# Patient Record
Sex: MALE | Race: WHITE | NOT HISPANIC OR LATINO | Employment: OTHER | URBAN - METROPOLITAN AREA
[De-identification: names, ages, dates, MRNs, and addresses within clinical notes are randomized per-mention and may not be internally consistent; named-entity substitution may affect disease eponyms.]

---

## 2019-08-16 NOTE — TELEPHONE ENCOUNTER
ONCOLOGY INTAKE: Records Information      APPT INFORMATION:  Referring provider:  Self Referred  Referring provider s clinic:  na  Reason for visit/diagnosis:  glioblastoma  Has patient been notified of appointment date and time?: yes, per pt wife    RECORDS INFORMATION:  Were the records received with the referral (via [employer])? no    Has patient been seen for any external appt for this diagnosis? yes    If yes, where? [facility]    Has patient had any imaging or procedures outside of [facility] for this condition? yes      If Yes, where? [facility]    ADDITIONAL INFORMATION:  Patient lives in both [place] and [place].  Patient will be moving to [place] on 8/30/2019.  Per pt wife patient was diagnosed with GBM in June 2019 and has been going through a clinical trial at [facility] in [facility].    Patient is being treated by Dr. Wilmer Moreno at [facility] who specifically referred patient to Dr. Avila.  [facility] contact number is [phone]

## 2019-08-19 NOTE — TELEPHONE ENCOUNTER
RECORDS STATUS - ALL OTHER DIAGNOSIS      RECORDS RECEIVED FROM: [facility]   DATE RECEIVED: 08/19/2019   NOTES STATUS DETAILS   OFFICE NOTE from referring provider YES CE   OFFICE NOTE from medical oncologist YES CE   DISCHARGE SUMMARY from hospital YES CE   DISCHARGE REPORT from the ER NA    OPERATIVE REPORT YES 06/27/2019/CE   MEDICATION LIST YES CE   CLINICAL TRIAL TREATMENTS TO DATE NA    LABS YES CE   PATHOLOGY REPORTS PENDING REQUEST SENT TO [facility] 08/19/2019   ANYTHING RELATED TO DIAGNOSIS NA    GENONOMIC TESTING NA    TYPE:     IMAGING (NEED IMAGES & REPORT) PENDING REQUEST SENT TO [facility] 08/19/2019   CT SCANS     MRI     MAMMO     ULTRASOUND     PET

## 2019-09-06 ENCOUNTER — PRE VISIT (OUTPATIENT)
Dept: ONCOLOGY | Facility: CLINIC | Age: 54
End: 2019-09-06

## 2019-09-06 ENCOUNTER — ONCOLOGY VISIT (OUTPATIENT)
Dept: ONCOLOGY | Facility: CLINIC | Age: 54
End: 2019-09-06
Attending: PSYCHIATRY & NEUROLOGY
Payer: COMMERCIAL

## 2019-09-06 ENCOUNTER — PATIENT OUTREACH (OUTPATIENT)
Dept: CARE COORDINATION | Facility: CLINIC | Age: 54
End: 2019-09-06

## 2019-09-06 VITALS
RESPIRATION RATE: 18 BRPM | HEIGHT: 69 IN | SYSTOLIC BLOOD PRESSURE: 134 MMHG | OXYGEN SATURATION: 97 % | HEART RATE: 85 BPM | DIASTOLIC BLOOD PRESSURE: 83 MMHG | TEMPERATURE: 97.9 F | WEIGHT: 162.5 LBS | BODY MASS INDEX: 24.07 KG/M2

## 2019-09-06 DIAGNOSIS — C71.9 GLIOBLASTOMA (H): Primary | ICD-10-CM

## 2019-09-06 PROCEDURE — G0463 HOSPITAL OUTPT CLINIC VISIT: HCPCS | Mod: ZF

## 2019-09-06 PROCEDURE — 99205 OFFICE O/P NEW HI 60 MIN: CPT | Mod: ZP | Performed by: PSYCHIATRY & NEUROLOGY

## 2019-09-06 RX ORDER — OLOPATADINE HYDROCHLORIDE 1 MG/ML
1-2 SOLUTION/ DROPS OPHTHALMIC DAILY PRN
Status: ON HOLD | COMMUNITY
Start: 2008-04-16 | End: 2021-01-01

## 2019-09-06 RX ORDER — LEVETIRACETAM 1000 MG/1
1000 TABLET ORAL 2 TIMES DAILY
COMMUNITY
Start: 2019-06-23 | End: 2020-01-01

## 2019-09-06 RX ORDER — SENNOSIDES A AND B 8.6 MG/1
1 TABLET, FILM COATED ORAL DAILY PRN
COMMUNITY
End: 2020-04-02

## 2019-09-06 RX ORDER — ACETAZOLAMIDE 250 MG/1
250 TABLET ORAL 2 TIMES DAILY PRN
COMMUNITY
Start: 2018-12-29 | End: 2020-05-11

## 2019-09-06 RX ORDER — FLUTICASONE PROPIONATE 50 MCG
1 SPRAY, SUSPENSION (ML) NASAL AT BEDTIME
Status: ON HOLD | COMMUNITY
Start: 2008-04-16 | End: 2021-01-01

## 2019-09-06 SDOH — HEALTH STABILITY: MENTAL HEALTH: HOW OFTEN DO YOU HAVE A DRINK CONTAINING ALCOHOL?: 2-3 TIMES A WEEK

## 2019-09-06 SDOH — HEALTH STABILITY: MENTAL HEALTH: HOW MANY STANDARD DRINKS CONTAINING ALCOHOL DO YOU HAVE ON A TYPICAL DAY?: 3 OR 4

## 2019-09-06 ASSESSMENT — MIFFLIN-ST. JEOR: SCORE: 1549.6

## 2019-09-06 ASSESSMENT — PAIN SCALES - GENERAL: PAINLEVEL: NO PAIN (0)

## 2019-09-06 NOTE — LETTER
9/6/2019       RE: Erasto Maher  [address]  [facility] [address]     Dear Colleague,    Thank you for referring your patient, Erasto Maher, to the [facility]. Please see a copy of my visit note below.    NEURO-ONCOLOGY INITIAL VISIT  Sep 6, 2019    CHIEF COMPLAINT: Mr. 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 He is currently managed by Dr. Wilmer Mckenna at [facility] on an upfront clinical trial utilizing concurrent chemoradiation with temozolomide and atezolizumab. He completed this portion of his treatment on 8/30/2019.  He is presenting to this initial clinic visit for evaluation and to also establish care locally.     Accompanying him to this visit is Judith (wife).       HISTORY OF PRESENT ILLNESS  A summary of the patient s oncologic history is as follows;   -PRESENTATION: New onset seizures; complex partial event with speech arrest and altered mental status lasting several minutes. Later had secondary generalization. Started Keppra.   -MR brain imaging with a ring enhancing lesion in the right parietal lobe.    -6/27/2019 SURGERY: Right temporal craniotomy for mass resection, gross total resection by Dr. Tam Barreto.  PATHOLOGY: Glioblastoma; IDH1 R132H wildtype, MGMT promoter unmethylated  -7/3/2019 CLINICAL TRIAL: Evaluated by Dr. Wilmer Mckenna at [facility], consented for clinical trial 2016-0867 (Standard of Care plus atezolizumab).  -7/22 - 8/30/2019 CHEMORADS: 60cGy with concurrent temozolomide 75mg/m2/day (145mg) plus atezolizumab Q2 weeks on clinical trial 2016-0867.  -9/6/2019 NEURO-ONC: Evaluated at the [facility].   -9/25/2019 Projected start date of adjuvant chemotherapy; temozolomide + atezolizumab 840 mg IV on clinical trial 2016-0867 + baseline, post-radiation imaging.    Today in clinic;   -Erasto does  "complain of becoming easily fatigued. Played golf yesterday + used a cart and left very tired afterwards. Generalized weakness/ not as conditioned, notes less muscle mass thorughout.   -About 1-2 mornings a week, experiences right lateral thigh numbness that resolves within 10 minutes of waking; seems consistent with meralgia paresthetica.   -Experienced mild to moderate constipation with treatment.   -Denies any headaches or abnormalities with vision.  -Mild cognitive slowing/ mental \"fog\", especially after starting on Keppra. Some difficulty with remembering names.   -No recurrent seizure events.   -Off all steroids.  -Mood is good, though more irritable when fatigued.     REVIEW OF SYSTEMS  A comprehensive ROS negative except as in HPI.      MEDICATIONS   Current Outpatient Medications   Medication Sig Dispense Refill     fluticasone (FLONASE) 50 MCG/ACT nasal spray Spray 1 spray in nostril       levETIRAcetam (KEPPRA) 1000 MG tablet Take 1,000 mg by mouth       loratadine (CLARITIN) 5 MG/5ML syrup        melatonin 5 MG tablet Take 5 mg by mouth       olopatadine (PATANOL) 0.1 % ophthalmic solution Apply 1-2 drops to eye       omeprazole (PRILOSEC) 20 MG DR capsule Take 1 capsule by mouth       senna (SENOKOT) 8.6 MG tablet Take 1 tablet by mouth       acetaZOLAMIDE (DIAMOX) 250 MG tablet Take 250 mg by mouth       DRUG ALLERGIES   Allergies   Allergen Reactions     Erythromycin Diarrhea     PN: LW Reaction: GI Upset  PN: LW Reaction: GI Upset       No Clinical Screening - See Comments Rash     PN: LW Other1: -cats, hairy dogs  PN: LW Other1: -cats, hairy dogs       PAST MEDICAL HISTORY   Glioblastoma  Epilepsy  Findings on a cardiac stress test  Basal cell carcinoma    PAST SURGICAL HISTORY   Craniotomy  Hernia   Appendectomy   Mohs procedure     SOCIAL HISTORY   History   Smoking Status     Never Smoker   Smokeless Tobacco     Never Used    Social History    Substance and Sexual Activity      Alcohol use: Not " "Currently        Frequency: 2-3 times a week        Drinks per session: 3 or 4     History   Drug Use Unknown   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   .     FAMILY HISTORY   Maternal Grandfather- Colon cancer.   Paternal grandmother- Diabetes.  Mother- Colon polyps.  Father- Colon polyps. Pre-diabetes.   Son- Type 1 diabetes.   Brother- Pre-diabetes.       PHYSICAL EXAMINATION  /83 (BP Location: Left arm, Patient Position: Sitting, Cuff Size: Adult Regular)   Pulse 85   Temp 97.9  F (36.6  C) (Oral)   Resp 18   Ht 1.74 m (5' 8.5\")   Wt 73.7 kg (162 lb 8 oz)   SpO2 97%   BMI 24.35 kg/m      Wt Readings from Last 2 Encounters:   09/06/19 73.7 kg (162 lb 8 oz)      Ht Readings from Last 2 Encounters:   09/06/19 1.74 m (5' 8.5\")     KPS: 100    -Generally well appearing.  -Throat: No oral thrush. Dry mouth.   -Respiratory: Normal breath sounds, no audible wheezing.   -Skin: No rashes. Healed head incision. Hair loss.   -Hematologic/ lymphatic: No abnormal bruising. No leg swelling.  -Psychiatric: Normal mood and affect. Pleasant, talkative.  -Neurologic:   MENTAL STATUS:     Alert, oriented to date.    Recall: Immediate 3/3, delayed 3/3.    Speech fluent. Comprehension intact to multi-step commands.   Normal naming, repetition. Able to read.   Good right-left orientation.     CRANIAL NERVES:     Discs flat on fundoscopy.    Pupils are equal, round, reactive to light.     Extraocular movements full, patient denies diplopia.     Visual fields full.     Facial sensation intact to light touch.   Symmetric facial movements.   Hearing intact.   Palate moves symmetrically.     Sternocleidomastoid and trapezius strength intact.   Tongue midline.  MOTOR:    Normal and symmetric tone.   Grossly 5/5 throughout.    No pronation or drift. No orbiting.   Able to rise from a chair without use of arms.   On toe/ heel walk, equal distance from floor to heels/ toes.   SENSATION:    Intact to light touch " throughout.  COORDINATION:   Intact finger-nose with eyes open and closed.   REFLEXES:    Normal and symmetric.    Toes not tested. No clonus. No Hoffmans.   No grasp.    GAIT:  Walks without assistance.   Good speed. Normal stride length and heel strike. Normal turns. Normal arm swing.   Able to toe, heel walk. Able to tandem walk.       MEDICAL RECORDS  Obtained and personally reviewed all available outside medical records in addition to reviewing any records available in our electronic system.     LABS  Personally reviewed all available lab results.     IMAGING  Personally reviewed post-operative MR brain imaging from July.      IMPRESSION  For the 60 minute appointment, more than 50% of the encounter was spent discussing in detail the nature of this tumor, providing emotional support, answering questions, and providing local resources.     With regard to cancer-directed therapy, a multimodal treatment approach first involves attempting a maximum safe surgical resection. In this case, a gross total resection was performed and Erasto has already undergone chemoradiotherapy with temozolomide in addition to atezolizumab on a clinical trial at [facility] Of note, MGMT promoter status was finalized as unmethylated. Discussed the meaning of this with Erasto.     PROBLEM LIST  Glioblastoma, MGMT unmethylated and IDH1 wildtype by IHC  Seizure  Chemotherapy-associated constipation    PLAN  -CANCER-DIRECTED THERAPY-  -Continue to follow with Dr. Wilmer Mckenna at [facility] and remain on clinical trial.   -Adjuvant chemotherapy with temozolomide and atezolizumab due to start later this month.   -Consideration for a lower dose of Zofran (4mg) when restarting temozolomide to possibly avoid treatment-associated constipation.     -STEROIDS-  -Currently off dexamethasone.    -SEIZURE MANAGEMENT-  -Given history of seizures, will continue current antiepileptic regimen of Keppra for the foreseeable future.  -Discussed risk  factors for breakthrough seizures. Discussed the social, infrequent use of alcohol. Provided instructions.     -Quality of life/ MOOD/ FATIGUE-  -Denies any mood issues. Becomes fatigued easily, continue to monitor.   -Continue to monitor mood as untreated/ undertreated depression can worsen fatigue, dysorexia, and quality of life.    -ADDITIONAL SUPPORTIVE MANAGEMENT-  -Provided instructions on combating dry mouth.   -Social work provided information about hair care.     Return to clinic as needed.     Ericka Avila MD  Neuro-oncology

## 2019-09-06 NOTE — PROGRESS NOTES
NEURO-ONCOLOGY INITIAL VISIT  Sep 6, 2019    CHIEF COMPLAINT: Mr. 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 He is currently managed by Dr. Wilmer Mckenna at [facility] on an upfront clinical trial utilizing concurrent chemoradiation with temozolomide and atezolizumab. He completed this portion of his treatment on 8/30/2019.  He is presenting to this initial clinic visit for evaluation and to also establish care locally.     Accompanying him to this visit is Judith (wife).       HISTORY OF PRESENT ILLNESS  A summary of the patient s oncologic history is as follows;   -PRESENTATION: New onset seizures; complex partial event with speech arrest and altered mental status lasting several minutes. Later had secondary generalization. Started Keppra.   -MR brain imaging with a ring enhancing lesion in the right parietal lobe.    -6/27/2019 SURGERY: Right temporal craniotomy for mass resection, gross total resection by Dr. Tam Barreto.  PATHOLOGY: Glioblastoma; IDH1 R132H wildtype, MGMT promoter unmethylated  -7/3/2019 CLINICAL TRIAL: Evaluated by Dr. Wilmer Mckenna at [facility], consented for clinical trial 4169-0600 (Standard of Care plus atezolizumab).  -7/22 - 8/30/2019 CHEMORADS: 60cGy with concurrent temozolomide 75mg/m2/day (145mg) plus atezolizumab Q2 weeks on clinical trial 0495-2838.  -9/6/2019 NEURO-ONC: Evaluated at the [facility].   -9/25/2019 Projected start date of adjuvant chemotherapy; temozolomide + atezolizumab 840 mg IV on clinical trial 6300-9580 + baseline, post-radiation imaging.    Today in clinic;   -Erasto does complain of becoming easily fatigued. Played golf yesterday + used a cart and left very tired afterwards. Generalized weakness/ not as conditioned, notes less muscle mass thorughout.   -About 1-2 mornings a week, experiences right lateral thigh  "numbness that resolves within 10 minutes of waking; seems consistent with meralgia paresthetica.   -Experienced mild to moderate constipation with treatment.   -Denies any headaches or abnormalities with vision.  -Mild cognitive slowing/ mental \"fog\", especially after starting on Keppra. Some difficulty with remembering names.   -No recurrent seizure events.   -Off all steroids.  -Mood is good, though more irritable when fatigued.     REVIEW OF SYSTEMS  A comprehensive ROS negative except as in HPI.      MEDICATIONS   Current Outpatient Medications   Medication Sig Dispense Refill     fluticasone (FLONASE) 50 MCG/ACT nasal spray Spray 1 spray in nostril       levETIRAcetam (KEPPRA) 1000 MG tablet Take 1,000 mg by mouth       loratadine (CLARITIN) 5 MG/5ML syrup        melatonin 5 MG tablet Take 5 mg by mouth       olopatadine (PATANOL) 0.1 % ophthalmic solution Apply 1-2 drops to eye       omeprazole (PRILOSEC) 20 MG DR capsule Take 1 capsule by mouth       senna (SENOKOT) 8.6 MG tablet Take 1 tablet by mouth       acetaZOLAMIDE (DIAMOX) 250 MG tablet Take 250 mg by mouth       DRUG ALLERGIES   Allergies   Allergen Reactions     Erythromycin Diarrhea     PN: LW Reaction: GI Upset  PN: LW Reaction: GI Upset       No Clinical Screening - See Comments Rash     PN: LW Other1: -cats, hairy dogs  PN: LW Other1: -cats, hairy dogs       PAST MEDICAL HISTORY   Glioblastoma  Epilepsy  Findings on a cardiac stress test  Basal cell carcinoma    PAST SURGICAL HISTORY   Craniotomy  Hernia   Appendectomy   Mohs procedure     SOCIAL HISTORY   History   Smoking Status     Never Smoker   Smokeless Tobacco     Never Used    Social History    Substance and Sexual Activity      Alcohol use: Not Currently        Frequency: 2-3 times a week        Drinks per session: 3 or 4     History   Drug Use Unknown   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   .     FAMILY HISTORY   Maternal Grandfather- Colon cancer.   Paternal grandmother- " "Diabetes.  Mother- Colon polyps.  Father- Colon polyps. Pre-diabetes.   Son- Type 1 diabetes.   Brother- Pre-diabetes.       PHYSICAL EXAMINATION  /83 (BP Location: Left arm, Patient Position: Sitting, Cuff Size: Adult Regular)   Pulse 85   Temp 97.9  F (36.6  C) (Oral)   Resp 18   Ht 1.74 m (5' 8.5\")   Wt 73.7 kg (162 lb 8 oz)   SpO2 97%   BMI 24.35 kg/m     Wt Readings from Last 2 Encounters:   09/06/19 73.7 kg (162 lb 8 oz)      Ht Readings from Last 2 Encounters:   09/06/19 1.74 m (5' 8.5\")     KPS: 100    -Generally well appearing.  -Throat: No oral thrush. Dry mouth.   -Respiratory: Normal breath sounds, no audible wheezing.   -Skin: No rashes. Healed head incision. Hair loss.   -Hematologic/ lymphatic: No abnormal bruising. No leg swelling.  -Psychiatric: Normal mood and affect. Pleasant, talkative.  -Neurologic:   MENTAL STATUS:     Alert, oriented to date.    Recall: Immediate 3/3, delayed 3/3.    Speech fluent. Comprehension intact to multi-step commands.   Normal naming, repetition. Able to read.   Good right-left orientation.     CRANIAL NERVES:     Discs flat on fundoscopy.    Pupils are equal, round, reactive to light.     Extraocular movements full, patient denies diplopia.     Visual fields full.     Facial sensation intact to light touch.   Symmetric facial movements.   Hearing intact.   Palate moves symmetrically.     Sternocleidomastoid and trapezius strength intact.   Tongue midline.  MOTOR:    Normal and symmetric tone.   Grossly 5/5 throughout.    No pronation or drift. No orbiting.   Able to rise from a chair without use of arms.   On toe/ heel walk, equal distance from floor to heels/ toes.   SENSATION:    Intact to light touch throughout.  COORDINATION:   Intact finger-nose with eyes open and closed.   REFLEXES:    Normal and symmetric.    Toes not tested. No clonus. No Hoffmans.   No grasp.    GAIT:  Walks without assistance.   Good speed. Normal stride length and heel strike. " Normal turns. Normal arm swing.   Able to toe, heel walk. Able to tandem walk.       MEDICAL RECORDS  Obtained and personally reviewed all available outside medical records in addition to reviewing any records available in our electronic system.     LABS  Personally reviewed all available lab results.     IMAGING  Personally reviewed post-operative MR brain imaging from July.      IMPRESSION  For the 60 minute appointment, more than 50% of the encounter was spent discussing in detail the nature of this tumor, providing emotional support, answering questions, and providing local resources.     With regard to cancer-directed therapy, a multimodal treatment approach first involves attempting a maximum safe surgical resection. In this case, a gross total resection was performed and Erasto has already undergone chemoradiotherapy with temozolomide in addition to atezolizumab on a clinical trial at [facility]. Of note, MGMT promoter status was finalized as unmethylated. Discussed the meaning of this with Erasto.     PROBLEM LIST  Glioblastoma, MGMT unmethylated and IDH1 wildtype by IHC  Seizure  Chemotherapy-associated constipation    PLAN  -CANCER-DIRECTED THERAPY-  -Continue to follow with Dr. Wilmer Mckenna at [facility] and remain on clinical trial.   -Adjuvant chemotherapy with temozolomide and atezolizumab due to start later this month.   -Consideration for a lower dose of Zofran (4mg) when restarting temozolomide to possibly avoid treatment-associated constipation.     -STEROIDS-  -Currently off dexamethasone.    -SEIZURE MANAGEMENT-  -Given history of seizures, will continue current antiepileptic regimen of Keppra for the foreseeable future.  -Discussed risk factors for breakthrough seizures. Discussed the social, infrequent use of alcohol. Provided instructions.     -Quality of life/ MOOD/ FATIGUE-  -Denies any mood issues. Becomes fatigued easily, continue to monitor.   -Continue to monitor mood as untreated/  undertreated depression can worsen fatigue, dysorexia, and quality of life.    -ADDITIONAL SUPPORTIVE MANAGEMENT-  -Provided instructions on combating dry mouth.   -Social work provided information about hair care.     Return to clinic as needed.     Ericka Avila MD  Neuro-oncology

## 2019-09-06 NOTE — NURSING NOTE
"Oncology Rooming Note    September 6, 2019 10:55 AM   Erasto Maher is a 54 year old male who presents for:    No chief complaint on file.    Initial Vitals: /83 (BP Location: Left arm, Patient Position: Sitting, Cuff Size: Adult Regular)   Pulse 85   Temp 97.9  F (36.6  C) (Oral)   Resp 18   Ht 1.74 m (5' 8.5\")   Wt 73.7 kg (162 lb 8 oz)   SpO2 97%   BMI 24.35 kg/m   Estimated body mass index is 24.35 kg/m  as calculated from the following:    Height as of this encounter: 1.74 m (5' 8.5\").    Weight as of this encounter: 73.7 kg (162 lb 8 oz). Body surface area is 1.89 meters squared.  No Pain (0) Comment: Data Unavailable   No LMP for male patient.  Allergies reviewed: Yes  Medications reviewed: Yes    Medications: Medication refills not needed today.  Pharmacy name entered into hi5: [id number] [place] [id number] AT [facility]    Clinical concerns: No new concerns. Provider was notified.      Xi Michel LPN              "

## 2019-09-06 NOTE — PROGRESS NOTES
Social Work Follow-Up Encounter Visit  Oncology Clinic    Data/Intervention:  Patient Name:  Erasto Maher  /Age:  1965 (54 year old)    Reason for Follow-Up:  Hair loss salon recommendation request    Collaborated With:    -Dr Avila  -patient  -pt's spouse    Resources Provided:  [facility] (formerly [facility])     Assessment:  Received request from Dr Avila, pt inquiring about recommendation for hair loss salon recommendation.  SW met with pt and pt's spouse to offer information for [facility].  Pt and pt's spouse appreciative of information.  No other questions or concerns for SW at this time.      Plan:  SW available as necessary.           INDIA Fischer, MSW   - [facility]  Phone: [phone]  Pager: [phone]  2019

## 2019-09-06 NOTE — PATIENT INSTRUCTIONS
Social work regarding hair care.     Plan for dry mouth:  -Good oral hygiene is very important.   -Keep mouth and lips moist by rinsing with water frequently (every 2 hours while awake). Salt or baking soda (1/2 to 1 teaspoon in 8 ounces of water) can be added for improved effect as well as using a commercially available saliva substitute, like Biotene.  -Apply lip moisturizer/chap stick.  -Suck on hard candies.    ALCOHOL USE:  Discussed 1 drink/ day. A drink is defined as;   12-ounces of beer (5% alcohol content).   8-ounces of malt liquor (7% alcohol content).   5-ounces of wine (12% alcohol content).   1.5-ounces or a  shot  of 80-proof (40% alcohol content) distilled spirits or liquor (e.g., gin, rum, vodka, whiskey).    Consideration for a lower dose of Zofran (4mg) when restarting temozolomide to possibly avoid treatment-associated constipation.     Continue treatment on clinical trial through [facility].     Return to clinic as needed. Please continue to keep us updated.     Ericka Avila MD  Neuro-oncology  9/6/2019

## 2020-01-01 ENCOUNTER — ANCILLARY PROCEDURE (OUTPATIENT)
Dept: MRI IMAGING | Facility: CLINIC | Age: 55
End: 2020-01-01
Attending: PSYCHIATRY & NEUROLOGY
Payer: COMMERCIAL

## 2020-01-01 ENCOUNTER — ONCOLOGY VISIT (OUTPATIENT)
Dept: RADIATION ONCOLOGY | Facility: CLINIC | Age: 55
End: 2020-01-01

## 2020-01-01 ENCOUNTER — TUMOR CONFERENCE (OUTPATIENT)
Dept: ONCOLOGY | Facility: CLINIC | Age: 55
End: 2020-01-01

## 2020-01-01 ENCOUNTER — PRE VISIT (OUTPATIENT)
Dept: RADIATION ONCOLOGY | Facility: CLINIC | Age: 55
End: 2020-01-01

## 2020-01-01 ENCOUNTER — OFFICE VISIT (OUTPATIENT)
Dept: RADIATION ONCOLOGY | Facility: CLINIC | Age: 55
End: 2020-01-01
Attending: RADIOLOGY
Payer: COMMERCIAL

## 2020-01-01 ENCOUNTER — PREP FOR PROCEDURE (OUTPATIENT)
Dept: NEUROSURGERY | Facility: CLINIC | Age: 55
End: 2020-01-01

## 2020-01-01 ENCOUNTER — APPOINTMENT (OUTPATIENT)
Dept: LAB | Facility: CLINIC | Age: 55
End: 2020-01-01
Attending: PSYCHIATRY & NEUROLOGY
Payer: COMMERCIAL

## 2020-01-01 ENCOUNTER — TELEPHONE (OUTPATIENT)
Dept: ONCOLOGY | Facility: CLINIC | Age: 55
End: 2020-01-01

## 2020-01-01 ENCOUNTER — HOSPITAL ENCOUNTER (OUTPATIENT)
Dept: MRI IMAGING | Facility: CLINIC | Age: 55
End: 2020-12-04
Attending: RADIOLOGY
Payer: COMMERCIAL

## 2020-01-01 ENCOUNTER — TELEPHONE (OUTPATIENT)
Dept: NEUROSURGERY | Facility: CLINIC | Age: 55
End: 2020-01-01

## 2020-01-01 ENCOUNTER — VIRTUAL VISIT (OUTPATIENT)
Dept: NEUROSURGERY | Facility: CLINIC | Age: 55
End: 2020-01-01
Attending: NEUROLOGICAL SURGERY
Payer: COMMERCIAL

## 2020-01-01 ENCOUNTER — VIRTUAL VISIT (OUTPATIENT)
Dept: ONCOLOGY | Facility: CLINIC | Age: 55
End: 2020-01-01
Attending: PSYCHIATRY & NEUROLOGY
Payer: COMMERCIAL

## 2020-01-01 ENCOUNTER — PATIENT OUTREACH (OUTPATIENT)
Dept: ONCOLOGY | Facility: CLINIC | Age: 55
End: 2020-01-01

## 2020-01-01 ENCOUNTER — OFFICE VISIT (OUTPATIENT)
Dept: RADIATION ONCOLOGY | Facility: CLINIC | Age: 55
End: 2020-01-01
Attending: NEUROLOGICAL SURGERY
Payer: COMMERCIAL

## 2020-01-01 ENCOUNTER — HEALTH MAINTENANCE LETTER (OUTPATIENT)
Age: 55
End: 2020-01-01

## 2020-01-01 ENCOUNTER — PRE VISIT (OUTPATIENT)
Dept: ONCOLOGY | Facility: CLINIC | Age: 55
End: 2020-01-01

## 2020-01-01 ENCOUNTER — RESEARCH ENCOUNTER (OUTPATIENT)
Dept: ONCOLOGY | Facility: CLINIC | Age: 55
End: 2020-01-01

## 2020-01-01 ENCOUNTER — DOCUMENTATION ONLY (OUTPATIENT)
Dept: ONCOLOGY | Facility: CLINIC | Age: 55
End: 2020-01-01

## 2020-01-01 VITALS
TEMPERATURE: 97.8 F | WEIGHT: 168.2 LBS | OXYGEN SATURATION: 98 % | HEART RATE: 81 BPM | SYSTOLIC BLOOD PRESSURE: 131 MMHG | BODY MASS INDEX: 24.13 KG/M2 | RESPIRATION RATE: 16 BRPM | DIASTOLIC BLOOD PRESSURE: 84 MMHG

## 2020-01-01 VITALS
HEART RATE: 82 BPM | TEMPERATURE: 97 F | BODY MASS INDEX: 23.89 KG/M2 | SYSTOLIC BLOOD PRESSURE: 116 MMHG | OXYGEN SATURATION: 96 % | DIASTOLIC BLOOD PRESSURE: 80 MMHG | RESPIRATION RATE: 16 BRPM | WEIGHT: 166.5 LBS

## 2020-01-01 VITALS
OXYGEN SATURATION: 98 % | SYSTOLIC BLOOD PRESSURE: 127 MMHG | RESPIRATION RATE: 16 BRPM | DIASTOLIC BLOOD PRESSURE: 77 MMHG | HEART RATE: 73 BPM

## 2020-01-01 VITALS
DIASTOLIC BLOOD PRESSURE: 92 MMHG | OXYGEN SATURATION: 98 % | TEMPERATURE: 97.3 F | SYSTOLIC BLOOD PRESSURE: 138 MMHG | HEART RATE: 96 BPM | WEIGHT: 164.3 LBS | RESPIRATION RATE: 16 BRPM | BODY MASS INDEX: 23.57 KG/M2

## 2020-01-01 VITALS
RESPIRATION RATE: 16 BRPM | OXYGEN SATURATION: 96 % | HEART RATE: 80 BPM | SYSTOLIC BLOOD PRESSURE: 114 MMHG | DIASTOLIC BLOOD PRESSURE: 66 MMHG

## 2020-01-01 DIAGNOSIS — R45.89 DIFFICULTY COPING: Primary | ICD-10-CM

## 2020-01-01 DIAGNOSIS — C71.9 GLIOBLASTOMA (H): Primary | ICD-10-CM

## 2020-01-01 DIAGNOSIS — C71.3 MALIGNANT NEOPLASM OF PARIETAL LOBE OF BRAIN (H): Primary | ICD-10-CM

## 2020-01-01 DIAGNOSIS — T45.1X5A CHEMOTHERAPY INDUCED NEUTROPENIA (H): ICD-10-CM

## 2020-01-01 DIAGNOSIS — R45.89 DEPRESSED MOOD: ICD-10-CM

## 2020-01-01 DIAGNOSIS — G40.409 GENERALIZED TONIC-CLONIC SEIZURE (H): Primary | ICD-10-CM

## 2020-01-01 DIAGNOSIS — C71.3 MALIGNANT NEOPLASM OF PARIETAL LOBE OF BRAIN (H): ICD-10-CM

## 2020-01-01 DIAGNOSIS — Z51.11 CHEMOTHERAPY MANAGEMENT, ENCOUNTER FOR: ICD-10-CM

## 2020-01-01 DIAGNOSIS — R11.2 CHEMOTHERAPY-INDUCED NAUSEA AND VOMITING: ICD-10-CM

## 2020-01-01 DIAGNOSIS — C71.9 GLIOBLASTOMA (H): ICD-10-CM

## 2020-01-01 DIAGNOSIS — T45.1X5A CHEMOTHERAPY-INDUCED NAUSEA AND VOMITING: ICD-10-CM

## 2020-01-01 DIAGNOSIS — C71.9 GLIOBLASTOMA MULTIFORME OF BRAIN (H): Primary | ICD-10-CM

## 2020-01-01 DIAGNOSIS — R45.89 DIFFICULTY COPING: ICD-10-CM

## 2020-01-01 DIAGNOSIS — Z00.6 EXAMINATION OF PARTICIPANT OR CONTROL IN CLINICAL RESEARCH: ICD-10-CM

## 2020-01-01 DIAGNOSIS — D70.1 CHEMOTHERAPY INDUCED NEUTROPENIA (H): ICD-10-CM

## 2020-01-01 DIAGNOSIS — Z11.59 ENCOUNTER FOR SCREENING FOR OTHER VIRAL DISEASES: Primary | ICD-10-CM

## 2020-01-01 DIAGNOSIS — G93.6 BRAIN SWELLING (H): Primary | ICD-10-CM

## 2020-01-01 LAB
ALBUMIN SERPL-MCNC: 3.5 G/DL (ref 3.4–5)
ALBUMIN SERPL-MCNC: 3.7 G/DL (ref 3.4–5)
ALBUMIN SERPL-MCNC: 3.8 G/DL (ref 3.4–5)
ALP SERPL-CCNC: 59 U/L (ref 40–150)
ALP SERPL-CCNC: 60 U/L (ref 40–150)
ALP SERPL-CCNC: 62 U/L (ref 40–150)
ALT SERPL W P-5'-P-CCNC: 26 U/L (ref 0–70)
ALT SERPL W P-5'-P-CCNC: 28 U/L (ref 0–70)
ALT SERPL W P-5'-P-CCNC: 28 U/L (ref 0–70)
ANION GAP SERPL CALCULATED.3IONS-SCNC: 4 MMOL/L (ref 3–14)
ANION GAP SERPL CALCULATED.3IONS-SCNC: 4 MMOL/L (ref 3–14)
ANION GAP SERPL CALCULATED.3IONS-SCNC: 5 MMOL/L (ref 3–14)
AST SERPL W P-5'-P-CCNC: 13 U/L (ref 0–45)
AST SERPL W P-5'-P-CCNC: 15 U/L (ref 0–45)
AST SERPL W P-5'-P-CCNC: 17 U/L (ref 0–45)
BASOPHILS # BLD AUTO: 0 10E9/L (ref 0–0.2)
BASOPHILS NFR BLD AUTO: 0.5 %
BASOPHILS NFR BLD AUTO: 0.6 %
BASOPHILS NFR BLD AUTO: 0.7 %
BILIRUB SERPL-MCNC: 0.4 MG/DL (ref 0.2–1.3)
BILIRUB SERPL-MCNC: 0.5 MG/DL (ref 0.2–1.3)
BILIRUB SERPL-MCNC: 0.6 MG/DL (ref 0.2–1.3)
BUN SERPL-MCNC: 12 MG/DL (ref 7–30)
BUN SERPL-MCNC: 16 MG/DL (ref 7–30)
BUN SERPL-MCNC: 19 MG/DL (ref 7–30)
CALCIUM SERPL-MCNC: 8.7 MG/DL (ref 8.5–10.1)
CALCIUM SERPL-MCNC: 8.9 MG/DL (ref 8.5–10.1)
CALCIUM SERPL-MCNC: 9 MG/DL (ref 8.5–10.1)
CHLORIDE SERPL-SCNC: 104 MMOL/L (ref 94–109)
CHLORIDE SERPL-SCNC: 105 MMOL/L (ref 94–109)
CHLORIDE SERPL-SCNC: 107 MMOL/L (ref 94–109)
CO2 SERPL-SCNC: 27 MMOL/L (ref 20–32)
CO2 SERPL-SCNC: 29 MMOL/L (ref 20–32)
CO2 SERPL-SCNC: 30 MMOL/L (ref 20–32)
CREAT SERPL-MCNC: 0.84 MG/DL (ref 0.66–1.25)
CREAT SERPL-MCNC: 0.84 MG/DL (ref 0.66–1.25)
CREAT SERPL-MCNC: 0.87 MG/DL (ref 0.66–1.25)
DIFFERENTIAL METHOD BLD: ABNORMAL
EOSINOPHIL # BLD AUTO: 0.2 10E9/L (ref 0–0.7)
EOSINOPHIL # BLD AUTO: 0.2 10E9/L (ref 0–0.7)
EOSINOPHIL # BLD AUTO: 0.3 10E9/L (ref 0–0.7)
EOSINOPHIL NFR BLD AUTO: 4 %
EOSINOPHIL NFR BLD AUTO: 6.2 %
EOSINOPHIL NFR BLD AUTO: 7.5 %
ERYTHROCYTE [DISTWIDTH] IN BLOOD BY AUTOMATED COUNT: 12.7 % (ref 10–15)
ERYTHROCYTE [DISTWIDTH] IN BLOOD BY AUTOMATED COUNT: 12.9 % (ref 10–15)
ERYTHROCYTE [DISTWIDTH] IN BLOOD BY AUTOMATED COUNT: 13.2 % (ref 10–15)
GFR SERPL CREATININE-BSD FRML MDRD: >90 ML/MIN/{1.73_M2}
GLUCOSE SERPL-MCNC: 102 MG/DL (ref 70–99)
GLUCOSE SERPL-MCNC: 118 MG/DL (ref 70–99)
GLUCOSE SERPL-MCNC: 94 MG/DL (ref 70–99)
HCT VFR BLD AUTO: 33.6 % (ref 40–53)
HCT VFR BLD AUTO: 36.5 % (ref 40–53)
HCT VFR BLD AUTO: 38.5 % (ref 40–53)
HGB BLD-MCNC: 11.4 G/DL (ref 13.3–17.7)
HGB BLD-MCNC: 12.6 G/DL (ref 13.3–17.7)
HGB BLD-MCNC: 13.4 G/DL (ref 13.3–17.7)
IMM GRANULOCYTES # BLD: 0 10E9/L (ref 0–0.4)
IMM GRANULOCYTES NFR BLD: 0.2 %
IMM GRANULOCYTES NFR BLD: 0.2 %
IMM GRANULOCYTES NFR BLD: 0.3 %
LYMPHOCYTES # BLD AUTO: 0.6 10E9/L (ref 0.8–5.3)
LYMPHOCYTES # BLD AUTO: 0.7 10E9/L (ref 0.8–5.3)
LYMPHOCYTES # BLD AUTO: 0.9 10E9/L (ref 0.8–5.3)
LYMPHOCYTES NFR BLD AUTO: 13.7 %
LYMPHOCYTES NFR BLD AUTO: 17.2 %
LYMPHOCYTES NFR BLD AUTO: 28 %
MCH RBC QN AUTO: 33.2 PG (ref 26.5–33)
MCH RBC QN AUTO: 33.9 PG (ref 26.5–33)
MCH RBC QN AUTO: 34.2 PG (ref 26.5–33)
MCHC RBC AUTO-ENTMCNC: 33.9 G/DL (ref 31.5–36.5)
MCHC RBC AUTO-ENTMCNC: 34.5 G/DL (ref 31.5–36.5)
MCHC RBC AUTO-ENTMCNC: 34.8 G/DL (ref 31.5–36.5)
MCV RBC AUTO: 101 FL (ref 78–100)
MCV RBC AUTO: 95 FL (ref 78–100)
MCV RBC AUTO: 98 FL (ref 78–100)
MONOCYTES # BLD AUTO: 0.2 10E9/L (ref 0–1.3)
MONOCYTES # BLD AUTO: 0.4 10E9/L (ref 0–1.3)
MONOCYTES # BLD AUTO: 0.5 10E9/L (ref 0–1.3)
MONOCYTES NFR BLD AUTO: 10.8 %
MONOCYTES NFR BLD AUTO: 6.2 %
MONOCYTES NFR BLD AUTO: 9.8 %
NEUTROPHILS # BLD AUTO: 1.9 10E9/L (ref 1.6–8.3)
NEUTROPHILS # BLD AUTO: 2.8 10E9/L (ref 1.6–8.3)
NEUTROPHILS # BLD AUTO: 3.2 10E9/L (ref 1.6–8.3)
NEUTROPHILS NFR BLD AUTO: 58.7 %
NEUTROPHILS NFR BLD AUTO: 64.8 %
NEUTROPHILS NFR BLD AUTO: 70.6 %
NRBC # BLD AUTO: 0 10*3/UL
NRBC BLD AUTO-RTO: 0 /100
PLATELET # BLD AUTO: 116 10E9/L (ref 150–450)
PLATELET # BLD AUTO: 126 10E9/L (ref 150–450)
PLATELET # BLD AUTO: 137 10E9/L (ref 150–450)
POTASSIUM SERPL-SCNC: 3.9 MMOL/L (ref 3.4–5.3)
POTASSIUM SERPL-SCNC: 4.1 MMOL/L (ref 3.4–5.3)
POTASSIUM SERPL-SCNC: 4.1 MMOL/L (ref 3.4–5.3)
PROT SERPL-MCNC: 6.8 G/DL (ref 6.8–8.8)
RBC # BLD AUTO: 3.33 10E12/L (ref 4.4–5.9)
RBC # BLD AUTO: 3.72 10E12/L (ref 4.4–5.9)
RBC # BLD AUTO: 4.04 10E12/L (ref 4.4–5.9)
SARS-COV-2 RNA SPEC QL NAA+PROBE: NOT DETECTED
SODIUM SERPL-SCNC: 138 MMOL/L (ref 133–144)
SODIUM SERPL-SCNC: 138 MMOL/L (ref 133–144)
SODIUM SERPL-SCNC: 139 MMOL/L (ref 133–144)
SPECIMEN SOURCE: NORMAL
WBC # BLD AUTO: 3.3 10E9/L (ref 4–11)
WBC # BLD AUTO: 4.3 10E9/L (ref 4–11)
WBC # BLD AUTO: 4.5 10E9/L (ref 4–11)

## 2020-01-01 PROCEDURE — 85025 COMPLETE CBC W/AUTO DIFF WBC: CPT | Performed by: PSYCHIATRY & NEUROLOGY

## 2020-01-01 PROCEDURE — 77300 RADIATION THERAPY DOSE PLAN: CPT | Performed by: RADIOLOGY

## 2020-01-01 PROCEDURE — 99214 OFFICE O/P EST MOD 30 MIN: CPT | Mod: GT | Performed by: PSYCHIATRY & NEUROLOGY

## 2020-01-01 PROCEDURE — G0463 HOSPITAL OUTPT CLINIC VISIT: HCPCS

## 2020-01-01 PROCEDURE — 70552 MRI BRAIN STEM W/DYE: CPT

## 2020-01-01 PROCEDURE — 255N000002 HC RX 255 OP 636: Performed by: RADIOLOGY

## 2020-01-01 PROCEDURE — 999N001193 HC VIDEO/TELEPHONE VISIT; NO CHARGE

## 2020-01-01 PROCEDURE — 70552 MRI BRAIN STEM W/DYE: CPT | Mod: 26 | Performed by: RADIOLOGY

## 2020-01-01 PROCEDURE — A9585 GADOBUTROL INJECTION: HCPCS | Performed by: RADIOLOGY

## 2020-01-01 PROCEDURE — G0463 HOSPITAL OUTPT CLINIC VISIT: HCPCS | Mod: 25 | Performed by: RADIOLOGY

## 2020-01-01 PROCEDURE — 77334 RADIATION TREATMENT AID(S): CPT | Performed by: RADIOLOGY

## 2020-01-01 PROCEDURE — 99215 OFFICE O/P EST HI 40 MIN: CPT | Mod: ZP | Performed by: PSYCHIATRY & NEUROLOGY

## 2020-01-01 PROCEDURE — 77336 RADIATION PHYSICS CONSULT: CPT | Performed by: RADIOLOGY

## 2020-01-01 PROCEDURE — 90791 PSYCH DIAGNOSTIC EVALUATION: CPT | Mod: TEL | Performed by: PSYCHOLOGIST

## 2020-01-01 PROCEDURE — 77295 3-D RADIOTHERAPY PLAN: CPT | Performed by: RADIOLOGY

## 2020-01-01 PROCEDURE — 99215 OFFICE O/P EST HI 40 MIN: CPT | Performed by: PSYCHIATRY & NEUROLOGY

## 2020-01-01 PROCEDURE — 77290 THER RAD SIMULAJ FIELD CPLX: CPT | Mod: 26 | Performed by: RADIOLOGY

## 2020-01-01 PROCEDURE — 77334 RADIATION TREATMENT AID(S): CPT | Mod: 26 | Performed by: RADIOLOGY

## 2020-01-01 PROCEDURE — 85025 COMPLETE CBC W/AUTO DIFF WBC: CPT | Performed by: PATHOLOGY

## 2020-01-01 PROCEDURE — 77295 3-D RADIOTHERAPY PLAN: CPT | Mod: 26 | Performed by: RADIOLOGY

## 2020-01-01 PROCEDURE — 61796 SRS CRANIAL LESION SIMPLE: CPT | Performed by: NEUROLOGICAL SURGERY

## 2020-01-01 PROCEDURE — 77373 STRTCTC BDY RAD THER TX DLVR: CPT | Performed by: RADIOLOGY

## 2020-01-01 PROCEDURE — 70553 MRI BRAIN STEM W/O & W/DYE: CPT | Mod: GC | Performed by: STUDENT IN AN ORGANIZED HEALTH CARE EDUCATION/TRAINING PROGRAM

## 2020-01-01 PROCEDURE — 77263 THER RADIOLOGY TX PLNG CPLX: CPT | Performed by: RADIOLOGY

## 2020-01-01 PROCEDURE — G0463 HOSPITAL OUTPT CLINIC VISIT: HCPCS | Mod: ZF

## 2020-01-01 PROCEDURE — 77370 RADIATION PHYSICS CONSULT: CPT | Performed by: RADIOLOGY

## 2020-01-01 PROCEDURE — 70553 MRI BRAIN STEM W/O & W/DYE: CPT | Mod: GC | Performed by: RADIOLOGY

## 2020-01-01 PROCEDURE — A9585 GADOBUTROL INJECTION: HCPCS | Performed by: STUDENT IN AN ORGANIZED HEALTH CARE EDUCATION/TRAINING PROGRAM

## 2020-01-01 PROCEDURE — 99215 OFFICE O/P EST HI 40 MIN: CPT | Mod: 95 | Performed by: PSYCHIATRY & NEUROLOGY

## 2020-01-01 PROCEDURE — 77470 SPECIAL RADIATION TREATMENT: CPT | Performed by: RADIOLOGY

## 2020-01-01 PROCEDURE — 99215 OFFICE O/P EST HI 40 MIN: CPT | Mod: GT | Performed by: NEUROLOGICAL SURGERY

## 2020-01-01 PROCEDURE — 80053 COMPREHEN METABOLIC PANEL: CPT | Performed by: PSYCHIATRY & NEUROLOGY

## 2020-01-01 PROCEDURE — U0003 INFECTIOUS AGENT DETECTION BY NUCLEIC ACID (DNA OR RNA); SEVERE ACUTE RESPIRATORY SYNDROME CORONAVIRUS 2 (SARS-COV-2) (CORONAVIRUS DISEASE [COVID-19]), AMPLIFIED PROBE TECHNIQUE, MAKING USE OF HIGH THROUGHPUT TECHNOLOGIES AS DESCRIBED BY CMS-2020-01-R: HCPCS | Performed by: RADIOLOGY

## 2020-01-01 PROCEDURE — 77470 SPECIAL RADIATION TREATMENT: CPT | Mod: 26 | Performed by: RADIOLOGY

## 2020-01-01 PROCEDURE — 77290 THER RAD SIMULAJ FIELD CPLX: CPT | Performed by: RADIOLOGY

## 2020-01-01 PROCEDURE — 99213 OFFICE O/P EST LOW 20 MIN: CPT | Mod: 25 | Performed by: RADIOLOGY

## 2020-01-01 PROCEDURE — 77300 RADIATION THERAPY DOSE PLAN: CPT | Mod: 26 | Performed by: RADIOLOGY

## 2020-01-01 PROCEDURE — 61797 SRS CRAN LES SIMPLE ADDL: CPT | Performed by: NEUROLOGICAL SURGERY

## 2020-01-01 PROCEDURE — 77435 SBRT MANAGEMENT: CPT | Performed by: RADIOLOGY

## 2020-01-01 PROCEDURE — 80053 COMPREHEN METABOLIC PANEL: CPT | Performed by: PATHOLOGY

## 2020-01-01 PROCEDURE — 36592 COLLECT BLOOD FROM PICC: CPT

## 2020-01-01 RX ORDER — ALBUTEROL SULFATE 0.83 MG/ML
2.5 SOLUTION RESPIRATORY (INHALATION)
Status: CANCELLED | OUTPATIENT
Start: 2021-01-01

## 2020-01-01 RX ORDER — DIPHENHYDRAMINE HYDROCHLORIDE 50 MG/ML
50 INJECTION INTRAMUSCULAR; INTRAVENOUS
Status: CANCELLED
Start: 2021-01-01

## 2020-01-01 RX ORDER — GADOBUTROL 604.72 MG/ML
7.5 INJECTION INTRAVENOUS ONCE
Status: COMPLETED | OUTPATIENT
Start: 2020-01-01 | End: 2020-01-01

## 2020-01-01 RX ORDER — LEVETIRACETAM 1000 MG/1
1000 TABLET ORAL 2 TIMES DAILY
Qty: 180 TABLET | Refills: 1 | Status: SHIPPED | OUTPATIENT
Start: 2020-01-01 | End: 2021-01-01

## 2020-01-01 RX ORDER — NALOXONE HYDROCHLORIDE 0.4 MG/ML
.1-.4 INJECTION, SOLUTION INTRAMUSCULAR; INTRAVENOUS; SUBCUTANEOUS
Status: CANCELLED | OUTPATIENT
Start: 2021-01-01

## 2020-01-01 RX ORDER — ALBUTEROL SULFATE 90 UG/1
1-2 AEROSOL, METERED RESPIRATORY (INHALATION)
Status: CANCELLED
Start: 2021-01-01

## 2020-01-01 RX ORDER — MEPERIDINE HYDROCHLORIDE 25 MG/ML
25 INJECTION INTRAMUSCULAR; INTRAVENOUS; SUBCUTANEOUS EVERY 30 MIN PRN
Status: CANCELLED | OUTPATIENT
Start: 2021-01-01

## 2020-01-01 RX ORDER — SODIUM CHLORIDE 9 MG/ML
1000 INJECTION, SOLUTION INTRAVENOUS CONTINUOUS PRN
Status: CANCELLED
Start: 2021-01-01

## 2020-01-01 RX ORDER — DEXAMETHASONE 2 MG/1
4 TABLET ORAL
Qty: 30 TABLET | Refills: 1 | Status: SHIPPED | OUTPATIENT
Start: 2020-01-01 | End: 2021-01-01

## 2020-01-01 RX ORDER — LORAZEPAM 2 MG/ML
0.5 INJECTION INTRAMUSCULAR EVERY 4 HOURS PRN
Status: CANCELLED
Start: 2021-01-01

## 2020-01-01 RX ORDER — EPINEPHRINE 1 MG/ML
0.3 INJECTION, SOLUTION, CONCENTRATE INTRAVENOUS EVERY 5 MIN PRN
Status: CANCELLED | OUTPATIENT
Start: 2021-01-01

## 2020-01-01 RX ADMIN — GADOBUTROL 7.5 ML: 604.72 INJECTION INTRAVENOUS at 07:28

## 2020-01-01 RX ADMIN — GADOBUTROL 7.5 ML: 604.72 INJECTION INTRAVENOUS at 14:45

## 2020-01-01 RX ADMIN — GADOBUTROL 7.5 ML: 604.72 INJECTION INTRAVENOUS at 07:25

## 2020-01-01 RX ADMIN — GADOBUTROL 7.5 ML: 604.72 INJECTION INTRAVENOUS at 07:47

## 2020-01-01 SDOH — HEALTH STABILITY: MENTAL HEALTH: HOW OFTEN DO YOU HAVE A DRINK CONTAINING ALCOHOL?: 2-4 TIMES A MONTH

## 2020-01-01 SDOH — ECONOMIC STABILITY: TRANSPORTATION INSECURITY
IN THE PAST 12 MONTHS, HAS LACK OF TRANSPORTATION KEPT YOU FROM MEETINGS, WORK, OR FROM GETTING THINGS NEEDED FOR DAILY LIVING?: NOT ASKED

## 2020-01-01 SDOH — ECONOMIC STABILITY: FOOD INSECURITY: WITHIN THE PAST 12 MONTHS, YOU WORRIED THAT YOUR FOOD WOULD RUN OUT BEFORE YOU GOT MONEY TO BUY MORE.: NOT ASKED

## 2020-01-01 SDOH — ECONOMIC STABILITY: FOOD INSECURITY: WITHIN THE PAST 12 MONTHS, THE FOOD YOU BOUGHT JUST DIDN'T LAST AND YOU DIDN'T HAVE MONEY TO GET MORE.: NOT ASKED

## 2020-01-01 SDOH — ECONOMIC STABILITY: INCOME INSECURITY: HOW HARD IS IT FOR YOU TO PAY FOR THE VERY BASICS LIKE FOOD, HOUSING, MEDICAL CARE, AND HEATING?: NOT ASKED

## 2020-01-01 SDOH — ECONOMIC STABILITY: TRANSPORTATION INSECURITY
IN THE PAST 12 MONTHS, HAS THE LACK OF TRANSPORTATION KEPT YOU FROM MEDICAL APPOINTMENTS OR FROM GETTING MEDICATIONS?: NOT ASKED

## 2020-01-01 SDOH — HEALTH STABILITY: MENTAL HEALTH: HOW OFTEN DO YOU HAVE 6 OR MORE DRINKS ON ONE OCCASION?: NOT ASKED

## 2020-01-01 ASSESSMENT — PAIN SCALES - GENERAL
PAINLEVEL: NO PAIN (0)

## 2020-01-01 ASSESSMENT — ENCOUNTER SYMPTOMS
TINGLING: 1
GASTROINTESTINAL NEGATIVE: 1
DEPRESSION: 1
NERVOUS/ANXIOUS: 1
EYES NEGATIVE: 1
CARDIOVASCULAR NEGATIVE: 1
RESPIRATORY NEGATIVE: 1

## 2020-02-11 ENCOUNTER — TRANSFERRED RECORDS (OUTPATIENT)
Dept: HEALTH INFORMATION MANAGEMENT | Facility: CLINIC | Age: 55
End: 2020-02-11

## 2020-03-28 ENCOUNTER — APPOINTMENT (OUTPATIENT)
Dept: MRI IMAGING | Facility: CLINIC | Age: 55
DRG: 054 | End: 2020-03-28
Attending: STUDENT IN AN ORGANIZED HEALTH CARE EDUCATION/TRAINING PROGRAM
Payer: COMMERCIAL

## 2020-03-28 ENCOUNTER — APPOINTMENT (OUTPATIENT)
Dept: CT IMAGING | Facility: CLINIC | Age: 55
DRG: 054 | End: 2020-03-28
Attending: EMERGENCY MEDICINE
Payer: COMMERCIAL

## 2020-03-28 ENCOUNTER — HOSPITAL ENCOUNTER (INPATIENT)
Facility: CLINIC | Age: 55
LOS: 2 days | Discharge: HOME | DRG: 054 | End: 2020-03-30
Attending: EMERGENCY MEDICINE | Admitting: NEUROLOGICAL SURGERY
Payer: COMMERCIAL

## 2020-03-28 DIAGNOSIS — C71.9 GLIOBLASTOMA (H): Primary | ICD-10-CM

## 2020-03-28 DIAGNOSIS — G93.6 VASOGENIC EDEMA (H): ICD-10-CM

## 2020-03-28 DIAGNOSIS — R11.2 NAUSEA AND VOMITING, INTRACTABILITY OF VOMITING NOT SPECIFIED, UNSPECIFIED VOMITING TYPE: ICD-10-CM

## 2020-03-28 PROBLEM — D49.6 BRAIN TUMOR (H): Status: ACTIVE | Noted: 2020-03-28

## 2020-03-28 LAB
ALBUMIN SERPL-MCNC: 4.1 G/DL (ref 3.4–5)
ALP SERPL-CCNC: 67 U/L (ref 40–150)
ALT SERPL W P-5'-P-CCNC: 27 U/L (ref 0–70)
ANION GAP SERPL CALCULATED.3IONS-SCNC: 5 MMOL/L (ref 3–14)
AST SERPL W P-5'-P-CCNC: 13 U/L (ref 0–45)
BILIRUB SERPL-MCNC: 0.5 MG/DL (ref 0.2–1.3)
BUN SERPL-MCNC: 16 MG/DL (ref 7–30)
CALCIUM SERPL-MCNC: 9.1 MG/DL (ref 8.5–10.1)
CHLORIDE SERPL-SCNC: 96 MMOL/L (ref 94–109)
CO2 SERPL-SCNC: 29 MMOL/L (ref 20–32)
CREAT BLD-MCNC: 0.7 MG/DL (ref 0.66–1.25)
CREAT SERPL-MCNC: 0.75 MG/DL (ref 0.66–1.25)
ERYTHROCYTE [DISTWIDTH] IN BLOOD BY AUTOMATED COUNT: 11.9 % (ref 10–15)
GFR SERPL CREATININE-BSD FRML MDRD: >90 ML/MIN/{1.73_M2}
GFR SERPL CREATININE-BSD FRML MDRD: >90 ML/MIN/{1.73_M2}
GLUCOSE BLDC GLUCOMTR-MCNC: 156 MG/DL (ref 70–99)
GLUCOSE SERPL-MCNC: 143 MG/DL (ref 70–99)
HCT VFR BLD AUTO: 39.9 % (ref 40–53)
HGB BLD-MCNC: 13.8 G/DL (ref 13.3–17.7)
MCH RBC QN AUTO: 31.5 PG (ref 26.5–33)
MCHC RBC AUTO-ENTMCNC: 34.6 G/DL (ref 31.5–36.5)
MCV RBC AUTO: 91 FL (ref 78–100)
OSMOLALITY SERPL: 274 MMOL/KG (ref 275–295)
OSMOLALITY UR: 582 MMOL/KG (ref 100–1200)
PLATELET # BLD AUTO: 222 10E9/L (ref 150–450)
POTASSIUM SERPL-SCNC: 3.6 MMOL/L (ref 3.4–5.3)
PROT SERPL-MCNC: 7.1 G/DL (ref 6.8–8.8)
RBC # BLD AUTO: 4.38 10E12/L (ref 4.4–5.9)
SODIUM SERPL-SCNC: 127 MMOL/L (ref 133–144)
SODIUM SERPL-SCNC: 130 MMOL/L (ref 133–144)
SODIUM SERPL-SCNC: 132 MMOL/L (ref 133–144)
SODIUM UR-SCNC: 153 MMOL/L
SP GR UR STRIP: 1.02 (ref 1–1.03)
WBC # BLD AUTO: 9.5 10E9/L (ref 4–11)

## 2020-03-28 PROCEDURE — 84295 ASSAY OF SERUM SODIUM: CPT | Performed by: STUDENT IN AN ORGANIZED HEALTH CARE EDUCATION/TRAINING PROGRAM

## 2020-03-28 PROCEDURE — 25000132 ZZH RX MED GY IP 250 OP 250 PS 637: Performed by: STUDENT IN AN ORGANIZED HEALTH CARE EDUCATION/TRAINING PROGRAM

## 2020-03-28 PROCEDURE — 96375 TX/PRO/DX INJ NEW DRUG ADDON: CPT | Performed by: EMERGENCY MEDICINE

## 2020-03-28 PROCEDURE — 36415 COLL VENOUS BLD VENIPUNCTURE: CPT | Performed by: NEUROLOGICAL SURGERY

## 2020-03-28 PROCEDURE — 81003 URINALYSIS AUTO W/O SCOPE: CPT | Performed by: STUDENT IN AN ORGANIZED HEALTH CARE EDUCATION/TRAINING PROGRAM

## 2020-03-28 PROCEDURE — 25000128 H RX IP 250 OP 636: Performed by: STUDENT IN AN ORGANIZED HEALTH CARE EDUCATION/TRAINING PROGRAM

## 2020-03-28 PROCEDURE — 80053 COMPREHEN METABOLIC PANEL: CPT | Performed by: EMERGENCY MEDICINE

## 2020-03-28 PROCEDURE — 25800030 ZZH RX IP 258 OP 636: Performed by: STUDENT IN AN ORGANIZED HEALTH CARE EDUCATION/TRAINING PROGRAM

## 2020-03-28 PROCEDURE — 12000001 ZZH R&B MED SURG/OB UMMC

## 2020-03-28 PROCEDURE — 25000131 ZZH RX MED GY IP 250 OP 636 PS 637: Performed by: STUDENT IN AN ORGANIZED HEALTH CARE EDUCATION/TRAINING PROGRAM

## 2020-03-28 PROCEDURE — 25500064 ZZH RX 255 OP 636: Performed by: NEUROLOGICAL SURGERY

## 2020-03-28 PROCEDURE — 70553 MRI BRAIN STEM W/O & W/DYE: CPT

## 2020-03-28 PROCEDURE — 83930 ASSAY OF BLOOD OSMOLALITY: CPT | Performed by: EMERGENCY MEDICINE

## 2020-03-28 PROCEDURE — 84300 ASSAY OF URINE SODIUM: CPT | Performed by: STUDENT IN AN ORGANIZED HEALTH CARE EDUCATION/TRAINING PROGRAM

## 2020-03-28 PROCEDURE — 96376 TX/PRO/DX INJ SAME DRUG ADON: CPT | Performed by: EMERGENCY MEDICINE

## 2020-03-28 PROCEDURE — 96374 THER/PROPH/DIAG INJ IV PUSH: CPT | Performed by: EMERGENCY MEDICINE

## 2020-03-28 PROCEDURE — 84295 ASSAY OF SERUM SODIUM: CPT | Performed by: NEUROLOGICAL SURGERY

## 2020-03-28 PROCEDURE — 25000128 H RX IP 250 OP 636: Performed by: EMERGENCY MEDICINE

## 2020-03-28 PROCEDURE — 99285 EMERGENCY DEPT VISIT HI MDM: CPT | Mod: Z6 | Performed by: EMERGENCY MEDICINE

## 2020-03-28 PROCEDURE — 82565 ASSAY OF CREATININE: CPT

## 2020-03-28 PROCEDURE — A9585 GADOBUTROL INJECTION: HCPCS | Performed by: NEUROLOGICAL SURGERY

## 2020-03-28 PROCEDURE — 99285 EMERGENCY DEPT VISIT HI MDM: CPT | Mod: 25 | Performed by: EMERGENCY MEDICINE

## 2020-03-28 PROCEDURE — BW28ZZZ COMPUTERIZED TOMOGRAPHY (CT SCAN) OF HEAD: ICD-10-PCS | Performed by: RADIOLOGY

## 2020-03-28 PROCEDURE — 36415 COLL VENOUS BLD VENIPUNCTURE: CPT | Performed by: STUDENT IN AN ORGANIZED HEALTH CARE EDUCATION/TRAINING PROGRAM

## 2020-03-28 PROCEDURE — 00000146 ZZHCL STATISTIC GLUCOSE BY METER IP

## 2020-03-28 PROCEDURE — 25000128 H RX IP 250 OP 636

## 2020-03-28 PROCEDURE — 96361 HYDRATE IV INFUSION ADD-ON: CPT | Performed by: EMERGENCY MEDICINE

## 2020-03-28 PROCEDURE — 83935 ASSAY OF URINE OSMOLALITY: CPT | Performed by: STUDENT IN AN ORGANIZED HEALTH CARE EDUCATION/TRAINING PROGRAM

## 2020-03-28 PROCEDURE — 99223 1ST HOSP IP/OBS HIGH 75: CPT | Performed by: INTERNAL MEDICINE

## 2020-03-28 PROCEDURE — 85027 COMPLETE CBC AUTOMATED: CPT | Performed by: EMERGENCY MEDICINE

## 2020-03-28 PROCEDURE — 70450 CT HEAD/BRAIN W/O DYE: CPT

## 2020-03-28 RX ORDER — SODIUM CHLORIDE 9 MG/ML
INJECTION, SOLUTION INTRAVENOUS CONTINUOUS
Status: DISCONTINUED | OUTPATIENT
Start: 2020-03-28 | End: 2020-03-30 | Stop reason: HOSPADM

## 2020-03-28 RX ORDER — AMOXICILLIN 250 MG
1 CAPSULE ORAL 2 TIMES DAILY
Status: DISCONTINUED | OUTPATIENT
Start: 2020-03-28 | End: 2020-03-30 | Stop reason: HOSPADM

## 2020-03-28 RX ORDER — CALCIUM CARBONATE 500 MG/1
500 TABLET, CHEWABLE ORAL DAILY PRN
Status: DISCONTINUED | OUTPATIENT
Start: 2020-03-28 | End: 2020-03-30 | Stop reason: HOSPADM

## 2020-03-28 RX ORDER — ONDANSETRON 2 MG/ML
4 INJECTION INTRAMUSCULAR; INTRAVENOUS EVERY 6 HOURS PRN
Status: DISCONTINUED | OUTPATIENT
Start: 2020-03-28 | End: 2020-03-28

## 2020-03-28 RX ORDER — GADOBUTROL 604.72 MG/ML
10 INJECTION INTRAVENOUS ONCE
Status: COMPLETED | OUTPATIENT
Start: 2020-03-28 | End: 2020-03-28

## 2020-03-28 RX ORDER — LABETALOL 20 MG/4 ML (5 MG/ML) INTRAVENOUS SYRINGE
10-40 EVERY 10 MIN PRN
Status: DISCONTINUED | OUTPATIENT
Start: 2020-03-28 | End: 2020-03-30 | Stop reason: HOSPADM

## 2020-03-28 RX ORDER — ACETAMINOPHEN 325 MG/1
650 TABLET ORAL EVERY 4 HOURS PRN
Status: DISCONTINUED | OUTPATIENT
Start: 2020-03-31 | End: 2020-03-30 | Stop reason: HOSPADM

## 2020-03-28 RX ORDER — ONDANSETRON 4 MG/1
4-8 TABLET, ORALLY DISINTEGRATING ORAL EVERY 6 HOURS PRN
Status: DISCONTINUED | OUTPATIENT
Start: 2020-03-28 | End: 2020-03-30 | Stop reason: HOSPADM

## 2020-03-28 RX ORDER — ONDANSETRON 2 MG/ML
4 INJECTION INTRAMUSCULAR; INTRAVENOUS ONCE
Status: COMPLETED | OUTPATIENT
Start: 2020-03-28 | End: 2020-03-28

## 2020-03-28 RX ORDER — AMOXICILLIN 250 MG
2 CAPSULE ORAL 2 TIMES DAILY
Status: DISCONTINUED | OUTPATIENT
Start: 2020-03-28 | End: 2020-03-30 | Stop reason: HOSPADM

## 2020-03-28 RX ORDER — HYDROMORPHONE HYDROCHLORIDE 1 MG/ML
0.5 INJECTION, SOLUTION INTRAMUSCULAR; INTRAVENOUS; SUBCUTANEOUS ONCE
Status: COMPLETED | OUTPATIENT
Start: 2020-03-28 | End: 2020-03-28

## 2020-03-28 RX ORDER — PANTOPRAZOLE SODIUM 40 MG/1
40 TABLET, DELAYED RELEASE ORAL AT BEDTIME
Status: DISCONTINUED | OUTPATIENT
Start: 2020-03-28 | End: 2020-03-30 | Stop reason: HOSPADM

## 2020-03-28 RX ORDER — HYDRALAZINE HYDROCHLORIDE 20 MG/ML
10-20 INJECTION INTRAMUSCULAR; INTRAVENOUS EVERY 30 MIN PRN
Status: DISCONTINUED | OUTPATIENT
Start: 2020-03-28 | End: 2020-03-30 | Stop reason: HOSPADM

## 2020-03-28 RX ORDER — LIDOCAINE 40 MG/G
CREAM TOPICAL
Status: DISCONTINUED | OUTPATIENT
Start: 2020-03-28 | End: 2020-03-30 | Stop reason: HOSPADM

## 2020-03-28 RX ORDER — ACETAMINOPHEN 325 MG/1
975 TABLET ORAL EVERY 8 HOURS
Status: DISCONTINUED | OUTPATIENT
Start: 2020-03-28 | End: 2020-03-30 | Stop reason: HOSPADM

## 2020-03-28 RX ORDER — ONDANSETRON 2 MG/ML
4-8 INJECTION INTRAMUSCULAR; INTRAVENOUS EVERY 6 HOURS PRN
Status: DISCONTINUED | OUTPATIENT
Start: 2020-03-28 | End: 2020-03-30 | Stop reason: HOSPADM

## 2020-03-28 RX ORDER — ONDANSETRON 4 MG/1
4 TABLET, ORALLY DISINTEGRATING ORAL EVERY 6 HOURS PRN
Status: DISCONTINUED | OUTPATIENT
Start: 2020-03-28 | End: 2020-03-28

## 2020-03-28 RX ORDER — HYDROMORPHONE HYDROCHLORIDE 1 MG/ML
INJECTION, SOLUTION INTRAMUSCULAR; INTRAVENOUS; SUBCUTANEOUS
Status: COMPLETED
Start: 2020-03-28 | End: 2020-03-28

## 2020-03-28 RX ORDER — NALOXONE HYDROCHLORIDE 0.4 MG/ML
.1-.4 INJECTION, SOLUTION INTRAMUSCULAR; INTRAVENOUS; SUBCUTANEOUS
Status: DISCONTINUED | OUTPATIENT
Start: 2020-03-28 | End: 2020-03-30 | Stop reason: HOSPADM

## 2020-03-28 RX ORDER — OXYCODONE HYDROCHLORIDE 5 MG/1
5-10 TABLET ORAL
Status: DISCONTINUED | OUTPATIENT
Start: 2020-03-28 | End: 2020-03-30 | Stop reason: HOSPADM

## 2020-03-28 RX ORDER — SIMETHICONE 80 MG
80 TABLET,CHEWABLE ORAL EVERY 6 HOURS PRN
Status: DISCONTINUED | OUTPATIENT
Start: 2020-03-28 | End: 2020-03-30 | Stop reason: HOSPADM

## 2020-03-28 RX ORDER — DEXAMETHASONE 4 MG/1
4 TABLET ORAL DAILY
Status: DISCONTINUED | OUTPATIENT
Start: 2020-03-28 | End: 2020-03-30 | Stop reason: HOSPADM

## 2020-03-28 RX ORDER — PANTOPRAZOLE SODIUM 40 MG/1
40 TABLET, DELAYED RELEASE ORAL
Status: DISCONTINUED | OUTPATIENT
Start: 2020-03-29 | End: 2020-03-28

## 2020-03-28 RX ORDER — ONDANSETRON 2 MG/ML
4-8 INJECTION INTRAMUSCULAR; INTRAVENOUS EVERY 6 HOURS PRN
Status: DISCONTINUED | OUTPATIENT
Start: 2020-03-28 | End: 2020-03-28

## 2020-03-28 RX ORDER — LEVETIRACETAM 500 MG/1
1000 TABLET ORAL EVERY 12 HOURS
Status: DISCONTINUED | OUTPATIENT
Start: 2020-03-28 | End: 2020-03-30 | Stop reason: HOSPADM

## 2020-03-28 RX ADMIN — PROCHLORPERAZINE EDISYLATE 10 MG: 5 INJECTION INTRAMUSCULAR; INTRAVENOUS at 12:46

## 2020-03-28 RX ADMIN — ONDANSETRON 4 MG: 2 INJECTION INTRAMUSCULAR; INTRAVENOUS at 11:22

## 2020-03-28 RX ADMIN — LABETALOL 20 MG/4 ML (5 MG/ML) INTRAVENOUS SYRINGE 20 MG: at 23:47

## 2020-03-28 RX ADMIN — Medication 1 MG: at 23:27

## 2020-03-28 RX ADMIN — LABETALOL 20 MG/4 ML (5 MG/ML) INTRAVENOUS SYRINGE 20 MG: at 13:47

## 2020-03-28 RX ADMIN — ONDANSETRON 4 MG: 4 TABLET, ORALLY DISINTEGRATING ORAL at 08:40

## 2020-03-28 RX ADMIN — OXYCODONE HYDROCHLORIDE 10 MG: 5 TABLET ORAL at 18:03

## 2020-03-28 RX ADMIN — HYDROMORPHONE HYDROCHLORIDE 0.5 MG: 1 INJECTION, SOLUTION INTRAMUSCULAR; INTRAVENOUS; SUBCUTANEOUS at 03:54

## 2020-03-28 RX ADMIN — LABETALOL 20 MG/4 ML (5 MG/ML) INTRAVENOUS SYRINGE 10 MG: at 23:27

## 2020-03-28 RX ADMIN — ONDANSETRON 4 MG: 2 INJECTION INTRAMUSCULAR; INTRAVENOUS at 04:35

## 2020-03-28 RX ADMIN — PANTOPRAZOLE SODIUM 40 MG: 40 TABLET, DELAYED RELEASE ORAL at 22:52

## 2020-03-28 RX ADMIN — LABETALOL 20 MG/4 ML (5 MG/ML) INTRAVENOUS SYRINGE 20 MG: at 16:41

## 2020-03-28 RX ADMIN — LEVETIRACETAM 1000 MG: 500 TABLET ORAL at 08:41

## 2020-03-28 RX ADMIN — DEXAMETHASONE 4 MG: 4 TABLET ORAL at 13:47

## 2020-03-28 RX ADMIN — HYDROMORPHONE HYDROCHLORIDE 1 MG: 1 INJECTION, SOLUTION INTRAMUSCULAR; INTRAVENOUS; SUBCUTANEOUS at 04:26

## 2020-03-28 RX ADMIN — SENNOSIDES AND DOCUSATE SODIUM 1 TABLET: 8.6; 5 TABLET ORAL at 20:33

## 2020-03-28 RX ADMIN — LABETALOL 20 MG/4 ML (5 MG/ML) INTRAVENOUS SYRINGE 10 MG: at 12:47

## 2020-03-28 RX ADMIN — OXYCODONE HYDROCHLORIDE 5 MG: 5 TABLET ORAL at 08:41

## 2020-03-28 RX ADMIN — OXYCODONE HYDROCHLORIDE 10 MG: 5 TABLET ORAL at 22:13

## 2020-03-28 RX ADMIN — PROCHLORPERAZINE EDISYLATE 10 MG: 5 INJECTION INTRAMUSCULAR; INTRAVENOUS at 18:03

## 2020-03-28 RX ADMIN — ONDANSETRON 8 MG: 2 INJECTION INTRAMUSCULAR; INTRAVENOUS at 15:23

## 2020-03-28 RX ADMIN — GADOBUTROL 10 ML: 604.72 INJECTION INTRAVENOUS at 14:46

## 2020-03-28 RX ADMIN — SODIUM CHLORIDE: 9 INJECTION, SOLUTION INTRAVENOUS at 20:40

## 2020-03-28 RX ADMIN — Medication 1 MG: at 04:26

## 2020-03-28 RX ADMIN — OXYCODONE HYDROCHLORIDE 5 MG: 5 TABLET ORAL at 13:47

## 2020-03-28 RX ADMIN — OXYCODONE HYDROCHLORIDE 5 MG: 5 TABLET ORAL at 15:23

## 2020-03-28 RX ADMIN — LEVETIRACETAM 1000 MG: 500 TABLET ORAL at 20:33

## 2020-03-28 RX ADMIN — LEVOTHYROXINE SODIUM 125 MCG: 0.12 TABLET ORAL at 07:00

## 2020-03-28 RX ADMIN — SENNOSIDES AND DOCUSATE SODIUM 2 TABLET: 8.6; 5 TABLET ORAL at 08:41

## 2020-03-28 RX ADMIN — LABETALOL 20 MG/4 ML (5 MG/ML) INTRAVENOUS SYRINGE 20 MG: at 17:11

## 2020-03-28 RX ADMIN — SODIUM CHLORIDE: 9 INJECTION, SOLUTION INTRAVENOUS at 05:44

## 2020-03-28 RX ADMIN — ACETAMINOPHEN 975 MG: 325 TABLET, FILM COATED ORAL at 16:37

## 2020-03-28 RX ADMIN — CALCIUM CARBONATE (ANTACID) CHEW TAB 500 MG 500 MG: 500 CHEW TAB at 16:37

## 2020-03-28 ASSESSMENT — ACTIVITIES OF DAILY LIVING (ADL)
ADLS_ACUITY_SCORE: 16
ADLS_ACUITY_SCORE: 15
ADLS_ACUITY_SCORE: 16

## 2020-03-28 ASSESSMENT — VISUAL ACUITY: OU: NORMAL ACUITY

## 2020-03-28 NOTE — ED PROVIDER NOTES
ED Provider Note  [facility]      History     Chief Complaint   Patient presents with     Headache     HPI  Erasto Maher is a 54 year old male with glioblastoma who presents to the ER with a primary complaint of headache.  He states that he is currently undergoing therapy, including with an experimental medication.  He states he took his oral chemotherapy this past week, believes he finished on Monday or Tuesday.  He states that it is not unusual for him to feel little off for a few days surrounding this.  However, he is noticed a more than normal level of disorientation, states that his balance is been off, he has been walking into things.  He states last night he put his shirt on backwards.  He states that even when he does feel off following this medication is typically gone by now.  He states that tonight he walked into a door frame, striking his nose.  He states that within a short while of this he started to develop headache.  It is primarily frontal.  He states that the headache started mildly and gradually worsened.  That he rarely gets headaches.  No blurred vision or slurred speech.  No numbness, tingling, weakness, aside from questionable mild weakness in the left arm while in the ambulance which is now completely resolved.  No fever, sore throat, chest pain, shortness of breath, cough, abdominal pain, diarrhea.  He states he was nauseous and did vomit tonight after the headache got bad.  He gets a majority of his cancer treatment through facility in [place].  He states that his wife did speak with someone here who referred him to the ER.    History reviewed. No pertinent past medical history.    History reviewed. No pertinent surgical history.    History reviewed. No pertinent family history.    Social History     Tobacco Use     Smoking status: Never Smoker     Smokeless tobacco: Never Used   Substance Use Topics     Alcohol use: Not Currently     Frequency: 2-3 times a  week     Drinks per session: 3 or 4         Past Medical History  History reviewed. No pertinent past medical history.  History reviewed. No pertinent surgical history.  No current outpatient medications on file.    Allergies   Allergen Reactions     Erythromycin Diarrhea     PN: LW Reaction: GI Upset  PN: LW Reaction: GI Upset       No Clinical Screening - See Comments Rash     PN: LW Other1: -cats, hairy dogs  PN: LW Other1: -cats, hairy dogs     Past medical history, past surgical history, medications, and allergies were reviewed with the patient. Additional pertinent items: None    Family History  History reviewed. No pertinent family history.  Family history was reviewed with the patient. Additional pertinent items: None    Social History  Social History     Tobacco Use     Smoking status: Never Smoker     Smokeless tobacco: Never Used   Substance Use Topics     Alcohol use: Not Currently     Frequency: 2-3 times a week     Drinks per session: 3 or 4     Drug use: Not Currently      Social history was reviewed with the patient. Additional pertinent items: None    Review of Systems  A complete review of systems was performed with pertinent positives and negatives noted in the HPI, and all other systems negative.    Physical Exam   BP: (!) 163/106  Pulse: 95  Heart Rate: 100  Temp: 97.9  F (36.6  C)  Resp: 16  Weight: 95.8 kg (211 lb 3.2 oz)  SpO2: 100 %  Physical Exam  Constitutional:       General: He is not in acute distress.     Appearance: He is not diaphoretic.   HENT:      Head: Atraumatic.   Eyes:      General: No scleral icterus.     Pupils: Pupils are equal, round, and reactive to light.   Neck:      Musculoskeletal: Neck supple.   Cardiovascular:      Rate and Rhythm: Normal rate.   Pulmonary:      Effort: No respiratory distress.   Abdominal:      Palpations: Abdomen is soft.      Tenderness: There is no abdominal tenderness.   Musculoskeletal:         General: No tenderness.   Skin:     General: Skin  is warm.      Findings: No rash.   Neurological:      General: No focal deficit present.      Mental Status: He is oriented to person, place, and time.      Cranial Nerves: No cranial nerve deficit.      Sensory: No sensory deficit.      Motor: No weakness.      Coordination: Coordination normal.      Comments: Mildly tremulous         ED Course      Procedures                         Results for orders placed or performed during the hospital encounter of 03/28/20   Head CT w/o contrast     Status: None    Narrative    EXAM: CT HEAD WITHOUT CONTRAST  LOCATION: [facility]  DATE/TIME: 03/28/2020, 4:15 AM    INDICATION: Patient with known glioblastoma presents with imbalance and disorientation.  COMPARISON: 06/28/2019.  TECHNIQUE: Routine without IV contrast. Multiplanar reformats. Dose reduction techniques were used.    FINDINGS:  INTRACRANIAL CONTENTS: Fairly extensive vasogenic edema is demonstrated concentrated in the posterior right frontal lobe, right parietal lobe and extending into the right temporal lobe. Possible 2.7 x 2.5 x 0.3 cm mass at the right frontoparietal   junction underlying the right-sided craniotomy. Mass effect upon the right lateral ventricle as well as 7 mm of right to left shift of midline structures. No acute intracranial hemorrhage or evidence of recent infarct.     VISUALIZED ORBITS/SINUSES/MASTOIDS: No intraorbital abnormality. No paranasal sinus mucosal disease. No middle ear or mastoid effusion.    BONES/SOFT TISSUES: Right frontoparietal craniotomy.      Impression    IMPRESSION:  1.  Fairly extensive vasogenic edema is now demonstrated in the posterior right frontal lobe, right parietal lobe extending into the right temporal lobe. This has increased compared to 06/28/2019.  2.  Possible 2.7 x 2.6 x 2.3 cm soft tissue mass in the right frontoparietal junction underlying the craniotomy at the site of previous surgical resection/debulking.  3.  There is intracranial mass  effect including 7 mm of right to left shift of midline structures.  4.  Not mentioned above, the right temporal horn is slightly larger than on the previous study and could be partially entrapped.     Glucose by meter     Status: Abnormal   Result Value Ref Range    Glucose 156 (H) 70 - 99 mg/dL   CBC with platelets     Status: Abnormal   Result Value Ref Range    WBC 9.5 4.0 - 11.0 10e9/L    RBC Count 4.38 (L) 4.4 - 5.9 10e12/L    Hemoglobin 13.8 13.3 - 17.7 g/dL    Hematocrit 39.9 (L) 40.0 - 53.0 %    MCV 91 78 - 100 fl    MCH 31.5 26.5 - 33.0 pg    MCHC 34.6 31.5 - 36.5 g/dL    RDW 11.9 10.0 - 15.0 %    Platelet Count 222 150 - 450 10e9/L   Comprehensive metabolic panel     Status: Abnormal   Result Value Ref Range    Sodium 130 (L) 133 - 144 mmol/L    Potassium 3.6 3.4 - 5.3 mmol/L    Chloride 96 94 - 109 mmol/L    Carbon Dioxide 29 20 - 32 mmol/L    Anion Gap 5 3 - 14 mmol/L    Glucose 143 (H) 70 - 99 mg/dL    Urea Nitrogen 16 7 - 30 mg/dL    Creatinine 0.75 0.66 - 1.25 mg/dL    GFR Estimate >90 >60 mL/min/[1.73_m2]    GFR Estimate If Black >90 >60 mL/min/[1.73_m2]    Calcium 9.1 8.5 - 10.1 mg/dL    Bilirubin Total 0.5 0.2 - 1.3 mg/dL    Albumin 4.1 3.4 - 5.0 g/dL    Protein Total 7.1 6.8 - 8.8 g/dL    Alkaline Phosphatase 67 40 - 150 U/L    ALT 27 0 - 70 U/L    AST 13 0 - 45 U/L   Creatinine POCT     Status: None   Result Value Ref Range    Creatinine 0.7 0.66 - 1.25 mg/dL    GFR Estimate >90 >60 mL/min/[1.73_m2]    GFR Estimate If Black >90 >60 mL/min/[1.73_m2]   Osmolality     Status: Abnormal   Result Value Ref Range    Osmolality 274 (L) 275 - 295 mmol/kg     Medications   sodium chloride 0.9% infusion ( Intravenous New Bag 3/28/20 4733)   levETIRAcetam (KEPPRA) tablet 1,000 mg (has no administration in time range)   levothyroxine (SYNTHROID/LEVOTHROID) tablet 125 mcg (has no administration in time range)   HYDROmorphone (PF) (DILAUDID) injection 0.5 mg (0.5 mg Intravenous Given 3/28/20 2051)    HYDROmorphone (DILAUDID) injection 1 mg (1 mg Intravenous Given 3/28/20 5793)   ondansetron (ZOFRAN) injection 4 mg (4 mg Intravenous Given 3/28/20 5002)        Assessments & Plan (with Medical Decision Making)   CT was done which shows notable vasogenic edema on the right, with midline shift.  Neurosurgery was called right away.  We did come and evaluate the patient, will admit him to observation for further evaluation and treatment.  He does appear neuro intact at this time.    Dictation Disclaimer: Some of this Note has been completed with voice-recognition dictation software. Although errors are generally corrected real-time, there is the potential for a rare error to be present in the completed chart.      I have reviewed the nursing notes. I have reviewed the findings, diagnosis, plan and need for follow up with the patient.    Current Discharge Medication List          Final diagnoses:   Glioblastoma (H)   Vasogenic edema (H)       --  Buffy Madrid MD   Emergency Medicine   [facility], [facility] EMERGENCY DEPARTMENT  3/28/2020     Buffy Madrid MD  03/28/20 0660

## 2020-03-28 NOTE — PLAN OF CARE
Status: Pt admitted to 6A this AM for neuro changes, known hx of GBM. C/o impaired coordination, HA, nausea  VS: HTN, labetalol given x2.   Neuros: Pt reports impaired coordination, HA, and nausea, otherwise is intact.   GI: Emesis x2 this shift. Received Zofran x2 and compazine x1 with some relief of nausea/vomiting.   : Voids spontaneously.   IV: MIVF at 75mL/hr infusing via PIV  Activity: Up SBA  Pain: c/o HA, received oxycodone x2 with some relief.   Respiratory: LS clear, equal throughout.   Drains/lines/skin: PIV x1  Labs/Tests: MRI completed this shift  New this shift: MRI done; pt's wife updated twice  Social: Pt in touch with his wife  Plan of care: Continue to monitor and follow current POC pending MRI

## 2020-03-28 NOTE — ED TRIAGE NOTES
Pt experienced the worst headache of his life approximately 2 hrs ago and nausea. He has a hx of glioblastoma and he got in touch with an oncologist here that was concerned for a brain bleed. He has been experiencing significant stress in his life because he lost his job due to COVID-19

## 2020-03-28 NOTE — ED NOTES
Bed: ED25  Expected date:   Expected time:   Means of arrival:   Comments:  [address] 54 M sudden onset of headache CA patient yellow [id number]

## 2020-03-28 NOTE — PLAN OF CARE
Pt admitted for worsening HA, nausea, and incoordination. Hx GBM. VSS on RA. Neuros include generalized weakness. NS infusing at 75 mL/hr. Up SBA. Pt to completed MRI this AM, form filled out and in chart. Continue to monitor and follow POC

## 2020-03-28 NOTE — H&P
[facility]       NEUROSURGERY  H&P NOTE    This consultation was requested by Dr. Madrid from the emergency service.    Reason for Consultation: headache glioblastoma    HPI: Erasto Maher is a 54 year old male with glioblastoma who presents to the ER with a primary complaint of headache after complete his most recent cycle of Temodar Monday last week. He states it's not unusual for him to experience some sides effects from Temodar, but the symptoms are worse than before and persist. He started to experience headache, worsening of coordination which caused him to walk into doors for the past two days. He endorse some subjective left arm weakness. No blurred vision or slurred speech.  No numbness, tingling.  No fever, sore throat, chest pain, shortness of breath, cough, abdominal pain, diarrhea.  He states he was nauseous and did vomit tonight after the headache got bad.  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 Barreto on 6/27/2019. He found out his glioblastoma after presented with seizure.  He is currently managed by Dr. Wilmer Mckenna at [facility] on an upfront clinical trial utilizing concurrent chemoradiation with temozolomide and atezolizumab. He completed this portion of his treatment on 8/30/2019.  He is taking Temodar managed by Dr. Ericka Avila.        PAST MEDICAL HISTORY: Glioblastoma, hypothyroidism  PAST SURGICAL HISTORY: Craniotomy for tumor reseciton in 2019  FAMILY HISTORY: Denies  SOCIAL HISTORY: Denies smoking, drinking    MEDICATIONS:  Keppra 1000 BID, synthroid 125 daily, denies bloodthinners    Allergies:  Allergies   Allergen Reactions     Erythromycin Diarrhea     PN: LW Reaction: GI Upset  PN: LW Reaction: GI Upset       No Clinical Screening - See Comments Rash     PN: LW  Other1: -cats, hairy dogs  PN: LW Other1: -cats, hairy dogs       ROS: 10 point ROS of systems including Constitutional, Eyes, Respiratory, Cardiovascular, Gastroenterology, Genitourinary, Integumentary, Muscularskeletal, Psychiatric were all negative except for pertinent positives noted in my HPI.    Physical exam:   Blood pressure (!) 150/94, pulse 91, temperature 95.9  F (35.5  C), temperature source Axillary, resp. rate 16, weight 95.8 kg (211 lb 3.2 oz), SpO2 95 %.  General: awake and alert  NEUROLOGIC:  -- Awake; Alert; oriented x 3  -- Follows commands briskly  -- +repetition, calculation, and naming  -- Speech fluent, spontaneous. No aphasia or dysarthria.  -- no gaze preference. No apparent hemineglect.  Cranial Nerves:  -- visual fields full to confrontation, PERRL 3-2mm bilat and brisk, extraocular movements intact  -- face symmetrical, tongue midline  -- sensory V1-V3 intact bilaterally  -- palate elevates symmetrically, uvula midline  -- hearing grossly intact bilat  -- Cerebellar: L dysmetria, difficulty with alternating rapid finer tapping on the L hand    Motor:  Normal bulk / tone; no tremor, rigidity, or bradykinesia.  No muscle wasting or fasciculations  No Pronator Drift     Delt Bi Tri Hand Flexion/  Extension Iliopsoas Quadriceps Hamstrings Tibialis Anterior Gastroc    C5 C6 C7 C8/T1 L2 L3 L4-S1 L4 S1   R 5 5 5 5 5 5 5 5 5   L 5 5 5 5 5 5 5 5 5   Sensory:  intact to LT x 4 extremities       IMAGING:  CT Large mass in the R fronto parietal mass, extensive vasogenic edema is now demonstrated in the posterior right frontal lobe, right parietal lobe extending into the right temporal lobe.     LABS:   BMP: Na 130    ASSESSMENT: Mr. Maher is a 54 year old man with history of glioblastoma s/p resection on atezolizumab trial and temodar and radiation presented with headache and worsening coordination after receiving temodar last week. CTH shows worsening edema.     PLAN  No neurosurgical  intervention indicated at this time   MRI with and without with stealth protocol  Serial neuro exams   Continue home keppra for seizure ppx  Decadron 4mg daily for cerebral edema (Dicussed with neuro oncology team from [facility])  Normotensive  Continuous pulse oximetry  Supplemental oxygen PRN  Incentive spirometry Q1H while awake  Advance diet as tolerated to regular  Bowel regimen. PRN anti-emetics.  Protonix for ulcer ppx while on decadron  IVF 75cc/hr, serial Na check, goal normonatremia  Electrolyte replacement protocol  Continue to monitor intake/output  DVT: SCDs while in bed  Disposition: 6A  PT/OT consulted    The patient was discussed with Dr. Vargas, neurosurgery chief resident and Dr. Tyler, neurosurgery attending, and they agree with the above.    Please contact neurosurgery resident on call with questions.    Dial * * [phone], enter [phone] when prompted.     Debora Crawford MD  Neurosurgery PGY2

## 2020-03-29 ENCOUNTER — APPOINTMENT (OUTPATIENT)
Dept: PHYSICAL THERAPY | Facility: CLINIC | Age: 55
DRG: 054 | End: 2020-03-29
Attending: STUDENT IN AN ORGANIZED HEALTH CARE EDUCATION/TRAINING PROGRAM
Payer: COMMERCIAL

## 2020-03-29 LAB
ANION GAP SERPL CALCULATED.3IONS-SCNC: 5 MMOL/L (ref 3–14)
BASOPHILS # BLD AUTO: 0 10E9/L (ref 0–0.2)
BASOPHILS NFR BLD AUTO: 0.1 %
BUN SERPL-MCNC: 9 MG/DL (ref 7–30)
CALCIUM SERPL-MCNC: 8.5 MG/DL (ref 8.5–10.1)
CHLORIDE SERPL-SCNC: 94 MMOL/L (ref 94–109)
CO2 SERPL-SCNC: 26 MMOL/L (ref 20–32)
CREAT SERPL-MCNC: 0.61 MG/DL (ref 0.66–1.25)
DIFFERENTIAL METHOD BLD: ABNORMAL
EOSINOPHIL # BLD AUTO: 0 10E9/L (ref 0–0.7)
EOSINOPHIL NFR BLD AUTO: 0.2 %
ERYTHROCYTE [DISTWIDTH] IN BLOOD BY AUTOMATED COUNT: 11.9 % (ref 10–15)
GFR SERPL CREATININE-BSD FRML MDRD: >90 ML/MIN/{1.73_M2}
GLUCOSE SERPL-MCNC: 124 MG/DL (ref 70–99)
HCT VFR BLD AUTO: 38.8 % (ref 40–53)
HGB BLD-MCNC: 13.4 G/DL (ref 13.3–17.7)
IMM GRANULOCYTES # BLD: 0 10E9/L (ref 0–0.4)
IMM GRANULOCYTES NFR BLD: 0.3 %
LYMPHOCYTES # BLD AUTO: 0.9 10E9/L (ref 0.8–5.3)
LYMPHOCYTES NFR BLD AUTO: 8.9 %
MAGNESIUM SERPL-MCNC: 1.6 MG/DL (ref 1.6–2.3)
MCH RBC QN AUTO: 30.6 PG (ref 26.5–33)
MCHC RBC AUTO-ENTMCNC: 34.5 G/DL (ref 31.5–36.5)
MCV RBC AUTO: 89 FL (ref 78–100)
MONOCYTES # BLD AUTO: 0.7 10E9/L (ref 0–1.3)
MONOCYTES NFR BLD AUTO: 6.9 %
NEUTROPHILS # BLD AUTO: 8.2 10E9/L (ref 1.6–8.3)
NEUTROPHILS NFR BLD AUTO: 83.6 %
NRBC # BLD AUTO: 0 10*3/UL
NRBC BLD AUTO-RTO: 0 /100
PHOSPHATE SERPL-MCNC: 3.4 MG/DL (ref 2.5–4.5)
PLATELET # BLD AUTO: 223 10E9/L (ref 150–450)
POTASSIUM SERPL-SCNC: 4.1 MMOL/L (ref 3.4–5.3)
RBC # BLD AUTO: 4.38 10E12/L (ref 4.4–5.9)
SODIUM SERPL-SCNC: 125 MMOL/L (ref 133–144)
SODIUM SERPL-SCNC: 126 MMOL/L (ref 133–144)
SODIUM SERPL-SCNC: 129 MMOL/L (ref 133–144)
SODIUM SERPL-SCNC: 136 MMOL/L (ref 133–144)
TSH SERPL DL<=0.005 MIU/L-ACNC: 13.7 MU/L (ref 0.4–4)
WBC # BLD AUTO: 9.8 10E9/L (ref 4–11)

## 2020-03-29 PROCEDURE — 83735 ASSAY OF MAGNESIUM: CPT | Performed by: NEUROLOGICAL SURGERY

## 2020-03-29 PROCEDURE — 80048 BASIC METABOLIC PNL TOTAL CA: CPT | Performed by: NEUROLOGICAL SURGERY

## 2020-03-29 PROCEDURE — 36415 COLL VENOUS BLD VENIPUNCTURE: CPT | Performed by: STUDENT IN AN ORGANIZED HEALTH CARE EDUCATION/TRAINING PROGRAM

## 2020-03-29 PROCEDURE — 25000132 ZZH RX MED GY IP 250 OP 250 PS 637: Performed by: STUDENT IN AN ORGANIZED HEALTH CARE EDUCATION/TRAINING PROGRAM

## 2020-03-29 PROCEDURE — 12000001 ZZH R&B MED SURG/OB UMMC

## 2020-03-29 PROCEDURE — 25000131 ZZH RX MED GY IP 250 OP 636 PS 637: Performed by: STUDENT IN AN ORGANIZED HEALTH CARE EDUCATION/TRAINING PROGRAM

## 2020-03-29 PROCEDURE — 97530 THERAPEUTIC ACTIVITIES: CPT | Mod: GP | Performed by: PHYSICAL THERAPIST

## 2020-03-29 PROCEDURE — 84443 ASSAY THYROID STIM HORMONE: CPT | Performed by: STUDENT IN AN ORGANIZED HEALTH CARE EDUCATION/TRAINING PROGRAM

## 2020-03-29 PROCEDURE — 36415 COLL VENOUS BLD VENIPUNCTURE: CPT | Performed by: NEUROLOGICAL SURGERY

## 2020-03-29 PROCEDURE — 84100 ASSAY OF PHOSPHORUS: CPT | Performed by: NEUROLOGICAL SURGERY

## 2020-03-29 PROCEDURE — 97116 GAIT TRAINING THERAPY: CPT | Mod: GP | Performed by: PHYSICAL THERAPIST

## 2020-03-29 PROCEDURE — 97162 PT EVAL MOD COMPLEX 30 MIN: CPT | Mod: GP | Performed by: PHYSICAL THERAPIST

## 2020-03-29 PROCEDURE — 25800030 ZZH RX IP 258 OP 636: Performed by: STUDENT IN AN ORGANIZED HEALTH CARE EDUCATION/TRAINING PROGRAM

## 2020-03-29 PROCEDURE — 85025 COMPLETE CBC W/AUTO DIFF WBC: CPT | Performed by: NEUROLOGICAL SURGERY

## 2020-03-29 PROCEDURE — 84295 ASSAY OF SERUM SODIUM: CPT | Performed by: STUDENT IN AN ORGANIZED HEALTH CARE EDUCATION/TRAINING PROGRAM

## 2020-03-29 RX ORDER — TEMOZOLOMIDE 250 MG/1
CAPSULE ORAL
COMMUNITY
End: 2020-04-02

## 2020-03-29 RX ORDER — ONDANSETRON 8 MG/1
TABLET, FILM COATED ORAL
COMMUNITY
End: 2020-04-02

## 2020-03-29 RX ORDER — SODIUM CHLORIDE 1 G/1
1 TABLET ORAL
Status: DISCONTINUED | OUTPATIENT
Start: 2020-03-29 | End: 2020-03-30 | Stop reason: HOSPADM

## 2020-03-29 RX ORDER — TEMOZOLOMIDE 140 MG/1
CAPSULE ORAL
COMMUNITY
End: 2020-04-02

## 2020-03-29 RX ORDER — LEVOTHYROXINE SODIUM 125 UG/1
125 TABLET ORAL EVERY MORNING
COMMUNITY
End: 2021-01-01

## 2020-03-29 RX ORDER — LORATADINE 10 MG/1
10 TABLET ORAL AT BEDTIME
Status: ON HOLD | COMMUNITY
End: 2021-01-01

## 2020-03-29 RX ORDER — LANOLIN ALCOHOL/MO/W.PET/CERES
3 CREAM (GRAM) TOPICAL
Status: DISCONTINUED | OUTPATIENT
Start: 2020-03-29 | End: 2020-03-30 | Stop reason: HOSPADM

## 2020-03-29 RX ADMIN — SENNOSIDES AND DOCUSATE SODIUM 2 TABLET: 8.6; 5 TABLET ORAL at 09:25

## 2020-03-29 RX ADMIN — SODIUM CHLORIDE: 9 INJECTION, SOLUTION INTRAVENOUS at 04:02

## 2020-03-29 RX ADMIN — LEVOTHYROXINE SODIUM 125 MCG: 0.12 TABLET ORAL at 09:26

## 2020-03-29 RX ADMIN — LEVETIRACETAM 1000 MG: 500 TABLET ORAL at 09:26

## 2020-03-29 RX ADMIN — LEVETIRACETAM 1000 MG: 500 TABLET ORAL at 20:33

## 2020-03-29 RX ADMIN — Medication 1 G: at 10:48

## 2020-03-29 RX ADMIN — Medication 1 G: at 17:28

## 2020-03-29 RX ADMIN — Medication 1 G: at 14:47

## 2020-03-29 RX ADMIN — PANTOPRAZOLE SODIUM 40 MG: 40 TABLET, DELAYED RELEASE ORAL at 20:33

## 2020-03-29 RX ADMIN — OXYCODONE HYDROCHLORIDE 5 MG: 5 TABLET ORAL at 04:11

## 2020-03-29 RX ADMIN — MELATONIN TAB 3 MG 3 MG: 3 TAB at 21:56

## 2020-03-29 RX ADMIN — DEXAMETHASONE 4 MG: 4 TABLET ORAL at 09:26

## 2020-03-29 RX ADMIN — ACETAMINOPHEN 975 MG: 325 TABLET, FILM COATED ORAL at 09:23

## 2020-03-29 ASSESSMENT — ACTIVITIES OF DAILY LIVING (ADL)
ADLS_ACUITY_SCORE: 13
ADLS_ACUITY_SCORE: 15
ADLS_ACUITY_SCORE: 15
ADLS_ACUITY_SCORE: 13
ADLS_ACUITY_SCORE: 15
ADLS_ACUITY_SCORE: 13

## 2020-03-29 NOTE — PROGRESS NOTES
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   Functional Level Prior   Ambulation 0-->independent   Transferring 0-->independent   Toileting 0-->independent   Bathing 0-->independent   Communication 0-->understands/communicates without difficulty   Swallowing 0-->swallows foods/liquids without difficulty   Cognition 0 - no cognition issues reported   Fall history within last six months no   Which of the above functional risks had a recent onset or change? ambulation   Prior Functional Level Comment 2 days ago was when status changed, as pt felt his cognition was different, overwhelmed/stressed and ran into a door while walking. Also noted his typing what less coordinated.   General Information   Onset of Illness/Injury or Date of Surgery - Date 03/28/20   Referring Physician Debora Crawford MD    Patient/Family Goals Statement To return to prior level of function, stay active and as healthy as able. Pt also desires to discharge home asap.   Pertinent History of Current Problem (include personal factors and/or comorbidities that impact the POC) Mr. Maher is a 54 year old man with history of glioblastoma s/p resection on atezolizumab trial and temodar and radiation presented with headache and  worsening coordination after receiving temodar last week. CTH shows worsening edema.    Precautions/Limitations fall precautions   Cognitive Status Examination   Level of Consciousness alert   Follows Commands and Answers Questions 100% of the time   Personal Safety and Judgment intact;impaired  (L inattention)   Cognitive Comment reduced awareness at times of deficits   Range of Motion (ROM)   ROM Comment intact   Strength   Strength Comments shoulder flex and abd R 5/5 L 4-/5, biceps R 5/5 L 4+/5, triceps 5/5 B,  R 5/5 L 4+5. Hip flex R 5/5 L 4/5, knee flex/ext, ankle DF 5/5   Bed Mobility   Bed Mobility Comments IND   Transfer Skills   Transfer Comments SBA-IND sit<>stand, bed to chair   Gait   Gait Comments Required SBA for most gait but intermittent CGA-slight BOSSMAN for L sided inattention of obstacles. This appeared to improve with IV placement behind pt vs R as pt noted he was trying to stay away from IV pole. Higher level balance impaired, however pt steady on stairs overall with slight imbalance only when pt decided to try to not use railing.    Balance   Balance Comments - romberg, + sharpened romberg B   Sensory Examination   Sensory Perception Comments intact light touch B UEs and LEs, reports same sensation B   Coordination   Coordination Comments 10/10 R UE and 9/10 L UE finger/nose, slightly slowed L UE use with finger/nose. Pt able to follow B with fingertips accurately   General Therapy Interventions   Planned Therapy Interventions balance training;bed mobility training;gait training;ROM;strengthening;stretching;transfer training;neuromuscular re-education;home program guidelines;progressive activity/exercise   Clinical Impression   Criteria for Skilled Therapeutic Intervention yes, treatment indicated   PT Diagnosis impaired mobility   Influenced by the following impairments reduced strength, coordination, L sided awareness, safety   Functional limitations due to impairments below baseline bed  "mobility, transfers, gait   Clinical Presentation Evolving/Changing   Clinical Presentation Rationale clinical judgement   Clinical Decision Making (Complexity) Moderate complexity   Therapy Frequency Daily   Predicted Duration of Therapy Intervention (days/wks)  1 week   Anticipated Discharge Disposition Home with Home Therapy;Home with Outpatient Therapy  (OP PT if open)   Risk & Benefits of therapy have been explained Yes   Patient, Family & other staff in agreement with plan of care Yes   [facility] TM \"6 Clicks\"   2016, Trustees of [facility], under license to [employer].  All rights reserved.   6 Clicks Short Forms Basic Mobility Inpatient Short Form   [facility] AM-PAC  \"6 Clicks\" V.2 Basic Mobility Inpatient Short Form   1. Turning from your back to your side while in a flat bed without using bedrails? 4 - None   2. Moving from lying on your back to sitting on the side of a flat bed without using bedrails? 4 - None   3. Moving to and from a bed to a chair (including a wheelchair)? 3 - A Little   4. Standing up from a chair using your arms (e.g., wheelchair, or bedside chair)? 4 - None   5. To walk in hospital room? 3 - A Little   6. Climbing 3-5 steps with a railing? 3 - A Little   Basic Mobility Raw Score (Score out of 24.Lower scores equate to lower levels of function) 21   Total Evaluation Time   Total Evaluation Time (Minutes) 13     "

## 2020-03-29 NOTE — PROGRESS NOTES
Neurosurgery Progress Note    Subjective:  MRI brain completed, awaiting images from [facility], no acute events, plan TBD    Objective:  /65 (BP Location: Left arm)   Pulse 78   Temp 97.5  F (36.4  C) (Oral)   Resp 16   Wt 95.8 kg (211 lb 3.2 oz)   SpO2 95%   BMI 31.64 kg/m    Alert, oriented x3  CN 2-12 intact  5/5 strength on right, 4/5 strength on left, left pronator drift, left dysmetria  Sensation intact to light touch      Assessment:  Mr. Maher is a 54 year old man with history of glioblastoma s/p resection on atezolizumab trial and temodar and radiation presented with headache and worsening coordination after receiving temodar last week. MRI brain 3/28 with Interval increase in the size and associated vasogenic edema of right temporoparietal mass since 2019, with 6 mm leftward midline shift, with compression of the brain.    Plan:  No neurosurgical intervention indicated at this time   MRI with and without with stealth protocol completed  Pending comparison with images from [facility]  Serial neuro exams   Continue home keppra for seizure ppx  Decadron 4mg daily for cerebral edema (Dicussed with neuro oncology team from [facility])  Normotensive  Protonix for ulcer ppx while on decadron  goal normonatremia, decreased IVF  Electrolyte replacement protocol  Continue to monitor intake/output  DVT: SCDs while in bed  PT/OT  Disposition: 6A      Valentino Morrell MD  Neurosurgery Resident PGY2    Please contact neurosurgery resident on call with questions.    Dial * * [phone], enter [phone] when prompted.

## 2020-03-29 NOTE — PROGRESS NOTES
Time: 0476-8221    Reason for admit: Worsening HA, nausea, incoordination, known hx of glioblastoma     Neuro: A&Ox4, intact, able to make his needs known   Activity: SBA d/t unsteady gait/ incoordination   Pain: HA pain, prn oxycodone given with some relief  Cardiac: Hypertensive, gave 10mg labetalol and no change in BP so I gave another 20 mg labetalol and BP drop to within parameters   Respiratory: WNL on RA, denies SOB   GI/: + BS, voiding without difficulty   Diet: Regular   Lines: L PIV infusing NS @ 125 ml/hr   Skin: Intact     Events: Pt resting between cares, no emesis this shift, melatonin ordered to help pt sleep, provider switch pantoprazole from before breakfast to at bedtime.     Plan: Continue to monitor and follow POC.

## 2020-03-29 NOTE — PHARMACY-ADMISSION MEDICATION HISTORY
Admission medication history interview status for the 3/28/2020 admission is complete. See Epic admission navigator for allergy information, pharmacy, prior to admission medications and immunization status.     Medication history interview sources: Patient, Chart Review, SureScripts    Changes made to PTA medication list (reason)  Added:   -Levothyroxine (per fill history - confirmed with pt)  -Ondansetron (per fill history - confirmed with pt)  -Temozolomide - total dose 390 mg (per fill history - confirmed with pt and chart)  -Atezolizumab (per chart - confirmed with patient)  Deleted: N/A  Changed:   -Acetazolamide (added frequency - per patient)  -Flonase (added frequency - per patient)  -Keppra (added frequency - per fill history - confirmed with patient)  -Loratadine (changed formulation and added frequency - per patient)  -Melatonin (added frequency - per patient)  -Olopatadine eye drops (added frequency - per patient)  -Omeprazole (added frequency - per patient )  -Senna (added frequency - per patient)    Additional medication history information (including reliability of information, actions taken by pharmacist):    Patient appeared to be a reliable source of information for medication reconciliation.     Apart from the above changes, the patient is currently on Temozolomide and Atezolizumab for glioblastoma. Currently on Cycle 7. Patient stated that he started his 5 day regimen of Temozolomide last Monday, but a 3/16/2020 OP Visit note shows that he started it that day so he should have finished this on the 19th of March. This note also indicates that he received his Atezolizumab treatment that day over a 30 minute period.    No other changes except for the above changes.    Allergies were confirmed.    Prior to Admission medications    Medication Sig Last Dose Taking? Auth Provider   acetaZOLAMIDE (DIAMOX) 250 MG tablet Take 250 mg by mouth 2 times daily as needed Taken 1-2 days before ascent and continue  until descent. (for skiing) Past Month at Unknown time Yes Reported, Patient   ATEZOLIZUMAB IV Inject 840 mg into the vein every 14 days Past Month at Unknown time Yes Unknown, Entered By History   levothyroxine (SYNTHROID/LEVOTHROID) 125 MCG tablet Take 125 mcg by mouth daily 3/27/2020 at AM Yes Unknown, Entered By History   melatonin 5 MG tablet Take 5 mg by mouth nightly as needed  Past Month at Unknown time Yes Reported, Patient   ondansetron (ZOFRAN) 8 MG tablet Take 8 mg by mouth 30 minutes before starting chemo. (Temozolomide and Atezolizumab)  Yes Unknown, Entered By History   senna (SENOKOT) 8.6 MG tablet Take 1 tablet by mouth daily as needed  Past Month at Unknown time Yes Reported, Patient   temozolomide (TEMODAR) 140 MG capsule Take 140 mg by mouth once daily on Days 1-5 of Cycle 7 (start: 3/16/2020) - Total Dose = 390 mg 3/19/2020 Yes Unknown, Entered By History   temozolomide (TEMODAR) 250 MG capsule Take 250 mg by mouth once daily on Days 1-5 of Cycle 7. Total dose: 390 mg 3/19/2020 Yes Unknown, Entered By History   fluticasone (FLONASE) 50 MCG/ACT nasal spray Spray 1 spray into both nostrils daily as needed  More than a month at Unknown time  Reported, Patient   levETIRAcetam (KEPPRA) 1000 MG tablet Take 1,000 mg by mouth 2 times daily  3/27/2020 at PM  Reported, Patient   loratadine (CLARITIN) 10 MG tablet Take 10 mg by mouth daily More than a month at Unknown time  Unknown, Entered By History   olopatadine (PATANOL) 0.1 % ophthalmic solution Place 1-2 drops into both eyes daily as needed  More than a month at Unknown time  Reported, Patient   omeprazole (PRILOSEC) 20 MG DR capsule Take 20 mg by mouth At Bedtime  3/27/2020 at PM  Reported, Patient         Medication history completed by: Ethan Martin, Pharmacist Intern (PD2)

## 2020-03-29 NOTE — PLAN OF CARE
Discharge Planner PT   Patient plan for discharge:   Current status: PT 6A: PT eval complete, treatment provided. Pt required SBA for most gait but intermittent CGA-slight BOSSMAN for L sided inattention of obstacles. This appeared to improve with IV placement behind pt vs R as pt noted he was trying to stay away from IV pole. Higher level balance impaired, however pt steady on stairs overall with slight imbalance only when pt decided to try to not use railing. Pt ambulated with improving gait technique provided training, and was SBA without device. Spouse able to provide this assist per discussion. Discussed potential ADL modifications at present due to pt status but encouraged OT eval for ADLs and reduced L UE coordination, though appears mild.   Barriers to return to prior living situation: medical status  Recommendations for discharge: home with assist (needs close assist for any gait), OP PT or home PT (whatever is open).  Rationale for recommendations: pt will require further follow-up PT to address L inattention impacting safe gait, below baseline balance       Entered by: Ina Rodrigues 03/29/2020 11:11 AM

## 2020-03-29 NOTE — PROGRESS NOTES
Status: Worsening HA, nausea, unsteady gait. Hx: Glioblastoma  Vitals: VSS/A  Neuros: A&Ox4.  Unsteady gait.  IV: PIV infusing NS@ 50mL/hr.    Resp/trach: WDL  Diet: Regular.  Orders meals indep.  Bowel status: BS+.  : Voiding spont, using bathroom toilet.  Skin: intact  Pain: denies  Activity: Up with SBA, Gb+, Walker+  Plan: No emesis, or nausea this shift.  Continue with current POC.

## 2020-03-30 ENCOUNTER — APPOINTMENT (OUTPATIENT)
Dept: OCCUPATIONAL THERAPY | Facility: CLINIC | Age: 55
DRG: 054 | End: 2020-03-30
Attending: STUDENT IN AN ORGANIZED HEALTH CARE EDUCATION/TRAINING PROGRAM
Payer: COMMERCIAL

## 2020-03-30 ENCOUNTER — APPOINTMENT (OUTPATIENT)
Dept: PHYSICAL THERAPY | Facility: CLINIC | Age: 55
DRG: 054 | End: 2020-03-30
Payer: COMMERCIAL

## 2020-03-30 VITALS
DIASTOLIC BLOOD PRESSURE: 80 MMHG | TEMPERATURE: 95.7 F | SYSTOLIC BLOOD PRESSURE: 138 MMHG | BODY MASS INDEX: 31.64 KG/M2 | HEART RATE: 79 BPM | WEIGHT: 211.2 LBS | OXYGEN SATURATION: 97 % | RESPIRATION RATE: 16 BRPM

## 2020-03-30 LAB
ANION GAP SERPL CALCULATED.3IONS-SCNC: 4 MMOL/L (ref 3–14)
BASOPHILS # BLD AUTO: 0 10E9/L (ref 0–0.2)
BASOPHILS NFR BLD AUTO: 0.4 %
BUN SERPL-MCNC: 13 MG/DL (ref 7–30)
CALCIUM SERPL-MCNC: 8.8 MG/DL (ref 8.5–10.1)
CHLORIDE SERPL-SCNC: 104 MMOL/L (ref 94–109)
CO2 SERPL-SCNC: 27 MMOL/L (ref 20–32)
CREAT SERPL-MCNC: 0.78 MG/DL (ref 0.66–1.25)
DIFFERENTIAL METHOD BLD: ABNORMAL
EOSINOPHIL # BLD AUTO: 0 10E9/L (ref 0–0.7)
EOSINOPHIL NFR BLD AUTO: 0.5 %
ERYTHROCYTE [DISTWIDTH] IN BLOOD BY AUTOMATED COUNT: 12.5 % (ref 10–15)
GFR SERPL CREATININE-BSD FRML MDRD: >90 ML/MIN/{1.73_M2}
GLUCOSE SERPL-MCNC: 104 MG/DL (ref 70–99)
HCT VFR BLD AUTO: 38.6 % (ref 40–53)
HGB BLD-MCNC: 13.1 G/DL (ref 13.3–17.7)
IMM GRANULOCYTES # BLD: 0 10E9/L (ref 0–0.4)
IMM GRANULOCYTES NFR BLD: 0.4 %
LYMPHOCYTES # BLD AUTO: 1.2 10E9/L (ref 0.8–5.3)
LYMPHOCYTES NFR BLD AUTO: 15.6 %
MAGNESIUM SERPL-MCNC: 1.9 MG/DL (ref 1.6–2.3)
MCH RBC QN AUTO: 31.3 PG (ref 26.5–33)
MCHC RBC AUTO-ENTMCNC: 33.9 G/DL (ref 31.5–36.5)
MCV RBC AUTO: 92 FL (ref 78–100)
MONOCYTES # BLD AUTO: 0.8 10E9/L (ref 0–1.3)
MONOCYTES NFR BLD AUTO: 9.5 %
NEUTROPHILS # BLD AUTO: 5.8 10E9/L (ref 1.6–8.3)
NEUTROPHILS NFR BLD AUTO: 73.6 %
NRBC # BLD AUTO: 0 10*3/UL
NRBC BLD AUTO-RTO: 0 /100
PHOSPHATE SERPL-MCNC: 3.4 MG/DL (ref 2.5–4.5)
PLATELET # BLD AUTO: 208 10E9/L (ref 150–450)
POTASSIUM SERPL-SCNC: 3.9 MMOL/L (ref 3.4–5.3)
RBC # BLD AUTO: 4.19 10E12/L (ref 4.4–5.9)
SODIUM SERPL-SCNC: 135 MMOL/L (ref 133–144)
SODIUM SERPL-SCNC: 136 MMOL/L (ref 133–144)
WBC # BLD AUTO: 7.9 10E9/L (ref 4–11)

## 2020-03-30 PROCEDURE — 36415 COLL VENOUS BLD VENIPUNCTURE: CPT | Performed by: NEUROLOGICAL SURGERY

## 2020-03-30 PROCEDURE — 80048 BASIC METABOLIC PNL TOTAL CA: CPT | Performed by: NEUROLOGICAL SURGERY

## 2020-03-30 PROCEDURE — 83735 ASSAY OF MAGNESIUM: CPT | Performed by: NEUROLOGICAL SURGERY

## 2020-03-30 PROCEDURE — 85025 COMPLETE CBC W/AUTO DIFF WBC: CPT | Performed by: NEUROLOGICAL SURGERY

## 2020-03-30 PROCEDURE — 25000132 ZZH RX MED GY IP 250 OP 250 PS 637: Performed by: STUDENT IN AN ORGANIZED HEALTH CARE EDUCATION/TRAINING PROGRAM

## 2020-03-30 PROCEDURE — 97530 THERAPEUTIC ACTIVITIES: CPT | Mod: GP | Performed by: PHYSICAL THERAPIST

## 2020-03-30 PROCEDURE — 84295 ASSAY OF SERUM SODIUM: CPT | Performed by: STUDENT IN AN ORGANIZED HEALTH CARE EDUCATION/TRAINING PROGRAM

## 2020-03-30 PROCEDURE — 25000131 ZZH RX MED GY IP 250 OP 636 PS 637: Performed by: STUDENT IN AN ORGANIZED HEALTH CARE EDUCATION/TRAINING PROGRAM

## 2020-03-30 PROCEDURE — 97535 SELF CARE MNGMENT TRAINING: CPT | Mod: GO

## 2020-03-30 PROCEDURE — 84100 ASSAY OF PHOSPHORUS: CPT | Performed by: NEUROLOGICAL SURGERY

## 2020-03-30 PROCEDURE — 36416 COLLJ CAPILLARY BLOOD SPEC: CPT | Performed by: STUDENT IN AN ORGANIZED HEALTH CARE EDUCATION/TRAINING PROGRAM

## 2020-03-30 PROCEDURE — 97165 OT EVAL LOW COMPLEX 30 MIN: CPT | Mod: GO

## 2020-03-30 RX ORDER — PANTOPRAZOLE SODIUM 40 MG/1
40 TABLET, DELAYED RELEASE ORAL AT BEDTIME
Qty: 28 TABLET | Refills: 0 | Status: SHIPPED | OUTPATIENT
Start: 2020-03-30 | End: 2020-05-11

## 2020-03-30 RX ORDER — DEXAMETHASONE 4 MG/1
4 TABLET ORAL DAILY
Qty: 28 TABLET | Refills: 0 | Status: ON HOLD | OUTPATIENT
Start: 2020-03-30 | End: 2020-04-10

## 2020-03-30 RX ADMIN — DEXAMETHASONE 4 MG: 4 TABLET ORAL at 08:47

## 2020-03-30 RX ADMIN — Medication 1 G: at 08:46

## 2020-03-30 RX ADMIN — LEVETIRACETAM 1000 MG: 500 TABLET ORAL at 08:47

## 2020-03-30 RX ADMIN — LEVOTHYROXINE SODIUM 125 MCG: 0.12 TABLET ORAL at 08:47

## 2020-03-30 ASSESSMENT — ACTIVITIES OF DAILY LIVING (ADL)
COGNITION: 0 - NO COGNITION ISSUES REPORTED
ADLS_ACUITY_SCORE: 13
RETIRED_EATING: 0-->INDEPENDENT
ADLS_ACUITY_SCORE: 10
WHICH_OF_THE_ABOVE_FUNCTIONAL_RISKS_HAD_A_RECENT_ONSET_OR_CHANGE?: AMBULATION
AMBULATION: 0-->INDEPENDENT
TRANSFERRING: 0-->INDEPENDENT
BATHING: 0-->INDEPENDENT
SWALLOWING: 0-->SWALLOWS FOODS/LIQUIDS WITHOUT DIFFICULTY
RETIRED_COMMUNICATION: 0-->UNDERSTANDS/COMMUNICATES WITHOUT DIFFICULTY
DRESS: 0-->INDEPENDENT
TOILETING: 0-->INDEPENDENT
FALL_HISTORY_WITHIN_LAST_SIX_MONTHS: NO
ADLS_ACUITY_SCORE: 10

## 2020-03-30 NOTE — DISCHARGE SUMMARY
[facility] Discharge Summary and Instructions    Erasto Maher MRN# [id number]   Age: 54 year old YOB: 1965     Date of Admission:  3/28/2020  Date of Discharge::  3/30/2020 10:57 AM  Admitting Physician:  Danyel Tyler MD  Discharge Physician:  Danyel Tyler MD          Admission Diagnoses:   Glioblastoma (H) [C71.9]          Discharge Diagnosis:     Glioblastoma (H) [C71.9]          Procedures:   None           Brief History of Illness:   Erasto Maher is a 54 year old male with glioblastoma who presents to the ER with a primary complaint of headache after complete his most recent cycle of Temodar Monday last week. He states it's not unusual for him to experience some sides effects from Temodar, but the symptoms are worse than before and persist. He started to experience headache, worsening of coordination which caused him to walk into doors for the past two days. He endorse some subjective left arm weakness. No blurred vision or slurred speech.  No numbness, tingling.  No fever, sore throat, chest pain, shortness of breath, cough, abdominal pain, diarrhea.  He states he was nauseous and did vomit tonight after the headache got bad.  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 Barreto on 6/27/2019. He found out his glioblastoma after presented with seizure.  He is currently managed by Dr. Wilmer Mckenna at [facility] on an upfront clinical trial utilizing concurrent chemoradiation with temozolomide and atezolizumab. He completed this portion of his treatment on 8/30/2019.  He is taking Temodar managed by Dr. Ericka Avila.            Hospital Course:   During the hospital admission, the patient was started on decadron. This was coordinated with his clinical trial side [facility] to comply with the study protocol.  The patient's headache and nausea improved throughout the hospital stay. On hospital day 3, the patient was medically stable, eating a regular diet, his pain was well controlled and he therefore was discharged home.     Temp: 95.7  F (35.4  C) Temp src: Axillary BP: 138/80 Pulse: 79 Heart Rate: 73 Resp: 16 SpO2: 97 % O2 Device: None (Room air)       Alert, oriented x3  CN II-XII intact  5/5 strength on right, 4/5 strength on left, left pronator drift, left dysmetria  Sensation intact to light touch           Discharge Medications:     Current Discharge Medication List      CONTINUE these medications which have NOT CHANGED    Details   acetaZOLAMIDE (DIAMOX) 250 MG tablet Take 250 mg by mouth 2 times daily as needed Taken 1-2 days before ascent and continue until descent. (for skiing)      ATEZOLIZUMAB IV Inject 840 mg into the vein every 14 days      levothyroxine (SYNTHROID/LEVOTHROID) 125 MCG tablet Take 125 mcg by mouth daily      melatonin 5 MG tablet Take 5 mg by mouth nightly as needed       ondansetron (ZOFRAN) 8 MG tablet Take 8 mg by mouth 30 minutes before starting chemo. (Temozolomide and Atezolizumab)      senna (SENOKOT) 8.6 MG tablet Take 1 tablet by mouth daily as needed       !! temozolomide (TEMODAR) 140 MG capsule Take 140 mg by mouth once daily on Days 1-5 of Cycle 7 (start: 3/16/2020) - Total Dose = 390 mg      !! temozolomide (TEMODAR) 250 MG capsule Take 250 mg by mouth once daily on Days 1-5 of Cycle 7. Total dose: 390 mg      fluticasone (FLONASE) 50 MCG/ACT nasal spray Spray 1 spray into both nostrils daily as needed       levETIRAcetam (KEPPRA) 1000 MG tablet Take 1,000 mg by mouth 2 times daily       loratadine (CLARITIN) 10 MG tablet Take 10 mg by mouth daily      olopatadine (PATANOL) 0.1 % ophthalmic solution Place 1-2 drops into both eyes daily as needed       omeprazole (PRILOSEC) 20 MG DR capsule Take 20 mg by mouth At Bedtime        !! - Potential duplicate medications found. Please  discuss with provider.                  Discharge Instructions and Follow-Up:     Discharge diet: Regular   Discharge activity: You may advance activity as tolerated. No strenuous exercise or heay lifting greater than 10 lbs for 4 weeks or until seen and cleared in clinic.   Discharge follow-up: Follow-up with Dr. Chan and Dr. Austin MD in 2 weeks         Please call if you have:  1. increased pain, redness, drainage, swelling at your incision  2. fevers > 101.5 F degrees  3. with any questions or concerns.  You may reach the Neurosurgery clinic at [phone] during regular work hours. ER at [phone].    and ask for the Neurosurgery Resident on call at [phone], during off hours or weekends.         Discharge Disposition:     Discharged to home

## 2020-03-30 NOTE — PLAN OF CARE
Discharge Planner OT   Patient plan for discharge: Home.  Current status: Initial OT evaluation and treatment session completed. Patient with symmetrical upper extremity strength and coordination demonstrated this morning. Patient independent with total body dressing, total body bathing in standing and toilet/shower transfers without losses of balance demonstrated.  Barriers to return to prior living situation: Medical status.  Recommendations for discharge: Home.  Rationale for recommendations: No further acute care OT goals indicated. Will discharge and complete orders.       Entered by: Bri Strickland 03/30/2020 9:59 AM    Occupational Therapy Discharge Summary    Reason for therapy discharge:    All goals and outcomes met, no further needs identified.    Progress towards therapy goal(s). See goals on Care Plan in [facility] electronic health record for goal details.  Goals met    Therapy recommendation(s):    No further therapy is recommended.

## 2020-03-30 NOTE — PLAN OF CARE
D/I:Denies N and HA this AM. He PO'd regular diet without difficulty and has been up in room on own.Discharge summary discussed and he has no further questions. He was given a disc with MRI result on in for his home MD.   P:He will stop at pharmacy to  meds on the way out.

## 2020-03-30 NOTE — PLAN OF CARE
"Discharge Planner PT   Patient plan for discharge:   Current status: PT 6A: Pt mobilizing with significant improvement from initial visit. Pt able to consistently walk distance of 400+ ft with steady IND gait, no major deficits and no obstacle collision or near collision. Obstacle awareness much improved as pt safe around both fixed and moving obstacles in hallways. Pt reported \"I'm feeling a lot more myself.\" Pt notes balance feels 90-95% back to baseline. He will see how he feels at home and when he next connects with his MDs in 2 days, can ask for orders if he or spouse feel needed. Pt otherwise IND.   Barriers to return to prior living situation: medical status  Recommendations for discharge: home with assist (needs close assist for any gait), if pt notices further concerns appropriate for a follow-up home PT visit (since OP closed).   Rationale for recommendations: per progress in PT  Entered by: Ina Rodrigues 03/30/2020 10:17 AM     Physical Therapy Discharge Summary    Reason for therapy discharge:    Discharged to home.    Progress towards therapy goal(s). See goals on Care Plan in [facility] electronic health record for goal details.  Goals met    Therapy recommendation(s):    see above      "

## 2020-03-30 NOTE — PLAN OF CARE
Status: Pt admitted for HA and nausea; Hx GBM  Vitals: VSS on RA  Neuros: AO4, strength 5/5 throughout with generalized weakness  IV: PIV infusing NS at 75 mL/hr  Resp/trach: LS clear throughout  Diet: Regular diet. No complaints of nausea   Bowel status: BM 3/27  : Voiding spontaneously   Skin: Intact  Pain: Denies   Activity: Up assist of 1, GB  Plan: Therapies recommending home with PT/OT. Continue to monitor and follow POC

## 2020-03-30 NOTE — PROGRESS NOTES
"   03/30/20 0935   Quick Adds   Type of Visit Initial Occupational Therapy Evaluation   Living Environment   Lives With spouse   Living Arrangements house   Home Accessibility stairs to enter home;stairs within home   Number of Stairs, Main Entrance 3   Transportation Anticipated family or friend will provide   Living Environment Comment Patient lives with spouse in [place], [place]. Has been travelling to and from [place], [place] for treatments every 2 weeks through [facility]. Patient highly independent prior to admission. 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    Self-Care   Usual Activity Tolerance excellent   Current Activity Tolerance good   Regular Exercise Yes   Activity/Exercise Type   (boxing)   Exercise Amount/Frequency 2 times/wk;45 mins   Equipment Currently Used at Home none   Functional Level   Ambulation 0-->independent   Transferring 0-->independent   Toileting 0-->independent   Bathing 0-->independent   Dressing 0-->independent   Eating 0-->independent   Communication 0-->understands/communicates without difficulty   Swallowing 0-->swallows foods/liquids without difficulty   Cognition 0 - no cognition issues reported   Fall history within last six months no   Which of the above functional risks had a recent onset or change? ambulation       Present no   General Information   Onset of Illness/Injury or Date of Surgery - Date 03/28/20   Referring Physician Debora Crawford MD    Patient/Family Goals Statement To go home   Additional Occupational Profile Info/Pertinent History of Current Problem \"Mr. Maher is a 54 year old man with history of glioblastoma s/p resection on atezolizumab trial and temodar and radiation presented with headache and worsening coordination after receiving temodar last week. Found to have worsening edema and tumor " "progression.\"   Precautions/Limitations no known precautions/limitations   General Info Comments Activity orders: ambulate with assist   Cognitive Status Examination   Orientation orientation to person, place and time   Visual Perception   Visual Perception No deficits were identified   Sensory Examination   Sensory Quick Adds No deficits were identified   Pain Assessment   Patient Currently in Pain No   Range of Motion (ROM)   ROM Quick Adds No deficits were identified   Strength   Manual Muscle Testing Quick Adds No deficits were identified   Hand Strength   Hand Strength Comments WFL   Coordination   Coordination Comments WFL - does endorse baseline difficulties with typing/texting at times   Transfer Skill: Sit to Stand   Level of [place]: Sit/Stand independent   Lower Body Dressing   Level of [place]: Dress Lower Body independent   Instrumental Activities of Daily Living (IADL)   Previous Responsibilities meal prep;housekeeping;laundry;shopping;finances;medication management;work   Activities of Daily Living Analysis   Impairments Contributing to Impaired Activities of Daily Living balance impaired   General Therapy Interventions   Planned Therapy Interventions ADL retraining   Clinical Impression   Criteria for Skilled Therapeutic Interventions Met yes, treatment indicated   OT Diagnosis Patient presented to OT with decreased independence in activities of daily living.   Influenced by the following impairments Balance, coordination   Assessment of Occupational Performance 1-3 Performance Deficits   Identified Performance Deficits Ambulation   Clinical Decision Making (Complexity) Low complexity   Predicted Duration of Therapy Intervention (days/wks) 1 treatment, then discharge   Anticipated Discharge Disposition Home with Outpatient Therapy   Risks and Benefits of Treatment have been explained. Yes   Patient, Family & other staff in agreement with plan of care Yes   Total Evaluation Time   Total " Evaluation Time (Minutes) 5

## 2020-03-31 ENCOUNTER — PATIENT OUTREACH (OUTPATIENT)
Dept: ONCOLOGY | Facility: CLINIC | Age: 55
End: 2020-03-31

## 2020-03-31 ENCOUNTER — PATIENT OUTREACH (OUTPATIENT)
Dept: CARE COORDINATION | Facility: CLINIC | Age: 55
End: 2020-03-31

## 2020-03-31 NOTE — PROGRESS NOTES
RN Care Coordination Note  Incoming Call:   Patient is asking about salt tablets. It looks like his sodium was low (127) while in the hospital. He was taking salt tablets which raised sodium to normal. He was told by one nurse to continue and told by another to stop. Can you recommend a course of action?   Provider Response:  Ericka Avila MD Allard, Kathy, RN   He has two options to increase Na and it is up to him;   1. Continue salt tabs   Or   2. Stop salt tabs and decrease water intake to < 2L/ day + add salt to food/ diet.   Ericka Avila MD   Neuro-oncology   3/31/2020   Outgoing Call:   Relayed message above. Patient will continue with the salt tablets BID. Encouraged him to call with any questions or concerns.    Benita Street RN, BSN  Care Coordinator   [facility]

## 2020-03-31 NOTE — PROGRESS NOTES
Patient's wife stated they got MRI results back and surgery is recommended. They would like to get on the scheduled ASAP. call to discuss    Also they have questions about Salt tablets one nurse told them to take them. The other nurse told them not to take them. Now they do not know what to do.

## 2020-04-01 ENCOUNTER — VIRTUAL VISIT (OUTPATIENT)
Dept: NEUROSURGERY | Facility: CLINIC | Age: 55
End: 2020-04-01
Attending: NEUROLOGICAL SURGERY
Payer: COMMERCIAL

## 2020-04-01 ENCOUNTER — PREP FOR PROCEDURE (OUTPATIENT)
Dept: NEUROSURGERY | Facility: CLINIC | Age: 55
End: 2020-04-01

## 2020-04-01 DIAGNOSIS — D49.6 BRAIN TUMOR (H): Primary | ICD-10-CM

## 2020-04-01 DIAGNOSIS — C71.9 GLIOBLASTOMA (H): Primary | ICD-10-CM

## 2020-04-01 PROCEDURE — 40000114 ZZH STATISTIC NO CHARGE CLINIC VISIT

## 2020-04-01 NOTE — PROGRESS NOTES
"Erasto Maher is a 54 year old male who is being evaluated via a billable video visit.      The patient has been notified of following:     \"This video visit will be conducted via a call between you and your physician/provider. We have found that certain health care needs can be provided without the need for an in-person physical exam.  This service lets us provide the care you need with a video conversation.  If a prescription is necessary we can send it directly to your pharmacy.  If lab work is needed we can place an order for that and you can then stop by our lab to have the test done at a later time.    If during the course of the call the physician/provider feels a video visit is not appropriate, you will not be charged for this service.\"     Patient has given verbal consent for Video visit? Yes    Patient would like the video invitation sent by: Send to e-mail at:   [email or web address]        Video Start Time: 4:00 PM        Chief Complaint   Patient presents with     Video Visit     Brain Tumor       I have reviewed and updated the patient's Allergy List and Medication List.    ALLERGIES  Erythromycin and No clinical screening - see comments            Babs Belle CMA (AAMA)    Erasto Maher is a patients PMH notable for right temporal glioblastoma (IDHwt, MGM promoter unmethylated) s/p resection on 6/2019. The patient had undergone chemoradiation with atezolizumab. He had presented to [facility] on 3/28/2020 with severe headache, nausea and vomiting. MRI of the brain at that time revealed a 2.5 x 3.3 x 3.2 cm lesion with notable alphonso-lesional FLAIR abnormality and a 6 mm MLS. While the contrast enhancing lesion was 1,8 x 0.9 x 1.6 cm on the 2/11/2020 MRI. There was considerably less FLAIR signal in this previous MRI. Further, there is no evidence of MLS.  Patient presents for discussion of surgical resection, with goal for mitigating mass effect and definitive diagnosis of progression versus " immune response.    I had discussed the rationale, risks and benefits of the surgery with the patient, his wife, and his daughter. We additionally discussed the use of gammatile should tumor be found on frozen pathology. We also discussed the use of 5ALA and intra-operative MRI. All questions answered. The patient would like to proceed with surgical resection with 5ALA, intra-operative MRI, and gammatile. I will proceed to schedule accordingly.    I have spent 52 minutes today, with the majority of the visit counseling the patient on the diagnosis. All questions were answered. Over 50% of my time on the unit was spent counseling the patient in terms of the risks and benefits of surgery as well as use of 5ALA, intra-operative MRI, and gammatile.    Video-Visit Details    Type of service:  Video Visit    Video End Time (time video stopped): 4:52 PM    Originating Location (pt. Location): Home    Distant Location (provider location):  [facility]     Mode of Communication:  Video Conference via [employer]      Marquise Chan MD PhD

## 2020-04-02 ENCOUNTER — TELEPHONE (OUTPATIENT)
Dept: RADIATION ONCOLOGY | Facility: CLINIC | Age: 55
End: 2020-04-02

## 2020-04-02 ENCOUNTER — VIRTUAL VISIT (OUTPATIENT)
Dept: RADIATION ONCOLOGY | Facility: CLINIC | Age: 55
End: 2020-04-02
Attending: RADIOLOGY
Payer: COMMERCIAL

## 2020-04-02 ENCOUNTER — TELEPHONE (OUTPATIENT)
Dept: NEUROSURGERY | Facility: CLINIC | Age: 55
End: 2020-04-02

## 2020-04-02 DIAGNOSIS — C71.9 GLIOBLASTOMA MULTIFORME OF BRAIN (H): Primary | ICD-10-CM

## 2020-04-02 PROBLEM — Z98.890 S/P CRANIOTOMY: Status: ACTIVE | Noted: 2019-06-27

## 2020-04-02 PROBLEM — G93.9 BRAIN LESION: Status: ACTIVE | Noted: 2019-06-23

## 2020-04-02 PROBLEM — R93.0 ABNORMAL HEAD CT: Status: ACTIVE | Noted: 2019-06-22

## 2020-04-02 PROBLEM — G40.802: Status: ACTIVE | Noted: 2019-07-03

## 2020-04-02 PROBLEM — G40.409 GENERALIZED TONIC-CLONIC SEIZURE (H): Status: ACTIVE | Noted: 2019-06-22

## 2020-04-02 PROBLEM — E87.1 ACUTE HYPONATREMIA: Status: ACTIVE | Noted: 2019-06-22

## 2020-04-02 PROCEDURE — G0463 HOSPITAL OUTPT CLINIC VISIT: HCPCS | Mod: GT | Performed by: RADIOLOGY

## 2020-04-02 ASSESSMENT — ENCOUNTER SYMPTOMS
FALLS: 0
WEIGHT LOSS: 0
SHORTNESS OF BREATH: 0
DOUBLE VISION: 0
DEPRESSION: 0
VOMITING: 0
SORE THROAT: 0
BLOOD IN STOOL: 0
CONSTIPATION: 0
FEVER: 0
COUGH: 0
HEARTBURN: 0
CHILLS: 0
DIZZINESS: 0
WEAKNESS: 1
WHEEZING: 0
BACK PAIN: 0
HEADACHES: 1
FREQUENCY: 0
NECK PAIN: 0
DIARRHEA: 0
NAUSEA: 1
BLURRED VISION: 0
NERVOUS/ANXIOUS: 1
SEIZURES: 0

## 2020-04-02 NOTE — TELEPHONE ENCOUNTER
Request for previous radiation treatment records sent to [facility] Radiation Oncology dept 9:28 AM 4/2/2020

## 2020-04-02 NOTE — PROGRESS NOTES
"   Erasto Maher is a 54 year old male who is being evaluated via a billable telephone visit.      The patient has been notified of following: Recurrent GBM vs radionecrosis    \"This telephone visit will be conducted via a call between you and your physician/provider. We have found that certain health care needs can be provided without the need for a physical exam.  This service lets us provide the care you need with a short phone conversation.  If a prescription is necessary we can send it directly to your pharmacy.  If lab work is needed we can place an order for that and you can then stop by our lab to have the test done at a later time.    If during the course of the call the physician/provider feels a telephone visit is not appropriate, you will not be charged for this service.\"     Patient has given verbal consent for Telephone visit?  {YES-  Erasto Maher complains of    Chief Complaint   Patient presents with     Cancer     consult for rad therapy       I have reviewed and updated the patient's Past Medical History, Social History, Family History and Medication List.    ALLERGIES  Erythromycin and No clinical screening - see comments     MEDICATIONS  reviewed.     Additional provider notes:     Phone call duration: 45  minutes    Erasto Maher is a patients [facility] notable for right temporal glioblastoma (IDHwt, MGM promoter unmethylated) s/p resection on 6/2019.     The patient had undergone chemoradiation with atezolizumab at [facility]   He was tread     He had presented to  3/28/2020 with severe headache, nausea and vomiting and problems with clumsiness     . MRI of the brain at that time revealed a 2.5 x 3.3 x 3.2 cm lesion with notable alphonso-lesional FLAIR abnormality and a 6 mm MLS. While the contrast enhancing lesion was 1,8 x 0.9 x 1.6 cm on the 2/11/2020 MRI. There was considerably less FLAIR signal in this previous MRI. Further, there is no evidence of midline shift.      He was stared " on decadron and his symptoms have completely resolved.   He had a video consult with DR Chan yesterday and is being offered repeat resection with ( or without gammatile placaement.)     I am having a phone call now with him regarding the potential use of either postoperative external beam therapy or Gammatiles.    PMH reviewed  PSH reviewed  Phys Exam:  not done. alert, oriented cogent,, clear and fluid speech good questions and insight.  great memory    Impression : Likely recurrent GBM although radionecrosis cannot be ruled out.  Plan:  I spent  45 minutes on the phone with the patient discussing the risks and benefits of  external beam therapy and gamma tiles.    At the end of the conversation  I patched in Dr Marquise Chan and the 4 of us  ( including his wife) discussed the option of Gammatiles.    I explained the risk of radionecrosis, the problem with cremation for the first 4 months after Gaamtile implantation.      He agrees to proceed with Gammatile   We have a tentative date of 4/9/20    Seeds will be ordered.    Eli Mann  [phone] pager      CC  Patient Care Team:  Ranjit Edwards MD as PCP - General (Internal Medicine)  EUGENE MEDINA       Duration of the phone calls 60 hours.

## 2020-04-02 NOTE — TELEPHONE ENCOUNTER
FUTURE VISIT INFORMATION      SURGERY INFORMATION:    Date: 20    Location: [facility] OR    Surgeon:  Marquise Chan MD     Anesthesia Type:  general    Procedure: intraoperative magnetic resonance imaging/stealth assisted right craniotomy     Consult: Virtual Visit     RECORDS REQUESTED FROM:       Primary Care Provider: Ranjit Edwards MD- [facility]    Most recent EKG+ Tracin19- [facility]    Most recent Cardiac Stress Test: 10/31/17- [facility]

## 2020-04-02 NOTE — PROGRESS NOTES
Preoperative Assessment Center Medication History Note    Medication history completed via phone call on April 2, 2020 by this writer. See Epic admission navigator for prior to admission medications. Operating room staff will still need to confirm medications and last dose information on day of surgery.     Medication history interview sources:  patient, patient's wife, care everywhere med list from [facility], Payor information.     Changes made to PTA medication list (reason)  Added: psyllium  Deleted: omeprazole.   Temodar 140 mg and 250 mg - completed as of 1.5 weeks ago.   Zofran - completed this (was premedication for chemotherapy.   senna  Changed: none    Additional medication history information (including reliability of information, actions taken by pharmacist):  -- Temodar complete as of 1.5 weeks ago (removed).   -- No recent (within 30 days) course of antibiotics  -- Is actively on steroids.   -- Patient declines being on any other prescription or over-the-counter medications    Prior to Admission medications    Medication Sig Last Dose Taking? Auth Provider   ATEZOLIZUMAB IV Inject 840 mg into the vein every 14 days Taking Yes Unknown, Entered By History   dexamethasone (DECADRON) 4 MG tablet Take 1 tablet (4 mg) by mouth daily for 28 days Taking Yes Ander Jenkins MD   fluticasone (FLONASE) 50 MCG/ACT nasal spray Spray 1 spray into both nostrils nightly as needed  Taking Yes Reported, Patient   levETIRAcetam (KEPPRA) 1000 MG tablet Take 1,000 mg by mouth 2 times daily  Taking Yes Reported, Patient   levothyroxine (SYNTHROID/LEVOTHROID) 125 MCG tablet Take 125 mcg by mouth daily Taking Yes Unknown, Entered By History   loratadine (CLARITIN) 10 MG tablet Take 10 mg by mouth nightly as needed  Taking Yes Unknown, Entered By History   melatonin 5 MG tablet Take 5 mg by mouth nightly as needed  Taking Yes Reported, Patient   olopatadine (PATANOL) 0.1 % ophthalmic solution Place 1-2 drops into both eyes  daily as needed  Taking Yes Reported, Patient   pantoprazole (PROTONIX) 40 MG EC tablet Take 1 tablet (40 mg) by mouth At Bedtime for 28 days Taking Yes Ander Jenkins MD   Psyllium 500 MG CAPS Take 4 capsules by mouth At Bedtime Taking Yes Unknown, Entered By History   acetaZOLAMIDE (DIAMOX) 250 MG tablet Take 250 mg by mouth 2 times daily as needed Taken 1-2 days before ascent and continue until descent. (for skiing) Not Taking  Reported, Patient         Medication history completed by: Parveen Carrington [facility]

## 2020-04-02 NOTE — PROGRESS NOTES
"Erasto Maher is a 54 year old male who is being evaluated via a billable telephone visit.      The patient has been notified of following:     \"This telephone visit will be conducted via a call between you and your physician/provider. We have found that certain health care needs can be provided without the need for a physical exam.  This service lets us provide the care you need with a short phone conversation.  If a prescription is necessary we can send it directly to your pharmacy.  If lab work is needed we can place an order for that and you can then stop by our lab to have the test done at a later time.    If during the course of the call the physician/provider feels a telephone visit is not appropriate, you will not be charged for this service.\"     Patient has given verbal consent for Telephone visit?  Yes    Erasto Maher complains of    Chief Complaint   Patient presents with     Cancer     consult for rad therapy       I have reviewed and updated the patient's Past Medical History, Social History, Family History and Medication List.    ALLERGIES  Erythromycin and No clinical screening - see comments    Additional provider notes:     Phone call duration: 20 minutes    Maureen Behrns RN  HPI    INITIAL PATIENT ASSESSMENT    Diagnosis: gbm    Prior radiation therapy:   Site Treated: brain  Facility: [facility]  Dates:   Dose:     Prior chemotherapy:   Protocol: temodar  Facility:   Dates: current      Prior hormonal therapy:No    Pain Eval:  Denies    Psychosocial  Living arrangements: wife  Fall Risk: independent   referral needs: Not needed    Advanced Directive: Yes - Location: home  Implantable Cardiac Device? No    Onset of menarcheLMP: No LMP for male patient.  Onset of menopause:   Abnormal vaginal bleeding/discharge:   Are you pregnant?   Reproductive note:     Nurse face-to-face time: Level 5:  over 15 min face to face time  Review of Systems   Constitutional: Negative for " chills, fever, malaise/fatigue and weight loss.   HENT: Negative for congestion, hearing loss, sore throat and tinnitus.    Eyes: Negative for blurred vision and double vision.   Respiratory: Negative for cough, shortness of breath and wheezing.    Cardiovascular: Negative for chest pain and leg swelling.   Gastrointestinal: Positive for nausea. Negative for blood in stool, constipation, diarrhea, heartburn and vomiting.   Genitourinary: Negative for frequency and urgency.   Musculoskeletal: Negative for back pain, falls and neck pain.   Skin: Negative for itching and rash.   Neurological: Positive for weakness and headaches. Negative for dizziness and seizures.   Endo/Heme/Allergies: Positive for environmental allergies.   Psychiatric/Behavioral: Negative for depression. The patient is nervous/anxious.

## 2020-04-02 NOTE — TELEPHONE ENCOUNTER
Called patient an gave him time and dates of surgery pac visit and post op.  Answered questions about time and location.

## 2020-04-03 ENCOUNTER — OFFICE VISIT (OUTPATIENT)
Dept: SURGERY | Facility: CLINIC | Age: 55
End: 2020-04-03
Payer: COMMERCIAL

## 2020-04-03 ENCOUNTER — ANESTHESIA EVENT (OUTPATIENT)
Dept: SURGERY | Facility: CLINIC | Age: 55
DRG: 023 | End: 2020-04-03
Payer: COMMERCIAL

## 2020-04-03 ENCOUNTER — PRE VISIT (OUTPATIENT)
Dept: SURGERY | Facility: CLINIC | Age: 55
End: 2020-04-03

## 2020-04-03 VITALS
OXYGEN SATURATION: 98 % | TEMPERATURE: 98.1 F | WEIGHT: 168 LBS | HEART RATE: 84 BPM | HEIGHT: 70 IN | DIASTOLIC BLOOD PRESSURE: 93 MMHG | BODY MASS INDEX: 24.05 KG/M2 | RESPIRATION RATE: 16 BRPM | SYSTOLIC BLOOD PRESSURE: 152 MMHG

## 2020-04-03 DIAGNOSIS — Z01.818 PREOP EXAMINATION: Primary | ICD-10-CM

## 2020-04-03 DIAGNOSIS — Z01.818 PREOP EXAMINATION: ICD-10-CM

## 2020-04-03 DIAGNOSIS — C71.9 GLIOBLASTOMA (H): ICD-10-CM

## 2020-04-03 LAB
APTT PPP: 27 SEC (ref 22–37)
INR PPP: 1 (ref 0.86–1.14)
INTERPRETATION ECG - MUSE: NORMAL

## 2020-04-03 ASSESSMENT — ENCOUNTER SYMPTOMS: SEIZURES: 1

## 2020-04-03 ASSESSMENT — PAIN SCALES - GENERAL: PAINLEVEL: NO PAIN (0)

## 2020-04-03 ASSESSMENT — MIFFLIN-ST. JEOR: SCORE: 1598.29

## 2020-04-03 NOTE — RESULT ENCOUNTER NOTE
Bea Maurer,    Your test results are attached.  All of your labs are good for surgery.  Have a nice birthday next week!        Reanta Lam DNP, RN, ANP-C

## 2020-04-03 NOTE — ANESTHESIA PREPROCEDURE EVALUATION
"Anesthesia Pre-Procedure Evaluation    Patient: Erasto Maher   MRN:     [id number] Gender:   male   Age:    54 year old :      1965        Preoperative Diagnosis: Glioblastoma (H) [C71.9]   Procedure(s):  intraoperative magnetic resonance imaging/stealth assisted right craniotomy     HPI:   From clinic H&P    \"Aristeo Maher is a 54 year old man with allergic rhinitis, seizures, hypothyroidism, GERD and insomnia that has a glioblastoma.  He was diagnosed in 2019 after presenting with a seizure.  He initially had a surgical resection at [facility] in 2019.  He then decided to seek oncology care at [facility] in [place].  He completed radiation there in 2019.  He has since had immunotherapy with atezolizumab and oral chemo with Temodar.  His last immunotherapy was on 3/16/20 and his last temodar cycle completed 1.5 weeks ago.  He presented to the emergency department on 3/28/20 with severe headache and N/V.  An MRI was done and he was found to have progression of the glioblastoma.  His oncologist at [facility] subsequently referred him here to Dr. Chan for surgical consideration.  He consulted with him via virtual visit on 20.\"      Current Outpatient Medications   Medication Sig Dispense Refill     acetaZOLAMIDE (DIAMOX) 250 MG tablet Take 250 mg by mouth 2 times daily as needed Taken 1-2 days before ascent and continue until descent. (for skiing)       dexamethasone (DECADRON) 4 MG tablet Take 1 tablet (4 mg) by mouth daily for 28 days 28 tablet 0     fluticasone (FLONASE) 50 MCG/ACT nasal spray Spray 1 spray into both nostrils nightly as needed        levETIRAcetam (KEPPRA) 1000 MG tablet Take 1,000 mg by mouth 2 times daily        levothyroxine (SYNTHROID/LEVOTHROID) 125 MCG tablet Take 125 mcg by mouth daily       loratadine (CLARITIN) 10 MG tablet Take 10 mg by mouth nightly as needed        melatonin 5 MG tablet Take 5 mg by mouth nightly as needed        olopatadine " "(PATANOL) 0.1 % ophthalmic solution Place 1-2 drops into both eyes daily as needed        pantoprazole (PROTONIX) 40 MG EC tablet Take 1 tablet (40 mg) by mouth At Bedtime for 28 days 28 tablet 0     Psyllium 500 MG CAPS Take 4 capsules by mouth At Bedtime          LABS:  CBC:   Lab Results   Component Value Date    WBC 7.9 03/30/2020    WBC 9.8 03/29/2020    HGB 13.1 (L) 03/30/2020    HGB 13.4 03/29/2020    HCT 38.6 (L) 03/30/2020    HCT 38.8 (L) 03/29/2020     03/30/2020     03/29/2020     BMP:   Lab Results   Component Value Date     03/30/2020     03/30/2020    POTASSIUM 3.9 03/30/2020    POTASSIUM 4.1 03/29/2020    CHLORIDE 104 03/30/2020    CHLORIDE 94 03/29/2020    CO2 27 03/30/2020    CO2 26 03/29/2020    BUN 13 03/30/2020    BUN 9 03/29/2020    CR 0.78 03/30/2020    CR 0.61 (L) 03/29/2020     (H) 03/30/2020     (H) 03/29/2020     COAGS: No results found for: PTT, INR, FIBR  POC:   Lab Results   Component Value Date     (H) 03/28/2020     OTHER:   Lab Results   Component Value Date    JUDIE 8.8 03/30/2020    PHOS 3.4 03/30/2020    MAG 1.9 03/30/2020    ALBUMIN 4.1 03/28/2020    PROTTOTAL 7.1 03/28/2020    ALT 27 03/28/2020    AST 13 03/28/2020    ALKPHOS 67 03/28/2020    BILITOTAL 0.5 03/28/2020    TSH 13.70 (H) 03/29/2020        Preop Vitals    BP Readings from Last 3 Encounters:   04/03/20 (!) 152/93   03/30/20 138/80   09/06/19 134/83    Pulse Readings from Last 3 Encounters:   04/03/20 84   03/29/20 79   09/06/19 85      Resp Readings from Last 3 Encounters:   04/03/20 16   03/30/20 16   09/06/19 18    SpO2 Readings from Last 3 Encounters:   04/03/20 98%   03/30/20 97%   09/06/19 97%      Temp Readings from Last 1 Encounters:   04/03/20 98.1  F (36.7  C) (Oral)    Ht Readings from Last 1 Encounters:   04/03/20 1.778 m (5' 10\")      Wt Readings from Last 1 Encounters:   04/03/20 76.2 kg (168 lb)    Estimated body mass index is 24.11 kg/m  as calculated from the " "following:    Height as of this encounter: 1.778 m (5' 10\").    Weight as of this encounter: 76.2 kg (168 lb).     LDA:  Peripheral IV 03/28/20 Right Lower forearm (Active)   Site Assessment WDL 03/30/20 0000   Line Status Infusing 03/30/20 0000   Phlebitis Scale 0-->no symptoms 03/30/20 0000   Infiltration Scale 0 03/30/20 0000   Infiltration Site Treatment Method  None 03/29/20 1600   Extravasation? No 03/30/20 0000   Dressing Intervention New dressing  03/28/20 0342   Number of days: 6        Past Medical History:   Diagnosis Date     Allergic rhinitis      GERD (gastroesophageal reflux disease)      Glioblastoma (H)      Insomnia      PONV (postoperative nausea and vomiting)      Seizure (H)       Past Surgical History:   Procedure Laterality Date     APPENDECTOMY  11/1981     COLONOSCOPY       CRANIOTOMY  06/28/2019     HERNIA REPAIR      x2      Allergies   Allergen Reactions     No Clinical Screening - See Comments Rash     PN: LW Other1: -cats, hairy dogs  PN: LW Other1: -cats, hairy dogs        Anesthesia Evaluation     . Pt has had prior anesthetic. Type: MAC and General    History of anesthetic complications   - PONV        ROS/MED HX    ENT/Pulmonary:  - neg pulmonary ROS     Neurologic:     (+)seizures last seizure: 6/2019 features: unknown,     Cardiovascular:  - neg cardiovascular ROS   (+) ----. : . . . :. . Previous cardiac testing date:results:Stress Testdate:10/2017 results:Final Impressions:   1. Excellent exercise duration and workload.   2. During stress exam the patient developed no significant symptoms.   3. Maximum stress test with 111.2% of age predicted maximum heart rate.   4. There were ischemic changes by EKG during stress, which rapidly normalized in early recovery.   5. Post stress, normal LV size, increased systolic function with an estimated EF of > 80%.   6. Despite the described EKG changes, there were no corresponding echcardiographic signs of ischemia.   7. Negative stress echo " for ischemia.ECG reviewed date:4/3/2020 results: date: results:          METS/Exercise Tolerance:  >4 METS   Hematologic:  - neg hematologic  ROS       Musculoskeletal:  - neg musculoskeletal ROS       GI/Hepatic:     (+) GERD Asymptomatic on medication,       Renal/Genitourinary:  - ROS Renal section negative       Endo:     (+) thyroid problem hypothyroidism, Chronic steroid usage (decadron 4 mg daily ) for Other Date most recently used: daily,.      Psychiatric:  - neg psychiatric ROS       Infectious Disease:  - neg infectious disease ROS       Malignancy:   (+) Malignancy History of Skin and Other  Skin CA Remission status post Surgery, Other CA glioblastoma Active status post Surgery, Chemo and Radiation         Other:    (+) no H/O Chronic Pain,                       PHYSICAL EXAM:   Mental Status/Neuro: A/A/O; Age Appropriate   Airway: Facies: Feasible  Mallampati: I  Mouth/Opening: Full  TM distance: > 6 cm  Neck ROM: Full   Respiratory: Auscultation: CTAB     Resp. Rate: Normal     Resp. Effort: Normal      CV: Rhythm: Regular  Rate: Age appropriate  Heart: Normal Sounds  Edema: None   Comments:      Dental: Normal Dentition                Assessment:   ASA SCORE: 4    H&P: History and physical reviewed and following examination; no interval change.   Smoking Status:  Non-Smoker/Unknown        Plan:   Anes. Type:  General   Pre-Medication: None   Induction:  IV (Standard)   Airway: ETT; Oral   Access/Monitoring: PIV; A-Line   Maintenance: TIVA     Postop Plan:   Postop Pain: Opioids  Postop Sedation/Airway: Not planned     PONV Management:   Adult Risk Factors:, H/o PONV or Motion Sickness, Non-Smoker, Postop Opioids   Prevention: Ondansetron, Dexamethasone, Propofol, No Volatiles     CONSENT: Direct conversation   Plan and risks discussed with: Patient   Blood Products: Consented (ALL Blood Products)       Comments for Plan/Consent:  55 year old male, ASA 4, PMH PONV, glioblastoma s/p resection and  chemo/radiation now presenting for ablation due to new-onset seizures and growth of mass.  On chronic decadron 4 mg daily.  - GETA  - 2 IV, arterial line  - TIVA, decadron, zofran for PONV hx              PAC Discussion and Assessment    ASA Classification: 3  Case is suitable for: [facility]  Anesthetic techniques and relevant risks discussed: GA  Invasive monitoring and risk discussed:   Types:   Possibility and Risk of blood transfusion discussed:   NPO instructions given:   Additional anesthetic preparation and risks discussed:   Needs early admission to pre-op area:   Other:     PAC Resident/NP Anesthesia Assessment:  Aristeo Maher is a 54 year old man scheduled for an intraoperative magnetic resonance imaging/stealth assisted right craniotomy on 4/9/2020 by Dr. Chan in treatment of glioblastoma.  PAC referral for risk assessment and optimization for anesthesia with comorbid conditions of: allergic rhinitis, seizures, hypothyroidism, GERD and insomnia.      Pre-operative considerations:  1.  Cardiac:  Functional status- METS >4.  He is very active.  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  Since the closures he has been working out at home on his lifecycle, stairclimber and by lifting weights.  He had a stress test in 2017 that initially noted some possible ischemia, but conclusion was negative and the EF with exercise was >80%.  He has no diagnosed cardiac conditions.  High risk surgery with 0.4% risk of major adverse cardiac event.   2.  Pulm:  Airway feasible.  HILLARY risk: low.  3.  GI:  Risk of PONV score = 3.  If > 2, anti-emetic intervention recommended.  He has a known history of PONV.  PONV prevention measures as per anesthesia DOS.    4. Endo:  He is on 4mg of decadron daily.  Stress dose steroids as per anesthesia DOS.  5. Neuro:  He has a history of one seizure in June 2019 prior to his glioblastoma  diagnosis.  He is on keppra now and denies any seizures since.  He also denies any other neurologic symptoms at this time.  Consider seizure precautions.      VTE risk: 3%    Patient is optimized and is acceptable candidate for the proposed procedure.  No further diagnostic evaluation is needed.     Patient discussed with Dr. Jamison.      **For further details of assessment, testing, and physical exam please see H and P completed on same date.          Renata Lam DNP, RN, APRN      Reviewed and Signed by PAC Mid-Level Provider/Resident  Mid-Level Provider/Resident: Renata Lam DNP, RN, APRN  Date: 4/3/20  Time: 1102    Attending Anesthesiologist Anesthesia Assessment:        Anesthesiologist:   Date:   Time:   Pass/Fail:   Disposition:     PAC Pharmacist Assessment:        Pharmacist:   Date:   Time:    RUPA Oneal CNP

## 2020-04-03 NOTE — H&P
Pre-Operative H & P     CC:  Preoperative exam to assess for increased cardiopulmonary risk while undergoing surgery and anesthesia.    Date of Encounter: 4/3/2020  Primary Care Physician:  Ranjit Edwards  Associated diagnosis:  glioblastoma    HPI  Erasto Maher is a 54 year old male who presents for pre-operative H & P in preparation for an intraoperative magnetic resonance imaging/stealth assisted right craniotomy on 4/9/2020 by Dr. Chan at [facility].       Aristeo Maher is a 54 year old man with allergic rhinitis, seizures, hypothyroidism, GERD and insomnia that has a glioblastoma.  He was diagnosed in June 2019 after presenting with a seizure.  He initially had a surgical resection at [facility] in June 2019.  He then decided to seek oncology care at [facility] in [place].  He completed radiation there in Sept 2019.  He has since had immunotherapy with atezolizumab and oral chemo with Temodar.  His last immunotherapy was on 3/16/20 and his last temodar cycle completed 1.5 weeks ago.  He presented to the emergency department on 3/28/20 with severe headache and N/V.  An MRI was done and he was found to have progression of the glioblastoma.  His oncologist at [facility] subsequently referred him here to Dr. Chan for surgical consideration.  He consulted with him via virtual visit on 4/1/20.  The above listed surgery has now been planned for further treatment.      History is obtained from the patient and the medical record.     Past Medical History  Past Medical History:   Diagnosis Date     Allergic rhinitis      GERD (gastroesophageal reflux disease)      Glioblastoma (H)      Insomnia      PONV (postoperative nausea and vomiting)      Seizure (H)        Past Surgical History  Past Surgical History:   Procedure Laterality Date     APPENDECTOMY  11/1981     COLONOSCOPY       CRANIOTOMY  06/28/2019     HERNIA REPAIR      x2       Hx of Blood  transfusions/reactions: none    Hx of abnormal bleeding or anti-platelet use: none    Steroid use in the last year: 4 mg decadron daily    Personal or FH with difficulty with Anesthesia:  Patient has a history of PONV, but has no family history of anesthesia complications.      Prior to Admission Medications  Current Outpatient Medications   Medication Sig Dispense Refill     ATEZOLIZUMAB IV Inject 840 mg into the vein every 14 days       dexamethasone (DECADRON) 4 MG tablet Take 1 tablet (4 mg) by mouth daily for 28 days 28 tablet 0     fluticasone (FLONASE) 50 MCG/ACT nasal spray Spray 1 spray into both nostrils nightly as needed        levETIRAcetam (KEPPRA) 1000 MG tablet Take 1,000 mg by mouth 2 times daily        levothyroxine (SYNTHROID/LEVOTHROID) 125 MCG tablet Take 125 mcg by mouth daily       loratadine (CLARITIN) 10 MG tablet Take 10 mg by mouth nightly as needed        melatonin 5 MG tablet Take 5 mg by mouth nightly as needed        olopatadine (PATANOL) 0.1 % ophthalmic solution Place 1-2 drops into both eyes daily as needed        pantoprazole (PROTONIX) 40 MG EC tablet Take 1 tablet (40 mg) by mouth At Bedtime for 28 days 28 tablet 0     Psyllium 500 MG CAPS Take 4 capsules by mouth At Bedtime       acetaZOLAMIDE (DIAMOX) 250 MG tablet Take 250 mg by mouth 2 times daily as needed Taken 1-2 days before ascent and continue until descent. (for skiing)         Allergies  Allergies   Allergen Reactions     No Clinical Screening - See Comments Rash     PN: LW Other1: -cats, hairy dogs  PN: LW Other1: -cats, hairy dogs       Social History  Social History     Socioeconomic History     Marital status:      Spouse name: Not on file     Number of children: Not on file     Years of education: Not on file     Highest education level: Not on file   Occupational History     Occupation: self-employed   Social Needs     Financial resource strain: Not on file     Food insecurity     Worry: Not on file      Inability: Not on file     Transportation needs     Medical: Not on file     Non-medical: Not on file   Tobacco Use     Smoking status: Never Smoker     Smokeless tobacco: Never Used   Substance and Sexual Activity     Alcohol use: Not Currently     Frequency: 2-3 times a week     Drinks per session: 3 or 4     Drug use: Not Currently     Sexual activity: Not Currently     Partners: Female   Lifestyle     Physical activity     Days per week: Not on file     Minutes per session: Not on file     Stress: Not on file   Relationships     Social connections     Talks on phone: Not on file     Gets together: Not on file     Attends Jewish service: Not on file     Active member of club or organization: Not on file     Attends meetings of clubs or organizations: Not on file     Relationship status: Not on file     Intimate partner violence     Fear of current or ex partner: Not on file     Emotionally abused: Not on file     Physically abused: Not on file     Forced sexual activity: Not on file   Other Topics Concern     Not on file   Social History Narrative     Not on file       Family History  Family History   Problem Relation Age of Onset     Hypotension Mother      Myocardial Infarction Father      Pacemaker Father      Endocrine Disease Father         prediabetes     No Known Problems Brother                ROS/MED HX  The complete review of systems is negative other than noted in the HPI or here.   ENT/Pulmonary:  - neg pulmonary ROS     Neurologic:     (+)seizures last seizure: 6/2019 features: unknown,     Cardiovascular:  - neg cardiovascular ROS     METS/Exercise Tolerance:  >4 METS   Hematologic:  - neg hematologic  ROS       Musculoskeletal:  - neg musculoskeletal ROS       GI/Hepatic:     (+) GERD Asymptomatic on medication,       Renal/Genitourinary:  - ROS Renal section negative       Endo:     (+) thyroid problem hypothyroidism, Chronic steroid usage for Other Date most recently used: daily,.     "  Psychiatric:  - neg psychiatric ROS       Infectious Disease:  - neg infectious disease ROS       Malignancy:   (+) Malignancy History of Skin and Other  Skin CA Remission status post Surgery, Other CA glioblastoma Active status post Surgery, Chemo and Radiation         Other:    (+) no H/O Chronic Pain,                       PHYSICAL EXAM:   Mental Status/Neuro: A/A/O; Age Appropriate   Airway: Facies: Feasible  Mallampati: I  Mouth/Opening: Full  TM distance: > 6 cm  Neck ROM: Full   Respiratory: Auscultation: CTAB     Resp. Rate: Normal     Resp. Effort: Normal      CV: Rhythm: Regular  Rate: Age appropriate  Heart: Normal Sounds  Edema: None   Comments:      Dental: Normal Dentition                  Temp: 98.1  F (36.7  C) Temp src: Oral BP: (!) 152/93 Pulse: 84   Resp: 16 SpO2: 98 %         168 lbs 0 oz  5' 10\"   Body mass index is 24.11 kg/m .       Physical Exam  Constitutional: Awake, alert, cooperative, no apparent distress, and appears stated age.  Eyes: Pupils equal, round and reactive to light, extra ocular muscles intact, sclera clear, conjunctiva normal.  HENT: Normocephalic, oral pharynx with moist mucus membranes, good dentition. No goiter appreciated.   Respiratory: Clear to auscultation bilaterally, no crackles or wheezing.  Cardiovascular: Regular rate and rhythm, normal S1 and S2, and no murmur noted.  Carotids +2, no bruits. No edema. Palpable pulses to radial  DP and PT arteries.   GI: Normal bowel sounds, soft, non-distended, non-tender, no masses palpated, no hepatosplenomegaly.  Lymph/Hematologic: No cervical lymphadenopathy and no supraclavicular lymphadenopathy.  Genitourinary:  deferred  Skin: Warm and dry.  No rashes at anticipated surgical site.   Musculoskeletal: Full ROM of neck. There is no redness, warmth, or swelling of the exposed joints. Gross motor strength is normal.    Neurologic: Awake, alert, oriented to name, place and time. Cranial nerves II-XII are grossly intact. Gait " is normal.   Neuropsychiatric: Calm, cooperative. Normal affect.     Labs: (personally reviewed)   Component      Latest Ref Rng & Units 3/30/2020 4/3/2020   WBC      4.0 - 11.0 10e9/L 7.9    RBC Count      4.4 - 5.9 10e12/L 4.19 (L)    Hemoglobin      13.3 - 17.7 g/dL 13.1 (L)    Hematocrit      40.0 - 53.0 % 38.6 (L)    MCV      78 - 100 fl 92    MCH      26.5 - 33.0 pg 31.3    MCHC      31.5 - 36.5 g/dL 33.9    RDW      10.0 - 15.0 % 12.5    Platelet Count      150 - 450 10e9/L 208    Diff Method       Automated Method    % Neutrophils      % 73.6    % Lymphocytes      % 15.6    % Monocytes      % 9.5    % Eosinophils      % 0.5    % Basophils      % 0.4    % Immature Granulocytes      % 0.4    Nucleated RBCs      0 /100 0    Absolute Neutrophil      1.6 - 8.3 10e9/L 5.8    Absolute Lymphocytes      0.8 - 5.3 10e9/L 1.2    Absolute Monocytes      0.0 - 1.3 10e9/L 0.8    Absolute Eosinophils      0.0 - 0.7 10e9/L 0.0    Absolute Basophils      0.0 - 0.2 10e9/L 0.0    Abs Immature Granulocytes      0 - 0.4 10e9/L 0.0    Absolute Nucleated RBC       0.0    Sodium      133 - 144 mmol/L 135    Potassium      3.4 - 5.3 mmol/L 3.9    Chloride      94 - 109 mmol/L 104    Carbon Dioxide      20 - 32 mmol/L 27    Anion Gap      3 - 14 mmol/L 4    Glucose      70 - 99 mg/dL 104 (H)    Urea Nitrogen      7 - 30 mg/dL 13    Creatinine      0.66 - 1.25 mg/dL 0.78    GFR Estimate      >60 mL/min/1.73:m2 >90    GFR Estimate If Black      >60 mL/min/1.73:m2 >90    Calcium      8.5 - 10.1 mg/dL 8.8    Magnesium      1.6 - 2.3 mg/dL 1.9    Phosphorus      2.5 - 4.5 mg/dL 3.4    INR      0.86 - 1.14  1.00   PTT      22 - 37 sec  27         EK/3/20  NSR    Stress test:   2017  Final Impressions:   1. Excellent exercise duration and workload.   2. During stress exam the patient developed no significant symptoms.   3. Maximum stress test with 111.2% of age predicted maximum heart rate.   4. There were ischemic changes by EKG during  stress, which rapidly normalized in early recovery.   5. Post stress, normal LV size, increased systolic function with an estimated EF of > 80%.   6. Despite the described EKG changes, there were no corresponding echcardiographic signs of ischemia.   7. Negative stress echo for ischemia.      Outside records reviewed from: Care Everywhere        ASSESSMENT and PLAN  Aristeo Maher is a 54 year old man scheduled for an intraoperative magnetic resonance imaging/stealth assisted right craniotomy on 4/9/2020 by Dr. Chan in treatment of glioblastoma.  PAC referral for risk assessment and optimization for anesthesia with comorbid conditions of: allergic rhinitis, seizures, hypothyroidism, GERD and insomnia.      Pre-operative considerations:  1.  Cardiac:  Functional status- METS >4.  He is very active.  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  Since the closures he has been working out at home on his lifecycle, stairclimber and by lifting weights.  He had a stress test in 2017 that initially noted some possible ischemia, but conclusion was negative and the EF with exercise was >80%.  He has no diagnosed cardiac conditions.  High risk surgery with 0.4% risk of major adverse cardiac event.   2.  Pulm:  Airway feasible.  HILLARY risk: low.  3.  GI:  Risk of PONV score = 3.  If > 2, anti-emetic intervention recommended.  He has a known history of PONV.  PONV prevention measures as per anesthesia DOS.    4. Endo:  He is on 4mg of decadron daily.  Stress dose steroids as per anesthesia DOS.  5. Neuro:  He has a history of one seizure in June 2019 prior to his glioblastoma diagnosis.  He is on keppra now and denies any seizures since.  He also denies any other neurologic symptoms at this time.  Consider seizure precautions.      VTE risk: 3%    Patient is optimized and is acceptable candidate for the proposed procedure.  No further  diagnostic evaluation is needed.     Patient discussed with Dr. Jamison.      Renata Lam DNP, RN, APRN  Preoperative Assessment Center  [facility]  Clinic and Surgery Center  Phone: [phone]  Fax: [phone]   all other ROS negative except as per HPI

## 2020-04-03 NOTE — PATIENT INSTRUCTIONS
Preparing for Your Surgery      Name:  Erasto Maher   MRN:  [id number]   :  1965   Today's Date:  4/3/2020     Arriving for surgery:  Surgery date:  20  Arrival time:  05:30 am  Due to the COVID 19 crisis, we are trying to keep our patients safe from others who might have respiratory illnesses so the hospital is implementing a no visitor policy.  Please come to:       [facility]   [address] [place]  [address]    - ? parking is available in front of the hospital      -    Please proceed to the Surgery Lounge on the 3rd floor. [phone]?     - ?If you are in need of directions, wheelchair or escort please stop at the Information Desk in the lobby.  Inform the information person that you are here for surgery; a wheelchair and escort will be provided to the Surgery Lounge .?     What can I eat or drink?  -  You may have solid food or milk products until 8 hours prior to your surgery.  -  You may have water, apple juice or 7up/Sprite until 2 hours prior to your surgery.    Which medicines can I take?  Hold Aspirin, vitamins and supplements one week prior to surgery.  Hold Ibuprofen for 24 hours and/or Naproxen for 48 hours prior to surgery.   -  Please take these medications the day of surgery:  Tylenol if needed; take all other scheduled medications normally taken in the morning.    How do I prepare myself?  -  Take two showers: one the night before surgery; and one the morning of surgery.         Use Scrubcare or Hibiclens to wash from neck down, leave soap on your skin for up to one minute.  Do not get soap in your eyes or ears.  You may use your own shampoo and conditioner; no other hair products.   -  Do NOT use lotion, powder, deodorant, or antiperspirant the day of your surgery.  -  Do NOT wear any jewelry.  - Do not bring your own medications to the hospital, except for inhalers and eye drops.  -  Bring your ID and insurance card.         Questions or Concerns:  -If you are scheduled on the East or West campus and have questions or concerns regarding the day of surgery, please call Preadmission Nursing at [phone].   -If you have health changes between today and your surgery please call your surgeon. For questions after surgery please call your surgeons office.

## 2020-04-04 NOTE — PROGRESS NOTES
"Erasto Maher is a 54 year old male who is being evaluated via a billable video visit.      The patient has been notified of following:     \"This video visit will be conducted via a call between you and your physician/provider. We have found that certain health care needs can be provided without the need for an in-person physical exam.  This service lets us provide the care you need with a video conversation.  If a prescription is necessary we can send it directly to your pharmacy.  If lab work is needed we can place an order for that and you can then stop by our lab to have the test done at a later time.    If during the course of the call the physician/provider feels a video visit is not appropriate, you will not be charged for this service.\"     Patient has given verbal consent for Video visit? Yes    Patient would like the video invitation sent by: Text to cell phone: [phone]    Video Start Time: 2:35PM    Erasto Maher complains of    Chief Complaint   Patient presents with     Video Visit     Return Glioblastoma       I have reviewed and updated the patient's Past Allergies and Medication List.    ALLERGIES  No clinical screening - see comments     Naomi Garcia CMA    Additional provider notes: Please see below.       Video-Visit Details    Type of service:  Video Visit    Video End Time (time video stopped): 3:03PM    Originating Location (pt. Location): Home    Distant Location (provider location):  [facility]     Mode of Communication:  Video Conference via Yohana Avila MD      ___________________________________________________________________    NEURO-ONCOLOGY VIDEO VISIT  Apr 6, 2020    CHIEF COMPLAINT: Mr. 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 He completed chemoradiotherapy on " 8/30/2019 and was managed on a clinical trial receiving atezolizumab (anti-PDL1) plus adjuvant temozolomide. He last received an immunotherapy treatment on 3/16/20 when imaging on 3/28/20 showed progression of disease. The plan is for repeat surgery with Dr. Chan on 4/9/2020 +/- placement of GammaTiles.     He receives his primary neuro-oncology care with Dr. Wilmer Moreno at [facility].    I had a virtual visit with Aristeo and Judith (wife) plus their daughter today.       HISTORY OF PRESENT ILLNESS  -Aristeo states that he is doing well today.   -He presented to the ED last week with a headache in the setting of significant stress. 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 The stress he had been under is slightly less now and he is denying headaches.   -He also noted issues with proprioception/ spatial awareness with worsening coordination and dexterity, however, this has improved. He is back to working out routinely.   -On dexamethasone and the use of steroids also helped his complaints listed above.   -Denies fatigue.   -No recurrent seizure events.   -Mood is good, ready to keep up the fight.     REVIEW OF SYSTEMS  A comprehensive ROS negative except as in HPI.      MEDICATIONS   Current Outpatient Medications   Medication Sig Dispense Refill     dexamethasone (DECADRON) 4 MG tablet Take 1 tablet (4 mg) by mouth daily for 28 days 28 tablet 0     fluticasone (FLONASE) 50 MCG/ACT nasal spray Spray 1 spray into both nostrils nightly as needed        levETIRAcetam (KEPPRA) 1000 MG tablet Take 1,000 mg by mouth 2 times daily        levothyroxine (SYNTHROID/LEVOTHROID) 125 MCG tablet Take 125 mcg by mouth daily       loratadine (CLARITIN) 10 MG tablet Take 10 mg by mouth nightly as needed        melatonin 5 MG tablet Take 5 mg by mouth nightly as needed        olopatadine (PATANOL) 0.1 % ophthalmic solution Place 1-2 drops into both eyes daily as needed        pantoprazole (PROTONIX) 40  MG EC tablet Take 1 tablet (40 mg) by mouth At Bedtime for 28 days 28 tablet 0     Psyllium 500 MG CAPS Take 4 capsules by mouth At Bedtime       acetaZOLAMIDE (DIAMOX) 250 MG tablet Take 250 mg by mouth 2 times daily as needed Taken 1-2 days before ascent and continue until descent. (for skiing)       DRUG ALLERGIES   Allergies   Allergen Reactions     No Clinical Screening - See Comments Rash     Cats and dogs         ONCOLOGIC HISTORY  -PRESENTATION: New onset seizures; complex partial event with speech arrest and altered mental status lasting several minutes. Later had secondary generalization. Started Keppra.   -MR brain imaging with a ring enhancing lesion in the right parietal lobe.    -6/27/2019 SURGERY: Right temporal craniotomy for mass resection, gross total resection by Dr. Tam Barreto.  PATHOLOGY: Glioblastoma; IDH1 R132H wildtype, MGMT promoter unmethylated  -7/3/2019 CLINICAL TRIAL: Evaluated by Dr. Wilmer Mckenna at [facility], consented for clinical trial 3811-1844 (Standard of Care plus atezolizumab).  -7/22 - 8/30/2019 CHEMORADS: 60cGy with concurrent temozolomide 75mg/m2/day (145mg) plus atezolizumab Q2 weeks on clinical trial 0193-8846.  -9/6/2019 NEURO-ONC: Evaluated at the [facility].   -9/25/2019 - 3/16/2020 CHEMO: Adjuvant chemotherapy; temozolomide + atezolizumab 840 mg IV on clinical trial 6127-5152.  -4/6/2020 NEURO-ONC: Communicated with Dr. Borrero. Plan for surgery and next steps pending pathology results.   -4/9/2020 Planned SURGERY: Repeat craniotomy for surgical debulking.     SOCIAL HISTORY   Tobacco use: Never smoker.   Alcohol use: Social, infrequent.   Drug use: Denies marijuana use.  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   .       PHYSICAL EXAMINATION NOT PERFORMED      MEDICAL RECORDS  Obtained and personally reviewed all available outside medical records in addition to reviewing any records available in our electronic system.     LABS  Personally reviewed all available  lab results.     IMAGING  Personally reviewed MR brain imaging from last month. To my eye, there was an increase in the size of a heterogeneously contrast enhancing lesion in the  right temporoparietal region about the resection cavity.     Imaging results discussed with Niki.     Imaging and case discussed at Brain Tumor Conference today.       IMPRESSION  Clinic time was spent discussing in detail the nature of this tumor, providing emotional support, answering questions, and providing local resources.     With regard to cancer-directed therapy, I personally discussed his case with Dr. Moreno, neuro-oncologist at [facility]. The plan is for repeat craniotomy for surgical debulking of the new contrast enhancing disease seen on imaging. Post-operatively, if the pathology is consistent with treatment effect/ pseudoprogression, he will likely stayed on trial. If the pathology is consistent with tumor, the plan is for placement of GammaTiles. After that, we discussed in general, the next-line of therapy off trial, which could include lomustine, or targeted therapy pending next generation sequencing results, or immunotherapy off label.      PROBLEM LIST  Glioblastoma, MGMT unmethylated and IDH1 wildtype by IHC  Seizure  Chemotherapy-associated constipation  Headaches  Apraxia    PLAN  -CANCER-DIRECTED THERAPY-  -As above; Treatment plan pending pathology results.     -STEROIDS-  -Continue dexamethasone until the post-operative period, when steroids can be weaned per the direction of neurosurgery.    -SEIZURE MANAGEMENT-  -Given history of seizures, will continue current antiepileptic regimen of Keppra for the foreseeable future.    -Quality of life/ MOOD/ FATIGUE-  -Denies any mood issues.   -Continue to monitor mood as untreated/ undertreated depression can worsen fatigue, dysorexia, and quality of life.    Return to clinic after surgery.     Ericka Avila MD  Neuro-oncology

## 2020-04-06 ENCOUNTER — VIRTUAL VISIT (OUTPATIENT)
Dept: ONCOLOGY | Facility: CLINIC | Age: 55
End: 2020-04-06
Attending: PSYCHIATRY & NEUROLOGY
Payer: COMMERCIAL

## 2020-04-06 ENCOUNTER — TUMOR CONFERENCE (OUTPATIENT)
Dept: ONCOLOGY | Facility: CLINIC | Age: 55
End: 2020-04-06

## 2020-04-06 DIAGNOSIS — C71.9 GLIOBLASTOMA (H): Primary | ICD-10-CM

## 2020-04-06 PROCEDURE — 99214 OFFICE O/P EST MOD 30 MIN: CPT | Mod: GT | Performed by: PSYCHIATRY & NEUROLOGY

## 2020-04-06 PROCEDURE — 40000114 ZZH STATISTIC NO CHARGE CLINIC VISIT

## 2020-04-08 ENCOUNTER — PATIENT OUTREACH (OUTPATIENT)
Dept: ONCOLOGY | Facility: CLINIC | Age: 55
End: 2020-04-08

## 2020-04-08 NOTE — PROGRESS NOTES
RN Care Coordination Note  Outgoing Call:   Called patient and spoke with family regarding upcoming surgery. Information packet emailed to family and all questions answered. Pre-admission nursing number given to family along with other hospital contact information.      Benita Street RN, BSN  Care Coordinator   [facility]

## 2020-04-09 ENCOUNTER — HOSPITAL ENCOUNTER (OUTPATIENT)
Dept: MRI IMAGING | Facility: CLINIC | Age: 55
DRG: 023 | End: 2020-04-09
Attending: NEUROLOGICAL SURGERY | Admitting: NEUROLOGICAL SURGERY
Payer: COMMERCIAL

## 2020-04-09 ENCOUNTER — APPOINTMENT (OUTPATIENT)
Dept: CT IMAGING | Facility: CLINIC | Age: 55
DRG: 023 | End: 2020-04-09
Attending: NEUROLOGICAL SURGERY
Payer: COMMERCIAL

## 2020-04-09 ENCOUNTER — APPOINTMENT (OUTPATIENT)
Dept: RADIATION ONCOLOGY | Facility: CLINIC | Age: 55
End: 2020-04-09
Attending: RADIOLOGY
Payer: COMMERCIAL

## 2020-04-09 ENCOUNTER — ANESTHESIA (OUTPATIENT)
Dept: SURGERY | Facility: CLINIC | Age: 55
DRG: 023 | End: 2020-04-09
Payer: COMMERCIAL

## 2020-04-09 ENCOUNTER — APPOINTMENT (OUTPATIENT)
Dept: MRI IMAGING | Facility: CLINIC | Age: 55
DRG: 023 | End: 2020-04-09
Attending: NEUROLOGICAL SURGERY
Payer: COMMERCIAL

## 2020-04-09 ENCOUNTER — HOSPITAL ENCOUNTER (INPATIENT)
Facility: CLINIC | Age: 55
LOS: 1 days | Discharge: HOME | DRG: 023 | End: 2020-04-10
Attending: NEUROLOGICAL SURGERY | Admitting: NEUROLOGICAL SURGERY
Payer: COMMERCIAL

## 2020-04-09 DIAGNOSIS — D49.6 BRAIN TUMOR (H): ICD-10-CM

## 2020-04-09 DIAGNOSIS — C71.9 GLIOBLASTOMA (H): ICD-10-CM

## 2020-04-09 LAB
ABO + RH BLD: NORMAL
ABO + RH BLD: NORMAL
BLD GP AB SCN SERPL QL: NORMAL
BLOOD BANK CMNT PATIENT-IMP: NORMAL
BLOOD BANK CMNT PATIENT-IMP: NORMAL
GLUCOSE BLDC GLUCOMTR-MCNC: 129 MG/DL (ref 70–99)
GLUCOSE BLDC GLUCOMTR-MCNC: 149 MG/DL (ref 70–99)
SPECIMEN EXP DATE BLD: NORMAL

## 2020-04-09 PROCEDURE — C1713 ANCHOR/SCREW BN/BN,TIS/BN: HCPCS | Performed by: NEUROLOGICAL SURGERY

## 2020-04-09 PROCEDURE — 77331 SPECIAL RADIATION DOSIMETRY: CPT | Performed by: RADIOLOGY

## 2020-04-09 PROCEDURE — 25000566 ZZH SEVOFLURANE, EA 15 MIN: Performed by: NEUROLOGICAL SURGERY

## 2020-04-09 PROCEDURE — 27210995 ZZH RX 272: Performed by: NEUROLOGICAL SURGERY

## 2020-04-09 PROCEDURE — 00000146 ZZHCL STATISTIC GLUCOSE BY METER IP

## 2020-04-09 PROCEDURE — 70552 MRI BRAIN STEM W/DYE: CPT | Mod: XS

## 2020-04-09 PROCEDURE — 40000170 ZZH STATISTIC PRE-PROCEDURE ASSESSMENT II: Performed by: NEUROLOGICAL SURGERY

## 2020-04-09 PROCEDURE — 71000017 ZZH RECOVERY PHASE 1 LEVEL 3 EA ADDTL HR: Performed by: NEUROLOGICAL SURGERY

## 2020-04-09 PROCEDURE — 25800030 ZZH RX IP 258 OP 636: Performed by: NEUROLOGICAL SURGERY

## 2020-04-09 PROCEDURE — 00B70ZZ EXCISION OF CEREBRAL HEMISPHERE, OPEN APPROACH: ICD-10-PCS | Performed by: NEUROLOGICAL SURGERY

## 2020-04-09 PROCEDURE — 70450 CT HEAD/BRAIN W/O DYE: CPT

## 2020-04-09 PROCEDURE — 77336 RADIATION PHYSICS CONSULT: CPT | Performed by: RADIOLOGY

## 2020-04-09 PROCEDURE — 25000125 ZZHC RX 250: Performed by: NEUROLOGICAL SURGERY

## 2020-04-09 PROCEDURE — 25000128 H RX IP 250 OP 636: Performed by: STUDENT IN AN ORGANIZED HEALTH CARE EDUCATION/TRAINING PROGRAM

## 2020-04-09 PROCEDURE — 25000128 H RX IP 250 OP 636: Performed by: NEUROLOGICAL SURGERY

## 2020-04-09 PROCEDURE — 25000125 ZZHC RX 250: Performed by: STUDENT IN AN ORGANIZED HEALTH CARE EDUCATION/TRAINING PROGRAM

## 2020-04-09 PROCEDURE — 40000275 ZZH STATISTIC RCP TIME EA 10 MIN

## 2020-04-09 PROCEDURE — 71000016 ZZH RECOVERY PHASE 1 LEVEL 3 FIRST HR: Performed by: NEUROLOGICAL SURGERY

## 2020-04-09 PROCEDURE — 36000082 ZZH SURGERY LEVEL 7 EA 15 ADDTL MIN - UMMC: Performed by: NEUROLOGICAL SURGERY

## 2020-04-09 PROCEDURE — A9585 GADOBUTROL INJECTION: HCPCS | Performed by: NEUROLOGICAL SURGERY

## 2020-04-09 PROCEDURE — 25500064 ZZH RX 255 OP 636: Performed by: NEUROLOGICAL SURGERY

## 2020-04-09 PROCEDURE — 00H004Z INSERTION OF RADIOACTIVE ELEMENT, CESIUM-131 COLLAGEN IMPLANT INTO BRAIN, OPEN APPROACH: ICD-10-PCS | Performed by: NEUROLOGICAL SURGERY

## 2020-04-09 PROCEDURE — 25800030 ZZH RX IP 258 OP 636: Performed by: STUDENT IN AN ORGANIZED HEALTH CARE EDUCATION/TRAINING PROGRAM

## 2020-04-09 PROCEDURE — 00000160 ZZHCL STATISTIC H-SPECIAL HANDLING: Performed by: NEUROLOGICAL SURGERY

## 2020-04-09 PROCEDURE — 37000009 ZZH ANESTHESIA TECHNICAL FEE, EACH ADDTL 15 MIN: Performed by: NEUROLOGICAL SURGERY

## 2020-04-09 PROCEDURE — C1763 CONN TISS, NON-HUMAN: HCPCS | Performed by: NEUROLOGICAL SURGERY

## 2020-04-09 PROCEDURE — 40000014 ZZH STATISTIC ARTERIAL MONITORING DAILY

## 2020-04-09 PROCEDURE — 77334 RADIATION TREATMENT AID(S): CPT | Performed by: RADIOLOGY

## 2020-04-09 PROCEDURE — 20000004 ZZH R&B ICU UMMC

## 2020-04-09 PROCEDURE — C2643 BRACHYTX, NON-STRANDED,C-131: HCPCS | Performed by: RADIOLOGY

## 2020-04-09 PROCEDURE — 77763 APPLY INTRCAV RADIAT COMPL: CPT | Performed by: RADIOLOGY

## 2020-04-09 PROCEDURE — 8E09XBH COMPUTER ASSISTED PROCEDURE OF HEAD AND NECK REGION, WITH MAGNETIC RESONANCE IMAGING: ICD-10-PCS | Performed by: NEUROLOGICAL SURGERY

## 2020-04-09 PROCEDURE — 77370 RADIATION PHYSICS CONSULT: CPT | Mod: 59 | Performed by: RADIOLOGY

## 2020-04-09 PROCEDURE — 25000131 ZZH RX MED GY IP 250 OP 636 PS 637: Performed by: STUDENT IN AN ORGANIZED HEALTH CARE EDUCATION/TRAINING PROGRAM

## 2020-04-09 PROCEDURE — 36000080 ZZH SURGERY LEVEL 7 1ST 30 MIN - UMMC: Performed by: NEUROLOGICAL SURGERY

## 2020-04-09 PROCEDURE — 27210794 ZZH OR GENERAL SUPPLY STERILE: Performed by: NEUROLOGICAL SURGERY

## 2020-04-09 PROCEDURE — 27810169 ZZH OR IMPLANT GENERAL: Performed by: NEUROLOGICAL SURGERY

## 2020-04-09 PROCEDURE — 25800030 ZZH RX IP 258 OP 636: Performed by: ANESTHESIOLOGY

## 2020-04-09 PROCEDURE — 88307 TISSUE EXAM BY PATHOLOGIST: CPT | Performed by: NEUROLOGICAL SURGERY

## 2020-04-09 PROCEDURE — 25000132 ZZH RX MED GY IP 250 OP 250 PS 637: Performed by: STUDENT IN AN ORGANIZED HEALTH CARE EDUCATION/TRAINING PROGRAM

## 2020-04-09 PROCEDURE — 88331 PATH CONSLTJ SURG 1 BLK 1SPC: CPT | Performed by: NEUROLOGICAL SURGERY

## 2020-04-09 PROCEDURE — 70552 MRI BRAIN STEM W/DYE: CPT

## 2020-04-09 PROCEDURE — 37000008 ZZH ANESTHESIA TECHNICAL FEE, 1ST 30 MIN: Performed by: NEUROLOGICAL SURGERY

## 2020-04-09 DEVICE — IMP PLATE SYN STR DOG BONE LOW PROFILE 2H TI 421.502: Type: IMPLANTABLE DEVICE | Site: CRANIAL | Status: FUNCTIONAL

## 2020-04-09 DEVICE — GRAFT DURAGEN 2X2" ID220: Type: IMPLANTABLE DEVICE | Site: BRAIN | Status: FUNCTIONAL

## 2020-04-09 DEVICE — IMP SCR SYN MATRIX LOW PRO 1.5X04MM SELF DRILL 04.503.104.01: Type: IMPLANTABLE DEVICE | Site: CRANIAL | Status: FUNCTIONAL

## 2020-04-09 DEVICE — IMP BUR HOLE COVER 17MM LOW PROFILE TI 421.527: Type: IMPLANTABLE DEVICE | Site: CRANIAL | Status: FUNCTIONAL

## 2020-04-09 RX ORDER — LIDOCAINE HYDROCHLORIDE 20 MG/ML
INJECTION, SOLUTION INFILTRATION; PERINEURAL PRN
Status: DISCONTINUED | OUTPATIENT
Start: 2020-04-09 | End: 2020-04-09

## 2020-04-09 RX ORDER — FENTANYL CITRATE 50 UG/ML
25-50 INJECTION, SOLUTION INTRAMUSCULAR; INTRAVENOUS
Status: DISCONTINUED | OUTPATIENT
Start: 2020-04-09 | End: 2020-04-09 | Stop reason: HOSPADM

## 2020-04-09 RX ORDER — DEXAMETHASONE SODIUM PHOSPHATE 4 MG/ML
10 INJECTION, SOLUTION INTRA-ARTICULAR; INTRALESIONAL; INTRAMUSCULAR; INTRAVENOUS; SOFT TISSUE ONCE
Status: COMPLETED | OUTPATIENT
Start: 2020-04-09 | End: 2020-04-09

## 2020-04-09 RX ORDER — PROPOFOL 10 MG/ML
INJECTION, EMULSION INTRAVENOUS PRN
Status: DISCONTINUED | OUTPATIENT
Start: 2020-04-09 | End: 2020-04-09

## 2020-04-09 RX ORDER — LABETALOL 20 MG/4 ML (5 MG/ML) INTRAVENOUS SYRINGE
10-40 EVERY 10 MIN PRN
Status: DISCONTINUED | OUTPATIENT
Start: 2020-04-09 | End: 2020-04-10 | Stop reason: HOSPADM

## 2020-04-09 RX ORDER — SODIUM CHLORIDE, SODIUM LACTATE, POTASSIUM CHLORIDE, CALCIUM CHLORIDE 600; 310; 30; 20 MG/100ML; MG/100ML; MG/100ML; MG/100ML
INJECTION, SOLUTION INTRAVENOUS CONTINUOUS
Status: DISCONTINUED | OUTPATIENT
Start: 2020-04-09 | End: 2020-04-09 | Stop reason: HOSPADM

## 2020-04-09 RX ORDER — PANTOPRAZOLE SODIUM 40 MG/1
40 TABLET, DELAYED RELEASE ORAL AT BEDTIME
Status: DISCONTINUED | OUTPATIENT
Start: 2020-04-09 | End: 2020-04-10 | Stop reason: HOSPADM

## 2020-04-09 RX ORDER — ONDANSETRON 4 MG/1
4 TABLET, ORALLY DISINTEGRATING ORAL EVERY 30 MIN PRN
Status: DISCONTINUED | OUTPATIENT
Start: 2020-04-09 | End: 2020-04-09 | Stop reason: HOSPADM

## 2020-04-09 RX ORDER — LIDOCAINE 40 MG/G
CREAM TOPICAL
Status: DISCONTINUED | OUTPATIENT
Start: 2020-04-09 | End: 2020-04-09 | Stop reason: HOSPADM

## 2020-04-09 RX ORDER — DEXAMETHASONE 1 MG
1 TABLET ORAL EVERY 8 HOURS SCHEDULED
Status: DISCONTINUED | OUTPATIENT
Start: 2020-04-15 | End: 2020-04-10 | Stop reason: HOSPADM

## 2020-04-09 RX ORDER — NALOXONE HYDROCHLORIDE 0.4 MG/ML
.1-.4 INJECTION, SOLUTION INTRAMUSCULAR; INTRAVENOUS; SUBCUTANEOUS
Status: DISCONTINUED | OUTPATIENT
Start: 2020-04-09 | End: 2020-04-10 | Stop reason: HOSPADM

## 2020-04-09 RX ORDER — ONDANSETRON 4 MG/1
4 TABLET, ORALLY DISINTEGRATING ORAL EVERY 6 HOURS PRN
Status: DISCONTINUED | OUTPATIENT
Start: 2020-04-09 | End: 2020-04-10 | Stop reason: HOSPADM

## 2020-04-09 RX ORDER — OXYCODONE HYDROCHLORIDE 5 MG/1
5-10 TABLET ORAL
Status: DISCONTINUED | OUTPATIENT
Start: 2020-04-09 | End: 2020-04-10 | Stop reason: HOSPADM

## 2020-04-09 RX ORDER — HYDRALAZINE HYDROCHLORIDE 20 MG/ML
2.5-5 INJECTION INTRAMUSCULAR; INTRAVENOUS EVERY 10 MIN PRN
Status: DISCONTINUED | OUTPATIENT
Start: 2020-04-09 | End: 2020-04-09 | Stop reason: HOSPADM

## 2020-04-09 RX ORDER — HYDROMORPHONE HYDROCHLORIDE 1 MG/ML
.3-.5 INJECTION, SOLUTION INTRAMUSCULAR; INTRAVENOUS; SUBCUTANEOUS EVERY 5 MIN PRN
Status: DISCONTINUED | OUTPATIENT
Start: 2020-04-09 | End: 2020-04-09 | Stop reason: HOSPADM

## 2020-04-09 RX ORDER — POLYETHYLENE GLYCOL 3350 17 G/17G
17 POWDER, FOR SOLUTION ORAL DAILY
Status: DISCONTINUED | OUTPATIENT
Start: 2020-04-10 | End: 2020-04-10 | Stop reason: HOSPADM

## 2020-04-09 RX ORDER — ACETAMINOPHEN 325 MG/1
975 TABLET ORAL EVERY 8 HOURS
Status: DISCONTINUED | OUTPATIENT
Start: 2020-04-09 | End: 2020-04-10 | Stop reason: HOSPADM

## 2020-04-09 RX ORDER — GADOBUTROL 604.72 MG/ML
5 INJECTION INTRAVENOUS ONCE
Status: COMPLETED | OUTPATIENT
Start: 2020-04-09 | End: 2020-04-09

## 2020-04-09 RX ORDER — LORATADINE 10 MG/1
10 TABLET ORAL
Status: DISCONTINUED | OUTPATIENT
Start: 2020-04-09 | End: 2020-04-10 | Stop reason: HOSPADM

## 2020-04-09 RX ORDER — ACETAMINOPHEN 325 MG/1
650 TABLET ORAL EVERY 4 HOURS PRN
Status: DISCONTINUED | OUTPATIENT
Start: 2020-04-12 | End: 2020-04-10 | Stop reason: HOSPADM

## 2020-04-09 RX ORDER — AMOXICILLIN 250 MG
1 CAPSULE ORAL 2 TIMES DAILY
Status: DISCONTINUED | OUTPATIENT
Start: 2020-04-09 | End: 2020-04-10 | Stop reason: HOSPADM

## 2020-04-09 RX ORDER — ACETAMINOPHEN 325 MG/1
975 TABLET ORAL ONCE
Status: COMPLETED | OUTPATIENT
Start: 2020-04-09 | End: 2020-04-09

## 2020-04-09 RX ORDER — ONDANSETRON 2 MG/ML
4 INJECTION INTRAMUSCULAR; INTRAVENOUS EVERY 30 MIN PRN
Status: DISCONTINUED | OUTPATIENT
Start: 2020-04-09 | End: 2020-04-09 | Stop reason: HOSPADM

## 2020-04-09 RX ORDER — DEXAMETHASONE 2 MG/1
2 TABLET ORAL EVERY 8 HOURS SCHEDULED
Status: DISCONTINUED | OUTPATIENT
Start: 2020-04-13 | End: 2020-04-10 | Stop reason: HOSPADM

## 2020-04-09 RX ORDER — ONDANSETRON 2 MG/ML
INJECTION INTRAMUSCULAR; INTRAVENOUS PRN
Status: DISCONTINUED | OUTPATIENT
Start: 2020-04-09 | End: 2020-04-09

## 2020-04-09 RX ORDER — DEXAMETHASONE 4 MG/1
4 TABLET ORAL EVERY 8 HOURS SCHEDULED
Status: DISCONTINUED | OUTPATIENT
Start: 2020-04-09 | End: 2020-04-10 | Stop reason: HOSPADM

## 2020-04-09 RX ORDER — CEFAZOLIN SODIUM 1 G/3ML
1 INJECTION, POWDER, FOR SOLUTION INTRAMUSCULAR; INTRAVENOUS EVERY 8 HOURS
Status: DISCONTINUED | OUTPATIENT
Start: 2020-04-10 | End: 2020-04-10 | Stop reason: HOSPADM

## 2020-04-09 RX ORDER — AMOXICILLIN 250 MG
2 CAPSULE ORAL 2 TIMES DAILY
Status: DISCONTINUED | OUTPATIENT
Start: 2020-04-09 | End: 2020-04-10 | Stop reason: HOSPADM

## 2020-04-09 RX ORDER — LABETALOL 20 MG/4 ML (5 MG/ML) INTRAVENOUS SYRINGE
10
Status: DISCONTINUED | OUTPATIENT
Start: 2020-04-09 | End: 2020-04-09 | Stop reason: HOSPADM

## 2020-04-09 RX ORDER — VANCOMYCIN HYDROCHLORIDE 1 G/200ML
1000 INJECTION, SOLUTION INTRAVENOUS
Status: COMPLETED | OUTPATIENT
Start: 2020-04-09 | End: 2020-04-09

## 2020-04-09 RX ORDER — HYDROMORPHONE HYDROCHLORIDE 1 MG/ML
.3-.5 INJECTION, SOLUTION INTRAMUSCULAR; INTRAVENOUS; SUBCUTANEOUS
Status: DISCONTINUED | OUTPATIENT
Start: 2020-04-09 | End: 2020-04-10 | Stop reason: HOSPADM

## 2020-04-09 RX ORDER — LEVETIRACETAM 10 MG/ML
1000 INJECTION INTRAVASCULAR ONCE
Status: COMPLETED | OUTPATIENT
Start: 2020-04-09 | End: 2020-04-09

## 2020-04-09 RX ORDER — MANNITOL 20 G/100ML
1 INJECTION, SOLUTION INTRAVENOUS ONCE
Status: COMPLETED | OUTPATIENT
Start: 2020-04-09 | End: 2020-04-09

## 2020-04-09 RX ORDER — GENTAMICIN SULFATE 80 MG/100ML
80 INJECTION, SOLUTION INTRAVENOUS ONCE
Status: COMPLETED | OUTPATIENT
Start: 2020-04-09 | End: 2020-04-09

## 2020-04-09 RX ORDER — ONDANSETRON 2 MG/ML
4 INJECTION INTRAMUSCULAR; INTRAVENOUS EVERY 6 HOURS PRN
Status: DISCONTINUED | OUTPATIENT
Start: 2020-04-09 | End: 2020-04-10 | Stop reason: HOSPADM

## 2020-04-09 RX ORDER — LIDOCAINE 40 MG/G
CREAM TOPICAL
Status: DISCONTINUED | OUTPATIENT
Start: 2020-04-09 | End: 2020-04-10 | Stop reason: HOSPADM

## 2020-04-09 RX ORDER — FENTANYL CITRATE 50 UG/ML
INJECTION, SOLUTION INTRAMUSCULAR; INTRAVENOUS PRN
Status: DISCONTINUED | OUTPATIENT
Start: 2020-04-09 | End: 2020-04-09

## 2020-04-09 RX ORDER — LEVETIRACETAM 500 MG/1
1000 TABLET ORAL 2 TIMES DAILY
Status: DISCONTINUED | OUTPATIENT
Start: 2020-04-09 | End: 2020-04-10 | Stop reason: HOSPADM

## 2020-04-09 RX ORDER — NALOXONE HYDROCHLORIDE 0.4 MG/ML
.1-.4 INJECTION, SOLUTION INTRAMUSCULAR; INTRAVENOUS; SUBCUTANEOUS
Status: DISCONTINUED | OUTPATIENT
Start: 2020-04-09 | End: 2020-04-09

## 2020-04-09 RX ORDER — DEXAMETHASONE 1.5 MG/1
3 TABLET ORAL EVERY 8 HOURS SCHEDULED
Status: DISCONTINUED | OUTPATIENT
Start: 2020-04-11 | End: 2020-04-10 | Stop reason: HOSPADM

## 2020-04-09 RX ORDER — HYDRALAZINE HYDROCHLORIDE 20 MG/ML
10-20 INJECTION INTRAMUSCULAR; INTRAVENOUS EVERY 30 MIN PRN
Status: DISCONTINUED | OUTPATIENT
Start: 2020-04-09 | End: 2020-04-10 | Stop reason: HOSPADM

## 2020-04-09 RX ORDER — SODIUM CHLORIDE 9 MG/ML
INJECTION, SOLUTION INTRAVENOUS CONTINUOUS
Status: DISCONTINUED | OUTPATIENT
Start: 2020-04-09 | End: 2020-04-10 | Stop reason: HOSPADM

## 2020-04-09 RX ADMIN — PROPOFOL 50 MG: 10 INJECTION, EMULSION INTRAVENOUS at 10:25

## 2020-04-09 RX ADMIN — ROCURONIUM BROMIDE 50 MG: 10 INJECTION INTRAVENOUS at 10:24

## 2020-04-09 RX ADMIN — OXYCODONE HYDROCHLORIDE 5 MG: 5 TABLET ORAL at 21:40

## 2020-04-09 RX ADMIN — GENTAMICIN SULFATE 80 MG: 80 INJECTION, SOLUTION INTRAVENOUS at 07:07

## 2020-04-09 RX ADMIN — PROPOFOL 50 MG: 10 INJECTION, EMULSION INTRAVENOUS at 09:52

## 2020-04-09 RX ADMIN — PROPOFOL 50 MG: 10 INJECTION, EMULSION INTRAVENOUS at 09:44

## 2020-04-09 RX ADMIN — SODIUM CHLORIDE, POTASSIUM CHLORIDE, SODIUM LACTATE AND CALCIUM CHLORIDE: 600; 310; 30; 20 INJECTION, SOLUTION INTRAVENOUS at 14:39

## 2020-04-09 RX ADMIN — LIDOCAINE HYDROCHLORIDE 100 MG: 20 INJECTION, SOLUTION INFILTRATION; PERINEURAL at 09:11

## 2020-04-09 RX ADMIN — ROCURONIUM BROMIDE 30 MG: 10 INJECTION INTRAVENOUS at 12:16

## 2020-04-09 RX ADMIN — ACETAMINOPHEN 975 MG: 325 TABLET, FILM COATED ORAL at 20:53

## 2020-04-09 RX ADMIN — DEXAMETHASONE 4 MG: 4 TABLET ORAL at 20:52

## 2020-04-09 RX ADMIN — PANTOPRAZOLE SODIUM 40 MG: 40 TABLET, DELAYED RELEASE ORAL at 20:53

## 2020-04-09 RX ADMIN — MANNITOL 77.9 G: 20 INJECTION, SOLUTION INTRAVENOUS at 10:15

## 2020-04-09 RX ADMIN — PHENYLEPHRINE HYDROCHLORIDE 200 MCG: 10 INJECTION INTRAVENOUS at 09:20

## 2020-04-09 RX ADMIN — ROCURONIUM BROMIDE 100 MG: 10 INJECTION INTRAVENOUS at 09:11

## 2020-04-09 RX ADMIN — FENTANYL CITRATE 50 MCG: 50 INJECTION INTRAMUSCULAR; INTRAVENOUS at 15:44

## 2020-04-09 RX ADMIN — LEVETIRACETAM 1000 MG: 500 TABLET ORAL at 20:53

## 2020-04-09 RX ADMIN — ONDANSETRON 4 MG: 2 INJECTION INTRAMUSCULAR; INTRAVENOUS at 14:35

## 2020-04-09 RX ADMIN — ROCURONIUM BROMIDE 50 MG: 10 INJECTION INTRAVENOUS at 09:41

## 2020-04-09 RX ADMIN — ROCURONIUM BROMIDE 20 MG: 10 INJECTION INTRAVENOUS at 13:38

## 2020-04-09 RX ADMIN — GADOBUTROL 5 ML: 604.72 INJECTION INTRAVENOUS at 13:15

## 2020-04-09 RX ADMIN — HYDROMORPHONE HYDROCHLORIDE 0.5 MG: 1 INJECTION, SOLUTION INTRAMUSCULAR; INTRAVENOUS; SUBCUTANEOUS at 16:00

## 2020-04-09 RX ADMIN — AMINOLEVULINIC ACID HYDROCHLORIDE 1500 MG: 1500 POWDER, FOR SOLUTION ORAL at 07:14

## 2020-04-09 RX ADMIN — ROCURONIUM BROMIDE 50 MG: 10 INJECTION INTRAVENOUS at 11:07

## 2020-04-09 RX ADMIN — PHENYLEPHRINE HYDROCHLORIDE 100 MCG: 10 INJECTION INTRAVENOUS at 09:45

## 2020-04-09 RX ADMIN — SENNOSIDES AND DOCUSATE SODIUM 1 TABLET: 8.6; 5 TABLET ORAL at 20:53

## 2020-04-09 RX ADMIN — PROPOFOL 50 MG: 10 INJECTION, EMULSION INTRAVENOUS at 09:47

## 2020-04-09 RX ADMIN — GADOBUTROL 5 ML: 604.72 INJECTION INTRAVENOUS at 09:00

## 2020-04-09 RX ADMIN — SODIUM CHLORIDE, POTASSIUM CHLORIDE, SODIUM LACTATE AND CALCIUM CHLORIDE: 600; 310; 30; 20 INJECTION, SOLUTION INTRAVENOUS at 09:00

## 2020-04-09 RX ADMIN — FENTANYL CITRATE 50 MCG: 50 INJECTION INTRAMUSCULAR; INTRAVENOUS at 15:53

## 2020-04-09 RX ADMIN — FENTANYL CITRATE 250 MCG: 50 INJECTION, SOLUTION INTRAMUSCULAR; INTRAVENOUS at 09:11

## 2020-04-09 RX ADMIN — SODIUM CHLORIDE: 9 INJECTION, SOLUTION INTRAVENOUS at 16:39

## 2020-04-09 RX ADMIN — PHENYLEPHRINE HYDROCHLORIDE 100 MCG: 10 INJECTION INTRAVENOUS at 10:45

## 2020-04-09 RX ADMIN — VANCOMYCIN HYDROCHLORIDE 1250 MG: 10 INJECTION, POWDER, LYOPHILIZED, FOR SOLUTION INTRAVENOUS at 21:36

## 2020-04-09 RX ADMIN — ROCURONIUM BROMIDE 30 MG: 10 INJECTION INTRAVENOUS at 13:22

## 2020-04-09 RX ADMIN — FENTANYL CITRATE 100 MCG: 50 INJECTION, SOLUTION INTRAMUSCULAR; INTRAVENOUS at 09:44

## 2020-04-09 RX ADMIN — FENTANYL CITRATE 100 MCG: 50 INJECTION, SOLUTION INTRAMUSCULAR; INTRAVENOUS at 13:24

## 2020-04-09 RX ADMIN — PHENYLEPHRINE HYDROCHLORIDE 100 MCG: 10 INJECTION INTRAVENOUS at 12:50

## 2020-04-09 RX ADMIN — VANCOMYCIN HYDROCHLORIDE 1000 MG: 1 INJECTION, SOLUTION INTRAVENOUS at 07:07

## 2020-04-09 RX ADMIN — FENTANYL CITRATE 50 MCG: 50 INJECTION, SOLUTION INTRAMUSCULAR; INTRAVENOUS at 10:23

## 2020-04-09 RX ADMIN — FENTANYL CITRATE 100 MCG: 50 INJECTION, SOLUTION INTRAMUSCULAR; INTRAVENOUS at 10:32

## 2020-04-09 RX ADMIN — SUGAMMADEX 100 MG: 100 INJECTION, SOLUTION INTRAVENOUS at 14:45

## 2020-04-09 RX ADMIN — ROCURONIUM BROMIDE 20 MG: 10 INJECTION INTRAVENOUS at 12:05

## 2020-04-09 RX ADMIN — ROCURONIUM BROMIDE 50 MG: 10 INJECTION INTRAVENOUS at 13:58

## 2020-04-09 RX ADMIN — LEVETIRACETAM 1 G: 10 INJECTION INTRAVENOUS at 09:38

## 2020-04-09 RX ADMIN — HYDROMORPHONE HYDROCHLORIDE 0.5 MG: 1 INJECTION, SOLUTION INTRAMUSCULAR; INTRAVENOUS; SUBCUTANEOUS at 17:09

## 2020-04-09 RX ADMIN — ACETAMINOPHEN 975 MG: 325 TABLET, FILM COATED ORAL at 06:23

## 2020-04-09 RX ADMIN — ROCURONIUM BROMIDE 50 MG: 10 INJECTION INTRAVENOUS at 12:45

## 2020-04-09 RX ADMIN — DEXAMETHASONE SODIUM PHOSPHATE 10 MG: 4 INJECTION, SOLUTION INTRAMUSCULAR; INTRAVENOUS at 09:30

## 2020-04-09 RX ADMIN — PROPOFOL 150 MG: 10 INJECTION, EMULSION INTRAVENOUS at 09:11

## 2020-04-09 RX ADMIN — ONDANSETRON 4 MG: 2 INJECTION INTRAMUSCULAR; INTRAVENOUS at 16:07

## 2020-04-09 RX ADMIN — ROCURONIUM BROMIDE 50 MG: 10 INJECTION INTRAVENOUS at 11:33

## 2020-04-09 ASSESSMENT — VISUAL ACUITY
OU: NORMAL ACUITY

## 2020-04-09 ASSESSMENT — MIFFLIN-ST. JEOR
SCORE: 1610.25
SCORE: 1579.69

## 2020-04-09 ASSESSMENT — ACTIVITIES OF DAILY LIVING (ADL): ADLS_ACUITY_SCORE: 12

## 2020-04-09 ASSESSMENT — PAIN DESCRIPTION - DESCRIPTORS
DESCRIPTORS: ACHING
DESCRIPTORS: ACHING;CONSTANT

## 2020-04-09 NOTE — BRIEF OP NOTE
[facility], [place]    Brief Operative Note    Pre-operative diagnosis: Glioblastoma (H) [C71.9]  Post-operative diagnosis Same as pre-operative diagnosis    Procedure: Procedure(s):  intraoperative magnetic resonance imaging/stealth assisted right craniotomy, with gammatile placement  Surgeon: Surgeon(s) and Role:     * Marquise Chan MD - Primary     * Eli Mann MD - Assisting     * Thu Vargas MD - Resident - Assisting     * Priscilla Osullivan MD - Resident - Assisting  Anesthesia: General   Estimated blood loss: 50ml  Drains: None  Specimens:   ID Type Source Tests Collected by Time Destination   A : Right Brain Tumor Tissue Brain SURGICAL PATHOLOGY EXAM Marquise Chan MD 4/9/2020 11:13 AM    B : Right Brain Tumor Tissue Brain SURGICAL PATHOLOGY EXAM Marquise Chan MD 4/9/2020  2:21 PM      Findings:  Redo right craniotomy and tumor resection with gamma tile placement.  Complications: None.  Implants:   Implant Name Type Inv. Item Serial No.  Lot No. LRB No. Used Action   GRAFT DURAGEN 2X2&quot;  Bone/Tissue/Biologic GRAFT DURAGEN 2X2&quot;  [id number] [employer] [id number] Right 1 Implanted   GammaTile   [id number]  [id number] Right 6 Implanted   IMP BUR HOLE COVER 17MM LOW PROFILE .527 Metallic Hardware/[place] IMP BUR HOLE COVER 17MM LOW PROFILE .527 NONE SYNTHES-STRATEC NONE Right 2 Explanted   IMP PLATE SYN STR DOG BONE LOW PROFILE 2H .502 Metallic Hardware/[place] IMP PLATE SYN STR DOG BONE LOW PROFILE 2H .502 NONE SYNTHES-STRATEC NONE Right 2 Explanted   IMP SCR SYN MATRIX LOW PRO 1.5X04MM SELF DRILL [id number] Metallic Hardware/[place] IMP SCR SYN MATRIX LOW PRO 1.5X04MM SELF DRILL [id number] NONE SYNTHES-STRATEC NONE Right 11 Explanted   IMP PLATE SYN STR DOG BONE LOW PROFILE 2H .502 Metallic Hardware/[place] IMP PLATE SYN STR DOG BONE LOW PROFILE 2H .502 NONE  [employer] NONE Right 2 Implanted   IMP BUR HOLE COVER 17MM LOW PROFILE .527 Metallic Hardware/[place] IMP BUR HOLE COVER 17MM LOW PROFILE .527 NONE [employer] NONE Right 2 Implanted   IMP SCR SYN MATRIX LOW PRO 1.5X04MM SELF DRILL [id number] Metallic Hardware/[place] IMP SCR SYN MATRIX LOW PRO 1.5X04MM SELF DRILL [phone] NONE SnapOne-[place] NONE Right 14 Implanted

## 2020-04-09 NOTE — ANESTHESIA POSTPROCEDURE EVALUATION
Anesthesia POST Procedure Evaluation    Patient: Erasto Maher   MRN:     [id number] Gender:   male   Age:    55 year old :      1965        Preoperative Diagnosis: Glioblastoma (H) [C71.9]   Procedure(s):  intraoperative magnetic resonance imaging/stealth assisted right craniotomy, with gammatile placement   Postop Comments: No value filed.     Anesthesia Type: General       Disposition: ICU            ICU Sign Out: Unable to perform physician to physician sign out   Postop Pain Control: Uneventful            Sign Out: Well controlled pain   PONV: No   Neuro/Psych: Uneventful            Sign Out: Acceptable/Baseline neuro status   Airway/Respiratory: Uneventful            Sign Out: Acceptable/Baseline resp. status   CV/Hemodynamics: Uneventful            Sign Out: Acceptable CV status   Other NRE: NONE   DID A NON-ROUTINE EVENT OCCUR? No    Event details/Postop Comments:  Awake, responding clearly to commands, conversant.  Moving all 4 ext to command.  OK to transfer to ICU         Last Anesthesia Record Vitals:  CRNA VITALS  2020 1449 - 2020 1549      2020             Ht Rate:  102    SpO2:  98 %          Last PACU Vitals:  Vitals Value Taken Time   /69 2020  4:20 PM   Temp 37.1  C (98.7  F) 2020  3:30 PM   Pulse 75 2020  4:20 PM   Resp 12 2020  3:45 PM   SpO2 98 % 2020  4:21 PM   Temp src     NIBP     Pulse     SpO2     Resp     Temp     Ht Rate     Temp 2     Vitals shown include unvalidated device data.      Electronically Signed By: Ruperto Carson MD, 2020, 4:22 PM

## 2020-04-09 NOTE — BRIEF OP NOTE
[facility], [place]    Brief Operative Note    Pre-operative diagnosis: Glioblastoma (H) [C71.9]  Post-operative diagnosis Same as pre-operative diagnosis    Procedure: Procedure(s):  intraoperative magnetic resonance imaging/stealth assisted right craniotomy, with gammatile placement  Surgeon: Surgeon(s) and Role:     * Marquise Chan MD - Primary     * Eli Mann MD - Assisting     * Thu Vargas MD - Resident - Assisting     * Priscilla Osullivan MD - Resident - Assisting  Anesthesia: General   Estimated blood loss: Less than 50 ml  Drains: None  Specimens:   ID Type Source Tests Collected by Time Destination   A : Right Brain Tumor Tissue Brain SURGICAL PATHOLOGY EXAM Marquise Chan MD 4/9/2020 11:13 AM    B : Right Brain Tumor Tissue Brain SURGICAL PATHOLOGY EXAM Marquise Chan MD 4/9/2020  2:21 PM      Findings:   GTR. 6 gamma tiles.   Complications: None.  Implants:   Implant Name Type Inv. Item Serial No.  Lot No. LRB No. Used Action   GRAFT DURAGEN 2X2&quot;  Bone/Tissue/Biologic GRAFT DURAGEN 2X2&quot;  [id number] [employer] [id number] Right 1 Implanted   GammaTile   [id number]  [id number] Right 6 Implanted   IMP BUR HOLE COVER 17MM LOW PROFILE .527 Metallic Hardware/[place] IMP BUR HOLE COVER 17MM LOW PROFILE .527 NONE SYNTHES-STRATEC NONE Right 2 Explanted   IMP PLATE SYN STR DOG BONE LOW PROFILE 2H .502 Metallic Hardware/[place] IMP PLATE SYN STR DOG BONE LOW PROFILE 2H .502 NONE SYNTHES-STRATEC NONE Right 2 Explanted   IMP SCR SYN MATRIX LOW PRO 1.5X04MM SELF DRILL [id number] Metallic Hardware/[place] IMP SCR SYN MATRIX LOW PRO 1.5X04MM SELF DRILL [id number] NONE SYNTHES-STRATEC NONE Right 11 Explanted   IMP PLATE SYN STR DOG BONE LOW PROFILE 2H .502 Metallic Hardware/[place] IMP PLATE SYN STR DOG BONE LOW PROFILE 2H .502 NONE SYNTHES-STRATEC NONE Right 2 Implanted   IMP  BUR HOLE COVER 17MM LOW PROFILE .527 Metallic Hardware/[place] IMP BUR HOLE COVER 17MM LOW PROFILE .527 NONE "[employer] NONE Right 2 Implanted   IMP SCR SYN MATRIX LOW PRO 1.5X04MM SELF DRILL [id number] Metallic Hardware/[place] IMP SCR SYN MATRIX LOW PRO 1.5X04MM SELF DRILL [phone] NONE "[employer]"-[employer] NONE Right 14 Implanted

## 2020-04-09 NOTE — ANESTHESIA CARE TRANSFER NOTE
Patient: Erasto Maher    Procedure(s):  intraoperative magnetic resonance imaging/stealth assisted right craniotomy, with gammatile placement    Diagnosis: Glioblastoma (H) [C71.9]  Diagnosis Additional Information: No value filed.    Anesthesia Type:   General     Note:  Airway :Face Mask  Patient transferred to:PACU  Comments: Pt. Pink and breathing spontaneously.  Vitals stable.  Report given to oncoming nurse.  Transfer of care occurred. Handoff Report: Identifed the Patient, Identified the Reponsible Provider, Reviewed the pertinent medical history, Discussed the surgical course, Reviewed Intra-OP anesthesia mangement and issues during anesthesia, Set expectations for post-procedure period and Allowed opportunity for questions and acknowledgement of understanding      Vitals: (Last set prior to Anesthesia Care Transfer)    CRNA VITALS  4/9/2020 1449 - 4/9/2020 1531      4/9/2020             Ht Rate:  102    SpO2:  98 %                Electronically Signed By: RPUA Ma CRNA  April 9, 2020  3:31 PM

## 2020-04-09 NOTE — PROGRESS NOTES
"Neurosurgery Progress Note    Subjective:  Doing well post-op, no acute events, CT head with mild pneumocephalus    Objective:  BP (!) 147/103 (BP Location: Right arm)   Pulse 78   Temp 97.9  F (36.6  C) (Oral)   Resp 16   Ht 1.778 m (5' 10\")   Wt 77.9 kg (171 lb 11.8 oz)   SpO2 98%   BMI 24.64 kg/m    Alert, oriented x3  CN 2-12 intact  5/5 strength in all extremities  Sensation intact to light touch      Assessment:  Mr. Maher is a 54 year old man with history of glioblastoma s/p resection on atezolizumab trial and temodar and radiation presented with headache and worsening coordination after receiving temodar. MRI brain 3/28 with interval increase in the size and associated vasogenic edema of right temporoparietal mass since 2019, with 6 mm leftward midline shift, with compression of the brain. S/p OR 4/9 for right craniotomy for tumor resection with 5-ALA and gamma tile placement.    Plan:  Neuro checks per unit routine  SBP < 140  Advance diet as tolerated  Decadron 1 week taper for treatment of cerebral edema  pta Keppra  Post-op CT head completed  Post-op MRI brain  Vanc 24 hrs -> then ancef during hospitalization -> keflex 7d  PT/OT  Dispo: transfer to  vs discharge    Valentino Morrell MD  Neurosurgery Resident PGY2    Please contact neurosurgery resident on call with questions.    Dial * * [phone], enter [phone] when prompted.       "

## 2020-04-09 NOTE — OR NURSING
Dr. Chan and Dr. Vargas at the bedside. Completed neuro exam with no concerns and told patient about what they found in the OR. Dr. Chan will call patient's wife to update her.

## 2020-04-09 NOTE — OP NOTE
"Radiation Oncology    Preop DX     GBM  PostopDX    same    Procedure:   Craniotomy and tumor resection  ( see Dr Dia complete note)                        Insertion of [id number]  GammaTiles   ( 6 tiles each wit 4 seeds= 24 seeds)    After maximal safe resection and intraoperative MRI, Dr Chan sequentially inserted the 6 GammaTiles into the resection cavity making sure the \"bumpy \" side was against the cavity wall and that the tiles were spaced adjacent to each other.  I supervised the handling of the radioactive sourses with the help of the radiation physicist, Clarisa Lindquist.      Tomorrow he will have a treatment planning CT scan and tx planning MRi.   The dose will be calculated. The aim would be to achieve 60Gy to a 0.5 cm depth.      Eli Mann  [phone] pager    "

## 2020-04-10 ENCOUNTER — APPOINTMENT (OUTPATIENT)
Dept: PHYSICAL THERAPY | Facility: CLINIC | Age: 55
DRG: 023 | End: 2020-04-10
Attending: NEUROLOGICAL SURGERY
Payer: COMMERCIAL

## 2020-04-10 ENCOUNTER — APPOINTMENT (OUTPATIENT)
Dept: MRI IMAGING | Facility: CLINIC | Age: 55
DRG: 023 | End: 2020-04-10
Attending: NEUROLOGICAL SURGERY
Payer: COMMERCIAL

## 2020-04-10 VITALS
HEART RATE: 69 BPM | SYSTOLIC BLOOD PRESSURE: 109 MMHG | OXYGEN SATURATION: 95 % | DIASTOLIC BLOOD PRESSURE: 73 MMHG | WEIGHT: 165 LBS | HEIGHT: 70 IN | BODY MASS INDEX: 23.62 KG/M2 | RESPIRATION RATE: 11 BRPM | TEMPERATURE: 97.9 F

## 2020-04-10 LAB
ANION GAP SERPL CALCULATED.3IONS-SCNC: 6 MMOL/L (ref 3–14)
BASOPHILS # BLD AUTO: 0 10E9/L (ref 0–0.2)
BASOPHILS NFR BLD AUTO: 0.1 %
BUN SERPL-MCNC: 10 MG/DL (ref 7–30)
CALCIUM SERPL-MCNC: 8.9 MG/DL (ref 8.5–10.1)
CHLORIDE SERPL-SCNC: 104 MMOL/L (ref 94–109)
CO2 SERPL-SCNC: 27 MMOL/L (ref 20–32)
COPATH REPORT: NORMAL
CREAT SERPL-MCNC: 0.69 MG/DL (ref 0.66–1.25)
DIFFERENTIAL METHOD BLD: ABNORMAL
EOSINOPHIL # BLD AUTO: 0 10E9/L (ref 0–0.7)
EOSINOPHIL NFR BLD AUTO: 0 %
ERYTHROCYTE [DISTWIDTH] IN BLOOD BY AUTOMATED COUNT: 13 % (ref 10–15)
GFR SERPL CREATININE-BSD FRML MDRD: >90 ML/MIN/{1.73_M2}
GLUCOSE SERPL-MCNC: 140 MG/DL (ref 70–99)
HCT VFR BLD AUTO: 40.1 % (ref 40–53)
HGB BLD-MCNC: 12.9 G/DL (ref 13.3–17.7)
IMM GRANULOCYTES # BLD: 0.1 10E9/L (ref 0–0.4)
IMM GRANULOCYTES NFR BLD: 0.4 %
LYMPHOCYTES # BLD AUTO: 0.9 10E9/L (ref 0.8–5.3)
LYMPHOCYTES NFR BLD AUTO: 4.9 %
MAGNESIUM SERPL-MCNC: 1.8 MG/DL (ref 1.6–2.3)
MCH RBC QN AUTO: 30.8 PG (ref 26.5–33)
MCHC RBC AUTO-ENTMCNC: 32.2 G/DL (ref 31.5–36.5)
MCV RBC AUTO: 96 FL (ref 78–100)
MONOCYTES # BLD AUTO: 1.1 10E9/L (ref 0–1.3)
MONOCYTES NFR BLD AUTO: 5.9 %
NEUTROPHILS # BLD AUTO: 16.3 10E9/L (ref 1.6–8.3)
NEUTROPHILS NFR BLD AUTO: 88.7 %
NRBC # BLD AUTO: 0 10*3/UL
NRBC BLD AUTO-RTO: 0 /100
PHOSPHATE SERPL-MCNC: 4.3 MG/DL (ref 2.5–4.5)
PLATELET # BLD AUTO: 237 10E9/L (ref 150–450)
POTASSIUM SERPL-SCNC: 4.2 MMOL/L (ref 3.4–5.3)
RBC # BLD AUTO: 4.19 10E12/L (ref 4.4–5.9)
SODIUM SERPL-SCNC: 137 MMOL/L (ref 133–144)
WBC # BLD AUTO: 18.4 10E9/L (ref 4–11)

## 2020-04-10 PROCEDURE — 83735 ASSAY OF MAGNESIUM: CPT | Performed by: NEUROLOGICAL SURGERY

## 2020-04-10 PROCEDURE — 40000894 ZZH STATISTIC OT IP EVAL DEFER: Performed by: OCCUPATIONAL THERAPIST

## 2020-04-10 PROCEDURE — 25000131 ZZH RX MED GY IP 250 OP 636 PS 637: Performed by: STUDENT IN AN ORGANIZED HEALTH CARE EDUCATION/TRAINING PROGRAM

## 2020-04-10 PROCEDURE — 25000128 H RX IP 250 OP 636: Performed by: NEUROLOGICAL SURGERY

## 2020-04-10 PROCEDURE — 85025 COMPLETE CBC W/AUTO DIFF WBC: CPT | Performed by: NEUROLOGICAL SURGERY

## 2020-04-10 PROCEDURE — 25500064 ZZH RX 255 OP 636: Performed by: NEUROLOGICAL SURGERY

## 2020-04-10 PROCEDURE — 25000132 ZZH RX MED GY IP 250 OP 250 PS 637: Performed by: STUDENT IN AN ORGANIZED HEALTH CARE EDUCATION/TRAINING PROGRAM

## 2020-04-10 PROCEDURE — 80048 BASIC METABOLIC PNL TOTAL CA: CPT | Performed by: NEUROLOGICAL SURGERY

## 2020-04-10 PROCEDURE — A9585 GADOBUTROL INJECTION: HCPCS | Performed by: NEUROLOGICAL SURGERY

## 2020-04-10 PROCEDURE — 97116 GAIT TRAINING THERAPY: CPT | Mod: GP

## 2020-04-10 PROCEDURE — 97530 THERAPEUTIC ACTIVITIES: CPT | Mod: GP

## 2020-04-10 PROCEDURE — 97112 NEUROMUSCULAR REEDUCATION: CPT | Mod: GP

## 2020-04-10 PROCEDURE — 97161 PT EVAL LOW COMPLEX 20 MIN: CPT | Mod: GP

## 2020-04-10 PROCEDURE — 25800030 ZZH RX IP 258 OP 636: Performed by: NEUROLOGICAL SURGERY

## 2020-04-10 PROCEDURE — 84100 ASSAY OF PHOSPHORUS: CPT | Performed by: NEUROLOGICAL SURGERY

## 2020-04-10 PROCEDURE — 36415 COLL VENOUS BLD VENIPUNCTURE: CPT | Performed by: NEUROLOGICAL SURGERY

## 2020-04-10 PROCEDURE — 70553 MRI BRAIN STEM W/O & W/DYE: CPT

## 2020-04-10 RX ORDER — DEXAMETHASONE 2 MG/1
2 TABLET ORAL EVERY 8 HOURS
Qty: 6 TABLET | Refills: 0 | Status: SHIPPED | OUTPATIENT
Start: 2020-04-13 | End: 2020-04-20

## 2020-04-10 RX ORDER — OXYCODONE HYDROCHLORIDE 5 MG/1
5-10 TABLET ORAL
Qty: 25 TABLET | Refills: 0 | Status: SHIPPED | OUTPATIENT
Start: 2020-04-10 | End: 2020-05-11

## 2020-04-10 RX ORDER — DEXAMETHASONE 4 MG/1
4 TABLET ORAL EVERY 8 HOURS
Qty: 3 TABLET | Refills: 0 | Status: SHIPPED | OUTPATIENT
Start: 2020-04-10 | End: 2020-04-20

## 2020-04-10 RX ORDER — DEXAMETHASONE 1.5 MG/1
3 TABLET ORAL EVERY 8 HOURS
Qty: 12 TABLET | Refills: 0 | Status: SHIPPED | OUTPATIENT
Start: 2020-04-11 | End: 2020-04-20

## 2020-04-10 RX ORDER — GADOBUTROL 604.72 MG/ML
0.1 INJECTION INTRAVENOUS ONCE
Status: COMPLETED | OUTPATIENT
Start: 2020-04-10 | End: 2020-04-10

## 2020-04-10 RX ORDER — CEPHALEXIN 500 MG/1
500 CAPSULE ORAL 2 TIMES DAILY
Qty: 14 CAPSULE | Refills: 0 | Status: SHIPPED | OUTPATIENT
Start: 2020-04-10 | End: 2020-04-20

## 2020-04-10 RX ORDER — DEXAMETHASONE 1 MG
1 TABLET ORAL EVERY 8 HOURS
Qty: 6 TABLET | Refills: 0 | Status: SHIPPED | OUTPATIENT
Start: 2020-04-15 | End: 2020-04-20

## 2020-04-10 RX ORDER — AMOXICILLIN 250 MG
1 CAPSULE ORAL 2 TIMES DAILY
Qty: 30 TABLET | Refills: 0 | Status: SHIPPED | OUTPATIENT
Start: 2020-04-10 | End: 2020-01-01

## 2020-04-10 RX ADMIN — OXYCODONE HYDROCHLORIDE 5 MG: 5 TABLET ORAL at 00:43

## 2020-04-10 RX ADMIN — VANCOMYCIN HYDROCHLORIDE 1250 MG: 10 INJECTION, POWDER, LYOPHILIZED, FOR SOLUTION INTRAVENOUS at 08:03

## 2020-04-10 RX ADMIN — OXYCODONE HYDROCHLORIDE 5 MG: 5 TABLET ORAL at 09:46

## 2020-04-10 RX ADMIN — LEVOTHYROXINE SODIUM 125 MCG: 0.07 TABLET ORAL at 07:51

## 2020-04-10 RX ADMIN — ACETAMINOPHEN 975 MG: 325 TABLET, FILM COATED ORAL at 04:04

## 2020-04-10 RX ADMIN — LEVETIRACETAM 1000 MG: 500 TABLET ORAL at 07:51

## 2020-04-10 RX ADMIN — ACETAMINOPHEN 975 MG: 325 TABLET, FILM COATED ORAL at 12:02

## 2020-04-10 RX ADMIN — SENNOSIDES AND DOCUSATE SODIUM 1 TABLET: 8.6; 5 TABLET ORAL at 07:50

## 2020-04-10 RX ADMIN — DEXAMETHASONE 4 MG: 4 TABLET ORAL at 05:57

## 2020-04-10 RX ADMIN — GADOBUTROL 7 ML: 604.72 INJECTION INTRAVENOUS at 10:56

## 2020-04-10 ASSESSMENT — VISUAL ACUITY
OU: NORMAL ACUITY

## 2020-04-10 ASSESSMENT — ACTIVITIES OF DAILY LIVING (ADL)
ADLS_ACUITY_SCORE: 12
DRESS: 0-->INDEPENDENT
ADLS_ACUITY_SCORE: 12
RETIRED_EATING: 0-->INDEPENDENT
ADLS_ACUITY_SCORE: 12
ADLS_ACUITY_SCORE: 12

## 2020-04-10 ASSESSMENT — PAIN DESCRIPTION - DESCRIPTORS: DESCRIPTORS: ACHING

## 2020-04-10 NOTE — PLAN OF CARE
Discharge Planner PT   Patient plan for discharge: home     Current status: PT eval completed, tx indicated. Pt performs bed mobility and supine>sit with supervision. Performed self cares in BR mod indep. Ambulated 400'+ with CGA-SBA. Performed 1 x 4 steps with R ascending handrail and CGA. Pt completed dynamic balance drills - compensated appropriately without overt LOB. Educated on crani precautions.     Barriers to return to prior living situation: medical needs    Recommendations for discharge: home with prn support from SO and daughter     Rationale for recommendations: Pt does not require physical A for mobility at this time. He is safe to dc home - educated on safe mobility at home to maintain and progress tolerance.        Entered by: Kendra Isbell 04/10/2020 9:20 AM

## 2020-04-10 NOTE — PROGRESS NOTES
"   04/10/20 0800   Quick Adds   Type of Visit Initial PT Evaluation  (Kendra Isbell, PT, DPT )       Present no   Living Environment   Lives With spouse;child(hector), adult  (25 year old daughter )   Living Arrangements house   Home Accessibility stairs to enter home;stairs within home   Number of Stairs, Main Entrance 3   Stair Railings, Main Entrance railing on right side (ascending)   Number of Stairs, Within Home, Primary other (see comments)  (full flight up to bedroom )   Stair Railings, Within Home, Primary railings on both sides of stairs;railing on right side (ascending)   Transportation Anticipated family or friend will provide;car, drives self   Living Environment Comment Pt lives with SO in [address] home. 25 year old daughter is currently staying with them and working from home (normally lives in [place]). He has a full flight up to bedroom. bathroom on both main and upper levels. [address] home from garage which is primary enterence.    Self-Care   Usual Activity Tolerance excellent   Current Activity Tolerance good   Regular Exercise Yes   Activity/Exercise Type running/jogging;walking;biking;strength training   Exercise Amount/Frequency daily;30 mins;1 hr   Equipment Currently Used at Home none   Activity/Exercise/Self-Care Comment Pt was indep wtih ADLs. 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    Functional Level Prior   Ambulation 0-->independent   Transferring 0-->independent   Toileting 0-->independent   Bathing 0-->independent   Communication 0-->understands/communicates without difficulty   Swallowing 0-->swallows foods/liquids without difficulty   Cognition 0 - no cognition issues reported   Fall history within last six months no   Which of the above functional risks had a recent onset or change? none  (activity tolerance )   Prior Functional Level Comment indep with " mobility    General Information   Onset of Illness/Injury or Date of Surgery - Date 04/09/20   Referring Physician Valentino Morrell MD    Patient/Family Goals Statement return home   Pertinent History of Current Problem (include personal factors and/or comorbidities that impact the POC) 54 year old man with history of glioblastoma s/p resection on atezolizumab trial and temodar and radiation presented with headache and worsening coordination after receiving temodar. MRI brain 3/28 with interval increase in the size and associated vasogenic edema of right temporoparietal mass since 2019, with 6 mm leftward midline shift, with compression of the brain. S/p OR 4/9 for right craniotomy for tumor resection with 5-ALA and gamma tile placement.   Precautions/Limitations fall precautions;other (see comments)  (crani precautions )   General Info Comments activity: early mobility guidelines    Cognitive Status Examination   Orientation orientation to person, place and time   Level of Consciousness alert   Follows Commands and Answers Questions 100% of the time   Personal Safety and Judgment intact   Memory intact   Integumentary/Edema   Integumentary/Edema Comments incision intact, no drainage noted    Posture    Posture Forward head position;Protracted shoulders   Range of Motion (ROM)   ROM Comment WFL throughout   Strength   Strength Comments minor deconditioning 4-5/5 savana UE and LEs    Bed Mobility   Bed Mobility Comments supervision with HOB slightly elevated    Transfer Skills   Transfer Comments STS with SBA, minor use of UEs    Gait   Gait Comments without AD, L UE in high guard, intermittent staggering, no overt LOB   Balance   Balance Comments not formally assessed, no overt LOB    Sensory Examination   Sensory Perception no deficits were identified   Coordination   Coordination no deficits were identified   Muscle Tone   Muscle Tone no deficits were identified   General Therapy Interventions   Planned Therapy  "Interventions ADL retraining;balance training;bed mobility training;gait training;strengthening;transfer training;progressive activity/exercise;home program guidelines;risk factor education   Clinical Impression   Criteria for Skilled Therapeutic Intervention yes, treatment indicated   PT Diagnosis dec functional mobility    Influenced by the following impairments deconditioning, fatigue,    Functional limitations due to impairments balance, activity tolerance,    Clinical Presentation Evolving/Changing   Clinical Presentation Rationale current mobility, level of A,    Clinical Decision Making (Complexity) Moderate complexity   Therapy Frequency 6x/week   Predicted Duration of Therapy Intervention (days/wks) days   Anticipated Equipment Needs at Discharge   (TBD)   Anticipated Discharge Disposition Home with Assist   Risk & Benefits of therapy have been explained Yes   Patient, Family & other staff in agreement with plan of care Yes   Clinical Impression Comments PT eval completed, tx indicated    [facility] AM-PAC  \"6 Clicks\" V.2 Basic Mobility Inpatient Short Form   1. Turning from your back to your side while in a flat bed without using bedrails? 4 - None   2. Moving from lying on your back to sitting on the side of a flat bed without using bedrails? 4 - None   3. Moving to and from a bed to a chair (including a wheelchair)? 4 - None   4. Standing up from a chair using your arms (e.g., wheelchair, or bedside chair)? 4 - None   5. To walk in hospital room? 4 - None   6. Climbing 3-5 steps with a railing? 3 - A Little   Basic Mobility Raw Score (Score out of 24.Lower scores equate to lower levels of function) 23   Total Evaluation Time   Total Evaluation Time (Minutes) 7     "

## 2020-04-10 NOTE — PROGRESS NOTES
Major Shift Events:  Neuro intact, MRI completed. Discharge instructions given.   Plan: Discharge patient with belongings.   For vital signs and complete assessments, please see documentation flowsheets.

## 2020-04-10 NOTE — PLAN OF CARE
Physical Therapy Discharge Summary    Reason for therapy discharge:    Discharged to home.    Progress towards therapy goal(s). See goals on Care Plan in [facility] electronic health record for goal details.  Goals not met.  Barriers to achieving goals:   discharge on same date as initial evaluation.    Therapy recommendation(s):    Continue home exercise program.

## 2020-04-10 NOTE — PLAN OF CARE
Patient is able to verbalize when he is having pain and request PRN pain meds. Oxycodone helps control pain per patient.

## 2020-04-10 NOTE — PROGRESS NOTES
Patients VSS overnight, patient is on room air and tolerating. Minimal pain overnight controlled with PRN pain meds. Patients arauz removed and patient has voided post removal. Patient is ambulatory in room with no issues.

## 2020-04-10 NOTE — PROGRESS NOTES
Admitted/transferred from: PACU post-op craniotomy, gamma tile placement and tumor resection.   Reason for admission/transfer: Frequent neuro exams and close hemodynamic monitoring.   Pt status upon admission/transfer: Neurologically intact, no deficits.   Interventions: Frequent neuro exams.   Plan: Monitor neuro status closely, alert team with changes.   2 RN Skin Assessment: Completed by: Cass ABEBE RN and Ashli WELLS RN.   Results of skin assessment and interventions/actions: Intact, no issues.     Ht: 5 feet 10 inches Wt: 165 Drug Calc Wt: done    Pt belongings: In patient bags at bedside.

## 2020-04-10 NOTE — DISCHARGE SUMMARY
[facility] Discharge Summary and Instructions    Erasto Maher MRN# [id number]   Age: 55 year old YOB: 1965     Date of Admission:  4/9/2020  Date of Discharge::  4/10/2020  Admitting Physician:  Marquise Chan MD  Discharge Physician:  Marquise Chan MD          Admission Diagnoses:   Glioblastoma (H) [C71.9]          Discharge Diagnosis:     Glioblastoma (H) [C71.9]          Procedures:   4/09/2020  1) Craniotomy for tumor resection, right  2) Stealth neuro-navigation  3) Micro-dissection  4) Intra-operative MRI  5) 5-ALA guided resection  6) Implantation of gammatile           Brief History of Illness:   Erasto Maher is a 54 year old male with 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 Barreto on 6/27/2019. He found out his glioblastoma after presented with seizure.  He was being managed by Dr. Wilmer Mckenna at [facility] on an upfront clinical trial utilizing concurrent chemoradiation with temozolomide and atezolizumab. He completed this portion of his treatment on 8/30/2019.  He is taking Temodar managed by Dr. Ericka Avila of Neuro Oncology.            Hospital Course:   Following surgery the patient was monitored in the Neurosurgical ICU, patient remained medically and neurologically stable.  Patient complained of incisional pain but denied headache, nausea/vomiting, aphasia, weakness or seizure activity.    Post operatively MRI was obtained and revealed gross total resection with gamma tile placement.    Patient was started on a one week Decadron taper post operatively and will continue this upon discharge.  Patient also started on Protonix for GI prophylaxis.   Patient was evaluated by PT and OT who felt the patient was appropriate for discharge home with family.   Surgical pathology was pending upon discharge.   Patient will need to follow up in Neurosurgical clinic in 2 weeks for wound check.  Patient  will follow up with Neuro Oncology in 2-3 weeks to discuss future treatment plan.   Prior to discharge patient was medically stable and at neurologic baseline, tolerating diet, ambulating safely, voiding, passing flatus, and pain control was achieved with oral medications.              Discharge Medications:     Current Discharge Medication List      START taking these medications    Details   cephALEXin (KEFLEX) 500 MG capsule Take 1 capsule (500 mg) by mouth 2 times daily for 7 days  Qty: 14 capsule, Refills: 0    Associated Diagnoses: Glioblastoma (H)      oxyCODONE (ROXICODONE) 5 MG tablet Take 1-2 tablets (5-10 mg) by mouth every 3 hours as needed  Qty: 25 tablet, Refills: 0    Associated Diagnoses: Glioblastoma (H)      senna-docusate (SENOKOT-S/PERICOLACE) 8.6-50 MG tablet Take 1 tablet by mouth 2 times daily  Qty: 30 tablet, Refills: 0    Associated Diagnoses: Glioblastoma (H)         CONTINUE these medications which have CHANGED    Details   !! dexamethasone (DECADRON) 1 MG tablet Take 1 tablet (1 mg) by mouth every 8 hours for 2 days  Qty: 6 tablet, Refills: 0    Comments: Taper 4  Associated Diagnoses: Glioblastoma (H)      !! dexamethasone (DECADRON) 1.5 MG tablet Take 2 tablets (3 mg) by mouth every 8 hours for 2 days  Qty: 12 tablet, Refills: 0    Comments: Taper 2  Associated Diagnoses: Glioblastoma (H)      !! dexamethasone (DECADRON) 2 MG tablet Take 1 tablet (2 mg) by mouth every 8 hours for 2 days  Qty: 6 tablet, Refills: 0    Comments: Taper 3  Associated Diagnoses: Glioblastoma (H)      !! dexamethasone (DECADRON) 4 MG tablet Take 1 tablet (4 mg) by mouth every 8 hours for 1 day  Qty: 3 tablet, Refills: 0    Comments: Taper 1  Associated Diagnoses: Glioblastoma (H)       !! - Potential duplicate medications found. Please discuss with provider.      CONTINUE these medications which have NOT CHANGED    Details   levETIRAcetam (KEPPRA) 1000 MG tablet Take 1,000 mg by mouth 2 times daily      "  levothyroxine (SYNTHROID/LEVOTHROID) 125 MCG tablet Take 125 mcg by mouth daily      loratadine (CLARITIN) 10 MG tablet Take 10 mg by mouth nightly as needed       melatonin 5 MG tablet Take 5 mg by mouth nightly as needed       pantoprazole (PROTONIX) 40 MG EC tablet Take 1 tablet (40 mg) by mouth At Bedtime for 28 days  Qty: 28 tablet, Refills: 0    Associated Diagnoses: Glioblastoma (H); Vasogenic edema (H)      Psyllium 500 MG CAPS Take 4 capsules by mouth At Bedtime      acetaZOLAMIDE (DIAMOX) 250 MG tablet Take 250 mg by mouth 2 times daily as needed Taken 1-2 days before ascent and continue until descent. (for skiing)      fluticasone (FLONASE) 50 MCG/ACT nasal spray Spray 1 spray into both nostrils nightly as needed       olopatadine (PATANOL) 0.1 % ophthalmic solution Place 1-2 drops into both eyes daily as needed          Exam:   Physical Exam  /82   Pulse 69   Temp 97.9  F (36.6  C) (Oral)   Resp 8   Ht 1.778 m (5' 10\")   Wt 74.8 kg (165 lb)   SpO2 95%   BMI 23.68 kg/m    General: Appears comfortable, NAD  Wound: Incision, clean, dry, intact without strikethrough  Neurologic Exam:  - AOx3.  - Follows commands.  - Speech fluent, spontaneous. No aphasia or dysarthria.  - No gaze preference. No apparent hemineglect.  - PERRL, EOMI.  - Face symmetric with sensation intact to light touch.  - Palate elevates symmetrically, uvula midline, tongue protrudes midline.  - Trapezii and sternocleidomastoid muscles 5/5 bilaterally.  - No pronator drift.  Motor: Normal bulk/tone; no tremor, rigidity, or bradykinesia.   Right:  Deltoid 5/5, tricep 5/5, bicep 5/5, wrist flexor 5/5, wrist extensor 5/5, finger intrinsic 5/5  Left:  Deltoid 5/5, tricep 5/5, bicep 5/5, wrist flexor 5/5, wrist extensor 5/5, finger intrinsic 5/5  Right: Iliopsoas 5/5, quadricep 5/5, hamstring 5/5, tibialis anterior 5/5, gastrocnemius 5/5, EHL 5/5  Left:  Iliopsoas 5/5, quadricep 5/5, hamstring 5/5, tibialis anterior 5/5, " gastrocnemius 5/5, EHL 5/5    Sensation intact in bilateral L4-S1 dermatones                   Discharge Instructions and Follow-Up:     Discharge diet: Regular   Discharge activity: You may advance activity as tolerated. No strenuous exercise or heay lifting greater than 10 lbs for 4 weeks or until seen and cleared in clinic.   Discharge follow-up: Please follow-up with Dr. Marquise Chan in Neurosurgery clinic on 4/24/2020 @1245 PM for post-surgical evaluation and wound check. Please call [phone] with any questions.     Please follow-up with Dr. Ericka Avila in Neuro-Oncology clinic in 2-3 weeks. Please call [phone] to schedule an appointment with Dr. Ericka Avila in Neuro Oncology.     Your case will be discussed on 4/13/2020 by an interdisciplinary team which includes Neurosurgery, Oncology, Radiation Oncology, and Pathology.  Following this meeting if any additional treatment recommendations are recommended, you will be made aware and appropriate appointments will be scheduled for you.          Wound care: Ok to shower,however no scrubbing of the wound and no soaking of the wound, meaning no bathtubs or swimming pools. Pat dry only. Leave wound open to air.  Patient to have wound checked 2 weeks following surgery.       Please call if you have:  1. increased pain, redness, drainage, swelling at your incision  2. fevers > 101.5 F degrees  3. with any questions or concerns.  You may reach the Neurosurgery clinic at [phone] during regular work hours. ER at [phone].    and ask for the Neurosurgery Resident on call at [phone], during off hours or weekends.         Discharge Disposition:     Discharged to home        RUPA Mac, CNP  Department of Neurosurgery  Pager: [phone]

## 2020-04-10 NOTE — PLAN OF CARE
Discharge Planner OT   4A   DEFER       Patient plan for discharge: home  Current status:  Deferring OT consult.  Acknowledge order placed for OT eval and treat.  Upon chart review and discussion with PT, no acute needs identified requiring skilled OT intervention.        Barriers to return to prior living situation:  home w A per PT  Recommendations for discharge: defer to PT   Rationale for recommendations: defer to PT       Entered by: Angie Ely 04/10/2020 1:05 PM

## 2020-04-13 ENCOUNTER — PATIENT OUTREACH (OUTPATIENT)
Dept: CARE COORDINATION | Facility: CLINIC | Age: 55
End: 2020-04-13

## 2020-04-13 ENCOUNTER — TUMOR CONFERENCE (OUTPATIENT)
Dept: ONCOLOGY | Facility: CLINIC | Age: 55
End: 2020-04-13

## 2020-04-13 NOTE — PROGRESS NOTES
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 Barreto on 6/27/2019. He found out his glioblastoma after presented with seizure.  He was being managed by Dr. Wilmer Mckenna at [facility] on an upfront clinical trial utilizing concurrent chemoradiation with temozolomide and atezolizumab. He completed this portion of his treatment on 8/30/2019.  He is taking Temodar managed by Dr. Ericka Avila of Neuro Oncology.     ASSESSMENT      Discharge Assessment  Patient reports symptoms are: Improved  Does the patient have all of their medications?: Yes  Does patient know what their new medications are for?: Yes  Does patient have a follow-up appointment scheduled?: Yes  Does patient have any other questions or concerns?: No    Post-op  Did the patient have surgery or a procedure: Yes  Incision: healing  Drainage: No  Bleeding: none  Fever: No  Chills: No  Redness: No  Warmth: No  Swelling: No  Incision site pain: No  Eating & Drinking: eating and drinking without complaints/concerns  PO Intake: regular diet  Bowel Function: normal  Urinary Status: voiding without complaint/concerns      PLAN                                                      Outpatient Plan:  Please follow-up with Dr. Marquise Chan in Neurosurgery clinic on 4/24/2020 @1245 PM for post-surgical evaluation and wound check. Please call [phone] with any questions.      Please follow-up with Dr. Ericka Avila in Neuro-Oncology clinic in 2-3 weeks. Please call [phone] to schedule an appointment with Dr. Ericka Avila in Neuro  Oncology.      Your case will be discussed on 4/13/2020 by an interdisciplinary team which includes Neurosurgery, Oncology, Radiation Oncology, and Pathology.  Following this meeting if any additional treatment recommendations are recommended, you will be made aware and appropriate appointments will be scheduled for you.     Future Appointments   Date Time Provider Department Center   4/13/2020  1:00 PM BRAIN TUMOR CONFERENCE [facility] Virtual   4/24/2020  1:00 PM Marquise Chan MD [facility]           Constance Awan CMA

## 2020-04-13 NOTE — TUMOR CONFERENCE
Tumor Conference Information  Tumor Conference:  Brain  Specialties Present:  Medical oncology, Pathology, Radiology, Radiation oncology, Surgery  Patient Status:  A current patient  Pathology:  Discussed (see comment)  Treatment to Date:  Surgical intervention(s), Chemoradiation, Biopsy  Clinical Trial Eligibility:  Previously enrolled (Comment: At [facility])  Recommended Plan:  Other (see comment) (Comment: Dr. Avila to discuss future treatment plan.)  Did the review exceed 30 minutes?:  did not           Documentation / Disclaimer Cancer Tumor Board Note  Cancer tumor board recommendations do not override what is determined to be reasonable care and treatment, which is dependent on the circumstances of a patient's case; the patient's medical, social, and personal concerns; and the clinical judgment of the oncologist [physician].

## 2020-04-14 NOTE — TUMOR CONFERENCE
Tumor Conference Information  Tumor Conference:  Brain  Specialties Present:  Medical oncology, Pathology, Radiology, Radiation oncology, Surgery  Patient Status:  A current patient  Pathology:  Not Discussed  Treatment to Date:  Biopsy, Surgical intervention(s), Chemoradiation  Clinical Trial Eligibility:  Previously enrolled  Recommended Plan:  Other (see comment) (Comment: Transferring care to Dr. Avila; will discuss future treatment plan.)  Did the review exceed 30 minutes?:  did not           Documentation / Disclaimer Cancer Tumor Board Note  Cancer tumor board recommendations do not override what is determined to be reasonable care and treatment, which is dependent on the circumstances of a patient's case; the patient's medical, social, and personal concerns; and the clinical judgment of the oncologist [physician].

## 2020-04-15 ENCOUNTER — PATIENT OUTREACH (OUTPATIENT)
Dept: ONCOLOGY | Facility: CLINIC | Age: 55
End: 2020-04-15

## 2020-04-15 NOTE — PROGRESS NOTES
Per Dr. Avila's request, prepared [facility] requisition for genetic testing, gathered required documents, and sent for her digital signature.  Benita Street, RN, BSN  Care Coordinator  [facility]

## 2020-04-20 ENCOUNTER — ONCOLOGY VISIT (OUTPATIENT)
Dept: RADIATION ONCOLOGY | Facility: CLINIC | Age: 55
End: 2020-04-20

## 2020-04-20 ENCOUNTER — TELEPHONE (OUTPATIENT)
Dept: ONCOLOGY | Facility: CLINIC | Age: 55
End: 2020-04-20

## 2020-04-20 ENCOUNTER — VIRTUAL VISIT (OUTPATIENT)
Dept: ONCOLOGY | Facility: CLINIC | Age: 55
End: 2020-04-20
Attending: PSYCHIATRY & NEUROLOGY
Payer: COMMERCIAL

## 2020-04-20 DIAGNOSIS — T45.1X5A CHEMOTHERAPY INDUCED NEUTROPENIA (H): ICD-10-CM

## 2020-04-20 DIAGNOSIS — C71.9 GLIOBLASTOMA (H): Primary | ICD-10-CM

## 2020-04-20 DIAGNOSIS — T45.1X5A CHEMOTHERAPY-INDUCED NAUSEA AND VOMITING: ICD-10-CM

## 2020-04-20 DIAGNOSIS — R11.2 CHEMOTHERAPY-INDUCED NAUSEA AND VOMITING: ICD-10-CM

## 2020-04-20 DIAGNOSIS — Z51.11 CHEMOTHERAPY MANAGEMENT, ENCOUNTER FOR: ICD-10-CM

## 2020-04-20 DIAGNOSIS — D70.1 CHEMOTHERAPY INDUCED NEUTROPENIA (H): ICD-10-CM

## 2020-04-20 PROCEDURE — 99215 OFFICE O/P EST HI 40 MIN: CPT | Mod: GT | Performed by: PSYCHIATRY & NEUROLOGY

## 2020-04-20 PROCEDURE — 40000114 ZZH STATISTIC NO CHARGE CLINIC VISIT

## 2020-04-20 RX ORDER — ESCITALOPRAM OXALATE 10 MG/1
10 TABLET ORAL EVERY MORNING
Status: ON HOLD | COMMUNITY
Start: 2020-04-20 | End: 2021-01-01

## 2020-04-20 RX ORDER — ONDANSETRON 4 MG/1
4 TABLET, FILM COATED ORAL AT BEDTIME
Qty: 30 TABLET | Refills: 1 | Status: SHIPPED | OUTPATIENT
Start: 2020-04-20 | End: 2020-01-01

## 2020-04-20 NOTE — PATIENT INSTRUCTIONS
Lomustine cycle 1 due to start on 4/30.  -Pharmacy to call with chemotherapy teaching.  -Supportive medications: Zofran (refill sent).  -Labs the week of 4/27 and week of 5/25; [facility].      Sending pathology for next generation sequencing.     Looking to connect with the Brain Tumor Network for clinical trial options.     Appt 6/8 + repeat imaging + labs.     Ericka Avila MD  Neuro-oncology  4/20/2020

## 2020-04-20 NOTE — TELEPHONE ENCOUNTER
"Oral Chemotherapy Monitoring Program    Primary Oncologist: Dr. Avila  Primary Oncology Clinic: [facility]   Cancer Diagnosis: GBM    Drug: Lomustine 200mg (2x 100mg capsule) by mouth daily for 1 dose every 6 weeks  Start Date: 4/30/2020  Dose is appropriate for patients: 1.92 BSA, Renal and Hepatic Function   Expected duration of therapy: Until disease progression or unacceptable toxicity    Drug Interaction Assessment: no clinically significant drug interactions identified.   Medication list checked (4/20): -Lomustine -AcetaZOLAMIDE -Cephalexin -DexAMETHasone -Flonase Allergy Relief -LevETIRAcetam -Levothyroxine -Loratadine -Melatonin -Patanol -OxyCODONE -Pantoprazole -Psyllium -Senokot -Ondansetron    Lab Monitoring Plan  CBC and CMP as directed by Dr. Avila  Labs drawn outside of [place]: Yes, [facility]. Phone: [phone]. Fax: [phone]    Subjective/Objective:  Erasto Maher is a 55 year old male contacted by phone for an initial visit for oral chemotherapy education. I spoke with Erasto and his wife, Judith, over the phone after their virtual visit with Dr. Avila today.     ORAL CHEMOTHERAPY 4/20/2020   Drug Name (No Data)   Current Dosage 200mg   Current Schedule Daily   Cycle Details Other   Start Date of Last Cycle (No Data)   Planned next cycle start date 4/30/2020   Any new drug interactions? No   Is the dose as ordered appropriate for the patient? Yes       Last PHQ-2 Score on record:   PHQ-2 ( 1999 [employer]) 4/3/2020   Q1: Little interest or pleasure in doing things 0   Q2: Feeling down, depressed or hopeless 1   PHQ-2 Score 1     Vitals:  BP:   BP Readings from Last 1 Encounters:   04/10/20 109/73     Wt Readings from Last 1 Encounters:   04/09/20 74.8 kg (165 lb)     Estimated body surface area is 1.92 meters squared as calculated from the following:    Height as of 4/9/20: 1.778 m (5' 10\").    Weight as of 4/9/20: 74.8 kg (165 lb).      Labs:  _  Result Component " Current Result Ref Range   Sodium 137 (4/10/2020) 133 - 144 mmol/L     _  Result Component Current Result Ref Range   Potassium 4.2 (4/10/2020) 3.4 - 5.3 mmol/L     _  Result Component Current Result Ref Range   Calcium 8.9 (4/10/2020) 8.5 - 10.1 mg/dL     _  Result Component Current Result Ref Range   Magnesium 1.8 (4/10/2020) 1.6 - 2.3 mg/dL     _  Result Component Current Result Ref Range   Phosphorus 4.3 (4/10/2020) 2.5 - 4.5 mg/dL     _  Result Component Current Result Ref Range   Albumin 4.1 (3/28/2020) 3.4 - 5.0 g/dL     _  Result Component Current Result Ref Range   Urea Nitrogen 10 (4/10/2020) 7 - 30 mg/dL     _  Result Component Current Result Ref Range   Creatinine 0.69 (4/10/2020) 0.66 - 1.25 mg/dL     _  Result Component Current Result Ref Range   AST 13 (3/28/2020) 0 - 45 U/L     _  Result Component Current Result Ref Range   ALT 27 (3/28/2020) 0 - 70 U/L     _  Result Component Current Result Ref Range   Bilirubin Total 0.5 (3/28/2020) 0.2 - 1.3 mg/dL     _  Result Component Current Result Ref Range   WBC 18.4 (H) (4/10/2020) 4.0 - 11.0 10e9/L     _  Result Component Current Result Ref Range   Hemoglobin 12.9 (L) (4/10/2020) 13.3 - 17.7 g/dL     _  Result Component Current Result Ref Range   Platelet Count 237 (4/10/2020) 150 - 450 10e9/L     _  Result Component Current Result Ref Range   Absolute Neutrophil 16.3 (H) (4/10/2020) 1.6 - 8.3 10e9/L       Assessment:  Patient is appropriate to start therapy.    Plan:  Basic chemotherapy teaching was reviewed with the patient and the patient's family including indication, start date of therapy, dose, administration, adverse effects, missed doses, food and drug interactions, monitoring, side effect management, office contact information, and safe handling. Written materials were provided and all questions answered.    Follow-Up:  4/27/20 for baseline labs and then 1 week following the start of lomustine therapy.       Melly Montenegro PharmD  Oral  Chemotherapy Monitoring Program  [facility]  [phone]

## 2020-04-22 ENCOUNTER — DOCUMENTATION ONLY (OUTPATIENT)
Dept: ONCOLOGY | Facility: CLINIC | Age: 55
End: 2020-04-22

## 2020-04-22 NOTE — PROGRESS NOTES
Orders for labs to [facility] lab: [phone]    Confirmation of successful transmission.    Saray Go CMA (AAMA)  [facility] [place]  Care Plan   Oncology/Hematology

## 2020-04-23 ENCOUNTER — PATIENT OUTREACH (OUTPATIENT)
Dept: ONCOLOGY | Facility: CLINIC | Age: 55
End: 2020-04-23

## 2020-04-23 NOTE — PROGRESS NOTES
[facility] has requested recent MRI report and visit notes from Dr. Avila. Report and notes sent to Al.    Benita Street, RN, BSN  Neuro-Oncology Care Coordinator  [facility]

## 2020-04-24 ENCOUNTER — VIRTUAL VISIT (OUTPATIENT)
Dept: NEUROSURGERY | Facility: CLINIC | Age: 55
End: 2020-04-24
Attending: NEUROLOGICAL SURGERY
Payer: COMMERCIAL

## 2020-04-24 DIAGNOSIS — C71.9 GLIOBLASTOMA (H): Primary | ICD-10-CM

## 2020-04-24 PROCEDURE — 40000114 ZZH STATISTIC NO CHARGE CLINIC VISIT

## 2020-04-24 ASSESSMENT — ENCOUNTER SYMPTOMS
INSOMNIA: 0
DEPRESSION: 0
NERVOUS/ANXIOUS: 1
DECREASED CONCENTRATION: 0
PANIC: 0

## 2020-04-24 NOTE — PROGRESS NOTES
"Erasto Maher is a 55 year old male who is being evaluated via a billable video visit.      The patient has been notified of following:     \"This video visit will be conducted via a call between you and your physician/provider. We have found that certain health care needs can be provided without the need for an in-person physical exam.  This service lets us provide the care you need with a video conversation.  If a prescription is necessary we can send it directly to your pharmacy.  If lab work is needed we can place an order for that and you can then stop by our lab to have the test done at a later time.    Video visits are billed at different rates depending on your insurance coverage.  Please reach out to your insurance provider with any questions.    If during the course of the call the physician/provider feels a video visit is not appropriate, you will not be charged for this service.\"    Patient has given verbal consent for Video visit? Yes    How would you like to obtain your AVS? Kimberlyhart    Patient would like the video invitation sent by: Text to cell phone: [phone]    Will anyone else be joining your video visit? No      I have reviewed and updated the patient's allergies and medication list.    Concerns:  Patient has no new concerns.      Refills:  None      DUSTY Bagley          Video-Visit Details    Type of service:  Video Visit    Video Start Time: 1:03 PM  Video End Time:1:25 PM    Originating Location (pt. Location): Home    Distant Location (provider location):  [facility]     Mode of Communication:  Video Conference via [employer]    Post-operative visit     55 M s/p GTR of recurrent right parietal glioblastoma and gammatile placement. GTR achieved. Post-operative course unremarkable. Patient discharged home on POD1    No new complaints since discharge. Denied fever, wound drainage/pain.    Images of incision reassuring. Patient grossly non-focal.    Doing " well post-operatively. All questions answered. The patient will start on Lomustin therapy 3 weeks after his procedure. I will continue to follow the patient through the [facility].      Marquise Chan MD

## 2020-04-25 NOTE — PROCEDURES
[facility]  Department of Radiation Oncology  [facility] [address] [place] [address]  Radiotherapy Treatment Summary          Date of Report:  2020     PATIENT: ERASTO LIZARRAGA  MEDICAL RECORD NO: [id number]    : 1965     DIAGNOSIS: C71.3 Malignant neoplasm of parietal lobe     INTENT OF RADIOTHERAPY: Cure        Details of the treatments summarized below are found in records kept in the Department of Radiation Oncology @ [facility].  Treatment Summary:  Radiation Oncology - Course: 2    Treatment Site  Current Dose  Modality  Implant     Elapsed Days  Fx.  2 rt parietal cavity   6,000 cGy  Per Plan   2020     Permanent   1                       Dose per Fraction: 6,000 cGy   Total Dose:   6,000 cGy     COMMENTS: Erasto Lizarraga underwent a permanent radioactive brain implant by Dr. Marquise Chan for Malignant neoplasm of parietal lobe.  A total of 6 GammaTiles were implanted ( 24 Cs-131   seeds, 3.5U per seed).  The patient tolerated the treatment well and will be followed by Dr. Chan.     PAIN MANAGEMENT: Patient's pain management was per inpatient team.  Staff Physician: Eli Mann M.D.  Physicist: Ashly Lindquist, PhD  CC: Marquise Chan MD

## 2020-04-28 ENCOUNTER — TELEPHONE (OUTPATIENT)
Dept: ONCOLOGY | Facility: CLINIC | Age: 55
End: 2020-04-28

## 2020-04-28 ENCOUNTER — MYC MEDICAL ADVICE (OUTPATIENT)
Dept: ONCOLOGY | Facility: CLINIC | Age: 55
End: 2020-04-28

## 2020-04-28 ENCOUNTER — DOCUMENTATION ONLY (OUTPATIENT)
Dept: ONCOLOGY | Facility: CLINIC | Age: 55
End: 2020-04-28

## 2020-04-28 NOTE — ORAL ONC MGMT
Oral Chemotherapy Monitoring Program     Primary Oncologist: Dr. Avila  Primary Oncology Clinic: [facility]  Cancer Diagnosis: Glioblastoma     Therapy: Lomustine 200mg x1 dose    Contacted patient to review 4/27 baseline lab results.    WBC = 5.2  HGB = 13.4  PLT = 202  ANC = 3.7    No concerning abnormalities, OK to start lomustine on 4/30/20 as planned. Will call to follow up within 1 week of lomustine dose.    Cirilo Khanna  Oncology Pharmacy Intern  [facility]  [phone]

## 2020-04-28 NOTE — PROGRESS NOTES
Travel Insurance forms filled out and put in providers folder for review and signature.      Saray Go CMA ([facility])

## 2020-05-04 LAB — LAB SCANNED RESULT: NORMAL

## 2020-05-04 NOTE — PROGRESS NOTES
Travel Insurance paperwork completed, signed and mailed to   [employer]  [address]  [address]  [place] [address]    A copy was emailed to patient, per pt request.      Saray Go CMA

## 2020-05-07 ENCOUNTER — TELEPHONE (OUTPATIENT)
Dept: ONCOLOGY | Facility: CLINIC | Age: 55
End: 2020-05-07

## 2020-05-07 NOTE — ORAL ONC MGMT
Oral Chemotherapy Monitoring Program    Placed call to patient in follow up of Lomustine therapy.    Mailbox was full, will attempt to contact patient via [employer].     Lana Roy  Student Pharmacist  Oral Chemotherapy Monitoring Program  [facility]  [phone]

## 2020-05-11 ENCOUNTER — VIRTUAL VISIT (OUTPATIENT)
Dept: ONCOLOGY | Facility: CLINIC | Age: 55
End: 2020-05-11
Attending: PSYCHIATRY & NEUROLOGY
Payer: COMMERCIAL

## 2020-05-11 DIAGNOSIS — C71.9 GLIOBLASTOMA (H): Primary | ICD-10-CM

## 2020-05-11 PROCEDURE — 40000114 ZZH STATISTIC NO CHARGE CLINIC VISIT

## 2020-05-11 PROCEDURE — 99214 OFFICE O/P EST MOD 30 MIN: CPT | Mod: GT | Performed by: PSYCHIATRY & NEUROLOGY

## 2020-05-11 NOTE — PROGRESS NOTES
"Erasto Maher is a 55 year old male who is being evaluated via a billable video visit.      The patient has been notified of following:     \"This video visit will be conducted via a call between you and your physician/provider. We have found that certain health care needs can be provided without the need for an in-person physical exam.  This service lets us provide the care you need with a video conversation.  If a prescription is necessary we can send it directly to your pharmacy.  If lab work is needed we can place an order for that and you can then stop by our lab to have the test done at a later time.    Video visits are billed at different rates depending on your insurance coverage.  Please reach out to your insurance provider with any questions.    If during the course of the call the physician/provider feels a video visit is not appropriate, you will not be charged for this service.\"    Patient has given verbal consent for Video visit? Yes    How would you like to obtain your AVS? [facility]    Patient would like the video invitation sent by: Text to cell phone: [phone]    Will anyone else be joining your video visit? No     _________________________________________________________________    NEURO-ONCOLOGY VISIT  May 11, 2020    CHIEF COMPLAINT: Mr. 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 He completed chemoradiotherapy on 8/30/2019 and was managed on a clinical trial receiving atezolizumab (anti-PDL1) plus adjuvant temozolomide. He last received an immunotherapy treatment on 3/16/20 when imaging on 3/28/20 showed progression of disease. He underwent a repeat craniotomy for tumor resection with Dr. Chan on 4/9/2020 +/- placement of GammaTiles. Aristeo is currently managed on lomustine monotherapy.     I had a virtual follow-up visit with Aristeo " and Judith (wife) plus Lana (daughter) today.       HISTORY OF PRESENT ILLNESS  -Aristeo states that he is doing well today.   -Tolerated cycle 1 of lomustine well; denied nausea or fatigue.   -Continued mild issues with proprioception/ spatial awareness with worsening coordination and dexterity, however, continues to improve. Working out routinely with good energy. Strength is improving.   -Off dexamethasone.  -No recurrent seizure events.   -Mood is good on Lexapro.     REVIEW OF SYSTEMS  A comprehensive ROS negative except as in HPI.      MEDICATIONS   Current Outpatient Medications   Medication Sig Dispense Refill     escitalopram (LEXAPRO) 10 MG tablet Take 0.5 tablets (5 mg) by mouth daily       fluticasone (FLONASE) 50 MCG/ACT nasal spray Spray 1 spray into both nostrils nightly as needed        levETIRAcetam (KEPPRA) 1000 MG tablet Take 1,000 mg by mouth 2 times daily        levothyroxine (SYNTHROID/LEVOTHROID) 125 MCG tablet Take 125 mcg by mouth daily       loratadine (CLARITIN) 10 MG tablet Take 10 mg by mouth nightly as needed        melatonin 5 MG tablet Take 5 mg by mouth nightly as needed        olopatadine (PATANOL) 0.1 % ophthalmic solution Place 1-2 drops into both eyes daily as needed        omeprazole (PRILOSEC) 20 MG DR capsule        ondansetron (ZOFRAN) 4 MG tablet Take 1 tablet (4 mg) by mouth At Bedtime prior to lomustine. Can repeat every 8 hours as needed for nausea. 30 tablet 1     Psyllium 500 MG CAPS Take 4 capsules by mouth At Bedtime       senna-docusate (SENOKOT-S/PERICOLACE) 8.6-50 MG tablet Take 1 tablet by mouth 2 times daily 30 tablet 0     DRUG ALLERGIES   Allergies   Allergen Reactions     No Clinical Screening - See Comments Rash     Cats and dogs         ONCOLOGIC HISTORY  -PRESENTATION: New onset seizures; complex partial event with speech arrest and altered mental status lasting several minutes. Later had secondary generalization. Started Keppra.   -MR brain imaging with a  ring enhancing lesion in the right parietal lobe.    -6/27/2019 SURGERY: Right temporal craniotomy for mass resection, gross total resection by Dr. Tam Barreto.  PATHOLOGY: Glioblastoma; IDH1 R132H wildtype, MGMT promoter unmethylated  -7/3/2019 CLINICAL TRIAL: Evaluated by Dr. Wilmer Mckenna at [facility], consented for clinical trial 2016-0867 (Standard of Care plus atezolizumab).  -7/22 - 8/30/2019 CHEMORADS: 60cGy with concurrent temozolomide 75mg/m2/day (145mg) plus atezolizumab Q2 weeks on clinical trial 2016-0867.  -9/6/2019 NEURO-ONC: Evaluated at the [facility].   -9/25/2019 - 3/16/2020 CHEMO: Adjuvant chemotherapy; temozolomide + atezolizumab 840 mg IV on clinical trial 2016-0867.  -4/6/2020 NEURO-ONC: Communicated with Dr. Borrero. Plan for surgery and next steps pending pathology results.   -4/9/2020 SURGERY + RADS: Repeat craniotomy for surgical debulking plus placement of GammaTiles.  PATHOLOGY: Recurrent glioblastoma.   Next generation sequencing: Microsatellite instability: Stable. Tumor mutational burden: 4 (Low). APC, CDK4, MDM2, PTEN mutated. ARID1A, ASXL1, ATRX, DNMT3A, FANCE, KDM5C, MED12, MEF2B, NF1, RUNX1, TERT mutated. PD-L1 Negative.  -4/20/2020 NEURO-ONC/ CHEMO: Starting Lomustine 3 weeks post-op. Consented for next generation sequencing and sending most recent tumor sample.    -4/30/2020 CHEMO: Lomustine, cycle 1.   -5/11/2020 NEURO-ONC: Clinically well. Referral to genetic counseling.     SOCIAL HISTORY   Tobacco use: Never smoker.   Alcohol use: Social, infrequent.   Drug use: Denies marijuana use.  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       PHYSICAL EXAMINATION  -Generally well appearing.  -Respiratory: No audible wheezing.   -Skin: No facial rashes.  -Psychiatric: Normal mood and affect. Pleasant, talkative.  -Neurologic:   MENTAL STATUS:     Alert, oriented to date.    Recall: Intact.    Speech fluent.    Comprehension intact to multi-step commands.      CRANIAL NERVES:     Pupils are equal, round, reactive to light.     Extraocular movements full, patient denies diplopia.     Symmetric facial movements.   Hearing intact.   With normal phonation, no dysfunction of the palate or tongue.   MOTOR:    Antigravity in arms.     The rest of a comprehensive physical examination is deferred due to PHE (public health emergency) video visit restrictions.       MEDICAL RECORDS  Obtained and personally reviewed all available outside medical records in addition to reviewing any records available in our electronic system.     LABS  Personally reviewed all available lab results.     IMAGING  No new neuro-imaging to review.        IMPRESSION  Clinic time was spent discussing in detail the nature of this tumor, providing emotional support, answering questions, and providing local resources.     Aristeo remains clinically stable with no new complaints. He tolerated the first cycle of lomustine well.     We reviewed the results of his next generation sequencing and everolimus could be a treatment option at disease recurrence. The results also demonstrated a mutation in APC or adenomatous polyposis coli, which is a key tumor suppressor gene that encodes for a large multi-domain protein. Aristeo's mother has a history of a high number of polyps and his grandfather  of colon cancer. This is concerning because this mutation is associated with a modestly increased risk for colon cancers. To better evaluate this risk, Aristeo is agreeable to a referral to genetic counseling.     PROBLEM LIST  Glioblastoma, MGMT unmethylated and IDH1 wildtype by IHC  Seizure  Chemotherapy-associated constipation  Headaches  Apraxia    PLAN  -CANCER-DIRECTED THERAPY-  -As above; Referral to genetic counseling.  -Labs the week of .    -Repeat MR brain imaging as scheduled next month.   -Not interested in [place] at this time.   -Previously provided information on the [facility] for clinical trial  options.     -STEROIDS-  -Off dexamethasone.    -SEIZURE MANAGEMENT-  -Given history of seizures, will continue current antiepileptic regimen of Keppra for the foreseeable future.    -Quality of life/ MOOD/ FATIGUE-  -Denies any mood issues.   -Continue to monitor mood as untreated/ undertreated depression can worsen fatigue, dysorexia, and quality of life.    Return to clinic on 6/5/2020 + imaging + labs as already scheduled.     Ericka Avila MD  Neuro-oncology       Video-Visit Details  Type of service:  Video Visit    Video Start Time: 3:32 PM  Video End Time: 4:05 PM    Originating Location (pt. Location): Home    Distant Location (provider location):  [facility]     Platform used for Video Visit: [facility]    Ericka Avila MD

## 2020-05-11 NOTE — PATIENT INSTRUCTIONS
Discussed next generation sequencing results.   Referral placed to genetic counseling.     Return to clinic on 6/5 (schedule change request submitted).     Ericka Avila MD  Neuro-oncology  5/11/2020

## 2020-05-12 ENCOUNTER — PATIENT OUTREACH (OUTPATIENT)
Dept: ONCOLOGY | Facility: CLINIC | Age: 55
End: 2020-05-12

## 2020-05-12 NOTE — PROGRESS NOTES
Patient has requested a full copy of the report from [employer]. Copy sent to patient via email.    Benita Street RN, BSN  Neuro-Oncology Care Coordinator  [facility]

## 2020-05-12 NOTE — TELEPHONE ENCOUNTER
Oncology/Surgical Oncology Referral Request:     Specialty Requested: Medical Oncology Genetic testing    Referring Provider: Dr Ericka Avila MD    Referring Clinic/Organization: [facility]    Records location: [facility]     Requested Provider (if specified): Not Specified

## 2020-05-18 ENCOUNTER — VIRTUAL VISIT (OUTPATIENT)
Dept: ONCOLOGY | Facility: CLINIC | Age: 55
End: 2020-05-18
Attending: GENETIC COUNSELOR, MS
Payer: COMMERCIAL

## 2020-05-18 ENCOUNTER — PRE VISIT (OUTPATIENT)
Dept: ONCOLOGY | Facility: CLINIC | Age: 55
End: 2020-05-18

## 2020-05-18 DIAGNOSIS — C71.9 GLIOBLASTOMA (H): Primary | ICD-10-CM

## 2020-05-18 PROCEDURE — 96040 ZZH GENETIC COUNSELING, EACH 30 MINUTES: CPT | Mod: 95,ZF | Performed by: GENETIC COUNSELOR, MS

## 2020-05-18 NOTE — PROGRESS NOTES
"5/18/2020    Erasto Maher is a 55 year old male who is being evaluated via a billable video visit.      The patient has been notified of following:     \"This video visit will be conducted via a call between you and your physician/provider. We have found that certain health care needs can be provided without the need for an in-person physical exam.  This service lets us provide the care you need with a video conversation.  If a prescription is necessary we can send it directly to your pharmacy.  If lab work is needed we can place an order for that and you can then stop by our lab to have the test done at a later time.    Video visits are billed at different rates depending on your insurance coverage.  Please reach out to your insurance provider with any questions.    If during the course of the call the physician/provider feels a video visit is not appropriate, you will not be charged for this service.\"    Patient has given verbal consent for Video visit? Yes    Video-Visit Details    Type of service:  Video Visit    Video Start Time: 12:15 pm  Video End Time: 1:00 pm    Originating Location (pt. Location): Home    Distant Location (provider location):  [facility]     Platform used for Video Visit: [employer]    Time spent over video: 45 minutes    Delfina Bill MS, [facility]  Licensed Genetic Counselor  Office: [phone]      "

## 2020-05-18 NOTE — Clinical Note
"    5/18/2020         RE: Erasto aMher  [id number]  [facility] [address]        Dear Colleague,    Thank you for referring your patient, Erasto Maher, to the [facility]. Please see a copy of my visit note below.    5/18/2020    Erasto Maher is a 55 year old male who is being evaluated via a billable video visit.      The patient has been notified of following:     \"This video visit will be conducted via a call between you and your physician/provider. We have found that certain health care needs can be provided without the need for an in-person physical exam.  This service lets us provide the care you need with a video conversation.  If a prescription is necessary we can send it directly to your pharmacy.  If lab work is needed we can place an order for that and you can then stop by our lab to have the test done at a later time.    Video visits are billed at different rates depending on your insurance coverage.  Please reach out to your insurance provider with any questions.    If during the course of the call the physician/provider feels a video visit is not appropriate, you will not be charged for this service.\"    Patient has given verbal consent for Video visit? {YES-NO  Default Yes:4444::\"Yes\"}    How would you like to obtain your AVS? {AVS Preference:902178}    Patient would like the video invitation sent by: {video visit invitation:023264}    Will anyone else be joining your video visit? {:[id number]}  {If patient encounters technical issues they should call [phone] :535287}      Video-Visit Details    Type of service:  Video Visit    Video Start Time: {video visit start/end time:152948}  Video End Time: {video visit start/end time:152948}    Originating Location (pt. Location): {video visit patient location:030325::\"Home\"}    Distant Location (provider location):  [facility]     Platform used for Video Visit: {Virtual Visit " "Platforms:733892::\"Aliza\"}    {signature options:634865}          Referring Provider: ***    Presenting Information:   I met with Erasto Maher today for genetic counseling at the [facility] at the *** to discuss his personal and family history of *** cancer.  He is here today to review this history, cancer screening recommendations, and available genetic testing options.    Personal History:  Erasto is a 55 year old male. He was diagnosed with ***; treatment included ***  / does not have any personal history of cancer.    ***He had her first menstrual period at age ***, her first child at age ***, and is ***.  ***Erasto has her ovaries, fallopian tubes and uterus in place, and she has had *** ovarian cancer screening to date. She reports that she *** hormone replacement therapy.      She has *** clinical breast exams and mammograms; her most recent mammogram in *** was ***. *** Erasto began having colonoscopies at the age of ***/Erasto has not had a colonoscopy. His most recent colonoscopy in *** was *** and follow-up was recommended in ***. *** He does not regularly do any other cancer screening at this time. Erasto reported *** tobacco use and *** alcohol use.    Family History: (Please see scanned pedigree for detailed family history information)    ***    ***    His maternal ethnicity is ***. His paternal ethnicity is ***.  There is no known Ashkenazi Rastafari ancestry on either side of his family. There is no reported consanguinity.    Discussion:    Erasto's personal and family history of *** is suggestive of a hereditary cancer syndrome.    We reviewed the features of sporadic, familial, and hereditary cancers. In looking at Erasto's family history, it is possible that a cancer susceptibility gene is present due to ***.    We discussed the natural history and genetics of ***. A detailed handout regarding *** and the information we discussed was provided to Erasto at the end of our " appointment today and can be found in the after visit summary. Topics included: inheritance pattern, cancer risks, cancer screening recommendations, and also risks, benefits and limitations of testing.    ***    Based on his personal and family history, Erasto meets current National Comprehensive Cancer Network (NCCN) criteria for genetic testing of ***.      We discussed that there are additional genes that could cause increased risk for *** cancer. As many of these genes present with overlapping features in a family and accurate cancer risk cannot always be established based upon the pedigree analysis alone, it would be reasonable for Erasto to consider panel genetic testing to analyze multiple genes at once.    ***    Genetic testing is available for: ***.      Medical Management: For Erasto, we reviewed that the information from genetic testing may determine:    ***surgery to treat Erasto's active cancer diagnosis (i.e. lumpectomy versus bilateral mastectomy, partial versus total colectomy, etc.***),    additional cancer screening for which Erasto may qualify (i.e. mammogram and breast MRI, more frequent colonoscopies, more frequent dermatologic exams, etc.***),    options for risk reducing surgeries Erasto could consider (i.e. bilateral mastectomy, surgery to remove her ovaries and/or uterus, etc.***),      and targeted chemotherapies for Erasto's *** active cancer, or ***if he were to develop certain cancers in the future (i.e. immunotherapy for individuals with Camp syndrome, PARP inhibitors, etc.***).     These recommendations and possible targeted chemotherapies will be discussed in detail once genetic testing is completed.     Plan:  1) Today Erasto elected to proceed with ***.  2) This information should be available in 4-6*** weeks.  3) Erasto will return to clinic to discuss the results.    Face to face time: *** minutes    ***         Again, thank you for allowing me to participate in the care of your  patient.        Sincerely,        Delfina Bill GC

## 2020-05-19 ENCOUNTER — DOCUMENTATION ONLY (OUTPATIENT)
Dept: ONCOLOGY | Facility: CLINIC | Age: 55
End: 2020-05-19

## 2020-05-19 NOTE — PROGRESS NOTES
Lab orders printed and faxed to [facility] lab, fax # [phone] per request of [facility].  Successful fax transmission was verified via rightfax.    Babs Belle CMA

## 2020-06-04 NOTE — PATIENT INSTRUCTIONS
Imaging reviewed and no concerning findings, very positive response to treatment.     Labs at goal for next cycle of chemotherapy.     Cycle 2 of Lomustine to start on 6/11.   Labs week of 6/29, 7/13 (local lab).    Return to clinic on 7/20 + imaging and labs.     Ericka Avila MD  Neuro-oncology  6/5/2020

## 2020-06-04 NOTE — PROGRESS NOTES
"NEURO-ONCOLOGY VISIT  Jun 5, 2020    CHIEF COMPLAINT: Mr. 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 He completed chemoradiotherapy on 8/30/2019 and was managed on a clinical trial receiving atezolizumab (anti-PDL1) plus adjuvant temozolomide. He last received an immunotherapy treatment on 3/16/20 when imaging on 3/28/20 showed progression of disease. He underwent a repeat craniotomy for tumor resection with Dr. Chan on 4/9/2020 with placement of GammaTiles. Aristeo is currently managed on lomustine monotherapy.     I met with Aristeo in the clinic and Judith (wife) on the phone today for this follow-up visit.       HISTORY OF PRESENT ILLNESS  -Aristeo states that he is doing well today.   -Tolerated his last cycle of lomustine well; denied nausea.  -Fatigue is mild; playing golf about 3-4x a week plus working out/ intense cardio. Feels bout of \"heavy tiredness\". No associated nausea, no SOB, no significant increase in weakness, no confusion/ decreased alertness, no aphasia.   -Continued mild issues with proprioception/ spatial awareness with worsening coordination and dexterity, however, continues to improve. Strength is good.   -Off dexamethasone.  -No recurrent seizure events.   -Mood is good on Lexapro.   -Evaluated by genetic counseling and declined additional testing.   -Taking Vitamin B supplement and Folic Acid supplement.     REVIEW OF SYSTEMS  A comprehensive ROS negative except as in HPI.      MEDICATIONS   Current Outpatient Medications   Medication Sig Dispense Refill     escitalopram (LEXAPRO) 10 MG tablet Take 0.5 tablets (5 mg) by mouth daily       fluticasone (FLONASE) 50 MCG/ACT nasal spray Spray 1 spray into both nostrils nightly as needed        levETIRAcetam (KEPPRA) 1000 MG tablet Take 1,000 mg by mouth 2 times daily        levothyroxine " (SYNTHROID/LEVOTHROID) 125 MCG tablet Take 125 mcg by mouth daily       loratadine (CLARITIN) 10 MG tablet Take 10 mg by mouth nightly as needed        olopatadine (PATANOL) 0.1 % ophthalmic solution Place 1-2 drops into both eyes daily as needed        omeprazole (PRILOSEC) 20 MG DR capsule        ondansetron (ZOFRAN) 4 MG tablet Take 1 tablet (4 mg) by mouth At Bedtime prior to lomustine. Can repeat every 8 hours as needed for nausea. 30 tablet 1     Psyllium 500 MG CAPS Take 4 capsules by mouth At Bedtime       senna-docusate (SENOKOT-S/PERICOLACE) 8.6-50 MG tablet Take 1 tablet by mouth 2 times daily 30 tablet 0     melatonin 5 MG tablet Take 5 mg by mouth nightly as needed        DRUG ALLERGIES   Allergies   Allergen Reactions     No Clinical Screening - See Comments Rash     Cats and dogs         ONCOLOGIC HISTORY  -PRESENTATION: New onset seizures; complex partial event with speech arrest and altered mental status lasting several minutes. Later had secondary generalization. Started Keppra.   -MR brain imaging with a ring enhancing lesion in the right parietal lobe.    -6/27/2019 SURGERY: Right temporal craniotomy for mass resection, gross total resection by Dr. Tam Barreto.  PATHOLOGY: Glioblastoma; IDH1 R132H wildtype, MGMT promoter unmethylated  -7/3/2019 CLINICAL TRIAL: Evaluated by Dr. Wilmer Mckenna at [facility], consented for clinical trial 5835-0134 (Standard of Care plus atezolizumab).  -7/22 - 8/30/2019 CHEMORADS: 60cGy with concurrent temozolomide 75mg/m2/day (145mg) plus atezolizumab Q2 weeks on clinical trial [id number].  -9/6/2019 NEURO-ONC: [facility].   -9/25/2019 - 3/16/2020 CHEMO: Adjuvant chemotherapy; temozolomide + atezolizumab 840 mg IV on clinical trial [id number].  -4/6/2020 NEURO-ONC: Communicated with Dr. Borrero. Plan for surgery and next steps pending pathology results.   -4/9/2020 SURGERY + RADS: Repeat craniotomy for surgical debulking plus placement of  "GammaTiles.  PATHOLOGY: Recurrent glioblastoma.   Next generation sequencing: Microsatellite instability: Stable. Tumor mutational burden: 4 (Low). APC, CDK4, MDM2, PTEN mutated. ARID1A, ASXL1, ATRX, DNMT3A, FANCE, KDM5C, MED12, MEF2B, NF1, RUNX1, TERT mutated. PD-L1 Negative.  -4/20/2020 NEURO-ONC/ CHEMO: Starting Lomustine 3 weeks post-op. Consented for next generation sequencing and sending most recent tumor sample.    -4/30/2020 CHEMO: Lomustine, cycle 1.   -5/11/2020 NEURO-ONC: Clinically well. Referral to genetic counseling.   -6/5/2020 NEURO-ONC/ MRB/ CHEMO: Clinically well. Imaging with a positive treatment response. Lomustine, cycle 2 (start date of 6/11).      SOCIAL HISTORY   Tobacco use: Never smoker.   Alcohol use: Social, infrequent.   Drug use: Denies marijuana use.  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   . 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       PHYSICAL EXAMINATION  /72 (BP Location: Right arm, Patient Position: Sitting, Cuff Size: Adult Regular)   Pulse 79   Temp 97.3  F (36.3  C) (Oral)   Resp 16   Ht 1.778 m (5' 10\")   Wt 75.2 kg (165 lb 11.2 oz)   SpO2 98%   BMI 23.78 kg/m     Wt Readings from Last 2 Encounters:   06/05/20 75.2 kg (165 lb 11.2 oz)   04/09/20 74.8 kg (165 lb)     Ht Readings from Last 2 Encounters:   06/05/20 1.778 m (5' 10\")   04/09/20 1.778 m (5' 10\")     -Generally well appearing.  -Respiratory: No audible wheezing.   -Skin: No facial rashes. Wearing a mask.   -Psychiatric: Normal mood and affect. Pleasant, talkative.  -Neurologic:   MENTAL STATUS:     Alert, oriented to date.    Recall: Intact.    Speech fluent.    Comprehension intact to multi-step commands.     CRANIAL NERVES:     Pupils are equal, round, reactive to light.     Extraocular movements full, patient denies diplopia.     Symmetric facial movements.   Hearing intact.   With normal phonation, no dysfunction of the palate or tongue.   MOTOR:               No pronation or drift. No orbiting.              " Able to rise from a chair without use of arms.              On toe/ heel walk, equal distance from floor to heels/ toes.   SENSATION:               Intact to light touch throughout.  COORDINATION:              Intact finger-nose with eyes open and closed.   GAIT:  Walks without assistance.              Good speed. Normal stride length and heel strike. Normal turns. Normal arm swing.              Able to toe, heel walk. Able to tandem walk.         MEDICAL RECORDS  Obtained and personally reviewed all available outside medical records in addition to reviewing any records available in our electronic system.     LABS  Personally reviewed all available lab results.     IMAGING  Personally reviewed MR brain imaging from today and compared to prior imaging. To my eye, there is a decrease in associated T2 FLAIR about the resection cavity. There is a thin rim of contrast enhancement about the cavity with an area of thickening in the medial aspect that does not have an increase in perfusion.     Imaging was shown to and results were reviewed with Aristeo and Judith.    Imaging and case will be reviewed and discussed at Brain Tumor Conference.        IMPRESSION  Clinic time was spent discussing in detail the nature of this tumor, providing emotional support, answering questions, and providing local resources.     Aristeo remains clinically stable with no new complaints. He tolerated the first cycle of lomustine well.     Imaging with positive treatment response.     Will continue with cycle 2 of lomustine, due to start on 6/11. Will continue with lab monitoring the week of 6/29 and 7/13 and repeat imaging in 6 weeks.    Based on the results of his next generation sequencing, at disease recurrence, everolimus could be a treatment option. Clinical trials are also a potential option.     Evaluated by genetic counseling given the mutation found in APC or adenomatous polyposis coli, which is a key tumor suppressor gene that encodes for  a large multi-domain protein to assess personal/ children's risk for colon cancer. Additional testing was declined and Aristeo already adheres to close colon cancer screening.     PROBLEM LIST  Glioblastoma, MGMT unmethylated and IDH1 wildtype by IHC  Seizure  Chemotherapy-associated constipation  Headaches  Apraxia    PLAN  -CANCER-DIRECTED THERAPY-  -As above; Lomustine, cycle 2 to start on 6/11 as labs from today are at goal for next cycle of chemotherapy.   -Labs the week of 6/29 and 7/13.  -Repeat MR brain imaging in 6 weeks.   -Not interested in Zopim at this time.   -Previously provided information on the [facility] Tumor Network for clinical trial options.     -STEROIDS-  -Off dexamethasone.    -SEIZURE MANAGEMENT-  -Given history of seizures, will continue current antiepileptic regimen of Keppra for the foreseeable future.    -Quality of life/ MOOD/ FATIGUE-  -Denies any mood issues.   -Continue to monitor mood as untreated/ undertreated depression can worsen fatigue, dysorexia, and quality of life.    Return to clinic on 7/20 + imaging + labs.     Ericka Avila MD  Neuro-oncology

## 2020-06-05 ENCOUNTER — ANCILLARY PROCEDURE (OUTPATIENT)
Dept: MRI IMAGING | Facility: CLINIC | Age: 55
End: 2020-06-05
Attending: PSYCHIATRY & NEUROLOGY
Payer: COMMERCIAL

## 2020-06-05 ENCOUNTER — ONCOLOGY VISIT (OUTPATIENT)
Dept: ONCOLOGY | Facility: CLINIC | Age: 55
End: 2020-06-05
Attending: PSYCHIATRY & NEUROLOGY
Payer: COMMERCIAL

## 2020-06-05 VITALS
WEIGHT: 165.7 LBS | BODY MASS INDEX: 23.72 KG/M2 | RESPIRATION RATE: 16 BRPM | OXYGEN SATURATION: 98 % | HEART RATE: 79 BPM | SYSTOLIC BLOOD PRESSURE: 117 MMHG | TEMPERATURE: 97.3 F | HEIGHT: 70 IN | DIASTOLIC BLOOD PRESSURE: 72 MMHG

## 2020-06-05 DIAGNOSIS — D70.1 CHEMOTHERAPY INDUCED NEUTROPENIA (H): ICD-10-CM

## 2020-06-05 DIAGNOSIS — Z51.11 CHEMOTHERAPY MANAGEMENT, ENCOUNTER FOR: ICD-10-CM

## 2020-06-05 DIAGNOSIS — R11.2 CHEMOTHERAPY-INDUCED NAUSEA AND VOMITING: ICD-10-CM

## 2020-06-05 DIAGNOSIS — C71.9 GLIOBLASTOMA (H): Primary | ICD-10-CM

## 2020-06-05 DIAGNOSIS — C71.9 GLIOBLASTOMA (H): ICD-10-CM

## 2020-06-05 DIAGNOSIS — T45.1X5A CHEMOTHERAPY INDUCED NEUTROPENIA (H): ICD-10-CM

## 2020-06-05 DIAGNOSIS — T45.1X5A CHEMOTHERAPY-INDUCED NAUSEA AND VOMITING: ICD-10-CM

## 2020-06-05 LAB
ALBUMIN SERPL-MCNC: 4.1 G/DL (ref 3.4–5)
ALP SERPL-CCNC: 63 U/L (ref 40–150)
ALT SERPL W P-5'-P-CCNC: 28 U/L (ref 0–70)
ANION GAP SERPL CALCULATED.3IONS-SCNC: 5 MMOL/L (ref 3–14)
AST SERPL W P-5'-P-CCNC: 18 U/L (ref 0–45)
BASOPHILS # BLD AUTO: 0 10E9/L (ref 0–0.2)
BASOPHILS NFR BLD AUTO: 0.6 %
BILIRUB SERPL-MCNC: 0.7 MG/DL (ref 0.2–1.3)
BUN SERPL-MCNC: 14 MG/DL (ref 7–30)
CALCIUM SERPL-MCNC: 8.9 MG/DL (ref 8.5–10.1)
CHLORIDE SERPL-SCNC: 101 MMOL/L (ref 94–109)
CO2 SERPL-SCNC: 29 MMOL/L (ref 20–32)
CREAT SERPL-MCNC: 0.87 MG/DL (ref 0.66–1.25)
DIFFERENTIAL METHOD BLD: ABNORMAL
EOSINOPHIL # BLD AUTO: 0.2 10E9/L (ref 0–0.7)
EOSINOPHIL NFR BLD AUTO: 4.5 %
ERYTHROCYTE [DISTWIDTH] IN BLOOD BY AUTOMATED COUNT: 13.5 % (ref 10–15)
GFR SERPL CREATININE-BSD FRML MDRD: >90 ML/MIN/{1.73_M2}
GLUCOSE SERPL-MCNC: 118 MG/DL (ref 70–99)
HCT VFR BLD AUTO: 38.8 % (ref 40–53)
HGB BLD-MCNC: 13.1 G/DL (ref 13.3–17.7)
IMM GRANULOCYTES # BLD: 0 10E9/L (ref 0–0.4)
IMM GRANULOCYTES NFR BLD: 0 %
LYMPHOCYTES # BLD AUTO: 0.9 10E9/L (ref 0.8–5.3)
LYMPHOCYTES NFR BLD AUTO: 26.6 %
MCH RBC QN AUTO: 31 PG (ref 26.5–33)
MCHC RBC AUTO-ENTMCNC: 33.8 G/DL (ref 31.5–36.5)
MCV RBC AUTO: 92 FL (ref 78–100)
MONOCYTES # BLD AUTO: 0.2 10E9/L (ref 0–1.3)
MONOCYTES NFR BLD AUTO: 6.9 %
NEUTROPHILS # BLD AUTO: 2.1 10E9/L (ref 1.6–8.3)
NEUTROPHILS NFR BLD AUTO: 61.4 %
NRBC # BLD AUTO: 0 10*3/UL
NRBC BLD AUTO-RTO: 0 /100
PLATELET # BLD AUTO: 143 10E9/L (ref 150–450)
POTASSIUM SERPL-SCNC: 4.6 MMOL/L (ref 3.4–5.3)
PROT SERPL-MCNC: 7.2 G/DL (ref 6.8–8.8)
RBC # BLD AUTO: 4.23 10E12/L (ref 4.4–5.9)
SODIUM SERPL-SCNC: 135 MMOL/L (ref 133–144)
WBC # BLD AUTO: 3.4 10E9/L (ref 4–11)

## 2020-06-05 PROCEDURE — 36415 COLL VENOUS BLD VENIPUNCTURE: CPT | Performed by: PSYCHIATRY & NEUROLOGY

## 2020-06-05 PROCEDURE — G0463 HOSPITAL OUTPT CLINIC VISIT: HCPCS | Mod: ZF

## 2020-06-05 PROCEDURE — 99214 OFFICE O/P EST MOD 30 MIN: CPT | Mod: ZP | Performed by: PSYCHIATRY & NEUROLOGY

## 2020-06-05 PROCEDURE — 80053 COMPREHEN METABOLIC PANEL: CPT | Performed by: PSYCHIATRY & NEUROLOGY

## 2020-06-05 PROCEDURE — 85025 COMPLETE CBC W/AUTO DIFF WBC: CPT | Performed by: PSYCHIATRY & NEUROLOGY

## 2020-06-05 RX ORDER — GADOBUTROL 604.72 MG/ML
7.5 INJECTION INTRAVENOUS ONCE
Status: COMPLETED | OUTPATIENT
Start: 2020-06-05 | End: 2020-06-05

## 2020-06-05 RX ADMIN — GADOBUTROL 7.5 ML: 604.72 INJECTION INTRAVENOUS at 14:20

## 2020-06-05 ASSESSMENT — PAIN SCALES - GENERAL: PAINLEVEL: NO PAIN (0)

## 2020-06-05 ASSESSMENT — MIFFLIN-ST. JEOR: SCORE: 1582.86

## 2020-06-05 NOTE — NURSING NOTE
"Oncology Rooming Note    June 5, 2020 3:30 PM   Erasto Maher is a 55 year old male who presents for:    Chief Complaint   Patient presents with     Oncology Clinic Visit     Return: Glioblastoma      Initial Vitals: /72 (BP Location: Right arm, Patient Position: Sitting, Cuff Size: Adult Regular)   Pulse 79   Temp 97.3  F (36.3  C) (Oral)   Resp 16   Ht 1.778 m (5' 10\")   Wt 75.2 kg (165 lb 11.2 oz)   SpO2 98%   BMI 23.78 kg/m   Estimated body mass index is 23.78 kg/m  as calculated from the following:    Height as of this encounter: 1.778 m (5' 10\").    Weight as of this encounter: 75.2 kg (165 lb 11.2 oz). Body surface area is 1.93 meters squared.  No Pain (0) Comment: Data Unavailable   No LMP for male patient.  Allergies reviewed: Yes  Medications reviewed: Yes    Medications: Medication refills not needed today.  Pharmacy name entered into [employer]:    [facility] - [place], [place] - [address] [id number] - [place], [place] - [address] [facility]  [facility] - [place], [place] - [address]    Clinical concerns: N/A        Diane De Jesus CMA              "

## 2020-06-05 NOTE — DISCHARGE INSTRUCTIONS
MRI Contrast Discharge Instructions    The IV contrast you received today will pass out of your body in your  urine. This will happen in the next 24 hours. You will not feel this process.  Your urine will not change color.    Drink at least 4 extra glasses of water or juice today (unless your doctor  has restricted your fluids). This reduces the stress on your kidneys.  You may take your regular medicines.    If you are on dialysis: It is best to have dialysis today.    If you have a reaction: Most reactions happen right away. If you have  any new symptoms after leaving the hospital (such as hives or swelling),  call your hospital at the correct number below. Or call your family doctor.  If you have breathing distress or wheezing, call 911.    Special instructions: ***    I have read and understand the above information.    Signature:______________________________________ Date:___________    Staff:__________________________________________ Date:___________     Time:__________    [facility] Departments:    ___[place]: [phone]  ___[facility]: [phone]  ___[place]: [phone] ___[facility]: [phone]  ___[facility]: [phone]  ___[facility]: [phone]  ___[phone]  ___[facility]: [phone]  ___[phone]

## 2020-06-05 NOTE — LETTER
"    6/5/2020         RE: Erasto Maher  [address]  [facility] [address]        Dear Colleague,    Thank you for referring your patient, Erasto Maher, to the [facility]. Please see a copy of my visit note below.    NEURO-ONCOLOGY VISIT  Jun 5, 2020    CHIEF COMPLAINT: Mr. 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 He completed chemoradiotherapy on 8/30/2019 and was managed on a clinical trial receiving atezolizumab (anti-PDL1) plus adjuvant temozolomide. He last received an immunotherapy treatment on 3/16/20 when imaging on 3/28/20 showed progression of disease. He underwent a repeat craniotomy for tumor resection with Dr. Chan on 4/9/2020 with placement of GammaTiles. Airsteo is currently managed on lomustine monotherapy.     I met with Aristeo in the clinic and Judith (wife) on the phone today for this follow-up visit.       HISTORY OF PRESENT ILLNESS  -Aristeo states that he is doing well today.   -Tolerated his last cycle of lomustine well; denied nausea.  -Fatigue is mild; playing golf about 3-4x a week plus working out/ intense cardio. Feels bout of \"heavy tiredness\". No associated nausea, no SOB, no significant increase in weakness, no confusion/ decreased alertness, no aphasia.   -Continued mild issues with proprioception/ spatial awareness with worsening coordination and dexterity, however, continues to improve. Strength is good.   -Off dexamethasone.  -No recurrent seizure events.   -Mood is good on Lexapro.   -Evaluated by genetic counseling and declined additional testing.   -Taking Vitamin B supplement and Folic Acid supplement.     REVIEW OF SYSTEMS  A comprehensive ROS negative except as in HPI.      MEDICATIONS   Current Outpatient Medications   Medication Sig Dispense Refill     escitalopram (LEXAPRO) 10 MG " tablet Take 0.5 tablets (5 mg) by mouth daily       fluticasone (FLONASE) 50 MCG/ACT nasal spray Spray 1 spray into both nostrils nightly as needed        levETIRAcetam (KEPPRA) 1000 MG tablet Take 1,000 mg by mouth 2 times daily        levothyroxine (SYNTHROID/LEVOTHROID) 125 MCG tablet Take 125 mcg by mouth daily       loratadine (CLARITIN) 10 MG tablet Take 10 mg by mouth nightly as needed        olopatadine (PATANOL) 0.1 % ophthalmic solution Place 1-2 drops into both eyes daily as needed        omeprazole (PRILOSEC) 20 MG DR capsule        ondansetron (ZOFRAN) 4 MG tablet Take 1 tablet (4 mg) by mouth At Bedtime prior to lomustine. Can repeat every 8 hours as needed for nausea. 30 tablet 1     Psyllium 500 MG CAPS Take 4 capsules by mouth At Bedtime       senna-docusate (SENOKOT-S/PERICOLACE) 8.6-50 MG tablet Take 1 tablet by mouth 2 times daily 30 tablet 0     melatonin 5 MG tablet Take 5 mg by mouth nightly as needed        DRUG ALLERGIES   Allergies   Allergen Reactions     No Clinical Screening - See Comments Rash     Cats and dogs         ONCOLOGIC HISTORY  -PRESENTATION: New onset seizures; complex partial event with speech arrest and altered mental status lasting several minutes. Later had secondary generalization. Started Keppra.   -MR brain imaging with a ring enhancing lesion in the right parietal lobe.    -6/27/2019 SURGERY: Right temporal craniotomy for mass resection, gross total resection by Dr. Tam Barreto.  PATHOLOGY: Glioblastoma; IDH1 R132H wildtype, MGMT promoter unmethylated  -7/3/2019 CLINICAL TRIAL: Evaluated by Dr. Wilmer Mckenna at [facility], consented for clinical trial [id number] (Standard of Care plus atezolizumab).  -7/22 - 8/30/2019 CHEMORADS: 60cGy with concurrent temozolomide 75mg/m2/day (145mg) plus atezolizumab Q2 weeks on clinical trial [id number].  -9/6/2019 NEURO-ONC: Evaluated at the [facility].   -9/25/2019 - 3/16/2020 CHEMO: Adjuvant chemotherapy; temozolomide + atezolizumab  "840 mg IV on clinical trial 1365-1031.  -4/6/2020 NEURO-ONC: Communicated with Dr. Borrero. Plan for surgery and next steps pending pathology results.   -4/9/2020 SURGERY + RADS: Repeat craniotomy for surgical debulking plus placement of GammaTiles.  PATHOLOGY: Recurrent glioblastoma.   Next generation sequencing: Microsatellite instability: Stable. Tumor mutational burden: 4 (Low). APC, CDK4, MDM2, PTEN mutated. ARID1A, ASXL1, ATRX, DNMT3A, FANCE, KDM5C, MED12, MEF2B, NF1, RUNX1, TERT mutated. PD-L1 Negative.  -4/20/2020 NEURO-ONC/ CHEMO: Starting Lomustine 3 weeks post-op. Consented for next generation sequencing and sending most recent tumor sample.    -4/30/2020 CHEMO: Lomustine, cycle 1.   -5/11/2020 NEURO-ONC: Clinically well. Referral to genetic counseling.   -6/5/2020 NEURO-ONC/ MRB/ CHEMO: Clinically well. Imaging with a positive treatment response. Lomustine, cycle 2 (start date of 6/11).      SOCIAL HISTORY   Tobacco use: Never smoker.   Alcohol use: Social, infrequent.   Drug use: Denies marijuana use.  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       PHYSICAL EXAMINATION  /72 (BP Location: Right arm, Patient Position: Sitting, Cuff Size: Adult Regular)   Pulse 79   Temp 97.3  F (36.3  C) (Oral)   Resp 16   Ht 1.778 m (5' 10\")   Wt 75.2 kg (165 lb 11.2 oz)   SpO2 98%   BMI 23.78 kg/m     Wt Readings from Last 2 Encounters:   06/05/20 75.2 kg (165 lb 11.2 oz)   04/09/20 74.8 kg (165 lb)     Ht Readings from Last 2 Encounters:   06/05/20 1.778 m (5' 10\")   04/09/20 1.778 m (5' 10\")     -Generally well appearing.  -Respiratory: No audible wheezing.   -Skin: No facial rashes. Wearing a mask.   -Psychiatric: Normal mood and affect. Pleasant, talkative.  -Neurologic:   MENTAL STATUS:     Alert, oriented to date.    Recall: Intact.    Speech fluent.    Comprehension intact to multi-step commands.     CRANIAL NERVES:     Pupils are equal, round, reactive to light.  "    Extraocular movements full, patient denies diplopia.     Symmetric facial movements.   Hearing intact.   With normal phonation, no dysfunction of the palate or tongue.   MOTOR:               No pronation or drift. No orbiting.              Able to rise from a chair without use of arms.              On toe/ heel walk, equal distance from floor to heels/ toes.   SENSATION:               Intact to light touch throughout.  COORDINATION:              Intact finger-nose with eyes open and closed.   GAIT:  Walks without assistance.              Good speed. Normal stride length and heel strike. Normal turns. Normal arm swing.              Able to toe, heel walk. Able to tandem walk.         MEDICAL RECORDS  Obtained and personally reviewed all available outside medical records in addition to reviewing any records available in our electronic system.     LABS  Personally reviewed all available lab results.     IMAGING  Personally reviewed MR brain imaging from today and compared to prior imaging. To my eye, there is a decrease in associated T2 FLAIR about the resection cavity. There is a thin rim of contrast enhancement about the cavity with an area of thickening in the medial aspect that does not have an increase in perfusion.     Imaging was shown to and results were reviewed with Aristeo and Judith.    Imaging and case will be reviewed and discussed at Brain Tumor Conference.        IMPRESSION  Clinic time was spent discussing in detail the nature of this tumor, providing emotional support, answering questions, and providing local resources.     Aristeo remains clinically stable with no new complaints. He tolerated the first cycle of lomustine well.     Imaging with positive treatment response.     Will continue with cycle 2 of lomustine, due to start on 6/11. Will continue with lab monitoring the week of 6/29 and 7/13 and repeat imaging in 6 weeks.    Based on the results of his next generation sequencing, at disease  recurrence, everolimus could be a treatment option. Clinical trials are also a potential option.     Evaluated by genetic counseling given the mutation found in APC or adenomatous polyposis coli, which is a key tumor suppressor gene that encodes for a large multi-domain protein to assess personal/ children's risk for colon cancer. Additional testing was declined and Aristeo already adheres to close colon cancer screening.     PROBLEM LIST  Glioblastoma, MGMT unmethylated and IDH1 wildtype by IHC  Seizure  Chemotherapy-associated constipation  Headaches  Apraxia    PLAN  -CANCER-DIRECTED THERAPY-  -As above; Lomustine, cycle 2 to start on 6/11 as labs from today are at goal for next cycle of chemotherapy.   -Labs the week of 6/29 and 7/13.  -Repeat MR brain imaging in 6 weeks.   -Not interested in Optune at this time.   -Previously provided information on the Brain Tumor Network for clinical trial options.     -STEROIDS-  -Off dexamethasone.    -SEIZURE MANAGEMENT-  -Given history of seizures, will continue current antiepileptic regimen of Keppra for the foreseeable future.    -Quality of life/ MOOD/ FATIGUE-  -Denies any mood issues.   -Continue to monitor mood as untreated/ undertreated depression can worsen fatigue, dysorexia, and quality of life.    Return to clinic on 7/20 + imaging + labs.     Ericka Avila MD  Neuro-oncology       Again, thank you for allowing me to participate in the care of your patient.        Sincerely,        Ericka Avila MD

## 2020-06-08 ENCOUNTER — TUMOR CONFERENCE (OUTPATIENT)
Dept: ONCOLOGY | Facility: CLINIC | Age: 55
End: 2020-06-08
Payer: COMMERCIAL

## 2020-06-09 ENCOUNTER — ANCILLARY PROCEDURE (OUTPATIENT)
Dept: CT IMAGING | Facility: CLINIC | Age: 55
End: 2020-06-09
Attending: NEUROLOGICAL SURGERY
Payer: COMMERCIAL

## 2020-06-18 ENCOUNTER — TELEPHONE (OUTPATIENT)
Dept: ONCOLOGY | Facility: CLINIC | Age: 55
End: 2020-06-18

## 2020-06-18 NOTE — TELEPHONE ENCOUNTER
Oral Chemotherapy Monitoring Program    Placed call to patient's spouse in follow up of Ariseto's Lomustine therapy.    Left message to please call back in follow up of therapy. No patient or drug names were mentioned.    Hilda Montes  Pharmacy Intern  [facility]  [phone]

## 2020-06-18 NOTE — ORAL ONC MGMT
"Oral Chemotherapy Monitoring Program     Primary Oncologist: Dr. Avila  Primary Oncology Clinic: [facility]  Cancer Diagnosis: Glioblastoma     Therapy: Lomustine 200mg x1 dose every 6 weeks.     Subjective/Objective:  Erasto Maher is a 55 year old male contacted by phone for a follow-up visit for oral chemotherapy.  I spoke with Aristeo Wong's wife. Aristeo had his most recent Lomustine dose on 6/11. She reports that Aristeo has some fatigue but it is not limiting his normal activities. Aristeo has not started any new medications/OTCs or supplements.   ORAL CHEMOTHERAPY 4/20/2020 6/18/2020   Drug Name (No Data) (No Data)   Current Dosage 200mg 200mg   Current Schedule Daily -   Cycle Details Other Other   Start Date of Last Cycle (No Data) 6/11/2020   Planned next cycle start date 4/30/2020 -   Doses missed in last 2 weeks - 0   Adherence Assessment - Adherent   Adverse Effects - Fatigue   Any new drug interactions? No -   Is the dose as ordered appropriate for the patient? Yes -       Last PHQ-2 Score on record:   PHQ-2 ( 1999 [employer]) 4/3/2020   Q1: Little interest or pleasure in doing things 0   Q2: Feeling down, depressed or hopeless 1   PHQ-2 Score 1     Vitals:  BP:   BP Readings from Last 1 Encounters:   06/05/20 117/72     Wt Readings from Last 1 Encounters:   06/05/20 75.2 kg (165 lb 11.2 oz)     Estimated body surface area is 1.93 meters squared as calculated from the following:    Height as of 6/5/20: 1.778 m (5' 10\").    Weight as of 6/5/20: 75.2 kg (165 lb 11.2 oz).    Labs:  _  Result Component Current Result Ref Range   Sodium 135 (6/5/2020) 133 - 144 mmol/L     _  Result Component Current Result Ref Range   Potassium 4.6 (6/5/2020) 3.4 - 5.3 mmol/L     _  Result Component Current Result Ref Range   Calcium 8.9 (6/5/2020) 8.5 - 10.1 mg/dL     No results found for Mag within last 30 days.     No results found for Phos within last 30 days.     _  Result Component Current Result Ref Range "   Albumin 4.1 (6/5/2020) 3.4 - 5.0 g/dL     _  Result Component Current Result Ref Range   Urea Nitrogen 14 (6/5/2020) 7 - 30 mg/dL     _  Result Component Current Result Ref Range   Creatinine 0.87 (6/5/2020) 0.66 - 1.25 mg/dL       _  Result Component Current Result Ref Range   AST 18 (6/5/2020) 0 - 45 U/L     _  Result Component Current Result Ref Range   ALT 28 (6/5/2020) 0 - 70 U/L     _  Result Component Current Result Ref Range   Bilirubin Total 0.7 (6/5/2020) 0.2 - 1.3 mg/dL       _  Result Component Current Result Ref Range   WBC 3.4 (L) (6/5/2020) 4.0 - 11.0 10e9/L     _  Result Component Current Result Ref Range   Hemoglobin 13.1 (L) (6/5/2020) 13.3 - 17.7 g/dL     _  Result Component Current Result Ref Range   Platelet Count 143 (L) (6/5/2020) 150 - 450 10e9/L     _  Result Component Current Result Ref Range   Absolute Neutrophil 2.1 (6/5/2020) 1.6 - 8.3 10e9/L       Assessment:  Aristeo is doing well on Lomustine therapy and only reports some fatigue. He is able to cut the grass and go golfing per his wife Judith.     Plan:  Continue Lomustine one dose every 6 weeks.     Follow-Up:  7/20 Dr. Avila visit.    Refill Due:  7/20 for 7/23    Oral Chemotherapy Monitoring Program   Austin Richey PharmD  [facility]  [phone]  June 18, 2020

## 2020-07-20 ENCOUNTER — ONCOLOGY VISIT (OUTPATIENT)
Dept: ONCOLOGY | Facility: CLINIC | Age: 55
End: 2020-07-20
Attending: PSYCHIATRY & NEUROLOGY
Payer: COMMERCIAL

## 2020-07-20 ENCOUNTER — HOSPITAL ENCOUNTER (OUTPATIENT)
Dept: MRI IMAGING | Facility: CLINIC | Age: 55
End: 2020-07-20
Attending: PSYCHIATRY & NEUROLOGY
Payer: COMMERCIAL

## 2020-07-20 ENCOUNTER — APPOINTMENT (OUTPATIENT)
Dept: LAB | Facility: CLINIC | Age: 55
End: 2020-07-20
Attending: PSYCHIATRY & NEUROLOGY
Payer: COMMERCIAL

## 2020-07-20 VITALS
BODY MASS INDEX: 23.96 KG/M2 | OXYGEN SATURATION: 100 % | RESPIRATION RATE: 16 BRPM | WEIGHT: 167.4 LBS | HEART RATE: 70 BPM | SYSTOLIC BLOOD PRESSURE: 123 MMHG | DIASTOLIC BLOOD PRESSURE: 89 MMHG | TEMPERATURE: 97.8 F | HEIGHT: 70 IN

## 2020-07-20 DIAGNOSIS — Z51.11 CHEMOTHERAPY MANAGEMENT, ENCOUNTER FOR: ICD-10-CM

## 2020-07-20 DIAGNOSIS — D70.1 CHEMOTHERAPY INDUCED NEUTROPENIA (H): ICD-10-CM

## 2020-07-20 DIAGNOSIS — C71.9 GLIOBLASTOMA (H): Primary | ICD-10-CM

## 2020-07-20 DIAGNOSIS — T45.1X5A CHEMOTHERAPY INDUCED NEUTROPENIA (H): ICD-10-CM

## 2020-07-20 DIAGNOSIS — C71.9 GLIOBLASTOMA (H): ICD-10-CM

## 2020-07-20 DIAGNOSIS — T45.1X5A CHEMOTHERAPY-INDUCED NAUSEA AND VOMITING: ICD-10-CM

## 2020-07-20 DIAGNOSIS — R11.2 CHEMOTHERAPY-INDUCED NAUSEA AND VOMITING: ICD-10-CM

## 2020-07-20 LAB
ALBUMIN SERPL-MCNC: 4 G/DL (ref 3.4–5)
ALP SERPL-CCNC: 60 U/L (ref 40–150)
ALT SERPL W P-5'-P-CCNC: 28 U/L (ref 0–70)
ANION GAP SERPL CALCULATED.3IONS-SCNC: 2 MMOL/L (ref 3–14)
AST SERPL W P-5'-P-CCNC: 18 U/L (ref 0–45)
BASOPHILS # BLD AUTO: 0 10E9/L (ref 0–0.2)
BASOPHILS NFR BLD AUTO: 1.1 %
BILIRUB SERPL-MCNC: 0.6 MG/DL (ref 0.2–1.3)
BUN SERPL-MCNC: 12 MG/DL (ref 7–30)
CALCIUM SERPL-MCNC: 8.8 MG/DL (ref 8.5–10.1)
CHLORIDE SERPL-SCNC: 105 MMOL/L (ref 94–109)
CO2 SERPL-SCNC: 30 MMOL/L (ref 20–32)
CREAT SERPL-MCNC: 0.8 MG/DL (ref 0.66–1.25)
DIFFERENTIAL METHOD BLD: ABNORMAL
EOSINOPHIL # BLD AUTO: 0.2 10E9/L (ref 0–0.7)
EOSINOPHIL NFR BLD AUTO: 5.3 %
ERYTHROCYTE [DISTWIDTH] IN BLOOD BY AUTOMATED COUNT: 14.1 % (ref 10–15)
GFR SERPL CREATININE-BSD FRML MDRD: >90 ML/MIN/{1.73_M2}
GLUCOSE SERPL-MCNC: 88 MG/DL (ref 70–99)
HCT VFR BLD AUTO: 39.3 % (ref 40–53)
HGB BLD-MCNC: 13.6 G/DL (ref 13.3–17.7)
IMM GRANULOCYTES # BLD: 0 10E9/L (ref 0–0.4)
IMM GRANULOCYTES NFR BLD: 0.3 %
LYMPHOCYTES # BLD AUTO: 0.9 10E9/L (ref 0.8–5.3)
LYMPHOCYTES NFR BLD AUTO: 25.8 %
MCH RBC QN AUTO: 32.6 PG (ref 26.5–33)
MCHC RBC AUTO-ENTMCNC: 34.6 G/DL (ref 31.5–36.5)
MCV RBC AUTO: 94 FL (ref 78–100)
MONOCYTES # BLD AUTO: 0.4 10E9/L (ref 0–1.3)
MONOCYTES NFR BLD AUTO: 9.7 %
NEUTROPHILS # BLD AUTO: 2.1 10E9/L (ref 1.6–8.3)
NEUTROPHILS NFR BLD AUTO: 57.8 %
NRBC # BLD AUTO: 0 10*3/UL
NRBC BLD AUTO-RTO: 0 /100
PLATELET # BLD AUTO: 151 10E9/L (ref 150–450)
POTASSIUM SERPL-SCNC: 4.1 MMOL/L (ref 3.4–5.3)
PROT SERPL-MCNC: 6.9 G/DL (ref 6.8–8.8)
RBC # BLD AUTO: 4.17 10E12/L (ref 4.4–5.9)
SODIUM SERPL-SCNC: 137 MMOL/L (ref 133–144)
WBC # BLD AUTO: 3.6 10E9/L (ref 4–11)

## 2020-07-20 PROCEDURE — 25500064 ZZH RX 255 OP 636: Performed by: PSYCHIATRY & NEUROLOGY

## 2020-07-20 PROCEDURE — 99214 OFFICE O/P EST MOD 30 MIN: CPT | Mod: ZP | Performed by: PSYCHIATRY & NEUROLOGY

## 2020-07-20 PROCEDURE — 70553 MRI BRAIN STEM W/O & W/DYE: CPT

## 2020-07-20 PROCEDURE — 85025 COMPLETE CBC W/AUTO DIFF WBC: CPT | Performed by: PSYCHIATRY & NEUROLOGY

## 2020-07-20 PROCEDURE — A9585 GADOBUTROL INJECTION: HCPCS | Performed by: PSYCHIATRY & NEUROLOGY

## 2020-07-20 PROCEDURE — G0463 HOSPITAL OUTPT CLINIC VISIT: HCPCS | Mod: ZF

## 2020-07-20 PROCEDURE — 80053 COMPREHEN METABOLIC PANEL: CPT | Performed by: PSYCHIATRY & NEUROLOGY

## 2020-07-20 PROCEDURE — G0463 HOSPITAL OUTPT CLINIC VISIT: HCPCS | Mod: 25

## 2020-07-20 PROCEDURE — 36415 COLL VENOUS BLD VENIPUNCTURE: CPT

## 2020-07-20 RX ORDER — GADOBUTROL 604.72 MG/ML
7.5 INJECTION INTRAVENOUS ONCE
Status: COMPLETED | OUTPATIENT
Start: 2020-07-20 | End: 2020-07-20

## 2020-07-20 RX ORDER — FAMOTIDINE 20 MG/1
20 TABLET, FILM COATED ORAL
COMMUNITY
Start: 2020-07-07 | End: 2020-01-01

## 2020-07-20 RX ADMIN — GADOBUTROL 7 ML: 604.72 INJECTION INTRAVENOUS at 08:44

## 2020-07-20 ASSESSMENT — MIFFLIN-ST. JEOR: SCORE: 1590.57

## 2020-07-20 ASSESSMENT — PAIN SCALES - GENERAL: PAINLEVEL: NO PAIN (0)

## 2020-07-20 NOTE — PATIENT INSTRUCTIONS
Imaging reviewed and no concerning findings.     Labs at goal for next cycle of chemotherapy.     Cycle 3 of Lomustine to start on 7/23.   Labs week of 8/10, 8/24 (local lab).    I will touch base with Dr. Chan about the subdural fluid collection management.     Return to clinic on 8/31 + imaging and labs.     Ericka Avila MD  Neuro-oncology  07/20/20

## 2020-07-20 NOTE — NURSING NOTE
Chief Complaint   Patient presents with     Blood Draw     Labs drawn via  by RN in lab. VS taken.     Luci Monte RN

## 2020-07-20 NOTE — PROGRESS NOTES
"NEURO-ONCOLOGY VISIT  Jul 20, 2020    CHIEF COMPLAINT: Mr. 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 He completed chemoradiotherapy on 8/30/2019 and was managed on a clinical trial receiving atezolizumab (anti-PDL1) plus adjuvant temozolomide. He last received an immunotherapy treatment on 3/16/2020 when imaging on 3/28/2020 showed progression of disease. He underwent a repeat craniotomy for tumor resection with Dr. Chan on 4/9/2020 with placement of GammaTiles. Aristeo is currently managed on lomustine monotherapy.     I met with Aristeo and Judith (wife) today for this follow-up visit.       HISTORY OF PRESENT ILLNESS  -Aristeo states that he is doing well today.   -Tolerated his last cycle of lomustine well; denied nausea.  -Less fatigued; playing golf about 3-4x a week plus working out/ intense cardio. Continues to feels bouts of \"heavy tiredness\" about 3x/ week (slightly less frequent than before). No associated nausea, no SOB, no significant increase in weakness, no confusion/ decreased alertness, no aphasia.   -Continued mild issues with proprioception/ spatial awareness with worsening coordination and dexterity, however, near baseline. Strength is good.   -Off dexamethasone.  -No recurrent seizure events.   -Mood is good on Lexapro.   -Taking Vitamin B supplement and Folic Acid supplement.     REVIEW OF SYSTEMS  A comprehensive ROS negative except as in HPI.      MEDICATIONS   Current Outpatient Medications   Medication Sig Dispense Refill     escitalopram (LEXAPRO) 10 MG tablet Take 0.5 tablets (5 mg) by mouth daily       famotidine (PEPCID) 20 MG tablet Take 20 mg by mouth       fluticasone (FLONASE) 50 MCG/ACT nasal spray Spray 1 spray into both nostrils nightly as needed        levETIRAcetam (KEPPRA) 1000 MG tablet Take 1,000 mg by mouth 2 times daily   "      levothyroxine (SYNTHROID/LEVOTHROID) 125 MCG tablet Take 125 mcg by mouth daily       loratadine (CLARITIN) 10 MG tablet Take 10 mg by mouth nightly as needed        melatonin 5 MG tablet Take 5 mg by mouth nightly as needed        olopatadine (PATANOL) 0.1 % ophthalmic solution Place 1-2 drops into both eyes daily as needed        ondansetron (ZOFRAN) 4 MG tablet Take 1 tablet (4 mg) by mouth At Bedtime prior to lomustine. Can repeat every 8 hours as needed for nausea. 30 tablet 1     Psyllium 500 MG CAPS Take 4 capsules by mouth At Bedtime       senna-docusate (SENOKOT-S/PERICOLACE) 8.6-50 MG tablet Take 1 tablet by mouth 2 times daily 30 tablet 0     omeprazole (PRILOSEC) 20 MG DR capsule        DRUG ALLERGIES   Allergies   Allergen Reactions     No Clinical Screening - See Comments Rash     Cats and dogs         ONCOLOGIC HISTORY  -PRESENTATION: New onset seizures; complex partial event with speech arrest and altered mental status lasting several minutes. Later had secondary generalization. Started Keppra.   -MR brain imaging with a ring enhancing lesion in the right parietal lobe.    -6/27/2019 SURGERY: Right temporal craniotomy for mass resection, gross total resection by Dr. Tam Barreto.  PATHOLOGY: Glioblastoma; IDH1 R132H wildtype, MGMT promoter unmethylated  -7/3/2019 CLINICAL TRIAL: Evaluated by Dr. Wilmer Mckenna at [facility], consented for clinical trial 8913-6416 (Standard of Care plus atezolizumab).  -7/22 - 8/30/2019 CHEMORADS: 60cGy with concurrent temozolomide 75mg/m2/day (145mg) plus atezolizumab Q2 weeks on clinical trial 2016-0867.  -9/6/2019 NEURO-ONC: Evaluated at the [facility].   -9/25/2019 - 3/16/2020 CHEMO: Adjuvant chemotherapy; temozolomide + atezolizumab 840 mg IV on clinical trial 2016-0867.  -4/6/2020 NEURO-ONC: Communicated with Dr. Borrero. Plan for surgery and next steps pending pathology results.   -4/9/2020 SURGERY + RADS: Repeat craniotomy for surgical debulking plus  "placement of GammaTiles.  PATHOLOGY: Recurrent glioblastoma.   Next generation sequencing: Microsatellite instability: Stable. Tumor mutational burden: 4 (Low). APC, CDK4, MDM2, PTEN mutated. ARID1A, ASXL1, ATRX, DNMT3A, FANCE, KDM5C, MED12, MEF2B, NF1, RUNX1, TERT mutated. PD-L1 Negative.  -4/20/2020 NEURO-ONC/ CHEMO: Starting Lomustine 3 weeks post-op. Consented for next generation sequencing and sending most recent tumor sample.    -4/30/2020 CHEMO: Lomustine, cycle 1.   -5/11/2020 NEURO-ONC: Clinically well. Referral to genetic counseling.   -6/5/2020 NEURO-ONC/ MRB/ CHEMO: Clinically well. Imaging with a positive treatment response. Lomustine, cycle 2 (start date of 6/11).    -7/20/2020 NEURO-ONC/ MRB/ CHEMO: Clinically well. Imaging without concern; aside from subdural fluid collection. Labs at goal. Lomustine, cycle 3 (start date of 7/23).      SOCIAL HISTORY   Tobacco use: Never smoker.   Alcohol use: Social, infrequent.   Drug use: Denies marijuana use.  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   . 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       PHYSICAL EXAMINATION  /89 (BP Location: Right arm, Patient Position: Sitting, Cuff Size: Adult Regular)   Pulse 70   Temp 97.8  F (36.6  C) (Oral)   Resp 16   Ht 1.778 m (5' 10\")   Wt 75.9 kg (167 lb 6.4 oz)   SpO2 100%   BMI 24.02 kg/m     Wt Readings from Last 2 Encounters:   07/20/20 75.9 kg (167 lb 6.4 oz)   06/05/20 75.2 kg (165 lb 11.2 oz)     Ht Readings from Last 2 Encounters:   07/20/20 1.778 m (5' 10\")   06/05/20 1.778 m (5' 10\")     -Generally well appearing.  -Respiratory: No audible wheezing.   -Skin: No facial rashes. Wearing a mask.   -Psychiatric: Normal mood and affect. Pleasant, talkative.  -Neurologic:   MENTAL STATUS:     Alert, oriented to date.    Recall: Intact.    Speech fluent.    Comprehension intact to multi-step commands.     CRANIAL NERVES:     Pupils are equal, round, reactive to light.     Extraocular movements full, patient denies " diplopia.     Visual fields are full.    Symmetric facial movements.   Hearing intact.   With normal phonation, no dysfunction of the palate or tongue.   MOTOR:               No pronation or drift. No orbiting.              Able to rise from a chair without use of arms.              On toe/ heel walk, equal distance from floor to heels/ toes.   SENSATION:               Intact to light touch throughout.  COORDINATION:              Intact finger-nose with eyes open and closed; no difference on the left noted.   GAIT:  Walks without assistance.              Good speed. Normal stride length and heel strike. Normal turns. Normal arm swing.              Able to toe, heel walk. Able to tandem walk.         MEDICAL RECORDS  Obtained and personally reviewed all available outside medical records in addition to reviewing any records available in our electronic system.     LABS  Personally reviewed all available lab results.     IMAGING  Personally reviewed MR brain imaging from today and compared to prior imaging. To my eye, there is a stable degree of associated T2 FLAIR about the resection cavity. There is a thin rim of contrast enhancement about the cavity with an area of thickening in the medial aspect that is slightly small and continues to not have any increase in perfusion.     Subdural fluid collection remains, no change in size.     Imaging was shown to and results were reviewed with Aristeo and Judith.    Imaging and case will be reviewed and discussed at Brain Tumor Conference.        IMPRESSION  Clinic time was spent discussing in detail the nature of his tumor in light of recent imaging results and updating the current treatment plan.     Aristeo remains clinically stable with no new complaints. He tolerated the second cycle of lomustine well. Imaging without concerns today. Will continue with cycle 3 of lomustine, due to start on 7/23. Labs at goal today. Will continue with lab monitoring the week of 8/10 and 8/24 and  repeat imaging + labs on 8/31.    I will touch base with Dr. Chan about the subdural fluid collection management and either he or his RN coordinator will provide an update.     Based on the results of his next generation sequencing, at disease recurrence, everolimus could be a treatment option. Clinical trials are also a potential option.     Evaluated by genetic counseling given the mutation found in APC or adenomatous polyposis coli, which is a key tumor suppressor gene that encodes for a large multi-domain protein to assess personal/ children's risk for colon cancer. Additional testing was declined and Aristeo already adheres to close colon cancer screening.     PROBLEM LIST  Glioblastoma, MGMT unmethylated and IDH1 wildtype by IHC  Seizure  Chemotherapy-associated constipation  Headaches  Apraxia    PLAN  -CANCER-DIRECTED THERAPY-  -As above; Lomustine, cycle 3 to start on 7/23 as labs from today are at goal for next cycle of chemotherapy.   -Labs the week of 8/10 and 8/24 (locally).  -Repeat MR brain imaging in 6 weeks.   -Not interested in [place] at this time.   -Previously provided information on the [facility] for clinical trial options.     -STEROIDS-  -Off dexamethasone.    -SEIZURE MANAGEMENT-  -Given history of seizures, will continue current antiepileptic regimen of Keppra for the foreseeable future.    -Quality of life/ MOOD/ FATIGUE-  -Denies any mood issues.   -Continue to monitor mood as untreated/ undertreated depression can worsen fatigue, dysorexia, and quality of life.    Return to clinic on 8/31 + imaging + labs.     Ericka Avila MD  Neuro-oncology

## 2020-07-20 NOTE — NURSING NOTE
"Oncology Rooming Note    July 20, 2020 11:09 AM   Erasto Maher is a 55 year old male who presents for:    Chief Complaint   Patient presents with     Blood Draw     Labs drawn via  by RN in lab. VS taken.     Oncology Clinic Visit     [facility] Return: Glioblastoma     Initial Vitals: /89 (BP Location: Right arm, Patient Position: Sitting, Cuff Size: Adult Regular)   Pulse 70   Temp 97.8  F (36.6  C) (Oral)   Resp 16   Ht 1.778 m (5' 10\")   Wt 75.9 kg (167 lb 6.4 oz)   SpO2 100%   BMI 24.02 kg/m   Estimated body mass index is 24.02 kg/m  as calculated from the following:    Height as of this encounter: 1.778 m (5' 10\").    Weight as of this encounter: 75.9 kg (167 lb 6.4 oz). Body surface area is 1.94 meters squared.  No Pain (0) Comment: Data Unavailable   No LMP for male patient.  Allergies reviewed: Yes  Medications reviewed: Yes    Medications: Medication refills not needed today.  Pharmacy name entered into CheckInPage:    [facility] - [place], [place] - [address] [id number] - [place], [place] - [address] AT [facility] [address]  [facility] - [place], [place] - [address]     Clinical concerns: Patient would like to know when able to start boxing again.  Dr. Avila was notified.      Valentina Queen, [facility]              "

## 2020-07-20 NOTE — LETTER
"    7/20/2020         RE: Erasto Maher  [address]  [facility] [address]        Dear Colleague,    Thank you for referring your patient, Erasto Maher, to the [facility]. Please see a copy of my visit note below.    NEURO-ONCOLOGY VISIT  Jul 20, 2020    CHIEF COMPLAINT: Mr. 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 He completed chemoradiotherapy on 8/30/2019 and was managed on a clinical trial receiving atezolizumab (anti-PDL1) plus adjuvant temozolomide. He last received an immunotherapy treatment on 3/16/2020 when imaging on 3/28/2020 showed progression of disease. He underwent a repeat craniotomy for tumor resection with Dr. Chan on 4/9/2020 with placement of GammaTiles. Aristeo is currently managed on lomustine monotherapy.     I met with Aristeo and Judith (wife) today for this follow-up visit.       HISTORY OF PRESENT ILLNESS  -Aristeo states that he is doing well today.   -Tolerated his last cycle of lomustine well; denied nausea.  -Less fatigued; playing golf about 3-4x a week plus working out/ intense cardio. Continues to feels bouts of \"heavy tiredness\" about 3x/ week (slightly less frequent than before). No associated nausea, no SOB, no significant increase in weakness, no confusion/ decreased alertness, no aphasia.   -Continued mild issues with proprioception/ spatial awareness with worsening coordination and dexterity, however, near baseline. Strength is good.   -Off dexamethasone.  -No recurrent seizure events.   -Mood is good on Lexapro.   -Taking Vitamin B supplement and Folic Acid supplement.     REVIEW OF SYSTEMS  A comprehensive ROS negative except as in HPI.      MEDICATIONS   Current Outpatient Medications   Medication Sig Dispense Refill     escitalopram (LEXAPRO) 10 MG tablet Take 0.5 tablets (5 mg) by " mouth daily       famotidine (PEPCID) 20 MG tablet Take 20 mg by mouth       fluticasone (FLONASE) 50 MCG/ACT nasal spray Spray 1 spray into both nostrils nightly as needed        levETIRAcetam (KEPPRA) 1000 MG tablet Take 1,000 mg by mouth 2 times daily        levothyroxine (SYNTHROID/LEVOTHROID) 125 MCG tablet Take 125 mcg by mouth daily       loratadine (CLARITIN) 10 MG tablet Take 10 mg by mouth nightly as needed        melatonin 5 MG tablet Take 5 mg by mouth nightly as needed        olopatadine (PATANOL) 0.1 % ophthalmic solution Place 1-2 drops into both eyes daily as needed        ondansetron (ZOFRAN) 4 MG tablet Take 1 tablet (4 mg) by mouth At Bedtime prior to lomustine. Can repeat every 8 hours as needed for nausea. 30 tablet 1     Psyllium 500 MG CAPS Take 4 capsules by mouth At Bedtime       senna-docusate (SENOKOT-S/PERICOLACE) 8.6-50 MG tablet Take 1 tablet by mouth 2 times daily 30 tablet 0     omeprazole (PRILOSEC) 20 MG DR capsule        DRUG ALLERGIES   Allergies   Allergen Reactions     No Clinical Screening - See Comments Rash     Cats and dogs         ONCOLOGIC HISTORY  -PRESENTATION: New onset seizures; complex partial event with speech arrest and altered mental status lasting several minutes. Later had secondary generalization. Started Keppra.   -MR brain imaging with a ring enhancing lesion in the right parietal lobe.    -6/27/2019 SURGERY: Right temporal craniotomy for mass resection, gross total resection by Dr. Tam Barreto.  PATHOLOGY: Glioblastoma; IDH1 R132H wildtype, MGMT promoter unmethylated  -7/3/2019 CLINICAL TRIAL: Evaluated by Dr. Wilmer Mckenna at [facility], consented for clinical trial 5094-8639 (Standard of Care plus atezolizumab).  -7/22 - 8/30/2019 CHEMORADS: 60cGy with concurrent temozolomide 75mg/m2/day (145mg) plus atezolizumab Q2 weeks on clinical trial [id number].  -9/6/2019 NEURO-ONC: Evaluated at the [facility].   -9/25/2019 - 3/16/2020 CHEMO: Adjuvant chemotherapy;  "temozolomide + atezolizumab 840 mg IV on clinical trial [id number].  -4/6/2020 NEURO-ONC: Communicated with Dr. Borrero. Plan for surgery and next steps pending pathology results.   -4/9/2020 SURGERY + RADS: Repeat craniotomy for surgical debulking plus placement of GammaTiles.  PATHOLOGY: Recurrent glioblastoma.   Next generation sequencing: Microsatellite instability: Stable. Tumor mutational burden: 4 (Low). APC, CDK4, MDM2, PTEN mutated. ARID1A, ASXL1, ATRX, DNMT3A, FANCE, KDM5C, MED12, MEF2B, NF1, RUNX1, TERT mutated. PD-L1 Negative.  -4/20/2020 NEURO-ONC/ CHEMO: Starting Lomustine 3 weeks post-op. Consented for next generation sequencing and sending most recent tumor sample.    -4/30/2020 CHEMO: Lomustine, cycle 1.   -5/11/2020 NEURO-ONC: Clinically well. Referral to genetic counseling.   -6/5/2020 NEURO-ONC/ MRB/ CHEMO: Clinically well. Imaging with a positive treatment response. Lomustine, cycle 2 (start date of 6/11).    -7/20/2020 NEURO-ONC/ MRB/ CHEMO: Clinically well. Imaging without concern; aside from subdural fluid collection. Labs at goal. Lomustine, cycle 3 (start date of 7/23).      SOCIAL HISTORY   Tobacco use: Never smoker.   Alcohol use: Social, infrequent.   Drug use: Denies marijuana use.  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       PHYSICAL EXAMINATION  /89 (BP Location: Right arm, Patient Position: Sitting, Cuff Size: Adult Regular)   Pulse 70   Temp 97.8  F (36.6  C) (Oral)   Resp 16   Ht 1.778 m (5' 10\")   Wt 75.9 kg (167 lb 6.4 oz)   SpO2 100%   BMI 24.02 kg/m     Wt Readings from Last 2 Encounters:   07/20/20 75.9 kg (167 lb 6.4 oz)   06/05/20 75.2 kg (165 lb 11.2 oz)     Ht Readings from Last 2 Encounters:   07/20/20 1.778 m (5' 10\")   06/05/20 1.778 m (5' 10\")     -Generally well appearing.  -Respiratory: No audible wheezing.   -Skin: No facial rashes. Wearing a mask.   -Psychiatric: Normal mood and affect. Pleasant, talkative.  -Neurologic:   " MENTAL STATUS:     Alert, oriented to date.    Recall: Intact.    Speech fluent.    Comprehension intact to multi-step commands.     CRANIAL NERVES:     Pupils are equal, round, reactive to light.     Extraocular movements full, patient denies diplopia.     Visual fields are full.    Symmetric facial movements.   Hearing intact.   With normal phonation, no dysfunction of the palate or tongue.   MOTOR:               No pronation or drift. No orbiting.              Able to rise from a chair without use of arms.              On toe/ heel walk, equal distance from floor to heels/ toes.   SENSATION:               Intact to light touch throughout.  COORDINATION:              Intact finger-nose with eyes open and closed; no difference on the left noted.   GAIT:  Walks without assistance.              Good speed. Normal stride length and heel strike. Normal turns. Normal arm swing.              Able to toe, heel walk. Able to tandem walk.         MEDICAL RECORDS  Obtained and personally reviewed all available outside medical records in addition to reviewing any records available in our electronic system.     LABS  Personally reviewed all available lab results.     IMAGING  Personally reviewed MR brain imaging from today and compared to prior imaging. To my eye, there is a stable degree of associated T2 FLAIR about the resection cavity. There is a thin rim of contrast enhancement about the cavity with an area of thickening in the medial aspect that is slightly small and continues to not have any increase in perfusion.     Subdural fluid collection remains, no change in size.     Imaging was shown to and results were reviewed with Aristeo and Judith.    Imaging and case will be reviewed and discussed at Brain Tumor Conference.        IMPRESSION  Clinic time was spent discussing in detail the nature of his tumor in light of recent imaging results and updating the current treatment plan.     Aristeo remains clinically stable with no  new complaints. He tolerated the second cycle of lomustine well. Imaging without concerns today. Will continue with cycle 3 of lomustine, due to start on 7/23. Labs at goal today. Will continue with lab monitoring the week of 8/10 and 8/24 and repeat imaging + labs on 8/31.    I will touch base with Dr. Chan about the subdural fluid collection management and either he or his RN coordinator will provide an update.     Based on the results of his next generation sequencing, at disease recurrence, everolimus could be a treatment option. Clinical trials are also a potential option.     Evaluated by genetic counseling given the mutation found in APC or adenomatous polyposis coli, which is a key tumor suppressor gene that encodes for a large multi-domain protein to assess personal/ children's risk for colon cancer. Additional testing was declined and Aristeo already adheres to close colon cancer screening.     PROBLEM LIST  Glioblastoma, MGMT unmethylated and IDH1 wildtype by IHC  Seizure  Chemotherapy-associated constipation  Headaches  Apraxia    PLAN  -CANCER-DIRECTED THERAPY-  -As above; Lomustine, cycle 3 to start on 7/23 as labs from today are at goal for next cycle of chemotherapy.   -Labs the week of 8/10 and 8/24 (locally).  -Repeat MR brain imaging in 6 weeks.   -Not interested in [employer] at this time.   -Previously provided information on the Brain Tumor Network for clinical trial options.     -STEROIDS-  -Off dexamethasone.    -SEIZURE MANAGEMENT-  -Given history of seizures, will continue current antiepileptic regimen of Keppra for the foreseeable future.    -Quality of life/ MOOD/ FATIGUE-  -Denies any mood issues.   -Continue to monitor mood as untreated/ undertreated depression can worsen fatigue, dysorexia, and quality of life.    Return to clinic on 8/31 + imaging + labs.     Ericka Avila MD  Neuro-oncology

## 2020-08-13 ENCOUNTER — TELEPHONE (OUTPATIENT)
Dept: ONCOLOGY | Facility: CLINIC | Age: 55
End: 2020-08-13

## 2020-08-13 NOTE — ORAL ONC MGMT
Oral Chemotherapy Monitoring Program    Primary Oncologist: Dr. Avila   Primary Oncology Clinic: [facility]  Cancer Diagnosis: Glioblastoma    Therapy History:  C1D1: 4/30/2020  C2D1: 6/11/2020  C3D1: 7/23/2020  Lomustine 200 mg (2 X 100 mg tabs) by mouth once on an empty stomach     Drug Interaction Assessment: No new known drug interactions    Lab Monitoring Plan  Next labs planned for week of 8/24  Subjective/Objective:  Erasto Maher is a 55 year old male contacted by phone for a follow-up visit for oral chemotherapy. I spoke with Aristeo Wong's wife. She said he took the last dose of lomustine on 7/23. He took 2 tablets two hours after dinner on the 7/23. He did not feel nauseous or sick after taking it. For a few days after taking the lomustine he would have waves of fatigue. The fatigue has never held him back or made him change plans. They have no concerns at this time and he has no other symptoms.    We discussed his lab results from 8/10 in [facility]. His labs look stable and there are no concerning abnormalities on his blood counts. Plt = 129, WBC = 4.1, Hbg = 13.4, and ANC = 2.5. She said he will call the clinic where he gets labs done at the end of next week to set a lab appointment for 8/24.     ORAL CHEMOTHERAPY 4/20/2020 6/18/2020 8/13/2020   Drug Name (No Data) (No Data) (No Data)   Current Dosage 200mg 200mg 200mg   Current Schedule Daily - (No Data)   Cycle Details Other Other (No Data)   Start Date of Last Cycle (No Data) 6/11/2020 7/23/2020   Planned next cycle start date 4/30/2020 - 9/3/2020   Doses missed in last 2 weeks - 0 0   Adherence Assessment - Adherent Adherent   Adverse Effects - Fatigue No AE identified during assessment   Fatigue - (No Data) -   Any new drug interactions? No No No   Is the dose as ordered appropriate for the patient? Yes Yes Yes   Has the patient missed any days of school, work, or other routine activity? - No -     Vitals:  BP:   BP Readings  "from Last 1 Encounters:   07/20/20 123/89     Wt Readings from Last 1 Encounters:   07/20/20 75.9 kg (167 lb 6.4 oz)     Estimated body surface area is 1.94 meters squared as calculated from the following:    Height as of 7/20/20: 1.778 m (5' 10\").    Weight as of 7/20/20: 75.9 kg (167 lb 6.4 oz).    Labs:  _  Result Component Current Result Ref Range   Sodium 137 (7/20/2020) 133 - 144 mmol/L     _  Result Component Current Result Ref Range   Potassium 4.1 (7/20/2020) 3.4 - 5.3 mmol/L     _  Result Component Current Result Ref Range   Calcium 8.8 (7/20/2020) 8.5 - 10.1 mg/dL     No results found for Mag within last 30 days.     No results found for Phos within last 30 days.     _  Result Component Current Result Ref Range   Albumin 4.0 (7/20/2020) 3.4 - 5.0 g/dL     _  Result Component Current Result Ref Range   Urea Nitrogen 12 (7/20/2020) 7 - 30 mg/dL     _  Result Component Current Result Ref Range   Creatinine 0.80 (7/20/2020) 0.66 - 1.25 mg/dL       _  Result Component Current Result Ref Range   AST 18 (7/20/2020) 0 - 45 U/L     _  Result Component Current Result Ref Range   ALT 28 (7/20/2020) 0 - 70 U/L     _  Result Component Current Result Ref Range   Bilirubin Total 0.6 (7/20/2020) 0.2 - 1.3 mg/dL       _  Result Component Current Result Ref Range   WBC 3.6 (L) (7/20/2020) 4.0 - 11.0 10e9/L     _  Result Component Current Result Ref Range   Hemoglobin 13.6 (7/20/2020) 13.3 - 17.7 g/dL     _  Result Component Current Result Ref Range   Platelet Count 151 (7/20/2020) 150 - 450 10e9/L     _  Result Component Current Result Ref Range   Absolute Neutrophil 2.1 (7/20/2020) 1.6 - 8.3 10e9/L       Assessment:  Aristeo is doing well on lomustine with no noted adverse effects    Plan:  Continue lomustine as prescribed     Follow-Up:  Review labs on 8/24    Jory Foster  Pharmacy Intern  [facility]  [phone]      "

## 2020-08-28 NOTE — PROGRESS NOTES
"NEURO-ONCOLOGY VISIT  Aug 31, 2020    CHIEF COMPLAINT: Mr. 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 He completed chemoradiotherapy on 8/30/2019 and was managed on a clinical trial receiving atezolizumab (anti-PDL1) plus adjuvant temozolomide. He last received an immunotherapy treatment on 3/16/2020 when imaging on 3/28/2020 showed progression of disease. He underwent a repeat craniotomy for tumor resection with Dr. Chan on 4/9/2020 with placement of GammaTiles. Aristeo is currently managed on lomustine monotherapy.     I met with Aristeo and Judith (wife) today for this follow-up visit.       HISTORY OF PRESENT ILLNESS  -Aristeo states that he is doing well today. Worked out everyday of August and feels great.   -Tolerated his last cycle of lomustine well; denied nausea.  -Less fatigued with 1/2 dose of Lexapro, however, mood worsened. So, returned to full dose and mood is again positive/ not depressed.    -Continued mild issues with proprioception/ spatial awareness with worsening coordination and dexterity, however, at baseline. Strength is good.   -Off dexamethasone.  -No recurrent seizure events. No recurrent bouts of \"heavy tiredness\".  -Taking Vitamin B supplement and Folic Acid supplement.     REVIEW OF SYSTEMS  A comprehensive ROS negative except as in HPI.      MEDICATIONS   Current Outpatient Medications   Medication Sig Dispense Refill     escitalopram (LEXAPRO) 10 MG tablet Take 0.5 tablets (5 mg) by mouth daily       famotidine (PEPCID) 20 MG tablet Take 20 mg by mouth       fluticasone (FLONASE) 50 MCG/ACT nasal spray Spray 1 spray into both nostrils nightly as needed        levETIRAcetam (KEPPRA) 1000 MG tablet Take 1,000 mg by mouth 2 times daily        levothyroxine (SYNTHROID/LEVOTHROID) 125 MCG tablet Take 125 mcg by mouth daily       loratadine " (CLARITIN) 10 MG tablet Take 10 mg by mouth nightly as needed        melatonin 5 MG tablet Take 5 mg by mouth nightly as needed        olopatadine (PATANOL) 0.1 % ophthalmic solution Place 1-2 drops into both eyes daily as needed        omeprazole (PRILOSEC) 20 MG DR capsule        ondansetron (ZOFRAN) 4 MG tablet Take 1 tablet (4 mg) by mouth At Bedtime prior to lomustine. Can repeat every 8 hours as needed for nausea. 30 tablet 1     Psyllium 500 MG CAPS Take 4 capsules by mouth At Bedtime       senna-docusate (SENOKOT-S/PERICOLACE) 8.6-50 MG tablet Take 1 tablet by mouth 2 times daily 30 tablet 0     DRUG ALLERGIES   Allergies   Allergen Reactions     No Clinical Screening - See Comments Rash     Cats and dogs         ONCOLOGIC HISTORY  -PRESENTATION: New onset seizures; complex partial event with speech arrest and altered mental status lasting several minutes. Later had secondary generalization. Started Keppra.   -MR brain imaging with a ring enhancing lesion in the right parietal lobe.    -6/27/2019 SURGERY: Right temporal craniotomy for mass resection, gross total resection by Dr. Tam Barreto.  PATHOLOGY: Glioblastoma; IDH1 R132H wildtype, MGMT promoter unmethylated  -7/3/2019 CLINICAL TRIAL: Evaluated by Dr. Wilmer Mckenna at [facility], consented for clinical trial 8827-0450 (Standard of Care plus atezolizumab).  -7/22 - 8/30/2019 CHEMORADS: 60cGy with concurrent temozolomide 75mg/m2/day (145mg) plus atezolizumab Q2 weeks on clinical trial [id number].  -9/6/2019 NEURO-ONC: Evaluated at the [facility].   -9/25/2019 - 3/16/2020 CHEMO: Adjuvant chemotherapy; temozolomide + atezolizumab 840 mg IV on clinical trial [id number].  -4/6/2020 NEURO-ONC: Communicated with Dr. Borrero. Plan for surgery and next steps pending pathology results.   -4/9/2020 SURGERY + RADS: Repeat craniotomy for surgical debulking plus placement of GammaTiles.  PATHOLOGY: Recurrent glioblastoma.   Next generation sequencing: Microsatellite  "instability: Stable. Tumor mutational burden: 4 (Low). APC, CDK4, MDM2, PTEN mutated. ARID1A, ASXL1, ATRX, DNMT3A, FANCE, KDM5C, MED12, MEF2B, NF1, RUNX1, TERT mutated. PD-L1 Negative.  -4/20/2020 NEURO-ONC/ CHEMO: Starting Lomustine 3 weeks post-op. Consented for next generation sequencing and sending most recent tumor sample.    -4/30/2020 CHEMO: Lomustine, cycle 1.   -5/11/2020 NEURO-ONC: Clinically well. Referral to genetic counseling.   -6/5/2020 NEURO-ONC/ MRB/ CHEMO: Clinically well. Imaging with a positive treatment response. Lomustine, cycle 2 (start date of 6/11).    -7/20/2020 NEURO-ONC/ MRB/ CHEMO: Clinically well. Imaging without concern; aside from subdural fluid collection. Labs at goal. Lomustine, cycle 3 (start date of 7/23).    -8/31/2020 NEURO-ONC/ MRB/ CHEMO: Clinically well. Imaging without concern. Labs at goal. Lomustine, cycle 4 (start date of 9/3).      SOCIAL HISTORY   Tobacco use: Never smoker.   Alcohol use: Social, infrequent.   Drug use: Denies marijuana use.  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       PHYSICAL EXAMINATION  /80 (BP Location: Right arm, Patient Position: Sitting, Cuff Size: Adult Regular)   Pulse 82   Temp 97  F (36.1  C) (Tympanic)   Resp 16   Wt 75.5 kg (166 lb 8 oz)   SpO2 96%   BMI 23.89 kg/m     Wt Readings from Last 2 Encounters:   08/31/20 75.5 kg (166 lb 8 oz)   07/20/20 75.9 kg (167 lb 6.4 oz)     Ht Readings from Last 2 Encounters:   07/20/20 1.778 m (5' 10\")   06/05/20 1.778 m (5' 10\")     -Generally well appearing.  -Respiratory: No audible wheezing.   -Skin: No rashes. Wearing a mask. Hair regrowing on right side of head.   -Psychiatric: Normal mood and affect. Pleasant, talkative.  -Neurologic:   MENTAL STATUS:     Alert, oriented to date.    Recall: Intact.    Speech fluent.    Comprehension intact to multi-step commands.     CRANIAL NERVES:     Pupils are equal, round, reactive to light.     Extraocular " movements full, patient denies diplopia.     Visual fields are full.    Symmetric facial movements.   Hearing intact.   With normal phonation, no dysfunction of the palate or tongue.   MOTOR:               No pronation or drift. No orbiting.              Able to rise from a chair without use of arms.              On toe/ heel walk, equal distance from floor to heels/ toes.   SENSATION:               Intact to light touch throughout.  COORDINATION:              Intact finger-nose with eyes open and closed.   GAIT:  Walks without assistance.              Good speed. Normal stride length and heel strike. Normal turns. Normal arm swing.              Able to toe, heel walk. Able to tandem walk.         MEDICAL RECORDS  Obtained and personally reviewed all available outside medical records in addition to reviewing any records available in our electronic system.     LABS  Personally reviewed all available lab results.     IMAGING  Personally reviewed MR brain imaging from today and compared to prior imaging. To my eye, there is a stable to slightly increased degree of associated T2 FLAIR about the resection cavity (increase seen in the posterior, inferior portion). There is a thin rim of contrast enhancement about the cavity with an area of thickening in the medial aspect that is again slightly smaller and continues to not have any increase in perfusion.     Subdural fluid collection remains, no change in size.     Imaging was shown to and results were reviewed with Aristeo and Judith.    Imaging and case will be reviewed and discussed at Brain Tumor Conference.        IMPRESSION  Clinic time was spent discussing in detail the nature of his tumor in light of recent imaging results and his current treatment plan.     Aristeo remains clinically stable with no new complaints. He tolerated the third cycle of lomustine well. Imaging without concerns today. Labs from 8/24 had platelets of 89, however, repeat labs from today are at  goal for next cycle of chemotherapy. Will continue with cycle 4 of lomustine, due to start on 9/3. Will continue with lab monitoring the week of 9/28 and 10/5 and repeat imaging + labs on 10/12. Goal will be for 4-6 cycles of Lomustine.     Based on the results of his next generation sequencing, at disease recurrence, everolimus could be a treatment option. Clinical trials are also a potential option.     Evaluated by genetic counseling given the mutation found in APC or adenomatous polyposis coli, which is a key tumor suppressor gene that encodes for a large multi-domain protein to assess personal/ children's risk for colon cancer. Additional testing was declined and Aristeo already adheres to close colon cancer screening.     PROBLEM LIST  Glioblastoma, MGMT unmethylated and IDH1 wildtype by IHC  Seizure  Chemotherapy-associated constipation  Headaches  Apraxia    PLAN  -CANCER-DIRECTED THERAPY-  -As above; Starting cycle 4 of Lomustine this week. Labs to be repeated at week 4 and 5.  -Not interested in [employer] at this time.   -Previously provided information on the Brain Tumor Network for clinical trial options.     -STEROIDS-  -Off dexamethasone.    -SEIZURE MANAGEMENT-  -Given history of seizures, will continue current antiepileptic regimen of Keppra for the foreseeable future.    -Quality of life/ MOOD/ FATIGUE-  -Denies any mood issues.   -Continue Lexapro.   -Continue to monitor mood as untreated/ undertreated depression can worsen fatigue, dysorexia, and quality of life.    -OTHER-   -No chemotherapy/ cancer-related restrictions on travel.   -Wanting to drive to see family in [place] in October.     Return to clinic on 10/12 + imaging + labs.     Ericka Avila MD  Neuro-oncology

## 2020-08-28 NOTE — PATIENT INSTRUCTIONS
Imaging reviewed and no concerning findings.   Repeat in October.     Labs from 8/24 with platelets of 89. Repeat labs at goal for next cycle of chemotherapy.     Cycle 4 of Lomustine to start on 9/3.   Labs week of 9/28 and 10/5 (local lab).    Ok to travel.     Return to clinic on 10/12 + imaging and labs.     Ericka Avila MD  Neuro-oncology  08/31/20

## 2020-08-31 NOTE — TUMOR CONFERENCE
Tumor Conference Information  Tumor Conference:  Brain  Specialties Present:  Medical oncology, Pathology, Radiology, Radiation oncology, Surgery  Patient Status:  A current patient  Pathology:  Not Discussed  Treatment to Date:  Surgical intervention(s), Chemoradiation, Palliative chemotherapy  Clinical Trial Eligibility:  Previously enrolled  Recommended Plan:  Continue with current treatment plan (Comment: reviewed recent imaging, grossly stable - plan to continue lomustine and reimage with follow up in 6 weeks)  Did the review exceed 30 minutes?:  did not           Documentation / Disclaimer Cancer Tumor Board Note  Cancer tumor board recommendations do not override what is determined to be reasonable care and treatment, which is dependent on the circumstances of a patient's case; the patient's medical, social, and personal concerns; and the clinical judgment of the oncologist [physician].

## 2020-08-31 NOTE — NURSING NOTE
Chief Complaint   Patient presents with     Blood Draw     labs drawn with piv start by rn.  vs taken     Labs drawn with PIV start by rn.  Pt tolerated well.  VS taken and pt checked in for next appt.    Amanda Rodríguez RN

## 2020-08-31 NOTE — DISCHARGE INSTRUCTIONS
MRI Contrast Discharge Instructions    The IV contrast you received today will pass out of your body in your  urine. This will happen in the next 24 hours. You will not feel this process.  Your urine will not change color.    Drink at least 4 extra glasses of water or juice today (unless your doctor  has restricted your fluids). This reduces the stress on your kidneys.  You may take your regular medicines.    If you are on dialysis: It is best to have dialysis today.    If you have a reaction: Most reactions happen right away. If you have  any new symptoms after leaving the hospital (such as hives or swelling),  call your hospital at the correct number below. Or call your family doctor.  If you have breathing distress or wheezing, call 911.    Special instructions: ***    I have read and understand the above information.    Signature:______________________________________ Date:___________    Staff:__________________________________________ Date:___________     Time:__________    [facility] Departments:    ___[place]: [phone]  ___[facility]: [phone]  ___[place]: [phone] ___[facility]: [phone]  ___[facility]: [phone]  ___[facility]: [phone]  ___[phone]  ___[facility]: [phone]  ___[phone]

## 2020-08-31 NOTE — PROGRESS NOTES
"Oncology RN Care Coordination Note:     This writer attempted to reach Aristeo to discuss his question regarding whether or not he may resume kickboxing.  Per Dr. Chan \"I worry about increased intracranial pressure while winding up a kick or punch -- which most enthusiasts do without realizing. I would urge consideration for not doing it or light touch rather than full contact with a bag.\"  I called patient's wife and discussed this with her and said I would follow up with a [employer] message as well.      Judith said they thought this would be the answer, but Aristeo is getting antsy with the golf season almost being over and he is anxious to resume kickboxing.      Jeanette Pagan, RN BSN   [facility]  Nurse Coordinator        "

## 2020-08-31 NOTE — NURSING NOTE
"Oncology Rooming Note    August 31, 2020 10:10 AM   Erasto Maher is a 55 year old male who presents for:    Chief Complaint   Patient presents with     Blood Draw     labs drawn with piv start by rn.  vs taken     Oncology Clinic Visit     Glioblastoma (H)     Initial Vitals: /80 (BP Location: Right arm, Patient Position: Sitting, Cuff Size: Adult Regular)   Pulse 82   Temp 97  F (36.1  C) (Tympanic)   Resp 16   Wt 75.5 kg (166 lb 8 oz)   SpO2 96%   BMI 23.89 kg/m   Estimated body mass index is 23.89 kg/m  as calculated from the following:    Height as of 7/20/20: 1.778 m (5' 10\").    Weight as of this encounter: 75.5 kg (166 lb 8 oz). Body surface area is 1.93 meters squared.  No Pain (0) Comment: Data Unavailable   No LMP for male patient.  Allergies reviewed: Yes  Medications reviewed: Yes    Medications: Medication refills not needed today.  Pharmacy name entered into [employer]:    [facility] - [place], [place] - [address] [id number] - [place], [place] - [address] [facility]  [facility] - [place], [place] - [address]    Clinical concerns: No new concerns today. Dr. Avila was notified.      Chu Waldrop MA            "

## 2020-08-31 NOTE — LETTER
"    8/31/2020         RE: Erasto Maher  [address]  [place] [address]        Dear Colleague,    Thank you for referring your patient, Erasto Maher, to the [facility]. Please see a copy of my visit note below.    NEURO-ONCOLOGY VISIT  Aug 31, 2020    CHIEF COMPLAINT: Mr. 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 He completed chemoradiotherapy on 8/30/2019 and was managed on a clinical trial receiving atezolizumab (anti-PDL1) plus adjuvant temozolomide. He last received an immunotherapy treatment on 3/16/2020 when imaging on 3/28/2020 showed progression of disease. He underwent a repeat craniotomy for tumor resection with Dr. Chan on 4/9/2020 with placement of GammaTiles. Aristeo is currently managed on lomustine monotherapy.     I met with Aristeo and Judith (wife) today for this follow-up visit.       HISTORY OF PRESENT ILLNESS  -Aristeo states that he is doing well today. Worked out everyday of August and feels great.   -Tolerated his last cycle of lomustine well; denied nausea.  -Less fatigued with 1/2 dose of Lexapro, however, mood worsened. So, returned to full dose and mood is again positive/ not depressed.    -Continued mild issues with proprioception/ spatial awareness with worsening coordination and dexterity, however, at baseline. Strength is good.   -Off dexamethasone.  -No recurrent seizure events. No recurrent bouts of \"heavy tiredness\".  -Taking Vitamin B supplement and Folic Acid supplement.     REVIEW OF SYSTEMS  A comprehensive ROS negative except as in HPI.      MEDICATIONS   Current Outpatient Medications   Medication Sig Dispense Refill     escitalopram (LEXAPRO) 10 MG tablet Take 0.5 tablets (5 mg) by mouth daily       famotidine (PEPCID) 20 MG tablet Take 20 mg by mouth       fluticasone (FLONASE) 50 " MCG/ACT nasal spray Spray 1 spray into both nostrils nightly as needed        levETIRAcetam (KEPPRA) 1000 MG tablet Take 1,000 mg by mouth 2 times daily        levothyroxine (SYNTHROID/LEVOTHROID) 125 MCG tablet Take 125 mcg by mouth daily       loratadine (CLARITIN) 10 MG tablet Take 10 mg by mouth nightly as needed        melatonin 5 MG tablet Take 5 mg by mouth nightly as needed        olopatadine (PATANOL) 0.1 % ophthalmic solution Place 1-2 drops into both eyes daily as needed        omeprazole (PRILOSEC) 20 MG DR capsule        ondansetron (ZOFRAN) 4 MG tablet Take 1 tablet (4 mg) by mouth At Bedtime prior to lomustine. Can repeat every 8 hours as needed for nausea. 30 tablet 1     Psyllium 500 MG CAPS Take 4 capsules by mouth At Bedtime       senna-docusate (SENOKOT-S/PERICOLACE) 8.6-50 MG tablet Take 1 tablet by mouth 2 times daily 30 tablet 0     DRUG ALLERGIES   Allergies   Allergen Reactions     No Clinical Screening - See Comments Rash     Cats and dogs         ONCOLOGIC HISTORY  -PRESENTATION: New onset seizures; complex partial event with speech arrest and altered mental status lasting several minutes. Later had secondary generalization. Started Keppra.   -MR brain imaging with a ring enhancing lesion in the right parietal lobe.    -6/27/2019 SURGERY: Right temporal craniotomy for mass resection, gross total resection by Dr. Tam Barreto.  PATHOLOGY: Glioblastoma; IDH1 R132H wildtype, MGMT promoter unmethylated  -7/3/2019 CLINICAL TRIAL: Evaluated by Dr. Wilmer Mckenna at [facility], consented for clinical trial [id number] (Standard of Care plus atezolizumab).  -7/22 - 8/30/2019 CHEMORADS: 60cGy with concurrent temozolomide 75mg/m2/day (145mg) plus atezolizumab Q2 weeks on clinical trial 2016-0867.  -9/6/2019 NEURO-ONC: Evaluated at [facility].   -9/25/2019 - 3/16/2020 CHEMO: Adjuvant chemotherapy; temozolomide + atezolizumab 840 mg IV on clinical trial 2016-0867.  -4/6/2020 NEURO-ONC: Communicated with  "Dr. Borrero. Plan for surgery and next steps pending pathology results.   -4/9/2020 SURGERY + RADS: Repeat craniotomy for surgical debulking plus placement of GammaTiles.  PATHOLOGY: Recurrent glioblastoma.   Next generation sequencing: Microsatellite instability: Stable. Tumor mutational burden: 4 (Low). APC, CDK4, MDM2, PTEN mutated. ARID1A, ASXL1, ATRX, DNMT3A, FANCE, KDM5C, MED12, MEF2B, NF1, RUNX1, TERT mutated. PD-L1 Negative.  -4/20/2020 NEURO-ONC/ CHEMO: Starting Lomustine 3 weeks post-op. Consented for next generation sequencing and sending most recent tumor sample.    -4/30/2020 CHEMO: Lomustine, cycle 1.   -5/11/2020 NEURO-ONC: Clinically well. Referral to genetic counseling.   -6/5/2020 NEURO-ONC/ MRB/ CHEMO: Clinically well. Imaging with a positive treatment response. Lomustine, cycle 2 (start date of 6/11).    -7/20/2020 NEURO-ONC/ MRB/ CHEMO: Clinically well. Imaging without concern; aside from subdural fluid collection. Labs at goal. Lomustine, cycle 3 (start date of 7/23).    -8/31/2020 NEURO-ONC/ MRB/ CHEMO: Clinically well. Imaging without concern. Labs at goal. Lomustine, cycle 4 (start date of 9/3).      SOCIAL HISTORY   Tobacco use: Never smoker.   Alcohol use: Social, infrequent.   Drug use: Denies marijuana use.  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       PHYSICAL EXAMINATION  /80 (BP Location: Right arm, Patient Position: Sitting, Cuff Size: Adult Regular)   Pulse 82   Temp 97  F (36.1  C) (Tympanic)   Resp 16   Wt 75.5 kg (166 lb 8 oz)   SpO2 96%   BMI 23.89 kg/m     Wt Readings from Last 2 Encounters:   08/31/20 75.5 kg (166 lb 8 oz)   07/20/20 75.9 kg (167 lb 6.4 oz)     Ht Readings from Last 2 Encounters:   07/20/20 1.778 m (5' 10\")   06/05/20 1.778 m (5' 10\")     -Generally well appearing.  -Respiratory: No audible wheezing.   -Skin: No rashes. Wearing a mask. Hair regrowing on right side of head.   -Psychiatric: Normal mood and affect. " Pleasant, talkative.  -Neurologic:   MENTAL STATUS:     Alert, oriented to date.    Recall: Intact.    Speech fluent.    Comprehension intact to multi-step commands.     CRANIAL NERVES:     Pupils are equal, round, reactive to light.     Extraocular movements full, patient denies diplopia.     Visual fields are full.    Symmetric facial movements.   Hearing intact.   With normal phonation, no dysfunction of the palate or tongue.   MOTOR:               No pronation or drift. No orbiting.              Able to rise from a chair without use of arms.              On toe/ heel walk, equal distance from floor to heels/ toes.   SENSATION:               Intact to light touch throughout.  COORDINATION:              Intact finger-nose with eyes open and closed.   GAIT:  Walks without assistance.              Good speed. Normal stride length and heel strike. Normal turns. Normal arm swing.              Able to toe, heel walk. Able to tandem walk.         MEDICAL RECORDS  Obtained and personally reviewed all available outside medical records in addition to reviewing any records available in our electronic system.     LABS  Personally reviewed all available lab results.     IMAGING  Personally reviewed MR brain imaging from today and compared to prior imaging. To my eye, there is a stable to slightly increased degree of associated T2 FLAIR about the resection cavity (increase seen in the posterior, inferior portion). There is a thin rim of contrast enhancement about the cavity with an area of thickening in the medial aspect that is again slightly smaller and continues to not have any increase in perfusion.     Subdural fluid collection remains, no change in size.     Imaging was shown to and results were reviewed with Rosita.    Imaging and case will be reviewed and discussed at Brain Tumor Conference.        IMPRESSION  Clinic time was spent discussing in detail the nature of his tumor in light of recent imaging results  and his current treatment plan.     Aristeo remains clinically stable with no new complaints. He tolerated the third cycle of lomustine well. Imaging without concerns today. Labs from 8/24 had platelets of 89, however, repeat labs from today are at goal for next cycle of chemotherapy. Will continue with cycle 4 of lomustine, due to start on 9/3. Will continue with lab monitoring the week of 9/28 and 10/5 and repeat imaging + labs on 10/12. Goal will be for 4-6 cycles of Lomustine.     Based on the results of his next generation sequencing, at disease recurrence, everolimus could be a treatment option. Clinical trials are also a potential option.     Evaluated by genetic counseling given the mutation found in APC or adenomatous polyposis coli, which is a key tumor suppressor gene that encodes for a large multi-domain protein to assess personal/ children's risk for colon cancer. Additional testing was declined and Aristeo already adheres to close colon cancer screening.     PROBLEM LIST  Glioblastoma, MGMT unmethylated and IDH1 wildtype by IHC  Seizure  Chemotherapy-associated constipation  Headaches  Apraxia    PLAN  -CANCER-DIRECTED THERAPY-  -As above; Starting cycle 4 of Lomustine this week. Labs to be repeated at week 4 and 5.  -Not interested in Lorena Gaxiola at this time.   -Previously provided information on the Brain Tumor Network for clinical trial options.     -STEROIDS-  -Off dexamethasone.    -SEIZURE MANAGEMENT-  -Given history of seizures, will continue current antiepileptic regimen of Keppra for the foreseeable future.    -Quality of life/ MOOD/ FATIGUE-  -Denies any mood issues.   -Continue Lexapro.   -Continue to monitor mood as untreated/ undertreated depression can worsen fatigue, dysorexia, and quality of life.    -OTHER-   -No chemotherapy/ cancer-related restrictions on travel.   -Wanting to drive to see family in [place] in October.     Return to clinic on 10/12 + imaging + labs.     Ericka Avila,  MD  Neuro-oncology

## 2020-10-08 NOTE — PROGRESS NOTES
"NEURO-ONCOLOGY VISIT  Oct 12, 2020    CHIEF COMPLAINT: Mr. 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 He completed chemoradiotherapy on 8/30/2019 and was managed on a clinical trial receiving atezolizumab (anti-PDL1) plus adjuvant temozolomide. He last received an immunotherapy treatment on 3/16/2020 when imaging on 3/28/2020 showed progression of disease. He underwent a repeat craniotomy for tumor resection with Dr. Chan on 4/9/2020 with placement of GammaTiles. Aristeo is currently managed on lomustine monotherapy.     I met with Aristeo and Judith (wife) today for this follow-up visit.     HISTORY OF PRESENT ILLNESS  -Aristeo states that he is doing well today. Continues to work out daily. Feels great. Still playing a ton of golf.  -Tolerated his last cycle of lomustine well; denied nausea.  -Good energy. Fatigued at times. Naps in the afternoon as needed.   -On Lexapro and mood is positive/ not depressed.    -Continued mild issues with proprioception/ spatial awareness. Strength is good.   -Off dexamethasone.  -No recurrent seizure events. No recurrent bouts of \"heavy tiredness\".  -Wanting to travel to see family in [place] later this month.     REVIEW OF SYSTEMS  A comprehensive ROS negative except as in HPI.      MEDICATIONS   Current Outpatient Medications   Medication Sig Dispense Refill     escitalopram (LEXAPRO) 10 MG tablet Take 0.5 tablets (5 mg) by mouth daily       famotidine (PEPCID) 20 MG tablet Take 20 mg by mouth       fluticasone (FLONASE) 50 MCG/ACT nasal spray Spray 1 spray into both nostrils nightly as needed        levETIRAcetam (KEPPRA) 1000 MG tablet Take 1,000 mg by mouth 2 times daily        levothyroxine (SYNTHROID/LEVOTHROID) 125 MCG tablet Take 125 mcg by mouth daily       loratadine (CLARITIN) 10 MG tablet Take 10 mg by mouth nightly as " needed        melatonin 5 MG tablet Take 5 mg by mouth nightly as needed        olopatadine (PATANOL) 0.1 % ophthalmic solution Place 1-2 drops into both eyes daily as needed        omeprazole (PRILOSEC) 20 MG DR capsule        ondansetron (ZOFRAN) 4 MG tablet Take 1 tablet (4 mg) by mouth At Bedtime prior to lomustine. Can repeat every 8 hours as needed for nausea. 30 tablet 1     Psyllium 500 MG CAPS Take 4 capsules by mouth At Bedtime       senna-docusate (SENOKOT-S/PERICOLACE) 8.6-50 MG tablet Take 1 tablet by mouth 2 times daily 30 tablet 0     lomustine (GLEOSTINE) 100 MG capsule Take 2 capsules (200 mg) by mouth once for 1 dose Take on an empty stomach. 2 capsule 0     DRUG ALLERGIES   Allergies   Allergen Reactions     No Clinical Screening - See Comments Rash     Cats and dogs         ONCOLOGIC HISTORY  -PRESENTATION: New onset seizures; complex partial event with speech arrest and altered mental status lasting several minutes. Later had secondary generalization. Started Keppra.   -MR brain imaging with a ring enhancing lesion in the right parietal lobe.    -6/27/2019 SURGERY: Right temporal craniotomy for mass resection, gross total resection by Dr. Tam Barreto.  PATHOLOGY: Glioblastoma; IDH1 R132H wildtype, MGMT promoter unmethylated  -7/3/2019 CLINICAL TRIAL: Evaluated by Dr. Wilmer Mckenna at [facility], consented for clinical trial [id number] (Standard of Care plus atezolizumab).  -7/22 - 8/30/2019 CHEMORADS: 60cGy with concurrent temozolomide 75mg/m2/day (145mg) plus atezolizumab Q2 weeks on clinical trial [id number].  -9/6/2019 NEURO-ONC: Evaluated at the [facility].   -9/25/2019 - 3/16/2020 CHEMO: Adjuvant chemotherapy; temozolomide + atezolizumab 840 mg IV on clinical trial 2843-8559.  -4/6/2020 NEURO-ONC: Communicated with Dr. Borrero. Plan for surgery and next steps pending pathology results.   -4/9/2020 SURGERY + RADS: Repeat craniotomy for surgical debulking plus placement of GammaTiles.  PATHOLOGY:  "Recurrent glioblastoma.   Next generation sequencing: Microsatellite instability: Stable. Tumor mutational burden: 4 (Low). APC, CDK4, MDM2, PTEN mutated. ARID1A, ASXL1, ATRX, DNMT3A, FANCE, KDM5C, MED12, MEF2B, NF1, RUNX1, TERT mutated. PD-L1 Negative.  -4/20/2020 NEURO-ONC/ CHEMO: Starting Lomustine 3 weeks post-op. Consented for next generation sequencing and sending most recent tumor sample.    -4/30/2020 CHEMO: Lomustine, cycle 1.   -5/11/2020 NEURO-ONC: Clinically well. Referral to genetic counseling.   -6/5/2020 NEURO-ONC/ MRB/ CHEMO: Clinically well. Imaging with a positive treatment response. Lomustine, cycle 2 (start date of 6/11).    -7/20/2020 NEURO-ONC/ MRB/ CHEMO: Clinically well. Imaging without concern; aside from subdural fluid collection. Labs at goal. Lomustine, cycle 3 (start date of 7/23).    -8/31/2020 NEURO-ONC/ MRB/ CHEMO: Clinically well. Imaging without concern. Labs at goal. Lomustine, cycle 4 (start date of 9/3).   -10/12/2020 NEURO-ONC/ MRB/ CHEMO: Clinically well. Imaging without concern for cancer growth, but the resection cavity is expanding with time. Per Dr. Chan; continue to monitor with no surgical intervention at this time. Labs at goal. Lomustine, cycle 5 (start date of 10/15).      SOCIAL HISTORY   Tobacco use: Never smoker.   Alcohol use: Social, infrequent.   Drug use: Denies marijuana use.  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       PHYSICAL EXAMINATION  BP (!) 138/92 (BP Location: Right arm, Patient Position: Sitting, Cuff Size: Adult Regular)   Pulse 96   Temp 97.3  F (36.3  C) (Tympanic)   Resp 16   Wt 74.5 kg (164 lb 4.8 oz)   SpO2 98%   BMI 23.57 kg/m     Wt Readings from Last 2 Encounters:   10/12/20 74.5 kg (164 lb 4.8 oz)   08/31/20 75.5 kg (166 lb 8 oz)     Ht Readings from Last 2 Encounters:   07/20/20 1.778 m (5' 10\")   06/05/20 1.778 m (5' 10\")     -Generally well appearing.  -Respiratory: No audible wheezing.   -Skin: No " rashes. Wearing a mask. Hair regrowing on right side of head.   -Psychiatric: Normal mood and affect. Pleasant, talkative.  -Neurologic:   MENTAL STATUS:     Alert, oriented to date.    Recall: Intact.    Speech fluent.    Comprehension intact to multi-step commands.     CRANIAL NERVES:     Pupils are equal, round, reactive to light.     Extraocular movements full, patient denies diplopia.     Visual fields are full.    Symmetric facial movements.   Hearing intact.   With normal phonation, no dysfunction of the palate or tongue.   MOTOR:               No pronation or drift.              Able to rise from a chair without use of arms.              On toe/ heel walk, equal distance from floor to heels/ toes.   SENSATION:               Intact to light touch throughout.  COORDINATION:              Intact finger-nose with eyes open and closed.   GAIT:  Walks without assistance.              Good speed. Normal stride length and heel strike. Normal turns. Normal arm swing.              Able to toe, heel walk. Able to tandem walk.         MEDICAL RECORDS  Obtained and personally reviewed all available outside medical records in addition to reviewing any records available in our electronic system.     LABS  Personally reviewed all available lab results.     IMAGING  Personally reviewed MR brain imaging from today and compared to prior imaging. To my eye, there is an increased degree of associated T2 FLAIR about the resection cavity, but it is evident that the cavity is also expanding in size in comparison to 3 months ago. This enlargement of the cavity could explain the increase in T2 FLAIR. There is a thin rim of contrast enhancement about the cavity with an area of thickening in the medial aspect that is again stable with no increase in perfusion.     Subdural fluid collection remains, no change in size.     Imaging was shown to and results were reviewed with Rosita.    Imaging and case will be reviewed and  discussed at Brain Tumor Conference.        IMPRESSION  Clinic time was spent discussing in detail the nature of his cancer in light of recent imaging results and his current treatment plan.     Aristeo remains clinically stable with no new complaints. He tolerated the four cycle of lomustine well. Imaging with no overtly concerning findings for cancer growth, however, there is an increase in the size of the resection cavity with a resulting increase in alphonso-cavitary T2 FLAIR signal. Imaging was discussed at Brain Tumor Conference today and Dr. Chan recommended watching for now with no surgical intervention. Labs from at goal for next cycle of chemotherapy. Will continue with cycle 5 of lomustine, due to start on 10/15. Will continue with lab monitoring the week of 11/9 and 11/16 and repeat imaging + labs on 11/23. Goal will be for 4-6 cycles of Lomustine.     Based on the results of his next generation sequencing, at disease recurrence, everolimus could be a treatment option. Clinical trials are also a potential option.     Evaluated by genetic counseling given the mutation found in APC or adenomatous polyposis coli, which is a key tumor suppressor gene that encodes for a large multi-domain protein to assess personal/ children's risk for colon cancer. Additional testing was declined and Aristeo already adheres to close colon cancer screening.     PROBLEM LIST  Glioblastoma, MGMT unmethylated and IDH1 wildtype by IHC  Seizure  Chemotherapy-associated constipation  Headaches  Apraxia    PLAN  -CANCER-DIRECTED THERAPY-  -As above; Starting cycle 5 of Lomustine later this week. Labs to be repeated at weeks 4 and 5.  -Not interested in [employer] at this time.   -Previously provided information on the [facility] for clinical trial options.    -STEROIDS-  -Off dexamethasone.    -SEIZURE MANAGEMENT-  -Given history of seizures, will continue current antiepileptic regimen of Keppra for the foreseeable future.    -Quality  of life/ MOOD/ FATIGUE-  -Denies any mood issues.   -Continue Lexapro.   -Continue to monitor mood as untreated/ undertreated depression can worsen fatigue, dysorexia, and quality of life.    Already got his flu vaccination this year.     Return to clinic on 11/23 + imaging + labs.     Ericka Avila MD  Neuro-oncology

## 2020-10-08 NOTE — PATIENT INSTRUCTIONS
Imaging reviewed; no concerning findings for cancer growth, but the resection cavity has been expanding with time.  I will discuss with Dr. Chan later today and you will be updated on the plan.      Labs at goal for next cycle of chemotherapy.     Cycle 5 of Lomustine to start on 10/15, unless you are scheduled to meet with Dr. Chan on Friday.   Labs week of 11/9 and 11/16 (local lab).    Return to clinic on 11/23 + imaging and labs.     Ericka Avila MD  Neuro-oncology  10/12/20

## 2020-10-12 NOTE — DISCHARGE INSTRUCTIONS
MRI Contrast Discharge Instructions    The IV contrast you received today will pass out of your body in your  urine. This will happen in the next 24 hours. You will not feel this process.  Your urine will not change color.    Drink at least 4 extra glasses of water or juice today (unless your doctor  has restricted your fluids). This reduces the stress on your kidneys.  You may take your regular medicines.    If you are on dialysis: It is best to have dialysis today.    If you have a reaction: Most reactions happen right away. If you have  any new symptoms after leaving the hospital (such as hives or swelling),  call your hospital at the correct number below. Or call your family doctor.  If you have breathing distress or wheezing, call 911.    Special instructions: ***    I have read and understand the above information.    Signature:______________________________________ Date:___________    Staff:__________________________________________ Date:___________     Time:__________    [facility] Departments:    ___[place]: [phone]  ___[facility]: [phone]  ___[place]: [phone] ___[facility]: [phone]  ___[facility]: [phone]  ___[facility]: [phone]  ___[phone]  ___[facility]: [phone]  ___[phone]

## 2020-10-12 NOTE — LETTER
"    10/12/2020         RE: Erasto Maher  [id number]  [place] [address]        Dear Colleague,    Thank you for referring your patient, Erasto Maher, to the [facility]. Please see a copy of my visit note below.    NEURO-ONCOLOGY VISIT  Oct 12, 2020    CHIEF COMPLAINT: Mr. 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 He completed chemoradiotherapy on 8/30/2019 and was managed on a clinical trial receiving atezolizumab (anti-PDL1) plus adjuvant temozolomide. He last received an immunotherapy treatment on 3/16/2020 when imaging on 3/28/2020 showed progression of disease. He underwent a repeat craniotomy for tumor resection with Dr. Chan on 4/9/2020 with placement of GammaTiles. Aristeo is currently managed on lomustine monotherapy.     I met with Aristeo and Judith (wife) today for this follow-up visit.     HISTORY OF PRESENT ILLNESS  -Aristeo states that he is doing well today. Continues to work out daily. Feels great. Still playing a ton of golf.  -Tolerated his last cycle of lomustine well; denied nausea.  -Good energy. Fatigued at times. Naps in the afternoon as needed.   -On Lexapro and mood is positive/ not depressed.    -Continued mild issues with proprioception/ spatial awareness. Strength is good.   -Off dexamethasone.  -No recurrent seizure events. No recurrent bouts of \"heavy tiredness\".  -Wanting to travel to see family in [place] later this month.     REVIEW OF SYSTEMS  A comprehensive ROS negative except as in HPI.      MEDICATIONS   Current Outpatient Medications   Medication Sig Dispense Refill     escitalopram (LEXAPRO) 10 MG tablet Take 0.5 tablets (5 mg) by mouth daily       famotidine (PEPCID) 20 MG tablet Take 20 mg by mouth       fluticasone (FLONASE) 50 MCG/ACT nasal spray Spray 1 spray into both " nostrils nightly as needed        levETIRAcetam (KEPPRA) 1000 MG tablet Take 1,000 mg by mouth 2 times daily        levothyroxine (SYNTHROID/LEVOTHROID) 125 MCG tablet Take 125 mcg by mouth daily       loratadine (CLARITIN) 10 MG tablet Take 10 mg by mouth nightly as needed        melatonin 5 MG tablet Take 5 mg by mouth nightly as needed        olopatadine (PATANOL) 0.1 % ophthalmic solution Place 1-2 drops into both eyes daily as needed        omeprazole (PRILOSEC) 20 MG DR capsule        ondansetron (ZOFRAN) 4 MG tablet Take 1 tablet (4 mg) by mouth At Bedtime prior to lomustine. Can repeat every 8 hours as needed for nausea. 30 tablet 1     Psyllium 500 MG CAPS Take 4 capsules by mouth At Bedtime       senna-docusate (SENOKOT-S/PERICOLACE) 8.6-50 MG tablet Take 1 tablet by mouth 2 times daily 30 tablet 0     lomustine (GLEOSTINE) 100 MG capsule Take 2 capsules (200 mg) by mouth once for 1 dose Take on an empty stomach. 2 capsule 0     DRUG ALLERGIES   Allergies   Allergen Reactions     No Clinical Screening - See Comments Rash     Cats and dogs         ONCOLOGIC HISTORY  -PRESENTATION: New onset seizures; complex partial event with speech arrest and altered mental status lasting several minutes. Later had secondary generalization. Started Keppra.   -MR brain imaging with a ring enhancing lesion in the right parietal lobe.    -6/27/2019 SURGERY: Right temporal craniotomy for mass resection, gross total resection by Dr. Tam Barreto.  PATHOLOGY: Glioblastoma; IDH1 R132H wildtype, MGMT promoter unmethylated  -7/3/2019 CLINICAL TRIAL: Evaluated by Dr. Wilmer Mckenna at [facility], consented for clinical trial 0601-7976 (Standard of Care plus atezolizumab).  -7/22 - 8/30/2019 CHEMORADS: 60cGy with concurrent temozolomide 75mg/m2/day (145mg) plus atezolizumab Q2 weeks on clinical trial 2236-2202.  -9/6/2019 NEURO-ONC: Evaluated at the [facility].   -9/25/2019 - 3/16/2020 CHEMO: Adjuvant chemotherapy; temozolomide +  atezolizumab 840 mg IV on clinical trial [id number].  -4/6/2020 NEURO-ONC: Communicated with Dr. Borrero. Plan for surgery and next steps pending pathology results.   -4/9/2020 SURGERY + RADS: Repeat craniotomy for surgical debulking plus placement of GammaTiles.  PATHOLOGY: Recurrent glioblastoma.   Next generation sequencing: Microsatellite instability: Stable. Tumor mutational burden: 4 (Low). APC, CDK4, MDM2, PTEN mutated. ARID1A, ASXL1, ATRX, DNMT3A, FANCE, KDM5C, MED12, MEF2B, NF1, RUNX1, TERT mutated. PD-L1 Negative.  -4/20/2020 NEURO-ONC/ CHEMO: Starting Lomustine 3 weeks post-op. Consented for next generation sequencing and sending most recent tumor sample.    -4/30/2020 CHEMO: Lomustine, cycle 1.   -5/11/2020 NEURO-ONC: Clinically well. Referral to genetic counseling.   -6/5/2020 NEURO-ONC/ MRB/ CHEMO: Clinically well. Imaging with a positive treatment response. Lomustine, cycle 2 (start date of 6/11).    -7/20/2020 NEURO-ONC/ MRB/ CHEMO: Clinically well. Imaging without concern; aside from subdural fluid collection. Labs at goal. Lomustine, cycle 3 (start date of 7/23).    -8/31/2020 NEURO-ONC/ MRB/ CHEMO: Clinically well. Imaging without concern. Labs at goal. Lomustine, cycle 4 (start date of 9/3).   -10/12/2020 NEURO-ONC/ MRB/ CHEMO: Clinically well. Imaging without concern for cancer growth, but the resection cavity is expanding with time. Per Dr. Chan; continue to monitor with no surgical intervention at this time. Labs at goal. Lomustine, cycle 5 (start date of 10/15).      SOCIAL HISTORY   Tobacco use: Never smoker.   Alcohol use: Social, infrequent.   Drug use: Denies marijuana use.  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       PHYSICAL EXAMINATION  BP (!) 138/92 (BP Location: Right arm, Patient Position: Sitting, Cuff Size: Adult Regular)   Pulse 96   Temp 97.3  F (36.3  C) (Tympanic)   Resp 16   Wt 74.5 kg (164 lb 4.8 oz)   SpO2 98%   BMI 23.57 kg/m     Wt  "Readings from Last 2 Encounters:   10/12/20 74.5 kg (164 lb 4.8 oz)   08/31/20 75.5 kg (166 lb 8 oz)     Ht Readings from Last 2 Encounters:   07/20/20 1.778 m (5' 10\")   06/05/20 1.778 m (5' 10\")     -Generally well appearing.  -Respiratory: No audible wheezing.   -Skin: No rashes. Wearing a mask. Hair regrowing on right side of head.   -Psychiatric: Normal mood and affect. Pleasant, talkative.  -Neurologic:   MENTAL STATUS:     Alert, oriented to date.    Recall: Intact.    Speech fluent.    Comprehension intact to multi-step commands.     CRANIAL NERVES:     Pupils are equal, round, reactive to light.     Extraocular movements full, patient denies diplopia.     Visual fields are full.    Symmetric facial movements.   Hearing intact.   With normal phonation, no dysfunction of the palate or tongue.   MOTOR:               No pronation or drift.              Able to rise from a chair without use of arms.              On toe/ heel walk, equal distance from floor to heels/ toes.   SENSATION:               Intact to light touch throughout.  COORDINATION:              Intact finger-nose with eyes open and closed.   GAIT:  Walks without assistance.              Good speed. Normal stride length and heel strike. Normal turns. Normal arm swing.              Able to toe, heel walk. Able to tandem walk.         MEDICAL RECORDS  Obtained and personally reviewed all available outside medical records in addition to reviewing any records available in our electronic system.     LABS  Personally reviewed all available lab results.     IMAGING  Personally reviewed MR brain imaging from today and compared to prior imaging. To my eye, there is an increased degree of associated T2 FLAIR about the resection cavity, but it is evident that the cavity is also expanding in size in comparison to 3 months ago. This enlargement of the cavity could explain the increase in T2 FLAIR. There is a thin rim of contrast enhancement about the cavity " with an area of thickening in the medial aspect that is again stable with no increase in perfusion.     Subdural fluid collection remains, no change in size.     Imaging was shown to and results were reviewed with Aristeo and Judith.    Imaging and case will be reviewed and discussed at Brain Tumor Conference.        IMPRESSION  Clinic time was spent discussing in detail the nature of his cancer in light of recent imaging results and his current treatment plan.     Aristeo remains clinically stable with no new complaints. He tolerated the four cycle of lomustine well. Imaging with no overtly concerning findings for cancer growth, however, there is an increase in the size of the resection cavity with a resulting increase in alphonso-cavitary T2 FLAIR signal. Imaging was discussed at Brain Tumor Conference today and Dr. Chan recommended watching for now with no surgical intervention. Labs from at goal for next cycle of chemotherapy. Will continue with cycle 5 of lomustine, due to start on 10/15. Will continue with lab monitoring the week of 11/9 and 11/16 and repeat imaging + labs on 11/23. Goal will be for 4-6 cycles of Lomustine.     Based on the results of his next generation sequencing, at disease recurrence, everolimus could be a treatment option. Clinical trials are also a potential option.     Evaluated by genetic counseling given the mutation found in APC or adenomatous polyposis coli, which is a key tumor suppressor gene that encodes for a large multi-domain protein to assess personal/ children's risk for colon cancer. Additional testing was declined and Aristeo already adheres to close colon cancer screening.     PROBLEM LIST  Glioblastoma, MGMT unmethylated and IDH1 wildtype by IHC  Seizure  Chemotherapy-associated constipation  Headaches  Apraxia    PLAN  -CANCER-DIRECTED THERAPY-  -As above; Starting cycle 5 of Lomustine later this week. Labs to be repeated at weeks 4 and 5.  -Not interested in [employer] at this time.    -Previously provided information on the [facility] Tumor Network for clinical trial options.    -STEROIDS-  -Off dexamethasone.    -SEIZURE MANAGEMENT-  -Given history of seizures, will continue current antiepileptic regimen of Keppra for the foreseeable future.    -Quality of life/ MOOD/ FATIGUE-  -Denies any mood issues.   -Continue Lexapro.   -Continue to monitor mood as untreated/ undertreated depression can worsen fatigue, dysorexia, and quality of life.    Already got his flu vaccination this year.     Return to clinic on 11/23 + imaging + labs.     Ericka Avila MD  Neuro-oncology

## 2020-10-12 NOTE — NURSING NOTE
"Oncology Rooming Note    October 12, 2020 9:58 AM   Erasto Maher is a 55 year old male who presents for:    Chief Complaint   Patient presents with     Blood Draw     Labs drawn via PIV by RN in lab. VS taken. Pt checked in for next appt     Oncology Clinic Visit     Return; Glioblastoma     Initial Vitals: BP (!) 138/92 (BP Location: Right arm, Patient Position: Sitting, Cuff Size: Adult Regular)   Pulse 96   Temp 97.3  F (36.3  C) (Tympanic)   Resp 16   Wt 74.5 kg (164 lb 4.8 oz)   SpO2 98%   BMI 23.57 kg/m   Estimated body mass index is 23.57 kg/m  as calculated from the following:    Height as of 7/20/20: 1.778 m (5' 10\").    Weight as of this encounter: 74.5 kg (164 lb 4.8 oz). Body surface area is 1.92 meters squared.  No Pain (0) Comment: Data Unavailable   No LMP for male patient.  Allergies reviewed: Yes  Medications reviewed: Yes    Medications: MEDICATION REFILLS NEEDED TODAY. Provider was notified.  Pharmacy name entered into [facility]:    [facility] - [place], [place] - [id number] - [place], [place] - [address] AT [facility] [address]  [facility] - [place], [place] - [address]    Clinical concerns: Refill of Chemo medication, Gleostein 100 mg  Dr Avila was notified.      Saray Go CMA              "

## 2020-11-01 NOTE — PROGRESS NOTES
Received call from patient's wife Anshu as the on-call oncology fellow regarding his Keppra prescription. She states that she requested a refill about a week ago but hasn't heard back. They are going out-of-town tomorrow and she wants to make sure they have enough supply on hand. I sent a Keppra refill prescription to his local pharmacy. No other questions or concerns.    Edilma Ortiz MD  Hematology-Oncology Fellow, PGY5

## 2020-11-09 NOTE — ORAL ONC MGMT
Oral Chemotherapy Monitoring Program  Lab Follow Up    Reviewed lab results from 11/2.    ORAL CHEMOTHERAPY 4/20/2020 6/18/2020 8/13/2020 11/9/2020   Assessment Type - - - Lab Monitoring   Diagnosis Code - - - Brain Cancer - Glioblastoma   Providers - - - Dr. Avila   Clinic Name/Location - - - [facility]   Drug Name (No Data) (No Data) (No Data) (No Data)   Dose - - - (No Data)   Current Schedule Daily - (No Data) (No Data)   Cycle Details Other Other (No Data) (No Data)   Start Date of Last Cycle (No Data) 6/11/2020 7/23/2020 10/15/2020   Planned next cycle start date 4/30/2020 - 9/3/2020 -   Doses missed in last 2 weeks - 0 0 -   Adherence Assessment - Adherent Adherent -   Adverse Effects - Fatigue No AE identified during assessment -   Fatigue - (No Data) - -   Any new drug interactions? No No No -   Is the dose as ordered appropriate for the patient? Yes Yes Yes -   Has the patient missed any days of school, work, or other routine activity? - No - -       Labs:  _  Result Component Current Result Ref Range   Sodium 138 (10/12/2020) 133 - 144 mmol/L     _  Result Component Current Result Ref Range   Potassium 4.1 (10/12/2020) 3.4 - 5.3 mmol/L     _  Result Component Current Result Ref Range   Calcium 8.7 (10/12/2020) 8.5 - 10.1 mg/dL     No results found for Mag within last 30 days.     No results found for Phos within last 30 days.     _  Result Component Current Result Ref Range   Albumin 3.8 (10/12/2020) 3.4 - 5.0 g/dL     _  Result Component Current Result Ref Range   Urea Nitrogen 12 (10/12/2020) 7 - 30 mg/dL     _  Result Component Current Result Ref Range   Creatinine 0.84 (10/12/2020) 0.66 - 1.25 mg/dL     _  Result Component Current Result Ref Range   AST 15 (10/12/2020) 0 - 45 U/L     _  Result Component Current Result Ref Range   ALT 28 (10/12/2020) 0 - 70 U/L     _  Result Component Current Result Ref Range   Bilirubin Total 0.6 (10/12/2020) 0.2 - 1.3 mg/dL     _  Result Component Current Result Ref  Range   WBC 4.3 (10/12/2020) 4.0 - 11.0 10e9/L     _  Result Component Current Result Ref Range   Hemoglobin 12.6 (L) (10/12/2020) 13.3 - 17.7 g/dL     _  Result Component Current Result Ref Range   Platelet Count 137 (L) (10/12/2020) 150 - 450 10e9/L     _  Result Component Current Result Ref Range   Absolute Neutrophil 2.8 (10/12/2020) 1.6 - 8.3 10e9/L       Assessment & Plan:  Called Ar[facility] and spoke with him and his wife. There were no concerning abnormalities on his labs from 11/2. We spoke about how his PLT and WBC had dropped a little bit as they have in the past and to be aware of bleeding and infection. He is feeling great and has no concerns with these things. [place] and plan to return and get labs on 11/16.    Questions answered to patient's satisfaction.    Follow-Up:  Review labs on 11/16    Jory Foster  Pharmacy Intern  [facility]  [phone]

## 2020-11-21 NOTE — PATIENT INSTRUCTIONS
Imaging with a new area of concern away from the surgical cavity  I will discuss at Brain Tumor Conference and you will be updated on the plan.      Likely to be scheduled with Dr. Chan on Friday in his clinic to discuss surgical options.     Holding lomustine.     [facility] for clinical trial options.    Ericka Avila MD  Neuro-oncology  11/23/20

## 2020-11-21 NOTE — PROGRESS NOTES
"Erasto Maher is a 55 year old male who is being evaluated via a billable video visit.      The patient has been notified of following:     \"This video visit will be conducted via a call between you and your physician/provider. We have found that certain health care needs can be provided without the need for an in-person physical exam.  This service lets us provide the care you need with a video conversation.  If a prescription is necessary we can send it directly to your pharmacy.  If lab work is needed we can place an order for that and you can then stop by our lab to have the test done at a later time.    Video visits are billed at different rates depending on your insurance coverage.  Please reach out to your insurance provider with any questions.    If during the course of the call the physician/provider feels a video visit is not appropriate, you will not be charged for this service.\"    Patient has given verbal consent for Video visit? Yes  How would you like to obtain your AVS? MyChart  If you are dropped from the video visit, the video invite should be resent to: Text to cell phone: [phone]  Will anyone else be joining your video visit? No    /84   Pulse 81   Temp 97.8  F (36.6  C) (Tympanic)   Resp 16   Wt 76.3 kg (168 lb 3.2 oz)   SpO2 98%   BMI 24.13 kg/m      I have reviewed and updated patient's allergy and medication list.    Concerns: Covid19 Vaccine options  Refills: None    Misa Presley CMA      Video-Visit Details  Type of service:  Video Visit    Video Start Time: 9:44 AM  Video End Time: 10:13 AM    Originating Location (pt. Location): Home    Distant Location (provider location):  [facility]     Platform used for Video Visit: Aliza Avila MD    ___________________________________________________________    NEURO-ONCOLOGY VISIT  Nov 23, 2020    CHIEF COMPLAINT: Mr. 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 He completed chemoradiotherapy on 8/30/2019 and was managed on a clinical trial receiving atezolizumab (anti-PDL1) plus adjuvant temozolomide. He received his last dose of immunotherapy on 3/16/2020 when imaging on 3/28/2020 showed progression of disease. He underwent a repeat craniotomy for tumor resection with Dr. Chan on 4/9/2020 with placement of GammaTiles.     Aristeo is managed on lomustine monotherapy. However, imaging from today showed a new distant lesion consistent with disease progression. Will stop lomustine and refer to Dr. Chan to discuss surgical options.     I met with Aristeo and Judith (wife) today for this follow-up visit.     HISTORY OF PRESENT ILLNESS  -Aristeo states that he is doing well today. Continues to work out daily. Feels great.  -Tolerated his last cycle of lomustine well; denied nausea.  -Good energy. Fatigued at times. Naps in the afternoon as needed.   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 Reading and spelling are fine, no changes.  -On Lexapro and mood is positive/ not depressed.    -Continued mild issues with proprioception/ spatial awareness. Strength is good.   -Off dexamethasone.  -No recurrent seizure events. Having bouts of \"heavy tiredness\" occurring about 2-3 times a week.  -He traveled to see family in [place] last month and this was an enjoyable time.     REVIEW OF SYSTEMS  A comprehensive ROS negative except as in HPI.      MEDICATIONS   Current Outpatient Medications   Medication Sig Dispense Refill     escitalopram (LEXAPRO) 10 MG tablet Take 0.5 tablets (5 mg) by mouth daily       fluticasone (FLONASE) 50 MCG/ACT nasal spray Spray 1 spray into both nostrils nightly as needed        levETIRAcetam (KEPPRA) 1000 MG tablet Take 1 tablet (1,000 mg) by mouth 2 times daily 180 tablet 1 "     levothyroxine (SYNTHROID/LEVOTHROID) 125 MCG tablet Take 125 mcg by mouth daily       loratadine (CLARITIN) 10 MG tablet Take 10 mg by mouth nightly as needed        melatonin 5 MG tablet Take 5 mg by mouth nightly as needed        olopatadine (PATANOL) 0.1 % ophthalmic solution Place 1-2 drops into both eyes daily as needed        omeprazole (PRILOSEC) 20 MG DR capsule        ondansetron (ZOFRAN) 4 MG tablet Take 1 tablet (4 mg) by mouth At Bedtime prior to lomustine. Can repeat every 8 hours as needed for nausea. 30 tablet 1     Psyllium 500 MG CAPS Take 4 capsules by mouth At Bedtime       senna-docusate (SENOKOT-S/PERICOLACE) 8.6-50 MG tablet Take 1 tablet by mouth 2 times daily 30 tablet 0     DRUG ALLERGIES   Allergies   Allergen Reactions     No Clinical Screening - See Comments Rash     Cats and dogs         ONCOLOGIC HISTORY  -PRESENTATION: New onset seizures; complex partial event with speech arrest and altered mental status lasting several minutes. Later had secondary generalization. Started Keppra.   -MR brain imaging with a ring enhancing lesion in the right parietal lobe.    -6/27/2019 SURGERY: Right temporal craniotomy for mass resection, gross total resection by Dr. Tam Barreto.  PATHOLOGY: Glioblastoma; IDH1 R132H wildtype, MGMT promoter unmethylated  -7/3/2019 CLINICAL TRIAL: Evaluated by Dr. Wilmer Mckenna at [facility], consented for clinical trial 3420-1097 (Standard of Care plus atezolizumab).  -7/22 - 8/30/2019 CHEMORADS: 60cGy with concurrent temozolomide 75mg/m2/day (145mg) plus atezolizumab Q2 weeks on clinical trial 2016-0867.  -9/6/2019 NEURO-ONC: Evaluated at the [facility].   -9/25/2019 - 3/16/2020 CHEMO: Adjuvant chemotherapy; temozolomide + atezolizumab 840 mg IV on clinical trial 2016-0867.  -4/6/2020 NEURO-ONC: Communicated with Dr. Borrero. Plan for surgery and next steps pending pathology results.   -4/9/2020 SURGERY + RADS: Repeat craniotomy for surgical debulking plus placement  of GammaTiles.  PATHOLOGY: Recurrent glioblastoma.   Next generation sequencing: Microsatellite instability: Stable. Tumor mutational burden: 4 (Low). APC, CDK4, MDM2, PTEN mutated. ARID1A, ASXL1, ATRX, DNMT3A, FANCE, KDM5C, MED12, MEF2B, NF1, RUNX1, TERT mutated. PD-L1 Negative.  -4/20/2020 NEURO-ONC/ CHEMO: Starting Lomustine 3 weeks post-op. Consented for next generation sequencing and sending most recent tumor sample.    -4/30/2020 CHEMO: Lomustine, cycle 1.   -5/11/2020 NEURO-ONC: Clinically well. Referral to genetic counseling.   -6/5/2020 NEURO-ONC/ MRB/ CHEMO: Clinically well. Imaging with a positive treatment response. Lomustine, cycle 2 (start date of 6/11).    -7/20/2020 NEURO-ONC/ MRB/ CHEMO: Clinically well. Imaging without concern; aside from subdural fluid collection. Labs at goal. Lomustine, cycle 3 (start date of 7/23).    -8/31/2020 NEURO-ONC/ MRB/ CHEMO: Clinically well. Imaging without concern. Labs at goal. Lomustine, cycle 4 (start date of 9/3).   -10/12/2020 NEURO-ONC/ MRB/ CHEMO: Clinically well. Imaging without concern for cancer growth, but the resection cavity is expanding with time. Per Dr. Chan; continue to monitor with no surgical intervention at this time. Labs at goal. Lomustine, cycle 5 (start date of 10/15).    -11/23/2020 NEURO-ONC/ MRB: Clinically well. Imaging with a new distant lesion. Stopping lomustine. Referral to Dr. Chan.      SOCIAL HISTORY   Tobacco use: Never smoker.   Alcohol use: Social, infrequent.   Drug use: Denies marijuana use.  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   . 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       PHYSICAL EXAMINATION  -Generally well appearing.  -Respiratory: No audible wheezing.   -Skin: No rashes.   -Psychiatric: Normal mood and affect. Pleasant, talkative.  -Neurologic:   MENTAL STATUS:     Alert, oriented to date.    Recall: Intact.    Speech fluent.    Comprehension intact to multi-step commands.     CRANIAL NERVES:     Pupils are equal, round, reactive  to light.     Extraocular movements full, patient denies diplopia.     Visual fields are full.    Symmetric facial movements.   Hearing intact.   With normal phonation, no dysfunction of the palate or tongue.   MOTOR:               No pronation or drift.              Able to rise from a chair without use of arms.              On toe/ heel walk, equal distance from floor to heels/ toes.   SENSATION:               Intact to light touch throughout.  COORDINATION:              Intact finger-nose with eyes open and closed.     The rest of a comprehensive physical examination is deferred due to PHE (public health emergency) video visit restrictions.         MEDICAL RECORDS  Obtained and personally reviewed all available outside medical records in addition to reviewing any records available in our electronic system.     LABS  Personally reviewed all available lab results.     IMAGING  Personally reviewed MR brain imaging from today and compared to prior imaging. To my eye, there is a new contrast enhancing lesion in the superior right parietal lobe. Perfusion pending. Concerned for distant disease progression.     Imaging reviewed with Aristeo and Judith.    Imaging and case will be reviewed and discussed at Brain Tumor Conference.        IMPRESSION  Clinic time was spent discussing in detail the nature of his cancer in light of recent imaging results and his current treatment plan.     Aristeo remains clinically stable with no new complaints. He tolerated the fifth cycle of lomustine well. However, imaging from today is concerning for distant disease progression. Will stop lomustine and refer back to Dr. Chan to discuss options for surgical intervention. Labs from today with no concerns.    PROBLEM LIST  Glioblastoma, MGMT unmethylated and IDH1 wildtype by IHC  Seizure  Chemotherapy-associated constipation  Headaches  Apraxia    PLAN  -CANCER-DIRECTED THERAPY-  -As above.  -Provided information about the Brain Tumor Network  for clinical trial options.    -STEROIDS-  -Off dexamethasone.    -SEIZURE MANAGEMENT-  -Given history of seizures, will continue current antiepileptic regimen of Keppra for the foreseeable future.    -Quality of life/ MOOD/ FATIGUE-  -Denies any mood issues.   -Continue Lexapro.   -Continue to monitor mood as untreated/ undertreated depression can worsen fatigue, dysorexia, and quality of life.    Already received his flu vaccination this year.     Return to clinic pending treatment plan.      Ericka Avila MD  Neuro-oncology

## 2020-11-23 NOTE — NURSING NOTE
Chief Complaint   Patient presents with     Blood Draw     Labs drawn via PIV by RN in lab. VS taken.      Angie Solis RN

## 2020-11-23 NOTE — TUMOR CONFERENCE
Tumor Conference Information  Tumor Conference: Brain  Specialties Present: Medical oncology, Pathology, Radiology, Radiation oncology, Surgery  Patient Status: A current patient, Recurrence  Pathology: Not Discussed  Treatment to Date: Surgical intervention(s), Chemoradiation, Adjuvant chemotherapy  Clinical Trial Eligibility: Await additional information (Comment: could be eligible for  but currently unable to open trials due to COVID surge)  Recommended Plan: Other (see comment) (Comment: reviewed imaging, new areas of disease and local recurrence seen; could consider ablation, radiation to new sites, new chemotherapy, consider clinical trial or xiopharm)  Did the review exceed 30 minutes?: did not           Documentation / Disclaimer Cancer Tumor Board Note  Cancer tumor board recommendations do not override what is determined to be reasonable care and treatment, which is dependent on the circumstances of a patient's case; the patient's medical, social, and personal concerns; and the clinical judgment of the oncologist [physician].

## 2020-11-23 NOTE — DISCHARGE INSTRUCTIONS
MRI Contrast Discharge Instructions    The IV contrast you received today will pass out of your body in your  urine. This will happen in the next 24 hours. You will not feel this process.  Your urine will not change color.    Drink at least 4 extra glasses of water or juice today (unless your doctor  has restricted your fluids). This reduces the stress on your kidneys.  You may take your regular medicines.    If you are on dialysis: It is best to have dialysis today.    If you have a reaction: Most reactions happen right away. If you have  any new symptoms after leaving the hospital (such as hives or swelling),  call your hospital at the correct number below. Or call your family doctor.  If you have breathing distress or wheezing, call 911.    Special instructions: ***    I have read and understand the above information.    Signature:______________________________________ Date:___________    Staff:__________________________________________ Date:___________     Time:__________    [facility] Departments:    ___[place]: [phone]  ___[facility]: [phone]  ___[place]: [phone] ___[facility]: [phone]  ___[facility]: [phone]  ___[facility]: [phone]  ___[phone]  ___[facility]: [phone]  ___[phone]

## 2020-11-23 NOTE — LETTER
"    11/23/2020         RE: Erasto Maher  [id number]  [facility] [address]        Dear Colleague,    Thank you for referring your patient, Erasto Maher, to the [facility]. Please see a copy of my visit note below.    Erasto Maher is a 55 year old male who is being evaluated via a billable video visit.      The patient has been notified of following:     \"This video visit will be conducted via a call between you and your physician/provider. We have found that certain health care needs can be provided without the need for an in-person physical exam.  This service lets us provide the care you need with a video conversation.  If a prescription is necessary we can send it directly to your pharmacy.  If lab work is needed we can place an order for that and you can then stop by our lab to have the test done at a later time.    Video visits are billed at different rates depending on your insurance coverage.  Please reach out to your insurance provider with any questions.    If during the course of the call the physician/provider feels a video visit is not appropriate, you will not be charged for this service.\"    Patient has given verbal consent for Video visit? Yes  How would you like to obtain your AVS? MyChart  If you are dropped from the video visit, the video invite should be resent to: Text to cell phone: [phone]  Will anyone else be joining your video visit? No    /84   Pulse 81   Temp 97.8  F (36.6  C) (Tympanic)   Resp 16   Wt 76.3 kg (168 lb 3.2 oz)   SpO2 98%   BMI 24.13 kg/m      I have reviewed and updated patient's allergy and medication list.    Concerns: Covid19 Vaccine options  Refills: None    Misa Presley CMA      Video-Visit Details  Type of service:  Video Visit    Video Start Time: 9:44 AM  Video End Time: 10:13 AM    Originating Location (pt. Location): Home    Distant Location (provider location):  [facility] " "[facility]     Platform used for Video Visit: Aliza Avila MD    ___________________________________________________________    NEURO-ONCOLOGY VISIT  Nov 23, 2020    CHIEF COMPLAINT: Mr. 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 He completed chemoradiotherapy on 8/30/2019 and was managed on a clinical trial receiving atezolizumab (anti-PDL1) plus adjuvant temozolomide. He received his last dose of immunotherapy on 3/16/2020 when imaging on 3/28/2020 showed progression of disease. He underwent a repeat craniotomy for tumor resection with Dr. Chan on 4/9/2020 with placement of GammaTiles.     Aristeo is managed on lomustine monotherapy. However, imaging from today showed a new distant lesion consistent with disease progression. Will stop lomustine and refer to Dr. Chan to discuss surgical options.     I met with Aristeo and Judith (wife) today for this follow-up visit.     HISTORY OF PRESENT ILLNESS  -Aristeo states that he is doing well today. Continues to work out daily. Feels great.  -Tolerated his last cycle of lomustine well; denied nausea.  -Good energy. Fatigued at times. Naps in the afternoon as needed.   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 Reading and spelling are fine, no changes.  -On Lexapro and mood is positive/ not depressed.    -Continued mild issues with proprioception/ spatial awareness. Strength is good.   -Off dexamethasone.  -No recurrent seizure events. Having bouts of \"heavy tiredness\" occurring about 2-3 times a week.  -He traveled to see family in [place] last month and this was an enjoyable time.     REVIEW OF SYSTEMS  A comprehensive ROS negative except as in HPI.      MEDICATIONS   Current Outpatient Medications   Medication Sig Dispense Refill     " escitalopram (LEXAPRO) 10 MG tablet Take 0.5 tablets (5 mg) by mouth daily       fluticasone (FLONASE) 50 MCG/ACT nasal spray Spray 1 spray into both nostrils nightly as needed        levETIRAcetam (KEPPRA) 1000 MG tablet Take 1 tablet (1,000 mg) by mouth 2 times daily 180 tablet 1     levothyroxine (SYNTHROID/LEVOTHROID) 125 MCG tablet Take 125 mcg by mouth daily       loratadine (CLARITIN) 10 MG tablet Take 10 mg by mouth nightly as needed        melatonin 5 MG tablet Take 5 mg by mouth nightly as needed        olopatadine (PATANOL) 0.1 % ophthalmic solution Place 1-2 drops into both eyes daily as needed        omeprazole (PRILOSEC) 20 MG DR capsule        ondansetron (ZOFRAN) 4 MG tablet Take 1 tablet (4 mg) by mouth At Bedtime prior to lomustine. Can repeat every 8 hours as needed for nausea. 30 tablet 1     Psyllium 500 MG CAPS Take 4 capsules by mouth At Bedtime       senna-docusate (SENOKOT-S/PERICOLACE) 8.6-50 MG tablet Take 1 tablet by mouth 2 times daily 30 tablet 0     DRUG ALLERGIES   Allergies   Allergen Reactions     No Clinical Screening - See Comments Rash     Cats and dogs         ONCOLOGIC HISTORY  -PRESENTATION: New onset seizures; complex partial event with speech arrest and altered mental status lasting several minutes. Later had secondary generalization. Started Keppra.   -MR brain imaging with a ring enhancing lesion in the right parietal lobe.    -6/27/2019 SURGERY: Right temporal craniotomy for mass resection, gross total resection by Dr. Tam Barreto.  PATHOLOGY: Glioblastoma; IDH1 R132H wildtype, MGMT promoter unmethylated  -7/3/2019 CLINICAL TRIAL: Evaluated by Dr. Wilmer Mckenna at [facility], consented for clinical trial 9698-7746 (Standard of Care plus atezolizumab).  -7/22 - 8/30/2019 CHEMORADS: 60cGy with concurrent temozolomide 75mg/m2/day (145mg) plus atezolizumab Q2 weeks on clinical trial [id number].  -9/6/2019 NEURO-ONC: Evaluated at the [facility].   -9/25/2019 - 3/16/2020 CHEMO:  Adjuvant chemotherapy; temozolomide + atezolizumab 840 mg IV on clinical trial 2843-2304.  -4/6/2020 NEURO-ONC: Communicated with Dr. Borrero. Plan for surgery and next steps pending pathology results.   -4/9/2020 SURGERY + RADS: Repeat craniotomy for surgical debulking plus placement of GammaTiles.  PATHOLOGY: Recurrent glioblastoma.   Next generation sequencing: Microsatellite instability: Stable. Tumor mutational burden: 4 (Low). APC, CDK4, MDM2, PTEN mutated. ARID1A, ASXL1, ATRX, DNMT3A, FANCE, KDM5C, MED12, MEF2B, NF1, RUNX1, TERT mutated. PD-L1 Negative.  -4/20/2020 NEURO-ONC/ CHEMO: Starting Lomustine 3 weeks post-op. Consented for next generation sequencing and sending most recent tumor sample.    -4/30/2020 CHEMO: Lomustine, cycle 1.   -5/11/2020 NEURO-ONC: Clinically well. Referral to genetic counseling.   -6/5/2020 NEURO-ONC/ MRB/ CHEMO: Clinically well. Imaging with a positive treatment response. Lomustine, cycle 2 (start date of 6/11).    -7/20/2020 NEURO-ONC/ MRB/ CHEMO: Clinically well. Imaging without concern; aside from subdural fluid collection. Labs at goal. Lomustine, cycle 3 (start date of 7/23).    -8/31/2020 NEURO-ONC/ MRB/ CHEMO: Clinically well. Imaging without concern. Labs at goal. Lomustine, cycle 4 (start date of 9/3).   -10/12/2020 NEURO-ONC/ MRB/ CHEMO: Clinically well. Imaging without concern for cancer growth, but the resection cavity is expanding with time. Per Dr. Chan; continue to monitor with no surgical intervention at this time. Labs at goal. Lomustine, cycle 5 (start date of 10/15).    -11/23/2020 NEURO-ONC/ MRB: Clinically well. Imaging with a new distant lesion. Stopping lomustine. Referral to Dr. Chan.      SOCIAL HISTORY   Tobacco use: Never smoker.   Alcohol use: Social, infrequent.   Drug use: Denies marijuana use.  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   . 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       PHYSICAL EXAMINATION  -Generally well appearing.  -Respiratory: No audible  wheezing.   -Skin: No rashes.   -Psychiatric: Normal mood and affect. Pleasant, talkative.  -Neurologic:   MENTAL STATUS:     Alert, oriented to date.    Recall: Intact.    Speech fluent.    Comprehension intact to multi-step commands.     CRANIAL NERVES:     Pupils are equal, round, reactive to light.     Extraocular movements full, patient denies diplopia.     Visual fields are full.    Symmetric facial movements.   Hearing intact.   With normal phonation, no dysfunction of the palate or tongue.   MOTOR:               No pronation or drift.              Able to rise from a chair without use of arms.              On toe/ heel walk, equal distance from floor to heels/ toes.   SENSATION:               Intact to light touch throughout.  COORDINATION:              Intact finger-nose with eyes open and closed.     The rest of a comprehensive physical examination is deferred due to PHE (public health emergency) video visit restrictions.         MEDICAL RECORDS  Obtained and personally reviewed all available outside medical records in addition to reviewing any records available in our electronic system.     LABS  Personally reviewed all available lab results.     IMAGING  Personally reviewed MR brain imaging from today and compared to prior imaging. To my eye, there is a new contrast enhancing lesion in the superior right parietal lobe. Perfusion pending. Concerned for distant disease progression.     Imaging reviewed with Aristeo and Judith.    Imaging and case will be reviewed and discussed at Brain Tumor Conference.        IMPRESSION  Clinic time was spent discussing in detail the nature of his cancer in light of recent imaging results and his current treatment plan.     Aristeo remains clinically stable with no new complaints. He tolerated the fifth cycle of lomustine well. However, imaging from today is concerning for distant disease progression. Will stop lomustine and refer back to Dr. Chan to discuss options for  surgical intervention. Labs from today with no concerns.    PROBLEM LIST  Glioblastoma, MGMT unmethylated and IDH1 wildtype by IHC  Seizure  Chemotherapy-associated constipation  Headaches  Apraxia    PLAN  -CANCER-DIRECTED THERAPY-  -As above.  -Provided information about the [facility] for clinical trial options.    -STEROIDS-  -Off dexamethasone.    -SEIZURE MANAGEMENT-  -Given history of seizures, will continue current antiepileptic regimen of Keppra for the foreseeable future.    -Quality of life/ MOOD/ FATIGUE-  -Denies any mood issues.   -Continue Lexapro.   -Continue to monitor mood as untreated/ undertreated depression can worsen fatigue, dysorexia, and quality of life.    Already received his flu vaccination this year.     Return to clinic pending treatment plan.      Ericka Avila MD  Neuro-oncology         Again, thank you for allowing me to participate in the care of your patient.        Sincerely,        Ericka Avila MD

## 2020-11-27 NOTE — LETTER
11/27/2020      RE: Erasto Maher  [address]  [facility] [address]       Video Visit    55 M with recurrent glioblastoma:    Presented with right parietal glioblastoma, underwent GTR 6/27/2019 (IDHwt, MGMT promoter unmethylated)    Enrolled in clinical trial with atezolizumab (anti-PDL1) + TMZ + RT trial    Recurred 3/2020; repeat GTR with gammatile implant on 4/9/2020, subsequently on Lomustine monotherapy    Local control achieved with gammatile but two new contrast enhancing spots found > 1 cm from the resection cavity (MRI from 11/23/2020).    Patient presents today to discuss therapeutic options.    No new complaints on review of systems.    Examination non-focal    MRI from 11/23/2020 is as above described. There are two sub-centimeter CE+ lesions > 1 cm from the resection cavity. Both regions showed increased perfusion.    AP: 55 M with third recurrent of glioblastoma. We had reviewed various therapeutic options today. The patient would like to further pursue EAP for the Ziopharm Adv/Veledemex option. I will forward an application to the company/FDA for consideration. I have also reviewed SRS as an interim treatment option for interim tumor control. The patient and his wife is amenable to this arrangement. I will proceed to connect the patient with Radiation Oncology for this treatment.    I have spent 45 minutes today, with the majority of the visit counseling the patient on the diagnosis. All questions were answered. Over 50% of my time on the unit was spent counseling the patient and coordinating care regarding his recurrent glioblastoma.        Marquise Chan MD

## 2020-11-27 NOTE — NURSING NOTE
"Erasto Maher is a 55 year old male who is being evaluated via a billable video visit.      The patient has been notified of following:     \"This video visit will be conducted via a call between you and your physician/provider. We have found that certain health care needs can be provided without the need for an in-person physical exam.  This service lets us provide the care you need with a video conversation.  If a prescription is necessary we can send it directly to your pharmacy.  If lab work is needed we can place an order for that and you can then stop by our lab to have the test done at a later time.    Video visits are billed at different rates depending on your insurance coverage.  Please reach out to your insurance provider with any questions.    If during the course of the call the physician/provider feels a video visit is not appropriate, you will not be charged for this service.\"    Patient has given verbal consent for Video visit? Yes  How would you like to obtain your AVS? MyChart  If you are dropped from the video visit, the video invite should be resent to: Send to e-mail at: [email or web address]  Will anyone else be joining your video visit? No      I have reviewed and updated the patient's allergies and medication list.    Concerns: no new concerns  Refills: none needed    Vitals - Patient Reported  Weight (Patient Reported): 74.8 kg (165 lb)  Height (Patient Reported): 177.8 cm (5' 10\")  BMI (Based on Pt Reported Ht/Wt): 23.67  Pain Score: No Pain (0)               Korina Lebron CMA      "

## 2020-11-27 NOTE — LETTER
11/27/2020         RE: Erasto Maher  [id number]  [facility] [address]        Dear Colleague,    Thank you for referring your patient, Erasto Maher, to the [facility]. Please see a copy of my visit note below.    Video Visit    55 M with recurrent glioblastoma:    Presented with right parietal glioblastoma, underwent GTR 6/27/2019 (IDHwt, MGMT promoter unmethylated)    Enrolled in clinical trial with atezolizumab (anti-PDL1) + TMZ + RT trial    Recurred 3/2020; repeat GTR with gammatile implant on 4/9/2020, subsequently on Lomustine monotherapy    Local control achieved with gammatile but two new contrast enhancing spots found > 1 cm from the resection cavity (MRI from 11/23/2020).    Patient presents today to discuss therapeutic options.    No new complaints on review of systems.    Examination non-focal    MRI from 11/23/2020 is as above described. There are two sub-centimeter CE+ lesions > 1 cm from the resection cavity. Both regions showed increased perfusion.    AP: 55 M with third recurrent of glioblastoma. We had reviewed various therapeutic options today. The patient would like to further pursue EAP for the Ziopharm Adv/Veledemex option. I will forward an application to the company/FDA for consideration. I have also reviewed SRS as an interim treatment option for interim tumor control. The patient and his wife is amenable to this arrangement. I will proceed to connect the patient with Radiation Oncology for this treatment.    I have spent 45 minutes today, with the majority of the visit counseling the patient on the diagnosis. All questions were answered. Over 50% of my time on the unit was spent counseling the patient and coordinating care regarding his recurrent glioblastoma.        Again, thank you for allowing me to participate in the care of your patient.        Sincerely,        Marquise Chan MD

## 2020-12-02 NOTE — TELEPHONE ENCOUNTER
Talked with patient wife with patient permission   Given date and times of surgery, covid and pre and post op.    Patient confirmed.

## 2020-12-02 NOTE — PROGRESS NOTES
Video Visit    55 M with recurrent glioblastoma:    Presented with right parietal glioblastoma, underwent GTR 6/27/2019 (IDHwt, MGMT promoter unmethylated)    Enrolled in clinical trial with atezolizumab (anti-PDL1) + TMZ + RT trial    Recurred 3/2020; repeat GTR with gammatile implant on 4/9/2020, subsequently on Lomustine monotherapy    Local control achieved with gammatile but two new contrast enhancing spots found > 1 cm from the resection cavity (MRI from 11/23/2020).    Patient presents today to discuss therapeutic options.    No new complaints on review of systems.    Examination non-focal    MRI from 11/23/2020 is as above described. There are two sub-centimeter CE+ lesions > 1 cm from the resection cavity. Both regions showed increased perfusion.    AP: 55 M with third recurrent of glioblastoma. We had reviewed various therapeutic options today. The patient would like to further pursue EAP for the Ziopharm Adv/Veledemex option. I will forward an application to the company/FDA for consideration. I have also reviewed SRS as an interim treatment option for interim tumor control. The patient and his wife is amenable to this arrangement. I will proceed to connect the patient with Radiation Oncology for this treatment.    I have spent 45 minutes today, with the majority of the visit counseling the patient on the diagnosis. All questions were answered. Over 50% of my time on the unit was spent counseling the patient and coordinating care regarding his recurrent glioblastoma.

## 2020-12-02 NOTE — PROGRESS NOTES
"Radiation Oncology Visit:  Date on this visit: 12/4/2020    Erasto Maher  is referred by Dr.Clark Maximo Chan for a radiation oncology consultation. He requires evaluation for diagnosis of recurrent GBM      History Of Present Illness:  Mr. Maher is a 55 year old male who presents with a diagnosis of GBM  He is well known to me, but my first visit with him was vitural.  today I met him in person.  .  I will recap his oncolgic history:    He was diagnosed with GBM in  6/27/2019 SURGERY: Right temporal craniotomy for mass resection  PATHOLOGY: Glioblastoma; IDH1 R132H wildtype, MGMT promoter unmethylated  He was enrolled in a clinical trial  ( [facility]  [id number] (Standard of Care 60 GY RT, temozolomide plus atezolizumab).     MRI suggested progression so on 4/9/2020 SURGERY + Gammatile Placement ( pathology confirmed recurrent glioblastoma).     He started  Lomustine 3 weeks post-op.  He has had 5 cycles of. Lomustine, cycle 5 (start date of 10/15).        MRI on 11/23/2020 2  new distant lesions.  Dr Avila is stopping lomustine.  He was referred back to Dr Chan.  The plan is to try to get compassionate use of     In the meantime the 2 new lesions are distant from the gammatile cavity and could be encompassed in radiosurgery fields.   The hope is that this would \"hold \" his tumor at bay while they work through the logistics of getting compasssionate use of [employer].    He feels well.  He has excellent performance status.  He has some joint pain but is very active and has no limitations.       Past Medical/Surgical History:  Past Medical History:   Diagnosis Date     Allergic rhinitis      GERD (gastroesophageal reflux disease)      Glioblastoma (H)      Insomnia      PONV (postoperative nausea and vomiting)      Seizure (H)      Past Surgical History:   Procedure Laterality Date     APPENDECTOMY  11/1981     COLONOSCOPY       CRANIOTOMY  06/28/2019     HERNIA REPAIR      x2     MRI CRANIOTOMY WITH " OPTICAL TRACKING SYSTEM Right 4/9/2020    Procedure: intraoperative magnetic resonance imaging/stealth assisted right craniotomy, with gammatile placement;  Surgeon: Marquise Chan MD;  Location: [facility] OR       Past Radiation History:as above  Past Chemotherapy History: as above  Implanted Cardiac device:   none  Advanced directive  :     Review of Systems:  Reviewed.  See details in nursing note    Allergies:  Allergies as of 12/04/2020 - Reviewed 11/27/2020   Allergen Reaction Noted     No clinical screening - see comments Rash 04/16/2008       Current Medications:     Current Outpatient Medications:      escitalopram (LEXAPRO) 10 MG tablet, Take 0.5 tablets (5 mg) by mouth daily, Disp: , Rfl:      fluticasone (FLONASE) 50 MCG/ACT nasal spray, Spray 1 spray into both nostrils nightly as needed , Disp: , Rfl:      levETIRAcetam (KEPPRA) 1000 MG tablet, Take 1 tablet (1,000 mg) by mouth 2 times daily, Disp: 180 tablet, Rfl: 1     levothyroxine (SYNTHROID/LEVOTHROID) 125 MCG tablet, Take 125 mcg by mouth daily, Disp: , Rfl:      loratadine (CLARITIN) 10 MG tablet, Take 10 mg by mouth nightly as needed , Disp: , Rfl:      melatonin 5 MG tablet, Take 5 mg by mouth nightly as needed , Disp: , Rfl:      olopatadine (PATANOL) 0.1 % ophthalmic solution, Place 1-2 drops into both eyes daily as needed , Disp: , Rfl:      omeprazole (PRILOSEC) 20 MG DR capsule, , Disp: , Rfl:      ondansetron (ZOFRAN) 4 MG tablet, Take 1 tablet (4 mg) by mouth At Bedtime prior to lomustine. Can repeat every 8 hours as needed for nausea., Disp: 30 tablet, Rfl: 1     Psyllium 500 MG CAPS, Take 4 capsules by mouth At Bedtime, Disp: , Rfl:      senna-docusate (SENOKOT-S/PERICOLACE) 8.6-50 MG tablet, Take 1 tablet by mouth 2 times daily, Disp: 30 tablet, Rfl: 0    Family History:  Family History   Problem Relation Age of Onset     Hypotension Mother      Myocardial Infarction Father      Pacemaker Father      Endocrine Disease Father          prediabetes     No Known Problems Brother        Social History:  Social History     Socioeconomic History     Marital status:      Spouse name: Not on file     Number of children: Not on file     Years of education: Not on file     Highest education level: Not on file   Occupational History     Occupation: self-employed   Social Needs     Financial resource strain: Not on file     Food insecurity     Worry: Not on file     Inability: Not on file     Transportation needs     Medical: Not on file     Non-medical: Not on file   Tobacco Use     Smoking status: Never Smoker     Smokeless tobacco: Never Used   Substance and Sexual Activity     Alcohol use: Not Currently     Frequency: 2-3 times a week     Drinks per session: 3 or 4     Drug use: Not Currently     Sexual activity: Not Currently     Partners: Female   Lifestyle     Physical activity     Days per week: Not on file     Minutes per session: Not on file     Stress: Not on file   Relationships     Social connections     Talks on phone: Not on file     Gets together: Not on file     Attends Islam service: Not on file     Active member of club or organization: Not on file     Attends meetings of clubs or organizations: Not on file     Relationship status: Not on file     Intimate partner violence     Fear of current or ex partner: Not on file     Emotionally abused: Not on file     Physically abused: Not on file     Forced sexual activity: Not on file   Other Topics Concern     Not on file   Social History Narrative     Not on file       Physical Exam:  /66   Pulse 80   Resp 16   SpO2 96%   GENERAL APPEARANCE: healthy, alert and no distress  HENT: Mouth without ulcers or lesions  RESP: lungs clear to auscultation   CARDIOVASCULAR: regular rates and rhythm, normal S1 S2, no S3 or S4 and no murmur.   ABDOMEN:  soft, nontender, no HSM or masses and bowel sounds normal   MUSCULOSKELETAL: extremities normal- no gross deformities noted, no evidence  of inflammation in joints, FROM in all extremities. No edema b/l LE.  SKIN: no suspicious lesions or rashes   PSYCHIATRIC: mentation appears normal and affect normal      Pathology:reviewed.     Laboratory/Imaging Studies:   reviewed scan from 11/23/20   ASSESSMENT:     Recurrent GBM  with 2 small sites of disease distant from the surgical cavity.  The surgical cavity also has some enhancement laterally, and not clear if this is progression or just gliosis.        RECOMMENDATION:  Radiourgery is a reasonable option for Mr Maher.  He has an excellent performance status and is highly motivated.   He is hoping to be able to [employer] on a compassionate use agreement, but this is likely to take several weeks to be approved.   In the meantime, it appears his has active disease- relatively far from the Gammatile dose cloud.       I have fused the Gammatile  dose data to the current MRI and am awaiting the dose information from [facility].        The risks and benefits of radiotherapy were discussed with the patient. His wife was on the phone  Potential acute and long term side effects were explained.   The patient agrees to proceed with radiation therapy.  A written consent was obtained.    Thank you for allowing me to participate in Erasto Maher 's care .  Please do not hesitate to call me with questions.    Eli Mann MD  [phone]   Pager   [phone]  [facility]   [phone] [place]  [phone] [facility]  Primary Physician: Ranjit Edwards   Patient Care Team:  Ranjit Edwards MD as PCP - General (Internal Medicine)  Eli Mann MD as Assigned Cancer Care Provider  Ericka Avila MD as Assigned Neuroscience Provider  CHRISTIANO ROLDAN

## 2020-12-03 NOTE — TELEPHONE ENCOUNTER
Date: 12/3/2020   Age: 55 year old  Ethnicity:   Sex: male  : 1965   Lives In: [place], [place]      Diagnosis: Right Parietal Glioblastoma    Prior radiation therapy:   Site Treated: brain  Facility: ?  Dates: - 19  Dose: 60 cGy    Prior chemotherapy:   See below      Pain at time of consult?  Does patient have a living will?  Is patient pregnant?  Does patient have an implanted cardiac device?    RN time with patient:  Educated on gamma knife?    Fall Screen:  1. Have you fallen in the past week?   2. Have you felt unsteady when walking or standing in the past week?     Physicians:   Review Since Diagnosis:    New onset seizures; altered mental status lasting several minutes. Started Keppra.     MRI: Lesion in the right parietal lobe    19: Right temporal craniotomy for mass resection; diagnosed GBM.    7/3/19 Clinic trial- [id number]- at [facility] (Standard of Care plus atezolizumab)    -19: Chemorads: 60 cGy with concurrent Temozolomide plus atezolizumab q2wks on clinical trial.      19: completed chemoradiotherapy and managed on a clinical trial receiving atezolizumab plus temozolomide.     19: Evaluated at [facility]    19-3/16/20: Adjuvant chemotherapy; temozolomide + atezolizumab on clinical trial    3/16/20: Last dose of immunotherapy rt imaging on 3/28/20 showing disease progression.     20: Repeat craniotomy for surgical debulking plus placement of Gamma Tiles with Dr. Chan;  Pathology: recurrent GBM    20: Lomustine; completed 5 cycles. Stopped 20 rt new distant lesion consistent with disease progression.    20: MRI 17 x 9 x 13mm sup and medial to resected cavity;  right temp (No measurement)     20: Virtual appointment Dr Avila; referral to Dr. Chan rt new lesion.     20: Virtual visit with Dr. Chan. Discussion to proceed with GK.     Chief Complaint: at time of visit

## 2020-12-03 NOTE — TELEPHONE ENCOUNTER
FUTURE VISIT INFORMATION      SURGERY INFORMATION:    Date: 21    Location: [facility] OR    Surgeon:  Marquise Chan MD    Anesthesia Type:  General    Procedure: Intra-operative Magnetic Resonance Imaging GUIDANCE CRANIOTOMY    Consult: virtual visit     RECORDS REQUESTED FROM:       Primary Care Provider: Ranjit Edwards MD - [facility]    Most recent EKG+ Tracin/3/20    Most recent Cardiac Stress Test: 10/31/17- [facility]

## 2020-12-04 NOTE — LETTER
"    12/4/2020         RE: Erasto Maher  [address]  [place] [address]        Dear Colleague,    Thank you for referring your patient, Erasto Maher, to the [facility] RADIATION ONCOLOGY GAMMA KNIFE. Please see a copy of my visit note below.    Radiation Oncology Telephone Visit:  Date on this visit: 12/4/2020    Erasto Maher  is referred by Dr.Clark Maximo Chan for a radiation oncology consultation. He requires evaluation for diagnosis of recurrent GBM      History Of Present Illness:  Mr. Maher is a 55 year old male who presents with a diagnosis of GBM  He is well known to me .  I will recap his oncolgic history:    He was diagnosed with GBM in  6/27/2019 SURGERY: Right temporal craniotomy for mass resection  PATHOLOGY: Glioblastoma; IDH1 R132H wildtype, MGMT promoter unmethylated  He was enrolled in a clinical trial  ( [facility]  [id number] (Standard of Care 60 GY RT, temozolomide plus atezolizumab).     MRI suggested progression so on 4/9/2020 SURGERY + Gammatile Placement ( pathology confirmed recurrent glioblastoma).   Started  Lomustine 3 weeks post-op.  He has had 5 cycles of. Lomustine, cycle 5 (start date of 10/15).       MRI on 11/23/2020 2  new distant lesions.  Dr Avila is stopping lomustine.  He was referred back to Dr Chan.  The plan is to try to get compassionate use of     In the meantime the 2 new lesions are distant from the gammatile cavity and could be encompassed in radiosurgery fields.   The hope is that this would \"hold \" his tumor at bay while they work through the logistics of getting compasssionate use .      Past Medical/Surgical History:  Past Medical History:   Diagnosis Date     Allergic rhinitis      GERD (gastroesophageal reflux disease)      Glioblastoma (H)      Insomnia      PONV (postoperative nausea and vomiting)      Seizure (H)      Past Surgical History:   Procedure Laterality Date     APPENDECTOMY  11/1981     COLONOSCOPY   "     CRANIOTOMY  06/28/2019     HERNIA REPAIR      x2     MRI CRANIOTOMY WITH OPTICAL TRACKING SYSTEM Right 4/9/2020    Procedure: intraoperative magnetic resonance imaging/stealth assisted right craniotomy, with gammatile placement;  Surgeon: Marquise Chan MD;  Location:  OR       Past Radiation History:  Past Chemotherapy History:  Implanted Cardiac device:   none  Advanced directive  :  [unfilled]    Review of Systems:  Reviewed.  See details in nursing note    Allergies:  Allergies as of 12/04/2020 - Reviewed 11/27/2020   Allergen Reaction Noted     No clinical screening - see comments Rash 04/16/2008       Current Medications:     Current Outpatient Medications:      escitalopram (LEXAPRO) 10 MG tablet, Take 0.5 tablets (5 mg) by mouth daily, Disp: , Rfl:      fluticasone (FLONASE) 50 MCG/ACT nasal spray, Spray 1 spray into both nostrils nightly as needed , Disp: , Rfl:      levETIRAcetam (KEPPRA) 1000 MG tablet, Take 1 tablet (1,000 mg) by mouth 2 times daily, Disp: 180 tablet, Rfl: 1     levothyroxine (SYNTHROID/LEVOTHROID) 125 MCG tablet, Take 125 mcg by mouth daily, Disp: , Rfl:      loratadine (CLARITIN) 10 MG tablet, Take 10 mg by mouth nightly as needed , Disp: , Rfl:      melatonin 5 MG tablet, Take 5 mg by mouth nightly as needed , Disp: , Rfl:      olopatadine (PATANOL) 0.1 % ophthalmic solution, Place 1-2 drops into both eyes daily as needed , Disp: , Rfl:      omeprazole (PRILOSEC) 20 MG DR capsule, , Disp: , Rfl:      ondansetron (ZOFRAN) 4 MG tablet, Take 1 tablet (4 mg) by mouth At Bedtime prior to lomustine. Can repeat every 8 hours as needed for nausea., Disp: 30 tablet, Rfl: 1     Psyllium 500 MG CAPS, Take 4 capsules by mouth At Bedtime, Disp: , Rfl:      senna-docusate (SENOKOT-S/PERICOLACE) 8.6-50 MG tablet, Take 1 tablet by mouth 2 times daily, Disp: 30 tablet, Rfl: 0    Family History:  Family History   Problem Relation Age of Onset     Hypotension Mother      Myocardial  Infarction Father      Pacemaker Father      Endocrine Disease Father         prediabetes     No Known Problems Brother        Social History:  Social History     Socioeconomic History     Marital status:      Spouse name: Not on file     Number of children: Not on file     Years of education: Not on file     Highest education level: Not on file   Occupational History     Occupation: self-employed   Social Needs     Financial resource strain: Not on file     Food insecurity     Worry: Not on file     Inability: Not on file     Transportation needs     Medical: Not on file     Non-medical: Not on file   Tobacco Use     Smoking status: Never Smoker     Smokeless tobacco: Never Used   Substance and Sexual Activity     Alcohol use: Not Currently     Frequency: 2-3 times a week     Drinks per session: 3 or 4     Drug use: Not Currently     Sexual activity: Not Currently     Partners: Female   Lifestyle     Physical activity     Days per week: Not on file     Minutes per session: Not on file     Stress: Not on file   Relationships     Social connections     Talks on phone: Not on file     Gets together: Not on file     Attends [employer] service: Not on file     Active member of club or organization: Not on file     Attends meetings of clubs or organizations: Not on file     Relationship status: Not on file     Intimate partner violence     Fear of current or ex partner: Not on file     Emotionally abused: Not on file     Physically abused: Not on file     Forced sexual activity: Not on file   Other Topics Concern     Not on file   Social History Narrative     Not on file       Physical Exam:  There were no vitals taken for this visit.    GENERAL APPEARANCE: healthy, alert and no distress  HENT: Mouth without ulcers or lesions   NECK: no adenopathy, no asymmetry or masses   LYMPHATICS: No cervical, supraclavicular, axillary or inguinal lymphadenopathy   RESP: lungs clear to auscultation - no rales, rhonchi or  wheezes  CARDIOVASCULAR: regular rates and rhythm, normal S1 S2, no S3 or S4 and no murmur.   ABDOMEN:  soft, nontender, no HSM or masses and bowel sounds normal   MUSCULOSKELETAL: extremities normal- no gross deformities noted, no evidence of inflammation in joints, FROM in all extremities. No edema b/l LE.  SKIN: no suspicious lesions or rashes   PSYCHIATRIC: mentation appears normal and affect normal    Physical Exam    Pathology:    Laboratory/Imaging Studies  No results found for any visits on 20.    ASSESSMENT:    Recurrent GBM  with 2 small sites of disease distant from the surgical cavity.      RECOMMENDATION:  Radiourgery is a reasonable option for Mr Maher.  He has an excellent performance status and is highly        The risks and benefits of radiotherapy were discussed with the patient.  Potential acute and long term side effects were explained.   The patient agrees to proceed with radiation therapy.  A written consent was obtained.    Thank you for allowing me to participate in Erasto Maher 's care .  Please do not hesitate to call me with questions.    Eli Mann MD  [phone]   Pager   [phone]  [facility]   [phone] [place]  [phone] [facility]  Primary Physician: Ranjit Edwards   Patient Care Team:  Ranjit Edwards MD as PCP - General (Internal Medicine)  Eli Mann MD as Assigned Cancer Care Provider  Ericka Avila MD as Assigned Neuroscience Provider  CHRISTIANO ROLDAN                  [facility]  Date: 12/3/2020   Age: 55 year old  Ethnicity:   Sex: male  : 1965   Lives In: [place], [place]      Diagnosis: Right Parietal Glioblastoma    Prior radiation therapy:   Site Treated: brain  Facility: [facility]  Dates: - 19  Dose: 60 cGy    Site Treated: brain  Facility: [facility]  Dates: 20  Dose: (Gamma Tiles)    Prior chemotherapy:   See below    Pain at time of consult? No  Does patient have a living will? Yes    Is patient pregnant? NA  Does patient have an implanted cardiac device? No    RN time with patient: 50 min  Educated on gamma knife? Yes    Fall Screen:  1. Have you fallen in the past week? No  2. Have you felt unsteady when walking or standing in the past week? No    Physicians: Dr REJI Avila,  Dr Singh ([facility])    Review Since Diagnosis:    New onset seizures; altered mental status lasting several minutes. Started Keppra.     MRI: Lesion in the right parietal lobe    6/27/19: Right temporal craniotomy for mass resection; diagnosed GBM.    7/3/19 Clinic trial- [id number]- at [facility] (Standard of Care plus atezolizumab)    7/22-8/30/19: Chemorads: 60 cGy with concurrent Temozolomide plus atezolizumab q2wks on clinical trial.      8/30/19: completed chemoradiotherapy and managed on a clinical trial receiving atezolizumab plus temozolomide.     9/6/19: Evaluated at [facility]    9/25/19-3/16/20: Adjuvant chemotherapy; temozolomide + atezolizumab on clinical trial    3/16/20: Last dose of immunotherapy rt imaging on 3/28/20 showing disease progression.     4/9/20: Repeat craniotomy for surgical debulking plus placement of Gamma Tiles with Dr. Chan;  Pathology: recurrent GBM    4/30/20: Lomustine; completed 5 cycles. Stopped 11/23/20 rt new distant lesion consistent with disease progression.    11/23/20: MRI 17 x 9 x 13mm sup and medial to resected cavity;  right temp (No measurement)     11/23/20: Virtual appointment Dr Avila; referral to Dr. Chan rt new lesion.     11/27/20: Virtual visit with Dr. Chan. Discussion to proceed with GK.     January 6th 2021: Planned surgery with Dr Chan. Waiting for FDA approval.     Chief Complaint: at time of visit    Review of Systems   Constitutional: Positive for malaise/fatigue.        Napping as needed   HENT: Negative.    Eyes: Negative.    Respiratory: Negative.    Cardiovascular: Negative.    Gastrointestinal: Negative.    Genitourinary: Negative.    Musculoskeletal:  Positive for joint pain.        Knee pain on occasion   Skin: Negative.    Neurological: Positive for tingling.        Left hand tingling about two weeks ago. A bit unsteady on feet. Informed Dr. Avila Symptoms have resolved   Endo/Heme/Allergies: Negative.    Psychiatric/Behavioral: Positive for depression. The patient is nervous/anxious.          Again, thank you for allowing me to participate in the care of your patient.        Sincerely,        Eli Mann MD

## 2020-12-04 NOTE — PROGRESS NOTES
HPI  Date: 12/3/2020   Age: 55 year old  Ethnicity:   Sex: male  : 1965   Lives In: [place], [place]      Diagnosis: Right Parietal Glioblastoma    Prior radiation therapy:   Site Treated: brain  Facility: [facility]  Dates: - 19  Dose: 60 cGy    Site Treated: brain  Facility: [facility]  Dates: 20  Dose: (Gamma Tiles)    Prior chemotherapy:   See below    Pain at time of consult? No  Does patient have a living will? Yes   Is patient pregnant? NA  Does patient have an implanted cardiac device? No    RN time with patient: 50 min  Educated on gamma knife? Yes    Fall Screen:  1. Have you fallen in the past week? No  2. Have you felt unsteady when walking or standing in the past week? No    Physicians: Dr REJI Avila,  Dr Singh ([facility])    Review Since Diagnosis:    New onset seizures; altered mental status lasting several minutes. Started Keppra.     MRI: Lesion in the right parietal lobe    19: Right temporal craniotomy for mass resection; diagnosed GBM.    7/3/19 Clinic trial- [id number]- at [facility] (Standard of Care plus atezolizumab)    -19: Chemorads: 60 cGy with concurrent Temozolomide plus atezolizumab q2wks on clinical trial.      19: completed chemoradiotherapy and managed on a clinical trial receiving atezolizumab plus temozolomide.     19: Evaluated at [facility]    19-3/16/20: Adjuvant chemotherapy; temozolomide + atezolizumab on clinical trial    3/16/20: Last dose of immunotherapy rt imaging on 3/28/20 showing disease progression.     20: Repeat craniotomy for surgical debulking plus placement of Gamma Tiles with Dr. Chan;  Pathology: recurrent GBM    20: Lomustine; completed 5 cycles. Stopped 20 rt new distant lesion consistent with disease progression.    20: MRI 17 x 9 x 13mm sup and medial to resected cavity;  right temp (No measurement)     20: Virtual appointment Dr Avila; referral to Dr. Chan rt new lesion.      11/27/20: Virtual visit with Dr. Chan. Discussion to proceed with GK.     January 6th 2021: Planned surgery with Dr Chan. Waiting for FDA approval.     Chief Complaint: at time of visit    Review of Systems   Constitutional: Positive for malaise/fatigue.        Napping as needed   HENT: Negative.    Eyes: Negative.    Respiratory: Negative.    Cardiovascular: Negative.    Gastrointestinal: Negative.    Genitourinary: Negative.    Musculoskeletal: Positive for joint pain.        Knee pain on occasion   Skin: Negative.    Neurological: Positive for tingling.        Left hand tingling about two weeks ago. A bit unsteady on feet. Informed Dr. Avila Symptoms have resolved   Endo/Heme/Allergies: Negative.    Psychiatric/Behavioral: Positive for depression. The patient is nervous/anxious.

## 2020-12-09 NOTE — TELEPHONE ENCOUNTER
ONCOLOGY INTAKE: Records Information      APPT INFORMATION:  Referring provider:  Dr. Avila  Referring provider s clinic:  [facility]  Reason for visit/diagnosis:  difficulty coping & depressed  Has patient been notified of appointment date and time?: yes    RECORDS INFORMATION:  Were the records received with the referral (via [employer])? no    Has patient been seen for any external appt for this diagnosis? no    If yes, where? n/a    Has patient had any imaging or procedures outside of [place] for this condition? no      If Yes, where? n/a    ADDITIONAL INFORMATION:  none

## 2020-12-10 NOTE — LETTER
12/10/2020         RE: Erasto Maher  [address]  [facility] [address]        Dear Colleague,    Thank you for referring your patient, Erasto Maher, to the [facility] RADIATION ONCOLOGY GAMMA KNIFE. Please see a copy of my visit note below.    Name: Erasto Maher.  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   right parietal    500 cGy [place] 60 12/10/2020 12/10/2020 0  1   right temporal    500 cGy [place] 60 12/10/2020 12/10/2020 0  1   right lesion    500 cGy [place] 60 12/10/2020 12/10/2020 0  1              DESCRIPTION OF PROCEDURE:    On 12/10/2020 the patient was brought to the [facility] Gamma Knife IconTM suite at [facility] and treated with fractionated stereotactic radiotherapy.     The [employer] Gamma Knife IconTM Plan software was used to create a highly conformal dose distribution using the number and size collimators detailed above.     TREATMENT:    The patient was brought to the Gamma Knife suite. A timeout was performed to confirm the correct patient and correct procedure.    Patient was identified by 2 methods.  Site was verified.    The mask was placed and a cone beam CT was done and fused to the previously approved plan.        The physicist and I evaluated the fusion and confirmed the target coverage after auto-registration.      The treatment was delivered using the [facility] Gamma Knife IconTM without complication.      The mask was removed and the patient was discharged home in stable condition.    The patient tolerated the treatment well and had no complications.    Approved by:  Juan Carlos  MD Swapnil        Again, thank you for allowing me to participate in the care of your patient.        Sincerely,        Juan Carlos Kraft MD

## 2020-12-10 NOTE — LETTER
12/10/2020         RE: Erasto Maher  [address]  [facility] [address]        Dear Colleague,    Thank you for referring your patient, Erasto Maher, to the [facility] RADIATION ONCOLOGY GAMMA KNIFE. Please see a copy of my visit note below.    Gamma Knife Operative Note    MRN: [id number]  Name: Erasto Maher  : 1965    Date of procedure: December 10, 2020    Surgeon:  Marquise Chan MD PhD    Pre-operative Diagnosis:   Recurrent glioblastoma  Post-operative Diagnosis:   Same    Procedure: Gamma Knife Radiosurgery    Indication: This is a 55 y.o. M with recurrent multi-focal glioblastoma who presents for consideration of SRS as temporizing measure while the patient awaits approval of a compassionate use protocol.  . After case review in the brain tumor conference, the joint recommendation was to proceed with SR. Standard measurements were obtained for coordinate calculation to facilitate SRS delivery.    On the day of the procedure, informed consent was confirmed. Immobilization was achieved through face mask. The treatment is planned on the Gamma plan system based on previous MRI.  Treatment parameters were verified by myself, the treating radiation oncologist, and the physicist.     The lesion was treated with 25 Gy delivered in five fractions.  The dose was delivered in a highly conformal fashion. The treatment was performed at the [facility] gamma knife center.     In total three lesions were treated. The maximal diameter of the three lesions were: 2.5 cm, 1.8 cm, and 3.0 cm.    I was present and performed the key portions of this procedure.    Marquise Chan MD PhD

## 2020-12-10 NOTE — PROGRESS NOTES
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   right parietal    500 cGy [place] 60 12/10/2020 12/10/2020 0  1   right temporal    500 cGy [place] 60 12/10/2020 12/10/2020 0  1   right lesion    500 cGy [place] 60 12/10/2020 12/10/2020 0  1              DESCRIPTION OF PROCEDURE:    On 12/10/2020 the patient was brought to the [facility] Gamma Knife IconTM suite at [facility] and treated with fractionated stereotactic radiotherapy.     The [facility] Gamma Knife IconTM Plan software was used to create a highly conformal dose distribution using the number and size collimators detailed above.     TREATMENT:    The patient was brought to the Gamma Knife suite. A timeout was performed to confirm the correct patient and correct procedure.    Patient was identified by 2 methods.  Site was verified.    The mask was placed and a cone beam CT was done and fused to the previously approved plan.        The physicist and I evaluated the fusion and confirmed the target coverage after auto-registration.      The treatment was delivered using the [facility] Gamma Knife IconTM without complication.      The mask was removed and the patient was discharged home in stable condition.    The patient tolerated the treatment well and had no complications.    Approved by:  Juan Carlos Kraft MD

## 2020-12-11 NOTE — PROGRESS NOTES
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   right parietal    1,000 cGy [place] 60 12/10/2020 2020 1   2   right temporal    1,000 cGy [place] 60 12/10/2020 2020 1   2   right lesion    1,000 cGy [place] 60 12/10/2020 2020 1   2             DESCRIPTION OF PROCEDURE:  On 2020 the patient was brought to the [facility] Gamma Knife IconTM suite at [facility] and treated with fractionated stereotactic radiotherapy.     The [facility] Gamma Knife IconTM Plan software was used to create a highly conformal dose distribution using the number and size collimators detailed above.     TREATMENT:    The patient was brought to the Gamma Knife suite. A timeout was performed to confirm the correct patient and correct procedure.    Patient was identified by 2 methods.  Site was verified.    The mask was placed and a cone beam CT was done and fused to the previously approved plan.        The physicist and I evaluated the fusion and confirmed the target coverage after auto-registration.      The treatment was delivered using the [facility] Gamma Knife IconTM without complication.      The mask was removed and the patient was discharged home in stable condition.    The patient tolerated the treatment well and had no complications.    Approved by:  Phillip Bledsoe MD

## 2020-12-14 NOTE — PROGRESS NOTES
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   right parietal    1,500 cGy [place] 60 12/10/2020 2020 4   3   right temporal    1,500 cGy [place] 60 12/10/2020 2020 4   3   right lesion    1,500 cGy [place] 60 12/10/2020 2020 4   3             DESCRIPTION OF PROCEDURE:  On 2020 the patient was brought to the [facility] Gamma Knife IconTM suite at [facility] and treated with fractionated stereotactic radiotherapy.     The [employer] Gamma Knife IconTM Plan software was used to create a highly conformal dose distribution using the number and size collimators detailed above.     TREATMENT:    The patient was brought to the Gamma Knife suite. A timeout was performed to confirm the correct patient and correct procedure.    Patient was identified by 2 methods.  Site was verified.    The mask was placed and a cone beam CT was done and fused to the previously approved plan.        The physicist and I evaluated the fusion and confirmed the target coverage after auto-registration.      The treatment was delivered using the [facility] Gamma Knife IconTM without complication.      The mask was removed and the patient was discharged home in stable condition.    The patient tolerated the treatment well and had no complications.    Approved by:  Eli Mann MD

## 2020-12-14 NOTE — PROGRESS NOTES
Gamma Knife Operative Note    MRN: [id number]  Name: Erasto Maher  : 1965    Date of procedure: December 10, 2020    Surgeon:  Marquise Chan MD PhD    Pre-operative Diagnosis:   Recurrent glioblastoma  Post-operative Diagnosis:   Same    Procedure: Gamma Knife Radiosurgery    Indication: This is a 55 y.o. M with recurrent multi-focal glioblastoma who presents for consideration of SRS as temporizing measure while the patient awaits approval of a compassionate use protocol.  . After case review in the brain tumor conference, the joint recommendation was to proceed with SR. Standard measurements were obtained for coordinate calculation to facilitate SRS delivery.    On the day of the procedure, informed consent was confirmed. Immobilization was achieved through face mask. The treatment is planned on the Gamma plan system based on previous MRI.  Treatment parameters were verified by myself, the treating radiation oncologist, and the physicist.     The lesion was treated with 25 Gy delivered in five fractions.  The dose was delivered in a highly conformal fashion. The treatment was performed at the [facility] gamma knife center.     In total three lesions were treated. The maximal diameter of the three lesions were: 2.5 cm, 1.8 cm, and 3.0 cm.    I was present and performed the key portions of this procedure.    Marquise Chan MD PhD

## 2020-12-14 NOTE — LETTER
2020         RE: Erasto Maher  [address]  [facility] [address]        Dear Colleague,    Thank you for referring your patient, Erasto Maher, to the [facility] RADIATION ONCOLOGY GAMMA KNIFE. Please see a copy of my visit note below.      Name: Erasto Maher.  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   right parietal    1,500 cGy [place] 60 12/10/2020 2020 4   3   right temporal    1,500 cGy [place] 60 12/10/2020 2020 4   3   right lesion    1,500 cGy [place] 60 12/10/2020 2020 4   3             DESCRIPTION OF PROCEDURE:  On 2020 the patient was brought to the [facility] Gamma Knife IconTM suite at [facility] and treated with fractionated stereotactic radiotherapy.     The [employer] Gamma Knife IconTM Plan software was used to create a highly conformal dose distribution using the number and size collimators detailed above.     TREATMENT:    The patient was brought to the Gamma Knife suite. A timeout was performed to confirm the correct patient and correct procedure.    Patient was identified by 2 methods.  Site was verified.    The mask was placed and a cone beam CT was done and fused to the previously approved plan.        The physicist and I evaluated the fusion and confirmed the target coverage after auto-registration.      The treatment was delivered using the [employer] Gamma Knife IconTM without complication.      The mask was removed and the patient was discharged home in stable condition.    The patient tolerated the treatment well and had no complications.    Approved by:  Eli Mann MD        Again, thank you for allowing me to  participate in the care of your patient.        Sincerely,        Eli Mann MD

## 2020-12-15 NOTE — LETTER
12/15/2020         RE: Erasto Maher  [address]  [facility] [address]        Dear Colleague,    Thank you for referring your patient, Erasto Maher, to the [facility] RADIATION ONCOLOGY GAMMA KNIFE. Please see a copy of my visit note below.      Name: Erasto Maher.  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   right parietal    2,000 cGy [place] 60 12/10/2020 12/15/2020 5  4   right temporal    2,000 cGy [place] 60 12/10/2020 12/15/2020 5  4   right lesion    2,000 cGy [place] 60 12/10/2020 12/15/2020 5  4            DESCRIPTION OF PROCEDURE:  On 12/15/2020 the patient was brought to the [facility] Gamma Knife IconTM suite at [facility] and treated with fractionated stereotactic radiotherapy.     The [employer] Gamma Knife IconTM Plan software was used to create a highly conformal dose distribution using the number and size collimators detailed above.     TREATMENT:    The patient was brought to the Gamma Knife suite. A timeout was performed to confirm the correct patient and correct procedure.    Patient was identified by 2 methods.  Site was verified.    The mask was placed and a cone beam CT was done and fused to the previously approved plan.        The physicist and I evaluated the fusion and confirmed the target coverage after auto-registration.      The treatment was delivered using the [employer] Gamma Knife IconTM without complication.      The mask was removed and the patient was discharged home in stable condition.    The patient tolerated the treatment well and had no complications.    Approved by:  MAGALIS Nogueira MD        Again, thank you for allowing me to participate in  the care of your patient.        Sincerely,        Susan Nogueira MD

## 2020-12-15 NOTE — PROGRESS NOTES
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   right parietal    2,000 cGy [place] 60 12/10/2020 12/15/2020 5  4   right temporal    2,000 cGy [place] 60 12/10/2020 12/15/2020 5  4   right lesion    2,000 cGy [place] 60 12/10/2020 12/15/2020 5  4            DESCRIPTION OF PROCEDURE:  On 12/15/2020 the patient was brought to the [facility] Gamma Knife IconTM suite at [facility] and treated with fractionated stereotactic radiotherapy.     The [employer] Gamma Knife IconTM Plan software was used to create a highly conformal dose distribution using the number and size collimators detailed above.     TREATMENT:    The patient was brought to the Gamma Knife suite. A timeout was performed to confirm the correct patient and correct procedure.    Patient was identified by 2 methods.  Site was verified.    The mask was placed and a cone beam CT was done and fused to the previously approved plan.        The physicist and I evaluated the fusion and confirmed the target coverage after auto-registration.      The treatment was delivered using the [employer] Gamma Knife IconTM without complication.      The mask was removed and the patient was discharged home in stable condition.    The patient tolerated the treatment well and had no complications.    Approved by:  MAGALIS Nogueira MD

## 2020-12-18 NOTE — LETTER
"    12/18/2020         RE: Erasto Maher  [address]  [facility] [address]        Dear Colleague,    Thank you for referring your patient, Erasto Maher, to the [facility]. Please see a copy of my visit note below.    Erasto Maher is a 55 year old male who is being evaluated via a billable video visit.      The patient has been notified of following:     \"This video visit will be conducted via a call between you and your physician/provider. We have found that certain health care needs can be provided without the need for an in-person physical exam.  This service lets us provide the care you need with a video conversation.  If a prescription is necessary we can send it directly to your pharmacy.  If lab work is needed we can place an order for that and you can then stop by our lab to have the test done at a later time.    Video visits are billed at different rates depending on your insurance coverage.  Please reach out to your insurance provider with any questions.    If during the course of the call the physician/provider feels a video visit is not appropriate, you will not be charged for this service.\"    Patient has given verbal consent for Video visit? Yes  How would you like to obtain your AVS? MyChart  If you are dropped from the video visit, the video invite should be resent to: Send to e-mail at: [email or web address]  Will anyone else be joining your video visit? No    I have reviewed and updated the patient's allergies and medication list.    Concerns: none  Refills: none    Vitals - Patient Reported  Weight (Patient Reported): 74.8 kg (165 lb)  Height (Patient Reported): 177.8 cm (5' 10\")  BMI (Based on Pt Reported Ht/Wt): 23.67  Pain Score: No Pain (0)    Korina Lebron CMA      Video-Visit Details  Type of service:  Video Visit    Video Start Time: 11:53 AM  Video End Time: 12:18 PM    Originating Location (pt. Location): Home    Distant Location " "(provider location):  [facility] CANCER [facility]     Platform used for Video Visit: Aliza Avila MD    ___________________________________________________________    NEURO-ONCOLOGY VISIT  Dec 18, 2020    CHIEF COMPLAINT: Mr. 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 He completed chemoradiotherapy on 8/30/2019 and was managed on a clinical trial receiving atezolizumab (anti-PDL1) plus adjuvant temozolomide. He received his last dose of immunotherapy on 3/16/2020 when imaging on 3/28/2020 showed progression of disease. He underwent a repeat craniotomy for tumor resection with Dr. Chan on 4/9/2020 with placement of GammaTiles.     Aristeo is managed on lomustine monotherapy. However, imaging in 11/2020 showed a new distant lesion consistent with disease progression.     He recently completed a repeat course of radiation and planning to undergo repeat resection on compassionate use [facility] Clinical Trial that utilizes intratumoral adenovirus injection activating human interleukin-12 + veledimex in combination with nivolumab.     I met with Aristeo and Judith (wife) today for this follow-up visit.     HISTORY OF PRESENT ILLNESS  -Aristeo states that he is doing well today. Tolerated radiation therapy well. No fatigue.  -In the setting of more difficulty with typing on the left/ weakness on the left and running into things on the left, he was start dexamethasone 4mg daily last week and reports a near complete resolution of these symptoms. Decreasing to 2mg daily tomorrow.  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 Reading and spelling are fine, no changes.  -On Lexapro and mood is positive.     -No recurrent seizure events with initiation of steroids. Was having bouts of \"heavy tiredness\" occurring about 2-3 " times a week.    REVIEW OF SYSTEMS  A comprehensive ROS negative except as in HPI.      MEDICATIONS   Current Outpatient Medications   Medication Sig Dispense Refill     dexamethasone (DECADRON) 2 MG tablet Take 2 tablets (4 mg) by mouth daily (with breakfast) 30 tablet 1     escitalopram (LEXAPRO) 10 MG tablet Take 10 mg by mouth daily        fluticasone (FLONASE) 50 MCG/ACT nasal spray Spray 1 spray into both nostrils nightly as needed        levETIRAcetam (KEPPRA) 1000 MG tablet Take 1 tablet (1,000 mg) by mouth 2 times daily 180 tablet 1     levothyroxine (SYNTHROID/LEVOTHROID) 125 MCG tablet Take 125 mcg by mouth daily       loratadine (CLARITIN) 10 MG tablet Take 10 mg by mouth nightly as needed        omeprazole (PRILOSEC) 20 MG DR capsule daily        Psyllium 500 MG CAPS Take 4 capsules by mouth At Bedtime       melatonin 5 MG tablet Take 5 mg by mouth nightly as needed        olopatadine (PATANOL) 0.1 % ophthalmic solution Place 1-2 drops into both eyes daily as needed        DRUG ALLERGIES   Allergies   Allergen Reactions     No Clinical Screening - See Comments Rash     Cats and dogs         ONCOLOGIC HISTORY  -PRESENTATION: New onset seizures; complex partial event with speech arrest and altered mental status lasting several minutes. Later had secondary generalization. Started Keppra.   -MR brain imaging with a ring enhancing lesion in the right parietal lobe.    -6/27/2019 SURGERY: Right temporal craniotomy for mass resection, gross total resection by Dr. Tam Barreto.  PATHOLOGY: Glioblastoma; IDH1 R132H wildtype, MGMT promoter unmethylated  -7/3/2019 CLINICAL TRIAL: Evaluated by Dr. Wilmer Mckenna at [facility], consented for clinical trial 7895-0620 (Standard of Care plus atezolizumab).  -7/22 - 8/30/2019 CHEMORADS: 60cGy with concurrent temozolomide 75mg/m2/day (145mg) plus atezolizumab Q2 weeks on clinical trial 1774-2371.  -9/6/2019 NEURO-ONC: Evaluated at the [facility].   -9/25/2019 - 3/16/2020  CHEMO: Adjuvant chemotherapy; temozolomide + atezolizumab 840 mg IV on clinical trial 7732-1425.  -4/6/2020 NEURO-ONC: Communicated with Dr. Borrero. Plan for surgery and next steps pending pathology results.   -4/9/2020 SURGERY + RADS: Repeat craniotomy for surgical debulking plus placement of GammaTiles.  PATHOLOGY: Recurrent glioblastoma.   Next generation sequencing: Microsatellite instability: Stable. Tumor mutational burden: 4 (Low). APC, CDK4, MDM2, PTEN mutated. ARID1A, ASXL1, ATRX, DNMT3A, FANCE, KDM5C, MED12, MEF2B, NF1, RUNX1, TERT mutated. PD-L1 Negative.  -4/20/2020 NEURO-ONC/ CHEMO: Starting Lomustine 3 weeks post-op. Consented for next generation sequencing and sending most recent tumor sample.    -4/30/2020 CHEMO: Lomustine, cycle 1.   -5/11/2020 NEURO-ONC: Clinically well. Referral to genetic counseling.   -6/5/2020 NEURO-ONC/ MRB/ CHEMO: Clinically well. Imaging with a positive treatment response. Lomustine, cycle 2 (start date of 6/11).    -7/20/2020 NEURO-ONC/ MRB/ CHEMO: Clinically well. Imaging without concern; aside from subdural fluid collection. Labs at goal. Lomustine, cycle 3 (start date of 7/23).    -8/31/2020 NEURO-ONC/ MRB/ CHEMO: Clinically well. Imaging without concern. Labs at goal. Lomustine, cycle 4 (start date of 9/3).   -10/12/2020 NEURO-ONC/ MRB/ CHEMO: Clinically well. Imaging without concern for cancer growth, but the resection cavity is expanding with time. Per Dr. Chan; continue to monitor with no surgical intervention at this time. Labs at goal. Lomustine, cycle 5 (start date of 10/15).    -11/23/2020 NEURO-ONC/ MRB: Clinically well. Imaging with a new distant lesion. Stopping lomustine. Referral to Dr. Chan.    -12/10 - 12/16/2020 RADS: Gamma Knife therapy of 15 Gy/ fraction x 5 fractions.  -12/18/2020 NEURO-ONC: Discussion of treatment.  -1/6/2020 SURGERY: Anticipated surgery date.      SOCIAL HISTORY   Tobacco use: Never smoker.   Alcohol use: Social, infrequent.   Drug  use: Denies.  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       PHYSICAL EXAMINATION  -Generally well appearing.  -Respiratory: No audible wheezing.   -Skin: No rashes.   -Psychiatric: Normal mood and affect. Pleasant, talkative.  -Neurologic:   MENTAL STATUS:     Alert, oriented to date.    Recall: Intact.    Speech fluent.    Comprehension intact.     CRANIAL NERVES:     Pupils are equal, round, reactive to light.     Extraocular movements full.     Symmetric facial movements.   Hearing intact.   With normal phonation, no dysfunction of the palate or tongue.   MOTOR: Antigravity in the arms.  SENSATION: Intact to light touch throughout per report.    The rest of a comprehensive physical examination is deferred due to PHE (public health emergency) video visit restrictions.         MEDICAL RECORDS  Obtained and personally reviewed all available outside medical records in addition to reviewing any records available in our electronic system.     LABS  Personally reviewed all available lab results.     IMAGING  Personally reviewed MR brain imaging from 12/4 and compared to prior imaging in November. To my eye, the new contrast enhancing lesion in the superior right parietal lobe is slightly increased and there is thickening of enhancement about the surgical cavity.        IMPRESSION  Clinic time was spent discussing in detail the nature of his cancer in light of recent imaging results and his projected treatment plan.     Clinically, Aristeo is noting near resolution of his left-sided symptoms with initiation of dexamethasone 4mg daily. Tapering to 2mg daily tomorrow. Otherwise, he denies any side effects related to radiation therapy, including no increase in fatigue. He tolerated radiation therapy well.    The following was discussed with Aristeo and Judith today; The radiation therapy was a bridge to his next line of therapy on compassionate use [facility]. The rationale for combining virus gene therapy  (Fq-EWQ-tWD-2) + veledimex with nivolumab on this protocol is to enhance the AW-38-dlgovjxd effect by increasing the number and activity of effector CD8+ T-cells in an otherwise highly immunosuppressant cancer. IL-12 is a cytokine with anticancer activity and Gd-OSL-frH-12 is a genetically engineered, non-pathogenic form of an adenovirus that encodes for this cytokine. The virus infects the tumor cells and uses its machinery to produce IL-12. Veledimex activates production of IL-12 within the viral infected tumor cells. The treatment plan is as follows; Nivolumab to hopefully be given prior to resection if obtained through the free drug program in time. Then, veledimex is given starting the day of resection through day 14 with Jy-WUU-hKU-12 injected intratumoral immediately following tumor debulking. Nivolumab is then given every 2 weeks starting on Day 15.     I have already started working to secure nivolumab on compassionate use.     PROBLEM LIST  Glioblastoma, MGMT unmethylated and IDH1 wildtype by IHC  Seizure  Chemotherapy-associated constipation  Headaches  Apraxia    PLAN  -CANCER-DIRECTED THERAPY-  -As above.    -STEROIDS-  -Continue to taper dexamethasone as previously instructed.  -Monitor for worsening symptoms.     -SEIZURE MANAGEMENT-  -Given history of seizures, will continue current antiepileptic regimen of Keppra for the foreseeable future.    -Quality of life/ MOOD/ FATIGUE-  -Continue Lexapro.   -Continue to monitor mood as untreated/ undertreated depression can worsen fatigue, dysorexia, and quality of life.    Already received his flu vaccination this year.     Return to clinic following surgery, likely in late-January.      Ericka Avila MD  Neuro-oncology

## 2020-12-18 NOTE — PROGRESS NOTES
"Erasto Maher is a 55 year old male who is being evaluated via a billable video visit.      The patient has been notified of following:     \"This video visit will be conducted via a call between you and your physician/provider. We have found that certain health care needs can be provided without the need for an in-person physical exam.  This service lets us provide the care you need with a video conversation.  If a prescription is necessary we can send it directly to your pharmacy.  If lab work is needed we can place an order for that and you can then stop by our lab to have the test done at a later time.    Video visits are billed at different rates depending on your insurance coverage.  Please reach out to your insurance provider with any questions.    If during the course of the call the physician/provider feels a video visit is not appropriate, you will not be charged for this service.\"    Patient has given verbal consent for Video visit? Yes  How would you like to obtain your AVS? MyChart  If you are dropped from the video visit, the video invite should be resent to: Send to e-mail at: [email or web address]  Will anyone else be joining your video visit? No    I have reviewed and updated the patient's allergies and medication list.    Concerns: none  Refills: none    Vitals - Patient Reported  Weight (Patient Reported): 74.8 kg (165 lb)  Height (Patient Reported): 177.8 cm (5' 10\")  BMI (Based on Pt Reported Ht/Wt): 23.67  Pain Score: No Pain (0)    Korina Lebron CMA      Video-Visit Details  Type of service:  Video Visit    Video Start Time: 11:53 AM  Video End Time: 12:18 PM    Originating Location (pt. Location): Home    Distant Location (provider location):  [facility] CANCER [facility]     Platform used for Video Visit: Aliza Avila MD    ___________________________________________________________    NEURO-ONCOLOGY VISIT  Dec 18, 2020    CHIEF COMPLAINT: Mr. 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 He completed chemoradiotherapy on 8/30/2019 and was managed on a clinical trial receiving atezolizumab (anti-PDL1) plus adjuvant temozolomide. He received his last dose of immunotherapy on 3/16/2020 when imaging on 3/28/2020 showed progression of disease. He underwent a repeat craniotomy for tumor resection with Dr. Chan on 4/9/2020 with placement of GammaTiles.     Aristeo is managed on lomustine monotherapy. However, imaging in 11/2020 showed a new distant lesion consistent with disease progression.     He recently completed a repeat course of radiation and planning to undergo repeat resection on compassionate use [facility] Clinical Trial that utilizes intratumoral adenovirus injection activating human interleukin-12 + veledimex in combination with nivolumab.     I met with Aristeo and Judith (wife) today for this follow-up visit.     HISTORY OF PRESENT ILLNESS  -Aristeo states that he is doing well today. Tolerated radiation therapy well. No fatigue.  -In the setting of more difficulty with typing on the left/ weakness on the left and running into things on the left, he was start dexamethasone 4mg daily last week and reports a near complete resolution of these symptoms. Decreasing to 2mg daily tomorrow.  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 Reading and spelling are fine, no changes.  -On Lexapro and mood is positive.     -No recurrent seizure events with initiation of steroids. Was having bouts of \"heavy tiredness\" occurring about 2-3 times a week.    REVIEW OF SYSTEMS  A comprehensive ROS negative except as in HPI.      MEDICATIONS   Current Outpatient Medications   Medication Sig Dispense Refill     dexamethasone (DECADRON) 2 MG tablet Take 2 tablets (4 mg) by mouth daily (with breakfast) 30 tablet 1 "     escitalopram (LEXAPRO) 10 MG tablet Take 10 mg by mouth daily        fluticasone (FLONASE) 50 MCG/ACT nasal spray Spray 1 spray into both nostrils nightly as needed        levETIRAcetam (KEPPRA) 1000 MG tablet Take 1 tablet (1,000 mg) by mouth 2 times daily 180 tablet 1     levothyroxine (SYNTHROID/LEVOTHROID) 125 MCG tablet Take 125 mcg by mouth daily       loratadine (CLARITIN) 10 MG tablet Take 10 mg by mouth nightly as needed        omeprazole (PRILOSEC) 20 MG DR capsule daily        Psyllium 500 MG CAPS Take 4 capsules by mouth At Bedtime       melatonin 5 MG tablet Take 5 mg by mouth nightly as needed        olopatadine (PATANOL) 0.1 % ophthalmic solution Place 1-2 drops into both eyes daily as needed        DRUG ALLERGIES   Allergies   Allergen Reactions     No Clinical Screening - See Comments Rash     Cats and dogs         ONCOLOGIC HISTORY  -PRESENTATION: New onset seizures; complex partial event with speech arrest and altered mental status lasting several minutes. Later had secondary generalization. Started Keppra.   -MR brain imaging with a ring enhancing lesion in the right parietal lobe.    -6/27/2019 SURGERY: Right temporal craniotomy for mass resection, gross total resection by Dr. Tam Barreto.  PATHOLOGY: Glioblastoma; IDH1 R132H wildtype, MGMT promoter unmethylated  -7/3/2019 CLINICAL TRIAL: Evaluated by Dr. Wilmer Mckenna at [facility], consented for clinical trial 2016-0867 (Standard of Care plus atezolizumab).  -7/22 - 8/30/2019 CHEMORADS: 60cGy with concurrent temozolomide 75mg/m2/day (145mg) plus atezolizumab Q2 weeks on clinical trial 2016-0867.  -9/6/2019 NEURO-ONC: Evaluated at the [facility].   -9/25/2019 - 3/16/2020 CHEMO: Adjuvant chemotherapy; temozolomide + atezolizumab 840 mg IV on clinical trial 2016-0867.  -4/6/2020 NEURO-ONC: Communicated with Dr. Borrero. Plan for surgery and next steps pending pathology results.   -4/9/2020 SURGERY + RADS: Repeat craniotomy for surgical  debulking plus placement of GammaTiles.  PATHOLOGY: Recurrent glioblastoma.   Next generation sequencing: Microsatellite instability: Stable. Tumor mutational burden: 4 (Low). APC, CDK4, MDM2, PTEN mutated. ARID1A, ASXL1, ATRX, DNMT3A, FANCE, KDM5C, MED12, MEF2B, NF1, RUNX1, TERT mutated. PD-L1 Negative.  -4/20/2020 NEURO-ONC/ CHEMO: Starting Lomustine 3 weeks post-op. Consented for next generation sequencing and sending most recent tumor sample.    -4/30/2020 CHEMO: Lomustine, cycle 1.   -5/11/2020 NEURO-ONC: Clinically well. Referral to genetic counseling.   -6/5/2020 NEURO-ONC/ MRB/ CHEMO: Clinically well. Imaging with a positive treatment response. Lomustine, cycle 2 (start date of 6/11).    -7/20/2020 NEURO-ONC/ MRB/ CHEMO: Clinically well. Imaging without concern; aside from subdural fluid collection. Labs at goal. Lomustine, cycle 3 (start date of 7/23).    -8/31/2020 NEURO-ONC/ MRB/ CHEMO: Clinically well. Imaging without concern. Labs at goal. Lomustine, cycle 4 (start date of 9/3).   -10/12/2020 NEURO-ONC/ MRB/ CHEMO: Clinically well. Imaging without concern for cancer growth, but the resection cavity is expanding with time. Per Dr. Chan; continue to monitor with no surgical intervention at this time. Labs at goal. Lomustine, cycle 5 (start date of 10/15).    -11/23/2020 NEURO-ONC/ MRB: Clinically well. Imaging with a new distant lesion. Stopping lomustine. Referral to Dr. Chan.    -12/10 - 12/16/2020 RADS: Gamma Knife therapy of 15 Gy/ fraction x 5 fractions.  -12/18/2020 NEURO-ONC: Discussion of treatment.  -1/6/2020 SURGERY: Anticipated surgery date.      SOCIAL HISTORY   Tobacco use: Never smoker.   Alcohol use: Social, infrequent.   Drug use: Denies.  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       PHYSICAL EXAMINATION  -Generally well appearing.  -Respiratory: No audible wheezing.   -Skin: No rashes.   -Psychiatric: Normal mood and affect. Pleasant, talkative.  -Neurologic:   MENTAL  STATUS:     Alert, oriented to date.    Recall: Intact.    Speech fluent.    Comprehension intact.     CRANIAL NERVES:     Pupils are equal, round, reactive to light.     Extraocular movements full.     Symmetric facial movements.   Hearing intact.   With normal phonation, no dysfunction of the palate or tongue.   MOTOR: Antigravity in the arms.  SENSATION: Intact to light touch throughout per report.    The rest of a comprehensive physical examination is deferred due to PHE (public health emergency) video visit restrictions.         MEDICAL RECORDS  Obtained and personally reviewed all available outside medical records in addition to reviewing any records available in our electronic system.     LABS  Personally reviewed all available lab results.     IMAGING  Personally reviewed MR brain imaging from 12/4 and compared to prior imaging in November. To my eye, the new contrast enhancing lesion in the superior right parietal lobe is slightly increased and there is thickening of enhancement about the surgical cavity.        IMPRESSION  Clinic time was spent discussing in detail the nature of his cancer in light of recent imaging results and his projected treatment plan.     Clinically, Aristeo is noting near resolution of his left-sided symptoms with initiation of dexamethasone 4mg daily. Tapering to 2mg daily tomorrow. Otherwise, he denies any side effects related to radiation therapy, including no increase in fatigue. He tolerated radiation therapy well.    The following was discussed with Aristeo and Judith today; The radiation therapy was a bridge to his next line of therapy on compassionate use [facility]. The rationale for combining virus gene therapy (Xc-KOK-zNV-2) + veledimex with nivolumab on this protocol is to enhance the MK-60-ywwfibyd effect by increasing the number and activity of effector CD8+ T-cells in an otherwise highly immunosuppressant cancer. IL-12 is a cytokine with anticancer activity and Sr-XUR-wnP-12  is a genetically engineered, non-pathogenic form of an adenovirus that encodes for this cytokine. The virus infects the tumor cells and uses its machinery to produce IL-12. Veledimex activates production of IL-12 within the viral infected tumor cells. The treatment plan is as follows; Nivolumab to hopefully be given prior to resection if obtained through the free drug program in time. Then, veledimex is given starting the day of resection through day 14 with Uj-RZI-rDD-12 injected intratumoral immediately following tumor debulking. Nivolumab is then given every 2 weeks starting on Day 15.     I have already started working to secure nivolumab on compassionate use.     PROBLEM LIST  Glioblastoma, MGMT unmethylated and IDH1 wildtype by IHC  Seizure  Chemotherapy-associated constipation  Headaches  Apraxia    PLAN  -CANCER-DIRECTED THERAPY-  -As above.    -STEROIDS-  -Continue to taper dexamethasone as previously instructed.  -Monitor for worsening symptoms.     -SEIZURE MANAGEMENT-  -Given history of seizures, will continue current antiepileptic regimen of Keppra for the foreseeable future.    -Quality of life/ MOOD/ FATIGUE-  -Continue Lexapro.   -Continue to monitor mood as untreated/ undertreated depression can worsen fatigue, dysorexia, and quality of life.    Already received his flu vaccination this year.     Return to clinic following surgery, likely in late-January.      Ericka Avila MD  Neuro-oncology

## 2020-12-22 NOTE — PROGRESS NOTES
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   right parietal    2,500 cGy [place] 60 12/10/2020 2020 6   5   right temporal    2,500 cGy [place] 60 12/10/2020 2020 6   5   right lesion    2,500 cGy [place] 60 12/10/2020 2020 6   5             DESCRIPTION OF PROCEDURE:  On 2020 the patient was brought to the [place] Gamma Knife IconTM suite at [facility] and treated with fractionated stereotactic radiotherapy.     The [place] Gamma Knife IconTM Plan software was used to create a highly conformal dose distribution using the number and size collimators detailed above.     TREATMENT:    The patient was brought to the Gamma Knife suite. A timeout was performed to confirm the correct patient and correct procedure.    Patient was identified by 2 methods.  Site was verified.    The mask was placed and a cone beam CT was done and fused to the previously approved plan.        The physicist and I evaluated the fusion and confirmed the target coverage after auto-registration.      The treatment was delivered using the [place] Gamma Knife IconTM without complication.      The mask was removed and the patient was discharged home in stable condition.    The patient tolerated the treatment well and had no complications.    Approved by:  Emperatriz Knight MD

## 2020-12-24 NOTE — PROCEDURES
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     Treatment Summary:  Radiation Oncology - Course: 3 Protocol:   Treatment Site Current Dose Modality From To Elapsed Days Fx.  right parietal  2,500 cGy [place] 60 12/10/2020 2020   6  5  right temporal  2,500 cGy [place] 60 12/10/2020 2020   6  5  right lesion  2,500 cGy [place] 60 12/10/2020 2020   6  5             Dose per Fraction:    500     Total Dose:      25 GY         COMMENTS:                      ED visits/hospitalizations:0  Missed treatments:0     Acute Toxicity Profile by CTC v5.0:0     PAIN MANAGEMENT:      na                         FOLLOW UP PLAN:           He is getting an investigational agent in the next few weeks.  HE will follow up with DR Chan and Austin newby                 Staff Physician: Eli Mann M.D.  Physicist: Yomi Moreno,      CC: , MD Marquise Chan MD                                     Radiation Oncology:  [facility] [address] [place] [address]

## 2020-12-28 NOTE — PROGRESS NOTES
"Erasto Maher is a 55 year old male who is being evaluated via a billable telephone visit.  Phone call duration: 30 minutes    The patient has been notified of following:     \"This telephone visit will be conducted via a call between you and your physician/provider. We have found that certain health care needs can be provided without the need for a physical exam.  This service lets us provide the care you need with a short phone conversation.  If a prescription is necessary we can send it directly to your pharmacy.  If lab work is needed we can place an order for that and you can then stop by our lab to have the test done at a later time.    Telephone visits are billed at different rates depending on your insurance coverage. During this emergency period, for some insurers they may be billed the same as an in-person visit.  Please reach out to your insurance provider with any questions.    If during the course of the call the physician/provider feels a telephone visit is not appropriate, you will not be charged for this service.\"    Patient has given verbal consent for Telephone visit? Yes    Confidential Summary of Oncology Psychology Initial Visit    Erasto Maher is a 55 year old male who was referred by Dr. Avila for  Depression  The patient was seen for an initial 45 minute evaluation on 12/28/2020.    Presenting Concerns: sadness, depression, low activity, low energy, uncertainty, low motivation, concern over illness impact on family.     Mental Status/Interview:   Orientation: Erasto Maher was alert, attentive, and oriented to time, place, and person  Affect: Affect was appropriate to the situation and showed normal range and stability.  Speech: Speech was clear with normal fluency, rate, tone, and volume.  Memory: Although not formally assessed, immediate, recent, and remote memory appeared to be intact.   Insight and Judgement: Erasto Maher demonstrates adequate awareness of the " issues discussed and was able to come to reasonable conclusions.  Thought: Thought patterns were coherent and logical. Hallucinations were denied and delusions were not evident.   Lethality: Erasto Maher denied suicidal or homicidal ideation or intention.        Impression: Although he has many strengths, he is not using them. We discussed using his strengths to help him increase his activity levels. The primary goal is maintaining activity levels and growing his motivation over time.     Diagnosis:   Encounter Diagnoses   Name Primary?     Difficulty coping      Depressed mood      Recommendations/Plan:  1. Maintain activity levels both physically and mentally  2. Return for follow-up    Thank you for this opportunity to participate in your care of this patient.    Tutu Farmer Psy.D., L.P.  Director, Oncology Supportive Care

## 2021-01-01 ENCOUNTER — OFFICE VISIT (OUTPATIENT)
Dept: SURGERY | Facility: CLINIC | Age: 56
End: 2021-01-01
Payer: COMMERCIAL

## 2021-01-01 ENCOUNTER — VIRTUAL VISIT (OUTPATIENT)
Dept: SURGERY | Facility: CLINIC | Age: 56
End: 2021-01-01
Payer: COMMERCIAL

## 2021-01-01 ENCOUNTER — TELEPHONE (OUTPATIENT)
Dept: ONCOLOGY | Facility: CLINIC | Age: 56
End: 2021-01-01

## 2021-01-01 ENCOUNTER — RESEARCH ENCOUNTER (OUTPATIENT)
Dept: ONCOLOGY | Facility: CLINIC | Age: 56
End: 2021-01-01

## 2021-01-01 ENCOUNTER — VIRTUAL VISIT (OUTPATIENT)
Dept: NEUROSURGERY | Facility: CLINIC | Age: 56
End: 2021-01-01
Attending: NEUROLOGICAL SURGERY
Payer: COMMERCIAL

## 2021-01-01 ENCOUNTER — APPOINTMENT (OUTPATIENT)
Dept: SPEECH THERAPY | Facility: CLINIC | Age: 56
DRG: 054 | End: 2021-01-01
Attending: STUDENT IN AN ORGANIZED HEALTH CARE EDUCATION/TRAINING PROGRAM
Payer: COMMERCIAL

## 2021-01-01 ENCOUNTER — APPOINTMENT (OUTPATIENT)
Dept: PHYSICAL THERAPY | Facility: CLINIC | Age: 56
End: 2021-01-01
Payer: COMMERCIAL

## 2021-01-01 ENCOUNTER — INFUSION THERAPY VISIT (OUTPATIENT)
Dept: ONCOLOGY | Facility: CLINIC | Age: 56
End: 2021-01-01
Attending: PSYCHIATRY & NEUROLOGY
Payer: COMMERCIAL

## 2021-01-01 ENCOUNTER — APPOINTMENT (OUTPATIENT)
Dept: CT IMAGING | Facility: CLINIC | Age: 56
DRG: 026 | End: 2021-01-01
Attending: NEUROLOGICAL SURGERY
Payer: COMMERCIAL

## 2021-01-01 ENCOUNTER — VIRTUAL VISIT (OUTPATIENT)
Dept: ONCOLOGY | Facility: CLINIC | Age: 56
End: 2021-01-01
Attending: PSYCHOLOGIST
Payer: COMMERCIAL

## 2021-01-01 ENCOUNTER — TUMOR CONFERENCE (OUTPATIENT)
Dept: ONCOLOGY | Facility: CLINIC | Age: 56
End: 2021-01-01

## 2021-01-01 ENCOUNTER — APPOINTMENT (OUTPATIENT)
Dept: MRI IMAGING | Facility: CLINIC | Age: 56
DRG: 026 | End: 2021-01-01
Attending: NURSE PRACTITIONER
Payer: COMMERCIAL

## 2021-01-01 ENCOUNTER — APPOINTMENT (OUTPATIENT)
Dept: SPEECH THERAPY | Facility: CLINIC | Age: 56
End: 2021-01-01
Payer: COMMERCIAL

## 2021-01-01 ENCOUNTER — HOSPITAL ENCOUNTER (OUTPATIENT)
Dept: PHYSICAL THERAPY | Facility: CLINIC | Age: 56
Setting detail: THERAPIES SERIES
End: 2021-02-03
Attending: STUDENT IN AN ORGANIZED HEALTH CARE EDUCATION/TRAINING PROGRAM
Payer: COMMERCIAL

## 2021-01-01 ENCOUNTER — APPOINTMENT (OUTPATIENT)
Dept: LAB | Facility: CLINIC | Age: 56
End: 2021-01-01
Attending: PSYCHIATRY & NEUROLOGY
Payer: COMMERCIAL

## 2021-01-01 ENCOUNTER — PATIENT OUTREACH (OUTPATIENT)
Dept: CARE COORDINATION | Facility: CLINIC | Age: 56
End: 2021-01-01

## 2021-01-01 ENCOUNTER — ANCILLARY PROCEDURE (OUTPATIENT)
Dept: MRI IMAGING | Facility: CLINIC | Age: 56
End: 2021-01-01
Attending: PSYCHIATRY & NEUROLOGY
Payer: COMMERCIAL

## 2021-01-01 ENCOUNTER — PREP FOR PROCEDURE (OUTPATIENT)
Dept: NEUROSURGERY | Facility: CLINIC | Age: 56
End: 2021-01-01

## 2021-01-01 ENCOUNTER — TELEPHONE (OUTPATIENT)
Dept: NEUROSURGERY | Facility: CLINIC | Age: 56
End: 2021-01-01

## 2021-01-01 ENCOUNTER — APPOINTMENT (OUTPATIENT)
Dept: OCCUPATIONAL THERAPY | Facility: CLINIC | Age: 56
End: 2021-01-01
Payer: COMMERCIAL

## 2021-01-01 ENCOUNTER — ANESTHESIA EVENT (OUTPATIENT)
Dept: SURGERY | Facility: CLINIC | Age: 56
DRG: 054 | End: 2021-01-01
Payer: COMMERCIAL

## 2021-01-01 ENCOUNTER — APPOINTMENT (OUTPATIENT)
Dept: OCCUPATIONAL THERAPY | Facility: CLINIC | Age: 56
DRG: 054 | End: 2021-01-01
Attending: NEUROLOGICAL SURGERY
Payer: COMMERCIAL

## 2021-01-01 ENCOUNTER — HOSPITAL ENCOUNTER (OUTPATIENT)
Dept: MRI IMAGING | Facility: CLINIC | Age: 56
DRG: 026 | End: 2021-01-13
Attending: NEUROLOGICAL SURGERY | Admitting: NEUROLOGICAL SURGERY
Payer: COMMERCIAL

## 2021-01-01 ENCOUNTER — HOSPITAL ENCOUNTER (OUTPATIENT)
Dept: OCCUPATIONAL THERAPY | Facility: CLINIC | Age: 56
Setting detail: THERAPIES SERIES
End: 2021-02-10
Attending: STUDENT IN AN ORGANIZED HEALTH CARE EDUCATION/TRAINING PROGRAM
Payer: COMMERCIAL

## 2021-01-01 ENCOUNTER — APPOINTMENT (OUTPATIENT)
Dept: MRI IMAGING | Facility: CLINIC | Age: 56
DRG: 054 | End: 2021-01-01
Attending: EMERGENCY MEDICINE
Payer: COMMERCIAL

## 2021-01-01 ENCOUNTER — HOSPITAL ENCOUNTER (OUTPATIENT)
Dept: OCCUPATIONAL THERAPY | Facility: CLINIC | Age: 56
Setting detail: THERAPIES SERIES
End: 2021-01-21
Attending: STUDENT IN AN ORGANIZED HEALTH CARE EDUCATION/TRAINING PROGRAM
Payer: COMMERCIAL

## 2021-01-01 ENCOUNTER — HOSPITAL ENCOUNTER (INPATIENT)
Facility: CLINIC | Age: 56
LOS: 6 days | Discharge: HOME | DRG: 026 | End: 2021-01-19
Attending: NEUROLOGICAL SURGERY | Admitting: NEUROLOGICAL SURGERY
Payer: COMMERCIAL

## 2021-01-01 ENCOUNTER — ANESTHESIA EVENT (OUTPATIENT)
Dept: SURGERY | Facility: CLINIC | Age: 56
DRG: 025 | End: 2021-01-01
Payer: COMMERCIAL

## 2021-01-01 ENCOUNTER — APPOINTMENT (OUTPATIENT)
Dept: PHYSICAL THERAPY | Facility: CLINIC | Age: 56
DRG: 054 | End: 2021-01-01
Payer: COMMERCIAL

## 2021-01-01 ENCOUNTER — APPOINTMENT (OUTPATIENT)
Dept: GENERAL RADIOLOGY | Facility: CLINIC | Age: 56
DRG: 054 | End: 2021-01-01
Attending: STUDENT IN AN ORGANIZED HEALTH CARE EDUCATION/TRAINING PROGRAM
Payer: COMMERCIAL

## 2021-01-01 ENCOUNTER — APPOINTMENT (OUTPATIENT)
Dept: CT IMAGING | Facility: CLINIC | Age: 56
DRG: 054 | End: 2021-01-01
Attending: STUDENT IN AN ORGANIZED HEALTH CARE EDUCATION/TRAINING PROGRAM
Payer: COMMERCIAL

## 2021-01-01 ENCOUNTER — NURSE TRIAGE (OUTPATIENT)
Dept: NURSING | Facility: CLINIC | Age: 56
End: 2021-01-01

## 2021-01-01 ENCOUNTER — APPOINTMENT (OUTPATIENT)
Dept: CT IMAGING | Facility: CLINIC | Age: 56
DRG: 026 | End: 2021-01-01
Attending: STUDENT IN AN ORGANIZED HEALTH CARE EDUCATION/TRAINING PROGRAM
Payer: COMMERCIAL

## 2021-01-01 ENCOUNTER — APPOINTMENT (OUTPATIENT)
Dept: OCCUPATIONAL THERAPY | Facility: CLINIC | Age: 56
DRG: 054 | End: 2021-01-01
Attending: PSYCHIATRY & NEUROLOGY
Payer: COMMERCIAL

## 2021-01-01 ENCOUNTER — APPOINTMENT (OUTPATIENT)
Dept: CT IMAGING | Facility: CLINIC | Age: 56
DRG: 054 | End: 2021-01-01
Attending: EMERGENCY MEDICINE
Payer: COMMERCIAL

## 2021-01-01 ENCOUNTER — HOSPITAL ENCOUNTER (OUTPATIENT)
Dept: MRI IMAGING | Facility: CLINIC | Age: 56
End: 2021-03-22
Attending: PSYCHIATRY & NEUROLOGY
Payer: COMMERCIAL

## 2021-01-01 ENCOUNTER — HOSPITAL ENCOUNTER (INPATIENT)
Facility: CLINIC | Age: 56
LOS: 6 days | Discharge: HOME HEALTH | DRG: 055 | End: 2021-06-09
Attending: PHYSICAL MEDICINE & REHABILITATION | Admitting: PHYSICAL MEDICINE & REHABILITATION
Payer: COMMERCIAL

## 2021-01-01 ENCOUNTER — PRE VISIT (OUTPATIENT)
Dept: SURGERY | Facility: CLINIC | Age: 56
End: 2021-01-01

## 2021-01-01 ENCOUNTER — APPOINTMENT (OUTPATIENT)
Dept: PHYSICAL THERAPY | Facility: CLINIC | Age: 56
DRG: 026 | End: 2021-01-01
Attending: STUDENT IN AN ORGANIZED HEALTH CARE EDUCATION/TRAINING PROGRAM
Payer: COMMERCIAL

## 2021-01-01 ENCOUNTER — ANESTHESIA EVENT (OUTPATIENT)
Dept: SURGERY | Facility: CLINIC | Age: 56
DRG: 026 | End: 2021-01-01
Payer: COMMERCIAL

## 2021-01-01 ENCOUNTER — HOSPITAL ENCOUNTER (INPATIENT)
Facility: CLINIC | Age: 56
LOS: 4 days | Discharge: HOME HEALTH | DRG: 054 | End: 2021-06-21
Attending: EMERGENCY MEDICINE | Admitting: PSYCHIATRY & NEUROLOGY
Payer: COMMERCIAL

## 2021-01-01 ENCOUNTER — APPOINTMENT (OUTPATIENT)
Dept: PHYSICAL THERAPY | Facility: CLINIC | Age: 56
DRG: 054 | End: 2021-01-01
Attending: STUDENT IN AN ORGANIZED HEALTH CARE EDUCATION/TRAINING PROGRAM
Payer: COMMERCIAL

## 2021-01-01 ENCOUNTER — DOCUMENTATION ONLY (OUTPATIENT)
Dept: LAB | Facility: CLINIC | Age: 56
End: 2021-01-01

## 2021-01-01 ENCOUNTER — APPOINTMENT (OUTPATIENT)
Dept: NEUROLOGY | Facility: CLINIC | Age: 56
DRG: 054 | End: 2021-01-01
Attending: STUDENT IN AN ORGANIZED HEALTH CARE EDUCATION/TRAINING PROGRAM
Payer: COMMERCIAL

## 2021-01-01 ENCOUNTER — APPOINTMENT (OUTPATIENT)
Dept: NEUROLOGY | Facility: CLINIC | Age: 56
DRG: 026 | End: 2021-01-01
Attending: STUDENT IN AN ORGANIZED HEALTH CARE EDUCATION/TRAINING PROGRAM
Payer: COMMERCIAL

## 2021-01-01 ENCOUNTER — HOSPITAL ENCOUNTER (OUTPATIENT)
Dept: MRI IMAGING | Facility: CLINIC | Age: 56
DRG: 025 | End: 2021-04-21
Attending: NEUROLOGICAL SURGERY | Admitting: NEUROLOGICAL SURGERY
Payer: COMMERCIAL

## 2021-01-01 ENCOUNTER — ANESTHESIA (OUTPATIENT)
Dept: SURGERY | Facility: CLINIC | Age: 56
DRG: 026 | End: 2021-01-01
Payer: COMMERCIAL

## 2021-01-01 ENCOUNTER — DOCUMENTATION ONLY (OUTPATIENT)
Dept: OTHER | Facility: CLINIC | Age: 56
End: 2021-01-01

## 2021-01-01 ENCOUNTER — HOSPITAL ENCOUNTER (OUTPATIENT)
Dept: MRI IMAGING | Facility: CLINIC | Age: 56
DRG: 054 | End: 2021-05-27
Attending: NEUROLOGICAL SURGERY | Admitting: NEUROLOGICAL SURGERY
Payer: COMMERCIAL

## 2021-01-01 ENCOUNTER — APPOINTMENT (OUTPATIENT)
Dept: PHYSICAL THERAPY | Facility: CLINIC | Age: 56
DRG: 054 | End: 2021-01-01
Attending: PSYCHIATRY & NEUROLOGY
Payer: COMMERCIAL

## 2021-01-01 ENCOUNTER — ANESTHESIA (OUTPATIENT)
Dept: SURGERY | Facility: CLINIC | Age: 56
DRG: 054 | End: 2021-01-01
Payer: COMMERCIAL

## 2021-01-01 ENCOUNTER — PATIENT OUTREACH (OUTPATIENT)
Dept: ONCOLOGY | Facility: CLINIC | Age: 56
End: 2021-01-01

## 2021-01-01 ENCOUNTER — APPOINTMENT (OUTPATIENT)
Dept: ONCOLOGY | Facility: CLINIC | Age: 56
End: 2021-01-01
Attending: NEUROLOGICAL SURGERY
Payer: COMMERCIAL

## 2021-01-01 ENCOUNTER — APPOINTMENT (OUTPATIENT)
Dept: PHYSICAL THERAPY | Facility: CLINIC | Age: 56
DRG: 054 | End: 2021-01-01
Attending: NEUROLOGICAL SURGERY
Payer: COMMERCIAL

## 2021-01-01 ENCOUNTER — HOSPITAL ENCOUNTER (OUTPATIENT)
Dept: OCCUPATIONAL THERAPY | Facility: CLINIC | Age: 56
Setting detail: THERAPIES SERIES
End: 2021-02-17
Attending: STUDENT IN AN ORGANIZED HEALTH CARE EDUCATION/TRAINING PROGRAM
Payer: COMMERCIAL

## 2021-01-01 ENCOUNTER — HOSPITAL ENCOUNTER (OUTPATIENT)
Dept: OCCUPATIONAL THERAPY | Facility: CLINIC | Age: 56
Setting detail: THERAPIES SERIES
End: 2021-02-03
Attending: STUDENT IN AN ORGANIZED HEALTH CARE EDUCATION/TRAINING PROGRAM
Payer: COMMERCIAL

## 2021-01-01 ENCOUNTER — HOSPITAL ENCOUNTER (OUTPATIENT)
Dept: OCCUPATIONAL THERAPY | Facility: CLINIC | Age: 56
Setting detail: THERAPIES SERIES
End: 2021-02-24
Attending: STUDENT IN AN ORGANIZED HEALTH CARE EDUCATION/TRAINING PROGRAM
Payer: COMMERCIAL

## 2021-01-01 ENCOUNTER — APPOINTMENT (OUTPATIENT)
Dept: OCCUPATIONAL THERAPY | Facility: CLINIC | Age: 56
DRG: 054 | End: 2021-01-01
Payer: COMMERCIAL

## 2021-01-01 ENCOUNTER — APPOINTMENT (OUTPATIENT)
Dept: GENERAL RADIOLOGY | Facility: CLINIC | Age: 56
DRG: 026 | End: 2021-01-01
Attending: NURSE PRACTITIONER
Payer: COMMERCIAL

## 2021-01-01 ENCOUNTER — APPOINTMENT (OUTPATIENT)
Dept: OCCUPATIONAL THERAPY | Facility: CLINIC | Age: 56
DRG: 025 | End: 2021-01-01
Attending: STUDENT IN AN ORGANIZED HEALTH CARE EDUCATION/TRAINING PROGRAM
Payer: COMMERCIAL

## 2021-01-01 ENCOUNTER — APPOINTMENT (OUTPATIENT)
Dept: OCCUPATIONAL THERAPY | Facility: CLINIC | Age: 56
DRG: 026 | End: 2021-01-01
Attending: STUDENT IN AN ORGANIZED HEALTH CARE EDUCATION/TRAINING PROGRAM
Payer: COMMERCIAL

## 2021-01-01 ENCOUNTER — HOSPITAL ENCOUNTER (INPATIENT)
Facility: CLINIC | Age: 56
LOS: 1 days | Discharge: HOME | DRG: 025 | End: 2021-04-22
Attending: NEUROLOGICAL SURGERY | Admitting: NEUROLOGICAL SURGERY
Payer: COMMERCIAL

## 2021-01-01 ENCOUNTER — APPOINTMENT (OUTPATIENT)
Dept: OCCUPATIONAL THERAPY | Facility: CLINIC | Age: 56
DRG: 054 | End: 2021-01-01
Attending: STUDENT IN AN ORGANIZED HEALTH CARE EDUCATION/TRAINING PROGRAM
Payer: COMMERCIAL

## 2021-01-01 ENCOUNTER — ANESTHESIA (OUTPATIENT)
Dept: SURGERY | Facility: CLINIC | Age: 56
DRG: 025 | End: 2021-01-01
Payer: COMMERCIAL

## 2021-01-01 ENCOUNTER — HOSPITAL ENCOUNTER (OUTPATIENT)
Dept: MRI IMAGING | Facility: CLINIC | Age: 56
End: 2021-04-05
Attending: NEUROLOGICAL SURGERY
Payer: COMMERCIAL

## 2021-01-01 ENCOUNTER — APPOINTMENT (OUTPATIENT)
Dept: CT IMAGING | Facility: CLINIC | Age: 56
DRG: 025 | End: 2021-01-01
Attending: STUDENT IN AN ORGANIZED HEALTH CARE EDUCATION/TRAINING PROGRAM
Payer: COMMERCIAL

## 2021-01-01 ENCOUNTER — HOSPITAL ENCOUNTER (OUTPATIENT)
Dept: OCCUPATIONAL THERAPY | Facility: CLINIC | Age: 56
Setting detail: THERAPIES SERIES
End: 2021-01-27
Attending: STUDENT IN AN ORGANIZED HEALTH CARE EDUCATION/TRAINING PROGRAM
Payer: COMMERCIAL

## 2021-01-01 ENCOUNTER — APPOINTMENT (OUTPATIENT)
Dept: PHYSICAL THERAPY | Facility: CLINIC | Age: 56
DRG: 026 | End: 2021-01-01
Attending: NEUROLOGICAL SURGERY
Payer: COMMERCIAL

## 2021-01-01 ENCOUNTER — HOSPITAL ENCOUNTER (INPATIENT)
Facility: CLINIC | Age: 56
LOS: 7 days | Discharge: NURSING, REHAB OR LONG-TERM CARE | DRG: 054 | End: 2021-06-03
Attending: NEUROLOGICAL SURGERY | Admitting: NEUROLOGICAL SURGERY
Payer: COMMERCIAL

## 2021-01-01 ENCOUNTER — HOSPITAL ENCOUNTER (INPATIENT)
Dept: MRI IMAGING | Facility: CLINIC | Age: 56
Setting detail: SURGERY ADMIT
DRG: 054 | End: 2021-05-27
Attending: NEUROLOGICAL SURGERY | Admitting: NEUROLOGICAL SURGERY
Payer: COMMERCIAL

## 2021-01-01 VITALS
OXYGEN SATURATION: 98 % | RESPIRATION RATE: 16 BRPM | WEIGHT: 160.8 LBS | SYSTOLIC BLOOD PRESSURE: 109 MMHG | TEMPERATURE: 97.6 F | HEART RATE: 95 BPM | BODY MASS INDEX: 23.07 KG/M2 | DIASTOLIC BLOOD PRESSURE: 72 MMHG

## 2021-01-01 VITALS — SYSTOLIC BLOOD PRESSURE: 143 MMHG | DIASTOLIC BLOOD PRESSURE: 98 MMHG

## 2021-01-01 VITALS
RESPIRATION RATE: 16 BRPM | WEIGHT: 173.3 LBS | TEMPERATURE: 97.7 F | HEART RATE: 89 BPM | DIASTOLIC BLOOD PRESSURE: 79 MMHG | OXYGEN SATURATION: 98 % | BODY MASS INDEX: 24.87 KG/M2 | SYSTOLIC BLOOD PRESSURE: 129 MMHG

## 2021-01-01 VITALS
HEART RATE: 80 BPM | RESPIRATION RATE: 16 BRPM | OXYGEN SATURATION: 99 % | SYSTOLIC BLOOD PRESSURE: 137 MMHG | WEIGHT: 173.5 LBS | TEMPERATURE: 98.2 F | DIASTOLIC BLOOD PRESSURE: 93 MMHG | BODY MASS INDEX: 24.89 KG/M2

## 2021-01-01 VITALS — DIASTOLIC BLOOD PRESSURE: 90 MMHG | SYSTOLIC BLOOD PRESSURE: 145 MMHG

## 2021-01-01 VITALS
WEIGHT: 161.7 LBS | HEART RATE: 84 BPM | SYSTOLIC BLOOD PRESSURE: 125 MMHG | TEMPERATURE: 97.3 F | DIASTOLIC BLOOD PRESSURE: 81 MMHG | OXYGEN SATURATION: 96 % | RESPIRATION RATE: 16 BRPM | BODY MASS INDEX: 23.2 KG/M2

## 2021-01-01 VITALS
HEART RATE: 90 BPM | TEMPERATURE: 98.1 F | WEIGHT: 160.21 LBS | DIASTOLIC BLOOD PRESSURE: 79 MMHG | HEIGHT: 70 IN | BODY MASS INDEX: 22.94 KG/M2 | RESPIRATION RATE: 16 BRPM | OXYGEN SATURATION: 97 % | SYSTOLIC BLOOD PRESSURE: 107 MMHG

## 2021-01-01 VITALS
SYSTOLIC BLOOD PRESSURE: 136 MMHG | TEMPERATURE: 97.5 F | RESPIRATION RATE: 16 BRPM | WEIGHT: 165.8 LBS | OXYGEN SATURATION: 98 % | BODY MASS INDEX: 23.79 KG/M2 | HEART RATE: 76 BPM | DIASTOLIC BLOOD PRESSURE: 93 MMHG

## 2021-01-01 VITALS
HEART RATE: 94 BPM | WEIGHT: 171.7 LBS | BODY MASS INDEX: 24.64 KG/M2 | OXYGEN SATURATION: 98 % | RESPIRATION RATE: 16 BRPM | TEMPERATURE: 98.2 F | SYSTOLIC BLOOD PRESSURE: 117 MMHG | DIASTOLIC BLOOD PRESSURE: 82 MMHG

## 2021-01-01 VITALS
HEART RATE: 87 BPM | RESPIRATION RATE: 18 BRPM | TEMPERATURE: 98.2 F | BODY MASS INDEX: 23.66 KG/M2 | DIASTOLIC BLOOD PRESSURE: 83 MMHG | SYSTOLIC BLOOD PRESSURE: 149 MMHG | WEIGHT: 164.9 LBS | OXYGEN SATURATION: 97 %

## 2021-01-01 VITALS
HEART RATE: 77 BPM | TEMPERATURE: 97 F | HEIGHT: 70 IN | WEIGHT: 158.07 LBS | DIASTOLIC BLOOD PRESSURE: 77 MMHG | SYSTOLIC BLOOD PRESSURE: 114 MMHG | BODY MASS INDEX: 22.63 KG/M2 | RESPIRATION RATE: 16 BRPM | OXYGEN SATURATION: 98 %

## 2021-01-01 VITALS
RESPIRATION RATE: 15 BRPM | BODY MASS INDEX: 25.05 KG/M2 | DIASTOLIC BLOOD PRESSURE: 85 MMHG | WEIGHT: 175 LBS | HEIGHT: 70 IN | HEART RATE: 73 BPM | SYSTOLIC BLOOD PRESSURE: 129 MMHG | TEMPERATURE: 98.1 F | OXYGEN SATURATION: 98 %

## 2021-01-01 VITALS
DIASTOLIC BLOOD PRESSURE: 104 MMHG | BODY MASS INDEX: 23.99 KG/M2 | RESPIRATION RATE: 14 BRPM | HEART RATE: 71 BPM | OXYGEN SATURATION: 99 % | WEIGHT: 167.55 LBS | HEIGHT: 70 IN | TEMPERATURE: 98.5 F | SYSTOLIC BLOOD PRESSURE: 140 MMHG

## 2021-01-01 VITALS
OXYGEN SATURATION: 98 % | SYSTOLIC BLOOD PRESSURE: 108 MMHG | HEART RATE: 78 BPM | RESPIRATION RATE: 14 BRPM | TEMPERATURE: 97.4 F | DIASTOLIC BLOOD PRESSURE: 78 MMHG

## 2021-01-01 VITALS
DIASTOLIC BLOOD PRESSURE: 99 MMHG | OXYGEN SATURATION: 99 % | RESPIRATION RATE: 18 BRPM | TEMPERATURE: 97.7 F | HEART RATE: 81 BPM | SYSTOLIC BLOOD PRESSURE: 141 MMHG | BODY MASS INDEX: 23.47 KG/M2 | WEIGHT: 163.6 LBS

## 2021-01-01 VITALS
DIASTOLIC BLOOD PRESSURE: 81 MMHG | HEART RATE: 87 BPM | TEMPERATURE: 95.5 F | WEIGHT: 165 LBS | RESPIRATION RATE: 16 BRPM | BODY MASS INDEX: 23.62 KG/M2 | SYSTOLIC BLOOD PRESSURE: 103 MMHG | HEIGHT: 70 IN | OXYGEN SATURATION: 98 %

## 2021-01-01 VITALS
HEART RATE: 94 BPM | TEMPERATURE: 97.5 F | OXYGEN SATURATION: 98 % | SYSTOLIC BLOOD PRESSURE: 142 MMHG | BODY MASS INDEX: 24.74 KG/M2 | RESPIRATION RATE: 16 BRPM | DIASTOLIC BLOOD PRESSURE: 100 MMHG | HEIGHT: 70 IN | WEIGHT: 172.84 LBS

## 2021-01-01 VITALS
WEIGHT: 171 LBS | DIASTOLIC BLOOD PRESSURE: 99 MMHG | HEART RATE: 79 BPM | TEMPERATURE: 98.1 F | BODY MASS INDEX: 24.54 KG/M2 | OXYGEN SATURATION: 98 % | SYSTOLIC BLOOD PRESSURE: 150 MMHG | RESPIRATION RATE: 16 BRPM

## 2021-01-01 VITALS
SYSTOLIC BLOOD PRESSURE: 131 MMHG | OXYGEN SATURATION: 97 % | WEIGHT: 170.7 LBS | DIASTOLIC BLOOD PRESSURE: 77 MMHG | TEMPERATURE: 97.4 F | HEART RATE: 89 BPM | BODY MASS INDEX: 24.49 KG/M2 | RESPIRATION RATE: 18 BRPM

## 2021-01-01 VITALS
DIASTOLIC BLOOD PRESSURE: 88 MMHG | SYSTOLIC BLOOD PRESSURE: 127 MMHG | WEIGHT: 162.3 LBS | TEMPERATURE: 97.4 F | HEART RATE: 76 BPM | BODY MASS INDEX: 23.24 KG/M2 | HEIGHT: 70 IN | OXYGEN SATURATION: 99 % | RESPIRATION RATE: 16 BRPM

## 2021-01-01 DIAGNOSIS — C71.9 GLIOBLASTOMA (H): Primary | ICD-10-CM

## 2021-01-01 DIAGNOSIS — C71.9 GLIOBLASTOMA (H): ICD-10-CM

## 2021-01-01 DIAGNOSIS — T45.1X5A CHEMOTHERAPY INDUCED NEUTROPENIA (H): ICD-10-CM

## 2021-01-01 DIAGNOSIS — R45.89 DEPRESSED MOOD: Primary | ICD-10-CM

## 2021-01-01 DIAGNOSIS — Z11.59 ENCOUNTER FOR SCREENING FOR OTHER VIRAL DISEASES: ICD-10-CM

## 2021-01-01 DIAGNOSIS — Z00.6 EXAMINATION OF PARTICIPANT OR CONTROL IN CLINICAL RESEARCH: Primary | ICD-10-CM

## 2021-01-01 DIAGNOSIS — D70.1 CHEMOTHERAPY INDUCED NEUTROPENIA (H): ICD-10-CM

## 2021-01-01 DIAGNOSIS — R11.2 CHEMOTHERAPY-INDUCED NAUSEA AND VOMITING: ICD-10-CM

## 2021-01-01 DIAGNOSIS — G40.409 GENERALIZED TONIC-CLONIC SEIZURE (H): ICD-10-CM

## 2021-01-01 DIAGNOSIS — T45.1X5A CHEMOTHERAPY-INDUCED NEUTROPENIA (H): ICD-10-CM

## 2021-01-01 DIAGNOSIS — T45.1X5A CHEMOTHERAPY-INDUCED NAUSEA AND VOMITING: Primary | ICD-10-CM

## 2021-01-01 DIAGNOSIS — Z11.59 ENCOUNTER FOR SCREENING FOR OTHER VIRAL DISEASES: Primary | ICD-10-CM

## 2021-01-01 DIAGNOSIS — F06.8 OTHER SPECIFIED MENTAL DISORDERS DUE TO KNOWN PHYSIOLOGICAL CONDITION: ICD-10-CM

## 2021-01-01 DIAGNOSIS — T45.1X5A CHEMOTHERAPY-INDUCED NAUSEA AND VOMITING: ICD-10-CM

## 2021-01-01 DIAGNOSIS — Z00.6 EXAMINATION OF PARTICIPANT OR CONTROL IN CLINICAL RESEARCH: ICD-10-CM

## 2021-01-01 DIAGNOSIS — D70.1 CHEMOTHERAPY-INDUCED NEUTROPENIA (H): ICD-10-CM

## 2021-01-01 DIAGNOSIS — G93.6 BRAIN SWELLING (H): ICD-10-CM

## 2021-01-01 DIAGNOSIS — Z11.52 ENCOUNTER FOR SCREENING LABORATORY TESTING FOR COVID-19 VIRUS: ICD-10-CM

## 2021-01-01 DIAGNOSIS — D49.6 BRAIN TUMOR (H): ICD-10-CM

## 2021-01-01 DIAGNOSIS — R41.89 COGNITIVE DEFICIT WITH IMPAIRED VISUOSPATIAL FUNCTION: ICD-10-CM

## 2021-01-01 DIAGNOSIS — Z01.818 PRE-OP EVALUATION: Primary | ICD-10-CM

## 2021-01-01 DIAGNOSIS — C71.9 MALIGNANT NEOPLASM OF BRAIN, UNSPECIFIED LOCATION (H): ICD-10-CM

## 2021-01-01 DIAGNOSIS — G93.6 BRAIN EDEMA (H): ICD-10-CM

## 2021-01-01 DIAGNOSIS — R41.4 LEFT-SIDED NEGLECT: Primary | ICD-10-CM

## 2021-01-01 DIAGNOSIS — R74.01 TRANSAMINITIS: ICD-10-CM

## 2021-01-01 DIAGNOSIS — Z51.11 CHEMOTHERAPY MANAGEMENT, ENCOUNTER FOR: Primary | ICD-10-CM

## 2021-01-01 DIAGNOSIS — R41.842 COGNITIVE DEFICIT WITH IMPAIRED VISUOSPATIAL FUNCTION: ICD-10-CM

## 2021-01-01 DIAGNOSIS — R11.2 CHEMOTHERAPY-INDUCED NAUSEA AND VOMITING: Primary | ICD-10-CM

## 2021-01-01 DIAGNOSIS — I15.8 OTHER SECONDARY HYPERTENSION: ICD-10-CM

## 2021-01-01 DIAGNOSIS — Z71.89 OTHER SPECIFIED COUNSELING: ICD-10-CM

## 2021-01-01 DIAGNOSIS — Z51.11 CHEMOTHERAPY MANAGEMENT, ENCOUNTER FOR: ICD-10-CM

## 2021-01-01 DIAGNOSIS — R29.701 NATIONAL INSTITUTES OF HEALTH STROKE SCALE (NIHSS) TOTAL SCORE 1: ICD-10-CM

## 2021-01-01 DIAGNOSIS — C71.9 GBM (GLIOBLASTOMA MULTIFORME) (H): ICD-10-CM

## 2021-01-01 DIAGNOSIS — Z01.818 PRE-OP EXAMINATION: Primary | ICD-10-CM

## 2021-01-01 DIAGNOSIS — Z98.890 S/P CRANIOTOMY: ICD-10-CM

## 2021-01-01 LAB
ABO + RH BLD: NORMAL
ALBUMIN SERPL-MCNC: 2.5 G/DL (ref 3.4–5)
ALBUMIN SERPL-MCNC: 2.6 G/DL (ref 3.4–5)
ALBUMIN SERPL-MCNC: 2.6 G/DL (ref 3.4–5)
ALBUMIN SERPL-MCNC: 2.8 G/DL (ref 3.4–5)
ALBUMIN SERPL-MCNC: 2.9 G/DL (ref 3.4–5)
ALBUMIN SERPL-MCNC: 3 G/DL (ref 3.4–5)
ALBUMIN SERPL-MCNC: 3.1 G/DL (ref 3.4–5)
ALBUMIN SERPL-MCNC: 3.1 G/DL (ref 3.4–5)
ALBUMIN SERPL-MCNC: 3.3 G/DL (ref 3.4–5)
ALBUMIN SERPL-MCNC: 3.4 G/DL (ref 3.4–5)
ALBUMIN SERPL-MCNC: 3.4 G/DL (ref 3.4–5)
ALBUMIN SERPL-MCNC: 3.5 G/DL (ref 3.4–5)
ALBUMIN SERPL-MCNC: 3.6 G/DL (ref 3.4–5)
ALBUMIN SERPL-MCNC: 3.7 G/DL (ref 3.4–5)
ALBUMIN SERPL-MCNC: 3.8 G/DL (ref 3.4–5)
ALBUMIN SERPL-MCNC: 3.8 G/DL (ref 3.4–5)
ALBUMIN SERPL-MCNC: NORMAL G/DL (ref 3.4–5)
ALBUMIN UR-MCNC: NEGATIVE MG/DL
ALP SERPL-CCNC: 52 U/L (ref 40–150)
ALP SERPL-CCNC: 55 U/L (ref 40–150)
ALP SERPL-CCNC: 55 U/L (ref 40–150)
ALP SERPL-CCNC: 56 U/L (ref 40–150)
ALP SERPL-CCNC: 57 U/L (ref 40–150)
ALP SERPL-CCNC: 60 U/L (ref 40–150)
ALP SERPL-CCNC: 61 U/L (ref 40–150)
ALP SERPL-CCNC: 62 U/L (ref 40–150)
ALP SERPL-CCNC: 64 U/L (ref 40–150)
ALP SERPL-CCNC: 65 U/L (ref 40–150)
ALP SERPL-CCNC: 65 U/L (ref 40–150)
ALP SERPL-CCNC: 66 U/L (ref 40–150)
ALP SERPL-CCNC: 66 U/L (ref 40–150)
ALP SERPL-CCNC: 67 U/L (ref 40–150)
ALP SERPL-CCNC: 70 U/L (ref 40–150)
ALP SERPL-CCNC: 71 U/L (ref 40–150)
ALP SERPL-CCNC: 72 U/L (ref 40–150)
ALP SERPL-CCNC: 73 U/L (ref 40–150)
ALP SERPL-CCNC: 73 U/L (ref 40–150)
ALP SERPL-CCNC: 74 U/L (ref 40–150)
ALP SERPL-CCNC: 78 U/L (ref 40–150)
ALP SERPL-CCNC: 79 U/L (ref 40–150)
ALP SERPL-CCNC: 80 U/L (ref 40–150)
ALP SERPL-CCNC: 88 U/L (ref 40–150)
ALP SERPL-CCNC: 93 U/L (ref 40–150)
ALP SERPL-CCNC: NORMAL U/L (ref 40–150)
ALT SERPL W P-5'-P-CCNC: 124 U/L (ref 0–70)
ALT SERPL W P-5'-P-CCNC: 20 U/L (ref 0–70)
ALT SERPL W P-5'-P-CCNC: 24 U/L (ref 0–70)
ALT SERPL W P-5'-P-CCNC: 24 U/L (ref 0–70)
ALT SERPL W P-5'-P-CCNC: 25 U/L (ref 0–70)
ALT SERPL W P-5'-P-CCNC: 25 U/L (ref 0–70)
ALT SERPL W P-5'-P-CCNC: 255 U/L (ref 0–70)
ALT SERPL W P-5'-P-CCNC: 26 U/L (ref 0–70)
ALT SERPL W P-5'-P-CCNC: 28 U/L (ref 0–70)
ALT SERPL W P-5'-P-CCNC: 28 U/L (ref 0–70)
ALT SERPL W P-5'-P-CCNC: 30 U/L (ref 0–70)
ALT SERPL W P-5'-P-CCNC: 30 U/L (ref 0–70)
ALT SERPL W P-5'-P-CCNC: 32 U/L (ref 0–70)
ALT SERPL W P-5'-P-CCNC: 34 U/L (ref 0–70)
ALT SERPL W P-5'-P-CCNC: 34 U/L (ref 0–70)
ALT SERPL W P-5'-P-CCNC: 36 U/L (ref 0–70)
ALT SERPL W P-5'-P-CCNC: 36 U/L (ref 0–70)
ALT SERPL W P-5'-P-CCNC: 37 U/L (ref 0–70)
ALT SERPL W P-5'-P-CCNC: 37 U/L (ref 0–70)
ALT SERPL W P-5'-P-CCNC: 40 U/L (ref 0–70)
ALT SERPL W P-5'-P-CCNC: 42 U/L (ref 0–70)
ALT SERPL W P-5'-P-CCNC: 43 U/L (ref 0–70)
ALT SERPL W P-5'-P-CCNC: 45 U/L (ref 0–70)
ALT SERPL W P-5'-P-CCNC: 55 U/L (ref 0–70)
ALT SERPL W P-5'-P-CCNC: 88 U/L (ref 0–70)
ALT SERPL W P-5'-P-CCNC: NORMAL U/L (ref 0–70)
AMYLASE SERPL-CCNC: 42 U/L (ref 30–110)
AMYLASE SERPL-CCNC: 49 U/L (ref 30–110)
AMYLASE SERPL-CCNC: 50 U/L (ref 30–110)
AMYLASE SERPL-CCNC: 54 U/L (ref 30–110)
AMYLASE SERPL-CCNC: 60 U/L (ref 30–110)
AMYLASE SERPL-CCNC: 66 U/L (ref 30–110)
AMYLASE SERPL-CCNC: 84 U/L (ref 30–110)
AMYLASE SERPL-CCNC: NORMAL U/L (ref 30–110)
ANION GAP SERPL CALCULATED.3IONS-SCNC: 1 MMOL/L (ref 3–14)
ANION GAP SERPL CALCULATED.3IONS-SCNC: 12 MMOL/L (ref 3–14)
ANION GAP SERPL CALCULATED.3IONS-SCNC: 3 MMOL/L (ref 3–14)
ANION GAP SERPL CALCULATED.3IONS-SCNC: 4 MMOL/L (ref 3–14)
ANION GAP SERPL CALCULATED.3IONS-SCNC: 5 MMOL/L (ref 3–14)
ANION GAP SERPL CALCULATED.3IONS-SCNC: 6 MMOL/L (ref 3–14)
ANION GAP SERPL CALCULATED.3IONS-SCNC: 7 MMOL/L (ref 3–14)
ANION GAP SERPL CALCULATED.3IONS-SCNC: 8 MMOL/L (ref 3–14)
ANION GAP SERPL CALCULATED.3IONS-SCNC: 9 MMOL/L (ref 3–14)
ANION GAP SERPL CALCULATED.3IONS-SCNC: 9 MMOL/L (ref 3–14)
ANION GAP SERPL CALCULATED.3IONS-SCNC: NORMAL MMOL/L (ref 6–17)
APPEARANCE UR: CLEAR
APTT PPP: 20 SEC (ref 22–37)
APTT PPP: 24 SEC (ref 22–37)
APTT PPP: 25 SEC (ref 22–37)
APTT PPP: 25 SEC (ref 22–37)
APTT PPP: 26 SEC (ref 22–37)
APTT PPP: 26 SEC (ref 22–37)
APTT PPP: 27 SEC (ref 22–37)
APTT PPP: 27 SEC (ref 22–37)
APTT PPP: 28 SEC (ref 22–37)
APTT PPP: 29 SEC (ref 22–37)
APTT PPP: 30 SEC (ref 22–37)
APTT PPP: 36 SEC (ref 22–37)
AST SERPL W P-5'-P-CCNC: 11 U/L (ref 0–45)
AST SERPL W P-5'-P-CCNC: 12 U/L (ref 0–45)
AST SERPL W P-5'-P-CCNC: 13 U/L (ref 0–45)
AST SERPL W P-5'-P-CCNC: 14 U/L (ref 0–45)
AST SERPL W P-5'-P-CCNC: 14 U/L (ref 0–45)
AST SERPL W P-5'-P-CCNC: 15 U/L (ref 0–45)
AST SERPL W P-5'-P-CCNC: 16 U/L (ref 0–45)
AST SERPL W P-5'-P-CCNC: 17 U/L (ref 0–45)
AST SERPL W P-5'-P-CCNC: 18 U/L (ref 0–45)
AST SERPL W P-5'-P-CCNC: 19 U/L (ref 0–45)
AST SERPL W P-5'-P-CCNC: 20 U/L (ref 0–45)
AST SERPL W P-5'-P-CCNC: 21 U/L (ref 0–45)
AST SERPL W P-5'-P-CCNC: 22 U/L (ref 0–45)
AST SERPL W P-5'-P-CCNC: 23 U/L (ref 0–45)
AST SERPL W P-5'-P-CCNC: 23 U/L (ref 0–45)
AST SERPL W P-5'-P-CCNC: 24 U/L (ref 0–45)
AST SERPL W P-5'-P-CCNC: 25 U/L (ref 0–45)
AST SERPL W P-5'-P-CCNC: 26 U/L (ref 0–45)
AST SERPL W P-5'-P-CCNC: 28 U/L (ref 0–45)
AST SERPL W P-5'-P-CCNC: 31 U/L (ref 0–45)
AST SERPL W P-5'-P-CCNC: 34 U/L (ref 0–45)
AST SERPL W P-5'-P-CCNC: 53 U/L (ref 0–45)
AST SERPL W P-5'-P-CCNC: 8 U/L (ref 0–45)
AST SERPL W P-5'-P-CCNC: NORMAL U/L (ref 0–45)
BACTERIA #/AREA URNS HPF: ABNORMAL /HPF
BACTERIA #/AREA URNS HPF: ABNORMAL /HPF
BACTERIA SPEC CULT: NO GROWTH
BASOPHILS # BLD AUTO: 0 10E9/L (ref 0–0.2)
BASOPHILS # BLD AUTO: 0.1 10E9/L (ref 0–0.2)
BASOPHILS NFR BLD AUTO: 0 %
BASOPHILS NFR BLD AUTO: 0.1 %
BASOPHILS NFR BLD AUTO: 0.1 %
BASOPHILS NFR BLD AUTO: 0.2 %
BASOPHILS NFR BLD AUTO: 0.2 %
BASOPHILS NFR BLD AUTO: 0.3 %
BASOPHILS NFR BLD AUTO: 0.6 %
BASOPHILS NFR BLD AUTO: 0.7 %
BASOPHILS NFR BLD AUTO: 0.8 %
BASOPHILS NFR BLD AUTO: 0.9 %
BASOPHILS NFR BLD AUTO: 1 %
BASOPHILS NFR BLD AUTO: 1.1 %
BASOPHILS NFR BLD AUTO: 1.2 %
BASOPHILS NFR BLD AUTO: 1.3 %
BASOPHILS NFR BLD AUTO: 3.5 %
BILIRUB SERPL-MCNC: 0.2 MG/DL (ref 0.2–1.3)
BILIRUB SERPL-MCNC: 0.3 MG/DL (ref 0.2–1.3)
BILIRUB SERPL-MCNC: 0.4 MG/DL (ref 0.2–1.3)
BILIRUB SERPL-MCNC: 0.5 MG/DL (ref 0.2–1.3)
BILIRUB SERPL-MCNC: 0.8 MG/DL (ref 0.2–1.3)
BILIRUB SERPL-MCNC: 0.9 MG/DL (ref 0.2–1.3)
BILIRUB SERPL-MCNC: NORMAL MG/DL (ref 0.2–1.3)
BILIRUB UR QL STRIP: NEGATIVE
BLD GP AB SCN SERPL QL: NORMAL
BLOOD BANK CMNT PATIENT-IMP: NORMAL
BUN SERPL-MCNC: 10 MG/DL (ref 7–30)
BUN SERPL-MCNC: 11 MG/DL (ref 7–30)
BUN SERPL-MCNC: 12 MG/DL (ref 7–30)
BUN SERPL-MCNC: 13 MG/DL (ref 7–30)
BUN SERPL-MCNC: 14 MG/DL (ref 7–30)
BUN SERPL-MCNC: 15 MG/DL (ref 7–30)
BUN SERPL-MCNC: 16 MG/DL (ref 7–30)
BUN SERPL-MCNC: 18 MG/DL (ref 7–30)
BUN SERPL-MCNC: 19 MG/DL (ref 7–30)
BUN SERPL-MCNC: 20 MG/DL (ref 7–30)
BUN SERPL-MCNC: 20 MG/DL (ref 7–30)
BUN SERPL-MCNC: 25 MG/DL (ref 7–30)
BUN SERPL-MCNC: 25 MG/DL (ref 7–30)
BUN SERPL-MCNC: 9 MG/DL (ref 7–30)
BUN SERPL-MCNC: 9 MG/DL (ref 7–30)
BUN SERPL-MCNC: NORMAL MG/DL (ref 7–30)
CALCIUM SERPL-MCNC: 8.3 MG/DL (ref 8.5–10.1)
CALCIUM SERPL-MCNC: 8.4 MG/DL (ref 8.5–10.1)
CALCIUM SERPL-MCNC: 8.4 MG/DL (ref 8.5–10.1)
CALCIUM SERPL-MCNC: 8.5 MG/DL (ref 8.5–10.1)
CALCIUM SERPL-MCNC: 8.5 MG/DL (ref 8.5–10.1)
CALCIUM SERPL-MCNC: 8.6 MG/DL (ref 8.5–10.1)
CALCIUM SERPL-MCNC: 8.7 MG/DL (ref 8.5–10.1)
CALCIUM SERPL-MCNC: 8.8 MG/DL (ref 8.5–10.1)
CALCIUM SERPL-MCNC: 8.9 MG/DL (ref 8.5–10.1)
CALCIUM SERPL-MCNC: 9 MG/DL (ref 8.5–10.1)
CALCIUM SERPL-MCNC: 9.1 MG/DL (ref 8.5–10.1)
CALCIUM SERPL-MCNC: 9.2 MG/DL (ref 8.5–10.1)
CALCIUM SERPL-MCNC: 9.2 MG/DL (ref 8.5–10.1)
CALCIUM SERPL-MCNC: 9.3 MG/DL (ref 8.5–10.1)
CALCIUM SERPL-MCNC: 9.4 MG/DL (ref 8.5–10.1)
CALCIUM SERPL-MCNC: NORMAL MG/DL (ref 8.5–10.1)
CHLORIDE SERPL-SCNC: 100 MMOL/L (ref 94–109)
CHLORIDE SERPL-SCNC: 100 MMOL/L (ref 94–109)
CHLORIDE SERPL-SCNC: 101 MMOL/L (ref 94–109)
CHLORIDE SERPL-SCNC: 101 MMOL/L (ref 94–109)
CHLORIDE SERPL-SCNC: 102 MMOL/L (ref 94–109)
CHLORIDE SERPL-SCNC: 103 MMOL/L (ref 94–109)
CHLORIDE SERPL-SCNC: 104 MMOL/L (ref 94–109)
CHLORIDE SERPL-SCNC: 105 MMOL/L (ref 94–109)
CHLORIDE SERPL-SCNC: 106 MMOL/L (ref 94–109)
CHLORIDE SERPL-SCNC: 107 MMOL/L (ref 94–109)
CHLORIDE SERPL-SCNC: 108 MMOL/L (ref 94–109)
CHLORIDE SERPL-SCNC: 111 MMOL/L (ref 94–109)
CHLORIDE SERPL-SCNC: 113 MMOL/L (ref 94–109)
CHLORIDE SERPL-SCNC: 98 MMOL/L (ref 94–109)
CHLORIDE SERPL-SCNC: NORMAL MMOL/L (ref 94–109)
CO2 SERPL-SCNC: 20 MMOL/L (ref 20–32)
CO2 SERPL-SCNC: 22 MMOL/L (ref 20–32)
CO2 SERPL-SCNC: 23 MMOL/L (ref 20–32)
CO2 SERPL-SCNC: 24 MMOL/L (ref 20–32)
CO2 SERPL-SCNC: 24 MMOL/L (ref 20–32)
CO2 SERPL-SCNC: 25 MMOL/L (ref 20–32)
CO2 SERPL-SCNC: 26 MMOL/L (ref 20–32)
CO2 SERPL-SCNC: 27 MMOL/L (ref 20–32)
CO2 SERPL-SCNC: 28 MMOL/L (ref 20–32)
CO2 SERPL-SCNC: 29 MMOL/L (ref 20–32)
CO2 SERPL-SCNC: 30 MMOL/L (ref 20–32)
CO2 SERPL-SCNC: 31 MMOL/L (ref 20–32)
CO2 SERPL-SCNC: NORMAL MMOL/L (ref 20–32)
COLOR UR AUTO: ABNORMAL
COLOR UR AUTO: ABNORMAL
COLOR UR AUTO: NORMAL
COLOR UR AUTO: YELLOW
COLOR UR AUTO: YELLOW
COPATH REPORT: NORMAL
COPATH REPORT: NORMAL
CREAT SERPL-MCNC: 0.66 MG/DL (ref 0.66–1.25)
CREAT SERPL-MCNC: 0.68 MG/DL (ref 0.66–1.25)
CREAT SERPL-MCNC: 0.69 MG/DL (ref 0.66–1.25)
CREAT SERPL-MCNC: 0.7 MG/DL (ref 0.66–1.25)
CREAT SERPL-MCNC: 0.7 MG/DL (ref 0.66–1.25)
CREAT SERPL-MCNC: 0.71 MG/DL (ref 0.66–1.25)
CREAT SERPL-MCNC: 0.71 MG/DL (ref 0.66–1.25)
CREAT SERPL-MCNC: 0.72 MG/DL (ref 0.66–1.25)
CREAT SERPL-MCNC: 0.73 MG/DL (ref 0.66–1.25)
CREAT SERPL-MCNC: 0.74 MG/DL (ref 0.66–1.25)
CREAT SERPL-MCNC: 0.75 MG/DL (ref 0.66–1.25)
CREAT SERPL-MCNC: 0.76 MG/DL (ref 0.66–1.25)
CREAT SERPL-MCNC: 0.76 MG/DL (ref 0.66–1.25)
CREAT SERPL-MCNC: 0.77 MG/DL (ref 0.66–1.25)
CREAT SERPL-MCNC: 0.78 MG/DL (ref 0.66–1.25)
CREAT SERPL-MCNC: 0.78 MG/DL (ref 0.66–1.25)
CREAT SERPL-MCNC: 0.79 MG/DL (ref 0.66–1.25)
CREAT SERPL-MCNC: 0.8 MG/DL (ref 0.66–1.25)
CREAT SERPL-MCNC: 0.81 MG/DL (ref 0.66–1.25)
CREAT SERPL-MCNC: 0.82 MG/DL (ref 0.66–1.25)
CREAT SERPL-MCNC: 0.84 MG/DL (ref 0.66–1.25)
CREAT SERPL-MCNC: 0.85 MG/DL (ref 0.66–1.25)
CREAT SERPL-MCNC: 0.85 MG/DL (ref 0.66–1.25)
CREAT SERPL-MCNC: 0.86 MG/DL (ref 0.66–1.25)
CREAT SERPL-MCNC: 0.87 MG/DL (ref 0.66–1.25)
CREAT SERPL-MCNC: 0.88 MG/DL (ref 0.66–1.25)
CREAT SERPL-MCNC: 0.89 MG/DL (ref 0.66–1.25)
CREAT SERPL-MCNC: NORMAL MG/DL (ref 0.66–1.25)
CRP SERPL-MCNC: 10 MG/L (ref 0–8)
CRP SERPL-MCNC: 10 MG/L (ref 0–8)
CRP SERPL-MCNC: 100 MG/L (ref 0–8)
CRP SERPL-MCNC: 100 MG/L (ref 0–8)
CRP SERPL-MCNC: 13 MG/L (ref 0–8)
CRP SERPL-MCNC: 13 MG/L (ref 0–8)
CRP SERPL-MCNC: 18 MG/L (ref 0–8)
CRP SERPL-MCNC: 28 MG/L (ref 0–8)
CRP SERPL-MCNC: 29 MG/L (ref 0–8)
CRP SERPL-MCNC: 35 MG/L (ref 0–8)
CRP SERPL-MCNC: 52 MG/L (ref 0–8)
CRP SERPL-MCNC: 6.5 MG/L (ref 0–8)
CRP SERPL-MCNC: 8.9 MG/L (ref 0–8)
CRP SERPL-MCNC: NORMAL MG/L (ref 0–8)
DIFFERENTIAL METHOD BLD: ABNORMAL
EOSINOPHIL # BLD AUTO: 0 10E9/L (ref 0–0.7)
EOSINOPHIL # BLD AUTO: 0.1 10E9/L (ref 0–0.7)
EOSINOPHIL # BLD AUTO: 0.2 10E9/L (ref 0–0.7)
EOSINOPHIL # BLD AUTO: 0.3 10E9/L (ref 0–0.7)
EOSINOPHIL NFR BLD AUTO: 0 %
EOSINOPHIL NFR BLD AUTO: 0.2 %
EOSINOPHIL NFR BLD AUTO: 0.6 %
EOSINOPHIL NFR BLD AUTO: 0.8 %
EOSINOPHIL NFR BLD AUTO: 1 %
EOSINOPHIL NFR BLD AUTO: 1.2 %
EOSINOPHIL NFR BLD AUTO: 1.3 %
EOSINOPHIL NFR BLD AUTO: 2.1 %
EOSINOPHIL NFR BLD AUTO: 2.5 %
EOSINOPHIL NFR BLD AUTO: 2.8 %
EOSINOPHIL NFR BLD AUTO: 3.2 %
EOSINOPHIL NFR BLD AUTO: 3.3 %
EOSINOPHIL NFR BLD AUTO: 4 %
EOSINOPHIL NFR BLD AUTO: 4.7 %
EOSINOPHIL NFR BLD AUTO: 4.8 %
EOSINOPHIL NFR BLD AUTO: 5.1 %
EOSINOPHIL NFR BLD AUTO: 5.4 %
EOSINOPHIL NFR BLD AUTO: 5.6 %
EOSINOPHIL NFR BLD AUTO: 6.5 %
EOSINOPHIL NFR BLD AUTO: 6.5 %
EOSINOPHIL NFR BLD AUTO: 7 %
ERYTHROCYTE [DISTWIDTH] IN BLOOD BY AUTOMATED COUNT: 11.9 % (ref 10–15)
ERYTHROCYTE [DISTWIDTH] IN BLOOD BY AUTOMATED COUNT: 12.1 % (ref 10–15)
ERYTHROCYTE [DISTWIDTH] IN BLOOD BY AUTOMATED COUNT: 12.2 % (ref 10–15)
ERYTHROCYTE [DISTWIDTH] IN BLOOD BY AUTOMATED COUNT: 12.2 % (ref 10–15)
ERYTHROCYTE [DISTWIDTH] IN BLOOD BY AUTOMATED COUNT: 12.3 % (ref 10–15)
ERYTHROCYTE [DISTWIDTH] IN BLOOD BY AUTOMATED COUNT: 12.4 % (ref 10–15)
ERYTHROCYTE [DISTWIDTH] IN BLOOD BY AUTOMATED COUNT: 12.5 % (ref 10–15)
ERYTHROCYTE [DISTWIDTH] IN BLOOD BY AUTOMATED COUNT: 12.6 % (ref 10–15)
ERYTHROCYTE [DISTWIDTH] IN BLOOD BY AUTOMATED COUNT: 12.7 % (ref 10–15)
ERYTHROCYTE [DISTWIDTH] IN BLOOD BY AUTOMATED COUNT: 12.9 % (ref 10–15)
ERYTHROCYTE [DISTWIDTH] IN BLOOD BY AUTOMATED COUNT: 13 % (ref 10–15)
ERYTHROCYTE [DISTWIDTH] IN BLOOD BY AUTOMATED COUNT: 13.1 % (ref 10–15)
ERYTHROCYTE [DISTWIDTH] IN BLOOD BY AUTOMATED COUNT: 14.1 % (ref 10–15)
ERYTHROCYTE [DISTWIDTH] IN BLOOD BY AUTOMATED COUNT: NORMAL % (ref 10–15)
ERYTHROCYTE [SEDIMENTATION RATE] IN BLOOD BY WESTERGREN METHOD: 13 MM/H (ref 0–20)
ERYTHROCYTE [SEDIMENTATION RATE] IN BLOOD BY WESTERGREN METHOD: 25 MM/H (ref 0–20)
ERYTHROCYTE [SEDIMENTATION RATE] IN BLOOD BY WESTERGREN METHOD: 26 MM/H (ref 0–20)
ERYTHROCYTE [SEDIMENTATION RATE] IN BLOOD BY WESTERGREN METHOD: 29 MM/H (ref 0–20)
ERYTHROCYTE [SEDIMENTATION RATE] IN BLOOD BY WESTERGREN METHOD: 31 MM/H (ref 0–20)
ERYTHROCYTE [SEDIMENTATION RATE] IN BLOOD BY WESTERGREN METHOD: 32 MM/H (ref 0–20)
ERYTHROCYTE [SEDIMENTATION RATE] IN BLOOD BY WESTERGREN METHOD: 40 MM/H (ref 0–20)
ERYTHROCYTE [SEDIMENTATION RATE] IN BLOOD BY WESTERGREN METHOD: 43 MM/H (ref 0–20)
ERYTHROCYTE [SEDIMENTATION RATE] IN BLOOD BY WESTERGREN METHOD: 44 MM/H (ref 0–20)
ERYTHROCYTE [SEDIMENTATION RATE] IN BLOOD BY WESTERGREN METHOD: 44 MM/H (ref 0–20)
ERYTHROCYTE [SEDIMENTATION RATE] IN BLOOD BY WESTERGREN METHOD: 50 MM/H (ref 0–20)
ERYTHROCYTE [SEDIMENTATION RATE] IN BLOOD BY WESTERGREN METHOD: 50 MM/H (ref 0–20)
ERYTHROCYTE [SEDIMENTATION RATE] IN BLOOD BY WESTERGREN METHOD: 52 MM/H (ref 0–20)
GFR SERPL CREATININE-BSD FRML MDRD: >90 ML/MIN/{1.73_M2}
GFR SERPL CREATININE-BSD FRML MDRD: NORMAL ML/MIN/{1.73_M2}
GLUCOSE BLDC GLUCOMTR-MCNC: 103 MG/DL (ref 70–99)
GLUCOSE BLDC GLUCOMTR-MCNC: 104 MG/DL (ref 70–99)
GLUCOSE BLDC GLUCOMTR-MCNC: 121 MG/DL (ref 70–99)
GLUCOSE BLDC GLUCOMTR-MCNC: 139 MG/DL (ref 70–99)
GLUCOSE BLDC GLUCOMTR-MCNC: 155 MG/DL (ref 70–99)
GLUCOSE BLDC GLUCOMTR-MCNC: 84 MG/DL (ref 70–99)
GLUCOSE BLDC GLUCOMTR-MCNC: 87 MG/DL (ref 70–99)
GLUCOSE BLDC GLUCOMTR-MCNC: 88 MG/DL (ref 70–99)
GLUCOSE BLDC GLUCOMTR-MCNC: 96 MG/DL (ref 70–99)
GLUCOSE SERPL-MCNC: 100 MG/DL (ref 70–99)
GLUCOSE SERPL-MCNC: 100 MG/DL (ref 70–99)
GLUCOSE SERPL-MCNC: 103 MG/DL (ref 70–99)
GLUCOSE SERPL-MCNC: 104 MG/DL (ref 70–99)
GLUCOSE SERPL-MCNC: 105 MG/DL (ref 70–99)
GLUCOSE SERPL-MCNC: 106 MG/DL (ref 70–99)
GLUCOSE SERPL-MCNC: 107 MG/DL (ref 70–99)
GLUCOSE SERPL-MCNC: 108 MG/DL (ref 70–99)
GLUCOSE SERPL-MCNC: 108 MG/DL (ref 70–99)
GLUCOSE SERPL-MCNC: 109 MG/DL (ref 70–99)
GLUCOSE SERPL-MCNC: 110 MG/DL (ref 70–99)
GLUCOSE SERPL-MCNC: 110 MG/DL (ref 70–99)
GLUCOSE SERPL-MCNC: 111 MG/DL (ref 70–99)
GLUCOSE SERPL-MCNC: 114 MG/DL (ref 70–99)
GLUCOSE SERPL-MCNC: 114 MG/DL (ref 70–99)
GLUCOSE SERPL-MCNC: 115 MG/DL (ref 70–99)
GLUCOSE SERPL-MCNC: 116 MG/DL (ref 70–99)
GLUCOSE SERPL-MCNC: 117 MG/DL (ref 70–99)
GLUCOSE SERPL-MCNC: 117 MG/DL (ref 70–99)
GLUCOSE SERPL-MCNC: 119 MG/DL (ref 70–99)
GLUCOSE SERPL-MCNC: 128 MG/DL (ref 70–99)
GLUCOSE SERPL-MCNC: 130 MG/DL (ref 70–99)
GLUCOSE SERPL-MCNC: 130 MG/DL (ref 70–99)
GLUCOSE SERPL-MCNC: 132 MG/DL (ref 70–99)
GLUCOSE SERPL-MCNC: 134 MG/DL (ref 70–99)
GLUCOSE SERPL-MCNC: 136 MG/DL (ref 70–99)
GLUCOSE SERPL-MCNC: 140 MG/DL (ref 70–99)
GLUCOSE SERPL-MCNC: 141 MG/DL (ref 70–99)
GLUCOSE SERPL-MCNC: 143 MG/DL (ref 70–99)
GLUCOSE SERPL-MCNC: 146 MG/DL (ref 70–99)
GLUCOSE SERPL-MCNC: 150 MG/DL (ref 70–99)
GLUCOSE SERPL-MCNC: 156 MG/DL (ref 70–99)
GLUCOSE SERPL-MCNC: 188 MG/DL (ref 70–99)
GLUCOSE SERPL-MCNC: 92 MG/DL (ref 70–99)
GLUCOSE SERPL-MCNC: NORMAL MG/DL (ref 70–99)
GLUCOSE UR STRIP-MCNC: NEGATIVE MG/DL
HCT VFR BLD AUTO: 32.8 % (ref 40–53)
HCT VFR BLD AUTO: 32.8 % (ref 40–53)
HCT VFR BLD AUTO: 34.1 % (ref 40–53)
HCT VFR BLD AUTO: 34.1 % (ref 40–53)
HCT VFR BLD AUTO: 34.2 % (ref 40–53)
HCT VFR BLD AUTO: 34.5 % (ref 40–53)
HCT VFR BLD AUTO: 34.8 % (ref 40–53)
HCT VFR BLD AUTO: 34.8 % (ref 40–53)
HCT VFR BLD AUTO: 34.9 % (ref 40–53)
HCT VFR BLD AUTO: 34.9 % (ref 40–53)
HCT VFR BLD AUTO: 35.2 % (ref 40–53)
HCT VFR BLD AUTO: 35.7 % (ref 40–53)
HCT VFR BLD AUTO: 35.8 % (ref 40–53)
HCT VFR BLD AUTO: 36 % (ref 40–53)
HCT VFR BLD AUTO: 36.4 % (ref 40–53)
HCT VFR BLD AUTO: 36.6 % (ref 40–53)
HCT VFR BLD AUTO: 36.7 % (ref 40–53)
HCT VFR BLD AUTO: 36.9 % (ref 40–53)
HCT VFR BLD AUTO: 37.3 % (ref 40–53)
HCT VFR BLD AUTO: 37.4 % (ref 40–53)
HCT VFR BLD AUTO: 37.7 % (ref 40–53)
HCT VFR BLD AUTO: 37.7 % (ref 40–53)
HCT VFR BLD AUTO: 37.9 % (ref 40–53)
HCT VFR BLD AUTO: 39 % (ref 40–53)
HCT VFR BLD AUTO: 39 % (ref 40–53)
HCT VFR BLD AUTO: 39.1 % (ref 40–53)
HCT VFR BLD AUTO: 40.3 % (ref 40–53)
HCT VFR BLD AUTO: 40.5 % (ref 40–53)
HCT VFR BLD AUTO: 41.1 % (ref 40–53)
HCT VFR BLD AUTO: NORMAL % (ref 40–53)
HGB BLD-MCNC: 11 G/DL (ref 13.3–17.7)
HGB BLD-MCNC: 11.1 G/DL (ref 13.3–17.7)
HGB BLD-MCNC: 11.1 G/DL (ref 13.3–17.7)
HGB BLD-MCNC: 11.4 G/DL (ref 13.3–17.7)
HGB BLD-MCNC: 11.6 G/DL (ref 13.3–17.7)
HGB BLD-MCNC: 11.7 G/DL (ref 13.3–17.7)
HGB BLD-MCNC: 11.7 G/DL (ref 13.3–17.7)
HGB BLD-MCNC: 11.8 G/DL (ref 13.3–17.7)
HGB BLD-MCNC: 11.9 G/DL (ref 13.3–17.7)
HGB BLD-MCNC: 11.9 G/DL (ref 13.3–17.7)
HGB BLD-MCNC: 12.1 G/DL (ref 13.3–17.7)
HGB BLD-MCNC: 12.2 G/DL (ref 13.3–17.7)
HGB BLD-MCNC: 12.3 G/DL (ref 13.3–17.7)
HGB BLD-MCNC: 12.3 G/DL (ref 13.3–17.7)
HGB BLD-MCNC: 12.4 G/DL (ref 13.3–17.7)
HGB BLD-MCNC: 12.5 G/DL (ref 13.3–17.7)
HGB BLD-MCNC: 12.6 G/DL (ref 13.3–17.7)
HGB BLD-MCNC: 12.8 G/DL (ref 13.3–17.7)
HGB BLD-MCNC: 13.3 G/DL (ref 13.3–17.7)
HGB BLD-MCNC: 13.3 G/DL (ref 13.3–17.7)
HGB BLD-MCNC: 13.4 G/DL (ref 13.3–17.7)
HGB BLD-MCNC: 13.5 G/DL (ref 13.3–17.7)
HGB BLD-MCNC: 13.6 G/DL (ref 13.3–17.7)
HGB BLD-MCNC: 13.6 G/DL (ref 13.3–17.7)
HGB BLD-MCNC: NORMAL G/DL (ref 13.3–17.7)
HGB UR QL STRIP: ABNORMAL
HGB UR QL STRIP: ABNORMAL
HGB UR QL STRIP: NEGATIVE
IMM GRANULOCYTES # BLD: 0 10E9/L (ref 0–0.4)
IMM GRANULOCYTES # BLD: 0.1 10E9/L (ref 0–0.4)
IMM GRANULOCYTES NFR BLD: 0 %
IMM GRANULOCYTES NFR BLD: 0.2 %
IMM GRANULOCYTES NFR BLD: 0.3 %
IMM GRANULOCYTES NFR BLD: 0.4 %
IMM GRANULOCYTES NFR BLD: 0.5 %
IMM GRANULOCYTES NFR BLD: 0.6 %
IMM GRANULOCYTES NFR BLD: 0.7 %
IMM GRANULOCYTES NFR BLD: 0.9 %
IMM GRANULOCYTES NFR BLD: 0.9 %
IMM GRANULOCYTES NFR BLD: 1.1 %
IMM GRANULOCYTES NFR BLD: 1.1 %
INR PPP: 0.96 (ref 0.86–1.14)
INR PPP: 0.97 (ref 0.86–1.14)
INR PPP: 0.98 (ref 0.86–1.14)
INR PPP: 1 (ref 0.86–1.14)
INR PPP: 1.02 (ref 0.86–1.14)
INR PPP: 1.03 (ref 0.86–1.14)
INR PPP: 1.04 (ref 0.86–1.14)
INR PPP: 1.05 (ref 0.86–1.14)
INR PPP: 1.05 (ref 0.86–1.14)
INR PPP: 1.06 (ref 0.86–1.14)
INR PPP: 1.07 (ref 0.86–1.14)
INR PPP: 1.08 (ref 0.86–1.14)
INR PPP: 1.09 (ref 0.86–1.14)
INR PPP: 1.11 (ref 0.86–1.14)
INR PPP: 1.13 (ref 0.86–1.14)
INR PPP: 1.16 (ref 0.86–1.14)
INR PPP: 1.16 (ref 0.86–1.14)
INTERPRETATION ECG - MUSE: NORMAL
INTERPRETATION ECG - MUSE: NORMAL
KETONES UR STRIP-MCNC: NEGATIVE MG/DL
LABORATORY COMMENT REPORT: NORMAL
LDH SERPL L TO P-CCNC: 122 U/L (ref 85–227)
LDH SERPL L TO P-CCNC: 124 U/L (ref 85–227)
LDH SERPL L TO P-CCNC: 133 U/L (ref 85–227)
LDH SERPL L TO P-CCNC: 133 U/L (ref 85–227)
LDH SERPL L TO P-CCNC: 136 U/L (ref 85–227)
LDH SERPL L TO P-CCNC: 137 U/L (ref 85–227)
LDH SERPL L TO P-CCNC: 147 U/L (ref 85–227)
LDH SERPL L TO P-CCNC: NORMAL U/L (ref 85–227)
LEUKOCYTE ESTERASE UR QL STRIP: ABNORMAL
LEUKOCYTE ESTERASE UR QL STRIP: NEGATIVE
LIPASE SERPL-CCNC: 133 U/L (ref 73–393)
LIPASE SERPL-CCNC: 136 U/L (ref 73–393)
LIPASE SERPL-CCNC: 267 U/L (ref 73–393)
LIPASE SERPL-CCNC: 321 U/L (ref 73–393)
LIPASE SERPL-CCNC: 40 U/L (ref 73–393)
LIPASE SERPL-CCNC: 72 U/L (ref 73–393)
LIPASE SERPL-CCNC: 82 U/L (ref 73–393)
LIPASE SERPL-CCNC: NORMAL U/L (ref 73–393)
LYMPHOCYTES # BLD AUTO: 0.2 10E9/L (ref 0.8–5.3)
LYMPHOCYTES # BLD AUTO: 0.2 10E9/L (ref 0.8–5.3)
LYMPHOCYTES # BLD AUTO: 0.4 10E9/L (ref 0.8–5.3)
LYMPHOCYTES # BLD AUTO: 0.4 10E9/L (ref 0.8–5.3)
LYMPHOCYTES # BLD AUTO: 0.5 10E9/L (ref 0.8–5.3)
LYMPHOCYTES # BLD AUTO: 0.6 10E9/L (ref 0.8–5.3)
LYMPHOCYTES # BLD AUTO: 0.7 10E9/L (ref 0.8–5.3)
LYMPHOCYTES # BLD AUTO: 0.8 10E9/L (ref 0.8–5.3)
LYMPHOCYTES # BLD AUTO: 0.9 10E9/L (ref 0.8–5.3)
LYMPHOCYTES # BLD AUTO: 0.9 10E9/L (ref 0.8–5.3)
LYMPHOCYTES # BLD AUTO: 1 10E9/L (ref 0.8–5.3)
LYMPHOCYTES # BLD AUTO: 1.1 10E9/L (ref 0.8–5.3)
LYMPHOCYTES # BLD AUTO: 1.2 10E9/L (ref 0.8–5.3)
LYMPHOCYTES # BLD AUTO: 1.3 10E9/L (ref 0.8–5.3)
LYMPHOCYTES # BLD AUTO: 1.3 10E9/L (ref 0.8–5.3)
LYMPHOCYTES NFR BLD AUTO: 10.8 %
LYMPHOCYTES NFR BLD AUTO: 11.4 %
LYMPHOCYTES NFR BLD AUTO: 13.9 %
LYMPHOCYTES NFR BLD AUTO: 14 %
LYMPHOCYTES NFR BLD AUTO: 14.6 %
LYMPHOCYTES NFR BLD AUTO: 16 %
LYMPHOCYTES NFR BLD AUTO: 16.1 %
LYMPHOCYTES NFR BLD AUTO: 16.3 %
LYMPHOCYTES NFR BLD AUTO: 18.1 %
LYMPHOCYTES NFR BLD AUTO: 18.4 %
LYMPHOCYTES NFR BLD AUTO: 19.6 %
LYMPHOCYTES NFR BLD AUTO: 2.7 %
LYMPHOCYTES NFR BLD AUTO: 20 %
LYMPHOCYTES NFR BLD AUTO: 20.2 %
LYMPHOCYTES NFR BLD AUTO: 22.8 %
LYMPHOCYTES NFR BLD AUTO: 22.8 %
LYMPHOCYTES NFR BLD AUTO: 24.1 %
LYMPHOCYTES NFR BLD AUTO: 26.2 %
LYMPHOCYTES NFR BLD AUTO: 27.6 %
LYMPHOCYTES NFR BLD AUTO: 27.7 %
LYMPHOCYTES NFR BLD AUTO: 3.7 %
LYMPHOCYTES NFR BLD AUTO: 3.7 %
LYMPHOCYTES NFR BLD AUTO: 5.6 %
LYMPHOCYTES NFR BLD AUTO: 6.1 %
LYMPHOCYTES NFR BLD AUTO: 8.2 %
Lab: NORMAL
Lab: NORMAL
MAGNESIUM SERPL-MCNC: 1.4 MG/DL (ref 1.6–2.3)
MAGNESIUM SERPL-MCNC: 1.6 MG/DL (ref 1.6–2.3)
MAGNESIUM SERPL-MCNC: 1.7 MG/DL (ref 1.6–2.3)
MAGNESIUM SERPL-MCNC: 1.8 MG/DL (ref 1.6–2.3)
MAGNESIUM SERPL-MCNC: 1.8 MG/DL (ref 1.6–2.3)
MAGNESIUM SERPL-MCNC: 1.9 MG/DL (ref 1.6–2.3)
MAGNESIUM SERPL-MCNC: 2 MG/DL (ref 1.6–2.3)
MAGNESIUM SERPL-MCNC: 2.1 MG/DL (ref 1.6–2.3)
MAGNESIUM SERPL-MCNC: 2.3 MG/DL (ref 1.6–2.3)
MAGNESIUM SERPL-MCNC: 2.4 MG/DL (ref 1.6–2.3)
MAGNESIUM SERPL-MCNC: NORMAL MG/DL (ref 1.6–2.3)
MCH RBC QN AUTO: 30.7 PG (ref 26.5–33)
MCH RBC QN AUTO: 30.8 PG (ref 26.5–33)
MCH RBC QN AUTO: 30.8 PG (ref 26.5–33)
MCH RBC QN AUTO: 30.9 PG (ref 26.5–33)
MCH RBC QN AUTO: 31 PG (ref 26.5–33)
MCH RBC QN AUTO: 31.1 PG (ref 26.5–33)
MCH RBC QN AUTO: 31.1 PG (ref 26.5–33)
MCH RBC QN AUTO: 31.2 PG (ref 26.5–33)
MCH RBC QN AUTO: 31.3 PG (ref 26.5–33)
MCH RBC QN AUTO: 31.3 PG (ref 26.5–33)
MCH RBC QN AUTO: 31.4 PG (ref 26.5–33)
MCH RBC QN AUTO: 31.4 PG (ref 26.5–33)
MCH RBC QN AUTO: 31.5 PG (ref 26.5–33)
MCH RBC QN AUTO: 31.6 PG (ref 26.5–33)
MCH RBC QN AUTO: 31.7 PG (ref 26.5–33)
MCH RBC QN AUTO: 32.1 PG (ref 26.5–33)
MCH RBC QN AUTO: 32.4 PG (ref 26.5–33)
MCH RBC QN AUTO: 32.4 PG (ref 26.5–33)
MCH RBC QN AUTO: 32.7 PG (ref 26.5–33)
MCH RBC QN AUTO: 32.8 PG (ref 26.5–33)
MCH RBC QN AUTO: 32.9 PG (ref 26.5–33)
MCH RBC QN AUTO: 33.2 PG (ref 26.5–33)
MCH RBC QN AUTO: 33.2 PG (ref 26.5–33)
MCH RBC QN AUTO: 33.3 PG (ref 26.5–33)
MCH RBC QN AUTO: 33.4 PG (ref 26.5–33)
MCH RBC QN AUTO: 33.7 PG (ref 26.5–33)
MCH RBC QN AUTO: 34.1 PG (ref 26.5–33)
MCH RBC QN AUTO: 34.5 PG (ref 26.5–33)
MCH RBC QN AUTO: 34.6 PG (ref 26.5–33)
MCH RBC QN AUTO: NORMAL PG (ref 26.5–33)
MCHC RBC AUTO-ENTMCNC: 31.8 G/DL (ref 31.5–36.5)
MCHC RBC AUTO-ENTMCNC: 32.6 G/DL (ref 31.5–36.5)
MCHC RBC AUTO-ENTMCNC: 32.7 G/DL (ref 31.5–36.5)
MCHC RBC AUTO-ENTMCNC: 32.8 G/DL (ref 31.5–36.5)
MCHC RBC AUTO-ENTMCNC: 33 G/DL (ref 31.5–36.5)
MCHC RBC AUTO-ENTMCNC: 33.1 G/DL (ref 31.5–36.5)
MCHC RBC AUTO-ENTMCNC: 33.2 G/DL (ref 31.5–36.5)
MCHC RBC AUTO-ENTMCNC: 33.3 G/DL (ref 31.5–36.5)
MCHC RBC AUTO-ENTMCNC: 33.4 G/DL (ref 31.5–36.5)
MCHC RBC AUTO-ENTMCNC: 33.4 G/DL (ref 31.5–36.5)
MCHC RBC AUTO-ENTMCNC: 33.5 G/DL (ref 31.5–36.5)
MCHC RBC AUTO-ENTMCNC: 33.6 G/DL (ref 31.5–36.5)
MCHC RBC AUTO-ENTMCNC: 33.7 G/DL (ref 31.5–36.5)
MCHC RBC AUTO-ENTMCNC: 33.8 G/DL (ref 31.5–36.5)
MCHC RBC AUTO-ENTMCNC: 33.9 G/DL (ref 31.5–36.5)
MCHC RBC AUTO-ENTMCNC: 34 G/DL (ref 31.5–36.5)
MCHC RBC AUTO-ENTMCNC: 34 G/DL (ref 31.5–36.5)
MCHC RBC AUTO-ENTMCNC: 34.1 G/DL (ref 31.5–36.5)
MCHC RBC AUTO-ENTMCNC: 34.1 G/DL (ref 31.5–36.5)
MCHC RBC AUTO-ENTMCNC: 34.2 G/DL (ref 31.5–36.5)
MCHC RBC AUTO-ENTMCNC: 34.2 G/DL (ref 31.5–36.5)
MCHC RBC AUTO-ENTMCNC: 34.3 G/DL (ref 31.5–36.5)
MCHC RBC AUTO-ENTMCNC: 34.4 G/DL (ref 31.5–36.5)
MCHC RBC AUTO-ENTMCNC: 34.6 G/DL (ref 31.5–36.5)
MCHC RBC AUTO-ENTMCNC: 34.7 G/DL (ref 31.5–36.5)
MCHC RBC AUTO-ENTMCNC: NORMAL G/DL (ref 31.5–36.5)
MCV RBC AUTO: 100 FL (ref 78–100)
MCV RBC AUTO: 100 FL (ref 78–100)
MCV RBC AUTO: 101 FL (ref 78–100)
MCV RBC AUTO: 101 FL (ref 78–100)
MCV RBC AUTO: 102 FL (ref 78–100)
MCV RBC AUTO: 104 FL (ref 78–100)
MCV RBC AUTO: 105 FL (ref 78–100)
MCV RBC AUTO: 92 FL (ref 78–100)
MCV RBC AUTO: 93 FL (ref 78–100)
MCV RBC AUTO: 94 FL (ref 78–100)
MCV RBC AUTO: 95 FL (ref 78–100)
MCV RBC AUTO: 95 FL (ref 78–100)
MCV RBC AUTO: 97 FL (ref 78–100)
MCV RBC AUTO: 97 FL (ref 78–100)
MCV RBC AUTO: 98 FL (ref 78–100)
MCV RBC AUTO: 99 FL (ref 78–100)
MCV RBC AUTO: NORMAL FL (ref 78–100)
MONOCYTES # BLD AUTO: 0.2 10E9/L (ref 0–1.3)
MONOCYTES # BLD AUTO: 0.3 10E9/L (ref 0–1.3)
MONOCYTES # BLD AUTO: 0.4 10E9/L (ref 0–1.3)
MONOCYTES # BLD AUTO: 0.5 10E9/L (ref 0–1.3)
MONOCYTES # BLD AUTO: 0.6 10E9/L (ref 0–1.3)
MONOCYTES # BLD AUTO: 0.7 10E9/L (ref 0–1.3)
MONOCYTES # BLD AUTO: 0.8 10E9/L (ref 0–1.3)
MONOCYTES # BLD AUTO: 0.9 10E9/L (ref 0–1.3)
MONOCYTES # BLD AUTO: 1 10E9/L (ref 0–1.3)
MONOCYTES # BLD AUTO: 1.2 10E9/L (ref 0–1.3)
MONOCYTES NFR BLD AUTO: 10.7 %
MONOCYTES NFR BLD AUTO: 10.8 %
MONOCYTES NFR BLD AUTO: 10.9 %
MONOCYTES NFR BLD AUTO: 11.3 %
MONOCYTES NFR BLD AUTO: 11.6 %
MONOCYTES NFR BLD AUTO: 11.9 %
MONOCYTES NFR BLD AUTO: 11.9 %
MONOCYTES NFR BLD AUTO: 12 %
MONOCYTES NFR BLD AUTO: 12.7 %
MONOCYTES NFR BLD AUTO: 13.7 %
MONOCYTES NFR BLD AUTO: 13.9 %
MONOCYTES NFR BLD AUTO: 15.4 %
MONOCYTES NFR BLD AUTO: 16.9 %
MONOCYTES NFR BLD AUTO: 18.2 %
MONOCYTES NFR BLD AUTO: 19.5 %
MONOCYTES NFR BLD AUTO: 19.8 %
MONOCYTES NFR BLD AUTO: 2.6 %
MONOCYTES NFR BLD AUTO: 5.2 %
MONOCYTES NFR BLD AUTO: 6 %
MONOCYTES NFR BLD AUTO: 7 %
MONOCYTES NFR BLD AUTO: 7.2 %
MONOCYTES NFR BLD AUTO: 8.9 %
MONOCYTES NFR BLD AUTO: 9.2 %
MONOCYTES NFR BLD AUTO: 9.2 %
MONOCYTES NFR BLD AUTO: 9.9 %
MUCOUS THREADS #/AREA URNS LPF: PRESENT /LPF
NEUTROPHILS # BLD AUTO: 1.3 10E9/L (ref 1.6–8.3)
NEUTROPHILS # BLD AUTO: 1.4 10E9/L (ref 1.6–8.3)
NEUTROPHILS # BLD AUTO: 1.6 10E9/L (ref 1.6–8.3)
NEUTROPHILS # BLD AUTO: 1.7 10E9/L (ref 1.6–8.3)
NEUTROPHILS # BLD AUTO: 1.9 10E9/L (ref 1.6–8.3)
NEUTROPHILS # BLD AUTO: 10.1 10E9/L (ref 1.6–8.3)
NEUTROPHILS # BLD AUTO: 2.5 10E9/L (ref 1.6–8.3)
NEUTROPHILS # BLD AUTO: 2.7 10E9/L (ref 1.6–8.3)
NEUTROPHILS # BLD AUTO: 2.8 10E9/L (ref 1.6–8.3)
NEUTROPHILS # BLD AUTO: 2.9 10E9/L (ref 1.6–8.3)
NEUTROPHILS # BLD AUTO: 3.1 10E9/L (ref 1.6–8.3)
NEUTROPHILS # BLD AUTO: 3.2 10E9/L (ref 1.6–8.3)
NEUTROPHILS # BLD AUTO: 3.3 10E9/L (ref 1.6–8.3)
NEUTROPHILS # BLD AUTO: 3.4 10E9/L (ref 1.6–8.3)
NEUTROPHILS # BLD AUTO: 4.4 10E9/L (ref 1.6–8.3)
NEUTROPHILS # BLD AUTO: 4.4 10E9/L (ref 1.6–8.3)
NEUTROPHILS # BLD AUTO: 4.8 10E9/L (ref 1.6–8.3)
NEUTROPHILS # BLD AUTO: 5 10E9/L (ref 1.6–8.3)
NEUTROPHILS # BLD AUTO: 5.1 10E9/L (ref 1.6–8.3)
NEUTROPHILS # BLD AUTO: 5.8 10E9/L (ref 1.6–8.3)
NEUTROPHILS # BLD AUTO: 7.3 10E9/L (ref 1.6–8.3)
NEUTROPHILS # BLD AUTO: 7.4 10E9/L (ref 1.6–8.3)
NEUTROPHILS # BLD AUTO: 9.6 10E9/L (ref 1.6–8.3)
NEUTROPHILS NFR BLD AUTO: 48.7 %
NEUTROPHILS NFR BLD AUTO: 53.1 %
NEUTROPHILS NFR BLD AUTO: 55.2 %
NEUTROPHILS NFR BLD AUTO: 55.6 %
NEUTROPHILS NFR BLD AUTO: 56.1 %
NEUTROPHILS NFR BLD AUTO: 57.9 %
NEUTROPHILS NFR BLD AUTO: 59.1 %
NEUTROPHILS NFR BLD AUTO: 59.2 %
NEUTROPHILS NFR BLD AUTO: 63.7 %
NEUTROPHILS NFR BLD AUTO: 63.7 %
NEUTROPHILS NFR BLD AUTO: 64.2 %
NEUTROPHILS NFR BLD AUTO: 64.4 %
NEUTROPHILS NFR BLD AUTO: 65 %
NEUTROPHILS NFR BLD AUTO: 65.8 %
NEUTROPHILS NFR BLD AUTO: 68.8 %
NEUTROPHILS NFR BLD AUTO: 69.5 %
NEUTROPHILS NFR BLD AUTO: 73.2 %
NEUTROPHILS NFR BLD AUTO: 75.7 %
NEUTROPHILS NFR BLD AUTO: 81.4 %
NEUTROPHILS NFR BLD AUTO: 82.2 %
NEUTROPHILS NFR BLD AUTO: 85 %
NEUTROPHILS NFR BLD AUTO: 86.5 %
NEUTROPHILS NFR BLD AUTO: 86.7 %
NEUTROPHILS NFR BLD AUTO: 89.8 %
NEUTROPHILS NFR BLD AUTO: 93 %
NITRATE UR QL: NEGATIVE
NRBC # BLD AUTO: 0 10*3/UL
NRBC BLD AUTO-RTO: 0 /100
OSMOLALITY SERPL: 290 MMOL/KG (ref 275–295)
OSMOLALITY UR: 563 MMOL/KG (ref 100–1200)
PH UR STRIP: 5.5 PH (ref 5–7)
PH UR STRIP: 6 PH (ref 5–7)
PH UR STRIP: 6 PH (ref 5–7)
PH UR STRIP: 6.5 PH (ref 5–7)
PH UR STRIP: 6.5 PH (ref 5–7)
PHOSPHATE SERPL-MCNC: 2.1 MG/DL (ref 2.5–4.5)
PHOSPHATE SERPL-MCNC: 2.1 MG/DL (ref 2.5–4.5)
PHOSPHATE SERPL-MCNC: 2.7 MG/DL (ref 2.5–4.5)
PHOSPHATE SERPL-MCNC: 2.9 MG/DL (ref 2.5–4.5)
PHOSPHATE SERPL-MCNC: 2.9 MG/DL (ref 2.5–4.5)
PHOSPHATE SERPL-MCNC: 3 MG/DL (ref 2.5–4.5)
PHOSPHATE SERPL-MCNC: 3 MG/DL (ref 2.5–4.5)
PHOSPHATE SERPL-MCNC: 3.1 MG/DL (ref 2.5–4.5)
PHOSPHATE SERPL-MCNC: 3.5 MG/DL (ref 2.5–4.5)
PHOSPHATE SERPL-MCNC: 3.6 MG/DL (ref 2.5–4.5)
PHOSPHATE SERPL-MCNC: 3.7 MG/DL (ref 2.5–4.5)
PHOSPHATE SERPL-MCNC: 3.8 MG/DL (ref 2.5–4.5)
PHOSPHATE SERPL-MCNC: 4.5 MG/DL (ref 2.5–4.5)
PHOSPHATE SERPL-MCNC: NORMAL MG/DL (ref 2.5–4.5)
PLATELET # BLD AUTO: 126 10E9/L (ref 150–450)
PLATELET # BLD AUTO: 130 10E9/L (ref 150–450)
PLATELET # BLD AUTO: 131 10E9/L (ref 150–450)
PLATELET # BLD AUTO: 133 10E9/L (ref 150–450)
PLATELET # BLD AUTO: 135 10E9/L (ref 150–450)
PLATELET # BLD AUTO: 136 10E9/L (ref 150–450)
PLATELET # BLD AUTO: 138 10E9/L (ref 150–450)
PLATELET # BLD AUTO: 141 10E9/L (ref 150–450)
PLATELET # BLD AUTO: 141 10E9/L (ref 150–450)
PLATELET # BLD AUTO: 144 10E9/L (ref 150–450)
PLATELET # BLD AUTO: 145 10E9/L (ref 150–450)
PLATELET # BLD AUTO: 149 10E9/L (ref 150–450)
PLATELET # BLD AUTO: 152 10E9/L (ref 150–450)
PLATELET # BLD AUTO: 153 10E9/L (ref 150–450)
PLATELET # BLD AUTO: 154 10E9/L (ref 150–450)
PLATELET # BLD AUTO: 155 10E9/L (ref 150–450)
PLATELET # BLD AUTO: 155 10E9/L (ref 150–450)
PLATELET # BLD AUTO: 160 10E9/L (ref 150–450)
PLATELET # BLD AUTO: 162 10E9/L (ref 150–450)
PLATELET # BLD AUTO: 164 10E9/L (ref 150–450)
PLATELET # BLD AUTO: 169 10E9/L (ref 150–450)
PLATELET # BLD AUTO: 174 10E9/L (ref 150–450)
PLATELET # BLD AUTO: 174 10E9/L (ref 150–450)
PLATELET # BLD AUTO: 178 10E9/L (ref 150–450)
PLATELET # BLD AUTO: 182 10E9/L (ref 150–450)
PLATELET # BLD AUTO: 185 10E9/L (ref 150–450)
PLATELET # BLD AUTO: 203 10E9/L (ref 150–450)
PLATELET # BLD AUTO: 205 10E9/L (ref 150–450)
PLATELET # BLD AUTO: 247 10E9/L (ref 150–450)
PLATELET # BLD AUTO: 251 10E9/L (ref 150–450)
PLATELET # BLD AUTO: 253 10E9/L (ref 150–450)
PLATELET # BLD AUTO: NORMAL 10E9/L (ref 150–450)
PLATELET # BLD EST: ABNORMAL 10*3/UL
POTASSIUM SERPL-SCNC: 3.4 MMOL/L (ref 3.4–5.3)
POTASSIUM SERPL-SCNC: 3.5 MMOL/L (ref 3.4–5.3)
POTASSIUM SERPL-SCNC: 3.5 MMOL/L (ref 3.4–5.3)
POTASSIUM SERPL-SCNC: 3.6 MMOL/L (ref 3.4–5.3)
POTASSIUM SERPL-SCNC: 3.7 MMOL/L (ref 3.4–5.3)
POTASSIUM SERPL-SCNC: 3.8 MMOL/L (ref 3.4–5.3)
POTASSIUM SERPL-SCNC: 3.9 MMOL/L (ref 3.4–5.3)
POTASSIUM SERPL-SCNC: 4 MMOL/L (ref 3.4–5.3)
POTASSIUM SERPL-SCNC: 4.1 MMOL/L (ref 3.4–5.3)
POTASSIUM SERPL-SCNC: 4.2 MMOL/L (ref 3.4–5.3)
POTASSIUM SERPL-SCNC: 4.3 MMOL/L (ref 3.4–5.3)
POTASSIUM SERPL-SCNC: 4.4 MMOL/L (ref 3.4–5.3)
POTASSIUM SERPL-SCNC: 4.5 MMOL/L (ref 3.4–5.3)
POTASSIUM SERPL-SCNC: 4.6 MMOL/L (ref 3.4–5.3)
POTASSIUM SERPL-SCNC: NORMAL MMOL/L (ref 3.4–5.3)
PROT SERPL-MCNC: 5.8 G/DL (ref 6.8–8.8)
PROT SERPL-MCNC: 5.9 G/DL (ref 6.8–8.8)
PROT SERPL-MCNC: 6 G/DL (ref 6.8–8.8)
PROT SERPL-MCNC: 6.1 G/DL (ref 6.8–8.8)
PROT SERPL-MCNC: 6.1 G/DL (ref 6.8–8.8)
PROT SERPL-MCNC: 6.2 G/DL (ref 6.8–8.8)
PROT SERPL-MCNC: 6.4 G/DL (ref 6.8–8.8)
PROT SERPL-MCNC: 6.5 G/DL (ref 6.8–8.8)
PROT SERPL-MCNC: 6.6 G/DL (ref 6.8–8.8)
PROT SERPL-MCNC: 6.6 G/DL (ref 6.8–8.8)
PROT SERPL-MCNC: 6.7 G/DL (ref 6.8–8.8)
PROT SERPL-MCNC: 6.8 G/DL (ref 6.8–8.8)
PROT SERPL-MCNC: 7 G/DL (ref 6.8–8.8)
PROT SERPL-MCNC: 7.1 G/DL (ref 6.8–8.8)
PROT SERPL-MCNC: 7.1 G/DL (ref 6.8–8.8)
PROT SERPL-MCNC: 7.2 G/DL (ref 6.8–8.8)
PROT SERPL-MCNC: 7.4 G/DL (ref 6.8–8.8)
PROT SERPL-MCNC: NORMAL G/DL (ref 6.8–8.8)
RADIOLOGIST FLAGS: ABNORMAL
RBC # BLD AUTO: 3.26 10E12/L (ref 4.4–5.9)
RBC # BLD AUTO: 3.32 10E12/L (ref 4.4–5.9)
RBC # BLD AUTO: 3.33 10E12/L (ref 4.4–5.9)
RBC # BLD AUTO: 3.43 10E12/L (ref 4.4–5.9)
RBC # BLD AUTO: 3.52 10E12/L (ref 4.4–5.9)
RBC # BLD AUTO: 3.53 10E12/L (ref 4.4–5.9)
RBC # BLD AUTO: 3.54 10E12/L (ref 4.4–5.9)
RBC # BLD AUTO: 3.58 10E12/L (ref 4.4–5.9)
RBC # BLD AUTO: 3.64 10E12/L (ref 4.4–5.9)
RBC # BLD AUTO: 3.7 10E12/L (ref 4.4–5.9)
RBC # BLD AUTO: 3.72 10E12/L (ref 4.4–5.9)
RBC # BLD AUTO: 3.73 10E12/L (ref 4.4–5.9)
RBC # BLD AUTO: 3.77 10E12/L (ref 4.4–5.9)
RBC # BLD AUTO: 3.79 10E12/L (ref 4.4–5.9)
RBC # BLD AUTO: 3.83 10E12/L (ref 4.4–5.9)
RBC # BLD AUTO: 3.85 10E12/L (ref 4.4–5.9)
RBC # BLD AUTO: 3.89 10E12/L (ref 4.4–5.9)
RBC # BLD AUTO: 3.91 10E12/L (ref 4.4–5.9)
RBC # BLD AUTO: 3.92 10E12/L (ref 4.4–5.9)
RBC # BLD AUTO: 3.93 10E12/L (ref 4.4–5.9)
RBC # BLD AUTO: 3.97 10E12/L (ref 4.4–5.9)
RBC # BLD AUTO: 4 10E12/L (ref 4.4–5.9)
RBC # BLD AUTO: 4.01 10E12/L (ref 4.4–5.9)
RBC # BLD AUTO: 4.03 10E12/L (ref 4.4–5.9)
RBC # BLD AUTO: 4.05 10E12/L (ref 4.4–5.9)
RBC # BLD AUTO: 4.09 10E12/L (ref 4.4–5.9)
RBC # BLD AUTO: 4.2 10E12/L (ref 4.4–5.9)
RBC # BLD AUTO: 4.21 10E12/L (ref 4.4–5.9)
RBC # BLD AUTO: 4.22 10E12/L (ref 4.4–5.9)
RBC # BLD AUTO: 4.26 10E12/L (ref 4.4–5.9)
RBC # BLD AUTO: 4.34 10E12/L (ref 4.4–5.9)
RBC # BLD AUTO: NORMAL 10E12/L (ref 4.4–5.9)
RBC #/AREA URNS AUTO: 1 /HPF (ref 0–2)
RBC #/AREA URNS AUTO: 3 /HPF (ref 0–2)
RBC #/AREA URNS AUTO: 56 /HPF (ref 0–2)
RBC #/AREA URNS AUTO: <1 /HPF (ref 0–2)
RESEARCH KIT COLLECTION: NORMAL
SARS-COV-2 RNA RESP QL NAA+PROBE: NEGATIVE
SARS-COV-2 RNA RESP QL NAA+PROBE: NORMAL
SARS-COV-2 RNA SPEC QL NAA+PROBE: NOT DETECTED
SODIUM SERPL-SCNC: 130 MMOL/L (ref 133–144)
SODIUM SERPL-SCNC: 133 MMOL/L (ref 133–144)
SODIUM SERPL-SCNC: 134 MMOL/L (ref 133–144)
SODIUM SERPL-SCNC: 135 MMOL/L (ref 133–144)
SODIUM SERPL-SCNC: 135 MMOL/L (ref 133–144)
SODIUM SERPL-SCNC: 136 MMOL/L (ref 133–144)
SODIUM SERPL-SCNC: 137 MMOL/L (ref 133–144)
SODIUM SERPL-SCNC: 137 MMOL/L (ref 133–144)
SODIUM SERPL-SCNC: 138 MMOL/L (ref 133–144)
SODIUM SERPL-SCNC: 139 MMOL/L (ref 133–144)
SODIUM SERPL-SCNC: 140 MMOL/L (ref 133–144)
SODIUM SERPL-SCNC: 141 MMOL/L (ref 133–144)
SODIUM SERPL-SCNC: 142 MMOL/L (ref 133–144)
SODIUM SERPL-SCNC: NORMAL MMOL/L (ref 133–144)
SODIUM UR-SCNC: 91 MMOL/L
SOURCE: ABNORMAL
SOURCE: NORMAL
SP GR UR STRIP: 1.01 (ref 1–1.03)
SP GR UR STRIP: 1.02 (ref 1–1.03)
SPECIMEN EXP DATE BLD: NORMAL
SPECIMEN SOURCE: NORMAL
SQUAMOUS #/AREA URNS AUTO: 0 /HPF (ref 0–1)
SQUAMOUS #/AREA URNS AUTO: 0 /HPF (ref 0–1)
T4 FREE SERPL-MCNC: 0.99 NG/DL (ref 0.76–1.46)
T4 FREE SERPL-MCNC: 1.03 NG/DL (ref 0.76–1.46)
T4 FREE SERPL-MCNC: 1.07 NG/DL (ref 0.76–1.46)
T4 FREE SERPL-MCNC: 1.1 NG/DL (ref 0.76–1.46)
T4 FREE SERPL-MCNC: 1.16 NG/DL (ref 0.76–1.46)
T4 FREE SERPL-MCNC: 1.2 NG/DL (ref 0.76–1.46)
TSH SERPL DL<=0.005 MIU/L-ACNC: 1.07 MU/L (ref 0.4–4)
TSH SERPL DL<=0.005 MIU/L-ACNC: 1.27 MU/L (ref 0.4–4)
TSH SERPL DL<=0.005 MIU/L-ACNC: 10.96 MU/L (ref 0.4–4)
TSH SERPL DL<=0.005 MIU/L-ACNC: 15.26 MU/L (ref 0.4–4)
TSH SERPL DL<=0.005 MIU/L-ACNC: 17.51 MU/L (ref 0.4–4)
TSH SERPL DL<=0.005 MIU/L-ACNC: 2.56 MU/L (ref 0.4–4)
TSH SERPL DL<=0.005 MIU/L-ACNC: 21.11 MU/L (ref 0.4–4)
TSH SERPL DL<=0.005 MIU/L-ACNC: 5.22 MU/L (ref 0.4–4)
TSH SERPL DL<=0.005 MIU/L-ACNC: 7.23 MU/L (ref 0.4–4)
UROBILINOGEN UR STRIP-MCNC: 0 MG/DL (ref 0–2)
UROBILINOGEN UR STRIP-MCNC: NORMAL MG/DL (ref 0–2)
VARIANT LYMPHS BLD QL SMEAR: PRESENT
WBC # BLD AUTO: 11.3 10E9/L (ref 4–11)
WBC # BLD AUTO: 11.7 10E9/L (ref 4–11)
WBC # BLD AUTO: 2.4 10E9/L (ref 4–11)
WBC # BLD AUTO: 2.5 10E9/L (ref 4–11)
WBC # BLD AUTO: 3.1 10E9/L (ref 4–11)
WBC # BLD AUTO: 3.4 10E9/L (ref 4–11)
WBC # BLD AUTO: 3.5 10E9/L (ref 4–11)
WBC # BLD AUTO: 3.8 10E9/L (ref 4–11)
WBC # BLD AUTO: 3.9 10E9/L (ref 4–11)
WBC # BLD AUTO: 4.3 10E9/L (ref 4–11)
WBC # BLD AUTO: 4.3 10E9/L (ref 4–11)
WBC # BLD AUTO: 4.6 10E9/L (ref 4–11)
WBC # BLD AUTO: 4.7 10E9/L (ref 4–11)
WBC # BLD AUTO: 4.9 10E9/L (ref 4–11)
WBC # BLD AUTO: 5 10E9/L (ref 4–11)
WBC # BLD AUTO: 5.1 10E9/L (ref 4–11)
WBC # BLD AUTO: 5.2 10E9/L (ref 4–11)
WBC # BLD AUTO: 5.2 10E9/L (ref 4–11)
WBC # BLD AUTO: 5.6 10E9/L (ref 4–11)
WBC # BLD AUTO: 5.7 10E9/L (ref 4–11)
WBC # BLD AUTO: 5.9 10E9/L (ref 4–11)
WBC # BLD AUTO: 6.2 10E9/L (ref 4–11)
WBC # BLD AUTO: 6.4 10E9/L (ref 4–11)
WBC # BLD AUTO: 6.7 10E9/L (ref 4–11)
WBC # BLD AUTO: 6.7 10E9/L (ref 4–11)
WBC # BLD AUTO: 6.8 10E9/L (ref 4–11)
WBC # BLD AUTO: 7.3 10E9/L (ref 4–11)
WBC # BLD AUTO: 7.4 10E9/L (ref 4–11)
WBC # BLD AUTO: 8.6 10E9/L (ref 4–11)
WBC # BLD AUTO: 8.6 10E9/L (ref 4–11)
WBC # BLD AUTO: 9.2 10E9/L (ref 4–11)
WBC # BLD AUTO: NORMAL 10E9/L (ref 4–11)
WBC #/AREA URNS AUTO: 1 /HPF (ref 0–5)
WBC #/AREA URNS AUTO: 1 /HPF (ref 0–5)
WBC #/AREA URNS AUTO: 2 /HPF (ref 0–5)
WBC #/AREA URNS AUTO: 5 /HPF (ref 0–5)

## 2021-01-01 PROCEDURE — 999N001017 HC STATISTIC GLUCOSE BY METER IP

## 2021-01-01 PROCEDURE — 999N000141 HC STATISTIC PRE-PROCEDURE NURSING ASSESSMENT: Performed by: NEUROLOGICAL SURGERY

## 2021-01-01 PROCEDURE — 80053 COMPREHEN METABOLIC PANEL: CPT | Performed by: STUDENT IN AN ORGANIZED HEALTH CARE EDUCATION/TRAINING PROGRAM

## 2021-01-01 PROCEDURE — 82150 ASSAY OF AMYLASE: CPT | Performed by: STUDENT IN AN ORGANIZED HEALTH CARE EDUCATION/TRAINING PROGRAM

## 2021-01-01 PROCEDURE — 81003 URINALYSIS AUTO W/O SCOPE: CPT | Performed by: STUDENT IN AN ORGANIZED HEALTH CARE EDUCATION/TRAINING PROGRAM

## 2021-01-01 PROCEDURE — 85610 PROTHROMBIN TIME: CPT | Performed by: STUDENT IN AN ORGANIZED HEALTH CARE EDUCATION/TRAINING PROGRAM

## 2021-01-01 PROCEDURE — 84295 ASSAY OF SERUM SODIUM: CPT | Performed by: STUDENT IN AN ORGANIZED HEALTH CARE EDUCATION/TRAINING PROGRAM

## 2021-01-01 PROCEDURE — 250N000012 HC RX MED GY IP 250 OP 636 PS 637: Performed by: STUDENT IN AN ORGANIZED HEALTH CARE EDUCATION/TRAINING PROGRAM

## 2021-01-01 PROCEDURE — 85025 COMPLETE CBC W/AUTO DIFF WBC: CPT | Performed by: STUDENT IN AN ORGANIZED HEALTH CARE EDUCATION/TRAINING PROGRAM

## 2021-01-01 PROCEDURE — 84132 ASSAY OF SERUM POTASSIUM: CPT | Performed by: STUDENT IN AN ORGANIZED HEALTH CARE EDUCATION/TRAINING PROGRAM

## 2021-01-01 PROCEDURE — 82150 ASSAY OF AMYLASE: CPT | Performed by: PSYCHIATRY & NEUROLOGY

## 2021-01-01 PROCEDURE — 272N000001 HC OR GENERAL SUPPLY STERILE: Performed by: NEUROLOGICAL SURGERY

## 2021-01-01 PROCEDURE — 999N001121 HC STATISTIC RESEARCH KIT COLLECTION: Performed by: NEUROLOGICAL SURGERY

## 2021-01-01 PROCEDURE — 99215 OFFICE O/P EST HI 40 MIN: CPT | Mod: GT | Performed by: PSYCHIATRY & NEUROLOGY

## 2021-01-01 PROCEDURE — 85652 RBC SED RATE AUTOMATED: CPT | Performed by: STUDENT IN AN ORGANIZED HEALTH CARE EDUCATION/TRAINING PROGRAM

## 2021-01-01 PROCEDURE — 85025 COMPLETE CBC W/AUTO DIFF WBC: CPT | Performed by: NEUROLOGICAL SURGERY

## 2021-01-01 PROCEDURE — 84100 ASSAY OF PHOSPHORUS: CPT | Performed by: STUDENT IN AN ORGANIZED HEALTH CARE EDUCATION/TRAINING PROGRAM

## 2021-01-01 PROCEDURE — 120N000002 HC R&B MED SURG/OB UMMC

## 2021-01-01 PROCEDURE — 250N000011 HC RX IP 250 OP 636: Performed by: STUDENT IN AN ORGANIZED HEALTH CARE EDUCATION/TRAINING PROGRAM

## 2021-01-01 PROCEDURE — 99232 SBSQ HOSP IP/OBS MODERATE 35: CPT | Mod: GC | Performed by: PHYSICAL MEDICINE & REHABILITATION

## 2021-01-01 PROCEDURE — 70553 MRI BRAIN STEM W/O & W/DYE: CPT | Mod: 26 | Performed by: RADIOLOGY

## 2021-01-01 PROCEDURE — U0005 INFEC AGEN DETEC AMPLI PROBE: HCPCS | Performed by: EMERGENCY MEDICINE

## 2021-01-01 PROCEDURE — 250N000013 HC RX MED GY IP 250 OP 250 PS 637: Performed by: STUDENT IN AN ORGANIZED HEALTH CARE EDUCATION/TRAINING PROGRAM

## 2021-01-01 PROCEDURE — 85730 THROMBOPLASTIN TIME PARTIAL: CPT | Performed by: STUDENT IN AN ORGANIZED HEALTH CARE EDUCATION/TRAINING PROGRAM

## 2021-01-01 PROCEDURE — 250N000013 HC RX MED GY IP 250 OP 250 PS 637: Performed by: NEUROLOGICAL SURGERY

## 2021-01-01 PROCEDURE — 99233 SBSQ HOSP IP/OBS HIGH 50: CPT | Mod: 24 | Performed by: PHYSICAL MEDICINE & REHABILITATION

## 2021-01-01 PROCEDURE — 250N000013 HC RX MED GY IP 250 OP 250 PS 637

## 2021-01-01 PROCEDURE — 36415 COLL VENOUS BLD VENIPUNCTURE: CPT | Performed by: PATHOLOGY

## 2021-01-01 PROCEDURE — 80048 BASIC METABOLIC PNL TOTAL CA: CPT

## 2021-01-01 PROCEDURE — 97130 THER IVNTJ EA ADDL 15 MIN: CPT | Mod: GN | Performed by: SPEECH-LANGUAGE PATHOLOGIST

## 2021-01-01 PROCEDURE — 97530 THERAPEUTIC ACTIVITIES: CPT | Mod: GP

## 2021-01-01 PROCEDURE — 70450 CT HEAD/BRAIN W/O DYE: CPT

## 2021-01-01 PROCEDURE — 370N000017 HC ANESTHESIA TECHNICAL FEE, PER MIN: Performed by: NEUROLOGICAL SURGERY

## 2021-01-01 PROCEDURE — A9585 GADOBUTROL INJECTION: HCPCS | Performed by: RADIOLOGY

## 2021-01-01 PROCEDURE — 83735 ASSAY OF MAGNESIUM: CPT | Performed by: STUDENT IN AN ORGANIZED HEALTH CARE EDUCATION/TRAINING PROGRAM

## 2021-01-01 PROCEDURE — 97535 SELF CARE MNGMENT TRAINING: CPT | Mod: GO | Performed by: OCCUPATIONAL THERAPIST

## 2021-01-01 PROCEDURE — 97165 OT EVAL LOW COMPLEX 30 MIN: CPT | Mod: GO

## 2021-01-01 PROCEDURE — 36415 COLL VENOUS BLD VENIPUNCTURE: CPT | Performed by: STUDENT IN AN ORGANIZED HEALTH CARE EDUCATION/TRAINING PROGRAM

## 2021-01-01 PROCEDURE — 97112 NEUROMUSCULAR REEDUCATION: CPT | Mod: GP | Performed by: REHABILITATION PRACTITIONER

## 2021-01-01 PROCEDURE — 82150 ASSAY OF AMYLASE: CPT | Performed by: NEUROLOGICAL SURGERY

## 2021-01-01 PROCEDURE — 70553 MRI BRAIN STEM W/O & W/DYE: CPT

## 2021-01-01 PROCEDURE — 97165 OT EVAL LOW COMPLEX 30 MIN: CPT | Mod: GO | Performed by: OCCUPATIONAL THERAPIST

## 2021-01-01 PROCEDURE — 70450 CT HEAD/BRAIN W/O DYE: CPT | Mod: 26 | Performed by: RADIOLOGY

## 2021-01-01 PROCEDURE — C1713 ANCHOR/SCREW BN/BN,TIS/BN: HCPCS | Performed by: NEUROLOGICAL SURGERY

## 2021-01-01 PROCEDURE — 85025 COMPLETE CBC W/AUTO DIFF WBC: CPT | Performed by: PSYCHIATRY & NEUROLOGY

## 2021-01-01 PROCEDURE — 86140 C-REACTIVE PROTEIN: CPT | Performed by: STUDENT IN AN ORGANIZED HEALTH CARE EDUCATION/TRAINING PROGRAM

## 2021-01-01 PROCEDURE — 82565 ASSAY OF CREATININE: CPT | Performed by: STUDENT IN AN ORGANIZED HEALTH CARE EDUCATION/TRAINING PROGRAM

## 2021-01-01 PROCEDURE — 258N000003 HC RX IP 258 OP 636: Performed by: STUDENT IN AN ORGANIZED HEALTH CARE EDUCATION/TRAINING PROGRAM

## 2021-01-01 PROCEDURE — 97530 THERAPEUTIC ACTIVITIES: CPT | Mod: GO | Performed by: OCCUPATIONAL THERAPIST

## 2021-01-01 PROCEDURE — 85025 COMPLETE CBC W/AUTO DIFF WBC: CPT | Performed by: EMERGENCY MEDICINE

## 2021-01-01 PROCEDURE — 97116 GAIT TRAINING THERAPY: CPT | Mod: GP

## 2021-01-01 PROCEDURE — G0463 HOSPITAL OUTPT CLINIC VISIT: HCPCS | Mod: 25

## 2021-01-01 PROCEDURE — 97116 GAIT TRAINING THERAPY: CPT | Mod: GP | Performed by: REHABILITATION PRACTITIONER

## 2021-01-01 PROCEDURE — 999N001193 HC VIDEO/TELEPHONE VISIT; NO CHARGE

## 2021-01-01 PROCEDURE — 84443 ASSAY THYROID STIM HORMONE: CPT | Performed by: PSYCHIATRY & NEUROLOGY

## 2021-01-01 PROCEDURE — 710N000011 HC RECOVERY PHASE 1, LEVEL 3, PER MIN: Performed by: NEUROLOGICAL SURGERY

## 2021-01-01 PROCEDURE — 250N000011 HC RX IP 250 OP 636: Performed by: ANESTHESIOLOGY

## 2021-01-01 PROCEDURE — 88300 SURGICAL PATH GROSS: CPT | Mod: TC | Performed by: NEUROLOGICAL SURGERY

## 2021-01-01 PROCEDURE — 86900 BLOOD TYPING SEROLOGIC ABO: CPT | Performed by: STUDENT IN AN ORGANIZED HEALTH CARE EDUCATION/TRAINING PROGRAM

## 2021-01-01 PROCEDURE — 96133 NRPSYC TST EVAL PHYS/QHP EA: CPT | Performed by: CLINICAL NEUROPSYCHOLOGIST

## 2021-01-01 PROCEDURE — A9585 GADOBUTROL INJECTION: HCPCS | Performed by: NEUROLOGICAL SURGERY

## 2021-01-01 PROCEDURE — 85652 RBC SED RATE AUTOMATED: CPT | Performed by: NEUROLOGICAL SURGERY

## 2021-01-01 PROCEDURE — 96417 CHEMO IV INFUS EACH ADDL SEQ: CPT

## 2021-01-01 PROCEDURE — 70552 MRI BRAIN STEM W/DYE: CPT | Mod: XS

## 2021-01-01 PROCEDURE — 99239 HOSP IP/OBS DSCHRG MGMT >30: CPT | Mod: GC | Performed by: PSYCHIATRY & NEUROLOGY

## 2021-01-01 PROCEDURE — 84100 ASSAY OF PHOSPHORUS: CPT | Performed by: NEUROLOGICAL SURGERY

## 2021-01-01 PROCEDURE — G0463 HOSPITAL OUTPT CLINIC VISIT: HCPCS

## 2021-01-01 PROCEDURE — 85730 THROMBOPLASTIN TIME PARTIAL: CPT | Performed by: PATHOLOGY

## 2021-01-01 PROCEDURE — 128N000003 HC R&B REHAB

## 2021-01-01 PROCEDURE — 96366 THER/PROPH/DIAG IV INF ADDON: CPT

## 2021-01-01 PROCEDURE — 85730 THROMBOPLASTIN TIME PARTIAL: CPT | Performed by: NEUROLOGICAL SURGERY

## 2021-01-01 PROCEDURE — 8E09XBH COMPUTER ASSISTED PROCEDURE OF HEAD AND NECK REGION, WITH MAGNETIC RESONANCE IMAGING: ICD-10-PCS | Performed by: NEUROLOGICAL SURGERY

## 2021-01-01 PROCEDURE — 80053 COMPREHEN METABOLIC PANEL: CPT | Performed by: PSYCHIATRY & NEUROLOGY

## 2021-01-01 PROCEDURE — 999N000157 HC STATISTIC RCP TIME EA 10 MIN

## 2021-01-01 PROCEDURE — 250N000009 HC RX 250: Performed by: NEUROLOGICAL SURGERY

## 2021-01-01 PROCEDURE — 250N000011 HC RX IP 250 OP 636: Performed by: PSYCHIATRY & NEUROLOGY

## 2021-01-01 PROCEDURE — 81001 URINALYSIS AUTO W/SCOPE: CPT | Performed by: PATHOLOGY

## 2021-01-01 PROCEDURE — 96413 CHEMO IV INFUSION 1 HR: CPT

## 2021-01-01 PROCEDURE — 250N000025 HC SEVOFLURANE, PER MIN: Performed by: NEUROLOGICAL SURGERY

## 2021-01-01 PROCEDURE — 36415 COLL VENOUS BLD VENIPUNCTURE: CPT | Performed by: PSYCHIATRY & NEUROLOGY

## 2021-01-01 PROCEDURE — 36415 COLL VENOUS BLD VENIPUNCTURE: CPT

## 2021-01-01 PROCEDURE — 80053 COMPREHEN METABOLIC PANEL: CPT | Performed by: NEUROLOGICAL SURGERY

## 2021-01-01 PROCEDURE — 99233 SBSQ HOSP IP/OBS HIGH 50: CPT | Mod: 25 | Performed by: PSYCHIATRY & NEUROLOGY

## 2021-01-01 PROCEDURE — 95714 VEEG EA 12-26 HR UNMNTR: CPT

## 2021-01-01 PROCEDURE — 99223 1ST HOSP IP/OBS HIGH 75: CPT | Performed by: PHYSICAL MEDICINE & REHABILITATION

## 2021-01-01 PROCEDURE — 272N000002 HC OR SUPPLY OTHER OPNP: Performed by: NEUROLOGICAL SURGERY

## 2021-01-01 PROCEDURE — 258N000003 HC RX IP 258 OP 636: Performed by: NURSE ANESTHETIST, CERTIFIED REGISTERED

## 2021-01-01 PROCEDURE — 97535 SELF CARE MNGMENT TRAINING: CPT | Mod: GO

## 2021-01-01 PROCEDURE — 97530 THERAPEUTIC ACTIVITIES: CPT | Mod: GP | Performed by: REHABILITATION PRACTITIONER

## 2021-01-01 PROCEDURE — 250N000011 HC RX IP 250 OP 636: Performed by: NURSE ANESTHETIST, CERTIFIED REGISTERED

## 2021-01-01 PROCEDURE — 87040 BLOOD CULTURE FOR BACTERIA: CPT | Performed by: STUDENT IN AN ORGANIZED HEALTH CARE EDUCATION/TRAINING PROGRAM

## 2021-01-01 PROCEDURE — 84100 ASSAY OF PHOSPHORUS: CPT | Performed by: PSYCHIATRY & NEUROLOGY

## 2021-01-01 PROCEDURE — 710N000010 HC RECOVERY PHASE 1, LEVEL 2, PER MIN: Performed by: NEUROLOGICAL SURGERY

## 2021-01-01 PROCEDURE — 97166 OT EVAL MOD COMPLEX 45 MIN: CPT | Mod: GO | Performed by: OCCUPATIONAL THERAPIST

## 2021-01-01 PROCEDURE — 84439 ASSAY OF FREE THYROXINE: CPT | Performed by: PSYCHIATRY & NEUROLOGY

## 2021-01-01 PROCEDURE — U0003 INFECTIOUS AGENT DETECTION BY NUCLEIC ACID (DNA OR RNA); SEVERE ACUTE RESPIRATORY SYNDROME CORONAVIRUS 2 (SARS-COV-2) (CORONAVIRUS DISEASE [COVID-19]), AMPLIFIED PROBE TECHNIQUE, MAKING USE OF HIGH THROUGHPUT TECHNOLOGIES AS DESCRIBED BY CMS-2020-01-R: HCPCS | Performed by: EMERGENCY MEDICINE

## 2021-01-01 PROCEDURE — 250N000011 HC RX IP 250 OP 636: Performed by: NURSE PRACTITIONER

## 2021-01-01 PROCEDURE — 99203 OFFICE O/P NEW LOW 30 MIN: CPT | Performed by: PHYSICIAN ASSISTANT

## 2021-01-01 PROCEDURE — 97162 PT EVAL MOD COMPLEX 30 MIN: CPT | Mod: GP

## 2021-01-01 PROCEDURE — 250N000013 HC RX MED GY IP 250 OP 250 PS 637: Performed by: PSYCHIATRY & NEUROLOGY

## 2021-01-01 PROCEDURE — 88331 PATH CONSLTJ SURG 1 BLK 1SPC: CPT | Mod: TC | Performed by: NEUROLOGICAL SURGERY

## 2021-01-01 PROCEDURE — 84295 ASSAY OF SERUM SODIUM: CPT | Performed by: NEUROLOGICAL SURGERY

## 2021-01-01 PROCEDURE — 86140 C-REACTIVE PROTEIN: CPT | Performed by: NEUROLOGICAL SURGERY

## 2021-01-01 PROCEDURE — 88307 TISSUE EXAM BY PATHOLOGIST: CPT | Mod: TC | Performed by: NEUROLOGICAL SURGERY

## 2021-01-01 PROCEDURE — 999N000125 HC STATISTIC PATIENT MED CONFERENCE < 30 MIN

## 2021-01-01 PROCEDURE — 85610 PROTHROMBIN TIME: CPT | Performed by: NEUROLOGICAL SURGERY

## 2021-01-01 PROCEDURE — 70552 MRI BRAIN STEM W/DYE: CPT | Mod: 26 | Performed by: STUDENT IN AN ORGANIZED HEALTH CARE EDUCATION/TRAINING PROGRAM

## 2021-01-01 PROCEDURE — 99285 EMERGENCY DEPT VISIT HI MDM: CPT | Performed by: EMERGENCY MEDICINE

## 2021-01-01 PROCEDURE — 95711 VEEG 2-12 HR UNMONITORED: CPT

## 2021-01-01 PROCEDURE — 85610 PROTHROMBIN TIME: CPT | Performed by: PATHOLOGY

## 2021-01-01 PROCEDURE — 90832 PSYTX W PT 30 MINUTES: CPT | Mod: GT | Performed by: PSYCHOLOGIST

## 2021-01-01 PROCEDURE — 200N000002 HC R&B ICU UMMC

## 2021-01-01 PROCEDURE — 92523 SPEECH SOUND LANG COMPREHEN: CPT | Mod: GN | Performed by: SPEECH-LANGUAGE PATHOLOGIST

## 2021-01-01 PROCEDURE — 88342 IMHCHEM/IMCYTCHM 1ST ANTB: CPT | Mod: TC | Performed by: NEUROLOGICAL SURGERY

## 2021-01-01 PROCEDURE — 86850 RBC ANTIBODY SCREEN: CPT | Mod: 90 | Performed by: PATHOLOGY

## 2021-01-01 PROCEDURE — 97112 NEUROMUSCULAR REEDUCATION: CPT | Mod: GP | Performed by: STUDENT IN AN ORGANIZED HEALTH CARE EDUCATION/TRAINING PROGRAM

## 2021-01-01 PROCEDURE — 250N000009 HC RX 250: Performed by: ANESTHESIOLOGY

## 2021-01-01 PROCEDURE — 97750 PHYSICAL PERFORMANCE TEST: CPT | Mod: GP

## 2021-01-01 PROCEDURE — 250N000013 HC RX MED GY IP 250 OP 250 PS 637: Performed by: EMERGENCY MEDICINE

## 2021-01-01 PROCEDURE — 87040 BLOOD CULTURE FOR BACTERIA: CPT | Performed by: NEUROLOGICAL SURGERY

## 2021-01-01 PROCEDURE — 70450 CT HEAD/BRAIN W/O DYE: CPT | Mod: 26 | Performed by: STUDENT IN AN ORGANIZED HEALTH CARE EDUCATION/TRAINING PROGRAM

## 2021-01-01 PROCEDURE — 85027 COMPLETE CBC AUTOMATED: CPT | Performed by: PSYCHIATRY & NEUROLOGY

## 2021-01-01 PROCEDURE — 83615 LACTATE (LD) (LDH) ENZYME: CPT | Performed by: NEUROLOGICAL SURGERY

## 2021-01-01 PROCEDURE — 250N000011 HC RX IP 250 OP 636: Performed by: NEUROLOGICAL SURGERY

## 2021-01-01 PROCEDURE — 99214 OFFICE O/P EST MOD 30 MIN: CPT | Performed by: PSYCHIATRY & NEUROLOGY

## 2021-01-01 PROCEDURE — 86850 RBC ANTIBODY SCREEN: CPT | Performed by: STUDENT IN AN ORGANIZED HEALTH CARE EDUCATION/TRAINING PROGRAM

## 2021-01-01 PROCEDURE — 258N000003 HC RX IP 258 OP 636: Performed by: PSYCHIATRY & NEUROLOGY

## 2021-01-01 PROCEDURE — 97129 THER IVNTJ 1ST 15 MIN: CPT | Mod: GN | Performed by: SPEECH-LANGUAGE PATHOLOGIST

## 2021-01-01 PROCEDURE — 97161 PT EVAL LOW COMPLEX 20 MIN: CPT | Mod: GP | Performed by: PHYSICAL THERAPIST

## 2021-01-01 PROCEDURE — 86901 BLOOD TYPING SEROLOGIC RH(D): CPT | Performed by: STUDENT IN AN ORGANIZED HEALTH CARE EDUCATION/TRAINING PROGRAM

## 2021-01-01 PROCEDURE — D0Y0KZZ LASER INTERSTITIAL THERMAL THERAPY OF BRAIN: ICD-10-PCS | Performed by: NEUROLOGICAL SURGERY

## 2021-01-01 PROCEDURE — 80053 COMPREHEN METABOLIC PANEL: CPT | Performed by: PATHOLOGY

## 2021-01-01 PROCEDURE — 88341 IMHCHEM/IMCYTCHM EA ADD ANTB: CPT | Mod: 26 | Performed by: SPECIALIST

## 2021-01-01 PROCEDURE — 95718 EEG PHYS/QHP 2-12 HR W/VEEG: CPT | Performed by: PSYCHIATRY & NEUROLOGY

## 2021-01-01 PROCEDURE — 83615 LACTATE (LD) (LDH) ENZYME: CPT | Performed by: STUDENT IN AN ORGANIZED HEALTH CARE EDUCATION/TRAINING PROGRAM

## 2021-01-01 PROCEDURE — 250N000009 HC RX 250: Performed by: STUDENT IN AN ORGANIZED HEALTH CARE EDUCATION/TRAINING PROGRAM

## 2021-01-01 PROCEDURE — 97166 OT EVAL MOD COMPLEX 45 MIN: CPT | Mod: GO

## 2021-01-01 PROCEDURE — 83690 ASSAY OF LIPASE: CPT | Performed by: PSYCHIATRY & NEUROLOGY

## 2021-01-01 PROCEDURE — 97116 GAIT TRAINING THERAPY: CPT | Mod: GP | Performed by: STUDENT IN AN ORGANIZED HEALTH CARE EDUCATION/TRAINING PROGRAM

## 2021-01-01 PROCEDURE — 255N000002 HC RX 255 OP 636: Performed by: NEUROLOGICAL SURGERY

## 2021-01-01 PROCEDURE — 83690 ASSAY OF LIPASE: CPT | Performed by: NEUROLOGICAL SURGERY

## 2021-01-01 PROCEDURE — 80048 BASIC METABOLIC PNL TOTAL CA: CPT | Performed by: PSYCHIATRY & NEUROLOGY

## 2021-01-01 PROCEDURE — 83930 ASSAY OF BLOOD OSMOLALITY: CPT | Performed by: STUDENT IN AN ORGANIZED HEALTH CARE EDUCATION/TRAINING PROGRAM

## 2021-01-01 PROCEDURE — 250N000009 HC RX 250

## 2021-01-01 PROCEDURE — 258N000003 HC RX IP 258 OP 636: Performed by: NEUROLOGICAL SURGERY

## 2021-01-01 PROCEDURE — 99001 SPECIMEN HANDLING PT-LAB: CPT | Performed by: NEUROLOGICAL SURGERY

## 2021-01-01 PROCEDURE — 88342 IMHCHEM/IMCYTCHM 1ST ANTB: CPT | Mod: 26 | Performed by: SPECIALIST

## 2021-01-01 PROCEDURE — U0003 INFECTIOUS AGENT DETECTION BY NUCLEIC ACID (DNA OR RNA); SEVERE ACUTE RESPIRATORY SYNDROME CORONAVIRUS 2 (SARS-COV-2) (CORONAVIRUS DISEASE [COVID-19]), AMPLIFIED PROBE TECHNIQUE, MAKING USE OF HIGH THROUGHPUT TECHNOLOGIES AS DESCRIBED BY CMS-2020-01-R: HCPCS | Mod: 90 | Performed by: PATHOLOGY

## 2021-01-01 PROCEDURE — 96132 NRPSYC TST EVAL PHYS/QHP 1ST: CPT | Performed by: CLINICAL NEUROPSYCHOLOGIST

## 2021-01-01 PROCEDURE — 99232 SBSQ HOSP IP/OBS MODERATE 35: CPT | Performed by: PHYSICAL MEDICINE & REHABILITATION

## 2021-01-01 PROCEDURE — 83690 ASSAY OF LIPASE: CPT | Performed by: STUDENT IN AN ORGANIZED HEALTH CARE EDUCATION/TRAINING PROGRAM

## 2021-01-01 PROCEDURE — 258N000003 HC RX IP 258 OP 636: Performed by: ANESTHESIOLOGY

## 2021-01-01 PROCEDURE — 81003 URINALYSIS AUTO W/O SCOPE: CPT | Performed by: PSYCHIATRY & NEUROLOGY

## 2021-01-01 PROCEDURE — 90832 PSYTX W PT 30 MINUTES: CPT | Mod: TEL | Performed by: PSYCHOLOGIST

## 2021-01-01 PROCEDURE — 97530 THERAPEUTIC ACTIVITIES: CPT | Mod: GO

## 2021-01-01 PROCEDURE — 999N000128 HC STATISTIC PERIPHERAL IV START W/O US GUIDANCE

## 2021-01-01 PROCEDURE — 250N000013 HC RX MED GY IP 250 OP 250 PS 637: Performed by: PHYSICAL MEDICINE & REHABILITATION

## 2021-01-01 PROCEDURE — 61304 CRNEC/CRNOT EXPL SUPRATNTORL: CPT | Mod: GC | Performed by: NEUROLOGICAL SURGERY

## 2021-01-01 PROCEDURE — 88331 PATH CONSLTJ SURG 1 BLK 1SPC: CPT | Mod: 26 | Performed by: SPECIALIST

## 2021-01-01 PROCEDURE — 80048 BASIC METABOLIC PNL TOTAL CA: CPT | Performed by: EMERGENCY MEDICINE

## 2021-01-01 PROCEDURE — 83735 ASSAY OF MAGNESIUM: CPT | Performed by: NEUROLOGICAL SURGERY

## 2021-01-01 PROCEDURE — 97112 NEUROMUSCULAR REEDUCATION: CPT | Mod: GO | Performed by: OCCUPATIONAL THERAPIST

## 2021-01-01 PROCEDURE — U0005 INFEC AGEN DETEC AMPLI PROBE: HCPCS | Mod: 90 | Performed by: PATHOLOGY

## 2021-01-01 PROCEDURE — 250N000013 HC RX MED GY IP 250 OP 250 PS 637: Performed by: ANESTHESIOLOGY

## 2021-01-01 PROCEDURE — 999N000147 HC STATISTIC PT IP EVAL DEFER

## 2021-01-01 PROCEDURE — 86900 BLOOD TYPING SEROLOGIC ABO: CPT | Mod: 90 | Performed by: PATHOLOGY

## 2021-01-01 PROCEDURE — 96365 THER/PROPH/DIAG IV INF INIT: CPT

## 2021-01-01 PROCEDURE — 999N000150 HC STATISTIC PT MED CONFERENCE < 30 MIN

## 2021-01-01 PROCEDURE — 99207 MR BRAIN FOR STROKE COMPLETE W/O & W CONTRAST: CPT | Mod: 26 | Performed by: RADIOLOGY

## 2021-01-01 PROCEDURE — A9585 GADOBUTROL INJECTION: HCPCS | Performed by: PSYCHIATRY & NEUROLOGY

## 2021-01-01 PROCEDURE — 70553 MRI BRAIN STEM W/O & W/DYE: CPT | Mod: GC | Performed by: RADIOLOGY

## 2021-01-01 PROCEDURE — 96367 TX/PROPH/DG ADDL SEQ IV INF: CPT

## 2021-01-01 PROCEDURE — 88307 TISSUE EXAM BY PATHOLOGIST: CPT | Mod: 26 | Performed by: SPECIALIST

## 2021-01-01 PROCEDURE — 70450 CT HEAD/BRAIN W/O DYE: CPT | Mod: 77

## 2021-01-01 PROCEDURE — 278N000051 HC OR IMPLANT GENERAL: Performed by: NEUROLOGICAL SURGERY

## 2021-01-01 PROCEDURE — U0005 INFEC AGEN DETEC AMPLI PROBE: HCPCS | Performed by: NEUROLOGICAL SURGERY

## 2021-01-01 PROCEDURE — 70552 MRI BRAIN STEM W/DYE: CPT

## 2021-01-01 PROCEDURE — 250N000012 HC RX MED GY IP 250 OP 636 PS 637: Performed by: NURSE PRACTITIONER

## 2021-01-01 PROCEDURE — 99232 SBSQ HOSP IP/OBS MODERATE 35: CPT | Mod: 25 | Performed by: PSYCHIATRY & NEUROLOGY

## 2021-01-01 PROCEDURE — 250N000009 HC RX 250: Performed by: NURSE ANESTHETIST, CERTIFIED REGISTERED

## 2021-01-01 PROCEDURE — 71045 X-RAY EXAM CHEST 1 VIEW: CPT

## 2021-01-01 PROCEDURE — C9803 HOPD COVID-19 SPEC COLLECT: HCPCS | Performed by: EMERGENCY MEDICINE

## 2021-01-01 PROCEDURE — 85027 COMPLETE CBC AUTOMATED: CPT | Performed by: STUDENT IN AN ORGANIZED HEALTH CARE EDUCATION/TRAINING PROGRAM

## 2021-01-01 PROCEDURE — 00B70ZX EXCISION OF CEREBRAL HEMISPHERE, OPEN APPROACH, DIAGNOSTIC: ICD-10-PCS | Performed by: NEUROLOGICAL SURGERY

## 2021-01-01 PROCEDURE — 360N000080 HC SURGERY LEVEL 7, PER MIN: Performed by: NEUROLOGICAL SURGERY

## 2021-01-01 PROCEDURE — 81001 URINALYSIS AUTO W/SCOPE: CPT | Performed by: STUDENT IN AN ORGANIZED HEALTH CARE EDUCATION/TRAINING PROGRAM

## 2021-01-01 PROCEDURE — 85025 COMPLETE CBC W/AUTO DIFF WBC: CPT | Performed by: PATHOLOGY

## 2021-01-01 PROCEDURE — 250N000011 HC RX IP 250 OP 636

## 2021-01-01 PROCEDURE — 97530 THERAPEUTIC ACTIVITIES: CPT | Mod: GP | Performed by: STUDENT IN AN ORGANIZED HEALTH CARE EDUCATION/TRAINING PROGRAM

## 2021-01-01 PROCEDURE — 71045 X-RAY EXAM CHEST 1 VIEW: CPT | Mod: 26 | Performed by: RADIOLOGY

## 2021-01-01 PROCEDURE — 250N000006 HC OR RX SURGIFLO W/THROMBIN KIT 2ML 1991 OPNP: Performed by: NEUROLOGICAL SURGERY

## 2021-01-01 PROCEDURE — 99215 OFFICE O/P EST HI 40 MIN: CPT | Performed by: PSYCHIATRY & NEUROLOGY

## 2021-01-01 PROCEDURE — 86901 BLOOD TYPING SEROLOGIC RH(D): CPT | Mod: 90 | Performed by: PATHOLOGY

## 2021-01-01 PROCEDURE — 99214 OFFICE O/P EST MOD 30 MIN: CPT | Performed by: PHYSICIAN ASSISTANT

## 2021-01-01 PROCEDURE — 81001 URINALYSIS AUTO W/SCOPE: CPT | Performed by: NURSE PRACTITIONER

## 2021-01-01 PROCEDURE — 82565 ASSAY OF CREATININE: CPT | Performed by: PSYCHIATRY & NEUROLOGY

## 2021-01-01 PROCEDURE — 36415 COLL VENOUS BLD VENIPUNCTURE: CPT | Performed by: NEUROLOGICAL SURGERY

## 2021-01-01 PROCEDURE — 250N000009 HC RX 250: Performed by: NURSE PRACTITIONER

## 2021-01-01 PROCEDURE — 96116 NUBHVL XM PHYS/QHP 1ST HR: CPT | Performed by: CLINICAL NEUROPSYCHOLOGIST

## 2021-01-01 PROCEDURE — 99285 EMERGENCY DEPT VISIT HI MDM: CPT | Mod: 25 | Performed by: EMERGENCY MEDICINE

## 2021-01-01 PROCEDURE — 999N001121 HC STATISTIC RESEARCH KIT COLLECTION

## 2021-01-01 PROCEDURE — 999N000015 HC STATISTIC ARTERIAL MONITORING DAILY

## 2021-01-01 PROCEDURE — 99000 SPECIMEN HANDLING OFFICE-LAB: CPT | Performed by: PATHOLOGY

## 2021-01-01 PROCEDURE — 92610 EVALUATE SWALLOWING FUNCTION: CPT | Mod: GN

## 2021-01-01 PROCEDURE — 99223 1ST HOSP IP/OBS HIGH 75: CPT | Performed by: PSYCHIATRY & NEUROLOGY

## 2021-01-01 PROCEDURE — 95720 EEG PHY/QHP EA INCR W/VEEG: CPT | Performed by: PSYCHIATRY & NEUROLOGY

## 2021-01-01 PROCEDURE — 61781 SCAN PROC CRANIAL INTRA: CPT | Performed by: NEUROLOGICAL SURGERY

## 2021-01-01 PROCEDURE — 92526 ORAL FUNCTION THERAPY: CPT | Mod: GN

## 2021-01-01 PROCEDURE — 97112 NEUROMUSCULAR REEDUCATION: CPT | Mod: GP

## 2021-01-01 PROCEDURE — 85025 COMPLETE CBC W/AUTO DIFF WBC: CPT

## 2021-01-01 PROCEDURE — 81001 URINALYSIS AUTO W/SCOPE: CPT | Performed by: PSYCHIATRY & NEUROLOGY

## 2021-01-01 PROCEDURE — 84300 ASSAY OF URINE SODIUM: CPT | Performed by: STUDENT IN AN ORGANIZED HEALTH CARE EDUCATION/TRAINING PROGRAM

## 2021-01-01 PROCEDURE — 3E0Q3GC INTRODUCTION OF OTHER THERAPEUTIC SUBSTANCE INTO CRANIAL CAVITY AND BRAIN, PERCUTANEOUS APPROACH: ICD-10-PCS | Performed by: NEUROLOGICAL SURGERY

## 2021-01-01 PROCEDURE — A9585 GADOBUTROL INJECTION: HCPCS | Performed by: EMERGENCY MEDICINE

## 2021-01-01 PROCEDURE — 255N000002 HC RX 255 OP 636: Performed by: EMERGENCY MEDICINE

## 2021-01-01 PROCEDURE — 99223 1ST HOSP IP/OBS HIGH 75: CPT | Mod: 24 | Performed by: PHYSICAL MEDICINE & REHABILITATION

## 2021-01-01 PROCEDURE — 83615 LACTATE (LD) (LDH) ENZYME: CPT | Performed by: PSYCHIATRY & NEUROLOGY

## 2021-01-01 PROCEDURE — 87635 SARS-COV-2 COVID-19 AMP PRB: CPT | Performed by: PATHOLOGY

## 2021-01-01 PROCEDURE — 80048 BASIC METABOLIC PNL TOTAL CA: CPT | Performed by: STUDENT IN AN ORGANIZED HEALTH CARE EDUCATION/TRAINING PROGRAM

## 2021-01-01 PROCEDURE — U0003 INFECTIOUS AGENT DETECTION BY NUCLEIC ACID (DNA OR RNA); SEVERE ACUTE RESPIRATORY SYNDROME CORONAVIRUS 2 (SARS-COV-2) (CORONAVIRUS DISEASE [COVID-19]), AMPLIFIED PROBE TECHNIQUE, MAKING USE OF HIGH THROUGHPUT TECHNOLOGIES AS DESCRIBED BY CMS-2020-01-R: HCPCS | Performed by: NEUROLOGICAL SURGERY

## 2021-01-01 PROCEDURE — 99215 OFFICE O/P EST HI 40 MIN: CPT | Mod: GT | Performed by: PHYSICIAN ASSISTANT

## 2021-01-01 PROCEDURE — 84132 ASSAY OF SERUM POTASSIUM: CPT | Performed by: NEUROLOGICAL SURGERY

## 2021-01-01 PROCEDURE — 88161 CYTOPATH SMEAR OTHER SOURCE: CPT | Mod: TC | Performed by: NEUROLOGICAL SURGERY

## 2021-01-01 PROCEDURE — 93010 ELECTROCARDIOGRAM REPORT: CPT | Performed by: INTERNAL MEDICINE

## 2021-01-01 PROCEDURE — 99214 OFFICE O/P EST MOD 30 MIN: CPT | Mod: GC | Performed by: PSYCHIATRY & NEUROLOGY

## 2021-01-01 PROCEDURE — 70552 MRI BRAIN STEM W/DYE: CPT | Mod: 26 | Performed by: RADIOLOGY

## 2021-01-01 PROCEDURE — 88300 SURGICAL PATH GROSS: CPT | Mod: 26 | Performed by: SPECIALIST

## 2021-01-01 PROCEDURE — 99239 HOSP IP/OBS DSCHRG MGMT >30: CPT | Mod: 24 | Performed by: PHYSICAL MEDICINE & REHABILITATION

## 2021-01-01 PROCEDURE — 88341 IMHCHEM/IMCYTCHM EA ADD ANTB: CPT | Mod: TC | Performed by: NEUROLOGICAL SURGERY

## 2021-01-01 PROCEDURE — 70549 MR ANGIOGRAPH NECK W/O&W/DYE: CPT | Mod: 26 | Performed by: RADIOLOGY

## 2021-01-01 PROCEDURE — 97163 PT EVAL HIGH COMPLEX 45 MIN: CPT | Mod: GP | Performed by: STUDENT IN AN ORGANIZED HEALTH CARE EDUCATION/TRAINING PROGRAM

## 2021-01-01 PROCEDURE — 999N000109 HC STATISTIC OP CR VISIT

## 2021-01-01 PROCEDURE — 99215 OFFICE O/P EST HI 40 MIN: CPT | Mod: 24 | Performed by: NEUROLOGICAL SURGERY

## 2021-01-01 PROCEDURE — 255N000002 HC RX 255 OP 636: Performed by: PSYCHIATRY & NEUROLOGY

## 2021-01-01 PROCEDURE — 93005 ELECTROCARDIOGRAM TRACING: CPT

## 2021-01-01 PROCEDURE — 999N000102 HC STATISTIC NO CHARGE CLINIC VISIT

## 2021-01-01 PROCEDURE — 999N000125 HC STATISTIC PATIENT MED CONFERENCE < 30 MIN: Performed by: SPEECH-LANGUAGE PATHOLOGIST

## 2021-01-01 PROCEDURE — 00B73ZX EXCISION OF CEREBRAL HEMISPHERE, PERCUTANEOUS APPROACH, DIAGNOSTIC: ICD-10-PCS | Performed by: NEUROLOGICAL SURGERY

## 2021-01-01 PROCEDURE — 86900 BLOOD TYPING SEROLOGIC ABO: CPT | Performed by: PHYSICIAN ASSISTANT

## 2021-01-01 PROCEDURE — 272N000004 HC RX 272: Performed by: NEUROLOGICAL SURGERY

## 2021-01-01 PROCEDURE — 83935 ASSAY OF URINE OSMOLALITY: CPT | Performed by: STUDENT IN AN ORGANIZED HEALTH CARE EDUCATION/TRAINING PROGRAM

## 2021-01-01 PROCEDURE — 3E0Q0GC INTRODUCTION OF OTHER THERAPEUTIC SUBSTANCE INTO CRANIAL CAVITY AND BRAIN, OPEN APPROACH: ICD-10-PCS | Performed by: NEUROLOGICAL SURGERY

## 2021-01-01 DEVICE — IMP BUR HOLE COVER 17MM LOW PROFILE TI 421.527: Type: IMPLANTABLE DEVICE | Site: CRANIAL | Status: FUNCTIONAL

## 2021-01-01 DEVICE — IMP BUR HOLE COVER 24MM LOW PROFILE TI 421.528: Type: IMPLANTABLE DEVICE | Site: CRANIAL | Status: FUNCTIONAL

## 2021-01-01 DEVICE — IMP SCR SYN MATRIX LOW PRO 1.5X04MM SELF DRILL 04.503.104.01: Type: IMPLANTABLE DEVICE | Site: CRANIAL | Status: FUNCTIONAL

## 2021-01-01 RX ORDER — HYDROMORPHONE HYDROCHLORIDE 1 MG/ML
.3-.5 INJECTION, SOLUTION INTRAMUSCULAR; INTRAVENOUS; SUBCUTANEOUS EVERY 5 MIN PRN
Status: DISCONTINUED | OUTPATIENT
Start: 2021-01-01 | End: 2021-01-01 | Stop reason: HOSPADM

## 2021-01-01 RX ORDER — SODIUM CHLORIDE 9 MG/ML
1000 INJECTION, SOLUTION INTRAVENOUS CONTINUOUS PRN
Status: CANCELLED
Start: 2021-01-01

## 2021-01-01 RX ORDER — FAMOTIDINE 20 MG/1
20 TABLET, FILM COATED ORAL 2 TIMES DAILY
Status: DISCONTINUED | OUTPATIENT
Start: 2021-01-01 | End: 2021-01-01

## 2021-01-01 RX ORDER — MEPERIDINE HYDROCHLORIDE 25 MG/ML
25 INJECTION INTRAMUSCULAR; INTRAVENOUS; SUBCUTANEOUS EVERY 30 MIN PRN
Status: CANCELLED | OUTPATIENT
Start: 2021-01-01

## 2021-01-01 RX ORDER — LIDOCAINE HYDROCHLORIDE AND EPINEPHRINE 10; 10 MG/ML; UG/ML
INJECTION, SOLUTION INFILTRATION; PERINEURAL PRN
Status: DISCONTINUED | OUTPATIENT
Start: 2021-01-01 | End: 2021-01-01 | Stop reason: HOSPADM

## 2021-01-01 RX ORDER — ACETAMINOPHEN 325 MG/1
650 TABLET ORAL EVERY 4 HOURS PRN
Status: DISCONTINUED | OUTPATIENT
Start: 2021-01-01 | End: 2021-01-01 | Stop reason: HOSPADM

## 2021-01-01 RX ORDER — FENTANYL CITRATE 50 UG/ML
25-50 INJECTION, SOLUTION INTRAMUSCULAR; INTRAVENOUS
Status: DISCONTINUED | OUTPATIENT
Start: 2021-01-01 | End: 2021-01-01 | Stop reason: HOSPADM

## 2021-01-01 RX ORDER — LORAZEPAM 2 MG/ML
0.5 INJECTION INTRAMUSCULAR EVERY 4 HOURS PRN
Status: CANCELLED
Start: 2021-01-01

## 2021-01-01 RX ORDER — LEVETIRACETAM 10 MG/ML
1000 INJECTION INTRAVASCULAR 2 TIMES DAILY
Status: DISCONTINUED | OUTPATIENT
Start: 2021-01-01 | End: 2021-01-01

## 2021-01-01 RX ORDER — DEXAMETHASONE 4 MG/1
4 TABLET ORAL DAILY
Status: DISCONTINUED | OUTPATIENT
Start: 2021-01-01 | End: 2021-01-01 | Stop reason: HOSPADM

## 2021-01-01 RX ORDER — EPINEPHRINE 1 MG/ML
0.3 INJECTION, SOLUTION INTRAMUSCULAR; SUBCUTANEOUS EVERY 5 MIN PRN
Status: CANCELLED | OUTPATIENT
Start: 2021-01-01

## 2021-01-01 RX ORDER — LEVETIRACETAM 500 MG/1
1000 TABLET ORAL 2 TIMES DAILY
Status: DISCONTINUED | OUTPATIENT
Start: 2021-01-01 | End: 2021-01-01

## 2021-01-01 RX ORDER — SODIUM CHLORIDE, SODIUM LACTATE, POTASSIUM CHLORIDE, CALCIUM CHLORIDE 600; 310; 30; 20 MG/100ML; MG/100ML; MG/100ML; MG/100ML
INJECTION, SOLUTION INTRAVENOUS CONTINUOUS
Status: DISCONTINUED | OUTPATIENT
Start: 2021-01-01 | End: 2021-01-01 | Stop reason: HOSPADM

## 2021-01-01 RX ORDER — ALBUTEROL SULFATE 90 UG/1
1-2 AEROSOL, METERED RESPIRATORY (INHALATION)
Status: CANCELLED
Start: 2021-01-01

## 2021-01-01 RX ORDER — CEFAZOLIN SODIUM 1 G/3ML
1 INJECTION, POWDER, FOR SOLUTION INTRAMUSCULAR; INTRAVENOUS EVERY 8 HOURS
Status: COMPLETED | OUTPATIENT
Start: 2021-01-01 | End: 2021-01-01

## 2021-01-01 RX ORDER — NALOXONE HYDROCHLORIDE 0.4 MG/ML
0.4 INJECTION, SOLUTION INTRAMUSCULAR; INTRAVENOUS; SUBCUTANEOUS
Status: DISCONTINUED | OUTPATIENT
Start: 2021-01-01 | End: 2021-01-01

## 2021-01-01 RX ORDER — LEVETIRACETAM 10 MG/ML
1000 INJECTION INTRAVASCULAR ONCE
Status: DISCONTINUED | OUTPATIENT
Start: 2021-01-01 | End: 2021-01-01 | Stop reason: CLARIF

## 2021-01-01 RX ORDER — SALIVA STIMULANT COMB. NO.3
1 SPRAY, NON-AEROSOL (ML) MUCOUS MEMBRANE 4 TIMES DAILY
Status: DISCONTINUED | OUTPATIENT
Start: 2021-01-01 | End: 2021-01-01

## 2021-01-01 RX ORDER — MAGNESIUM SULFATE HEPTAHYDRATE 40 MG/ML
2 INJECTION, SOLUTION INTRAVENOUS ONCE
Status: COMPLETED | OUTPATIENT
Start: 2021-01-01 | End: 2021-01-01

## 2021-01-01 RX ORDER — FENTANYL CITRATE 50 UG/ML
25-50 INJECTION, SOLUTION INTRAMUSCULAR; INTRAVENOUS EVERY 5 MIN PRN
Status: DISCONTINUED | OUTPATIENT
Start: 2021-01-01 | End: 2021-01-01 | Stop reason: HOSPADM

## 2021-01-01 RX ORDER — ONDANSETRON 2 MG/ML
4 INJECTION INTRAMUSCULAR; INTRAVENOUS EVERY 30 MIN PRN
Status: DISCONTINUED | OUTPATIENT
Start: 2021-01-01 | End: 2021-01-01 | Stop reason: HOSPADM

## 2021-01-01 RX ORDER — GADOBUTROL 604.72 MG/ML
7.5 INJECTION INTRAVENOUS ONCE
Status: COMPLETED | OUTPATIENT
Start: 2021-01-01 | End: 2021-01-01

## 2021-01-01 RX ORDER — FAMOTIDINE 20 MG/1
20 TABLET, FILM COATED ORAL 2 TIMES DAILY PRN
Status: DISCONTINUED | OUTPATIENT
Start: 2021-01-01 | End: 2021-01-01 | Stop reason: HOSPADM

## 2021-01-01 RX ORDER — NALOXONE HYDROCHLORIDE 0.4 MG/ML
0.4 INJECTION, SOLUTION INTRAMUSCULAR; INTRAVENOUS; SUBCUTANEOUS
Status: DISCONTINUED | OUTPATIENT
Start: 2021-01-01 | End: 2021-01-01 | Stop reason: HOSPADM

## 2021-01-01 RX ORDER — HYDROMORPHONE HYDROCHLORIDE 1 MG/ML
.3-.5 INJECTION, SOLUTION INTRAMUSCULAR; INTRAVENOUS; SUBCUTANEOUS EVERY 10 MIN PRN
Status: DISCONTINUED | OUTPATIENT
Start: 2021-01-01 | End: 2021-01-01 | Stop reason: HOSPADM

## 2021-01-01 RX ORDER — SODIUM CHLORIDE 9 MG/ML
INJECTION, SOLUTION INTRAVENOUS CONTINUOUS
Status: DISCONTINUED | OUTPATIENT
Start: 2021-01-01 | End: 2021-01-01 | Stop reason: HOSPADM

## 2021-01-01 RX ORDER — POTASSIUM CHLORIDE 7.45 MG/ML
10 INJECTION INTRAVENOUS
Status: COMPLETED | OUTPATIENT
Start: 2021-01-01 | End: 2021-01-01

## 2021-01-01 RX ORDER — LEVOTHYROXINE SODIUM 125 UG/1
125 TABLET ORAL EVERY MORNING
Status: DISCONTINUED | OUTPATIENT
Start: 2021-01-01 | End: 2021-01-01 | Stop reason: HOSPADM

## 2021-01-01 RX ORDER — PROPOFOL 10 MG/ML
INJECTION, EMULSION INTRAVENOUS PRN
Status: DISCONTINUED | OUTPATIENT
Start: 2021-01-01 | End: 2021-01-01

## 2021-01-01 RX ORDER — LEVETIRACETAM 10 MG/ML
1000 INJECTION INTRAVASCULAR ONCE
Status: COMPLETED | OUTPATIENT
Start: 2021-01-01 | End: 2021-01-01

## 2021-01-01 RX ORDER — LORATADINE 10 MG/1
10 TABLET ORAL AT BEDTIME
COMMUNITY
Start: 2021-01-01

## 2021-01-01 RX ORDER — LIDOCAINE 40 MG/G
CREAM TOPICAL
Status: DISCONTINUED | OUTPATIENT
Start: 2021-01-01 | End: 2021-01-01 | Stop reason: HOSPADM

## 2021-01-01 RX ORDER — DOCUSATE SODIUM 100 MG/1
100 CAPSULE, LIQUID FILLED ORAL 2 TIMES DAILY
Status: DISCONTINUED | OUTPATIENT
Start: 2021-01-01 | End: 2021-01-01

## 2021-01-01 RX ORDER — GLYCOPYRROLATE 0.2 MG/ML
INJECTION, SOLUTION INTRAMUSCULAR; INTRAVENOUS PRN
Status: DISCONTINUED | OUTPATIENT
Start: 2021-01-01 | End: 2021-01-01

## 2021-01-01 RX ORDER — LABETALOL HYDROCHLORIDE 5 MG/ML
10 INJECTION, SOLUTION INTRAVENOUS
Status: DISCONTINUED | OUTPATIENT
Start: 2021-01-01 | End: 2021-01-01 | Stop reason: HOSPADM

## 2021-01-01 RX ORDER — ACETAMINOPHEN 325 MG/1
650 TABLET ORAL EVERY 6 HOURS
Status: CANCELLED | OUTPATIENT
Start: 2021-01-01

## 2021-01-01 RX ORDER — CEFAZOLIN SODIUM 1 G/3ML
1 INJECTION, POWDER, FOR SOLUTION INTRAMUSCULAR; INTRAVENOUS EVERY 8 HOURS
Status: DISCONTINUED | OUTPATIENT
Start: 2021-01-01 | End: 2021-01-01 | Stop reason: HOSPADM

## 2021-01-01 RX ORDER — NALOXONE HYDROCHLORIDE 0.4 MG/ML
0.2 INJECTION, SOLUTION INTRAMUSCULAR; INTRAVENOUS; SUBCUTANEOUS
Status: DISCONTINUED | OUTPATIENT
Start: 2021-01-01 | End: 2021-01-01 | Stop reason: HOSPADM

## 2021-01-01 RX ORDER — AMOXICILLIN 250 MG
1 CAPSULE ORAL 2 TIMES DAILY
Status: DISCONTINUED | OUTPATIENT
Start: 2021-01-01 | End: 2021-01-01 | Stop reason: HOSPADM

## 2021-01-01 RX ORDER — OXYCODONE HYDROCHLORIDE 5 MG/1
5 TABLET ORAL EVERY 4 HOURS PRN
Status: CANCELLED | OUTPATIENT
Start: 2021-01-01

## 2021-01-01 RX ORDER — ALBUTEROL SULFATE 0.83 MG/ML
2.5 SOLUTION RESPIRATORY (INHALATION)
Status: CANCELLED | OUTPATIENT
Start: 2021-01-01

## 2021-01-01 RX ORDER — HYDROMORPHONE HYDROCHLORIDE 2 MG/1
2 TABLET ORAL EVERY 6 HOURS
Status: DISCONTINUED | OUTPATIENT
Start: 2021-01-01 | End: 2021-01-01

## 2021-01-01 RX ORDER — LEVETIRACETAM 1000 MG/1
1000 TABLET ORAL 2 TIMES DAILY
Qty: 180 TABLET | Refills: 1 | Status: ON HOLD | OUTPATIENT
Start: 2021-01-01 | End: 2021-01-01

## 2021-01-01 RX ORDER — LABETALOL HYDROCHLORIDE 5 MG/ML
10-40 INJECTION, SOLUTION INTRAVENOUS EVERY 10 MIN PRN
Status: CANCELLED | OUTPATIENT
Start: 2021-01-01

## 2021-01-01 RX ORDER — LEVETIRACETAM 500 MG/1
500 TABLET ORAL ONCE
Status: COMPLETED | OUTPATIENT
Start: 2021-01-01 | End: 2021-01-01

## 2021-01-01 RX ORDER — ACETAMINOPHEN 325 MG/1
650 TABLET ORAL EVERY 4 HOURS PRN
COMMUNITY
Start: 2021-01-01

## 2021-01-01 RX ORDER — ESMOLOL HYDROCHLORIDE 10 MG/ML
INJECTION INTRAVENOUS PRN
Status: DISCONTINUED | OUTPATIENT
Start: 2021-01-01 | End: 2021-01-01

## 2021-01-01 RX ORDER — DEXAMETHASONE SODIUM PHOSPHATE 4 MG/ML
4 INJECTION, SOLUTION INTRA-ARTICULAR; INTRALESIONAL; INTRAMUSCULAR; INTRAVENOUS; SOFT TISSUE EVERY 8 HOURS
Status: DISCONTINUED | OUTPATIENT
Start: 2021-01-01 | End: 2021-01-01

## 2021-01-01 RX ORDER — ACETAMINOPHEN 325 MG/1
975 TABLET ORAL ONCE
Status: COMPLETED | OUTPATIENT
Start: 2021-01-01 | End: 2021-01-01

## 2021-01-01 RX ORDER — LEVETIRACETAM 10 MG/ML
1000 INJECTION INTRAVASCULAR ONCE
Status: DISCONTINUED | OUTPATIENT
Start: 2021-01-01 | End: 2021-01-01

## 2021-01-01 RX ORDER — DEXAMETHASONE 2 MG/1
4 TABLET ORAL
Qty: 60 TABLET | Refills: 0 | Status: SHIPPED | OUTPATIENT
Start: 2021-01-01

## 2021-01-01 RX ORDER — ONDANSETRON 2 MG/ML
4 INJECTION INTRAMUSCULAR; INTRAVENOUS EVERY 6 HOURS PRN
Status: CANCELLED | OUTPATIENT
Start: 2021-01-01

## 2021-01-01 RX ORDER — NALOXONE HYDROCHLORIDE 0.4 MG/ML
.1-.4 INJECTION, SOLUTION INTRAMUSCULAR; INTRAVENOUS; SUBCUTANEOUS
Status: CANCELLED | OUTPATIENT
Start: 2021-01-01

## 2021-01-01 RX ORDER — METHYLPREDNISOLONE SODIUM SUCCINATE 125 MG/2ML
125 INJECTION, POWDER, LYOPHILIZED, FOR SOLUTION INTRAMUSCULAR; INTRAVENOUS
Status: CANCELLED
Start: 2021-01-01

## 2021-01-01 RX ORDER — OXYCODONE HYDROCHLORIDE 5 MG/1
5 TABLET ORAL EVERY 4 HOURS PRN
Status: DISCONTINUED | OUTPATIENT
Start: 2021-01-01 | End: 2021-01-01

## 2021-01-01 RX ORDER — AMOXICILLIN 250 MG
1 CAPSULE ORAL 2 TIMES DAILY
Qty: 28 TABLET | Refills: 0 | Status: SHIPPED | OUTPATIENT
Start: 2021-01-01 | End: 2021-01-01

## 2021-01-01 RX ORDER — OXYCODONE HYDROCHLORIDE 10 MG/1
10 TABLET ORAL EVERY 4 HOURS PRN
Status: CANCELLED | OUTPATIENT
Start: 2021-01-01

## 2021-01-01 RX ORDER — SODIUM CHLORIDE, SODIUM LACTATE, POTASSIUM CHLORIDE, CALCIUM CHLORIDE 600; 310; 30; 20 MG/100ML; MG/100ML; MG/100ML; MG/100ML
INJECTION, SOLUTION INTRAVENOUS CONTINUOUS PRN
Status: DISCONTINUED | OUTPATIENT
Start: 2021-01-01 | End: 2021-01-01

## 2021-01-01 RX ORDER — LEVOFLOXACIN 500 MG/1
500 TABLET, FILM COATED ORAL 2 TIMES DAILY
Qty: 6 TABLET | Refills: 0 | Status: SHIPPED | OUTPATIENT
Start: 2021-01-01 | End: 2021-01-01

## 2021-01-01 RX ORDER — LEVOTHYROXINE SODIUM 125 UG/1
125 TABLET ORAL DAILY
Status: DISCONTINUED | OUTPATIENT
Start: 2021-01-01 | End: 2021-01-01 | Stop reason: HOSPADM

## 2021-01-01 RX ORDER — ONDANSETRON 4 MG/1
4 TABLET, ORALLY DISINTEGRATING ORAL EVERY 30 MIN PRN
Status: DISCONTINUED | OUTPATIENT
Start: 2021-01-01 | End: 2021-01-01 | Stop reason: HOSPADM

## 2021-01-01 RX ORDER — DEXAMETHASONE SODIUM PHOSPHATE 4 MG/ML
10 INJECTION, SOLUTION INTRA-ARTICULAR; INTRALESIONAL; INTRAMUSCULAR; INTRAVENOUS; SOFT TISSUE ONCE
Status: DISCONTINUED | OUTPATIENT
Start: 2021-01-01 | End: 2021-01-01

## 2021-01-01 RX ORDER — DEXAMETHASONE 2 MG/1
2 TABLET ORAL EVERY 6 HOURS
Qty: 4 TABLET | Refills: 0 | Status: SHIPPED | OUTPATIENT
Start: 2021-01-01 | End: 2021-01-01

## 2021-01-01 RX ORDER — DIPHENHYDRAMINE HYDROCHLORIDE 50 MG/ML
50 INJECTION INTRAMUSCULAR; INTRAVENOUS
Status: CANCELLED
Start: 2021-01-01

## 2021-01-01 RX ORDER — SODIUM CHLORIDE 9 MG/ML
INJECTION, SOLUTION INTRAVENOUS CONTINUOUS
Status: ACTIVE | OUTPATIENT
Start: 2021-01-01 | End: 2021-01-01

## 2021-01-01 RX ORDER — DOCUSATE SODIUM 100 MG/1
100 CAPSULE, LIQUID FILLED ORAL 2 TIMES DAILY
Status: DISCONTINUED | OUTPATIENT
Start: 2021-01-01 | End: 2021-01-01 | Stop reason: HOSPADM

## 2021-01-01 RX ORDER — HYDRALAZINE HYDROCHLORIDE 20 MG/ML
10-20 INJECTION INTRAMUSCULAR; INTRAVENOUS EVERY 30 MIN PRN
Status: CANCELLED | OUTPATIENT
Start: 2021-01-01

## 2021-01-01 RX ORDER — ACETAMINOPHEN 325 MG/1
975 TABLET ORAL EVERY 8 HOURS
Status: COMPLETED | OUTPATIENT
Start: 2021-01-01 | End: 2021-01-01

## 2021-01-01 RX ORDER — ESCITALOPRAM OXALATE 10 MG/1
10 TABLET ORAL EVERY MORNING
Status: DISCONTINUED | OUTPATIENT
Start: 2021-01-01 | End: 2021-01-01 | Stop reason: HOSPADM

## 2021-01-01 RX ORDER — DEXAMETHASONE 1 MG
1 TABLET ORAL EVERY 8 HOURS SCHEDULED
Status: DISCONTINUED | OUTPATIENT
Start: 2021-01-01 | End: 2021-01-01 | Stop reason: HOSPADM

## 2021-01-01 RX ORDER — LEVETIRACETAM 750 MG/1
1500 TABLET ORAL 2 TIMES DAILY
Qty: 120 TABLET | Refills: 1 | Status: SHIPPED | OUTPATIENT
Start: 2021-01-01 | End: 2021-01-01

## 2021-01-01 RX ORDER — GENTAMICIN SULFATE 80 MG/100ML
80 INJECTION, SOLUTION INTRAVENOUS ONCE
Status: COMPLETED | OUTPATIENT
Start: 2021-01-01 | End: 2021-01-01

## 2021-01-01 RX ORDER — DEXAMETHASONE SODIUM PHOSPHATE 4 MG/ML
INJECTION, SOLUTION INTRA-ARTICULAR; INTRALESIONAL; INTRAMUSCULAR; INTRAVENOUS; SOFT TISSUE PRN
Status: DISCONTINUED | OUTPATIENT
Start: 2021-01-01 | End: 2021-01-01

## 2021-01-01 RX ORDER — HYDROMORPHONE HYDROCHLORIDE 2 MG/1
2-4 TABLET ORAL
Status: DISCONTINUED | OUTPATIENT
Start: 2021-01-01 | End: 2021-01-01

## 2021-01-01 RX ORDER — DEXAMETHASONE SODIUM PHOSPHATE 4 MG/ML
4 INJECTION, SOLUTION INTRA-ARTICULAR; INTRALESIONAL; INTRAMUSCULAR; INTRAVENOUS; SOFT TISSUE EVERY 6 HOURS
Status: DISCONTINUED | OUTPATIENT
Start: 2021-01-01 | End: 2021-01-01

## 2021-01-01 RX ORDER — POLYETHYLENE GLYCOL 3350 17 G/17G
17 POWDER, FOR SOLUTION ORAL DAILY
Status: DISCONTINUED | OUTPATIENT
Start: 2021-01-01 | End: 2021-01-01 | Stop reason: HOSPADM

## 2021-01-01 RX ORDER — HYDROMORPHONE HYDROCHLORIDE 1 MG/ML
0.4 INJECTION, SOLUTION INTRAMUSCULAR; INTRAVENOUS; SUBCUTANEOUS
Status: DISCONTINUED | OUTPATIENT
Start: 2021-01-01 | End: 2021-01-01

## 2021-01-01 RX ORDER — ONDANSETRON 2 MG/ML
INJECTION INTRAMUSCULAR; INTRAVENOUS PRN
Status: DISCONTINUED | OUTPATIENT
Start: 2021-01-01 | End: 2021-01-01

## 2021-01-01 RX ORDER — CEPHALEXIN 500 MG/1
500 CAPSULE ORAL EVERY 8 HOURS SCHEDULED
Status: DISCONTINUED | OUTPATIENT
Start: 2021-01-01 | End: 2021-01-01

## 2021-01-01 RX ORDER — SCOLOPAMINE TRANSDERMAL SYSTEM 1 MG/1
1 PATCH, EXTENDED RELEASE TRANSDERMAL
Status: DISCONTINUED | OUTPATIENT
Start: 2021-01-01 | End: 2021-01-01 | Stop reason: HOSPADM

## 2021-01-01 RX ORDER — DEXAMETHASONE 2 MG/1
6 TABLET ORAL
Qty: 90 TABLET | Refills: 1 | Status: SHIPPED | OUTPATIENT
Start: 2021-01-01

## 2021-01-01 RX ORDER — LORATADINE 10 MG/1
10 TABLET ORAL AT BEDTIME
Status: DISCONTINUED | OUTPATIENT
Start: 2021-01-01 | End: 2021-01-01 | Stop reason: HOSPADM

## 2021-01-01 RX ORDER — SODIUM CHLORIDE 9 MG/ML
INJECTION, SOLUTION INTRAVENOUS CONTINUOUS
Status: DISCONTINUED | OUTPATIENT
Start: 2021-01-01 | End: 2021-01-01

## 2021-01-01 RX ORDER — FENTANYL CITRATE 50 UG/ML
INJECTION, SOLUTION INTRAMUSCULAR; INTRAVENOUS PRN
Status: DISCONTINUED | OUTPATIENT
Start: 2021-01-01 | End: 2021-01-01

## 2021-01-01 RX ORDER — DEXAMETHASONE 4 MG/1
4 TABLET ORAL EVERY 6 HOURS
Qty: 4 TABLET | Refills: 0 | Status: SHIPPED | OUTPATIENT
Start: 2021-01-01 | End: 2021-01-01

## 2021-01-01 RX ORDER — NYSTATIN 100000 U/G
CREAM TOPICAL 2 TIMES DAILY
Status: DISCONTINUED | OUTPATIENT
Start: 2021-01-01 | End: 2021-01-01 | Stop reason: HOSPADM

## 2021-01-01 RX ORDER — LEVETIRACETAM 750 MG/1
1500 TABLET ORAL 2 TIMES DAILY
Qty: 120 TABLET | Refills: 0 | Status: SHIPPED | OUTPATIENT
Start: 2021-01-01 | End: 2021-01-01

## 2021-01-01 RX ORDER — CEPHALEXIN 500 MG/1
500 CAPSULE ORAL 2 TIMES DAILY
Qty: 6 CAPSULE | Refills: 0 | Status: SHIPPED | OUTPATIENT
Start: 2021-01-01 | End: 2021-01-01

## 2021-01-01 RX ORDER — NALOXONE HYDROCHLORIDE 0.4 MG/ML
0.2 INJECTION, SOLUTION INTRAMUSCULAR; INTRAVENOUS; SUBCUTANEOUS
Status: DISCONTINUED | OUTPATIENT
Start: 2021-01-01 | End: 2021-01-01

## 2021-01-01 RX ORDER — LEVOTHYROXINE SODIUM 137 UG/1
137 TABLET ORAL EVERY MORNING
COMMUNITY

## 2021-01-01 RX ORDER — DEXAMETHASONE 1 MG
1 TABLET ORAL EVERY 6 HOURS SCHEDULED
Status: DISCONTINUED | OUTPATIENT
Start: 2021-01-01 | End: 2021-01-01

## 2021-01-01 RX ORDER — LISINOPRIL 5 MG/1
5 TABLET ORAL DAILY
Status: DISCONTINUED | OUTPATIENT
Start: 2021-01-01 | End: 2021-01-01 | Stop reason: HOSPADM

## 2021-01-01 RX ORDER — EPHEDRINE SULFATE 50 MG/ML
INJECTION, SOLUTION INTRAMUSCULAR; INTRAVENOUS; SUBCUTANEOUS PRN
Status: DISCONTINUED | OUTPATIENT
Start: 2021-01-01 | End: 2021-01-01

## 2021-01-01 RX ORDER — LISINOPRIL 5 MG/1
5 TABLET ORAL DAILY
Qty: 30 TABLET | Refills: 3 | Status: SHIPPED | OUTPATIENT
Start: 2021-01-01 | End: 2021-01-01

## 2021-01-01 RX ORDER — EPINEPHRINE 1 MG/ML
0.3 INJECTION, SOLUTION, CONCENTRATE INTRAVENOUS EVERY 5 MIN PRN
Status: CANCELLED | OUTPATIENT
Start: 2021-01-01

## 2021-01-01 RX ORDER — LIDOCAINE HYDROCHLORIDE 20 MG/ML
INJECTION, SOLUTION INFILTRATION; PERINEURAL PRN
Status: DISCONTINUED | OUTPATIENT
Start: 2021-01-01 | End: 2021-01-01

## 2021-01-01 RX ORDER — OXYCODONE HYDROCHLORIDE 10 MG/1
10 TABLET ORAL EVERY 4 HOURS PRN
Status: DISCONTINUED | OUTPATIENT
Start: 2021-01-01 | End: 2021-01-01 | Stop reason: HOSPADM

## 2021-01-01 RX ORDER — ONDANSETRON 4 MG/1
4 TABLET, ORALLY DISINTEGRATING ORAL EVERY 6 HOURS PRN
Status: DISCONTINUED | OUTPATIENT
Start: 2021-01-01 | End: 2021-01-01 | Stop reason: HOSPADM

## 2021-01-01 RX ORDER — LEVETIRACETAM 750 MG/1
1500 TABLET ORAL 2 TIMES DAILY
Status: DISCONTINUED | OUTPATIENT
Start: 2021-01-01 | End: 2021-01-01 | Stop reason: HOSPADM

## 2021-01-01 RX ORDER — LEVOTHYROXINE SODIUM 125 UG/1
137 TABLET ORAL EVERY MORNING
Qty: 30 TABLET | Refills: 3 | COMMUNITY
Start: 2021-01-01 | End: 2021-01-01

## 2021-01-01 RX ORDER — POLYETHYLENE GLYCOL 3350 17 G/17G
17 POWDER, FOR SOLUTION ORAL DAILY
Status: DISCONTINUED | OUTPATIENT
Start: 2021-01-01 | End: 2021-01-01

## 2021-01-01 RX ORDER — SCOLOPAMINE TRANSDERMAL SYSTEM 1 MG/1
1 PATCH, EXTENDED RELEASE TRANSDERMAL ONCE
Status: COMPLETED | OUTPATIENT
Start: 2021-01-01 | End: 2021-01-01

## 2021-01-01 RX ORDER — ONDANSETRON 4 MG/1
4 TABLET, ORALLY DISINTEGRATING ORAL EVERY 6 HOURS PRN
Status: CANCELLED | OUTPATIENT
Start: 2021-01-01

## 2021-01-01 RX ORDER — MEPERIDINE HYDROCHLORIDE 25 MG/ML
12.5 INJECTION INTRAMUSCULAR; INTRAVENOUS; SUBCUTANEOUS
Status: DISCONTINUED | OUTPATIENT
Start: 2021-01-01 | End: 2021-01-01 | Stop reason: HOSPADM

## 2021-01-01 RX ORDER — VANCOMYCIN HYDROCHLORIDE 1 G/200ML
1000 INJECTION, SOLUTION INTRAVENOUS
Status: COMPLETED | OUTPATIENT
Start: 2021-01-01 | End: 2021-01-01

## 2021-01-01 RX ORDER — AMOXICILLIN 250 MG
1 CAPSULE ORAL 2 TIMES DAILY
Qty: 30 TABLET | Refills: 0 | Status: SHIPPED | OUTPATIENT
Start: 2021-01-01 | End: 2021-01-01

## 2021-01-01 RX ORDER — PROCHLORPERAZINE MALEATE 5 MG
10 TABLET ORAL EVERY 6 HOURS PRN
Status: CANCELLED | OUTPATIENT
Start: 2021-01-01

## 2021-01-01 RX ORDER — HYDRALAZINE HYDROCHLORIDE 20 MG/ML
2.5-5 INJECTION INTRAMUSCULAR; INTRAVENOUS EVERY 10 MIN PRN
Status: DISCONTINUED | OUTPATIENT
Start: 2021-01-01 | End: 2021-01-01 | Stop reason: HOSPADM

## 2021-01-01 RX ORDER — SALIVA STIMULANT COMB. NO.3
1 SPRAY, NON-AEROSOL (ML) MUCOUS MEMBRANE 4 TIMES DAILY PRN
Status: DISCONTINUED | OUTPATIENT
Start: 2021-01-01 | End: 2021-01-01 | Stop reason: HOSPADM

## 2021-01-01 RX ORDER — OXYCODONE HYDROCHLORIDE 5 MG/1
5 TABLET ORAL EVERY 4 HOURS PRN
Qty: 20 TABLET | Refills: 0 | Status: SHIPPED | OUTPATIENT
Start: 2021-01-01 | End: 2021-01-01

## 2021-01-01 RX ORDER — LABETALOL HYDROCHLORIDE 5 MG/ML
10-40 INJECTION, SOLUTION INTRAVENOUS EVERY 10 MIN PRN
Status: DISCONTINUED | OUTPATIENT
Start: 2021-01-01 | End: 2021-01-01 | Stop reason: HOSPADM

## 2021-01-01 RX ORDER — LABETALOL HYDROCHLORIDE 5 MG/ML
INJECTION, SOLUTION INTRAVENOUS PRN
Status: DISCONTINUED | OUTPATIENT
Start: 2021-01-01 | End: 2021-01-01

## 2021-01-01 RX ORDER — LEVOTHYROXINE SODIUM 125 UG/1
125 TABLET ORAL EVERY MORNING
Status: DISCONTINUED | OUTPATIENT
Start: 2021-01-01 | End: 2021-01-01

## 2021-01-01 RX ORDER — LISINOPRIL 5 MG/1
5 TABLET ORAL DAILY
Qty: 30 TABLET | Refills: 3 | COMMUNITY
Start: 2021-01-01

## 2021-01-01 RX ORDER — ESCITALOPRAM OXALATE 10 MG/1
10 TABLET ORAL EVERY MORNING
COMMUNITY
Start: 2021-01-01

## 2021-01-01 RX ORDER — DEXAMETHASONE SODIUM PHOSPHATE 4 MG/ML
10 INJECTION, SOLUTION INTRA-ARTICULAR; INTRALESIONAL; INTRAMUSCULAR; INTRAVENOUS; SOFT TISSUE ONCE
Status: CANCELLED | OUTPATIENT
Start: 2021-01-01

## 2021-01-01 RX ORDER — ONDANSETRON 2 MG/ML
4 INJECTION INTRAMUSCULAR; INTRAVENOUS EVERY 6 HOURS PRN
Status: DISCONTINUED | OUTPATIENT
Start: 2021-01-01 | End: 2021-01-01 | Stop reason: HOSPADM

## 2021-01-01 RX ORDER — CALCIUM CARBONATE 750 MG/1
750 TABLET, CHEWABLE ORAL 3 TIMES DAILY PRN
Status: DISCONTINUED | OUTPATIENT
Start: 2021-01-01 | End: 2021-01-01 | Stop reason: HOSPADM

## 2021-01-01 RX ORDER — DEXAMETHASONE SODIUM PHOSPHATE 4 MG/ML
10 INJECTION, SOLUTION INTRA-ARTICULAR; INTRALESIONAL; INTRAMUSCULAR; INTRAVENOUS; SOFT TISSUE ONCE
Status: COMPLETED | OUTPATIENT
Start: 2021-01-01 | End: 2021-01-01

## 2021-01-01 RX ORDER — DEXAMETHASONE 1 MG
1 TABLET ORAL EVERY 6 HOURS
Qty: 4 TABLET | Refills: 0 | Status: ON HOLD | OUTPATIENT
Start: 2021-01-01 | End: 2021-01-01

## 2021-01-01 RX ORDER — DEXAMETHASONE 4 MG/1
4 TABLET ORAL EVERY 6 HOURS SCHEDULED
Status: DISCONTINUED | OUTPATIENT
Start: 2021-01-01 | End: 2021-01-01

## 2021-01-01 RX ORDER — PROPOFOL 10 MG/ML
INJECTION, EMULSION INTRAVENOUS CONTINUOUS PRN
Status: DISCONTINUED | OUTPATIENT
Start: 2021-01-01 | End: 2021-01-01

## 2021-01-01 RX ORDER — DEXAMETHASONE SODIUM PHOSPHATE 4 MG/ML
2 INJECTION, SOLUTION INTRA-ARTICULAR; INTRALESIONAL; INTRAMUSCULAR; INTRAVENOUS; SOFT TISSUE EVERY 8 HOURS
Status: DISCONTINUED | OUTPATIENT
Start: 2021-01-01 | End: 2021-01-01

## 2021-01-01 RX ORDER — POLYETHYLENE GLYCOL 3350 17 G/17G
17 POWDER, FOR SOLUTION ORAL 2 TIMES DAILY
Qty: 28 PACKET | Refills: 0 | Status: SHIPPED | OUTPATIENT
Start: 2021-01-01 | End: 2021-01-01

## 2021-01-01 RX ORDER — DEXAMETHASONE SODIUM PHOSPHATE 4 MG/ML
3 INJECTION, SOLUTION INTRA-ARTICULAR; INTRALESIONAL; INTRAMUSCULAR; INTRAVENOUS; SOFT TISSUE EVERY 8 HOURS
Status: DISCONTINUED | OUTPATIENT
Start: 2021-01-01 | End: 2021-01-01

## 2021-01-01 RX ORDER — CEFAZOLIN SODIUM 1 G/3ML
1 INJECTION, POWDER, FOR SOLUTION INTRAMUSCULAR; INTRAVENOUS EVERY 8 HOURS
Status: DISCONTINUED | OUTPATIENT
Start: 2021-01-01 | End: 2021-01-01

## 2021-01-01 RX ORDER — DEXAMETHASONE 2 MG/1
2 TABLET ORAL EVERY 6 HOURS SCHEDULED
Status: DISCONTINUED | OUTPATIENT
Start: 2021-01-01 | End: 2021-01-01

## 2021-01-01 RX ORDER — BISACODYL 10 MG
10 SUPPOSITORY, RECTAL RECTAL DAILY PRN
Status: DISCONTINUED | OUTPATIENT
Start: 2021-01-01 | End: 2021-01-01 | Stop reason: HOSPADM

## 2021-01-01 RX ORDER — AMOXICILLIN 250 MG
1 CAPSULE ORAL 2 TIMES DAILY
Status: DISCONTINUED | OUTPATIENT
Start: 2021-01-01 | End: 2021-01-01

## 2021-01-01 RX ORDER — ACETAMINOPHEN 325 MG/1
975 TABLET ORAL EVERY 8 HOURS
Status: DISCONTINUED | OUTPATIENT
Start: 2021-01-01 | End: 2021-01-01 | Stop reason: HOSPADM

## 2021-01-01 RX ORDER — AMOXICILLIN 250 MG
2 CAPSULE ORAL 2 TIMES DAILY
Status: DISCONTINUED | OUTPATIENT
Start: 2021-01-01 | End: 2021-01-01 | Stop reason: HOSPADM

## 2021-01-01 RX ORDER — CEFTRIAXONE 2 G/1
2 INJECTION, POWDER, FOR SOLUTION INTRAMUSCULAR; INTRAVENOUS EVERY 12 HOURS
Status: DISCONTINUED | OUTPATIENT
Start: 2021-01-01 | End: 2021-01-01

## 2021-01-01 RX ORDER — MANNITOL 20 G/100ML
1 INJECTION, SOLUTION INTRAVENOUS ONCE
Status: DISCONTINUED | OUTPATIENT
Start: 2021-01-01 | End: 2021-01-01 | Stop reason: HOSPADM

## 2021-01-01 RX ORDER — GADOBUTROL 604.72 MG/ML
5 INJECTION INTRAVENOUS ONCE
Status: COMPLETED | OUTPATIENT
Start: 2021-01-01 | End: 2021-01-01

## 2021-01-01 RX ORDER — HYDRALAZINE HYDROCHLORIDE 20 MG/ML
10-20 INJECTION INTRAMUSCULAR; INTRAVENOUS EVERY 30 MIN PRN
Status: DISCONTINUED | OUTPATIENT
Start: 2021-01-01 | End: 2021-01-01 | Stop reason: HOSPADM

## 2021-01-01 RX ORDER — FLUTICASONE PROPIONATE 50 MCG
1 SPRAY, SUSPENSION (ML) NASAL AT BEDTIME
COMMUNITY
Start: 2021-01-01

## 2021-01-01 RX ORDER — LISINOPRIL 5 MG/1
5 TABLET ORAL DAILY
Qty: 30 TABLET | Refills: 3 | Status: ON HOLD | OUTPATIENT
Start: 2021-01-01 | End: 2021-01-01

## 2021-01-01 RX ORDER — CEFAZOLIN SODIUM 1 G/3ML
1 INJECTION, POWDER, FOR SOLUTION INTRAMUSCULAR; INTRAVENOUS EVERY 8 HOURS
Status: DISCONTINUED | OUTPATIENT
Start: 2021-01-01 | End: 2021-01-01 | Stop reason: ALTCHOICE

## 2021-01-01 RX ORDER — METOCLOPRAMIDE HYDROCHLORIDE 5 MG/ML
10 INJECTION INTRAMUSCULAR; INTRAVENOUS EVERY 6 HOURS PRN
Status: DISCONTINUED | OUTPATIENT
Start: 2021-01-01 | End: 2021-01-01

## 2021-01-01 RX ORDER — LEVETIRACETAM 500 MG/1
1000 TABLET ORAL 2 TIMES DAILY
Status: DISCONTINUED | OUTPATIENT
Start: 2021-01-01 | End: 2021-01-01 | Stop reason: HOSPADM

## 2021-01-01 RX ORDER — DEXAMETHASONE 4 MG/1
4 TABLET ORAL EVERY 12 HOURS SCHEDULED
Status: DISCONTINUED | OUTPATIENT
Start: 2021-01-01 | End: 2021-01-01

## 2021-01-01 RX ORDER — DEXAMETHASONE 2 MG/1
2 TABLET ORAL
Qty: 30 TABLET | Refills: 1 | Status: ON HOLD | OUTPATIENT
Start: 2021-01-01 | End: 2021-01-01

## 2021-01-01 RX ORDER — ACETAMINOPHEN 325 MG/1
975 TABLET ORAL EVERY 8 HOURS
Status: DISPENSED | OUTPATIENT
Start: 2021-01-01 | End: 2021-01-01

## 2021-01-01 RX ORDER — LEVETIRACETAM 500 MG/1
1500 TABLET ORAL 2 TIMES DAILY
Status: DISCONTINUED | OUTPATIENT
Start: 2021-01-01 | End: 2021-01-01

## 2021-01-01 RX ORDER — POLYETHYLENE GLYCOL 3350 17 G/17G
17 POWDER, FOR SOLUTION ORAL 2 TIMES DAILY
Status: DISCONTINUED | OUTPATIENT
Start: 2021-01-01 | End: 2021-01-01 | Stop reason: HOSPADM

## 2021-01-01 RX ORDER — SODIUM CHLORIDE, SODIUM LACTATE, POTASSIUM CHLORIDE, CALCIUM CHLORIDE 600; 310; 30; 20 MG/100ML; MG/100ML; MG/100ML; MG/100ML
INJECTION, SOLUTION INTRAVENOUS CONTINUOUS
Status: DISCONTINUED | OUTPATIENT
Start: 2021-01-01 | End: 2021-01-01

## 2021-01-01 RX ORDER — PROCHLORPERAZINE MALEATE 5 MG
10 TABLET ORAL EVERY 6 HOURS PRN
Status: DISCONTINUED | OUTPATIENT
Start: 2021-01-01 | End: 2021-01-01 | Stop reason: HOSPADM

## 2021-01-01 RX ORDER — DEXAMETHASONE 4 MG/1
2 TABLET ORAL DAILY
Qty: 60 TABLET | Refills: 0 | Status: SHIPPED | OUTPATIENT
Start: 2021-01-01 | End: 2021-01-01

## 2021-01-01 RX ORDER — HYDROMORPHONE HYDROCHLORIDE 2 MG/1
2 TABLET ORAL EVERY 4 HOURS PRN
Qty: 20 TABLET | Refills: 0 | Status: SHIPPED | OUTPATIENT
Start: 2021-01-01 | End: 2021-01-01

## 2021-01-01 RX ORDER — POTASSIUM CHLORIDE 750 MG/1
20 TABLET, EXTENDED RELEASE ORAL ONCE
Status: COMPLETED | OUTPATIENT
Start: 2021-01-01 | End: 2021-01-01

## 2021-01-01 RX ORDER — FAMOTIDINE 10 MG
10 TABLET ORAL 2 TIMES DAILY
Qty: 60 TABLET | Refills: 1 | Status: SHIPPED | OUTPATIENT
Start: 2021-01-01 | End: 2021-01-01

## 2021-01-01 RX ORDER — CHLORPROMAZINE HYDROCHLORIDE 10 MG/1
10 TABLET, FILM COATED ORAL 4 TIMES DAILY PRN
Status: DISCONTINUED | OUTPATIENT
Start: 2021-01-01 | End: 2021-01-01 | Stop reason: HOSPADM

## 2021-01-01 RX ORDER — LEVETIRACETAM 1000 MG/1
1000 TABLET ORAL 2 TIMES DAILY
Qty: 180 TABLET | Refills: 1 | COMMUNITY
Start: 2021-01-01 | End: 2021-01-01

## 2021-01-01 RX ORDER — PHYSOSTIGMINE SALICYLATE 1 MG/ML
1.2 INJECTION INTRAVENOUS
Status: DISCONTINUED | OUTPATIENT
Start: 2021-01-01 | End: 2021-01-01 | Stop reason: HOSPADM

## 2021-01-01 RX ORDER — FAMOTIDINE 10 MG
10 TABLET ORAL 2 TIMES DAILY
Status: DISCONTINUED | OUTPATIENT
Start: 2021-01-01 | End: 2021-01-01 | Stop reason: HOSPADM

## 2021-01-01 RX ORDER — DEXAMETHASONE 4 MG/1
4 TABLET ORAL
Status: DISCONTINUED | OUTPATIENT
Start: 2021-01-01 | End: 2021-01-01

## 2021-01-01 RX ORDER — LACTOSE-REDUCED FOOD 0.04G-1.05
1 LIQUID (ML) ORAL 3 TIMES DAILY
COMMUNITY
Start: 2021-01-01 | End: 2021-01-01

## 2021-01-01 RX ORDER — DEXAMETHASONE 1.5 MG/1
3 TABLET ORAL EVERY 6 HOURS
Qty: 8 TABLET | Refills: 0 | Status: SHIPPED | OUTPATIENT
Start: 2021-01-01 | End: 2021-01-01

## 2021-01-01 RX ORDER — DEXAMETHASONE 1.5 MG/1
3 TABLET ORAL EVERY 6 HOURS SCHEDULED
Status: DISCONTINUED | OUTPATIENT
Start: 2021-01-01 | End: 2021-01-01

## 2021-01-01 RX ORDER — HYDROMORPHONE HCL IN WATER/PF 6 MG/30 ML
0.2 PATIENT CONTROLLED ANALGESIA SYRINGE INTRAVENOUS
Status: DISCONTINUED | OUTPATIENT
Start: 2021-01-01 | End: 2021-01-01

## 2021-01-01 RX ORDER — CEFAZOLIN SODIUM 2 G/100ML
2 INJECTION, SOLUTION INTRAVENOUS EVERY 8 HOURS
Status: DISCONTINUED | OUTPATIENT
Start: 2021-01-01 | End: 2021-01-01 | Stop reason: HOSPADM

## 2021-01-01 RX ORDER — DEXAMETHASONE 2 MG/1
2 TABLET ORAL
Qty: 60 TABLET | Refills: 0 | Status: SHIPPED | OUTPATIENT
Start: 2021-01-01 | End: 2021-01-01

## 2021-01-01 RX ORDER — HYDROMORPHONE HYDROCHLORIDE 2 MG/1
2 TABLET ORAL EVERY 4 HOURS PRN
Status: DISCONTINUED | OUTPATIENT
Start: 2021-01-01 | End: 2021-01-01 | Stop reason: HOSPADM

## 2021-01-01 RX ORDER — METOCLOPRAMIDE HYDROCHLORIDE 5 MG/ML
10 INJECTION INTRAMUSCULAR; INTRAVENOUS EVERY 6 HOURS PRN
Status: DISCONTINUED | OUTPATIENT
Start: 2021-01-01 | End: 2021-01-01 | Stop reason: HOSPADM

## 2021-01-01 RX ORDER — GADOBUTROL 604.72 MG/ML
10 INJECTION INTRAVENOUS ONCE
Status: COMPLETED | OUTPATIENT
Start: 2021-01-01 | End: 2021-01-01

## 2021-01-01 RX ORDER — DEXAMETHASONE 1 MG
1 TABLET ORAL 2 TIMES DAILY WITH MEALS
Qty: 30 TABLET | Refills: 0 | Status: SHIPPED | OUTPATIENT
Start: 2021-01-01 | End: 2021-01-01

## 2021-01-01 RX ORDER — OXYCODONE HYDROCHLORIDE 5 MG/1
5 TABLET ORAL EVERY 4 HOURS PRN
Status: DISCONTINUED | OUTPATIENT
Start: 2021-01-01 | End: 2021-01-01 | Stop reason: HOSPADM

## 2021-01-01 RX ORDER — ACETAMINOPHEN 325 MG/1
325-650 TABLET ORAL EVERY 6 HOURS PRN
Status: ON HOLD | COMMUNITY
End: 2021-01-01

## 2021-01-01 RX ORDER — LEVOFLOXACIN 500 MG/1
500 TABLET, FILM COATED ORAL 2 TIMES DAILY
Qty: 6 TABLET | Refills: 0 | Status: CANCELLED | OUTPATIENT
Start: 2021-01-01 | End: 2021-01-01

## 2021-01-01 RX ORDER — LEVETIRACETAM 750 MG/1
1500 TABLET ORAL 2 TIMES DAILY
Qty: 120 TABLET | Refills: 1 | Status: SHIPPED | OUTPATIENT
Start: 2021-01-01

## 2021-01-01 RX ORDER — LEVOTHYROXINE SODIUM 125 UG/1
125 TABLET ORAL EVERY MORNING
Qty: 30 TABLET | Refills: 3 | Status: ON HOLD | OUTPATIENT
Start: 2021-01-01 | End: 2021-01-01

## 2021-01-01 RX ORDER — FAMOTIDINE 20 MG/1
20 TABLET, FILM COATED ORAL
Status: DISCONTINUED | OUTPATIENT
Start: 2021-01-01 | End: 2021-01-01 | Stop reason: HOSPADM

## 2021-01-01 RX ORDER — HYDROXYZINE HYDROCHLORIDE 25 MG/1
25 TABLET, FILM COATED ORAL EVERY 6 HOURS PRN
Status: DISCONTINUED | OUTPATIENT
Start: 2021-01-01 | End: 2021-01-01 | Stop reason: HOSPADM

## 2021-01-01 RX ORDER — DEXAMETHASONE 4 MG/1
4 TABLET ORAL EVERY 8 HOURS SCHEDULED
Status: CANCELLED | OUTPATIENT
Start: 2021-01-01

## 2021-01-01 RX ORDER — LISINOPRIL 2.5 MG/1
5 TABLET ORAL DAILY
Status: DISCONTINUED | OUTPATIENT
Start: 2021-01-01 | End: 2021-01-01 | Stop reason: HOSPADM

## 2021-01-01 RX ORDER — PHENYLEPHRINE HCL IN 0.9% NACL 50MG/250ML
.1-1 PLASTIC BAG, INJECTION (ML) INTRAVENOUS CONTINUOUS
Status: DISCONTINUED | OUTPATIENT
Start: 2021-01-01 | End: 2021-01-01 | Stop reason: HOSPADM

## 2021-01-01 RX ORDER — LEVETIRACETAM 500 MG/1
1000 TABLET ORAL ONCE
Status: COMPLETED | OUTPATIENT
Start: 2021-01-01 | End: 2021-01-01

## 2021-01-01 RX ORDER — PHENYLEPHRINE HCL IN 0.9% NACL 50MG/250ML
.1-1 PLASTIC BAG, INJECTION (ML) INTRAVENOUS CONTINUOUS
Status: DISCONTINUED | OUTPATIENT
Start: 2021-01-01 | End: 2021-01-01 | Stop reason: CLARIF

## 2021-01-01 RX ADMIN — LABETALOL HYDROCHLORIDE 20 MG: 5 INJECTION, SOLUTION INTRAVENOUS at 08:06

## 2021-01-01 RX ADMIN — LEVETIRACETAM 1000 MG: 500 TABLET ORAL at 07:47

## 2021-01-01 RX ADMIN — LEVETIRACETAM 1000 MG: 500 TABLET, FILM COATED ORAL at 08:38

## 2021-01-01 RX ADMIN — LABETALOL HYDROCHLORIDE 20 MG: 5 INJECTION, SOLUTION INTRAVENOUS at 18:11

## 2021-01-01 RX ADMIN — PHENYLEPHRINE HYDROCHLORIDE 100 MCG: 10 INJECTION INTRAVENOUS at 18:35

## 2021-01-01 RX ADMIN — ACETAMINOPHEN 975 MG: 325 TABLET, FILM COATED ORAL at 02:02

## 2021-01-01 RX ADMIN — ESCITALOPRAM OXALATE 10 MG: 10 TABLET ORAL at 07:59

## 2021-01-01 RX ADMIN — DOCUSATE SODIUM AND SENNOSIDES 1 TABLET: 8.6; 5 TABLET ORAL at 20:15

## 2021-01-01 RX ADMIN — ESCITALOPRAM OXALATE 10 MG: 10 TABLET ORAL at 09:41

## 2021-01-01 RX ADMIN — BENZOCAINE AND MENTHOL 1 LOZENGE: 15; 3.6 LOZENGE ORAL at 13:15

## 2021-01-01 RX ADMIN — LISINOPRIL 5 MG: 5 TABLET ORAL at 09:41

## 2021-01-01 RX ADMIN — POLYETHYLENE GLYCOL 3350 17 G: 17 POWDER, FOR SOLUTION ORAL at 07:59

## 2021-01-01 RX ADMIN — DOCUSATE SODIUM 50 MG AND SENNOSIDES 8.6 MG 1 TABLET: 8.6; 5 TABLET, FILM COATED ORAL at 20:36

## 2021-01-01 RX ADMIN — BEVACIZUMAB-AWWB 800 MG: 400 INJECTION, SOLUTION INTRAVENOUS at 15:01

## 2021-01-01 RX ADMIN — LEVETIRACETAM 1000 MG: 500 TABLET, FILM COATED ORAL at 09:02

## 2021-01-01 RX ADMIN — FAMOTIDINE 10 MG: 10 TABLET ORAL at 08:45

## 2021-01-01 RX ADMIN — LABETALOL HYDROCHLORIDE 20 MG: 5 INJECTION, SOLUTION INTRAVENOUS at 22:30

## 2021-01-01 RX ADMIN — Medication 1 SPRAY: at 11:04

## 2021-01-01 RX ADMIN — LABETALOL HYDROCHLORIDE 20 MG: 5 INJECTION, SOLUTION INTRAVENOUS at 22:47

## 2021-01-01 RX ADMIN — OMEPRAZOLE 20 MG: 20 CAPSULE, DELAYED RELEASE ORAL at 08:40

## 2021-01-01 RX ADMIN — LEVETIRACETAM 1000 MG: 500 TABLET, FILM COATED ORAL at 20:36

## 2021-01-01 RX ADMIN — LISINOPRIL 5 MG: 5 TABLET ORAL at 07:59

## 2021-01-01 RX ADMIN — LORATADINE 10 MG: 10 TABLET ORAL at 20:38

## 2021-01-01 RX ADMIN — LIDOCAINE HYDROCHLORIDE 100 MG: 20 INJECTION, SOLUTION INFILTRATION; PERINEURAL at 07:54

## 2021-01-01 RX ADMIN — LABETALOL HYDROCHLORIDE 20 MG: 5 INJECTION, SOLUTION INTRAVENOUS at 11:28

## 2021-01-01 RX ADMIN — ESCITALOPRAM OXALATE 10 MG: 10 TABLET ORAL at 07:32

## 2021-01-01 RX ADMIN — SODIUM CHLORIDE, POTASSIUM CHLORIDE, SODIUM LACTATE AND CALCIUM CHLORIDE: 600; 310; 30; 20 INJECTION, SOLUTION INTRAVENOUS at 13:25

## 2021-01-01 RX ADMIN — FENTANYL CITRATE 100 MCG: 50 INJECTION, SOLUTION INTRAMUSCULAR; INTRAVENOUS at 18:34

## 2021-01-01 RX ADMIN — LEVETIRACETAM 1000 MG: 500 TABLET, FILM COATED ORAL at 20:24

## 2021-01-01 RX ADMIN — LISINOPRIL 5 MG: 2.5 TABLET ORAL at 08:00

## 2021-01-01 RX ADMIN — CEFAZOLIN 1 G: 1 INJECTION, POWDER, FOR SOLUTION INTRAMUSCULAR; INTRAVENOUS at 11:46

## 2021-01-01 RX ADMIN — LABETALOL HYDROCHLORIDE 20 MG: 5 INJECTION, SOLUTION INTRAVENOUS at 19:25

## 2021-01-01 RX ADMIN — METOCLOPRAMIDE HYDROCHLORIDE 10 MG: 5 INJECTION INTRAMUSCULAR; INTRAVENOUS at 19:55

## 2021-01-01 RX ADMIN — LEVETIRACETAM 1000 MG: 500 TABLET, FILM COATED ORAL at 19:32

## 2021-01-01 RX ADMIN — ROCURONIUM BROMIDE 10 MG: 10 INJECTION INTRAVENOUS at 18:41

## 2021-01-01 RX ADMIN — LEVETIRACETAM 1000 MG: 500 TABLET, FILM COATED ORAL at 09:05

## 2021-01-01 RX ADMIN — DOCUSATE SODIUM 50 MG AND SENNOSIDES 8.6 MG 1 TABLET: 8.6; 5 TABLET, FILM COATED ORAL at 20:26

## 2021-01-01 RX ADMIN — LABETALOL HYDROCHLORIDE 20 MG: 5 INJECTION, SOLUTION INTRAVENOUS at 09:42

## 2021-01-01 RX ADMIN — NYSTATIN: 100000 CREAM TOPICAL at 08:41

## 2021-01-01 RX ADMIN — POLYETHYLENE GLYCOL 3350 17 G: 17 POWDER, FOR SOLUTION ORAL at 09:01

## 2021-01-01 RX ADMIN — FAMOTIDINE 20 MG: 20 TABLET, FILM COATED ORAL at 07:48

## 2021-01-01 RX ADMIN — FENTANYL CITRATE 50 MCG: 50 INJECTION, SOLUTION INTRAMUSCULAR; INTRAVENOUS at 14:46

## 2021-01-01 RX ADMIN — POLYETHYLENE GLYCOL 3350 17 G: 17 POWDER, FOR SOLUTION ORAL at 08:21

## 2021-01-01 RX ADMIN — LEVETIRACETAM 1000 MG: 500 TABLET, FILM COATED ORAL at 21:27

## 2021-01-01 RX ADMIN — LEVOTHYROXINE SODIUM 125 MCG: 0.07 TABLET ORAL at 08:50

## 2021-01-01 RX ADMIN — METOCLOPRAMIDE HYDROCHLORIDE 10 MG: 5 INJECTION INTRAMUSCULAR; INTRAVENOUS at 22:30

## 2021-01-01 RX ADMIN — LEVETIRACETAM 1000 MG: 10 INJECTION INTRAVENOUS at 08:04

## 2021-01-01 RX ADMIN — LEVETIRACETAM 1000 MG: 500 TABLET ORAL at 09:42

## 2021-01-01 RX ADMIN — ESCITALOPRAM OXALATE 10 MG: 10 TABLET ORAL at 08:25

## 2021-01-01 RX ADMIN — ROCURONIUM BROMIDE 20 MG: 10 INJECTION INTRAVENOUS at 12:20

## 2021-01-01 RX ADMIN — HYDROMORPHONE HYDROCHLORIDE 2 MG: 2 TABLET ORAL at 02:25

## 2021-01-01 RX ADMIN — ESMOLOL HYDROCHLORIDE 10 MG: 10 INJECTION, SOLUTION INTRAVENOUS at 15:50

## 2021-01-01 RX ADMIN — ESCITALOPRAM OXALATE 10 MG: 10 TABLET ORAL at 08:12

## 2021-01-01 RX ADMIN — LISINOPRIL 5 MG: 5 TABLET ORAL at 08:42

## 2021-01-01 RX ADMIN — LEVETIRACETAM 1000 MG: 10 INJECTION INTRAVENOUS at 20:30

## 2021-01-01 RX ADMIN — FENTANYL CITRATE 100 MCG: 50 INJECTION, SOLUTION INTRAMUSCULAR; INTRAVENOUS at 08:24

## 2021-01-01 RX ADMIN — FAMOTIDINE 10 MG: 10 TABLET ORAL at 08:16

## 2021-01-01 RX ADMIN — LEVOTHYROXINE SODIUM 125 MCG: 125 TABLET ORAL at 08:16

## 2021-01-01 RX ADMIN — ROCURONIUM BROMIDE 20 MG: 10 INJECTION INTRAVENOUS at 09:26

## 2021-01-01 RX ADMIN — LEVOTHYROXINE SODIUM 125 MCG: 125 TABLET ORAL at 07:48

## 2021-01-01 RX ADMIN — GENTAMICIN SULFATE 80 MG: 80 INJECTION, SOLUTION INTRAVENOUS at 12:54

## 2021-01-01 RX ADMIN — LEVETIRACETAM 2 G: 100 INJECTION, SOLUTION INTRAVENOUS at 18:14

## 2021-01-01 RX ADMIN — LEVETIRACETAM 1000 MG: 500 TABLET, FILM COATED ORAL at 08:11

## 2021-01-01 RX ADMIN — ONDANSETRON 4 MG: 2 INJECTION INTRAMUSCULAR; INTRAVENOUS at 14:47

## 2021-01-01 RX ADMIN — GADOBUTROL 7.5 ML: 604.72 INJECTION INTRAVENOUS at 07:15

## 2021-01-01 RX ADMIN — CEFAZOLIN 1 G: 330 INJECTION, POWDER, FOR SOLUTION INTRAMUSCULAR; INTRAVENOUS at 00:06

## 2021-01-01 RX ADMIN — FENTANYL CITRATE 50 MCG: 50 INJECTION, SOLUTION INTRAMUSCULAR; INTRAVENOUS at 14:44

## 2021-01-01 RX ADMIN — PROPOFOL 150 MG: 10 INJECTION, EMULSION INTRAVENOUS at 17:37

## 2021-01-01 RX ADMIN — FAMOTIDINE 20 MG: 20 TABLET, FILM COATED ORAL at 07:32

## 2021-01-01 RX ADMIN — LISINOPRIL 5 MG: 5 TABLET ORAL at 08:18

## 2021-01-01 RX ADMIN — SODIUM CHLORIDE, POTASSIUM CHLORIDE, SODIUM LACTATE AND CALCIUM CHLORIDE: 600; 310; 30; 20 INJECTION, SOLUTION INTRAVENOUS at 12:47

## 2021-01-01 RX ADMIN — Medication 5 MG: at 13:21

## 2021-01-01 RX ADMIN — PROMETHAZINE HYDROCHLORIDE 12.5 MG: 25 INJECTION INTRAMUSCULAR; INTRAVENOUS at 08:29

## 2021-01-01 RX ADMIN — CHLORPROMAZINE HYDROCHLORIDE 10 MG: 10 TABLET, SUGAR COATED ORAL at 01:33

## 2021-01-01 RX ADMIN — SODIUM CHLORIDE 500 ML: 9 INJECTION, SOLUTION INTRAVENOUS at 23:18

## 2021-01-01 RX ADMIN — LEVOTHYROXINE SODIUM 125 MCG: 125 TABLET ORAL at 07:55

## 2021-01-01 RX ADMIN — ROCURONIUM BROMIDE 20 MG: 10 INJECTION INTRAVENOUS at 13:44

## 2021-01-01 RX ADMIN — ACETAMINOPHEN 975 MG: 325 TABLET, FILM COATED ORAL at 18:05

## 2021-01-01 RX ADMIN — LEVOTHYROXINE SODIUM 137 MCG: 0.03 TABLET ORAL at 07:43

## 2021-01-01 RX ADMIN — ACETAMINOPHEN 975 MG: 325 TABLET, FILM COATED ORAL at 00:09

## 2021-01-01 RX ADMIN — LABETALOL HYDROCHLORIDE 20 MG: 5 INJECTION, SOLUTION INTRAVENOUS at 18:28

## 2021-01-01 RX ADMIN — DOCUSATE SODIUM AND SENNOSIDES 1 TABLET: 8.6; 5 TABLET ORAL at 09:13

## 2021-01-01 RX ADMIN — Medication 1 SPRAY: at 17:59

## 2021-01-01 RX ADMIN — POTASSIUM CHLORIDE 10 MEQ: 7.46 INJECTION, SOLUTION INTRAVENOUS at 06:25

## 2021-01-01 RX ADMIN — LEVETIRACETAM 1000 MG: 500 TABLET, FILM COATED ORAL at 20:17

## 2021-01-01 RX ADMIN — LEVETIRACETAM 1000 MG: 500 TABLET, FILM COATED ORAL at 21:36

## 2021-01-01 RX ADMIN — LISINOPRIL 5 MG: 2.5 TABLET ORAL at 08:25

## 2021-01-01 RX ADMIN — ESCITALOPRAM OXALATE 10 MG: 10 TABLET ORAL at 08:16

## 2021-01-01 RX ADMIN — DEXAMETHASONE 4 MG: 4 TABLET ORAL at 22:49

## 2021-01-01 RX ADMIN — OMEPRAZOLE 20 MG: 20 CAPSULE, DELAYED RELEASE ORAL at 08:37

## 2021-01-01 RX ADMIN — LEVETIRACETAM 1000 MG: 500 TABLET, FILM COATED ORAL at 20:38

## 2021-01-01 RX ADMIN — ESCITALOPRAM OXALATE 10 MG: 10 TABLET ORAL at 09:05

## 2021-01-01 RX ADMIN — ESCITALOPRAM OXALATE 10 MG: 10 TABLET ORAL at 08:35

## 2021-01-01 RX ADMIN — LABETALOL HYDROCHLORIDE 20 MG: 5 INJECTION, SOLUTION INTRAVENOUS at 16:14

## 2021-01-01 RX ADMIN — LABETALOL HYDROCHLORIDE 10 MG: 5 INJECTION, SOLUTION INTRAVENOUS at 05:05

## 2021-01-01 RX ADMIN — CEFTRIAXONE SODIUM 2 G: 2 INJECTION, POWDER, FOR SOLUTION INTRAMUSCULAR; INTRAVENOUS at 12:44

## 2021-01-01 RX ADMIN — DOCUSATE SODIUM 50 MG AND SENNOSIDES 8.6 MG 1 TABLET: 8.6; 5 TABLET, FILM COATED ORAL at 08:58

## 2021-01-01 RX ADMIN — ACETAMINOPHEN 975 MG: 325 TABLET, FILM COATED ORAL at 08:25

## 2021-01-01 RX ADMIN — DEXAMETHASONE SODIUM PHOSPHATE 3 MG: 4 INJECTION, SOLUTION INTRA-ARTICULAR; INTRALESIONAL; INTRAMUSCULAR; INTRAVENOUS; SOFT TISSUE at 05:55

## 2021-01-01 RX ADMIN — ESCITALOPRAM OXALATE 10 MG: 10 TABLET ORAL at 08:50

## 2021-01-01 RX ADMIN — MIDAZOLAM 2 MG: 1 INJECTION INTRAMUSCULAR; INTRAVENOUS at 07:37

## 2021-01-01 RX ADMIN — LEVOTHYROXINE SODIUM 125 MCG: 125 TABLET ORAL at 08:38

## 2021-01-01 RX ADMIN — LISINOPRIL 5 MG: 5 TABLET ORAL at 09:05

## 2021-01-01 RX ADMIN — DOCUSATE SODIUM 50 MG AND SENNOSIDES 8.6 MG 1 TABLET: 8.6; 5 TABLET, FILM COATED ORAL at 08:20

## 2021-01-01 RX ADMIN — NYSTATIN: 100000 CREAM TOPICAL at 19:56

## 2021-01-01 RX ADMIN — SODIUM CHLORIDE 250 ML: 9 INJECTION, SOLUTION INTRAVENOUS at 09:27

## 2021-01-01 RX ADMIN — GADOBUTROL 7.5 ML: 604.72 INJECTION INTRAVENOUS at 10:15

## 2021-01-01 RX ADMIN — LISINOPRIL 5 MG: 5 TABLET ORAL at 07:42

## 2021-01-01 RX ADMIN — ACETAMINOPHEN 975 MG: 325 TABLET, FILM COATED ORAL at 05:18

## 2021-01-01 RX ADMIN — SODIUM CHLORIDE 240 MG: 9 INJECTION, SOLUTION INTRAVENOUS at 15:43

## 2021-01-01 RX ADMIN — LEVOTHYROXINE SODIUM 125 MCG: 125 TABLET ORAL at 08:00

## 2021-01-01 RX ADMIN — DEXAMETHASONE SODIUM PHOSPHATE 3 MG: 4 INJECTION, SOLUTION INTRA-ARTICULAR; INTRALESIONAL; INTRAMUSCULAR; INTRAVENOUS; SOFT TISSUE at 14:07

## 2021-01-01 RX ADMIN — CEFAZOLIN 1 G: 1 INJECTION, POWDER, FOR SOLUTION INTRAMUSCULAR; INTRAVENOUS at 03:13

## 2021-01-01 RX ADMIN — ESCITALOPRAM OXALATE 10 MG: 10 TABLET ORAL at 09:02

## 2021-01-01 RX ADMIN — ESCITALOPRAM OXALATE 10 MG: 10 TABLET ORAL at 08:40

## 2021-01-01 RX ADMIN — DOCUSATE SODIUM 50 MG AND SENNOSIDES 8.6 MG 1 TABLET: 8.6; 5 TABLET, FILM COATED ORAL at 20:23

## 2021-01-01 RX ADMIN — CEFAZOLIN SODIUM 2 G: 2 INJECTION, SOLUTION INTRAVENOUS at 21:11

## 2021-01-01 RX ADMIN — FENTANYL CITRATE 50 MCG: 50 INJECTION, SOLUTION INTRAMUSCULAR; INTRAVENOUS at 13:41

## 2021-01-01 RX ADMIN — HYDRALAZINE HYDROCHLORIDE 10 MG: 20 INJECTION, SOLUTION INTRAMUSCULAR; INTRAVENOUS at 23:42

## 2021-01-01 RX ADMIN — DOCUSATE SODIUM 100 MG: 100 CAPSULE, LIQUID FILLED ORAL at 20:02

## 2021-01-01 RX ADMIN — HYDRALAZINE HYDROCHLORIDE 5 MG: 20 INJECTION, SOLUTION INTRAMUSCULAR; INTRAVENOUS at 20:50

## 2021-01-01 RX ADMIN — CEFAZOLIN SODIUM 2 G: 2 INJECTION, SOLUTION INTRAVENOUS at 14:41

## 2021-01-01 RX ADMIN — CEFAZOLIN 1 G: 330 INJECTION, POWDER, FOR SOLUTION INTRAMUSCULAR; INTRAVENOUS at 08:02

## 2021-01-01 RX ADMIN — CEFAZOLIN 1 G: 1 INJECTION, POWDER, FOR SOLUTION INTRAMUSCULAR; INTRAVENOUS at 03:59

## 2021-01-01 RX ADMIN — OMEPRAZOLE 20 MG: 20 CAPSULE, DELAYED RELEASE ORAL at 08:38

## 2021-01-01 RX ADMIN — ROCURONIUM BROMIDE 50 MG: 10 INJECTION INTRAVENOUS at 07:55

## 2021-01-01 RX ADMIN — DEXAMETHASONE 4 MG: 4 TABLET ORAL at 08:41

## 2021-01-01 RX ADMIN — SODIUM CHLORIDE, POTASSIUM CHLORIDE, SODIUM LACTATE AND CALCIUM CHLORIDE 500 ML: 600; 310; 30; 20 INJECTION, SOLUTION INTRAVENOUS at 02:42

## 2021-01-01 RX ADMIN — LEVETIRACETAM 1000 MG: 500 TABLET, FILM COATED ORAL at 08:25

## 2021-01-01 RX ADMIN — POLYETHYLENE GLYCOL 3350 17 G: 17 POWDER, FOR SOLUTION ORAL at 20:51

## 2021-01-01 RX ADMIN — CEFTRIAXONE SODIUM 2 G: 2 INJECTION, POWDER, FOR SOLUTION INTRAMUSCULAR; INTRAVENOUS at 01:07

## 2021-01-01 RX ADMIN — LIDOCAINE HYDROCHLORIDE 100 MG: 20 INJECTION, SOLUTION INFILTRATION; PERINEURAL at 17:37

## 2021-01-01 RX ADMIN — METOCLOPRAMIDE HYDROCHLORIDE 10 MG: 5 INJECTION INTRAMUSCULAR; INTRAVENOUS at 05:57

## 2021-01-01 RX ADMIN — LORATADINE 10 MG: 10 TABLET ORAL at 21:39

## 2021-01-01 RX ADMIN — ACETAMINOPHEN 975 MG: 325 TABLET, FILM COATED ORAL at 01:32

## 2021-01-01 RX ADMIN — HYDRALAZINE HYDROCHLORIDE 10 MG: 20 INJECTION INTRAMUSCULAR; INTRAVENOUS at 19:56

## 2021-01-01 RX ADMIN — SUGAMMADEX 200 MG: 100 INJECTION, SOLUTION INTRAVENOUS at 13:24

## 2021-01-01 RX ADMIN — PHENYLEPHRINE HYDROCHLORIDE 100 MCG: 10 INJECTION INTRAVENOUS at 08:12

## 2021-01-01 RX ADMIN — PHENYLEPHRINE HYDROCHLORIDE 0.5 MCG/KG/MIN: 10 INJECTION INTRAVENOUS at 09:25

## 2021-01-01 RX ADMIN — DEXAMETHASONE 1 MG: 1 TABLET ORAL at 06:13

## 2021-01-01 RX ADMIN — PROPOFOL 40 MG: 10 INJECTION, EMULSION INTRAVENOUS at 09:38

## 2021-01-01 RX ADMIN — DEXAMETHASONE SODIUM PHOSPHATE 10 MG: 4 INJECTION, SOLUTION INTRA-ARTICULAR; INTRALESIONAL; INTRAMUSCULAR; INTRAVENOUS; SOFT TISSUE at 08:10

## 2021-01-01 RX ADMIN — GLYCOPYRROLATE 0.2 MG: 0.2 INJECTION, SOLUTION INTRAMUSCULAR; INTRAVENOUS at 08:53

## 2021-01-01 RX ADMIN — LEVETIRACETAM 1000 MG: 500 TABLET, FILM COATED ORAL at 08:58

## 2021-01-01 RX ADMIN — DEXAMETHASONE SODIUM PHOSPHATE 3 MG: 4 INJECTION, SOLUTION INTRA-ARTICULAR; INTRALESIONAL; INTRAMUSCULAR; INTRAVENOUS; SOFT TISSUE at 22:48

## 2021-01-01 RX ADMIN — OMEPRAZOLE 20 MG: 20 CAPSULE, DELAYED RELEASE ORAL at 16:22

## 2021-01-01 RX ADMIN — ACETAMINOPHEN 975 MG: 325 TABLET, FILM COATED ORAL at 20:51

## 2021-01-01 RX ADMIN — LIDOCAINE HYDROCHLORIDE 80 MG: 20 INJECTION, SOLUTION INFILTRATION; PERINEURAL at 13:04

## 2021-01-01 RX ADMIN — PROPOFOL 150 MG: 10 INJECTION, EMULSION INTRAVENOUS at 13:04

## 2021-01-01 RX ADMIN — ESCITALOPRAM OXALATE 10 MG: 10 TABLET ORAL at 07:43

## 2021-01-01 RX ADMIN — SODIUM CHLORIDE 250 ML: 9 INJECTION, SOLUTION INTRAVENOUS at 16:54

## 2021-01-01 RX ADMIN — FAMOTIDINE 10 MG: 10 TABLET ORAL at 20:38

## 2021-01-01 RX ADMIN — LEVETIRACETAM 1000 MG: 500 TABLET, FILM COATED ORAL at 08:12

## 2021-01-01 RX ADMIN — FAMOTIDINE 10 MG: 10 TABLET ORAL at 20:17

## 2021-01-01 RX ADMIN — SODIUM CHLORIDE 240 MG: 9 INJECTION, SOLUTION INTRAVENOUS at 09:15

## 2021-01-01 RX ADMIN — LEVETIRACETAM 1000 MG: 500 TABLET, FILM COATED ORAL at 20:18

## 2021-01-01 RX ADMIN — GADOBUTROL 7.5 ML: 604.72 INJECTION INTRAVENOUS at 08:05

## 2021-01-01 RX ADMIN — FAMOTIDINE 10 MG: 10 TABLET ORAL at 08:12

## 2021-01-01 RX ADMIN — LEVETIRACETAM 1000 MG: 500 TABLET, FILM COATED ORAL at 08:50

## 2021-01-01 RX ADMIN — ACETAMINOPHEN 650 MG: 325 TABLET, FILM COATED ORAL at 21:58

## 2021-01-01 RX ADMIN — LABETALOL HYDROCHLORIDE 10 MG: 5 INJECTION, SOLUTION INTRAVENOUS at 05:08

## 2021-01-01 RX ADMIN — LABETALOL HYDROCHLORIDE 20 MG: 5 INJECTION, SOLUTION INTRAVENOUS at 06:06

## 2021-01-01 RX ADMIN — ESCITALOPRAM OXALATE 10 MG: 10 TABLET ORAL at 08:38

## 2021-01-01 RX ADMIN — FAMOTIDINE 20 MG: 20 TABLET, FILM COATED ORAL at 10:04

## 2021-01-01 RX ADMIN — ESCITALOPRAM OXALATE 10 MG: 10 TABLET ORAL at 10:04

## 2021-01-01 RX ADMIN — LABETALOL HYDROCHLORIDE 20 MG: 5 INJECTION, SOLUTION INTRAVENOUS at 15:26

## 2021-01-01 RX ADMIN — Medication 1 SPRAY: at 20:30

## 2021-01-01 RX ADMIN — LEVOTHYROXINE SODIUM 137 MCG: 0.03 TABLET ORAL at 08:40

## 2021-01-01 RX ADMIN — DOCUSATE SODIUM 50 MG AND SENNOSIDES 8.6 MG 1 TABLET: 8.6; 5 TABLET, FILM COATED ORAL at 20:00

## 2021-01-01 RX ADMIN — LABETALOL HYDROCHLORIDE 10 MG: 5 INJECTION, SOLUTION INTRAVENOUS at 20:31

## 2021-01-01 RX ADMIN — LEVETIRACETAM 1000 MG: 500 TABLET, FILM COATED ORAL at 08:35

## 2021-01-01 RX ADMIN — LEVETIRACETAM 1000 MG: 500 TABLET, FILM COATED ORAL at 19:33

## 2021-01-01 RX ADMIN — ROCURONIUM BROMIDE 20 MG: 10 INJECTION INTRAVENOUS at 10:28

## 2021-01-01 RX ADMIN — VANCOMYCIN HYDROCHLORIDE 1250 MG: 10 INJECTION, POWDER, LYOPHILIZED, FOR SOLUTION INTRAVENOUS at 14:00

## 2021-01-01 RX ADMIN — ESCITALOPRAM OXALATE 10 MG: 10 TABLET ORAL at 07:48

## 2021-01-01 RX ADMIN — ESCITALOPRAM OXALATE 10 MG: 10 TABLET ORAL at 09:37

## 2021-01-01 RX ADMIN — LEVETIRACETAM 1000 MG: 500 TABLET, FILM COATED ORAL at 20:23

## 2021-01-01 RX ADMIN — BEVACIZUMAB-AWWB 800 MG: 400 INJECTION, SOLUTION INTRAVENOUS at 09:59

## 2021-01-01 RX ADMIN — FAMOTIDINE 20 MG: 20 TABLET, FILM COATED ORAL at 16:36

## 2021-01-01 RX ADMIN — DOCUSATE SODIUM 100 MG: 100 CAPSULE, LIQUID FILLED ORAL at 19:57

## 2021-01-01 RX ADMIN — LEVOTHYROXINE SODIUM 125 MCG: 125 TABLET ORAL at 08:44

## 2021-01-01 RX ADMIN — SODIUM CHLORIDE 240 MG: 9 INJECTION, SOLUTION INTRAVENOUS at 14:57

## 2021-01-01 RX ADMIN — LEVETIRACETAM 1000 MG: 500 TABLET, FILM COATED ORAL at 20:52

## 2021-01-01 RX ADMIN — LABETALOL HYDROCHLORIDE 10 MG: 5 INJECTION, SOLUTION INTRAVENOUS at 08:39

## 2021-01-01 RX ADMIN — DEXAMETHASONE 4 MG: 4 TABLET ORAL at 08:38

## 2021-01-01 RX ADMIN — ONDANSETRON 4 MG: 2 INJECTION INTRAMUSCULAR; INTRAVENOUS at 14:20

## 2021-01-01 RX ADMIN — HYDRALAZINE HYDROCHLORIDE 20 MG: 20 INJECTION, SOLUTION INTRAMUSCULAR; INTRAVENOUS at 18:33

## 2021-01-01 RX ADMIN — PROPOFOL 50 MG: 10 INJECTION, EMULSION INTRAVENOUS at 18:17

## 2021-01-01 RX ADMIN — LABETALOL HYDROCHLORIDE 20 MG: 5 INJECTION, SOLUTION INTRAVENOUS at 21:57

## 2021-01-01 RX ADMIN — Medication 1 SPRAY: at 16:38

## 2021-01-01 RX ADMIN — FAMOTIDINE 10 MG: 10 TABLET ORAL at 20:27

## 2021-01-01 RX ADMIN — SODIUM CHLORIDE 250 ML: 9 INJECTION, SOLUTION INTRAVENOUS at 08:52

## 2021-01-01 RX ADMIN — POTASSIUM CHLORIDE 10 MEQ: 7.46 INJECTION, SOLUTION INTRAVENOUS at 06:50

## 2021-01-01 RX ADMIN — PROMETHAZINE HYDROCHLORIDE 12.5 MG: 25 INJECTION INTRAMUSCULAR; INTRAVENOUS at 20:02

## 2021-01-01 RX ADMIN — DOCUSATE SODIUM 100 MG: 100 CAPSULE, LIQUID FILLED ORAL at 08:18

## 2021-01-01 RX ADMIN — ESCITALOPRAM OXALATE 10 MG: 10 TABLET ORAL at 08:18

## 2021-01-01 RX ADMIN — LEVOTHYROXINE SODIUM 125 MCG: 0.07 TABLET ORAL at 08:11

## 2021-01-01 RX ADMIN — DOCUSATE SODIUM AND SENNOSIDES 1 TABLET: 8.6; 5 TABLET ORAL at 08:15

## 2021-01-01 RX ADMIN — HYDRALAZINE HYDROCHLORIDE 20 MG: 20 INJECTION, SOLUTION INTRAMUSCULAR; INTRAVENOUS at 05:40

## 2021-01-01 RX ADMIN — PROMETHAZINE HYDROCHLORIDE 12.5 MG: 25 INJECTION INTRAMUSCULAR; INTRAVENOUS at 03:59

## 2021-01-01 RX ADMIN — PROPOFOL 20 MCG/KG/MIN: 10 INJECTION, EMULSION INTRAVENOUS at 13:39

## 2021-01-01 RX ADMIN — SCOPALAMINE 1 PATCH: 1 PATCH, EXTENDED RELEASE TRANSDERMAL at 07:27

## 2021-01-01 RX ADMIN — SODIUM CHLORIDE 240 MG: 9 INJECTION, SOLUTION INTRAVENOUS at 10:53

## 2021-01-01 RX ADMIN — CEFAZOLIN 1 G: 1 INJECTION, POWDER, FOR SOLUTION INTRAMUSCULAR; INTRAVENOUS at 10:32

## 2021-01-01 RX ADMIN — LABETALOL HYDROCHLORIDE 20 MG: 5 INJECTION, SOLUTION INTRAVENOUS at 15:40

## 2021-01-01 RX ADMIN — DEXAMETHASONE 4 MG: 4 TABLET ORAL at 00:09

## 2021-01-01 RX ADMIN — LEVOTHYROXINE SODIUM 137 MCG: 0.03 TABLET ORAL at 09:42

## 2021-01-01 RX ADMIN — ACETAMINOPHEN 975 MG: 325 TABLET, FILM COATED ORAL at 16:29

## 2021-01-01 RX ADMIN — SODIUM CHLORIDE: 9 INJECTION, SOLUTION INTRAVENOUS at 16:59

## 2021-01-01 RX ADMIN — ROCURONIUM BROMIDE 30 MG: 10 INJECTION INTRAVENOUS at 18:39

## 2021-01-01 RX ADMIN — CEFAZOLIN 1 G: 330 INJECTION, POWDER, FOR SOLUTION INTRAMUSCULAR; INTRAVENOUS at 15:52

## 2021-01-01 RX ADMIN — DOCUSATE SODIUM AND SENNOSIDES 1 TABLET: 8.6; 5 TABLET ORAL at 20:51

## 2021-01-01 RX ADMIN — DEXAMETHASONE SODIUM PHOSPHATE 10 MG: 4 INJECTION, SOLUTION INTRA-ARTICULAR; INTRALESIONAL; INTRAMUSCULAR; INTRAVENOUS; SOFT TISSUE at 13:04

## 2021-01-01 RX ADMIN — HYDROMORPHONE HYDROCHLORIDE 2 MG: 2 TABLET ORAL at 22:03

## 2021-01-01 RX ADMIN — ENOXAPARIN SODIUM 40 MG: 40 INJECTION SUBCUTANEOUS at 22:50

## 2021-01-01 RX ADMIN — SCOPALAMINE 1 PATCH: 1 PATCH, EXTENDED RELEASE TRANSDERMAL at 12:47

## 2021-01-01 RX ADMIN — PHENYLEPHRINE HYDROCHLORIDE 100 MCG: 10 INJECTION INTRAVENOUS at 08:43

## 2021-01-01 RX ADMIN — HYDRALAZINE HYDROCHLORIDE 20 MG: 20 INJECTION, SOLUTION INTRAMUSCULAR; INTRAVENOUS at 06:42

## 2021-01-01 RX ADMIN — ACETAMINOPHEN 975 MG: 325 TABLET, FILM COATED ORAL at 19:55

## 2021-01-01 RX ADMIN — PHENYLEPHRINE HYDROCHLORIDE 100 MCG: 10 INJECTION INTRAVENOUS at 08:41

## 2021-01-01 RX ADMIN — POLYETHYLENE GLYCOL 3350 17 G: 17 POWDER, FOR SOLUTION ORAL at 20:17

## 2021-01-01 RX ADMIN — Medication 1 SPRAY: at 19:37

## 2021-01-01 RX ADMIN — DEXAMETHASONE SODIUM PHOSPHATE 4 MG: 4 INJECTION, SOLUTION INTRAMUSCULAR; INTRAVENOUS at 23:58

## 2021-01-01 RX ADMIN — ESCITALOPRAM OXALATE 10 MG: 10 TABLET ORAL at 08:58

## 2021-01-01 RX ADMIN — Medication 5 MG: at 15:24

## 2021-01-01 RX ADMIN — GADOBUTROL 10 ML: 604.72 INJECTION INTRAVENOUS at 12:54

## 2021-01-01 RX ADMIN — LABETALOL HYDROCHLORIDE 20 MG: 5 INJECTION, SOLUTION INTRAVENOUS at 04:43

## 2021-01-01 RX ADMIN — OMEPRAZOLE 20 MG: 20 CAPSULE, DELAYED RELEASE ORAL at 17:05

## 2021-01-01 RX ADMIN — CHLORPROMAZINE HYDROCHLORIDE 10 MG: 10 TABLET, SUGAR COATED ORAL at 18:28

## 2021-01-01 RX ADMIN — SODIUM CHLORIDE 240 MG: 9 INJECTION, SOLUTION INTRAVENOUS at 14:21

## 2021-01-01 RX ADMIN — FENTANYL CITRATE 50 MCG: 50 INJECTION, SOLUTION INTRAMUSCULAR; INTRAVENOUS at 09:35

## 2021-01-01 RX ADMIN — METOCLOPRAMIDE HYDROCHLORIDE 10 MG: 5 INJECTION INTRAMUSCULAR; INTRAVENOUS at 03:08

## 2021-01-01 RX ADMIN — SUGAMMADEX 200 MG: 100 INJECTION, SOLUTION INTRAVENOUS at 15:25

## 2021-01-01 RX ADMIN — LABETALOL HYDROCHLORIDE 5 MG: 5 INJECTION INTRAVENOUS at 14:17

## 2021-01-01 RX ADMIN — PHENYLEPHRINE HYDROCHLORIDE 0.5 MCG/KG/MIN: 10 INJECTION INTRAVENOUS at 18:47

## 2021-01-01 RX ADMIN — LISINOPRIL 5 MG: 5 TABLET ORAL at 08:41

## 2021-01-01 RX ADMIN — LABETALOL HYDROCHLORIDE 20 MG: 5 INJECTION, SOLUTION INTRAVENOUS at 01:06

## 2021-01-01 RX ADMIN — DOCUSATE SODIUM 50 MG AND SENNOSIDES 8.6 MG 1 TABLET: 8.6; 5 TABLET, FILM COATED ORAL at 08:38

## 2021-01-01 RX ADMIN — FENTANYL CITRATE 150 MCG: 50 INJECTION, SOLUTION INTRAMUSCULAR; INTRAVENOUS at 13:04

## 2021-01-01 RX ADMIN — HYDRALAZINE HYDROCHLORIDE 20 MG: 20 INJECTION, SOLUTION INTRAMUSCULAR; INTRAVENOUS at 11:09

## 2021-01-01 RX ADMIN — LISINOPRIL 5 MG: 5 TABLET ORAL at 08:58

## 2021-01-01 RX ADMIN — LEVETIRACETAM 1000 MG: 500 TABLET, FILM COATED ORAL at 07:59

## 2021-01-01 RX ADMIN — LEVOTHYROXINE SODIUM 137 MCG: 0.03 TABLET ORAL at 08:38

## 2021-01-01 RX ADMIN — DOCUSATE SODIUM 100 MG: 100 CAPSULE, LIQUID FILLED ORAL at 20:36

## 2021-01-01 RX ADMIN — ESCITALOPRAM OXALATE 10 MG: 10 TABLET ORAL at 07:55

## 2021-01-01 RX ADMIN — BEVACIZUMAB-AWWB 800 MG: 400 INJECTION, SOLUTION INTRAVENOUS at 15:37

## 2021-01-01 RX ADMIN — LEVETIRACETAM 1000 MG: 500 TABLET, FILM COATED ORAL at 09:13

## 2021-01-01 RX ADMIN — Medication 0.4 MCG/KG/MIN: at 09:49

## 2021-01-01 RX ADMIN — DOCUSATE SODIUM 50 MG AND SENNOSIDES 8.6 MG 1 TABLET: 8.6; 5 TABLET, FILM COATED ORAL at 20:33

## 2021-01-01 RX ADMIN — ACETAMINOPHEN 975 MG: 325 TABLET, FILM COATED ORAL at 17:59

## 2021-01-01 RX ADMIN — DOCUSATE SODIUM 50 MG AND SENNOSIDES 8.6 MG 2 TABLET: 8.6; 5 TABLET, FILM COATED ORAL at 08:43

## 2021-01-01 RX ADMIN — PROMETHAZINE HYDROCHLORIDE 12.5 MG: 25 INJECTION INTRAMUSCULAR; INTRAVENOUS at 00:05

## 2021-01-01 RX ADMIN — GENTAMICIN SULFATE 80 MG: 80 INJECTION, SOLUTION INTRAVENOUS at 08:00

## 2021-01-01 RX ADMIN — FAMOTIDINE 10 MG: 10 TABLET ORAL at 08:25

## 2021-01-01 RX ADMIN — HYDRALAZINE HYDROCHLORIDE 5 MG: 20 INJECTION, SOLUTION INTRAMUSCULAR; INTRAVENOUS at 20:43

## 2021-01-01 RX ADMIN — MAGNESIUM SULFATE IN WATER 2 G: 40 INJECTION, SOLUTION INTRAVENOUS at 05:16

## 2021-01-01 RX ADMIN — POTASSIUM CHLORIDE 20 MEQ: 750 TABLET, EXTENDED RELEASE ORAL at 10:05

## 2021-01-01 RX ADMIN — METOCLOPRAMIDE HYDROCHLORIDE 10 MG: 5 INJECTION INTRAMUSCULAR; INTRAVENOUS at 11:45

## 2021-01-01 RX ADMIN — SODIUM CHLORIDE: 9 INJECTION, SOLUTION INTRAVENOUS at 06:51

## 2021-01-01 RX ADMIN — LEVOTHYROXINE SODIUM 125 MCG: 125 TABLET ORAL at 08:25

## 2021-01-01 RX ADMIN — DEXAMETHASONE SODIUM PHOSPHATE 4 MG: 4 INJECTION, SOLUTION INTRAMUSCULAR; INTRAVENOUS at 17:59

## 2021-01-01 RX ADMIN — PHENYLEPHRINE HYDROCHLORIDE 100 MCG: 10 INJECTION INTRAVENOUS at 08:00

## 2021-01-01 RX ADMIN — LEVOTHYROXINE SODIUM 125 MCG: 125 TABLET ORAL at 09:13

## 2021-01-01 RX ADMIN — POLYETHYLENE GLYCOL 3350 17 G: 17 POWDER, FOR SOLUTION ORAL at 08:10

## 2021-01-01 RX ADMIN — SODIUM CHLORIDE 250 ML: 9 INJECTION, SOLUTION INTRAVENOUS at 14:39

## 2021-01-01 RX ADMIN — PROPOFOL 50 MG: 10 INJECTION, EMULSION INTRAVENOUS at 08:24

## 2021-01-01 RX ADMIN — LABETALOL HYDROCHLORIDE 5 MG: 5 INJECTION INTRAVENOUS at 14:16

## 2021-01-01 RX ADMIN — VANCOMYCIN HYDROCHLORIDE 1250 MG: 10 INJECTION, POWDER, LYOPHILIZED, FOR SOLUTION INTRAVENOUS at 02:30

## 2021-01-01 RX ADMIN — ACETAMINOPHEN 975 MG: 325 TABLET, FILM COATED ORAL at 15:25

## 2021-01-01 RX ADMIN — FENTANYL CITRATE 50 MCG: 50 INJECTION, SOLUTION INTRAMUSCULAR; INTRAVENOUS at 12:16

## 2021-01-01 RX ADMIN — CEFAZOLIN 1 G: 1 INJECTION, POWDER, FOR SOLUTION INTRAMUSCULAR; INTRAVENOUS at 01:33

## 2021-01-01 RX ADMIN — DEXAMETHASONE 1 MG: 1 TABLET ORAL at 14:41

## 2021-01-01 RX ADMIN — FAMOTIDINE 20 MG: 20 INJECTION, SOLUTION INTRAVENOUS at 17:03

## 2021-01-01 RX ADMIN — LEVETIRACETAM 1000 MG: 500 TABLET ORAL at 21:11

## 2021-01-01 RX ADMIN — CEFAZOLIN 1 G: 1 INJECTION, POWDER, FOR SOLUTION INTRAMUSCULAR; INTRAVENOUS at 18:05

## 2021-01-01 RX ADMIN — BEVACIZUMAB-AWWB 800 MG: 400 INJECTION, SOLUTION INTRAVENOUS at 15:36

## 2021-01-01 RX ADMIN — FAMOTIDINE 20 MG: 20 TABLET ORAL at 21:26

## 2021-01-01 RX ADMIN — LABETALOL HYDROCHLORIDE 10 MG: 5 INJECTION, SOLUTION INTRAVENOUS at 12:40

## 2021-01-01 RX ADMIN — GADOBUTROL 7.5 ML: 604.72 INJECTION INTRAVENOUS at 13:26

## 2021-01-01 RX ADMIN — SODIUM CHLORIDE 1000 ML: 9 INJECTION, SOLUTION INTRAVENOUS at 22:58

## 2021-01-01 RX ADMIN — LORATADINE 10 MG: 10 TABLET ORAL at 21:26

## 2021-01-01 RX ADMIN — MAGNESIUM SULFATE HEPTAHYDRATE 2 G: 40 INJECTION, SOLUTION INTRAVENOUS at 19:56

## 2021-01-01 RX ADMIN — CEFAZOLIN 1 G: 1 INJECTION, POWDER, FOR SOLUTION INTRAMUSCULAR; INTRAVENOUS at 02:01

## 2021-01-01 RX ADMIN — DOCUSATE SODIUM 50 MG AND SENNOSIDES 8.6 MG 2 TABLET: 8.6; 5 TABLET, FILM COATED ORAL at 08:38

## 2021-01-01 RX ADMIN — BEVACIZUMAB-AWWB 800 MG: 400 INJECTION, SOLUTION INTRAVENOUS at 14:59

## 2021-01-01 RX ADMIN — GENTAMICIN SULFATE 80 MG: 80 INJECTION, SOLUTION INTRAVENOUS at 18:14

## 2021-01-01 RX ADMIN — FENTANYL CITRATE 50 MCG: 50 INJECTION, SOLUTION INTRAMUSCULAR; INTRAVENOUS at 14:05

## 2021-01-01 RX ADMIN — GADOBUTROL 7.5 ML: 604.72 INJECTION INTRAVENOUS at 12:29

## 2021-01-01 RX ADMIN — SODIUM CHLORIDE: 9 INJECTION, SOLUTION INTRAVENOUS at 21:01

## 2021-01-01 RX ADMIN — LORATADINE 10 MG: 10 TABLET ORAL at 20:27

## 2021-01-01 RX ADMIN — SODIUM CHLORIDE 240 MG: 9 INJECTION, SOLUTION INTRAVENOUS at 16:57

## 2021-01-01 RX ADMIN — DOCUSATE SODIUM AND SENNOSIDES 1 TABLET: 8.6; 5 TABLET ORAL at 20:25

## 2021-01-01 RX ADMIN — CEFTRIAXONE SODIUM 2 G: 2 INJECTION, POWDER, FOR SOLUTION INTRAMUSCULAR; INTRAVENOUS at 14:00

## 2021-01-01 RX ADMIN — LEVETIRACETAM 1500 MG: 750 TABLET, FILM COATED ORAL at 08:40

## 2021-01-01 RX ADMIN — SUGAMMADEX 200 MG: 100 INJECTION, SOLUTION INTRAVENOUS at 19:21

## 2021-01-01 RX ADMIN — SODIUM CHLORIDE 240 MG: 9 INJECTION, SOLUTION INTRAVENOUS at 16:11

## 2021-01-01 RX ADMIN — LEVETIRACETAM 1500 MG: 750 TABLET, FILM COATED ORAL at 07:42

## 2021-01-01 RX ADMIN — LORATADINE 10 MG: 10 TABLET ORAL at 20:24

## 2021-01-01 RX ADMIN — ACETAMINOPHEN 975 MG: 325 TABLET, FILM COATED ORAL at 16:13

## 2021-01-01 RX ADMIN — ACETAMINOPHEN 975 MG: 325 TABLET, FILM COATED ORAL at 10:05

## 2021-01-01 RX ADMIN — VANCOMYCIN HYDROCHLORIDE 1000 MG: 1 INJECTION, SOLUTION INTRAVENOUS at 12:44

## 2021-01-01 RX ADMIN — LABETALOL HYDROCHLORIDE 20 MG: 5 INJECTION, SOLUTION INTRAVENOUS at 21:19

## 2021-01-01 RX ADMIN — PHENYLEPHRINE HYDROCHLORIDE 100 MCG: 10 INJECTION INTRAVENOUS at 07:54

## 2021-01-01 RX ADMIN — DEXAMETHASONE 4 MG: 4 TABLET ORAL at 07:43

## 2021-01-01 RX ADMIN — SODIUM CHLORIDE 250 ML: 9 INJECTION, SOLUTION INTRAVENOUS at 10:53

## 2021-01-01 RX ADMIN — CEFAZOLIN 1 G: 1 INJECTION, POWDER, FOR SOLUTION INTRAMUSCULAR; INTRAVENOUS at 10:04

## 2021-01-01 RX ADMIN — LEVOTHYROXINE SODIUM 125 MCG: 125 TABLET ORAL at 08:12

## 2021-01-01 RX ADMIN — SODIUM PHOSPHATE, MONOBASIC, MONOHYDRATE AND SODIUM PHOSPHATE, DIBASIC, ANHYDROUS 15 MMOL: 276; 142 INJECTION, SOLUTION INTRAVENOUS at 18:05

## 2021-01-01 RX ADMIN — PROMETHAZINE HYDROCHLORIDE 12.5 MG: 25 INJECTION INTRAMUSCULAR; INTRAVENOUS at 15:28

## 2021-01-01 RX ADMIN — VANCOMYCIN HYDROCHLORIDE 1000 MG: 1 INJECTION, SOLUTION INTRAVENOUS at 08:00

## 2021-01-01 RX ADMIN — FAMOTIDINE 10 MG: 10 TABLET ORAL at 20:51

## 2021-01-01 RX ADMIN — HYDROMORPHONE HYDROCHLORIDE 2 MG: 2 TABLET ORAL at 23:10

## 2021-01-01 RX ADMIN — LEVOTHYROXINE SODIUM 125 MCG: 125 TABLET ORAL at 07:32

## 2021-01-01 RX ADMIN — CEFAZOLIN SODIUM 2 G: 2 INJECTION, SOLUTION INTRAVENOUS at 06:13

## 2021-01-01 RX ADMIN — SODIUM CHLORIDE, POTASSIUM CHLORIDE, SODIUM LACTATE AND CALCIUM CHLORIDE: 600; 310; 30; 20 INJECTION, SOLUTION INTRAVENOUS at 07:37

## 2021-01-01 RX ADMIN — ROCURONIUM BROMIDE 20 MG: 10 INJECTION INTRAVENOUS at 08:44

## 2021-01-01 RX ADMIN — DEXAMETHASONE SODIUM PHOSPHATE 2 MG: 4 INJECTION, SOLUTION INTRAMUSCULAR; INTRAVENOUS at 14:44

## 2021-01-01 RX ADMIN — ACETAMINOPHEN 975 MG: 325 TABLET, FILM COATED ORAL at 10:32

## 2021-01-01 RX ADMIN — SODIUM CHLORIDE 250 ML: 9 INJECTION, SOLUTION INTRAVENOUS at 14:21

## 2021-01-01 RX ADMIN — ESMOLOL HYDROCHLORIDE 10 MG: 10 INJECTION, SOLUTION INTRAVENOUS at 14:22

## 2021-01-01 RX ADMIN — DOCUSATE SODIUM 50 MG AND SENNOSIDES 8.6 MG 1 TABLET: 8.6; 5 TABLET, FILM COATED ORAL at 09:05

## 2021-01-01 RX ADMIN — LEVETIRACETAM 1000 MG: 500 TABLET, FILM COATED ORAL at 08:20

## 2021-01-01 RX ADMIN — LEVETIRACETAM 1000 MG: 500 TABLET, FILM COATED ORAL at 20:27

## 2021-01-01 RX ADMIN — LEVETIRACETAM 1000 MG: 500 TABLET, FILM COATED ORAL at 20:15

## 2021-01-01 RX ADMIN — PROPOFOL 50 MG: 10 INJECTION, EMULSION INTRAVENOUS at 07:57

## 2021-01-01 RX ADMIN — SODIUM CHLORIDE: 9 INJECTION, SOLUTION INTRAVENOUS at 03:26

## 2021-01-01 RX ADMIN — ONDANSETRON 4 MG: 2 INJECTION INTRAMUSCULAR; INTRAVENOUS at 08:10

## 2021-01-01 RX ADMIN — HYDRALAZINE HYDROCHLORIDE 20 MG: 20 INJECTION, SOLUTION INTRAMUSCULAR; INTRAVENOUS at 16:04

## 2021-01-01 RX ADMIN — LORATADINE 10 MG: 10 TABLET ORAL at 20:19

## 2021-01-01 RX ADMIN — DEXAMETHASONE SODIUM PHOSPHATE 4 MG: 4 INJECTION, SOLUTION INTRAMUSCULAR; INTRAVENOUS at 06:19

## 2021-01-01 RX ADMIN — LEVOTHYROXINE SODIUM 125 MCG: 0.07 TABLET ORAL at 09:05

## 2021-01-01 RX ADMIN — LISINOPRIL 5 MG: 5 TABLET ORAL at 08:50

## 2021-01-01 RX ADMIN — DOCUSATE SODIUM AND SENNOSIDES 1 TABLET: 8.6; 5 TABLET ORAL at 08:44

## 2021-01-01 RX ADMIN — SODIUM CHLORIDE 250 ML: 9 INJECTION, SOLUTION INTRAVENOUS at 14:56

## 2021-01-01 RX ADMIN — SODIUM CHLORIDE 240 MG: 9 INJECTION, SOLUTION INTRAVENOUS at 09:27

## 2021-01-01 RX ADMIN — ACETAMINOPHEN 975 MG: 325 TABLET, FILM COATED ORAL at 10:26

## 2021-01-01 RX ADMIN — FAMOTIDINE 10 MG: 10 TABLET ORAL at 20:15

## 2021-01-01 RX ADMIN — ESCITALOPRAM OXALATE 10 MG: 10 TABLET ORAL at 08:10

## 2021-01-01 RX ADMIN — SODIUM CHLORIDE: 9 INJECTION, SOLUTION INTRAVENOUS at 22:27

## 2021-01-01 RX ADMIN — LORATADINE 10 MG: 10 TABLET ORAL at 20:17

## 2021-01-01 RX ADMIN — SODIUM CHLORIDE: 234 INJECTION INTRAMUSCULAR; INTRAVENOUS; SUBCUTANEOUS at 13:09

## 2021-01-01 RX ADMIN — FAMOTIDINE 20 MG: 20 INJECTION, SOLUTION INTRAVENOUS at 16:12

## 2021-01-01 RX ADMIN — DOCUSATE SODIUM 100 MG: 100 CAPSULE, LIQUID FILLED ORAL at 09:02

## 2021-01-01 RX ADMIN — POLYETHYLENE GLYCOL 3350 17 G: 17 POWDER, FOR SOLUTION ORAL at 20:38

## 2021-01-01 RX ADMIN — LABETALOL HYDROCHLORIDE 5 MG: 5 INJECTION INTRAVENOUS at 14:10

## 2021-01-01 RX ADMIN — FAMOTIDINE 20 MG: 20 INJECTION, SOLUTION INTRAVENOUS at 07:55

## 2021-01-01 RX ADMIN — FAMOTIDINE 10 MG: 10 TABLET ORAL at 09:13

## 2021-01-01 RX ADMIN — ROCURONIUM BROMIDE 10 MG: 10 INJECTION INTRAVENOUS at 14:18

## 2021-01-01 RX ADMIN — ESCITALOPRAM OXALATE 10 MG: 10 TABLET ORAL at 09:13

## 2021-01-01 RX ADMIN — FAMOTIDINE 20 MG: 20 TABLET, FILM COATED ORAL at 16:52

## 2021-01-01 RX ADMIN — LABETALOL HYDROCHLORIDE 40 MG: 5 INJECTION, SOLUTION INTRAVENOUS at 08:27

## 2021-01-01 RX ADMIN — GADOBUTROL 5 ML: 604.72 INJECTION INTRAVENOUS at 15:54

## 2021-01-01 RX ADMIN — GADOBUTROL 5 ML: 604.72 INJECTION INTRAVENOUS at 12:33

## 2021-01-01 RX ADMIN — PHENYLEPHRINE HYDROCHLORIDE 100 MCG: 10 INJECTION INTRAVENOUS at 18:37

## 2021-01-01 RX ADMIN — CEFTRIAXONE SODIUM 2 G: 2 INJECTION, POWDER, FOR SOLUTION INTRAMUSCULAR; INTRAVENOUS at 13:21

## 2021-01-01 RX ADMIN — LISINOPRIL 5 MG: 5 TABLET ORAL at 09:01

## 2021-01-01 RX ADMIN — ACETAMINOPHEN 975 MG: 325 TABLET, FILM COATED ORAL at 15:54

## 2021-01-01 RX ADMIN — METOCLOPRAMIDE HYDROCHLORIDE 10 MG: 5 INJECTION INTRAMUSCULAR; INTRAVENOUS at 11:27

## 2021-01-01 RX ADMIN — SODIUM CHLORIDE, POTASSIUM CHLORIDE, SODIUM LACTATE AND CALCIUM CHLORIDE: 600; 310; 30; 20 INJECTION, SOLUTION INTRAVENOUS at 17:21

## 2021-01-01 RX ADMIN — LEVETIRACETAM 1000 MG: 10 INJECTION INTRAVENOUS at 19:48

## 2021-01-01 RX ADMIN — LEVOTHYROXINE SODIUM 125 MCG: 0.07 TABLET ORAL at 08:58

## 2021-01-01 RX ADMIN — FENTANYL CITRATE 50 MCG: 50 INJECTION, SOLUTION INTRAMUSCULAR; INTRAVENOUS at 20:45

## 2021-01-01 RX ADMIN — LEVETIRACETAM 1000 MG: 500 TABLET, FILM COATED ORAL at 20:33

## 2021-01-01 RX ADMIN — SODIUM CHLORIDE: 9 INJECTION, SOLUTION INTRAVENOUS at 12:19

## 2021-01-01 RX ADMIN — DOCUSATE SODIUM 100 MG: 100 CAPSULE, LIQUID FILLED ORAL at 08:00

## 2021-01-01 RX ADMIN — FENTANYL CITRATE 50 MCG: 50 INJECTION, SOLUTION INTRAMUSCULAR; INTRAVENOUS at 17:37

## 2021-01-01 RX ADMIN — PROMETHAZINE HYDROCHLORIDE 12.5 MG: 25 INJECTION INTRAMUSCULAR; INTRAVENOUS at 11:04

## 2021-01-01 RX ADMIN — BEVACIZUMAB-AWWB 800 MG: 400 INJECTION, SOLUTION INTRAVENOUS at 17:31

## 2021-01-01 RX ADMIN — DEXAMETHASONE 4 MG: 4 TABLET ORAL at 05:52

## 2021-01-01 RX ADMIN — ESMOLOL HYDROCHLORIDE 30 MG: 10 INJECTION, SOLUTION INTRAVENOUS at 18:07

## 2021-01-01 RX ADMIN — ROCURONIUM BROMIDE 20 MG: 10 INJECTION INTRAVENOUS at 11:38

## 2021-01-01 RX ADMIN — LEVOTHYROXINE SODIUM 125 MCG: 125 TABLET ORAL at 10:05

## 2021-01-01 RX ADMIN — DEXAMETHASONE 4 MG: 4 TABLET ORAL at 18:23

## 2021-01-01 RX ADMIN — DEXAMETHASONE 4 MG: 4 TABLET ORAL at 08:35

## 2021-01-01 RX ADMIN — FENTANYL CITRATE 100 MCG: 50 INJECTION, SOLUTION INTRAMUSCULAR; INTRAVENOUS at 07:54

## 2021-01-01 RX ADMIN — LABETALOL HYDROCHLORIDE 10 MG: 5 INJECTION, SOLUTION INTRAVENOUS at 14:14

## 2021-01-01 RX ADMIN — LORATADINE 10 MG: 10 TABLET ORAL at 22:05

## 2021-01-01 RX ADMIN — PHENYLEPHRINE HYDROCHLORIDE 50 MCG: 10 INJECTION INTRAVENOUS at 18:47

## 2021-01-01 RX ADMIN — OMEPRAZOLE 20 MG: 20 CAPSULE, DELAYED RELEASE ORAL at 07:43

## 2021-01-01 RX ADMIN — LABETALOL HYDROCHLORIDE 40 MG: 5 INJECTION, SOLUTION INTRAVENOUS at 20:36

## 2021-01-01 RX ADMIN — SODIUM CHLORIDE: 9 INJECTION, SOLUTION INTRAVENOUS at 21:09

## 2021-01-01 RX ADMIN — SODIUM CHLORIDE 1000 ML: 9 INJECTION, SOLUTION INTRAVENOUS at 06:51

## 2021-01-01 RX ADMIN — FAMOTIDINE 10 MG: 10 TABLET ORAL at 07:59

## 2021-01-01 RX ADMIN — GADOBUTROL 5 ML: 604.72 INJECTION INTRAVENOUS at 11:46

## 2021-01-01 RX ADMIN — DOCUSATE SODIUM AND SENNOSIDES 1 TABLET: 8.6; 5 TABLET ORAL at 20:17

## 2021-01-01 RX ADMIN — SODIUM CHLORIDE: 234 INJECTION INTRAMUSCULAR; INTRAVENOUS; SUBCUTANEOUS at 00:45

## 2021-01-01 RX ADMIN — DEXAMETHASONE 1 MG: 1 TABLET ORAL at 21:12

## 2021-01-01 RX ADMIN — Medication 1 SPRAY: at 08:06

## 2021-01-01 RX ADMIN — POTASSIUM CHLORIDE 10 MEQ: 7.46 INJECTION, SOLUTION INTRAVENOUS at 05:17

## 2021-01-01 RX ADMIN — SODIUM CHLORIDE 250 ML: 9 INJECTION, SOLUTION INTRAVENOUS at 15:01

## 2021-01-01 RX ADMIN — LORATADINE 10 MG: 10 TABLET ORAL at 21:21

## 2021-01-01 RX ADMIN — DOCUSATE SODIUM 50 MG AND SENNOSIDES 8.6 MG 1 TABLET: 8.6; 5 TABLET, FILM COATED ORAL at 09:01

## 2021-01-01 RX ADMIN — POLYETHYLENE GLYCOL 3350 17 G: 17 POWDER, FOR SOLUTION ORAL at 20:26

## 2021-01-01 RX ADMIN — ACETAMINOPHEN 975 MG: 325 TABLET, FILM COATED ORAL at 00:01

## 2021-01-01 RX ADMIN — ROCURONIUM BROMIDE 20 MG: 10 INJECTION INTRAVENOUS at 11:04

## 2021-01-01 RX ADMIN — Medication 1 SPRAY: at 08:19

## 2021-01-01 RX ADMIN — LABETALOL HYDROCHLORIDE 20 MG: 5 INJECTION, SOLUTION INTRAVENOUS at 05:47

## 2021-01-01 RX ADMIN — HYDRALAZINE HYDROCHLORIDE 20 MG: 20 INJECTION, SOLUTION INTRAMUSCULAR; INTRAVENOUS at 16:49

## 2021-01-01 RX ADMIN — CEFAZOLIN 1 G: 1 INJECTION, POWDER, FOR SOLUTION INTRAMUSCULAR; INTRAVENOUS at 21:15

## 2021-01-01 RX ADMIN — LABETALOL HYDROCHLORIDE 20 MG: 5 INJECTION, SOLUTION INTRAVENOUS at 06:13

## 2021-01-01 RX ADMIN — POLYETHYLENE GLYCOL 3350 17 G: 17 POWDER, FOR SOLUTION ORAL at 20:27

## 2021-01-01 RX ADMIN — LABETALOL HYDROCHLORIDE 40 MG: 5 INJECTION, SOLUTION INTRAVENOUS at 15:09

## 2021-01-01 RX ADMIN — FAMOTIDINE 10 MG: 10 TABLET ORAL at 20:24

## 2021-01-01 RX ADMIN — ACETAMINOPHEN 975 MG: 325 TABLET, FILM COATED ORAL at 03:13

## 2021-01-01 RX ADMIN — BEVACIZUMAB-AWWB 700 MG: 400 INJECTION, SOLUTION INTRAVENOUS at 14:55

## 2021-01-01 RX ADMIN — FAMOTIDINE 20 MG: 20 TABLET ORAL at 12:28

## 2021-01-01 RX ADMIN — SODIUM CHLORIDE 250 ML: 9 INJECTION, SOLUTION INTRAVENOUS at 15:35

## 2021-01-01 RX ADMIN — POLYETHYLENE GLYCOL 3350 17 G: 17 POWDER, FOR SOLUTION ORAL at 20:18

## 2021-01-01 RX ADMIN — DOCUSATE SODIUM AND SENNOSIDES 1 TABLET: 8.6; 5 TABLET ORAL at 20:38

## 2021-01-01 RX ADMIN — FAMOTIDINE 20 MG: 20 TABLET ORAL at 21:28

## 2021-01-01 RX ADMIN — LABETALOL HYDROCHLORIDE 20 MG: 5 INJECTION, SOLUTION INTRAVENOUS at 06:53

## 2021-01-01 RX ADMIN — POTASSIUM CHLORIDE 10 MEQ: 7.46 INJECTION, SOLUTION INTRAVENOUS at 08:16

## 2021-01-01 RX ADMIN — Medication 1 SPRAY: at 20:44

## 2021-01-01 RX ADMIN — PROPOFOL 100 MG: 10 INJECTION, EMULSION INTRAVENOUS at 07:54

## 2021-01-01 RX ADMIN — MAGNESIUM SULFATE IN WATER 2 G: 40 INJECTION, SOLUTION INTRAVENOUS at 07:31

## 2021-01-01 RX ADMIN — ACETAMINOPHEN 975 MG: 325 TABLET, FILM COATED ORAL at 08:38

## 2021-01-01 RX ADMIN — CEFTRIAXONE SODIUM 2 G: 2 INJECTION, POWDER, FOR SOLUTION INTRAMUSCULAR; INTRAVENOUS at 01:06

## 2021-01-01 RX ADMIN — CEFAZOLIN 1 G: 1 INJECTION, POWDER, FOR SOLUTION INTRAMUSCULAR; INTRAVENOUS at 19:54

## 2021-01-01 RX ADMIN — FENTANYL CITRATE 50 MCG: 50 INJECTION, SOLUTION INTRAMUSCULAR; INTRAVENOUS at 09:37

## 2021-01-01 RX ADMIN — ACETAMINOPHEN 975 MG: 325 TABLET, FILM COATED ORAL at 11:24

## 2021-01-01 RX ADMIN — NYSTATIN: 100000 CREAM TOPICAL at 20:30

## 2021-01-01 RX ADMIN — LISINOPRIL 5 MG: 5 TABLET ORAL at 08:11

## 2021-01-01 RX ADMIN — FAMOTIDINE 20 MG: 20 TABLET ORAL at 21:27

## 2021-01-01 RX ADMIN — METOCLOPRAMIDE HYDROCHLORIDE 10 MG: 5 INJECTION INTRAMUSCULAR; INTRAVENOUS at 11:56

## 2021-01-01 RX ADMIN — MAGNESIUM SULFATE IN WATER 2 G: 40 INJECTION, SOLUTION INTRAVENOUS at 05:50

## 2021-01-01 RX ADMIN — LEVETIRACETAM 1000 MG: 10 INJECTION INTRAVENOUS at 20:44

## 2021-01-01 RX ADMIN — LEVETIRACETAM 1000 MG: 10 INJECTION INTRAVENOUS at 09:07

## 2021-01-01 RX ADMIN — LABETALOL HYDROCHLORIDE 20 MG: 5 INJECTION, SOLUTION INTRAVENOUS at 10:32

## 2021-01-01 RX ADMIN — LORATADINE 10 MG: 10 TABLET ORAL at 21:27

## 2021-01-01 RX ADMIN — LORATADINE 10 MG: 10 TABLET ORAL at 20:51

## 2021-01-01 RX ADMIN — ONDANSETRON 4 MG: 2 INJECTION INTRAMUSCULAR; INTRAVENOUS at 19:11

## 2021-01-01 RX ADMIN — DOCUSATE SODIUM 50 MG AND SENNOSIDES 8.6 MG 1 TABLET: 8.6; 5 TABLET, FILM COATED ORAL at 22:49

## 2021-01-01 RX ADMIN — CEFAZOLIN SODIUM 2 G: 2 INJECTION, SOLUTION INTRAVENOUS at 06:39

## 2021-01-01 RX ADMIN — PROMETHAZINE HYDROCHLORIDE 12.5 MG: 25 INJECTION INTRAMUSCULAR; INTRAVENOUS at 20:06

## 2021-01-01 RX ADMIN — OMEPRAZOLE 20 MG: 20 CAPSULE, DELAYED RELEASE ORAL at 15:51

## 2021-01-01 RX ADMIN — LABETALOL HYDROCHLORIDE 10 MG: 5 INJECTION, SOLUTION INTRAVENOUS at 14:32

## 2021-01-01 RX ADMIN — LEVOTHYROXINE SODIUM 125 MCG: 0.07 TABLET ORAL at 08:18

## 2021-01-01 RX ADMIN — HYDRALAZINE HYDROCHLORIDE 20 MG: 20 INJECTION, SOLUTION INTRAMUSCULAR; INTRAVENOUS at 11:00

## 2021-01-01 RX ADMIN — DOCUSATE SODIUM 100 MG: 100 CAPSULE, LIQUID FILLED ORAL at 20:19

## 2021-01-01 RX ADMIN — ESCITALOPRAM OXALATE 10 MG: 10 TABLET ORAL at 08:45

## 2021-01-01 RX ADMIN — SODIUM CHLORIDE 800 MG: 9 INJECTION, SOLUTION INTRAVENOUS at 16:14

## 2021-01-01 RX ADMIN — PROPOFOL 50 MG: 10 INJECTION, EMULSION INTRAVENOUS at 13:43

## 2021-01-01 RX ADMIN — LEVETIRACETAM 1000 MG: 10 INJECTION INTRAVENOUS at 08:00

## 2021-01-01 RX ADMIN — LEVETIRACETAM 1000 MG: 10 INJECTION INTRAVENOUS at 09:10

## 2021-01-01 RX ADMIN — DEXAMETHASONE SODIUM PHOSPHATE 10 MG: 4 INJECTION, SOLUTION INTRAMUSCULAR; INTRAVENOUS at 12:54

## 2021-01-01 RX ADMIN — PROMETHAZINE HYDROCHLORIDE 12.5 MG: 25 INJECTION INTRAMUSCULAR; INTRAVENOUS at 04:09

## 2021-01-01 RX ADMIN — DEXAMETHASONE SODIUM PHOSPHATE 2 MG: 4 INJECTION, SOLUTION INTRAMUSCULAR; INTRAVENOUS at 06:37

## 2021-01-01 RX ADMIN — DOCUSATE SODIUM 50 MG AND SENNOSIDES 8.6 MG 1 TABLET: 8.6; 5 TABLET, FILM COATED ORAL at 19:57

## 2021-01-01 RX ADMIN — LEVETIRACETAM 1000 MG: 500 TABLET, FILM COATED ORAL at 12:27

## 2021-01-01 RX ADMIN — OMEPRAZOLE 20 MG: 20 CAPSULE, DELAYED RELEASE ORAL at 22:10

## 2021-01-01 RX ADMIN — PROMETHAZINE HYDROCHLORIDE 12.5 MG: 25 INJECTION INTRAMUSCULAR; INTRAVENOUS at 12:51

## 2021-01-01 RX ADMIN — POTASSIUM CHLORIDE 10 MEQ: 7.46 INJECTION, SOLUTION INTRAVENOUS at 09:55

## 2021-01-01 RX ADMIN — OMEPRAZOLE 20 MG: 20 CAPSULE, DELAYED RELEASE ORAL at 16:26

## 2021-01-01 RX ADMIN — CEFAZOLIN 1 G: 330 INJECTION, POWDER, FOR SOLUTION INTRAMUSCULAR; INTRAVENOUS at 09:03

## 2021-01-01 RX ADMIN — PROMETHAZINE HYDROCHLORIDE 12.5 MG: 25 INJECTION INTRAMUSCULAR; INTRAVENOUS at 09:50

## 2021-01-01 RX ADMIN — LABETALOL HYDROCHLORIDE 40 MG: 5 INJECTION, SOLUTION INTRAVENOUS at 10:02

## 2021-01-01 RX ADMIN — DOCUSATE SODIUM AND SENNOSIDES 1 TABLET: 8.6; 5 TABLET ORAL at 20:27

## 2021-01-01 RX ADMIN — LABETALOL HYDROCHLORIDE 20 MG: 5 INJECTION, SOLUTION INTRAVENOUS at 07:36

## 2021-01-01 RX ADMIN — PROMETHAZINE HYDROCHLORIDE 12.5 MG: 25 INJECTION INTRAMUSCULAR; INTRAVENOUS at 16:30

## 2021-01-01 RX ADMIN — LISINOPRIL 5 MG: 5 TABLET ORAL at 08:38

## 2021-01-01 RX ADMIN — DOCUSATE SODIUM 50 MG AND SENNOSIDES 8.6 MG 1 TABLET: 8.6; 5 TABLET, FILM COATED ORAL at 07:59

## 2021-01-01 RX ADMIN — CEFAZOLIN 1 G: 1 INJECTION, POWDER, FOR SOLUTION INTRAMUSCULAR; INTRAVENOUS at 17:56

## 2021-01-01 RX ADMIN — ROCURONIUM BROMIDE 60 MG: 10 INJECTION INTRAVENOUS at 17:37

## 2021-01-01 RX ADMIN — LEVOTHYROXINE SODIUM 125 MCG: 0.07 TABLET ORAL at 08:00

## 2021-01-01 RX ADMIN — ACETAMINOPHEN 975 MG: 325 TABLET, FILM COATED ORAL at 14:22

## 2021-01-01 RX ADMIN — POLYETHYLENE GLYCOL 3350 17 G: 17 POWDER, FOR SOLUTION ORAL at 08:58

## 2021-01-01 RX ADMIN — LEVETIRACETAM 1000 MG: 500 TABLET ORAL at 22:03

## 2021-01-01 RX ADMIN — FENTANYL CITRATE 25 MCG: 50 INJECTION, SOLUTION INTRAMUSCULAR; INTRAVENOUS at 19:25

## 2021-01-01 RX ADMIN — PROMETHAZINE HYDROCHLORIDE 12.5 MG: 25 INJECTION INTRAMUSCULAR; INTRAVENOUS at 23:57

## 2021-01-01 RX ADMIN — LISINOPRIL 5 MG: 2.5 TABLET ORAL at 09:13

## 2021-01-01 RX ADMIN — VANCOMYCIN HYDROCHLORIDE 1750 MG: 1 INJECTION, POWDER, LYOPHILIZED, FOR SOLUTION INTRAVENOUS at 14:13

## 2021-01-01 RX ADMIN — DEXAMETHASONE SODIUM PHOSPHATE 2 MG: 4 INJECTION, SOLUTION INTRAMUSCULAR; INTRAVENOUS at 22:22

## 2021-01-01 RX ADMIN — LEVETIRACETAM 500 MG: 500 TABLET ORAL at 15:36

## 2021-01-01 RX ADMIN — ESCITALOPRAM OXALATE 10 MG: 10 TABLET ORAL at 11:07

## 2021-01-01 RX ADMIN — DOCUSATE SODIUM 50 MG AND SENNOSIDES 8.6 MG 1 TABLET: 8.6; 5 TABLET, FILM COATED ORAL at 08:11

## 2021-01-01 RX ADMIN — LEVOTHYROXINE SODIUM 125 MCG: 0.07 TABLET ORAL at 09:01

## 2021-01-01 RX ADMIN — LEVETIRACETAM 1000 MG: 500 TABLET, FILM COATED ORAL at 08:00

## 2021-01-01 RX ADMIN — LABETALOL HYDROCHLORIDE 20 MG: 5 INJECTION, SOLUTION INTRAVENOUS at 05:56

## 2021-01-01 RX ADMIN — LEVETIRACETAM 1000 MG: 10 INJECTION INTRAVENOUS at 07:56

## 2021-01-01 RX ADMIN — LEVETIRACETAM 1500 MG: 750 TABLET, FILM COATED ORAL at 19:56

## 2021-01-01 RX ADMIN — LEVETIRACETAM 1000 MG: 500 TABLET, FILM COATED ORAL at 08:44

## 2021-01-01 RX ADMIN — LEVETIRACETAM 1000 MG: 10 INJECTION INTRAVENOUS at 07:31

## 2021-01-01 RX ADMIN — LEVOTHYROXINE SODIUM 137 MCG: 0.03 TABLET ORAL at 08:36

## 2021-01-01 RX ADMIN — LEVETIRACETAM 1500 MG: 750 TABLET, FILM COATED ORAL at 20:28

## 2021-01-01 RX ADMIN — POLYETHYLENE GLYCOL 3350 17 G: 17 POWDER, FOR SOLUTION ORAL at 20:23

## 2021-01-01 RX ADMIN — DOCUSATE SODIUM 50 MG AND SENNOSIDES 8.6 MG 1 TABLET: 8.6; 5 TABLET, FILM COATED ORAL at 20:19

## 2021-01-01 RX ADMIN — LEVETIRACETAM 1000 MG: 500 TABLET, FILM COATED ORAL at 08:16

## 2021-01-01 RX ADMIN — POTASSIUM CHLORIDE 10 MEQ: 7.46 INJECTION, SOLUTION INTRAVENOUS at 05:49

## 2021-01-01 RX ADMIN — HYDRALAZINE HYDROCHLORIDE 20 MG: 20 INJECTION, SOLUTION INTRAMUSCULAR; INTRAVENOUS at 20:34

## 2021-01-01 RX ADMIN — Medication 1 SPRAY: at 16:36

## 2021-01-01 RX ADMIN — LEVETIRACETAM 1000 MG: 500 TABLET, FILM COATED ORAL at 20:26

## 2021-01-01 RX ADMIN — LEVETIRACETAM 1000 MG: 10 INJECTION INTRAVENOUS at 19:36

## 2021-01-01 RX ADMIN — FENTANYL CITRATE 50 MCG: 50 INJECTION, SOLUTION INTRAMUSCULAR; INTRAVENOUS at 13:38

## 2021-01-01 RX ADMIN — LABETALOL HYDROCHLORIDE 20 MG: 5 INJECTION, SOLUTION INTRAVENOUS at 05:28

## 2021-01-01 RX ADMIN — LORATADINE 10 MG: 10 TABLET ORAL at 20:15

## 2021-01-01 RX ADMIN — CEFAZOLIN SODIUM 2 G: 2 INJECTION, SOLUTION INTRAVENOUS at 22:22

## 2021-01-01 RX ADMIN — SODIUM CHLORIDE: 9 INJECTION, SOLUTION INTRAVENOUS at 17:05

## 2021-01-01 RX ADMIN — GLYCOPYRROLATE 0.1 MG: 0.2 INJECTION, SOLUTION INTRAMUSCULAR; INTRAVENOUS at 18:37

## 2021-01-01 RX ADMIN — LABETALOL HYDROCHLORIDE 10 MG: 5 INJECTION, SOLUTION INTRAVENOUS at 05:13

## 2021-01-01 RX ADMIN — ROCURONIUM BROMIDE 50 MG: 10 INJECTION INTRAVENOUS at 13:04

## 2021-01-01 RX ADMIN — MAGNESIUM SULFATE IN WATER 2 G: 40 INJECTION, SOLUTION INTRAVENOUS at 00:35

## 2021-01-01 RX ADMIN — LEVETIRACETAM 1000 MG: 500 TABLET, FILM COATED ORAL at 19:55

## 2021-01-01 RX ADMIN — FAMOTIDINE 20 MG: 20 TABLET ORAL at 12:25

## 2021-01-01 RX ADMIN — LABETALOL HYDROCHLORIDE 40 MG: 5 INJECTION, SOLUTION INTRAVENOUS at 09:18

## 2021-01-01 ASSESSMENT — ENCOUNTER SYMPTOMS
DIZZINESS: 0
DIARRHEA: 0
COUGH: 0
DYSURIA: 0
CONFUSION: 0
ABDOMINAL DISTENTION: 0
SEIZURES: 1
NECK PAIN: 0
CHILLS: 0
BACK PAIN: 0
HEADACHES: 0
SORE THROAT: 0
SEIZURES: 0
NAUSEA: 0
SEIZURES: 1
WEAKNESS: 1
PALPITATIONS: 0
SPEECH DIFFICULTY: 1
ARTHRALGIAS: 0
FATIGUE: 0
VOMITING: 0
FREQUENCY: 0
MYALGIAS: 0
COLOR CHANGE: 0
SHORTNESS OF BREATH: 0
EYE PAIN: 0
SEIZURES: 1
ABDOMINAL PAIN: 0
CHEST TIGHTNESS: 0
FACIAL ASYMMETRY: 1
FEVER: 0
DIFFICULTY URINATING: 0
CONSTIPATION: 0

## 2021-01-01 ASSESSMENT — ACTIVITIES OF DAILY LIVING (ADL)
IADL_COMMENTS: FAMILY ABLE TO ASSIST WITH IADLS PRN
ADLS_ACUITY_SCORE: 13
ADLS_ACUITY_SCORE: 12
ADLS_ACUITY_SCORE: 13
ADLS_ACUITY_SCORE: 12
ADLS_ACUITY_SCORE: 14
ADLS_ACUITY_SCORE: 13
ADLS_ACUITY_SCORE: 12
ADLS_ACUITY_SCORE: 13
ADLS_ACUITY_SCORE: 12
ADLS_ACUITY_SCORE: 14
ADLS_ACUITY_SCORE: 13
ADLS_ACUITY_SCORE: 15
ADLS_ACUITY_SCORE: 13
ADLS_ACUITY_SCORE: 15
ADLS_ACUITY_SCORE: 13
ADLS_ACUITY_SCORE: 13
ADLS_ACUITY_SCORE: 12
ADLS_ACUITY_SCORE: 12
ADLS_ACUITY_SCORE: 15
ADLS_ACUITY_SCORE: 11
ADLS_ACUITY_SCORE: 13
ADLS_ACUITY_SCORE: 13
ADLS_ACUITY_SCORE: 12
ADLS_ACUITY_SCORE: 13
ADLS_ACUITY_SCORE: 13
PREVIOUS_RESPONSIBILITIES: MEAL PREP;WORK
ADLS_ACUITY_SCORE: 12
ADLS_ACUITY_SCORE: 13
ADLS_ACUITY_SCORE: 12
ADLS_ACUITY_SCORE: 15
ADLS_ACUITY_SCORE: 13
ADLS_ACUITY_SCORE: 14
ADLS_ACUITY_SCORE: 13
ADLS_ACUITY_SCORE: 15
ADLS_ACUITY_SCORE: 13
ADLS_ACUITY_SCORE: 15
ADLS_ACUITY_SCORE: 12
ADLS_ACUITY_SCORE: 13
ADLS_ACUITY_SCORE: 14
ADLS_ACUITY_SCORE: 11
ADLS_ACUITY_SCORE: 13
ADLS_ACUITY_SCORE: 13
ADLS_ACUITY_SCORE: 9
ADLS_ACUITY_SCORE: 14
ADLS_ACUITY_SCORE: 13
ADLS_ACUITY_SCORE: 13
ADLS_ACUITY_SCORE: 11
ADLS_ACUITY_SCORE: 13
ADLS_ACUITY_SCORE: 14
ADLS_ACUITY_SCORE: 13
ADLS_ACUITY_SCORE: 13
ADLS_ACUITY_SCORE: 14
ADLS_ACUITY_SCORE: 14
ADLS_ACUITY_SCORE: 12
ADLS_ACUITY_SCORE: 15
ADLS_ACUITY_SCORE: 13
ADLS_ACUITY_SCORE: 14
ADLS_ACUITY_SCORE: 13
ADLS_ACUITY_SCORE: 13
PREVIOUS_RESPONSIBILITIES: MEAL PREP;HOUSEKEEPING;LAUNDRY;SHOPPING;YARDWORK;MEDICATION MANAGEMENT;FINANCES;DRIVING;WORK
ADLS_ACUITY_SCORE: 13
ADLS_ACUITY_SCORE: 15
ADLS_ACUITY_SCORE: 13

## 2021-01-01 ASSESSMENT — PAIN SCALES - GENERAL
PAINLEVEL: NO PAIN (0)
PAINLEVEL: NO PAIN (1)
PAINLEVEL: NO PAIN (0)

## 2021-01-01 ASSESSMENT — VISUAL ACUITY
OU: NOT TESTABLE
OU: NOT TESTABLE
OU: BASELINE;NORMAL ACUITY
OU: NORMAL ACUITY;BASELINE
OU: NORMAL ACUITY
OU: NOT TESTABLE
OU: NOT TESTABLE
OU: BASELINE;NORMAL ACUITY
OU: BASELINE;NORMAL ACUITY
OU: NOT TESTABLE
OU: NORMAL ACUITY

## 2021-01-01 ASSESSMENT — LIFESTYLE VARIABLES
TOBACCO_USE: 0
TOBACCO_USE: 0

## 2021-01-01 ASSESSMENT — MIFFLIN-ST. JEOR
SCORE: 1561.25
SCORE: 1565.25
SCORE: 1574.69
SCORE: 1552.96
SCORE: 1602.25
SCORE: 1543.25
SCORE: 1625.04
SCORE: 1610.25
SCORE: 1525.25
SCORE: 1591.25
SCORE: 1601.25
SCORE: 1562.44
SCORE: 1600.55
SCORE: 1621.25

## 2021-01-01 ASSESSMENT — 6 MINUTE WALK TEST (6MWT): TOTAL DISTANCE WALKED (FT): 1586

## 2021-01-04 NOTE — LETTER
1/4/2021         RE: Erasto Maher  [address]  [facility] [address]        Dear Colleague,    Thank you for referring your patient, Erasto Maher, to the [facility]. Please see a copy of my visit note below.    Chief Complaint   Patient presents with     Blood Draw     VPt blood draw from right arm only           Again, thank you for allowing me to participate in the care of your patient.        Sincerely,        [facility] Lab Draw

## 2021-01-04 NOTE — H&P
Pre-Operative H & P     CC:  Preoperative exam to assess for increased cardiopulmonary risk while undergoing surgery and anesthesia.    Date of Encounter: 1/4/2021  Primary Care Physician:  Ranjit Edwards     Reason for visit: pre operative examination, glioblastoma    HPI  Erasto Maher is a 55 year old male who presents for pre-operative H & P in preparation for Intra-operative Magnetic Resonance Imaging GUIDANCE CRANIOTOMY  with Dr. Chan on 1/13/21 at [facility].     Aristeo Maher is a 54 year old man with allergic rhinitis, seizures, headache hypothyroidism, GERD, depression and insomnia that has a glioblastoma.  He was diagnosed in June 2019 after presenting with a seizure.  He initially had a surgical resection at [facility] in June 2019.  He then decided to seek oncology care at [facility] in [place].  He completed radiation there in Sept 2019.  He has since had immunotherapy with atezolizumab and oral chemo with Temodar.  His last immunotherapy was on 3/16/20 and his last temodar cycle completed 1.5 weeks ago.  He presented to the emergency department on 3/28/20 with severe headache and N/V.  An MRI was done and he was found to have progression of the glioblastoma.  He underwent repeat craniotomy and Gamma tile placement on 4/9/20 with Dr. Chan. He then completed a repeat course of radiation and unfortunately imaging on 11/2020 showed new distant lesion consistent with disease progression. He is now a part of the [employer] Clinical Trial which he will start tomorrow and then is scheduled for the procedure as above.      History is obtained from the patient and chart review    Past Medical History  Past Medical History:   Diagnosis Date     Allergic rhinitis      Anemia      GERD (gastroesophageal reflux disease)      Glioblastoma (H)      Insomnia      PONV (postoperative nausea and vomiting)      Seizure (H)        Past Surgical History  Past  Surgical History:   Procedure Laterality Date     APPENDECTOMY  11/1981     COLONOSCOPY       CRANIOTOMY  06/28/2019     HERNIA REPAIR      x2     MRI CRANIOTOMY WITH OPTICAL TRACKING SYSTEM Right 4/9/2020    Procedure: intraoperative magnetic resonance imaging/stealth assisted right craniotomy, with gammatile placement;  Surgeon: Marquise Chan MD;  Location: [facility] OR       Hx of Blood transfusions/reactions: denies     Hx of abnormal bleeding or anti-platelet use: none    Menstrual history: No LMP for male patient.:     Steroid use in the last year: dexamethasone daily    Personal or FH with difficulty with Anesthesia:  PONV    Prior to Admission Medications  Current Outpatient Medications   Medication Sig Dispense Refill     escitalopram (LEXAPRO) 10 MG tablet Take 10 mg by mouth every morning        levETIRAcetam (KEPPRA) 1000 MG tablet Take 1 tablet (1,000 mg) by mouth 2 times daily 180 tablet 1     levothyroxine (SYNTHROID/LEVOTHROID) 125 MCG tablet Take 125 mcg by mouth every morning        melatonin 5 MG tablet Take 5 mg by mouth nightly as needed        omeprazole (PRILOSEC) 20 MG DR capsule Take 20 mg by mouth as needed        Psyllium 500 MG CAPS Take 4 capsules by mouth as needed        dexamethasone (DECADRON) 2 MG tablet Take 2 tablets (4 mg) by mouth daily (with breakfast) 30 tablet 1     fluticasone (FLONASE) 50 MCG/ACT nasal spray Spray 1 spray into both nostrils nightly as needed        loratadine (CLARITIN) 10 MG tablet Take 10 mg by mouth nightly as needed        olopatadine (PATANOL) 0.1 % ophthalmic solution Place 1-2 drops into both eyes daily as needed          Allergies  Allergies   Allergen Reactions     No Clinical Screening - See Comments Rash     Cats and dogs         Social History  Social History     Socioeconomic History     Marital status:      Spouse name: Not on file     Number of children: 2     Years of education: Not on file     Highest education level: Not on file    Occupational History     Occupation: self-employed   Social Needs     Financial resource strain: Not on file     Food insecurity     Worry: Not on file     Inability: Not on file     Transportation needs     Medical: Not on file     Non-medical: Not on file   Tobacco Use     Smoking status: Never Smoker     Smokeless tobacco: Never Used   Substance and Sexual Activity     Alcohol use: Yes     Frequency: 2-4 times a month     Drinks per session: 3 or 4     Drug use: Not Currently     Sexual activity: Not Currently     Partners: Female   Lifestyle     Physical activity     Days per week: Not on file     Minutes per session: Not on file     Stress: Not on file   Relationships     Social connections     Talks on phone: Not on file     Gets together: Not on file     Attends Mandaen service: Not on file     Active member of club or organization: Not on file     Attends meetings of clubs or organizations: Not on file     Relationship status: Not on file     Intimate partner violence     Fear of current or ex partner: Not on file     Emotionally abused: Not on file     Physically abused: Not on file     Forced sexual activity: Not on file   Other Topics Concern     Not on file   Social History Narrative     Not on file       Family History  Family History   Problem Relation Age of Onset     Hypotension Mother      Myocardial Infarction Father      Pacemaker Father      Endocrine Disease Father         prediabetes     No Known Problems Brother        Review of Systems  ROS/MED HX    ENT/Pulmonary:  - neg pulmonary ROS     Neurologic:     (+)seizures last seizure: 6/2019 features: unknown, other neuro headache, left sided weakness and lack of left hand dexterity    Cardiovascular:  - neg cardiovascular ROS   (+) ----. : . . . :. . Previous cardiac testing date:results:Stress Testdate:10/2017 results:Final Impressions:   1. Excellent exercise duration and workload.   2. During stress exam the patient developed no  "significant symptoms.   3. Maximum stress test with 111.2% of age predicted maximum heart rate.   4. There were ischemic changes by EKG during stress, which rapidly normalized in early recovery.   5. Post stress, normal LV size, increased systolic function with an estimated EF of > 80%.   6. Despite the described EKG changes, there were no corresponding echcardiographic signs of ischemia.   7. Negative stress echo for ischemia.ECG reviewed date:4/3/2020 results: date: results:          METS/Exercise Tolerance:  >4 METS   Hematologic:     (+) Anemia, -     (-) history of blood clots and History of Transfusion   Musculoskeletal:  - neg musculoskeletal ROS       GI/Hepatic:     (+) GERD Asymptomatic on medication,       Renal/Genitourinary:  - ROS Renal section negative       Endo:     (+) thyroid problem hypothyroidism, Chronic steroid usage (decadron 2 mg daily ) for Other Date most recently used: daily,.      Psychiatric:     (+) psychiatric history depression and other (comment) (difficulty coping, insomnia )      Infectious Disease:  - neg infectious disease ROS       Malignancy:   (+) Malignancy History of Skin and Other  Skin CA Remission status post Surgery, Other CA glioblastoma Active status post Surgery, Chemo and Radiation         Other:    (+) no H/O Chronic Pain,no other significant disability          The complete review of systems is negative other than noted in the HPI or here.   Temp: 98.1  F (36.7  C)   BP: 129/85 Pulse: 73   Resp: 15 SpO2: 98 %         175 lbs 0 oz  5' 10\"   Body mass index is 25.11 kg/m .       Physical Exam  Constitutional: Awake, alert, cooperative, no apparent distress, and appears stated age.  Eyes: Pupils equal, round and reactive to light, extra ocular muscles intact, sclera clear, conjunctiva normal.  HENT: Normocephalic, oral pharynx with moist mucus membranes, good dentition. No goiter appreciated.   Respiratory: Clear to auscultation bilaterally, no crackles or " wheezing.  Cardiovascular: Regular rate and rhythm, normal S1 and S2, and no murmur noted.  Carotids +2, no bruits. No edema. Palpable pulses to radial  DP and PT arteries.   GI: Normal bowel sounds, soft, non-distended, non-tender, no masses palpated, no hepatosplenomegaly.  Large bruise on left flank  Lymph/Hematologic: No cervical lymphadenopathy and no supraclavicular lymphadenopathy.  Genitourinary:  defer  Skin: Warm and dry.  No rashes at anticipated surgical site.   Musculoskeletal: Full ROM of neck. There is no redness, warmth, or swelling of the joints. Left hand wekaness   Neurologic: Awake, alert, oriented to name, place and time. Cranial nerves II-XII are grossly intact. Gait is normal.   Neuropsychiatric: Calm, cooperative. Normal affect.     Labs: (personally reviewed)  Results for MAGNOLIA LIZARRAGA (MRN [id number]) as of 1/4/2021 13:54   Ref. Range 1/4/2021 12:41   Sodium Latest Ref Range: 133 - 144 mmol/L 136   Potassium Latest Ref Range: 3.4 - 5.3 mmol/L 4.0   Chloride Latest Ref Range: 94 - 109 mmol/L 100   Carbon Dioxide Latest Ref Range: 20 - 32 mmol/L 31   Urea Nitrogen Latest Ref Range: 7 - 30 mg/dL 19   Creatinine Latest Ref Range: 0.66 - 1.25 mg/dL 0.88   GFR Estimate Latest Ref Range: >60 mL/min/1.73_m2 >90   GFR Estimate If Black Latest Ref Range: >60 mL/min/1.73_m2 >90   Calcium Latest Ref Range: 8.5 - 10.1 mg/dL 8.8   Anion Gap Latest Ref Range: 3 - 14 mmol/L 5   Phosphorus Latest Ref Range: 2.5 - 4.5 mg/dL 3.8   Albumin Latest Ref Range: 3.4 - 5.0 g/dL 3.4   Protein Total Latest Ref Range: 6.8 - 8.8 g/dL 6.6 (L)   Bilirubin Total Latest Ref Range: 0.2 - 1.3 mg/dL 0.3   Alkaline Phosphatase Latest Ref Range: 40 - 150 U/L 64   ALT Latest Ref Range: 0 - 70 U/L 28   AST Latest Ref Range: 0 - 45 U/L 14   Amylase Latest Ref Range: 30 - 110 U/L 60   Lactate Dehydrogenase Latest Ref Range: 85 - 227 U/L 133   Lipase Latest Ref Range: 73 - 393 U/L 136   Glucose Latest Ref Range: 70 - 99 mg/dL  103 (H)   WBC Latest Ref Range: 4.0 - 11.0 10e9/L 6.7   Hemoglobin Latest Ref Range: 13.3 - 17.7 g/dL 12.2 (L)   Hematocrit Latest Ref Range: 40.0 - 53.0 % 36.7 (L)   Platelet Count Latest Ref Range: 150 - 450 10e9/L 152   RBC Count Latest Ref Range: 4.4 - 5.9 10e12/L 3.54 (L)   MCV Latest Ref Range: 78 - 100 fl 104 (H)   MCH Latest Ref Range: 26.5 - 33.0 pg 34.5 (H)   MCHC Latest Ref Range: 31.5 - 36.5 g/dL 33.2   RDW Latest Ref Range: 10.0 - 15.0 % 12.1   Diff Method Unknown Automated Method   % Neutrophils Latest Units: % 75.7   % Lymphocytes Latest Units: % 11.4   % Monocytes Latest Units: % 9.2   % Eosinophils Latest Units: % 2.5   % Basophils Latest Units: % 0.3   % Immature Granulocytes Latest Units: % 0.9   Nucleated RBCs Latest Ref Range: 0 /100 0   Absolute Neutrophil Latest Ref Range: 1.6 - 8.3 10e9/L 5.1   Absolute Lymphocytes Latest Ref Range: 0.8 - 5.3 10e9/L 0.8   Absolute Monocytes Latest Ref Range: 0.0 - 1.3 10e9/L 0.6   Absolute Eosinophils Latest Ref Range: 0.0 - 0.7 10e9/L 0.2   Absolute Basophils Latest Ref Range: 0.0 - 0.2 10e9/L 0.0   Abs Immature Granulocytes Latest Ref Range: 0 - 0.4 10e9/L 0.1   Absolute Nucleated RBC Unknown 0.0       EK/3/20     Sinus rhythm          Echo stress 2017     Final Impressions:     1. Excellent exercise duration and workload.     2. During stress exam the patient developed no significant symptoms.     3. Maximum stress test with 111.2% of age predicted maximum heart rate.     4. There were ischemic changes by EKG during stress, which rapidly normalized in early recovery.     5. Post stress, normal LV size, increased systolic function with an estimated EF of > 80%.     6. Despite the described EKG changes, there were no corresponding echcardiographic signs of ischemia.     7. Negative stress echo for ischemia.     The patient's records and results personally reviewed by this provider.     Outside records reviewed from: care everywhere     ASSESSMENT and  BAILEY Alberts is a 55 year old man who is scheduled for Intra-operative Magnetic Resonance Imaging GUIDANCE CRANIOTOMY  on 1/13/21 by Dr. Chan in treatment of glioblastoma.  PAC referral for risk assessment and optimization for anesthesia with comorbid conditions of allergies, anemia, headaches, left sided weakness, history of seizures, hypothyroidisms, GERD, depression, difficulty coping and insomnia:    Pre-operative considerations:     1.  Cardiac:  Functional status- METS >4.  He is very active.  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  Since the closures he has been working out at home on his lifecycle, and by lifting weights.  He had a stress test in 2017 that initially noted some possible ischemia, but conclusion was negative and the EF with exercise was >80%.  He has no diagnosed cardiac conditions and denies any cardiac symptoms today.  High risk surgery with 0.4% risk of major adverse cardiac event.  No further cardiac testing indicated    2.  Pulm:  Airway feasible.  HILLARY risk: low.   ~ Seasonal allergies - Not an issue currently. Continue claritin and flonase PRN    3.  GI:  Risk of PONV score = 3.  If > 2, anti-emetic intervention recommended.  He has a known history of PONV but reports it was well controlled last surgery on 4/9/20.  PONV prevention measures as per anesthesia DOS.       4. Endo:  He is on 2mg of decadron daily.  Stress dose steroids as per anesthesia DOS.  ~ Hypothyroidism - continue levothyroxine.        5. Neuro:  He has a history of one seizure in June 2019 prior to his glioblastoma diagnosis.  He is on keppra now and denies any seizures since. Consider seizure precautions.     ~ The patient was having left sided weakness and headaches but since re-starting dexamethasone daily has had improvement in symptoms. He continues to have left hand dexterity issues as well as left sided weakness and  reports he fell into the door knob putting on his shoes the other day. Fall precautions as indicated.   ~ Glioblastoma - the patient is a part of [employer] Clinical Trial that utilizes intratumoral adenovirus injection activating human interleukin-12 + veledimex in combination with nivolumab. Procedure as above.     6. Psych: Depression, difficult coping, insomnia - followed by psychology. Continue lexapro and PRN melatonin.     7. Heme: Anemia - hgb is stable today at 12.2. Type and screen completed.       VTE risk: 3%       The patient is optimized for their procedure. AVS with information on surgery time/arrival time, meds and NPO status given by nursing staff.          Danika Horton PA-C  Preoperative Assessment Center  [facility]  Clinic and Surgery Center  Phone: [phone]  Fax: [phone]

## 2021-01-04 NOTE — PROGRESS NOTES
Chief Complaint   Patient presents with     Blood Draw     VPt blood draw from right arm only

## 2021-01-04 NOTE — ANESTHESIA PREPROCEDURE EVALUATION
"Anesthesia Pre-Procedure Evaluation    Patient: Erasto Maher   MRN:     [id number] Gender:   male   Age:    55 year old :      1965        Preoperative Diagnosis: Glioblastoma (H) [C71.9]   Procedure(s):  Intraoperative Magnetic Resonance Image Guided Craniotomy     LABS:  CBC:   Lab Results   Component Value Date    WBC 6.7 2021    WBC 4.5 2020    HGB 12.2 (L) 2021    HGB 11.4 (L) 2020    HCT 36.7 (L) 2021    HCT 33.6 (L) 2020     2021     (L) 2020     BMP:   Lab Results   Component Value Date     2020     10/12/2020    POTASSIUM 3.9 2020    POTASSIUM 4.1 10/12/2020    CHLORIDE 104 2020    CHLORIDE 105 10/12/2020    CO2 30 2020    CO2 29 10/12/2020    BUN 19 2020    BUN 12 10/12/2020    CR 0.87 2020    CR 0.84 10/12/2020    GLC 94 2020     (H) 10/12/2020     COAGS:   Lab Results   Component Value Date    PTT 27 2020    INR 1.00 2020     POC:   Lab Results   Component Value Date     (H) 2020     OTHER:   Lab Results   Component Value Date    JUDIE 9.0 2020    PHOS 4.3 04/10/2020    MAG 1.8 04/10/2020    ALBUMIN 3.5 2020    PROTTOTAL 6.8 2020    ALT 28 2020    AST 17 2020    ALKPHOS 60 2020    BILITOTAL 0.5 2020    TSH 13.70 (H) 2020        Preop Vitals    BP Readings from Last 3 Encounters:   20 127/77   20 114/66   20 131/84    Pulse Readings from Last 3 Encounters:   20 73   20 80   20 81      Resp Readings from Last 3 Encounters:   20 16   20 16   20 16    SpO2 Readings from Last 3 Encounters:   20 98%   20 96%   20 98%      Temp Readings from Last 1 Encounters:   20 97.8  F (36.6  C) (Tympanic)    Ht Readings from Last 1 Encounters:   20 1.778 m (5' 10\")      Wt Readings from Last 1 Encounters:   20 76.3 kg (168 lb 3.2 " "oz)    Estimated body mass index is 24.13 kg/m  as calculated from the following:    Height as of 7/20/20: 1.778 m (5' 10\").    Weight as of 11/23/20: 76.3 kg (168 lb 3.2 oz).     LDA:        Past Medical History:   Diagnosis Date     Allergic rhinitis      GERD (gastroesophageal reflux disease)      Glioblastoma (H)      Insomnia      PONV (postoperative nausea and vomiting)      Seizure (H)       Past Surgical History:   Procedure Laterality Date     APPENDECTOMY  11/1981     COLONOSCOPY       CRANIOTOMY  06/28/2019     HERNIA REPAIR      x2     MRI CRANIOTOMY WITH OPTICAL TRACKING SYSTEM Right 4/9/2020    Procedure: intraoperative magnetic resonance imaging/stealth assisted right craniotomy, with gammatile placement;  Surgeon: Marquise Chan MD;  Location: [facility] OR      Allergies   Allergen Reactions     No Clinical Screening - See Comments Rash     Cats and dogs          Anesthesia Evaluation     . Pt has had prior anesthetic. Type: MAC and General    History of anesthetic complications   - PONV  patient reports he had no issues with last 4/9/20 surgery      ROS/MED HX    ENT/Pulmonary:  - neg pulmonary ROS     Neurologic: Comment: Glioblastoma s/p craniotomy and tumor resection 2019, 4/2020    (+)seizures (x2; no seizures since) last seizure: 6/2019 features: tonic clonic with last seizure; possible absence seizure with first seizure, other neuro headache, left sided weakness and lack of left hand dexterity    Cardiovascular:  - neg cardiovascular ROS   (+) ----. : . . . :. . Previous cardiac testing date:results:Stress Testdate:10/2017 results:Final Impressions:   1. Excellent exercise duration and workload.   2. During stress exam the patient developed no significant symptoms.   3. Maximum stress test with 111.2% of age predicted maximum heart rate.   4. There were ischemic changes by EKG during stress, which rapidly normalized in early recovery.   5. Post stress, normal LV size, increased systolic " function with an estimated EF of > 80%.   6. Despite the described EKG changes, there were no corresponding echcardiographic signs of ischemia.   7. Negative stress echo for ischemia.ECG reviewed date:4/3/2020 results: date: results:          METS/Exercise Tolerance:  >4 METS   Hematologic:     (+) Anemia, -     (-) history of blood clots and History of Transfusion   Musculoskeletal:  - neg musculoskeletal ROS       GI/Hepatic:     (+) GERD Asymptomatic on medication,       Renal/Genitourinary:  - ROS Renal section negative       Endo:     (+) thyroid problem hypothyroidism, Chronic steroid usage (decadron 2 mg daily ) for Other Date most recently used: daily,.      Psychiatric:     (+) psychiatric history depression and other (comment) (difficulty coping, insomnia )      Infectious Disease:  - neg infectious disease ROS       Malignancy:   (+) Malignancy History of Skin and Other  Skin CA Remission status post Surgery, Other CA glioblastoma Active status post Surgery, Chemo and Radiation         Other:    (+) no H/O Chronic Pain,no other significant disability                        PHYSICAL EXAM:   Mental Status/Neuro: A/A/O; Age Appropriate   Airway: Facies: Feasible  Mallampati: II  Mouth/Opening: Full  TM distance: > 6 cm  Neck ROM: Full   Respiratory: Auscultation: CTAB     Resp. Rate: Normal     Resp. Effort: Normal      CV: Rhythm: Regular  Heart: Normal Sounds  Edema: None  Pulses: Normal  Neck: Normal   Comments:      Dental: Normal Dentition                Assessment:   ASA SCORE: 3      Smoking Status:  Non-Smoker/Unknown   NPO Status: NPO Appropriate     Plan:   Anes. Type:  General   Pre-Medication: None   Induction:  IV (Standard)   Airway: ETT; Oral   Access/Monitoring: PIV; 2nd PIV; A-Line   Maintenance: Balanced     Postop Plan:   Postop Pain: Opioids  Postop Sedation/Airway: Not planned  Disposition: ICU     PONV Management:   Adult Risk Factors:, H/o PONV or Motion Sickness, Non-Smoker, Postop  Opioids     CONSENT: Direct conversation   Plan and risks discussed with: Patient   Blood Products: Consented (ALL Blood Products)                PAC Discussion and Assessment    ASA Classification: 3  Case is suitable for: Aspers  Anesthetic techniques and relevant risks discussed: GA  Invasive monitoring and risk discussed:   Types:   Possibility and Risk of blood transfusion discussed:   NPO instructions given:   Additional anesthetic preparation and risks discussed:   Needs early admission to pre-op area:   Other:     PAC Resident/NP Anesthesia Assessment:  Aristeo is a 55 year old man who is scheduled for Intra-operative Magnetic Resonance Imaging GUIDANCE CRANIOTOMY  on 1/13/21 by Dr. Chan in treatment of glioblastoma.  PAC referral for risk assessment and optimization for anesthesia with comorbid conditions of allergies, anemia, headaches, left sided weakness, history of seizures, hypothyroidisms, GERD, depression, difficulty coping and insomnia:    Pre-operative considerations:     1.  Cardiac:  Functional status- METS >4.  He is very active.  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  Since the closures he has been working out at home on his lifecycle, and by lifting weights.  He had a stress test in 2017 that initially noted some possible ischemia, but conclusion was negative and the EF with exercise was >80%.  He has no diagnosed cardiac conditions and denies any cardiac symptoms today.  High risk surgery with 0.4% risk of major adverse cardiac event.  No further cardiac testing indicated    2.  Pulm:  Airway feasible.  HILLARY risk: low.   ~ Seasonal allergies - Not an issue currently. Continue claritin and flonase PRN    3.  GI:  Risk of PONV score = 3.  If > 2, anti-emetic intervention recommended.  He has a known history of PONV but reports it was well controlled last surgery on 4/9/20.  PONV prevention measures  as per anesthesia DOS.       4. Endo:  He is on 2mg of decadron daily.  Stress dose steroids as per anesthesia DOS.  ~ Hypothyroidism - continue levothyroxine.        5. Neuro:  He has a history of one seizure in June 2019 prior to his glioblastoma diagnosis.  He is on keppra now and denies any seizures since. Consider seizure precautions.     ~ The patient was having left sided weakness and headaches but since re-starting dexamethasone daily has had improvement in symptoms. He continues to have left hand dexterity issues as well as left sided weakness and reports he fell into the door knob putting on his shoes the other day. Fall precautions as indicated.   ~ Glioblastoma - the patient is a part of [employer] Clinical Trial that utilizes intratumoral adenovirus injection activating human interleukin-12 + veledimex in combination with nivolumab. Procedure as above.     6. Psych: Depression, difficult coping, insomnia - followed by psychology. Continue lexapro and PRN melatonin.     7. Heme: Anemia - hgb is stable today at 12.2. Type and screen completed.       VTE risk: 3%     Patient is optimized and is acceptable candidate for the proposed procedure.  No further diagnostic evaluation is needed.     For further details of assessment, testing, and physical exam please see H and P completed on same date.    Danika Horton PA-C          Mid-Level Provider/Resident: Danika Horton PA-C  Date: 1/4/21  Time:     Attending Anesthesiologist Anesthesia Assessment:        Anesthesiologist:   Date:   Time:   Pass/Fail:   Disposition:     PAC Pharmacist Assessment:        Pharmacist:   Date:   Time:    Danika Horton PA-C

## 2021-01-04 NOTE — TELEPHONE ENCOUNTER
I spoke with Judith and gave her an update on the plan going forward to get Aristeo treated under the Expanded Access Program.  The plan was approved by the FDA and was sent to the [facility] IR[facility] today for an expedited approval.    Once we receive the IRB approval, I will be able to send the consent form to them for review and schedule a consent meeting.  Judith is hoping this can be done remotely/by Zoom.    Judith is also now aware the Nivo infusion is being rescheduled to begin 2 weeks following surgery.  Aristeo will be scheduled for labs,  and the infusion during the last week of January.    Judith has my contact info and will call if she has any questions.    Sandra Henderson RN  Clinical Research Coordinator-RN

## 2021-01-04 NOTE — PATIENT INSTRUCTIONS
Preparing for Your Surgery      Name:  Erasto Maher   MRN:  [id number]   :  1965   Today's Date:  2021       Arriving for surgery:  Surgery date:  21  Arrival time:  10AM    Restrictions due to COVID 19:  No visitors are allowed at this time.    [facility] parking is available for anyone with mobility limitations or disabilities.  ([place]  24 hours/ 7 days a week; [facility]  7 am- 3:30 pm, Mon- Fri)    Please come to:       [facility] [place] [address]  [address] [place]  [address]     -    Please proceed to the Surgery Lounge on the 3rd floor. [phone]?     - ?If you are in need of directions, wheelchair or escort please stop at the Information Desk in the lobby.  Inform the information person that you are here for surgery; a wheelchair and escort will be provided to the Surgery Lounge .?     What can I eat or drink?  -  You may eat and drink normally for up to 8 hours before your surgery. (Until 21, 5:25AM)  -  You may have clear liquids until 2 hours before surgery. (Until 21, 10AM)    Examples of clear liquids:  Water  Clear broth  Juices (apple, white grape, white cranberry  and cider) without pulp  Noncarbonated, powder based beverages  (lemonade and Abdulaziz-Aid)  Sodas (Sprite, 7-Up, ginger ale and seltzer)  Coffee or tea (without milk or cream)  Gatorade    -  No Alcohol for at least 24 hours before surgery     Which medicines can I take?    Hold Aspirin for 7 days before surgery.   Hold Multivitamins for 7 days before surgery.  Hold Supplements for 7 days before surgery.  Hold Ibuprofen (Advil, Motrin) for 1 day before surgery--unless otherwise directed by surgeon.  Hold Naproxen (Aleve) for 4 days before surgery.    -  DO NOT take these medications the day of surgery:    Psyllium    -  PLEASE TAKE these medications the day of surgery:    Escitalopram(Lexapro)  Levetiracetam(Keppra)    Levothyroxine    Dexamethasone(Decadron)    -As  Needed:    Omeprazole(Prilosec)  Flonase nasal spray    Loratadine(Claritin)   Patanol eye drops    How do I prepare myself?  - Please take 2 showers before surgery using Scrubcare or Hibiclens soap.    Use this soap only from the neck to your toes.     Leave the soap on your skin for one minute--then rinse thoroughly.      You may use your own shampoo and conditioner; no other hair products.   - Please remove all jewelry and body piercings.  - No lotions, deodorants or fragrance.  - Bring your ID and insurance card.    - All patients are required to have a Covid-19 test within 4 days of surgery/procedure.      -Patients will be contacted by the [facility] scheduling team within 1 week of surgery to make an appointment.      - Patients may call the Scheduling team at [phone] if they have not been scheduled within 4 days of  surgery.        Questions or Concerns:    - For any questions regarding the day of surgery or your hospital stay, please contact the Pre Admission Nursing Office at [phone].       - If you have health changes between today and your surgery please call your surgeon.       For questions after surgery please call your surgeons office.

## 2021-01-07 NOTE — PROGRESS NOTES
Just reaching out to see if you can clarify as to why a Beta HCG Qual was ordered for this patient. I just want to know if that is what you actually wanted or if it was an accident. Thank you -Jaye BARTON

## 2021-01-07 NOTE — NURSING NOTE
[facility] 2020 : Informed Consent Note     The consent form, including purpose, risks and benefits, was reviewed with Erasto Maher, and all questions were answered before he signed the consent form. The patient understands that the study involves an active treatment phase as well as a post-treatment follow up phase.     Present during the discussion were Aristeo, the patient, and his spouse, Judith.  The consent was done using [employer] due to the pandemic.  A copy of the newly-approved consent was emailed the evening before for their review.    A copy of the signed form was emailed back  to the patient on the morning of 1/7/21.     Consent Version Date: 1/5/21  Consent obtained by: Sandra Henderson RN    Date: 1/6/21  HIPAA authorization signed?: yes  HIPAA authorization version date: 11/4/19 (revised HIPAA form)    Sandra Henderson RN  Clinical Research Coordinator-RN    Form 503.03.01 (Version 2)     Effective date: 01AUG2018     Next Review Date: 01AUG2020

## 2021-01-13 NOTE — OR NURSING
Pt has vanco, gent and keppra stated that are due preop. Anne CRNA charge asked what should be given before patient goes to OR and she stated to start vanco and the rest will be given in OR.

## 2021-01-13 NOTE — TELEPHONE ENCOUNTER
Triage received a call from radiology to report urgent finding on Brain MRI which shows:New peripherally postcontrast T1 hyperintense lesion within the right parietal parasagittal area, superior to this mass,  which could represent either recurrent tumor or radiation necrosis. Increase in leftward midline shift    Paged Dr. Chan per protocol, he acknowledged findings.

## 2021-01-14 NOTE — PLAN OF CARE
OT 3C: Cancel - OT consult received. Pt limited by nausea this AM, therapist unable to check back at a later time due to scheduling constraints. Will reschedule.

## 2021-01-14 NOTE — ANESTHESIA POSTPROCEDURE EVALUATION
Anesthesia POST Procedure Evaluation    Patient: Erasto Maher   MRN:     [id number] Gender:   male   Age:    55 year old :      1965        Preoperative Diagnosis: Glioblastoma (H) [C71.9]   Procedure(s):  Image Guided Craniotomy, autoguide, virus injection   Postop Comments: No value filed.     Anesthesia Type: General       Disposition: Admission   Postop Pain Control: Uneventful            Sign Out: Well controlled pain   PONV: No   Neuro/Psych: Uneventful            Sign Out: Acceptable/Baseline neuro status   Airway/Respiratory: Uneventful            Sign Out: Acceptable/Baseline resp. status   CV/Hemodynamics: Uneventful            Sign Out: Acceptable CV status   Other NRE: NONE   DID A NON-ROUTINE EVENT OCCUR? No         Last Anesthesia Record Vitals:  CRNA VITALS  2021 1906 - 2021 2006      2021             NIBP:  132/78    Ht Rate:  75          Last PACU Vitals:  Vitals Value Taken Time   /83 21 2100   Temp 36.5  C (97.7  F) 21   Pulse 77 21 210   Resp 18 21   SpO2 100 % 21   Temp src     NIBP 132/78 21   Pulse     SpO2     Resp     Temp     Ht Rate 75 21   Temp 2     Vitals shown include unvalidated device data.      Electronically Signed By: Alanna Kline MD, 2021, 9:02 PM

## 2021-01-14 NOTE — PROGRESS NOTES
Admitted/transferred from: PACU  Reason for admission/transfer: frequent neuro checks  2 RN skin assessment: completed by Mana SANTOS and Iraida WELLS  Result of skin assessment and interventions/actions: Primapor to forehead.   Height, weight, drug calc weight: Done  Patient belongings (see Flowsheet)  MDRO education added to care plan: N/A  ?

## 2021-01-14 NOTE — PROGRESS NOTES
Neuro- PERRLA. Arouses to voice. Lethargic and slow to respond at times but remains A&Ox4. L sided weakness and left sided neglect noted. L pronator drift present. PO dilaudid given for pain.   CV- Tmax 99.6. Sinus rhythm 80s-100s. No ectopy. SBP <140 achieved with no PRNs needed.  Pulm-Lungs clear on room air. Denies SOB.  GI- Tolerated clear liquids. Changed to regular diet. Intermittent nausea with movement. 1 episode of emesis. Reglan ordered by team and given with some relief.   - Straight cathed at 0100 for 900ml. Bladder scanned again at 0530 for 797ml. Straight cathed for 1000ml. Some sediment/mucous noted after second straight cath.    Will continue to monitor and assess for changes.

## 2021-01-14 NOTE — ANESTHESIA PROCEDURE NOTES
Airway   Date/Time: 1/13/2021 5:41 PM   Patient location during procedure: OR    Staff -   Anesthesiologist:  Alanna Kline MD  Resident/Fellow: Sylvester Evans MD  Performed By: resident    Consent for Airway   Urgency: elective    Indications and Patient Condition  Indications for airway management: alphonso-procedural  Induction type:intravenousMask difficulty assessment: 1 - vent by mask    Final Airway Details  Final airway type: endotracheal airway  Successful airway:ETT - single  Endotracheal Airway Details   ETT size (mm): 7.5  Cuffed: yes  Successful intubation technique: direct laryngoscopy  Grade View of Cords: 2  Adjucts: stylet  Measured from: gums/teeth  Secured at (cm): 23  Secured with: pink tape  Bite block used: None    Post intubation assessment   Placement verified by: capnometry, equal breath sounds and chest rise   Number of attempts at approach: 1  Secured with:pink tape  Ease of procedure: easy  Dentition: Intact and Unchanged

## 2021-01-14 NOTE — BRIEF OP NOTE
[facility]     Brief Operative Note    Pre-operative diagnosis: Glioblastoma (H) [C71.9]  Post-operative diagnosis Same as pre-operative diagnosis    Procedure: Procedure(s):  Image Guided Craniotomy, autoguide, virus injection  Surgeon: Surgeon(s) and Role:     * Marquise Chan MD - Primary     * Arlyn Ahumada MD - Resident - Assisting  Anesthesia: General   Estimated blood loss: 5 mL  Drains: None  Specimens:   ID Type Source Tests Collected by Time Destination   A : right brain tumor  Tissue Brain SURGICAL PATHOLOGY EXAM Marquise Chan MD 1/13/2021  6:50 PM      Findings:   specimen sent, virus injected.  Complications: None.  Implants:   Implant Name Type Inv. Item Serial No.  Lot No. LRB No. Used Action   IMP SCR SYN MATRIX LOW PRO [id number] SELF DRILL [id number] Metallic Hardware/[place] IMP SCR SYN MATRIX LOW PRO 1.5X04MM SELF DRILL [id number]  [employer] N/A Right 3 Implanted   IMP BUR HOLE COVER 24MM LOW PROFILE .528 Metallic Hardware/[place] IMP BUR HOLE COVER 24MM LOW PROFILE .528  [employer] N/A Right 1 Implanted     Skin: monocryl and exofin      Arlyn Ahumada MD  Neurosurgery Resident, PGY-2    Please contact neurosurgery resident on call with questions.    Dial * * *[phone], enter [phone] when prompted. \

## 2021-01-14 NOTE — ANESTHESIA CARE TRANSFER NOTE
Patient: Erasto Maher    Procedure(s):  Image Guided Craniotomy, autoguide, virus injection    Diagnosis: Glioblastoma (H) [C71.9]  Diagnosis Additional Information: No value filed.    Anesthesia Type:   General     Note:  Airway :ETT  Patient transferred to:PACU  Comments: Airway :Face Mask  Patient transferred to:PACU  Comments: Prior to extubation, patient was reversed with 200 mg of Sugammadex and shortly afterwards observed with spontaneous breathing with regular pattern and proper tidal volumes. Patient woke up, opened their eyes to verbal stimuli and followed basic commands. Patient was suctioned and extubated without complication.   Transported to PACU on 6L O2 via face mask.   VSS upon arrival to PACU.  Patient denies nausea or pain at this time.   Care transfer plan communicated, appropriate time for questions was given and patient care transferred to PACU RN       Sylvester Evans MD    Handoff Report: Identifed the Patient, Identified the Reponsible Provider, Reviewed the pertinent medical history, Discussed the surgical course, Reviewed Intra-OP anesthesia mangement and issues during anesthesia, Set expectations for post-procedure period and Allowed opportunity for questions and acknowledgement of understanding      Vitals: (Last set prior to Anesthesia Care Transfer)    CRNA VITALS  1/13/2021 1906 - 1/13/2021 1946      1/13/2021             NIBP:  132/78    Ht Rate:  75                Electronically Signed By: Sylvester Evans MD  January 13, 2021  7:46 PM

## 2021-01-14 NOTE — PROGRESS NOTES
[facility], [place]   Neurosurgery Progress Note:    Assessment: Erasto Maher is a 55 year old male with history of recurrent multifocal glioblastoma s/p multiple resections, Gamma Knife radiation, and chemotherapies who underwent craniotomy for biopsy and Ziopharm virus injection by Dr. Chan on 1/13/21. Stable postop.    Plan:  - Serial neuro exams  - Pain control  - Follow daily labs   - CBC+diff, CMP, ESR, CRP, INR/PTT, LDH, amylase, lipase  - Veledimex dosing per Dr. Chan  - Please contact the Neurosurgery resident on call for all medication changes  - Follow up AM head CT  - Follow up brain MRI in AM  - Advance diet as tolerated  - IVF until taking adequate PO  - PT/OT  - SCDs for DVT proph    Greatly appreciate the help from PT/OT in caring for Erasto Maher    -----------------------------------  Bryce Aquino MD  Neurosurgery PGY-2    Interval History/Subjective: Complaining of moderate headache. At neurologic baseline.    Objective:   Temp:  [97.6  F (36.4  C)-97.9  F (36.6  C)] 97.9  F (36.6  C)  Pulse:  [71-81] 76  Resp:  [14-22] 14  BP: (131-144)/() 131/83  MAP:  [78 mmHg-110 mmHg] 78 mmHg  Arterial Line BP: (115-149)/(59-83) 115/59  SpO2:  [97 %-100 %] 97 %  I/O last 3 completed shifts:  In: 1101.33 [I.V.:1101.33]  Out: 355 [Urine:350; Blood:5]    Gen: Appears comfortable, NAD  Wound: Incision clean, dry, intact without strikethrough  Neurologic:  - Alert & Oriented to person, place, time, and situation. Abulic affect.  - Follows commands briskly  - Speech fluent, spontaneous. No aphasia or dysarthria.  - No gaze preference. No apparent hemineglect.  - PERRL, EOMI  - Strong brow raise, eye closure, and smile  - Face symmetric with sensation intact to light touch  - Left pronator drift  - Moderate left neglect     Del Tr Bi WE WF Gr   R 5 5 5 5 5 5   L 4 4 4+ 4 4 4    HF KE KF DF PF EHL   R 5 5 5 5 5 5   L 4 4 4 4+ 4+ 4     Sensation intact and symmetric  to light touch throughout    Karnofsky Performance Score: 60    LABS  Recent Labs   Lab Test 01/04/21  1241 11/23/20  0803 10/12/20  0937   WBC 6.7 4.5 4.3   HGB 12.2* 11.4* 12.6*   * 101* 98    126* 137*       Recent Labs   Lab Test 01/13/21 2054 01/04/21  1241 11/23/20  0803 10/12/20  0937   NA  --  136 138 138   POTASSIUM 4.1 4.0 3.9 4.1   CHLORIDE  --  100 104 105   CO2  --  31 30 29   BUN  --  19 19 12   CR 0.87 0.88 0.87 0.84   ANIONGAP  --  5 4 4   JUDIE  --  8.8 9.0 8.7   GLC  --  103* 94 102*       IMAGING  Recent Results (from the past 24 hour(s))   MR Brain w Contrast   Result Value    Radiologist flags (Urgent)     New peripherally postcontrast T1 hyperintense lesion    Narrative    Brain MRI with contrast primarily for the purposes of stereotactic  evaluation    History: Anaplastic gliomas/glioblastoma, 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    Per EPIC: Presented with right parietal glioblastoma, underwent GTR  6/27/2019 (IDHwt, MGMT promoter unmethylated). Enrolled in clinical  trial with atezolizumab (anti-PDL1) + TMZ + RT trial. Recurred 3/2020;  repeat GTR with gammatile implant on 4/9/2020, subsequently on  Lomustine monotherapy. Radiation treatment to the right parietal,  right temporal and right lesion.    ICD-10: Glioblastoma (H).    Comparison: Stealth MRI from 12/4/2020 and brain MRI from 11/23/2020.    Technique: MR imaging performed with 3-dimensonally acquired axial  T1-weighted sequences performed with intravenous contrast.    Contrast: 5ml of GADAVIST    Findings: Limited imaging for stereotactic imaging demonstrates  postsurgical changes of right parietal craniotomy for underlying mass  resection. Increase in size of the resection cavity with thick  irregular enhancement especially anterior aspect of the resection  cavity, measuring 3.6 x 4.5 x 3.3 cm, previously 4.1 x 3.2 x 3.0 cm.     No  significant change in size of the enhancing lesion posterosuperior  to resection cavity, measuring 2.3 x 1.3 x 1.6 cm, previous the 2.4 x  1.5 x 1.7 cm. New peripherally postcontrast T1 hyperintense lesion  superior to this mass, measuring 1.8 x 1.2 x 1.1 cm.    Subtle enhancing area within the right posterior temporal lobe,  inferior to resection cavity is more conspicuous on this exam,  measuring 14 x 8 mm.    Near complete resolution of the extra-axial fluid collection overlying  the right frontoparietal convexity, measuring 4 mm thick, previously  11 mm thick. Dural thickening and enhancement at the craniotomy site.  Increase in leftward midline shift, measuring 7 mm, series the 4 mm.      Impression    Impression: Limited imaging primarily for the purposes of stereotactic  localization.   1. Postsurgical changes of right parietal craniotomy with increase in  size of the underlying resection cavity with irregular enhancement at  the anterior aspect.  2. No significant change in size as of the lesion posterosuperior to  the resection cavity. New peripherally postcontrast T1 hyperintense  lesion within the right parietal parasagittal area, superior to this  mass, which could represent either recurrent tumor or radiation  necrosis.  3. Subtle enhancing area within the right posterior temporal lobe,  inferior to resection cavity is more conspicuous on this exam.  4. Near complete resolution of the right frontoparietal extra-axial  fluid collection. Increase in leftward midline shift.    [Access Center: New peripherally postcontrast T1 hyperintense lesion  within the right parietal parasagittal area, superior to this mass,  which could represent either recurrent tumor or radiation necrosis.  Increase in leftward midline shift.  ]    This report will be copied to the [facility] to ensure a  provider acknowledges the finding. Access Center is available Monday  through Friday 8am-3:30 pm.        I have  personally reviewed the examination and initial interpretation  and I agree with the findings.    FRANKIE ANAND MD

## 2021-01-14 NOTE — OR NURSING
Study-Ad-RTS-hIL-12(IDS #[id number]) 2x10 11vp  /0.1ml BIOHAZARDOUS intratumoral inj 0.1ml  Expiration 1/13/2021    This medication was injection by Dr. Chan in the right brain at the tumor site at 1900. A total of 0.1 ml was used.

## 2021-01-14 NOTE — ANESTHESIA PROCEDURE NOTES
Arterial Line Procedure Note      Staff -   Anesthesiologist:  Alanna Kline MD  Resident/Fellow: Sylvester Evans MD  Performed By: resident    Location: In OR After Induction  Procedure Start/Stop Times:     patient identified, IV checked, site marked, risks and benefits discussed, informed consent, monitors and equipment checked, pre-op evaluation and at physician/surgeon's request      Correct Patient: Yes      Correct Position: Yes      Correct Site: Yes      Correct Procedure: Yes      Correct Laterality:  Yes    Site Marked:  Yes  Line Placement:     Procedure:  Arterial Line    Patient Prep: patient draped, mask, sterile gloves, sterile gown, hat and hand hygiene      Local skin infiltration:  None    Ultrasound Guided?: Yes      Artery evaluated via ultrasound confirming patency.   Using realtime imaging, the artery was punctured and the needle was observed entering the artery.      A permanent image is entered into patient's chart.      Catheter size:  20 gauge, Quick cath    Dressing:  Tegaderm    Complications:  None obvious    Arterial waveform: Yes      IBP within 10% of NIBP: Yes

## 2021-01-15 NOTE — PROGRESS NOTES
01/15/21 1114   Quick Adds   Type of Visit Initial PT Evaluation   Living Environment   Living Environment Comments Pt unable to provide information regarding living environment 2/2 lethargy and minimal command following. Per chart review from last PT evaluation 4/10/2020 pt lives with spouse in 2 story home, full flight of stairs up to bedroom. Uncertain if this is same as current living situation.   Self-Care   Current Activity Tolerance poor   Equipment Currently Used at Home none   Activity/Exercise/Self-Care Comment Per chart review, pt was independent prior to admission.   Disability/Function   Hearing Difficulty or Deaf no   Wear Glasses or Blind no   Concentrating, Remembering or Making Decisions Difficulty no   Difficulty Communicating no   Difficulty Eating/Swallowing no   Walking or Climbing Stairs Difficulty no   Dressing/Bathing Difficulty no   Toileting issues no   Doing Errands Independently Difficulty (such as shopping) no   Fall history within last six months no   Change in Functional Status Since Onset of Current Illness/Injury yes   General Information   Onset of Illness/Injury or Date of Surgery 01/13/21   Referring Physician Arlyn Ahumada MD   Patient/Family Therapy Goals Statement (PT) unable to state   Pertinent History of Current Problem (include personal factors and/or comorbidities that impact the POC) Per chart, Assessment: Erasto Maher is a 55 year old male with history of recurrent multifocal glioblastoma s/p multiple resections, Gamma Knife radiation, and chemotherapies who underwent craniotomy for biopsy and Ziopharm virus injection by Dr. Chan on 1/13/21. Stable postop.   Existing Precautions/Restrictions fall;other (see comments)  (craniotomy)   General Observations Activity orders: up with assist   Cognition   Orientation Status (Cognition) oriented to;person   Affect/Mental Status (Cognition) confused;unresponsive  (waxes and wanes)   Follows Commands  (Cognition) follows one-step commands;0-24% accuracy  (waxes and wanes)   Cognitive Status Comments Pt alert upon writer's entry into room though becomes quickly and progressively more lethargic. Pt's alertness and responsiveness waxes and wanes. Pt initially able to state name though does not participate further than this. Pt able to wiggle R toes to command initially, does not move L UE or LE actively.   Integumentary/Edema   Integumentary/Edema no deficits were identifed   Range of Motion (ROM)   ROM Comment R UE in flexed and contracted position, full PROM though pt unable to actively move L UE or LE.   Strength   Strength Comments Unable to formally assess   Bed Mobility   Comment (Bed Mobility) Total A of 2 for supine rolling and repositioning   Clinical Impression   Criteria for Skilled Therapeutic Intervention yes, treatment indicated   PT Diagnosis (PT) Impaired functional mobility   Influenced by the following impairments Decreased strength, ROM, and endurance, impaired balance and cognition   Functional limitations due to impairments Pt requires assist for safe functional mobility   Clinical Presentation Evolving/Changing   Clinical Presentation Rationale PMH and clinical judgment   Clinical Decision Making (Complexity) moderate complexity   Therapy Frequency (PT) 5x/week   Predicted Duration of Therapy Intervention (days/wks) 2 weeks   Planned Therapy Interventions (PT) balance training;bed mobility training;gait training;home exercise program;neuromuscular re-education;ROM (range of motion);stair training;strengthening;transfer training   Anticipated Equipment Needs at Discharge (PT)   (TBD)   Risk & Benefits of therapy have been explained evaluation/treatment results reviewed;care plan/treatment goals reviewed;risks/benefits reviewed;participants voiced agreement with care plan;participants included;patient   PT Discharge Planning    PT Discharge Recommendation (DC Rec) Transitional Care Facility   PT  Rationale for DC Rec Pt currently below functional baseline, will benefit from continued skilled rehab to maximize functional independence and return to PLOF.   PT Brief overview of current status  OH lift for transfers at this time   Total Evaluation Time   Total Evaluation Time (Minutes) 8

## 2021-01-15 NOTE — PROGRESS NOTES
PROVIDER NOTIFICATION     Date/Time 1/14/21 1825   Provider Name/Title Neurosurgery MD   Method of Notification page   Notification Reason New left facial droop. Pt also having some tremoring more noticeable on left side. RN unable to cue LUE.    Intervention No new interventions. MD Aquino at bedside to assess patient.   Plan of Action MD Aquino told RN to notify if exam changes.       Addendum: MD re-paged at 1900 as pt remained somnolent and unable to answer orientation questions at this time. PT also having tremors again which was more significant on the left side. MD told RN he would come to bedside to assess patient again.

## 2021-01-15 NOTE — PROGRESS NOTES
[facility], [place]   Neurosurgery Progress Note:    Assessment: Erasto Maher is a 55 year old male with history of recurrent multifocal glioblastoma s/p multiple resections, Gamma Knife radiation, and chemotherapies who underwent craniotomy for biopsy and Ziopharm virus injection by Dr. Chan on 1/13/21. Stable postop.    Plan:  - Serial neuro exams  - Pain control  - Follow daily labs   - CBC+diff, CMP, ESR, CRP, INR/PTT, LDH, amylase, lipase  - Veledimex dosing per Dr. Chan  - Please contact the Neurosurgery resident on call for all medication changes  - Follow up AM head CT  - Follow up brain MRI in AM  - Advance diet as tolerated  - IVF until taking adequate PO  - PT/OT  - SCDs for DVT proph    Greatly appreciate the help from PT/OT in caring for Erasto Maher    -----------------------------------  Bryce Aquino MD  Neurosurgery PGY-2    Interval History/Subjective: Received Veledimex at 1640. Increased somnolence; more sluggish command-following on exam. Head CT stable.    Objective:   Temp:  [98.3  F (36.8  C)-101.7  F (38.7  C)] 101.7  F (38.7  C)  Pulse:  [] 99  Resp:  [8-18] 16  MAP:  [78 mmHg-110 mmHg] 93 mmHg  Arterial Line BP: (109-160)/(58-81) 151/65  SpO2:  [92 %-97 %] 95 %  I/O last 3 completed shifts:  In: 2767 [P.O.:270; I.V.:2497]  Out: 4800 [Urine:4050; Emesis/NG output:750]    Gen: Appears comfortable, NAD  Wound: Incision clean, dry, intact without strikethrough  Neurologic:  - Alert & Oriented to person, place, time, and situation. Abulic affect.  - Follows commands briskly  - Speech fluent, spontaneous. No aphasia or dysarthria.  - No gaze preference. No apparent hemineglect.  - PERRL, EOMI  - Strong brow raise, eye closure, and smile  - Face symmetric with sensation intact to light touch  - Left pronator drift  - Moderate left neglect     Del Tr Bi WE WF Gr   R 5 5 5 5 5 5   L 4 4 4+ 4 4 4    HF KE KF DF PF EHL   R 5 5 5 5 5 5   L 4 4 4 4+ 4+  4     Sensation intact and symmetric to light touch throughout    Karnofsky Performance Score: 60    LABS  Recent Labs   Lab Test 01/14/21  0337 01/04/21  1241 11/23/20  0803   WBC 8.6 6.7 4.5   HGB 12.2* 12.2* 11.4*    104* 101*   * 152 126*       Recent Labs   Lab Test 01/14/21  0337 01/13/21 2054 01/04/21  1241 11/23/20  0803     --  136 138   POTASSIUM 3.5 4.1 4.0 3.9   CHLORIDE 102  --  100 104   CO2 28  --  31 30   BUN 14  --  19 19   CR 0.75 0.87 0.88 0.87   ANIONGAP 6  --  5 4   JUDIE 8.5  --  8.8 9.0   *  --  103* 94       IMAGING  Recent Results (from the past 24 hour(s))   CT Head w/o Contrast    Narrative    Exam: CT HEAD W/O CONTRAST  1/14/2021 3:18 AM    History: Follow up virus injection. Right parietal glioblastoma s/p  resection, gamma knife, and chemotherapy.    Comparison:  MRI 1/13/2020 and 11/23/2020. CT 6/9/2020..    Technique: Using multidetector thin collimation helical acquisition  technique, axial, coronal and sagittal CT images from the skull base  to the vertex were obtained without intravenous contrast.     Findings:    New craniotomy over the high right frontal lobe and adjacent biopsy  changes with hypoattenuation of the underlying cortex and trace  pneumocephalus.    Stable appearance of right parietal craniotomy with underlying gamma  tiles, rounded 2.8 cm resection cavity, and edema involving the the  subcortical right frontal, temporal, and parietal lobes. Unchanged  associated mass effect, 8 mm leftward midline shift at the level of  the third ventricle (series 3 image 18) and sulcal effacement. The  third ventricle is effaced. Lateral ventricles are stable in size with  partial effacement on the right. Uncal fullness on the right without  ella herniation.    No abnormal new intra-axial collection. Unchanged extra-axial  collection over the right frontoparietal convexity. No new defects of  the great white matter differentiation. Basal cisterns are  patent.    Visualized paranasal sinuses and mastoid air cells are clear. No acute  abnormality of the orbits.      Impression    Impression:   1. New posterior frontal craniotomy and underlying biopsy changes. No  evidence for acute complication.  2. Unchanged right temporal resection, gamma tiles, and right  frontotemporoparietal white matter edema with 8 mm leftward midline  shift.    I have personally reviewed the examination and initial interpretation  and I agree with the findings.    STEPHEN MIJARES MD   CT Head w/o Contrast    Narrative    CT HEAD W/O CONTRAST 1/14/2021 1:19 PM    History: s/p ziopharm injection   ICD-10:    Comparison: 1/14/2021    Technique: Using multidetector thin collimation helical acquisition  technique, axial, coronal and sagittal CT images from the skull base  to the vertex were obtained without intravenous contrast.    Findings: Right parietal craniotomy with underlying resection cavity  with internal gamma tiles and surrounding edema within the white  matter. Stable 12 mm right to left midline shift at the level of  septum pellucidum. The basal cisterns are patent.    Mucous retention cyst in the left maxillary sinus, otherwise the  visualized portions of the paranasal sinuses and mastoid air cells are  clear.       Impression    Impression:    1. No acute intracranial pathology.  2. Stable right parietal craniotomy with underlying resection cavity  containing gamma tiles. Stable surrounding edema.  3. Stable 12 mm right to left midline shift. The basal cisterns are  patent.    SARAH LUND MD

## 2021-01-15 NOTE — PLAN OF CARE
Major Shift Events: CT done this afternoon d/t persistent lethargy. Neuro exam waxed and waned. PERRL. Pt lethargic all day. This evening pt more somnolent. AOx3 all day until 1900. Pt was not able to answer questions for the 1900 check. MD was notified. Pt consistently following commands on the right side. R side 5/5. LUE with significant neglect. Needs to be asked multiple times to follow commands with the LUE. At times moves it anti-gravity spontaneously. To command 2/5 throughout the day. LLE 3/5. Pt intermittently c/o of a headache throughout the day. Received scheduled Tylenol. All other medications held d/t lethargy. Room air. Maintained SBP<140 with hydralazine and labetalol. Sinus tachycardia. Afebrile. Frequently nauseous. Emesis x2. Able to tolerate water and pills since 1500. Was able to take study med as well this evening. Received Reglan and Phenergan for nausea throughout the day. Pt unable to void. Straight cath x1 in AM with clots noted. Coude catheter placed this evening. Intially when catheter was placed urine output was red but now is clear lety. Good UOP. Crani incision CDI. mIVF at 100cc/hr. Updated wife a couple times today. She was also updated by MD Chan.  Plan: Remain in ICU.   For vital signs and complete assessments, please see documentation flowsheets.       Problem: Pain (Craniotomy/Craniectomy/Cranioplasty)  Goal: Acceptable Pain Control  Outcome: No Change  Intervention: Prevent or Manage Pain  Recent Flowsheet Documentation  Taken 1/14/2021 1600 by Kimmy Che, RN  Pain Management Interventions: medication (see MAR)  Taken 1/14/2021 0800 by Kimmy Che, RN  Pain Management Interventions: MD notified (comment)      Pt reports discomfort and irritations to her vaginal/rectal areas. Dr. Asif Hurd notified. New orders pending.

## 2021-01-15 NOTE — PLAN OF CARE
Major Shift Events: When answering orientation questions patient is oriented X4 but does not always respond to all questions. Was lethargic and somnolent at the start of the shift, neurosurg was aware and at bedside, CT ordered to ensure no changes. Became increasingly more alert throughout the night into the morning. Left facial droop. Left sided neglect, left pronator drift, slow to respond, had tremors at the start of my shift that resolved. SBP goal less than 140, Labetalol given many times overnight (See MAR for details), Sinus rhythm to sinus tach. No signs of ectopy. T max: 99.7 (oral), 101.7 (axillary), sating adequately on room air, Ordered for regular diet, I only gave water swabs overnight for worry of decreased alertness. No BM overnight. Kaba in place for retention. PO meds held overnight for decreased alertness.   Plan: Continue frequent Neuros and ICU cares.  For vital signs and complete assessments, please see documentation flowsheets.

## 2021-01-15 NOTE — PLAN OF CARE
OT 3C: Hold - Per discussion with PT, pt not appropriate for two disciplines at this time. Will reschedule.

## 2021-01-15 NOTE — PHARMACY-VANCOMYCIN DOSING SERVICE
Pharmacy Vancomycin Initial Note  Date of Service January 15, 2021  Patient's  1965  55 year old male    Indication: Meningitis    Current estimated CrCl = Estimated Creatinine Clearance: 127 mL/min (based on SCr of 0.7 mg/dL).    Creatinine for last 3 days  2021:  8:54 PM Creatinine 0.87 mg/dL  2021:  3:37 AM Creatinine 0.75 mg/dL  1/15/2021:  4:15 AM Creatinine 0.70 mg/dL    Recent Vancomycin Level(s) for last 3 days  No results found for requested labs within last 72 hours.      Vancomycin IV Administrations (past 72 hours)                   vancomycin (VANCOCIN) 1000 mg in dextrose 5% 200 mL PREMIX (mg) 1,000 mg New Bag 21 1244              Nephrotoxins and other renal medications (From now, onward)    Start     Dose/Rate Route Frequency Ordered Stop    21 0200  vancomycin 1250 mg in 0.9% NaCl 250 mL intermittent infusion 1,250 mg      1,250 mg  over 90 Minutes Intravenous EVERY 12 HOURS 01/15/21 1258      01/15/21 1400  vancomycin (VANCOCIN) 1,750 mg in sodium chloride 0.9 % 500 mL intermittent infusion      1,750 mg  over 2 Hours Intravenous ONCE 01/15/21 1257          Contrast Orders - past 72 hours (72h ago, onward)    Start     Dose/Rate Route Frequency Ordered Stop    01/15/21 1230  gadobutrol (GADAVIST) injection 7.5 mL      7.5 mL Intravenous ONCE 01/15/21 1213 01/15/21 1229           Plan:  1.  Start vancomycin  1750 mg (~23mg/kg) IV x1 dose followed by 1250mg IV vancomycin q12h.   2.  Goal Trough Level: 15-20 mg/L   3.  Pharmacy will check trough levels as appropriate in 1-3 Days.    4. Serum creatinine levels will be ordered daily for the first week of therapy and at least twice weekly for subsequent weeks.    5. [place] method utilized to dose vancomycin therapy: Method 2     Tricia Ochoa, PharmD, BCCCP

## 2021-01-16 NOTE — PLAN OF CARE
Major Shift Events:  Blood pressure remains labile; Began shift hypertensive, administered labetalol x 1, pressures then became soft. Required 1L NS and 500 LR; Na trending upward, last result 142, 2% NS discontinued; UO significant. Team notified, Urine studies sent;     Mentation improving overnight, speech clarity improving, LUE strength improving.       Plan: Will continue to monitor and notify NSG with any changes.   For vital signs and complete assessments, please see documentation flowsheets.

## 2021-01-16 NOTE — PROGRESS NOTES
[facility], [place]   Neurosurgery Progress Note:    Assessment: Erasto Maher is a 55 year old male with history of recurrent multifocal glioblastoma s/p multiple resections, Gamma Knife radiation, and chemotherapies who underwent craniotomy for biopsy and Ziopharm virus injection by Dr. Chan on 1/13/21. Stable postop. MRI completed.    Plan:  - Serial neuro exams  - Follow EEG (will discontinue after 48 hrs if negative for seizure)  - PTA Keppra  - Pain control  - Follow daily labs   - CBC+diff, CMP, ESR, CRP, INR/PTT, LDH, amylase, lipase  - Veledimex to be given today per Dr. Chan  - Dexamethasone 3mg q8h  - Vancomycin and ceftriaxone empiric meningitis coverage  - Please contact the Neurosurgery resident on call for all medication changes  - Sodium goal normonatremia  - Advance diet as tolerated   - Patient and family amenable to NG if needed  - PT/OT  - SCDs for DVT proph    Greatly appreciate the help from PT/OT in caring for Erasto Maher    -----------------------------------    Ander Jenkins MD, PhD  Neurosurgery Resident PGY-2    Please contact neurosurgery resident on call with questions.    Dial * * [phone], enter [phone] when prompted.       Interval History/Subjective: decadron weaning initiated; veledimex given Saturday      Objective:   Temp:  [98  F (36.7  C)-99.1  F (37.3  C)] 98.3  F (36.8  C)  Pulse:  [] 90  Resp:  [12-22] 14  BP: ()/(61-97) 117/65  MAP:  [76 mmHg-151 mmHg] 76 mmHg  Arterial Line BP: (107-158)/() 114/61  SpO2:  [93 %-99 %] 96 %  I/O last 3 completed shifts:  In: 3335.25 [P.O.:1440; I.V.:1895.25]  Out: 3500 [Urine:3500]    Gen: Appears comfortable, NAD  Wound: Incision clean, dry, intact  Neurologic:  - Alert & Oriented to person, place, time, and situation.  - Follows commands briskly  - Speech fluent, spontaneous. No aphasia or dysarthria.  - No gaze preference. No apparent hemineglect.  - PERRL, EOMI  - Strong brow  raise, eye closure, and smile  - Face symmetric with sensation intact to light touch  - Left pronator drift  - Left neglect resolved     Del Tr Bi WE WF Gr   R 5 5 5 5 5 5   L 4 4 4+ 4 4 4    HF KE KF DF PF EHL   R 5 5 5 5 5 5   L 4 4 4 4+ 4+ 4     Sensation intact and symmetric to light touch throughout    Karnofsky Performance Score: 60    LABS  Recent Labs   Lab Test 01/17/21  0550 01/16/21 0315 01/15/21  0415   WBC 11.3* 6.2 8.6   HGB 11.0* 11.1* 12.8*   * 99 100   * 133* 135*       Recent Labs   Lab Test 01/17/21  0550 01/17/21  0002 01/16/21  1815 01/16/21  0315 01/16/21  0315 01/15/21  0415 01/15/21  0415    141 140   < > 141  Canceled, Test credited   < > 130*   POTASSIUM 3.7  --   --   --  3.8  Canceled, Test credited  --  3.7   CHLORIDE 111*  --   --   --  113*  Canceled, Test credited  --  98   CO2 23  --   --   --  20  Canceled, Test credited  --  24   BUN 20  --   --   --  12  Canceled, Test credited  --  10   CR 0.69  --   --   --  0.70  Canceled, Test credited  --  0.70   ANIONGAP 7  --   --   --  8  Canceled, Test credited  --  8   JUDIE 8.9  --   --   --  8.6  Canceled, Test credited  --  8.7   *  --   --   --  156*  Canceled, Test credited  --  146*    < > = values in this interval not displayed.       IMAGING  No results found for this or any previous visit (from the past 24 hour(s)).

## 2021-01-16 NOTE — PROGRESS NOTES
[facility], [place]   Neurosurgery Progress Note:    Assessment: Erasto Maher is a 55 year old male with history of recurrent multifocal glioblastoma s/p multiple resections, Gamma Knife radiation, and chemotherapies who underwent craniotomy for biopsy and Ziopharm virus injection by Dr. Chan on 1/13/21. Stable postop.    Plan:  - Serial neuro exams  - Follow EEG  - PTA Keppra  - Pain control  - Follow daily labs   - CBC+diff, CMP, ESR, CRP, INR/PTT, LDH, amylase, lipase  - Veledimex dosing per Dr. Chan  - Dexamethasone 4mg q6h  - Vancomycin and ceftriaxone empiric meningitis coverage  - Please contact the Neurosurgery resident on call for all medication changes  - Sodium goal normonatremia  - Advance diet as tolerated   - Patient and family amenable to NG if needed  - PT/OT  - SCDs for DVT proph    Greatly appreciate the help from PT/OT in caring for Erasto Maher    -----------------------------------  Bryce Aquino MD  Neurosurgery PGY-2    Interval History/Subjective: Exam significantly waxes and wanes, at times fully alert and oriented and other times somnolent. Increased somnolence yesterday, and MRI demonstrated increased vasogenic edema with increased enhancing nodularity about resection cavity. Dexamethasone started. EEG applied. Tmax 101.7 yesterday -> vancomycin and ceftriaxone empiric CNS coverage started. 2% started for hyponatremia to 130. MAPs dropped to high 50s, quickly corrected with fluid boluses. Urine output significantly increased after 2% started.    Objective:   Temp:  [98.3  F (36.8  C)-101.7  F (38.7  C)] 98.3  F (36.8  C)  Pulse:  [] 84  Resp:  [8-21] 18  MAP:  [55 mmHg-106 mmHg] 88 mmHg  Arterial Line BP: ()/(41-77) 146/63  SpO2:  [93 %-98 %] 96 %  I/O last 3 completed shifts:  In: 3296.5 [I.V.:2296.5; IV Piggyback:1000]  Out: 3100 [Urine:3100]    Gen: Appears comfortable, NAD  Wound: Incision clean, dry, intact without  strikethrough  Neurologic:  - Alert & Oriented to person, place, time, and situation. Abulic affect.  - Follows commands briskly  - Speech fluent, spontaneous. No aphasia or dysarthria.  - No gaze preference. No apparent hemineglect.  - PERRL, EOMI  - Strong brow raise, eye closure, and smile  - Face symmetric with sensation intact to light touch  - Left pronator drift  - Moderate left neglect     Del Tr Bi WE WF Gr   R 5 5 5 5 5 5   L 4 4 4+ 4 4 4    HF KE KF DF PF EHL   R 5 5 5 5 5 5   L 4 4 4 4+ 4+ 4     Sensation intact and symmetric to light touch throughout    Karnofsky Performance Score: 60    LABS  Recent Labs   Lab Test 01/15/21  0415 01/14/21  0337 01/04/21  1241   WBC 8.6 8.6 6.7   HGB 12.8* 12.2* 12.2*    100 104*   * 145* 152       Recent Labs   Lab Test 01/15/21  2155 01/15/21  1802 01/15/21  1300 01/15/21  0415 01/14/21 0337 01/13/21 2054 01/04/21  1241    130* 130* 130* 136  --  136   POTASSIUM  --   --   --  3.7 3.5 4.1 4.0   CHLORIDE  --   --   --  98 102  --  100   CO2  --   --   --  24 28  --  31   BUN  --   --   --  10 14  --  19   CR  --   --   --  0.70 0.75 0.87 0.88   ANIONGAP  --   --   --  8 6  --  5   JUDIE  --   --   --  8.7 8.5  --  8.8   GLC  --   --   --  146* 117*  --  103*       IMAGING  Recent Results (from the past 24 hour(s))   CT Head w/o Contrast    Narrative    Exam: CT HEAD W/O CONTRAST  1/14/2021 11:03 PM    History: Decreased responsiveness in patient with GBM POD1 s/p  craniotomy for viral therapy. Right parietal glioblastoma s/p  resection, gamma knife, and chemotherapy.    Comparison:  CT head exam from same day at 1:19 p.m. and 3:18 a.m..    Technique: Using multidetector thin collimation helical acquisition  technique, axial, coronal and sagittal CT images from the skull base  to the vertex were obtained without intravenous contrast.     Findings:      Extensive postprocedural changes including right frontal  craniotomy/biopsy near the vertex and  right parietal craniotomy,  resection, and gamma tyle placement. Trace residual postprocedural  pneumocephalus. Slightly increased fluid over the superior  frontoparietal scalp.    Extensive right frontotemporoparietal white matter edema is unchanged.  Associated mass effect, 12 mm leftward midline shift at the septum  pellucidum, subfalcine herniation, and third ventricular effacement  are also unchanged. Stable size of the lateral ventricles with partial  effacement on the right. Uncal fullness is unchanged..     Unchanged tiny right frontal parietal subdural collection. No evidence  of significant intracranial hemorrhage or abnormal extra-axial  collection. No new defects the great white differentiation. The basal  cisterns are patent.    Visualized paranasal sinuses are relatively clear. The mastoid air  cells are clear.       Impression    Impression:   1. No acute change since yesterday.  2. Stable sequela of right frontal biopsy and parietal resection with  extensive associated white matter edema. Associated 12 mm leftward  midline shift, subfalcine herniation, and effacement of the third  ventricle are unchanged.    I have personally reviewed the examination and initial interpretation  and I agree with the findings.    STEPHEN MIJARES MD   XR Chest Port 1 View    Narrative    Exam: XR CHEST PORT 1 VW, 1/15/2021 10:00 AM    Indication: Assess for Cardiopulmonary Disease    Comparison: No comparisons available.    Findings:   Semiupright AP radiograph of the chest. Trachea is midline. Enlarged  cardiac silhouette.. Pulmonary vascularity appears indistinct. No  appreciable pneumothorax. Bilateral trace pleural effusions. Extensive  airspace/interstitial opacities bilaterally with lower lobe  predominance.       Impression    Impression:   1. Bilateral trace pleural effusions with collection of radiographic  findings consistent with pulmonary edema versus infection.  2. Enlarged cardiac size.    I have personally  reviewed the examination and initial interpretation  and I agree with the findings.    JOSY PORTER MD   MR Brain w/o & w Contrast    Narrative     TEMPORARY 1/15/2021 12:13 PM    Provided History: Ziopharm Study Protocol; STEALTH PROTOCOL; TUMOR  PROTOCOL.    Comparison: Stealth MRI from 1/13/2021 and previous diagnostic MRI  from 11/22/2020.    Technique: Multiplanar T1-weighted, axial fat-saturated T2 FLAIR, and  axial susceptibility weighted images of the brain were obtained  without the administration of intravenous contrast. Additional  precontrast thin section coronal oblique T2-weighted and T2 FLAIR  images were obtained through the temporal lobes. After the  administration of intravenous contrast, axial diffusion weighted,  axial fat saturated T2 FLAIR, and coronal T1-weighted images of the  brain were obtained.    Contrast: 7.5mL Gadavist     Findings:  Postsurgical changes of right parietal craniotomy with underlying  resection cavity with peripheral hemosiderin deposition.     Compared to 1/13/2021 today's study;    No significant change in size of the thick irregular nodular  enhancement anterior aspect of the resection cavity (series 12 image  100), measuring 3.2 x 1.6 cm. This has increased from 11/23/2020.    Unchanged enhancement within the right posterior temporal lobe (image  88), measuring 13 x 7. This has increased from 11/23/2020.    Nodular assessment medial to the resection cavity (series 12 image  101), measuring 5 x 7 mm, stable from 1/13/2021 and new from  11/23/2020.     No significant change in enhancing lesions posterosuperior to the  resection cavity (image 131), measuring 23 x 12. This is stable from  1/13/2021 and increased from 11/23/2020.     Rounded lesion slightly superior to this lesion measuring 20 x 13 mm.  These is stable from 1/13/2021 and new from 11/23/2020.    Increase in leftward midline shift, measures 10 mm, previously 7 mm.  Unchanged small amount of extra-axial  fluid collection overlying the  right parietal convexity, measuring 4 mm.      Impression    IMPRESSION:  1. Postsurgical changes of right frontoparietal craniotomy.  2. Increase in the size of existing enhancing nodularities around the  resection cavity with development of new nodularities since 11/23/2020  as detailed above. These nodularities are stable from 1/13/2021 with  the exception of the tiny enhancing nodularity along the medial aspect  of the resection cavity which is slightly more conspicuous. Findings  are concerning for progressing recurrent tumor. Treatment related  changes cannot be entirely excluded.  3. Increase in the extent of vasogenic edema with associated leftward  midline shift since 1/13/2021.    [[Access Center: Increase in leftward midline shift.]    This report will be copied to the [facility] to ensure a  provider acknowledges the finding. Access Center is available Monday  through Friday 8am-3:30 pm.        I have personally reviewed the examination and initial interpretation  and I agree with the findings.    FRANKIE ANAND MD

## 2021-01-16 NOTE — PROVIDER NOTIFICATION
NSG provider Dr. Eaton notified that pt's MAPs to mid 50s after approximately 1 hour after administering prn Labetalol for HTN. Pt had been quite lethargic at the time and required much more stimuli to waken. After waking up (still otherwise unchanged neuro status), BPs returned to WNL. Monitor.

## 2021-01-16 NOTE — OP NOTE
Name:  Erasto Maher  MRN:  [id number]  YOB: 1965  Date of Surgery:  January 13, 2021      Pre-operative Diagnosis: multi-focal glioblastoma  Post-operative Diagnosis: same  Procedure:  1) Right craniotomy for open biopsy of the right parietal tumor  2) Stereotactic neuro-navigation  3) Ziopharm adenovirus injection  4) Stealth autoguide robotic guidance    Anesthesia: GETA    Surgeon: Marquise Monzon MD, PhD  Assistant:  Rene Ahumada MD    Description of Procedure: After pre-operative laboratory value and informed consent were verified, the patient was brought into the operating room. The patient underwent general anesthesia and intubation. Antibiotic was administered.    The patient was placed in a supine position, with the head turned to the left, exposing the right parietal cranium.  The [employer] reference frame was placed. The patient's anatomy was registered relative to the pre-operative MRI using the [employer] system.    The region overlying the tumor was identified. An incision was planned based on the location of the tumor. The area encompassing the surgical incision was prepped and draped in a sterile manner.     Time out was performed. The incision was infiltrated with 0.25% marcaine with dilute epinephrine. Hemostasis was achieved using cautery. The incision was retracted using a cerebellar retractor.  The region overlying the tumor was confirmed using the Stealth probe.    A Himanshu hole was placed at the edge of the bony exposure and extended into a craniotomy.  Dural bleeding was controlled through placement of the dural . The epidural space was packed with Surgiflow.     The dura was opened in a cruciate manner. Corticectomy was performed in the region immediate superficial to the lesion. Using the Stealth autoguide, two biopsy specimens were secured and send to pathology.     At this point, 0.1 ml's of the Ziopharm [id number] virus was injected into  the tumor using a TB syringe. The injection was carried out over a minute and the needle was left in place for another minute after the injection. Hemostasis was verified after removal of the needle. The area was packed with a Surgi-foam.     I then turned my attention to the closure.  The craniotomy flap was secured using titanium plate/screw construct. The wound was amply irrigated with bacitracin containing saline. The galea was closed with 2'0 vicryl. The skin was closed with 3'0 Monocryl. Exofin was applied.    I was present and performed the key portions of the procedure.    EBL: < 50 ml    Specimens: biopsy tumor specimens    Disposition: ICU

## 2021-01-16 NOTE — PLAN OF CARE
N: Lethargic, more alert this afternoon, now again lethargic. Oriented X4 when answering questions. Speech garbled but logical. Slight left facial droop, improving. Pupils equal and reactive. RUE/RLE strong purposeful movement, occasional tremors. LUE flexion. LLE clonus, wiggles toes. Left neglect. MRI + Video EEG started per study protocol. Decadron added. Bed alarm on as occasionally impulsive, redirectable.   C: NSR, no ectopy noted. SBP <140 maintained with frequent PRN  labetalol and hydralazine. Pulses palpable. Intermittently diaphoretic. Tmax 99  R: RA. Lungs clear, diminished in bases. Oral membranes very dry - oral care provided frequently + mouth moisturizer ordered. Chest Xray completed.   GI: tolerated sips/ popsicle with head of bed elevated and supervision. No appetite - MD aware poor PO intake. Intermittent nausea, scheduled Phenergan + PRN Reglan X1. No emesis.  LBM PTA   : Kaba due to retention, adequate UOP. UA sent per study protocol   SKIN: Crani incision - WNL, approximated, no drainage, BECKI.  ACCESS: PIV X2  GTTS: 2% @ 50 ml/hr (trending Na q4 hrs), TKO + antibiotics (Ancef + Vanco and Rocephin added today). Blood cultures sent per study protocol   ACTIVITY: Turn q2, not OOB today due to lethargy/ no lift present   SOCIAL: Wife updated by RN and Dr. Chan     PLAN: Continue to monitor closely, notify MD of changes/concerns

## 2021-01-17 NOTE — PLAN OF CARE
Major Shift Events:  Neuro: Intermittently disoriented to date and can be forgetful; easily redirected/reoriented. Patient does seem to cover for his forgetfulness and deficits. Has spatial issues, particularly when it comes to eating, though he refuses help and denies such problems. Continues to have L sided weakness, but this has improved throughout the day. Slight L facial droop; unchanged. EEG monitoring discontinued and follow up head CT obtained. PT evaluated patient today and they walked a significant distance in the halls. Up with assist of 1/SBA and gait belt. OT to evaluate tomorrow. Per NSG, patient no longer needs lab work/MD approval for Veledimex administration tomorrow.  CV: Normotensive; sinus rhythm, afebrile. Good pulses throughout. Arterial line discontinued per order.  Resp: RA, lungs clear.  GI: On calorie counts and eating well on regular diet. Formed BM today; declined stool softener.  : Urology consulted given traumatic Kaba insertion (see their note); plan to remove Kaba in AM and do trial of void per nursing.  Skin: R head incision approximated with liquid bandage; CDI. Some bruising to L arm noted, old. No acute issues.    Plan: Possible transfer out of ICU today vs tomorrow. Per team, patient may discharge home, as soon as tomorrow, though this is pending PT/OT recs and patient/family preference. PT recommends ARU and patient is eager, but worried about COVID.    For vital signs and complete assessments, please see documentation flowsheets.

## 2021-01-17 NOTE — PLAN OF CARE
"CC: 1/13 Craniotomy Biopsy & Ziopharm virus injection r/t multifocal glioblastomas     Major Shift Events  Overnight Aristeo remained in stable condition with mild issues of confusion, hallucinations, and hypertension overnight.     Confusion/Hallucinations  Pt confusion mostly related to time, pt mentions having surgery yesterday (consistent). Additionally, pt at times gets restless with feelings of having lost items (keys + deodorant). Found deodorant in personal belongings (toiletry bag) and car keys are at home (per wife Judith). Pt had hallucinations at several times overnight, seeing people in the corner of the room. Pt states, \"can you tell those girls to stop watching me?\" Addressed this issue with neurosurg, plan is to continue decadron taper in order to reduce cerebral edema.    Hypertension  @ 0500 pt experienced sustained hypertension ~152/97 (117). Goal Sys <140. Labetalol 10 mg given, no effect. Additional Labetalol 10 mg given, no effect. Per Protocol, additional Labetalol 20 mg given, no effect. Hydralazine 20 mg given, effective. See MAR for details.    Neuro: Q4 Neuros, A&Ox3 (disorient to time), Following all commands, PERRLA (3mm), LUE/LLE 4/5, RUE/RLE 5/5, slight L-facial droop, R-hand tremor, mild garbled speech, rambles  Cv: SR (60-80's), Sys <140 (labetalol x3, hydralazine x1), Pulses 2+, S1/S2, afebrile  Resp: RA, SpO2>92%, LS: clear equal bilaterally  GI/: Kaba w/ AUO, BM-, BS+, Flatus +  Skin: EEG, Incision, Eccymosis     Drips: NS @ 75 ml/hr, TKO + abx  Sedation: None     Plan: Follow POC. Monitor closely, notify MD of acute changes.  For vital signs and complete assessments, please see documentation flowsheets.        "

## 2021-01-17 NOTE — PROGRESS NOTES
"3C Modified DGI     01/17/21 1200   Signing Clinician's Name / Credentials   Signing clinician's name / credentials Richard Lind, PT, DPT   Dynamic Gait Index ([employer] [place], 1995)   Gait Level Surface 2   Change in Gait Speed 1   Gait and Horizontal Head Turns 2   Gait with Vertical Head Turns 2   Gait and Pivot Turns 1   Step Over Obstacle 2   Step Around Obstacles 1   Steps* 2  (not formally assessed - scored per clinical judgement)   Total Dynamic Gait Index Score  (A score of 19 or less has been correlated to an increased risk of falls in community dwelling older adults, patients with vestibular disorders, and patients with MS.)   Total Score (out of 24) 13        *stairs not performed in session, score of 2 determined based on clinical judgement and patient capacity for marching in place. Even if patient scored full points for \"Steps\" category, overall DGI score would fall well within high risk for fall category.  "

## 2021-01-17 NOTE — CONSULTS
"      Urology Consult History and Physical    Name: Erasto Maher    MRN: [id number]   YOB: 1965         We were asked to see Erasto Maher at the request of Dr. Corcoran for evaluation and treatment of the following chief complaint.        Chief Complaint:   Urinary retention, recent urethral trauma          History of Present Illness:   Erasto Maher is a 55 year old male with history of recurrent multifocal glioblastoma s/p multiple resections, Gamma Knife radiation, and chemotherapies who underwent craniotomy for biopsy and Ziopharm virus injection by Dr. Chan on 1/13/21 and no previous urologic history    Underwent craniotomy on 1/13/21. Post-operatively his catheter was removed and he was noted to be retaining with nursing notes reporting he was straight cathed 900ml then 1000ml with some sediment and mucous noted. He was straight cathed a 3rd time and \"clots\" were noted. A 14 Fr coude catheter was placed on 1/14/21 and has been draining well since. No further hematuria noted. He is planning to discharge possibly as early as tomorrow. Neurosurgery requesting Urology recommendations regarding TOV in setting of retention and possible urethral trauma.    Patient denies any past Urologic hx. He feels that he empties bladder well. No straining or weakened stream. Positive for frequency which is longstanding. No prior UTIs or hx of renal stones. No  surgical hx.          Past Medical History:     Past Medical History:   Diagnosis Date     Allergic rhinitis      Anemia      GERD (gastroesophageal reflux disease)      Glioblastoma (H)      Insomnia      PONV (postoperative nausea and vomiting)      Seizure (H)             Past Surgical History:     Past Surgical History:   Procedure Laterality Date     APPENDECTOMY  11/1981     COLONOSCOPY       CRANIOTOMY  06/28/2019     HERNIA REPAIR      x2     MRI CRANIOTOMY Right 1/13/2021    Procedure: Image Guided Craniotomy, autoguide, virus " injection;  Surgeon: Marquise Chan MD;  Location: [facility] OR     MRI CRANIOTOMY WITH OPTICAL TRACKING SYSTEM Right 4/9/2020    Procedure: intraoperative magnetic resonance imaging/stealth assisted right craniotomy, with gammatile placement;  Surgeon: Marquise Chan MD;  Location: [facility] OR            Social History:     Social History     Tobacco Use     Smoking status: Never Smoker     Smokeless tobacco: Never Used   Substance Use Topics     Alcohol use: Yes     Frequency: 2-4 times a month     Drinks per session: 3 or 4            Family History:     Family History   Problem Relation Age of Onset     Hypotension Mother      Myocardial Infarction Father      Pacemaker Father      Endocrine Disease Father         prediabetes     No Known Problems Brother             Allergies:     Allergies   Allergen Reactions     No Clinical Screening - See Comments Rash     Cats and dogs              Medications:     Current Facility-Administered Medications   Medication     acetaminophen (TYLENOL) tablet 650 mg     artificial saliva (BIOTENE MT) solution 1 spray     ceFAZolin (ANCEF) intermittent infusion 2 g in 100 mL dextrose PRE-MIX     dexamethasone (DECADRON) injection 2 mg     escitalopram (LEXAPRO) tablet 10 mg     famotidine (PEPCID) tablet 20 mg     hydrALAZINE (APRESOLINE) injection 10-20 mg     HYDROmorphone (DILAUDID) tablet 2 mg     labetalol (NORMODYNE/TRANDATE) injection 10-40 mg     levETIRAcetam (KEPPRA) intermittent infusion 1,000 mg     levothyroxine (SYNTHROID/LEVOTHROID) tablet 125 mcg     lidocaine (LMX4) cream     lidocaine 1 % 0.1-1 mL     metoclopramide (REGLAN) injection 10 mg     naloxone (NARCAN) injection 0.2 mg    Or     naloxone (NARCAN) injection 0.4 mg    Or     naloxone (NARCAN) injection 0.2 mg    Or     naloxone (NARCAN) injection 0.4 mg     promethazine (PHENERGAN) 12.5 mg in sodium chloride 0.9 % 50 mL intermittent infusion     senna-docusate (SENOKOT-S/PERICOLACE) 8.6-50 MG per  tablet 1 tablet    Or     senna-docusate (SENOKOT-S/PERICOLACE) 8.6-50 MG per tablet 2 tablet     sodium chloride (PF) 0.9% PF flush 3 mL     sodium chloride (PF) 0.9% PF flush 3 mL     sodium chloride 0.9% infusion     study - sarah (IDS #[id number]) capsule 20 mg             Review of Systems:    ROS: 10 point ROS neg other than the symptoms noted above in the HPI           Physical Exam:   VS:  T: 98.3    HR: 81    BP: 117/65    RR: 16   GEN:  AOx3.  NAD.    CV:  RRR  LUNGS: Non-labored breathing.   BACK:  No midline or CVA tenderness.  ABD:  Soft.  NT.  ND.  No rebound or guarding.  No masses.  :  Circumcised.  Normal penile shaft.  Testicles descended bilaterally  PUMA:  Deferred  EXT:  Warm, well perfused. No edema.  SKIN:  Warm.  Dry.  No rashes.  NEURO:  CN grossly intact.            Data:   All laboratory data reviewed:    Recent Labs   Lab 01/17/21  0550 01/16/21  0315 01/15/21  0415 01/14/21  0337   WBC 11.3* 6.2 8.6 8.6   HGB 11.0* 11.1* 12.8* 12.2*   * 133* 135* 145*     Recent Labs   Lab 01/17/21  0550 01/17/21  0002 01/16/21  1815 01/16/21  1415 01/16/21  0315 01/16/21  0315 01/15/21  0415 01/15/21  0415 01/14/21  0337    141 140 139   < > 141  Canceled, Test credited   < > 130* 136   POTASSIUM 3.7  --   --   --   --  3.8  Canceled, Test credited  --  3.7 3.5   CHLORIDE 111*  --   --   --   --  113*  Canceled, Test credited  --  98 102   CO2 23  --   --   --   --  20  Canceled, Test credited  --  24 28   BUN 20  --   --   --   --  12  Canceled, Test credited  --  10 14   CR 0.69  --   --   --   --  0.70  Canceled, Test credited  --  0.70 0.75   *  --   --   --   --  156*  Canceled, Test credited  --  146* 117*   JUDIE 8.9  --   --   --   --  8.6  Canceled, Test credited  --  8.7 8.5   MAG 1.9  --   --   --   --  2.1  Canceled, Test credited  --  1.8 2.3   PHOS 3.0  --   --  2.1*  --  2.1*  Canceled, Test credited  --  2.9 3.8    < > = values in this interval not  displayed.     Recent Labs   Lab 01/16/21  0316 01/15/21  1114   COLOR  --  Yellow   APPEARANCE  --  Clear   URINEGLC  --  Negative   URINEBILI  --  Negative   URINEKETONE  --  Negative   SG 1.019 1.013   URINEPH  --  5.5   PROTEIN  --  Negative   NITRITE  --  Negative   LEUKEST  --  Trace*   RBCU  --  56*   WBCU  --  5       No pertinent imaging         Impression and Plan:   Impression:   Erasto Maher is a 55 year old male with PMH of recurrent multifocal glioblastoma s/p multiple resections, Gamma Knife radiation, and chemotherapies who underwent craniotomy for biopsy an injection therapy on 1/13/21 c/b post-op urinary retention and concern for urethral or prostatic trauma after straight catheterization. No hematuria work up needed in this context. Urethral rest with indwelling arauz for 4 days is adequate after this suspected trauma.      Plan:   - Plan to perform trial of void first thing tomorrow morning (ok to perform fill and pull - deflate arauz balloon, fill bladder via arauz with ~ 300 mL sterile saline or until patient feels pressure/need to void, then remove catheter)  - Bladder scan after patient voids for post-void residual (PVR)  - Optimization for successful TOV:  Avoid anticholinergic, antihistamine, and alpha-agonist medications, minimize narcotics, increase ambulation, treat any constipation aggressively    - If patient fails trial of void (> 200 mL on bladder scan after patient has attempted to double void) then replace catheter (he does not want to try CIC) and start tamsulosin 0.4 mg daily  - If arauz replaced then patient will need to f/u in urology clinic approximately 1 week after discharge for a trial of void so please contact Urology on call to assist in scheduling     This patient's exam findings, labs, and imaging discussed with urology staff surgeon, Dr. Pablo, who developed the treatment plan.    Harry Becerra MD  Urology Resident, PGY-3

## 2021-01-17 NOTE — PLAN OF CARE
Major Shift Events:  Improving alertness throughout the day. Mostly oriented; occasionally lost to time/sequence of events. Follows all commands - Known L sided weakness and droop, R arm tremors. Awake from about 1100 to end of shift with no lethargic episodes. Does appear to be becoming more delirious; reporting seeing another person in the room twice. Reoriented; team aware. Monitor for now. HTN requiring labetalol x1. Pt had period of somnolence following where MAPs to 50s; when woken, returned to normotensive. Otherwise afebrile. Lungs clear on RA. Assisted with meal x2. Requires cueing and pacing otherwise would take large bites and put more in mouth before swallowing. No stool. Kaba with adequate UOP; brisk at times. Negative 2.5L today. Team aware. NS at 75, LR TKO.  Plan: Q4h neuros overnight. Continue current plan of care. Notify provider of changes or concerns.  For vital signs and complete assessments, please see documentation flowsheets.

## 2021-01-18 NOTE — TUMOR CONFERENCE
Tumor Conference Information  Tumor Conference: Brain  Specialties Present: Medical oncology, Pathology, Radiology, Radiation oncology, Surgery  Patient Status: A current patient  Pathology: Not Discussed  Treatment to Date: Surgical intervention(s), Chemoradiation, Adjuvant chemotherapy, Palliative chemotherapy  Clinical Trial Eligibility: Enrolled  Recommended Plan: Other (see comment) (Comment: reviewed case, continuing with chemotherapy)  Did the review exceed 30 minutes?: did not           Documentation / Disclaimer Cancer Tumor Board Note  Cancer tumor board recommendations do not override what is determined to be reasonable care and treatment, which is dependent on the circumstances of a patient's case; the patient's medical, social, and personal concerns; and the clinical judgment of the oncologist [physician].

## 2021-01-18 NOTE — PROGRESS NOTES
OT 3C: Pt would highly benefit from OP PT/OT to progress functional independence with all ADLs and IADLs in addition to working on higher level balance and cognitive activities. Please ensure OP orders are placed at time of discharge.       01/18/21 1300   Quick Adds   Type of Visit Initial Occupational Therapy Evaluation   Living Environment   People in home spouse   Current Living Arrangements house   Home Accessibility stairs within home   Number of Stairs, Within Home, Primary other (see comments)  (full flight to bedroom)   Stair Railings, Within Home, Primary railings safe and in good condition   Transportation Anticipated family or friend will provide;car, drives self   Living Environment Comments Pt lives in a multi-level home with his wife, reports there is a full flight to access his bedroom.    Self-Care   Usual Activity Tolerance excellent   Current Activity Tolerance good   Regular Exercise Yes   Activity/Exercise Type running/jogging;strength training  (skiiing, boxing )   Exercise Amount/Frequency daily   Equipment Currently Used at Home none   Activity/Exercise/Self-Care Comment Pt is very active at baseline, exercises daily, loves to box and downhill ski.    Disability/Function   Hearing Difficulty or Deaf no   Wear Glasses or Blind no   Concentrating, Remembering or Making Decisions Difficulty no   Difficulty Communicating no   Difficulty Eating/Swallowing no   Walking or Climbing Stairs Difficulty no   Dressing/Bathing Difficulty no   Toileting issues no   Doing Errands Independently Difficulty (such as shopping) no   Fall history within last six months yes   Number of times patient has fallen within last six months 1   Change in Functional Status Since Onset of Current Illness/Injury yes   General Information   Onset of Illness/Injury or Date of Surgery 01/13/21   Referring Physician Arlyn Ahumada MD   Patient/Family Therapy Goal Statement (OT) return home, improve L neglect    Additional Occupational Profile Info/Pertinent History of Current Problem Per chart: Assessment: Erasto Maher is a 55 year old male with history of recurrent multifocal glioblastoma s/p multiple resections, Gamma Knife radiation, and chemotherapies who underwent craniotomy for biopsy and Ziopharm virus injection by Dr. Chan on 1/13/21. Stable postop. MRI completed.   Performance Patterns (Routines, Roles, Habits) Pt highly independent and active at baseline, no AE used. 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   Existing Precautions/Restrictions fall  (craniotomy )   Left Upper Extremity (Weight-bearing Status) full weight-bearing (FWB)   Right Upper Extremity (Weight-bearing Status) full weight-bearing (FWB)   Left Lower Extremity (Weight-bearing Status) full weight-bearing (FWB)   Right Lower Extremity (Weight-bearing Status) full weight-bearing (FWB)   General Observations and Info Activity: up with assist   Cognitive Status Examination   Orientation Status orientation to person, place and time   Affect/Mental Status (Cognitive) WNL   Follows Commands WNL   Cognitive Status Comments Cognition seems intact, pt may have higher level cognitive deficits, will monitor closely.    Visual Perception   Visual Impairment/Limitations corrective lenses for reading   Sensory   Sensory Quick Adds No deficits were identified   Pain Assessment   Patient Currently in Pain No   Integumentary/Edema   Integumentary/Edema no deficits were identifed   Posture   Posture not impaired   Range of Motion Comprehensive   General Range of Motion no range of motion deficits identified   Strength Comprehensive (MMT)   Comment, General Manual Muscle Testing (MMT) Assessment Mild strength deficits in L hand, otherwise WFL.    Coordination   Upper Extremity Coordination No deficits were identified   Gross Motor Coordination No deficits identified   Fine Motor Coordination Not tested    Bed Mobility   Comment (Bed Mobility) SBA    Transfers    Transfer Comments SBA for all transfers    Balance   Balance Comments No overt LOB with ambulation in hallway without AE.    Instrumental Activities of Daily Living (IADL)   Previous Responsibilities meal prep;housekeeping;laundry;shopping;yardwork;medication management;finances;driving;work   IADL Comments Pt is independent in IADLs at baseline.    Clinical Impression   Criteria for Skilled Therapeutic Interventions Met (OT) yes   OT Diagnosis Decreased IADL-I   OT Problem List-Impairments impacting ADL balance;cognition  (L neglect )   Assessment of Occupational Performance 1-3 Performance Deficits   Identified Performance Deficits home management, community mobility, work    Planned Therapy Interventions (OT) ADL retraining;IADL retraining;progressive activity/exercise   Clinical Decision Making Complexity (OT) low complexity   Therapy Frequency (OT) Daily   Predicted Duration of Therapy 2-3 days   Risks and Benefits of Treatment have been explained. Yes   Patient, Family & other staff in agreement with plan of care Yes   OT Discharge Planning    OT Discharge Recommendation (DC Rec) home with outpatient occupational therapy;Home with assist   OT Rationale for DC Rec Pt demos L neglect impacting optimal independence with higher level IADLs (work, driving, community mobility). Pt would highly benefit from OP OT to progress functional independence with all IADLs and higher level cognitive tasks.    OT Brief overview of current status  SBA for all mobility.    Total Evaluation Time (Minutes)   Total Evaluation Time (Minutes) 4

## 2021-01-18 NOTE — PROGRESS NOTES
[facility], [place]   Neurosurgery Progress Note:    Assessment: Erasto Maher is a 55 year old male with history of recurrent multifocal glioblastoma s/p multiple resections, Gamma Knife radiation, and chemotherapies who underwent craniotomy for biopsy and Ziopharm virus injection by Dr. Chan on 1/13/21. Stable postop. MRI completed.    Plan:  - Serial neuro exams  - EEG negative for seizure (discontinued)  - PTA Keppra  - Pain control  - Follow daily labs   - CBC+diff, CMP, ESR, CRP, INR/PTT, LDH, amylase, lipase  - Veledimex to be given today per Dr. Chan  - Dexamethasone 2mg s5i--akvi per Dr. Chan  - Continue Ancef  - Please contact the Neurosurgery resident on call for all medication changes  - Sodium goal normonatremia  - Advance diet as tolerated   - Patient and family amenable to NG if needed  - Appreciate Urology recommendations   - Trial of void 1/18  - PT/OT: risk of falls  - SCDs for DVT proph    Greatly appreciate the help from PT/OT in caring for Erasto Maher    -----------------------------------  Bryce Aquino MD  Neurosurgery Resident PGY-2    Please contact neurosurgery resident on call with questions.    Dial * * [phone], enter [phone] when prompted.       Interval History/Subjective: Awake with significantly improved alertness. Head CT last night showed improved midline shift. Vancomycin and ceftriaxone switched to Ancef. Per Urology, plan for trial of void today. PT stating that patient has risk of falls.      Objective:   Temp:  [98  F (36.7  C)-98.4  F (36.9  C)] 98.2  F (36.8  C)  Pulse:  [75-96] 83  Resp:  [12-18] 16  BP: (109-145)/(65-97) 137/83  MAP:  [68 mmHg-116 mmHg] 68 mmHg  Arterial Line BP: (103-155)/() 103/55  SpO2:  [93 %-98 %] 98 %  I/O last 3 completed shifts:  In: 3845 [P.O.:1680; I.V.:2165]  Out: 3820 [Urine:3820]    Gen: Appears comfortable, NAD  Wound: Incision clean, dry, intact  Neurologic:  - Alert & Oriented to person,  place, time, and situation.  - Follows commands briskly  - Speech fluent, spontaneous. No aphasia or dysarthria.  - No gaze preference. No apparent hemineglect.  - PERRL, EOMI  - Strong brow raise, eye closure, and smile  - Face symmetric with sensation intact to light touch  - Left pronator drift  - Left neglect resolved     Del Tr Bi WE WF Gr   R 5 5 5 5 5 5   L 4 4 4+ 4 4 4    HF KE KF DF PF EHL   R 5 5 5 5 5 5   L 4 4 4 4+ 4+ 4     Sensation intact and symmetric to light touch throughout    Karnofsky Performance Score: 60    LABS  Recent Labs   Lab Test 01/17/21  0550 01/16/21  0315 01/15/21  0415   WBC 11.3* 6.2 8.6   HGB 11.0* 11.1* 12.8*   * 99 100   * 133* 135*       Recent Labs   Lab Test 01/17/21  0550 01/17/21  0002 01/16/21  1815 01/16/21 0315 01/16/21 0315 01/15/21  0415 01/15/21  0415    141 140   < > 141  Canceled, Test credited   < > 130*   POTASSIUM 3.7  --   --   --  3.8  Canceled, Test credited  --  3.7   CHLORIDE 111*  --   --   --  113*  Canceled, Test credited  --  98   CO2 23  --   --   --  20  Canceled, Test credited  --  24   BUN 20  --   --   --  12  Canceled, Test credited  --  10   CR 0.69  --   --   --  0.70  Canceled, Test credited  --  0.70   ANIONGAP 7  --   --   --  8  Canceled, Test credited  --  8   JUDIE 8.9  --   --   --  8.6  Canceled, Test credited  --  8.7   *  --   --   --  156*  Canceled, Test credited  --  146*    < > = values in this interval not displayed.       IMAGING  Recent Results (from the past 24 hour(s))   CT Head w/o Contrast    Narrative    CT HEAD W/O CONTRAST 1/17/2021 4:03 PM    Provided History: Brain mass or lesion  ICD-10: Brain mass    Comparison: Brain MRI 1/15/2021.    Technique: Using multidetector thin collimation helical acquisition  technique, axial, coronal and sagittal CT images from the skull base  to the vertex were obtained without intravenous contrast.     Findings:    Postsurgical changes of right parietal  craniotomy for resection of  underlying mass and placement of gamma tiles. Surrounding vasogenic  edema with associated sulcal effacement and local mass effect.  Decreased right to left midline shift measuring 6 mm, previously 12  mm. Residual tumor evaluation is limited on this noncontrast CT.. No  intracranial hemorrhage. No extra-axial fluid collection. The gray to  white matter differentiation of the rest of the cerebral hemispheres  is preserved. Ventricles are proportionate to the sulci.. No sulcal  effacement..  The basal cisterns are patent.    The visualized paranasal sinuses are clear. The mastoid air cells are  clear. Orbits appear unremarkable. No acute fracture..      Impression    Impression:   Postsurgical right parietal craniotomy changes for resection of  underlying mass and placement of gamma tiles. Slight interval decrease  in the mass effect with improved right to left midline shift, now  measuring 6 mm.    I have personally reviewed the examination and initial interpretation  and I agree with the findings.    FAZAL OBANDO MD

## 2021-01-18 NOTE — PLAN OF CARE
ICU End of Shift Summary. See flowsheets for vital signs and detailed assessment.    Changes this shift: Tolerated ambulation in hallways, stairs and 7th floor gym with therapy. Trial of void successful with 275cc void and 14cc PVR on bladder scan. NSG and urology notified. Kaba pulled, voiding appropriately. BM x 1. Denies pain. Provided sponge block and exercises for left hand, proper technique demonstrated.     Plan:  6A transfer vs discharge home tomorrow 1/19 with outpatient PT/OT.

## 2021-01-18 NOTE — PLAN OF CARE
CC: 1/13 Craniotomy Biopsy & Ziopharm virus injection r/t multifocal glioblastomas     Major Shift Events  Overnight Aristeo remained in stable condition with mild issues of excessive urine output & hypertension     Excessive Urine Output  Overnight pt had x2 instances of excessive urine output. @ 2000 pt had 950 mL out, MD was informed and Na was drawn (Pk=587), ordered to continue observation. @ 0400 pt had 1350 mL out, MD was informed and ordered to wait for 0600 AM draw, Na @ 0600 = 138     Hypertension  @ 2300 pt BP was 142/97, just slightly above SYS < 140 goal. Recheck confirmed BP, Hydralazine 10 mg was given with good effect. No other issues with HTN overnight.     Neuro: Q4 Neuros, A&Ox4, Following all commands, PERRLA (3mm), MSK:5/5, slight L-facial droop, R-hand tremor, clear speech, rambles  Cv: SR (60-80's), Sys <140 (hydralazine x1), Pulses 2+, S1/S2, afebrile  Resp: RA, SpO2>92%, LS: clear equal bilaterally  GI/: Kaba w/ AUO, BM-, BS+, Flatus +  Skin: Incision CDI, Eccymosis     Drips: NS @ 75 ml/hr, TKO + abx  Sedation: None     Plan: Follow POC. Monitor closely, notify MD of acute changes.  For vital signs and complete assessments, please see documentation flowsheets.

## 2021-01-18 NOTE — PROGRESS NOTES
3C Dynamic Gait Index     01/18/21 1100   Signing Clinician's Name / Credentials   Signing clinician's name / credentials Richard Lind PT, DPT   Dynamic Gait Index ([employer] [place], 1995)   Gait Level Surface 2   Change in Gait Speed 3   Gait and Horizontal Head Turns 3   Gait with Vertical Head Turns 3   Gait and Pivot Turns 3   Step Over Obstacle 3   Step Around Obstacles 3   Steps 2   Total Dynamic Gait Index Score  (A score of 19 or less has been correlated to an increased risk of falls in community dwelling older adults, patients with vestibular disorders, and patients with MS.)   Total Score (out of 24) 22       Patient demonstrates significant improvement on DGI this session no longer indicating that he is at fall risk. Further, screened posterior and lateral reactive balance in session and patient IND for recovery from planned loss of balance in all directions with IND reactive stepping technique. Anticipate patient will continue to progress well with continued therapy in outpatient vs home setting.     Richard Lind PT, DPT  Pager #[phone]

## 2021-01-18 NOTE — PROGRESS NOTES
Calorie Count    Intake recorded for: 1/17/2021  Total Kcals: 3355 Total Protein: 116g    Kcals from Hospital Food: 3355   Protein: 116g    Kcals from Outside Food (average):0 Protein: 0g    # Meals Ordered from Kitchen: 3 meals ordered     # Meals Recorded: 3 recorded (first - 100% butter crumb cod w/ tartar sauce & lemon wedge, dinner roll w/ 2 butter, side mac n cheese, carrots w/ 2 butter, [place] cola, ice cream)  (second - 100% breakfast sandwich on whole wheat english muffin w/ scrambled egg, 2 pieces marshall & cheddar cheese, low-fat strawberry yogurt, 8oz orange juice, coffee w/ 3 sugars, 8oz 1% milk, banana bread, banana)  (third - 100% half order chicken quesadilla w/ sour cream & salsa, pizza w/ pepperoni, mushroom, onion, green pepper & sausage, caesar side salad w/ croutons & caesar dressing, [place] cola, vanilla pudding w/ whipped topping)    # Supplements Recorded: no intake recorded

## 2021-01-18 NOTE — DISCHARGE SUMMARY
[facility] Discharge Summary and Instructions    Erasto Maher MRN# [id number]   Age: 55 year old YOB: 1965     Date of Admission:  1/13/2021  Date of Discharge::  1/19/2021 11:44 AM  Admitting Physician:  Marquise Chan MD  Discharge Physician:  Marquise Chan MD          Admission Diagnoses:   Glioblastoma (H) [C71.9]          Discharge Diagnosis:     Glioblastoma (H) [C71.9]          Procedures:   1/13:  1) Right craniotomy for open biopsy of the right parietal tumor  2) Stereotactic neuro-navigation  3) Ziopharm adenovirus injection  4) Stealth autoguide robotic guidance           Brief History of Illness:   Patient has elected to undergo above-mentioned procedure due to multiple recurrences of multifocal GBM.           Hospital Course:   Patient underwent above-mentioned procedure on 1/13. The operation was uncomplicated and he was admitted to the surgical ICU for routine post operative cares. On post operative day 1, he began to exhibit effects from the virus, consisting of sweats, chills, some confusion, left sided weakness, and severe nausea. This continued and on POD#2 he was started on steroids in order to help alleviate his symptoms. He was worked up for seizures which was negative, and imaging remained stable. Over the course of the next couple of days, his symptoms improved. He had some urinary retention during this time, and urology was consulted. He had a repeat void trial on POD#5 and he was able to void with no issues. On post operative day 5 he had improved significantly and transferred to the floor. On post operative day 6, he was ambulating, voiding without a arauz, eating a regular diet, pain was well controlled and therefore he was discharged home per PT/OT recommendations.          Discharge Medications:     Discharge Medication List as of 1/19/2021  8:05 AM      START taking these medications    Details   HYDROmorphone (DILAUDID) 2 MG tablet Take 1  tablet (2 mg) by mouth every 4 hours as needed for moderate to severe pain, Disp-20 tablet, R-0, E-Prescribe      senna-docusate (SENOKOT-S/PERICOLACE) 8.6-50 MG tablet Take 1 tablet by mouth 2 times daily, Disp-30 tablet, R-0, E-Prescribe         CONTINUE these medications which have CHANGED    Details   dexamethasone (DECADRON) 1 MG tablet Take 1 tablet (1 mg) by mouth 2 times daily (with meals), Disp-30 tablet, R-0, E-Prescribe         CONTINUE these medications which have NOT CHANGED    Details   escitalopram (LEXAPRO) 10 MG tablet Take 10 mg by mouth every morning , Historical      fluticasone (FLONASE) 50 MCG/ACT nasal spray Spray 1 spray into both nostrils nightly as needed , Historical      levETIRAcetam (KEPPRA) 1000 MG tablet Take 1 tablet (1,000 mg) by mouth 2 times daily, Disp-180 tablet, R-1, E-Prescribe      levothyroxine (SYNTHROID/LEVOTHROID) 125 MCG tablet Take 125 mcg by mouth every morning , Historical      loratadine (CLARITIN) 10 MG tablet Take 10 mg by mouth nightly as needed , Historical      melatonin 5 MG tablet Take 5 mg by mouth nightly as needed , Historical      olopatadine (PATANOL) 0.1 % ophthalmic solution Place 1-2 drops into both eyes daily as needed , Historical      omeprazole (PRILOSEC) 20 MG DR capsule Take 20 mg by mouth as needed , Historical      Psyllium 500 MG CAPS Take 4 capsules by mouth as needed , Historical      study - sarah, IDS #[id number], capsule Patient to continue daily dosing at home with remaining capsules as directed., Disp-14 capsule, R-0, No Print Out                     Discharge Instructions and Follow-Up:     Discharge diet: Regular   Discharge activity: You may advance activity as tolerated. No strenuous exercise or heay lifting greater than 10 lbs for 4 weeks or until seen and cleared in clinic.   Discharge follow-up: Follow-up with Neurosurgery clinic in 2 weeks for wound check   Wound care: Ok to shower,however no scrubbing of the wound and no soaking  of the wound, meaning no bathtubs or swimming pools. Pat dry only. Leave wound open to air.  Skin: monocryl and exofin     Please call if you have:  1. increased pain, redness, drainage, swelling at your incision  2. fevers > 101.5 F degrees  3. with any questions or concerns.  You may reach the Neurosurgery clinic at [phone] during regular work hours. ER at [phone].    and ask for the Neurosurgery Resident on call at [phone], during off hours or weekends.         Discharge Disposition:     Discharged to home        Arlyn Ahumada MD  Neurosurgery Resident, PGY-2    Please contact neurosurgery resident on call with questions.    Dial * * [phone], enter [phone] when prompted.

## 2021-01-19 NOTE — PROGRESS NOTES
[facility], [place]   Neurosurgery Progress Note:    Assessment: Erasto Maher is a 55 year old male with history of recurrent multifocal glioblastoma s/p multiple resections, Gamma Knife radiation, and chemotherapies who underwent craniotomy for biopsy and Ziopharm virus injection by Dr. Chan on 1/13/21. Stable postop. Given brief dexamethasone taper while inpatient after demonstrating waxing/waning mental status after receiving first 2 doses of Veledimex. Mental status significantly improved back to patient's baseline.    Plan:  - Serial neuro exams  - EEG negative for seizure (discontinued)  - PTA Keppra  - Pain control  - Follow daily labs   - CBC+diff, CMP, ESR, CRP, INR/PTT, LDH, amylase, lipase  - Veledimex to be given today per Dr. Chan  - Dexamethasone 1mg u1t--haxz per Dr. Chan   - Plan to discharge on 1mg q12h  - Continue Ancef  - Please contact the Neurosurgery resident on call for all medication changes  - Sodium goal normonatremia  - Advance diet as tolerated   - Patient and family amenable to NG if needed  - Appreciate Urology recommendations   - Passed trial of void 1/18  - PT/OT: no longer fall risk; home with outpatient OT  - SCDs for DVT proph    Greatly appreciate the help from PT/OT in caring for Erasto Maher    -----------------------------------  Bryce Aquino MD  Neurosurgery Resident PGY-2    Please contact neurosurgery resident on call with questions.    Dial * * [phone], enter [phone] when prompted.       Interval History/Subjective: Neuro exam at baseline. Dexamethasone weaned to 1mg q8h. Per PT, patient is no longer a fall risk. Cleared for home with assist and outpatient OT. Passed trial of void after Kaba removed.    Objective:   Temp:  [98  F (36.7  C)-99.4  F (37.4  C)] 98.5  F (36.9  C)  Pulse:  [73-90] 89  Resp:  [16-17] 16  BP: (110-145)/(72-97) 139/87  SpO2:  [94 %-99 %] 98 %  I/O last 3 completed shifts:  In: 2005 [P.O.:480;  I.V.:1525]  Out: 5360 [Urine:5360]    Gen: Appears comfortable, NAD  Wound: Incision clean, dry, intact  Neurologic:  - Alert & Oriented to person, place, time, and situation.  - Follows commands briskly  - Speech fluent, spontaneous. No aphasia or dysarthria.  - No gaze preference. No apparent hemineglect.  - PERRL, EOMI  - Strong brow raise, eye closure, and smile  - Face symmetric with sensation intact to light touch  - Left pronator drift  - Left neglect resolved     Del Tr Bi WE WF Gr   R 5 5 5 5 5 5   L 4 4 4+ 4 4 4    HF KE KF DF PF EHL   R 5 5 5 5 5 5   L 4 4 4 4+ 4+ 4     Sensation intact and symmetric to light touch throughout    Karnofsky Performance Score: 60    LABS  Recent Labs   Lab Test 01/18/21  0628 01/17/21  0550 01/16/21  0315   WBC 5.9 11.3* 6.2   HGB 11.1* 11.0* 11.1*   * 101* 99   * 138* 133*       Recent Labs   Lab Test 01/18/21  0628 01/17/21  2255 01/17/21  0550 01/16/21  0315 01/16/21  0315    138 140   < > 141  Canceled, Test credited   POTASSIUM 3.8  --  3.7  --  3.8  Canceled, Test credited   CHLORIDE 107  --  111*  --  113*  Canceled, Test credited   CO2 25  --  23  --  20  Canceled, Test credited   BUN 15  --  20  --  12  Canceled, Test credited   CR 0.71  --  0.69  --  0.70  Canceled, Test credited   ANIONGAP 7  --  7  --  8  Canceled, Test credited   JUDIE 8.4*  --  8.9  --  8.6  Canceled, Test credited   *  --  128*  --  156*  Canceled, Test credited    < > = values in this interval not displayed.       IMAGING  No results found for this or any previous visit (from the past 24 hour(s)).

## 2021-01-19 NOTE — PROGRESS NOTES
Calorie Count  Intake recorded for: 1/18  Total Kcals: 0 Total Protein: 0g  Kcals from Hospital Food: 0   Protein: 0g  Kcals from Outside Food (average):0 Protein: 0g  # Meals Recorded: 3 meals ordered from kitchen, no intake recorded.   # Supplements Recorded: no intake recorded.

## 2021-01-19 NOTE — PROGRESS NOTES
Major Shift Events:  Neurologically intact. Vitally stable. Pt restless at times, had some difficulty sleeping. Pt states it is because he slept so much during the day.   Plan: Pt has orders to discharge today.   For vital signs and complete assessments, please see documentation flowsheets.

## 2021-01-19 NOTE — PROGRESS NOTES
Discharged to: Home escorted by transport aide picked up by wife (Judith) at 1145   Belongings: Prescription meds, phone, , wallet, and clothing sent with pt  AVS (After Visit Summary) discussed with: Pt and wife (Judith) on phone.     Pt A/O x4. Follows commands. Slight, improving LUE pronator drift, and right hand tremor. Stand by assist at discharge. VSS. Denies pain

## 2021-01-19 NOTE — PLAN OF CARE
3C PT - Cancel Pt getting ready to discharge. Per RN, mobilizing ind with no concerns and has safe discharge plan.    Physical Therapy Discharge Summary    Reason for therapy discharge:    Discharged to home with outpatient therapy.    Progress towards therapy goal(s). See goals on Care Plan in [facility] electronic health record for goal details.  Goals partially met.  Barriers to achieving goals:   discharge from facility.    Therapy recommendation(s):    Continued therapy is recommended.  Rationale/Recommendations:  balance and vision therapy.

## 2021-01-19 NOTE — PLAN OF CARE
Occupational Therapy Discharge Summary    Reason for therapy discharge:    Discharged to home with outpatient therapy.    Progress towards therapy goal(s). See goals on Care Plan in [facility] electronic health record for goal details.  Goals partially met.  Barriers to achieving goals:   discharge from facility.    Therapy recommendation(s):    Continued therapy is recommended.  Rationale/Recommendations:  Patient would benefit from OP OT to address L neglect and higher level cognition.

## 2021-01-20 NOTE — PROGRESS NOTES
[facility]: Post-Discharge Note  SITUATION                                                      Admission:    Admission Date: 01/13/21   Reason for Admission: Glioblastoma  Discharge:   Discharge Date: 01/19/21  Discharge Diagnosis: Glioblastoma    BACKGROUND                                                      Patient has elected to undergo above-mentioned procedure due to multiple recurrences of multifocal GBM.    ASSESSMENT      Discharge Assessment  Patient reports symptoms are: Improved  Does the patient have all of their medications?: Yes  Does patient know what their new medications are for?: Yes  Does patient have a follow-up appointment scheduled?: Yes  Does patient have any other questions or concerns?: No    Post-op  Did the patient have surgery or a procedure: Yes  Fever: No  Chills: No  Eating & Drinking: eating and drinking without complaints/concerns  Bowel Function: normal  Urinary Status: voiding without complaint/concerns        PLAN                                                      Outpatient Plan:    Follow-up with Neurosurgery clinic in 2 weeks for wound check    Wound care: Ok to shower,however no scrubbing of the wound and no soaking of the wound, meaning no bathtubs or swimming pools. Pat dry only. Leave wound open to air.  Skin: monocryl and exofin         Future Appointments   Date Time Provider Department Center   1/21/2021  7:45 AM Azeb Vega OT [facility]   1/25/2021 11:30 AM Ericka Avila MD [facility]   1/27/2021  1:30 PM  ONCOLOGY INFUSION [facility]   1/29/2021  1:15 PM Marquise Chan MD [facility]   2/3/2021  8:45 AM Lisseth Falcon, PT [facility]           Agnes Shepherd

## 2021-01-21 NOTE — DISCHARGE INSTRUCTIONS
OT Instructions 1/21/2021  1.  Www.Narrable-start typing on lesson 286 for practice.  2.  Use the blue foam block from the hospital and do the  and pinch exercises with your left hand, daily.  3.   coins from table top and place in coin slot.  As you feel your dexterity is improving, try performing this task faster.    4.  Deal and shuffle a deck of cards.  5.  When you start biking, start with 5-10 minutes.  If you feel good, you can add 5 minutes.

## 2021-01-21 NOTE — PROGRESS NOTES
"   01/21/21 0800   Quick Adds   Type of Visit Initial Outpatient Occupational Therapy Evaluation       Present No   General Information   Start Of Care Date 01/21/21   Referring Physician Dr. Arlyn Ahumada MD   Orders Evaluate and treat as indicated   Orders Date 01/13/21   Medical Diagnosis H/o glioblastoma with 2 previous surgeries.  Most recent surgery 1/13/201 for right craniotomy for biopsy of parietal tumor, stereotactic neuro-navigation, ziophram advenovirsu injection.  Pt presents with decreased left hand FMC, strength, proprioception, possible left decreased awareness.   Onset of Illness/Injury or Date of Surgery 01/13/21   Special Instructions no lifting greater than 10# or strenuous exercise until cleared by neurosurgery.   Surgical/Medical History Reviewed Yes   Additional Occupational Profile Info/Pertinent History of Current Problem Pt is very active, owns [employer].   Role/Living Environment   Current Community Support Family/friend caregiver   Patient role/Employment history Employed   Community/Avocational Activities boxing, biking, downhill skiing, golf-walk & carry, lift weights   Current Living Environment House   Number of Stairs Within Home single-full light up to bedroom and bathroom, railing on right side   Primary Bathroom Set Up/Equipment Shower stall   Additional Bathroom Location/Comments 4\" lip on shower   Home/Community Accessibility Comments no major concerns, wife is present when pt goes downstairs   Prior Level - Transfers Independent   Prior Level - Ambulation Independent   Prior Level - ADLS Independent   Prior Responsibilities - IADL Shopping;Yardwork;Medication management;Finances;Driving;Work   Role/Living Environment Comments Pt has not driven since surgery.    Patient/family Goals Statement \"to be normal\"   Pain   Patient currently in pain No   Fall Risk Screen   Fall screen completed by OT   Have you fallen 2 or more times in the past year? " No   Have you fallen and had an injury in the past year? No   Is patient a fall risk? No   Abuse Screen (yes response referral indicated)   Feels Unsafe at Home or Work/School no   Feels Threatened by Someone no   Does Anyone Try to Keep You From Having Contact with Others or Doing Things Outside Your Home? no   Physical Signs of Abuse Present no   Cognitive Status Examination   Orientation Orientation to person, place and time   Level of Consciousness Alert   Follows Commands and Answers Questions 100% of the time   Cognitive Comment Pt scored 27/30 on MoCA version 7.2.  Pt recalled 5/5 words.  Pt missed one number on the trail making task that was in left periphery.    Visual Perception   Visual Perception No deficits were identified   Visual Perception Comments will address visual field at next appointment with scanning tasks   Posture   Posture Not impaired   Range of Motion (ROM)   ROM Comments BUE AROM WNL   Hand Strength   Hand Dominance Right   Left Hand  (pounds) 60 pounds  (1 SD below mean)   Right Hand  (pounds) 81 pounds  (about 1 SD below mean)   Left Lateral Pinch (pounds) 17 pounds  (about 1 SD below mean)   Right Lateral Pinch (pounds) 17 pounds  (between 1-2 SD below mean)   Left Three Point Pinch (pounds) 14 pounds  (2 SD below mean)   Right Three Point Pinch (pounds) 14 pounds  (2 SD below mean)   Coordination   Fine Motor Coordination intact finger opposition iwth left hands with eyes open and closed   Left Hand, Nine Hole Peg Test (seconds) 53.4  (significantly slower than 2 SD slower than mean)   Right Hand, Nine [place] Peg Test (seconds) 27.22  (greater than 2 SD slower than mean)   Functional Mobility   Ambulation IND   Bathing   Level of [place] - Bathing independent   Bathing Comments Pt he just showered by himself but spouse was in the house.   Upper Body Dressing   Level of [place]: Dress Upper Body independent   Lower Body Dressing   Level of [place]: Dress Lower  Body independent   Toileting   Level of [place]: Toilet independent   Grooming   Level of [place]: Grooming independent   Eating/Self-Feeding   Level of [place]: Eating independent   Activity Tolerance   Activity Tolerance fair to good   Planned Therapy Interventions   Planned Therapy Interventions IADL training;Neuromuscular re-education;Self care/Home management;Strengthening;Therapeutic activities   Adult OT Eval Goals   OT Eval Goals (Adult) 1;2;3    OT Goal 1   Goal Identifier 1 home program   Goal Description Pt will report compliance with home program 7/7 days including left hand FMC tasks, proprioception tasks, scanning activities and appropriate progression of exercise program in preparation for return to work and leisure activities.    Target Date 02/25/21    OT Goal 2   Goal Identifier 2 typing   Goal Description Pt will demonstrate typing task with 80% accuracy in preparation for return to work.    Target Date 02/25/21    OT Goal 3   Goal Identifier 3 driving   Goal Description Pt will complete 3/3 [employer] driving subtasks with a PASS score, the foot tap reaction time with a PASS score and name 11/12 driving signs in preparation for return to driving.    Target Date 02/25/21   Clinical Impression   Criteria for Skilled Therapeutic Interventions Met Yes, treatment indicated   OT Diagnosis decreased IADL performance   Influenced by the following impairments LUE weakness and decreased FMC   Assessment of Occupational Performance 1-3 Performance Deficits   Identified Performance Deficits poor typing skills, not driving, not working   Clinical Decision Making (Complexity) Low complexity   Therapy Frequency 1x/wk x5 weeks   Predicted Duration of Therapy Intervention (days/wks) 5 weeks   Risks and Benefits of Treatment have been explained. Yes   Patient, Family & other staff in agreement with plan of care Yes   Clinical Impression Comments Pt presents with decreased LUE strength, decreased FMC  and slight inattention to left side of body/visual peripheral field affecting IADL performance.  Pt will benefit from continued skilled OT intervention to improve LUE strength, FMC and improve visual scanning for improved IADL performance including pt to return to work and driving.    Total Evaluation Time   OT Blanche, Low Complexity Minutes (10386) 25

## 2021-01-24 NOTE — PROGRESS NOTES
"Aristeo is a 55 year old who is being evaluated via a billable video visit.      How would you like to obtain your AVS? MyChart  If the video visit is dropped, the invitation should be resent by: Text to cell phone: [phone]  Will anyone else be joining your video visit? No      Video-Visit Details  Type of service:  Video Visit    Prep time: 10 minutes  Video Start Time: 11:53 AM  Video End Time: 12:24 PM  Post-visit appt documentation: 10 minutes     Originating Location (pt. Location): Home    Distant Location (provider location):  [facility]     Platform used for Video Visit: Aliza Avila MD    ___________________________________________________________    NEURO-ONCOLOGY VISIT  Jan 25, 2021    CHIEF COMPLAINT: Mr. 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 He completed chemoradiotherapy on 8/30/2019 and was managed on a clinical trial receiving atezolizumab (anti-PDL1) plus adjuvant temozolomide. He received his last dose of immunotherapy on 3/16/2020 when imaging on 3/28/2020 showed progression of disease. He underwent a repeat craniotomy for tumor resection with Dr. Chan on 4/9/2020 with placement of GammaTiles.     rAisteo was managed on lomustine monotherapy. However, imaging in 11/2020 showed a new distant lesion consistent with disease progression.     He completed a repeat course of radiation, then a repeat resection on 1/13/2021 plus use of the compassionate use Ziopharm treatment protocol that utilizes an intratumoral adenovirus injection activating human interleukin-12 + veledimex in combination with adjuvant nivolumab. Pathology was consistent with recurrent glioblastoma. The plan is to initiate nivolumab + bevacizumab on 1/27 (2 weeks post-operatively).    I met with Aristeo and Judith (wife) today for " this follow-up visit.     HISTORY OF PRESENT ILLNESS  -Aristeo states that he is doing well today. Following surgery + continued use of veledimex he notes continued left-sided weakness (neglect and decreased dexterity) and fatigue. But, has been feeling progressively stronger each day with slightly more energy. Still tired and taking a short afternoon nap.  -Dexamethasone dose is 1mg BID.   -Dullness in thinking at times, but cognition is improving. Working with OT.  -On Lexapro and mood is positive.     -No recurrent seizure events.    REVIEW OF SYSTEMS  A comprehensive ROS negative except as in HPI.      MEDICATIONS   Current Outpatient Medications   Medication Sig Dispense Refill     dexamethasone (DECADRON) 1 MG tablet Take 1 tablet (1 mg) by mouth 2 times daily (with meals) 30 tablet 0     escitalopram (LEXAPRO) 10 MG tablet Take 10 mg by mouth every morning        fluticasone (FLONASE) 50 MCG/ACT nasal spray Spray 1 spray into both nostrils nightly as needed        levETIRAcetam (KEPPRA) 1000 MG tablet Take 1 tablet (1,000 mg) by mouth 2 times daily 180 tablet 1     levothyroxine (SYNTHROID/LEVOTHROID) 125 MCG tablet Take 125 mcg by mouth every morning        loratadine (CLARITIN) 10 MG tablet Take 10 mg by mouth nightly as needed        melatonin 5 MG tablet Take 5 mg by mouth nightly as needed        olopatadine (PATANOL) 0.1 % ophthalmic solution Place 1-2 drops into both eyes daily as needed        omeprazole (PRILOSEC) 20 MG DR capsule Take 20 mg by mouth as needed        study - veledimex, IDS #[id number], capsule Patient to continue daily dosing at home with remaining capsules as directed. 14 capsule 0     Psyllium 500 MG CAPS Take 4 capsules by mouth as needed        DRUG ALLERGIES   Allergies   Allergen Reactions     No Clinical Screening - See Comments Rash     Cats and dogs         ONCOLOGIC HISTORY  -PRESENTATION: New onset seizures; complex partial event with speech arrest and altered mental status  lasting several minutes. Later had secondary generalization. Started Keppra.   -MR brain imaging with a ring enhancing lesion in the right parietal lobe.    -6/27/2019 SURGERY: Right temporal craniotomy for mass resection, gross total resection by Dr. Tam Barreto.  PATHOLOGY: Glioblastoma; IDH1 R132H wildtype, MGMT promoter unmethylated  -7/3/2019 CLINICAL TRIAL: Evaluated by Dr. Wilmer Mckenna at [facility], consented for clinical trial 2016-0867 (Standard of Care plus atezolizumab).  -7/22 - 8/30/2019 CHEMORADS: 60cGy with concurrent temozolomide 75mg/m2/day (145mg) plus atezolizumab Q2 weeks on clinical trial 2016-0867.  -9/6/2019 NEURO-ONC: Evaluated at the [facility].   -9/25/2019 - 3/16/2020 CHEMO: Adjuvant chemotherapy; temozolomide + atezolizumab 840 mg IV on clinical trial 2016-0867.  -4/6/2020 NEURO-ONC: Communicated with Dr. Borrero. Plan for surgery and next steps pending pathology results.   -4/9/2020 SURGERY + RADS: Repeat craniotomy for surgical debulking plus placement of GammaTiles.  PATHOLOGY: Recurrent glioblastoma.   Next generation sequencing: Microsatellite instability: Stable. Tumor mutational burden: 4 (Low). APC, CDK4, MDM2, PTEN mutated. ARID1A, ASXL1, ATRX, DNMT3A, FANCE, KDM5C, MED12, MEF2B, NF1, RUNX1, TERT mutated. PD-L1 Negative.  -4/20/2020 NEURO-ONC/ CHEMO: Starting Lomustine 3 weeks post-op. Consented for next generation sequencing and sending most recent tumor sample.    -4/30/2020 CHEMO: Lomustine, cycle 1.   -5/11/2020 NEURO-ONC: Clinically well. Referral to genetic counseling.   -6/5/2020 NEURO-ONC/ MRB/ CHEMO: Clinically well. Imaging with a positive treatment response. Lomustine, cycle 2 (start date of 6/11).    -7/20/2020 NEURO-ONC/ MRB/ CHEMO: Clinically well. Imaging without concern; aside from subdural fluid collection. Labs at goal. Lomustine, cycle 3 (start date of 7/23).    -8/31/2020 NEURO-ONC/ MRB/ CHEMO: Clinically well. Imaging without concern. Labs at goal. Lomustine,  cycle 4 (start date of 9/3).   -10/12/2020 NEURO-ONC/ MRB/ CHEMO: Clinically well. Imaging without concern for cancer growth, but the resection cavity is expanding with time. Per Dr. Chan; continue to monitor with no surgical intervention at this time. Labs at goal. Lomustine, cycle 5 (start date of 10/15).    -11/23/2020 NEURO-ONC/ MRB: Clinically well. Imaging with a new distant lesion. Stopping lomustine. Referral to Dr. Chan.    -12/10 - 12/16/2020 RADS: Gamma Knife therapy of 15 Gy/ fraction x 5 fractions.  -12/18/2020 NEURO-ONC: Discussion of treatment.  -1/13/2021 SURGERY: Right craniotomy for open biopsy of the right parietal tumor plus Ziopharm adenovirus injection.  PATHOLOGY: Recurrent glioblastoma.  -1/25/2021 NEURO-ONC: Clinically improving since surgery. Continue taking veledimex through Wednesday.   -1/27/2021 CHEMO: Starting nivolumab + bevacizumab.     SOCIAL HISTORY   Tobacco use: Never smoker.   Alcohol use: Social, infrequent.   Drug use: Denies.  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       PHYSICAL EXAMINATION    -Generally well appearing. Good energy.   -Respiratory: No audible wheezing.   -Skin: No rashes.   -Psychiatric: Normal mood and affect. Pleasant, talkative.  -Neurologic:   MENTAL STATUS:     Alert, oriented.    Recall: Intact.    Speech fluent.    Comprehension intact.     CRANIAL NERVES:     Pupils are equal, round.     Extraocular movements full.     Symmetric facial movements.   Hearing intact.   With normal phonation, no dysfunction of the palate or tongue.   MOTOR: Antigravity in the arms. No pronation and no drift.  SENSATION: Intact to light touch throughout per report.  COORDINATION: Largely equal on finger nose with eyes closed on left and right.     The rest of a comprehensive physical examination is deferred due to PHE (public health emergency) video visit restrictions.         MEDICAL RECORDS  Obtained and personally reviewed all available outside  medical records in addition to reviewing any records available in our electronic system.     LABS  Personally reviewed all available lab results.     IMAGING  No new neuro-imaging to review.         IMPRESSION  Clinic time for this high complexity visit was spent discussing in detail the nature of his cancer in light of his projected adjuvant treatment plan.     Clinically, Aristeo is noting improvement in the his left-sided symptoms. He is working with OT. Currently on dexamethasone 2mg daily. Otherwise, he notes continued fatigue. Denies any other significant side effects related to surgery and use of veledimex.    Compassionate use henrietta utilizes a combination of virus gene therapy (So-BLF-sWF-2) + veledimex with nivolumab to enhance the [id number] effect by increasing the number and activity of effector CD8+ T-cells in an otherwise highly immunosuppressant cancer. IL-12 is a cytokine with anticancer activity and Wl-HCZ-wbY-12 is a genetically engineered, non-pathogenic form of an adenovirus that encodes for this cytokine. The virus infects the tumor cells and uses its machinery to produce IL-12. Veledimex activates production of IL-12 within the viral infected tumor cells. With the completion of viral injection + continued use of veledimex through Wednesday (2 week course), the plan is to initiate nivolumab + bevacizumab every 2 weeks starting on Day 15 (this Wednesday). Will look to taper off dexamethasone at that time.     I have already secured nivolumab on compassionate use and kee.     PROBLEM LIST  Glioblastoma, MGMT unmethylated and IDH1 wildtype by IHC  Seizure  Chemotherapy-associated constipation  Headaches  Apraxia    PLAN  -CANCER-DIRECTED THERAPY-  -As above.  -Repeat imaging in 2 months.     -STEROIDS-  -On dexamethasone 2mg daily.  -Starting kee.  -Dexamethasone taper; 1/27 1mg in AM for 3 days, then stop.   -Monitor for worsening symptoms.     -SEIZURE MANAGEMENT-  -Given history of  seizures, will continue current antiepileptic regimen of Keppra for the foreseeable future.    -Quality of life/ MOOD/ FATIGUE-  -Continue Lexapro.   -Continue to monitor mood as untreated/ undertreated depression can worsen fatigue, dysorexia, and quality of life.    Already received his flu vaccination this year.     Return to clinic in 1 month.      Ericka Avila MD  Neuro-oncology

## 2021-01-25 NOTE — LETTER
"    1/25/2021         RE: Erasto Maher  [address]  [facility] [address]        Dear Colleague,    Thank you for referring your patient, Erasto Maher, to the [facility]. Please see a copy of my visit note below.    Aristeo is a 55 year old who is being evaluated via a billable video visit.      How would you like to obtain your AVS? MyChart  If the video visit is dropped, the invitation should be resent by: Text to cell phone: [phone]  Will anyone else be joining your video visit? No      Video-Visit Details  Type of service:  Video Visit    Prep time: 10 minutes  Video Start Time: 11:53 AM  Video End Time: 12:24 PM  Post-visit appt documentation: 10 minutes     Originating Location (pt. Location): Home    Distant Location (provider location):  [facility]     Platform used for Video Visit: Aliza Avila MD    ___________________________________________________________    NEURO-ONCOLOGY VISIT  Jan 25, 2021    CHIEF COMPLAINT: Mr. 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 He completed chemoradiotherapy on 8/30/2019 and was managed on a clinical trial receiving atezolizumab (anti-PDL1) plus adjuvant temozolomide. He received his last dose of immunotherapy on 3/16/2020 when imaging on 3/28/2020 showed progression of disease. He underwent a repeat craniotomy for tumor resection with Dr. Chan on 4/9/2020 with placement of GammaTiles.     Aristeo was managed on lomustine monotherapy. However, imaging in 11/2020 showed a new distant lesion consistent with disease progression.     He completed a repeat course of radiation, then a repeat resection on 1/13/2021 plus use of the compassionate use Ziopharm treatment protocol that utilizes an intratumoral adenovirus injection " activating human interleukin-12 + veledimex in combination with adjuvant nivolumab. Pathology was consistent with recurrent glioblastoma. The plan is to initiate nivolumab + bevacizumab on 1/27 (2 weeks post-operatively).    I met with Aristeo and Judith (wife) today for this follow-up visit.     HISTORY OF PRESENT ILLNESS  -Aristeo states that he is doing well today. Following surgery + continued use of veledimex he notes continued left-sided weakness (neglect and decreased dexterity) and fatigue. But, has been feeling progressively stronger each day with slightly more energy. Still tired and taking a short afternoon nap.  -Dexamethasone dose is 1mg BID.   -Dullness in thinking at times, but cognition is improving. Working with OT.  -On Lexapro and mood is positive.     -No recurrent seizure events.    REVIEW OF SYSTEMS  A comprehensive ROS negative except as in HPI.      MEDICATIONS   Current Outpatient Medications   Medication Sig Dispense Refill     dexamethasone (DECADRON) 1 MG tablet Take 1 tablet (1 mg) by mouth 2 times daily (with meals) 30 tablet 0     escitalopram (LEXAPRO) 10 MG tablet Take 10 mg by mouth every morning        fluticasone (FLONASE) 50 MCG/ACT nasal spray Spray 1 spray into both nostrils nightly as needed        levETIRAcetam (KEPPRA) 1000 MG tablet Take 1 tablet (1,000 mg) by mouth 2 times daily 180 tablet 1     levothyroxine (SYNTHROID/LEVOTHROID) 125 MCG tablet Take 125 mcg by mouth every morning        loratadine (CLARITIN) 10 MG tablet Take 10 mg by mouth nightly as needed        melatonin 5 MG tablet Take 5 mg by mouth nightly as needed        olopatadine (PATANOL) 0.1 % ophthalmic solution Place 1-2 drops into both eyes daily as needed        omeprazole (PRILOSEC) 20 MG DR capsule Take 20 mg by mouth as needed        study - veledimex, IDS #[id number], capsule Patient to continue daily dosing at home with remaining capsules as directed. 14 capsule 0     Psyllium 500 MG CAPS Take 4 capsules by  mouth as needed        DRUG ALLERGIES   Allergies   Allergen Reactions     No Clinical Screening - See Comments Rash     Cats and dogs         ONCOLOGIC HISTORY  -PRESENTATION: New onset seizures; complex partial event with speech arrest and altered mental status lasting several minutes. Later had secondary generalization. Started Keppra.   -MR brain imaging with a ring enhancing lesion in the right parietal lobe.    -6/27/2019 SURGERY: Right temporal craniotomy for mass resection, gross total resection by Dr. Tam Barreto.  PATHOLOGY: Glioblastoma; IDH1 R132H wildtype, MGMT promoter unmethylated  -7/3/2019 CLINICAL TRIAL: Evaluated by Dr. Wilmer Mckenna at [facility], consented for clinical trial 2016-0867 (Standard of Care plus atezolizumab).  -7/22 - 8/30/2019 CHEMORADS: 60cGy with concurrent temozolomide 75mg/m2/day (145mg) plus atezolizumab Q2 weeks on clinical trial 2016-0867.  -9/6/2019 NEURO-ONC: Evaluated at the [facility].   -9/25/2019 - 3/16/2020 CHEMO: Adjuvant chemotherapy; temozolomide + atezolizumab 840 mg IV on clinical trial 2016-0867.  -4/6/2020 NEURO-ONC: Communicated with Dr. Borrero. Plan for surgery and next steps pending pathology results.   -4/9/2020 SURGERY + RADS: Repeat craniotomy for surgical debulking plus placement of GammaTiles.  PATHOLOGY: Recurrent glioblastoma.   Next generation sequencing: Microsatellite instability: Stable. Tumor mutational burden: 4 (Low). APC, CDK4, MDM2, PTEN mutated. ARID1A, ASXL1, ATRX, DNMT3A, FANCE, KDM5C, MED12, MEF2B, NF1, RUNX1, TERT mutated. PD-L1 Negative.  -4/20/2020 NEURO-ONC/ CHEMO: Starting Lomustine 3 weeks post-op. Consented for next generation sequencing and sending most recent tumor sample.    -4/30/2020 CHEMO: Lomustine, cycle 1.   -5/11/2020 NEURO-ONC: Clinically well. Referral to genetic counseling.   -6/5/2020 NEURO-ONC/ MRB/ CHEMO: Clinically well. Imaging with a positive treatment response. Lomustine, cycle 2 (start date of 6/11).     -7/20/2020 NEURO-ONC/ MRB/ CHEMO: Clinically well. Imaging without concern; aside from subdural fluid collection. Labs at goal. Lomustine, cycle 3 (start date of 7/23).    -8/31/2020 NEURO-ONC/ MRB/ CHEMO: Clinically well. Imaging without concern. Labs at goal. Lomustine, cycle 4 (start date of 9/3).   -10/12/2020 NEURO-ONC/ MRB/ CHEMO: Clinically well. Imaging without concern for cancer growth, but the resection cavity is expanding with time. Per Dr. Chan; continue to monitor with no surgical intervention at this time. Labs at goal. Lomustine, cycle 5 (start date of 10/15).    -11/23/2020 NEURO-ONC/ MRB: Clinically well. Imaging with a new distant lesion. Stopping lomustine. Referral to Dr. Chan.    -12/10 - 12/16/2020 RADS: Gamma Knife therapy of 15 Gy/ fraction x 5 fractions.  -12/18/2020 NEURO-ONC: Discussion of treatment.  -1/13/2021 SURGERY: Right craniotomy for open biopsy of the right parietal tumor plus Ziopharm adenovirus injection.  PATHOLOGY: Recurrent glioblastoma.  -1/25/2021 NEURO-ONC: Clinically improving since surgery. Continue taking veledimex through Wednesday.   -1/27/2021 CHEMO: Starting nivolumab + bevacizumab.     SOCIAL HISTORY   Tobacco use: Never smoker.   Alcohol use: Social, infrequent.   Drug use: Denies.  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       PHYSICAL EXAMINATION    -Generally well appearing. Good energy.   -Respiratory: No audible wheezing.   -Skin: No rashes.   -Psychiatric: Normal mood and affect. Pleasant, talkative.  -Neurologic:   MENTAL STATUS:     Alert, oriented.    Recall: Intact.    Speech fluent.    Comprehension intact.     CRANIAL NERVES:     Pupils are equal, round.     Extraocular movements full.     Symmetric facial movements.   Hearing intact.   With normal phonation, no dysfunction of the palate or tongue.   MOTOR: Antigravity in the arms. No pronation and no drift.  SENSATION: Intact to light touch throughout per  report.  COORDINATION: Largely equal on finger nose with eyes closed on left and right.     The rest of a comprehensive physical examination is deferred due to PHE (public health emergency) video visit restrictions.         MEDICAL RECORDS  Obtained and personally reviewed all available outside medical records in addition to reviewing any records available in our electronic system.     LABS  Personally reviewed all available lab results.     IMAGING  No new neuro-imaging to review.         IMPRESSION  Clinic time for this high complexity visit was spent discussing in detail the nature of his cancer in light of his projected adjuvant treatment plan.     Clinically, Aristeo is noting improvement in the his left-sided symptoms. He is working with OT. Currently on dexamethasone 2mg daily. Otherwise, he notes continued fatigue. Denies any other significant side effects related to surgery and use of veledimex.    Compassionate use Darshana utilizes a combination of virus gene therapy (Wk-XQG-qOM-2) + veledimex with nivolumab to enhance the ES-[id number]-yvqluffn effect by increasing the number and activity of effector CD8+ T-cells in an otherwise highly immunosuppressant cancer. IL-12 is a cytokine with anticancer activity and Qj-PXG-xmJ-12 is a genetically engineered, non-pathogenic form of an adenovirus that encodes for this cytokine. The virus infects the tumor cells and uses its machinery to produce IL-12. Veledimex activates production of IL-12 within the viral infected tumor cells. With the completion of viral injection + continued use of veledimex through Wednesday (2 week course), the plan is to initiate nivolumab + bevacizumab every 2 weeks starting on Day 15 (this Wednesday). Will look to taper off dexamethasone at that time.     I have already secured nivolumab on compassionate use and kee.     PROBLEM LIST  Glioblastoma, MGMT unmethylated and IDH1 wildtype by IHC  Seizure  Chemotherapy-associated  constipation  Headaches  Apraxia    PLAN  -CANCER-DIRECTED THERAPY-  -As above.  -Repeat imaging in 2 months.     -STEROIDS-  -On dexamethasone 2mg daily.  -Starting kee.  -Dexamethasone taper; 1/27 1mg in AM for 3 days, then stop.   -Monitor for worsening symptoms.     -SEIZURE MANAGEMENT-  -Given history of seizures, will continue current antiepileptic regimen of Keppra for the foreseeable future.    -Quality of life/ MOOD/ FATIGUE-  -Continue Lexapro.   -Continue to monitor mood as untreated/ undertreated depression can worsen fatigue, dysorexia, and quality of life.    Already received his flu vaccination this year.     Return to clinic in 1 month.      Ericka Avila MD  Neuro-oncology

## 2021-01-25 NOTE — PATIENT INSTRUCTIONS
Dexamethasone taper; 1/27 1mg in AM for 3 days, then stop.     Continue taking veledimex through Wednesday.     Planning to start nivolumab + bevacizumab on Wednesday.     Ericka Avila MD  Neuro-oncology  1/25/2021

## 2021-01-25 NOTE — TELEPHONE ENCOUNTER
Spoke with Erasto and Judith to discuss chemotherapy and resources available at the [facility]. Provided patient with [employer] printouts on Avastin & Nivolumab. Reviewed administration, side effects (including myelosuppression, nausea/vomiting, diarrhea/constipation, hair loss, mouth sores) and ongoing symptom management by APPs in clinic. Provided phone numbers for triage and after hours care. Highlighted steps to expect when getting chemotherapy (check in, labs, pre- meds, infusion), Discussed that chemo treatment may be delayed a day or two due to blood counts, infusion schedule or patient's need to modify. Included a one page resource of symptoms and when to contact the Cancer Clinic with questions or concerns.      Answered all Erasto's questions to his stated satisfaction.            Benita Street, RN, BSN  Neuro-Oncology Care Coordinator  [facility]

## 2021-01-27 NOTE — PROGRESS NOTES
Infusion Nursing Note:  Erasto Maher presents today for Cycle 1 Day 1 Nivolumab, Bevacizumab-awwb.    Patient seen by provider today: Yes: Lizbeth on 1/25   present during visit today: Not Applicable.    Note: Patient is new to the infusion room today and is receiving Nivolumab, Bevacizumab for the first time.  Patient oriented to infusion room, location of bathrooms and nutrition stations, and call light.  Verified that patient recieved written Nivolumab, Bevacizumab information previously; [address] Care Coordinator previously provided teaching to patient. Verbally reviewed Nivolumab, Bevacizumab teaching, side effects, take-home medications, and follow-up schedule with patient. Patient instructed to call triage with any questions or if he experiences a temperature >100.5, shaking chills, uncontrolled nausea/vomiting/diarrhea, dizziness, shortness of breath, bleeding not relieved with pressure, or with any other concerns.  Instructed patient to call the after hours nurse line or [phone] on nights/weekends/holidays.      Intravenous Access:  Peripheral IV placed by lab.    Treatment Conditions:  Lab Results   Component Value Date    HGB 11.4 01/27/2021     Lab Results   Component Value Date    WBC 6.4 01/27/2021      Lab Results   Component Value Date    ANEU 4.4 01/27/2021     Lab Results   Component Value Date     01/27/2021      Lab Results   Component Value Date     01/27/2021                   Lab Results   Component Value Date    POTASSIUM 3.8 01/27/2021           Lab Results   Component Value Date    MAG 1.9 01/19/2021            Lab Results   Component Value Date    CR 0.76 01/27/2021                   Lab Results   Component Value Date    JUDIE 8.6 01/27/2021                Lab Results   Component Value Date    BILITOTAL 0.3 01/27/2021           Lab Results   Component Value Date    ALBUMIN 3.3 01/27/2021                    Lab Results   Component Value Date    ALT 88  01/27/2021           Lab Results   Component Value Date    AST 24 01/27/2021       Results reviewed, labs MET treatment parameters, ok to proceed with treatment.      Post Infusion Assessment:  Patient tolerated infusion without incident.  Blood return noted pre and post infusion.  Site patent and intact, free from redness, edema or discomfort.  No evidence of extravasations.  Access discontinued per protocol.       Discharge Plan:   Patient declined prescription refills.  Discharge instructions reviewed with: Patient.  Patient and/or family verbalized understanding of discharge instructions and all questions answered.  AVS to patient via [employer]  Patient will return 2/9/2021 for next infusion appointment.   Patient discharged in stable condition accompanied by: self.  Departure Mode: Ambulatory.    Kaylin Al RN

## 2021-01-27 NOTE — NURSING NOTE
Chief Complaint   Patient presents with     Blood Draw     Vitals, blood drawn and PIV placed by LPN. Pt checked into appt.      Sent UA supplies with pt to infusion was unable to do in lab.    MIKE Dorman LPN

## 2021-01-27 NOTE — PATIENT INSTRUCTIONS
[facility] Triage and after hours / weekends / holidays:  [phone]    Please call the triage or after hours line if you experience a temperature greater than or equal to 100.5, shaking chills, have uncontrolled nausea, vomiting and/or diarrhea, dizziness, shortness of breath, chest pain, bleeding, unexplained bruising, or if you have any other new/concerning symptoms, questions or concerns.      If you are having any concerning symptoms or wish to speak to a provider before your next infusion visit, please call your care coordinator or triage to notify them so we can adequately serve you.     If you need a refill on a narcotic prescription or other medication, please call before your infusion appointment.               January 2021 Sunday Monday Tuesday Wednesday Thursday Friday Saturday                            1     2    PRE-PROCEDURE COVID PCR  11:30 AM   (15 min.)   1, Bk Curbside Covid Md Anuradha   [facility] [place]   3     4    LAB PERIPHERAL  12:00 PM   (15 min.)    [facility] DRAW   [facility]    PAC EVAL   1:00 PM   (60 min.)   Danika Horton PA-C   [facility] [place] 5     6     7    LAB  11:50 AM   (10 min.)   LAB FIRST FLOOR [facility] 8     9    PRE-PROCEDURE COVID PCR   9:00 AM   (15 min.)    COVID LAB   [facility] [place]   10     11     12     13    Admission  10:14 AM   Marquise Chan MD   [facility]   (Discharge: 1/19/2021)    MR BRAIN W  12:00 PM   (45 min.)   UR2   [facility]    MR III SURGICAL PROCEDURE   1:00 PM   (300 min.)   UR3   [facility]    CRANIOTOMY, WITH MRI GUIDANCE   1:25 PM   Marquise Chan MD   [facility] OR 14    CT HEAD WO   3:00 AM   (20 min.)   [address] Imaging    CT HEAD WO  12:45 PM   (20 min.)   [address]  [facility] Imaging    CT HEAD WO  10:40 PM   (20 min.)   [id number]   M [facility] 15    IP EVALUATION   6:00 AM   (60 min.)   Melissa Petersen, PT   [facility] Rehabilitation    XR CHEST PORT 1 VIEW   9:40 AM   (20 min.)   [id number]   [facility]    MR BRAIN WWO  11:00 AM   (45 min.)   UUMR1   [facility]    EEG VIDEO 2-26 HRS UNMONITORED   1:30 PM   (180 min.)   [id number]   [facility] EEG 16    EEG VIDEO 2-26 HRS UNMONITORED   7:00 AM   (180 min.)   [id number]   [facility] EEG   17    IP TREATMENT   6:00 AM   (30 min.)   Richard Lind, PT   [facility]    EEG VIDEO 2-26 HRS UNMONITORED   7:00 AM   (180 min.)   UUEEG1   [facility] EEG    CT HEAD WO   3:30 PM   (20 min.)   UUCT1   [facility] 18    IP TREATMENT   6:00 AM   (30 min.)   Richard Lind, PT   [facility]    IP EVALUATION   6:00 AM   (60 min.)   Vanda Hahn OT   [facility]    BRAIN TUMOR CONFERENCE   1:00 PM   (5 min.)   BRAIN TUMOR CONFERENCE   [facility] Virtual Scheduling 19     20     21    NEURO EVAL   7:45 AM   (60 min.)   Azeb Vega OT   M [facility] 22     23       24     25    VIDEO VISIT RETURN  11:15 AM   (30 min.)   Ericka Avila MD   [facility] 26     27    NEURO TREATMENT  10:15 AM   (45 min.)   Azeb Vega OT   [facility]    LAB PERIPHERAL   1:00 PM   (15 min.)   [facility]   [facility]    [facility] ONC INFUSION 120   1:30 PM   (120 min.)   [facility] ONCOLOGY INFUSION   [facility] 28     29    VIDEO VISIT RETURN   3:30 PM   (15 min.)   Marquise Chan MD   [facility] 30 31 February 2021 Sunday Monday Tuesday  Wednesday Thursday Friday Saturday        1     2    VIDEO VISIT RETURN   9:45 AM   (60 min.)   Tutu Farmer PsyD   M [facility] 3    NEURO EVAL   8:45 AM   (60 min.)   Lisseth Falcon PT   M [facility]    NEURO TREATMENT   9:45 AM   (45 min.)   Azeb Vega OT   M [facility] 4     5     6       7     8     9    LAB PERIPHERAL   1:00 PM   (15 min.)   [facility]   [facility] ONC INFUSION 60   1:30 PM   (60 min.)   UC ONCOLOGY INFUSION   [facility] 10    NEURO TREATMENT  10:30 AM   (45 min.)   Azeb Vega OT   M [facility] 11     12     13       14     15     16    VIDEO VISIT RETURN   9:45 AM   (60 min.)   Tutu Farmer PsyD   M [facility] 17    NEURO TREATMENT  10:30 AM   (45 min.)   Azeb Vega OT   [facility] 18     19     20       21     22    LAB PERIPHERAL   7:00 AM   (15 min.)   [facility]   [facility] RETURN   7:15 AM   (30 min.)   Ericka Avila MD   [facility] ONC INFUSION 60   8:00 AM   (60 min.)   UC ONCOLOGY INFUSION   [facility] 23     24    NEURO TREATMENT  10:15 AM   (45 min.)   Azeb Vega OT   [facility] 25     26     27       28                                                  Recent Results (from the past 24 hour(s))   TSH with free T4 reflex    Collection Time: 01/27/21  1:17 PM   Result Value Ref Range    TSH 21.11 (H) 0.40 - 4.00 mU/L   CBC with platelets differential    Collection Time: 01/27/21  1:17 PM   Result Value Ref Range    WBC 6.4 4.0 - 11.0 10e9/L    RBC Count 3.43 (L) 4.4 - 5.9 10e12/L    Hemoglobin 11.4 (L) 13.3 - 17.7 g/dL     Hematocrit 34.9 (L) 40.0 - 53.0 %     (H) 78 - 100 fl    MCH 33.2 (H) 26.5 - 33.0 pg    MCHC 32.7 31.5 - 36.5 g/dL    RDW 12.4 10.0 - 15.0 %    Platelet Count 162 150 - 450 10e9/L    Diff Method Automated Method     % Neutrophils 69.5 %    % Lymphocytes 19.6 %    % Monocytes 8.9 %    % Eosinophils 0.6 %    % Basophils 0.3 %    % Immature Granulocytes 1.1 %    Nucleated RBCs 0 0 /100    Absolute Neutrophil 4.4 1.6 - 8.3 10e9/L    Absolute Lymphocytes 1.3 0.8 - 5.3 10e9/L    Absolute Monocytes 0.6 0.0 - 1.3 10e9/L    Absolute Eosinophils 0.0 0.0 - 0.7 10e9/L    Absolute Basophils 0.0 0.0 - 0.2 10e9/L    Abs Immature Granulocytes 0.1 0 - 0.4 10e9/L    Absolute Nucleated RBC 0.0    Comprehensive metabolic panel    Collection Time: 01/27/21  1:17 PM   Result Value Ref Range    Sodium 139 133 - 144 mmol/L    Potassium 3.8 3.4 - 5.3 mmol/L    Chloride 105 94 - 109 mmol/L    Carbon Dioxide 29 20 - 32 mmol/L    Anion Gap 4 3 - 14 mmol/L    Glucose 136 (H) 70 - 99 mg/dL    Urea Nitrogen 18 7 - 30 mg/dL    Creatinine 0.76 0.66 - 1.25 mg/dL    GFR Estimate >90 >60 mL/min/[1.73_m2]    GFR Estimate If Black >90 >60 mL/min/[1.73_m2]    Calcium 8.6 8.5 - 10.1 mg/dL    Bilirubin Total 0.3 0.2 - 1.3 mg/dL    Albumin 3.3 (L) 3.4 - 5.0 g/dL    Protein Total 6.8 6.8 - 8.8 g/dL    Alkaline Phosphatase 79 40 - 150 U/L    ALT 88 (H) 0 - 70 U/L    AST 24 0 - 45 U/L   T4 free    Collection Time: 01/27/21  1:17 PM   Result Value Ref Range    T4 Free 0.99 0.76 - 1.46 ng/dL

## 2021-01-27 NOTE — DISCHARGE INSTRUCTIONS
OT Instructions 1/27/2021:  1.  Do lessons in [employer] that address left pinky finger and left ring finger: practice keys a,s, q,w, z, x

## 2021-01-28 NOTE — PROGRESS NOTES
[facility] Telephone Triage Note    Assessment: Spouse/Partner Judith called in to triage reporting the following symptoms: nausea and heachae that developed overnight. Started Day 1 Cycle 1 of Opdivo+Avastin yesterday. Judith stated that he has had similar headaches in the past that were related to dehydration and electrolyte derrangements. He has started drinking Gatorade with mild relief in headache but nausea still present and not previously experienced. No vomiting or other symptoms at this time. No antiemetics currently included in medication list.    Recommendations: Discussed with Dr Avila.  He may resume use of his ondansetron, 4mg TID, with additiona of miralax or senna to avoid constipation. If further neuro symptoms develop or if headache gets worse or not better, would need to call clinic again and likely present to ER for imaging..    Follow-Up: Instructed patient to seek care immediately for worsening symptoms, including: fever, chest pain, shortness of breath, dizziness. Patient voiced understanding of advice and/or instructions given. Judith looked at mediations available at home and did find that they have ondansetron 4 mg and will give him that.

## 2021-01-29 NOTE — LETTER
1/29/2021         RE: Erasto Maher  [address]  [place] [address]        Dear Colleague,    Thank you for referring your patient, Erasto Maher, to the [facility]. Please see a copy of my visit note below.    Aristeo is a 55 year old who is being evaluated via a billable video visit.      How would you like to obtain your AVS? MyChart  If the video visit is dropped, the invitation should be resent by: Text to cell phone: [phone]  Will anyone else be joining your video visit? No       JUDY Farris      Video Start Time: 4:00 PM  Video-Visit Details    Type of service:  Video Visit    Video End Time: 4:15 PM    Originating Location (pt. Location): Home    Distant Location (provider location):  [facility]     Platform used for Video Visit: [facility]     Neurosurgery Clinic Note (post-operative visit)    55 M PMH for recurrent glioblastoma, s/p second recurrence and biopsy/EAP viral injection of the right parietal glioblastoma on 1/13/2021. The patient was discharged home on POD5. He has since completed his 14 course of Veledemex. He is being weaned off steroid treatment and is now transitioning to Avastin and Nivo therapy. He returns today for a follow-up/    On review of systems, the patient continued to be fatigued. However, he is doing ~20 minutes daily on a stationary bike, ambulating regularly, and eager to advance his exercise regimen.    Video motor examination grossly intact. Incision dry, clean, and intact.    AP: 55 M who is doing well after an EAP immuno-modulatory adenovirus injection (Ziopharm). I will continue to follow this patient closely through the [facility].      Again, thank you for allowing me to participate in the care of your patient.        Sincerely,        Marquise Chan MD

## 2021-01-29 NOTE — PROGRESS NOTES
Aristeo is a 55 year old who is being evaluated via a billable video visit.      How would you like to obtain your AVS? MyChart  If the video visit is dropped, the invitation should be resent by: Text to cell phone: [phone]  Will anyone else be joining your video visit? No       JUDY Farris      Video Start Time: 4:00 PM  Video-Visit Details    Type of service:  Video Visit    Video End Time: 4:15 PM    Originating Location (pt. Location): Home    Distant Location (provider location):  [facility]     Platform used for Video Visit: [facility]     Neurosurgery Clinic Note (post-operative visit)    55 M PM for recurrent glioblastoma, s/p second recurrence and biopsy/EAP viral injection of the right parietal glioblastoma on 1/13/2021. The patient was discharged home on POD5. He has since completed his 14 course of Veledemex. He is being weaned off steroid treatment and is now transitioning to Avastin and Nivo therapy. He returns today for a follow-up/    On review of systems, the patient continued to be fatigued. However, he is doing ~20 minutes daily on a stationary bike, ambulating regularly, and eager to advance his exercise regimen.    Video motor examination grossly intact. Incision dry, clean, and intact.    AP: 55 M who is doing well after an EAP immuno-modulatory adenovirus injection (Ziopharm). I will continue to follow this patient closely through the [facility].

## 2021-02-02 NOTE — PROGRESS NOTES
"Erasto Maher is a 55 year old male who is being evaluated via a billable telephone visit. Phone call duration: 30 minutes      The patient has been notified of following:      \"This telephone visit will be conducted via a call between you and your physician/provider. We have found that certain health care needs can be provided without the need for a physical exam.  This service lets us provide the care you need with a short phone conversation.  If a prescription is necessary we can send it directly to your pharmacy.  If lab work is needed we can place an order for that and you can then stop by our lab to have the test done at a later time.     Telephone visits are billed at different rates depending on your insurance coverage. During this emergency period, for some insurers they may be billed the same as an in-person visit.  Please reach out to your insurance provider with any questions.     If during the course of the call the physician/provider feels a telephone visit is not appropriate, you will not be charged for this service.\"     Patient has given verbal consent for Telephone visit? Yes    Confidential Summary of Oncology Psychology Followup Visit    Erasto Maher is a 55 year old male who presents for Depression  The patient was seen for a 30 minute appointment on 2/2/2021.    Subjective: He is feeling better and anxious at the same time. He is feeling good about his new treatment and anxious about its efficacy for him. He is also concerned about a lack of initiative for activities he doesn't want to do. We discussed some behavioral tools to help with his initiative.    Objective: Affect was appropriate to the content of the therapeutic conversation.     Diagnosis:   Encounter Diagnosis   Name Primary?     Depressed mood Yes     Recommendations/Plan:  1. Continue to use the behavioral tools to improve his initiative.  2. Return for follow-up     Thank you for this opportunity to participate in " your care of this patient.    Tutu Farmer Psy.D., L.P.  Director, Oncology Supportive Care

## 2021-02-02 NOTE — LETTER
"    2/2/2021         RE: Erasto Maher  [address]  [place] [address]        Dear Colleague,    Thank you for referring your patient, Erasto Maher, to the [facility]. Please see a copy of my visit note below.    Earsto Maher is a 55 year old male who is being evaluated via a billable telephone visit. Phone call duration: 30 minutes      The patient has been notified of following:      \"This telephone visit will be conducted via a call between you and your physician/provider. We have found that certain health care needs can be provided without the need for a physical exam.  This service lets us provide the care you need with a short phone conversation.  If a prescription is necessary we can send it directly to your pharmacy.  If lab work is needed we can place an order for that and you can then stop by our lab to have the test done at a later time.     Telephone visits are billed at different rates depending on your insurance coverage. During this emergency period, for some insurers they may be billed the same as an in-person visit.  Please reach out to your insurance provider with any questions.     If during the course of the call the physician/provider feels a telephone visit is not appropriate, you will not be charged for this service.\"     Patient has given verbal consent for Telephone visit? Yes    Confidential Summary of Oncology Psychology Followup Visit    Erasto Maher is a 55 year old male who presents for Depression  The patient was seen for a 30 minute appointment on 2/2/2021.    Subjective: He is feeling better and anxious at the same time. He is feeling good about his new treatment and anxious about its efficacy for him. He is also concerned about a lack of initiative for activities he doesn't want to do. We discussed some behavioral tools to help with his initiative.    Objective: Affect was appropriate to the content of the " therapeutic conversation.     Diagnosis:   Encounter Diagnosis   Name Primary?     Depressed mood Yes     Recommendations/Plan:  1. Continue to use the behavioral tools to improve his initiative.  2. Return for follow-up     Thank you for this opportunity to participate in your care of this patient.    Brock Briscoe.[email or web address], L.P.  Director, Oncology Supportive Care       Again, thank you for allowing me to participate in the care of your patient.        Sincerely,        Tutu Farmer PsyD

## 2021-02-03 NOTE — DISCHARGE INSTRUCTIONS
OT Instructions 2/3/2021:  1.  Practice lessons 68,73,85 on www.typingclub.com  2.  Pink thera-putty exercises with left hand.  A.  Flatten the putty.  Then, put all fingers in the putty and try to spread them out.  Repeat 5-10 times.  B.  Squeeze the putty between all your fingers-place putty between the SIDES of your fingers. Repeat 5 times with each finger.  C.  Hold the putty with your right hand.  Pinch & pull the putty with your left thumb and fingers; one at a time.  Repeat 5 times with each finger.

## 2021-02-03 NOTE — PROGRESS NOTES
02/03/21 0800   General Information   Start of Care Date 02/03/21   Referring Physician Dr Ahumada   Orders Evaluate and Treat as Indicated   Order Date 01/21/21   Medical Diagnosis glioblastoma   Onset of illness/injury or Date of Surgery 01/13/21  (craniotomy. Also on 6/27/2019 and 4/9/2020)   Precautions/Limitations   (no boxing with bag allowed per neurosurgery)   Special Instructions seizures on Keppra, starting chemo later in January??   Surgical/Medical history reviewed Yes   Pertinent history of current problem (include personal factors and/or comorbidities that impact the POC) Not sure if I needed this appt.  No falls. No pain, NO T/N. Self motivated to work out. Very capable of household activities. I dont feel like there is anythign I cant do right now. 24 minutes on ex bike last night, two days ago 40 minutes on treadmill some incline never got to a run.   Prior level of function comment very, very high activity level for PLOF: last winter skied 55009 vertical feet, golfed last summer walked the courses, carried his bags. Hasnt gotten cleared to do boxing work out (heavy bag). Has ex equipment at home. Life cycle (least favorite piece of equipment). Stairmaster, skiers edge (lateral  up/down motion). wt machine but doesnt use it much. 5 days a week. alternate cardio and wts under normal circumstances.    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 Wife wont let him.    Fall Risk Screen   Fall screen completed by PT   Have you fallen 2 or more times in the past year? No   Have you fallen and had an injury in the past year? No   Is patient a fall risk? No   Pain    Patient currently in pain No   Gait   Gait Comments 25fTW at fast pace 4.38 seconds and 10 steps. Arms swing symmetrically at fast speed.    Gait Special Tests 25 Foot Timed Walk   Seconds 7.1   Steps 12 Steps   Comments normal gait pattern, INGRIS, symmetrical steps. Left arm dowesnt swing quite as much as right (its subtle).    Gait Special Tests Functional Gait Assessment Score out of 30   Score out of 30 27   Gait Special Tests Six Minute Walk Test   Feet 1586 Feet  (1.34 m/s = 3.0 MPHS. This is WNLS for his age.)   Comments talking while walking. He could have gone faster if I had told him to really push it for speed.    Balance Special Tests Sit to Stand Reps in 30 Seconds   Reps in 30 seconds 15   Height 18   Comments good control   Planned Therapy Interventions   Planned Therapy Interventions Comment WE briefly went over his current exercise:Keep doing st bike (intervals), keep using treadmill and alternate arm on rail as he feels comfortable, Ok to do ski machine as long as something to hang onto for lateral motion, Ok to do body wt exercises, ok to do cardio kickboxing type of boxing (not sure he likes this type of ex). I think the left arm swing will improve over time/practice. Also walking in dark outside this summer(uneven ground) be careful. Plans on golfing in May.    Clinical Impression   Criteria for Skilled Therapeutic Interventions Met no;evaluation only;no problems identified which require skilled intervention   PT Diagnosis impaired high level mobility   Influenced by the following impairments hig level balance/dynamic postural control and decreased activity tolerance   Functional limitations due to impairments affects recreational/exercise activity   Clinical Presentation Stable/Uncomplicated   Clinical Presentation Rationale medical history, impairments, FGA, 6MWT, STS test and clinical judgement   Clinical Decision Making (Complexity) Low complexity   Therapy Frequency other (see comments)    Predicted Duration of Therapy Intervention (days/wks) no further PT needed at this time.    Risk & Benefits of therapy have been explained Yes   Patient, Family & other staff in agreement with plan of care Yes   Clinical Impression Comments Aristeo is moving well. He is not at risk for falls with normal community/household mobility. His PLOF is VERY active. He is steadily increasing his exercise/activity at this time and knows what to do to continue to progress his exercise/activity tolerance.    Total Evaluation Time   PT Eval, Low Complexity Minutes (80209) 45   Amanda Falcon DPT, MPT, NCS  Physical Therapist   Board Certified Neurologic Clinical Specialist     [facility]   [address] [place] [address]   [email or web address]  [email or web address]   [phone]   Clinic: [phone] //   Fax: [phone]

## 2021-02-03 NOTE — PROGRESS NOTES
21 0900   Signing Clinician's Name / Credentials   Signing clinician's name / credentials Amanda Falcon DPT NCS   Functional Gait Assessment (TYRELL Fisher, KOLE Uriostegui, et al. (2004))   1. GAIT LEVEL SURFACE 3   2. CHANGE IN GAIT SPEED 3   3. GAIT WITH HORIZONTAL HEAD TURNS 3   4. GAIT WITH VERTICAL HEAD TURNS 3   5. GAIT AND PIVOT TURN 3   6. STEP OVER OBSTACLE 2  (left foot (back foot) kicked box, no LOB, not automatic yet)   7. GAIT WITH NARROW BASE OF SUPPORT 3  (took some practice)   8. GAIT WITH EYES CLOSED 1  (12.13: s low/cautious but in midline)   9. AMBULATING BACKWARDS 3   10. STEPS 3   Total Functional Gait Assessment Score   TOTAL SCORE: (MAXIMUM SCORE 30) 27   Functional Gait Assessment (FGA): The FGA assesses postural stability during various walking tasks.   Gait assistive device used: None    Patient Score: 27/30  Scores of <22/30 have been correlated with predicting falls in community-dwelling older adults according to Phillip & Manny 2010.   Scores of <18/30 have been correlated with increased risk for falls in patients with Parkinsons Disease according to Weston Escalante Zhou et al 2014.  Minimal Detectable Change for patients with acute/chronic stroke = 4.2 according to Thimahnaz & Kristischel 2009  Minimal Detectable Change for patients with vestibular disorder = 8 according to Phillip & Manny 2010    Assessment (rationale for performing, application to patient s function & care plan): not at risk for falls with normal community mobility/household mobility.  Minutes billed as physical performance test: part of evaluation    Amanda Falcon DPT, MPT, NCS  Physical Therapist   Board Certified Neurologic Clinical Specialist     [facility]   [address] [place] [address]   [email or web address]  [email or web address]   [phone]   Clinic: [phone] //   Fax: [phone]

## 2021-02-09 NOTE — PROGRESS NOTES
Infusion Nursing Note:  Erasto Maher presents today for C2 D1 Nivolumab/MVASI.    Patient seen by provider today: No   present during visit today: Not Applicable.    Note: Patient arrives feeling well. Denies any acute concerns.      Intravenous Access:  Peripheral IV placed.    Treatment Conditions:  Lab Results   Component Value Date    HGB 11.6 02/09/2021     Lab Results   Component Value Date    WBC 2.5 02/09/2021      Lab Results   Component Value Date    ANEU 1.4 02/09/2021     Lab Results   Component Value Date     02/09/2021      Lab Results   Component Value Date     02/09/2021                   Lab Results   Component Value Date    POTASSIUM 4.1 02/09/2021           Lab Results   Component Value Date    MAG 1.9 01/19/2021            Lab Results   Component Value Date    CR 0.85 02/09/2021                   Lab Results   Component Value Date    JUDIE 8.9 02/09/2021                Lab Results   Component Value Date    BILITOTAL 0.4 02/09/2021           Lab Results   Component Value Date    ALBUMIN 3.6 02/09/2021                    Lab Results   Component Value Date    ALT 55 02/09/2021           Lab Results   Component Value Date    AST 28 02/09/2021     BP WNL.  Results reviewed, labs MET treatment parameters, ok to proceed with treatment.      Post Infusion Assessment:  Patient tolerated infusion without incident.  Blood return noted pre and post infusion.  Site patent and intact, free from redness, edema or discomfort.  No evidence of extravasations.  Access discontinued per protocol.       Discharge Plan:   Patient declined prescription refills.  Discharge instructions reviewed with: Patient.  Patient and/or family verbalized understanding of discharge instructions and all questions answered.  AVS to patient via UMMCT.  Patient will return 2/22 for next appointment.   Patient discharged in stable condition accompanied by: self.  Departure Mode: Ambulatory.    Alanna  MARCELINO Preston

## 2021-02-09 NOTE — PROGRESS NOTES
Chief Complaint   Patient presents with     Blood Draw     PIV started, labs drawn, saline locked, VSS, checked into next infusion     Mir Borrego RN on 2/9/2021 at 2:46 PM

## 2021-02-16 NOTE — PROGRESS NOTES
"Erasto Maher is a 55 year old male who is being evaluated via a billable telephone visit. Phone call duration: 30 minutes      The patient has been notified of following:      \"This telephone visit will be conducted via a call between you and your physician/provider. We have found that certain health care needs can be provided without the need for a physical exam.  This service lets us provide the care you need with a short phone conversation.  If a prescription is necessary we can send it directly to your pharmacy.  If lab work is needed we can place an order for that and you can then stop by our lab to have the test done at a later time.     Telephone visits are billed at different rates depending on your insurance coverage. During this emergency period, for some insurers they may be billed the same as an in-person visit.  Please reach out to your insurance provider with any questions.     If during the course of the call the physician/provider feels a telephone visit is not appropriate, you will not be charged for this service.\"     Patient has given verbal consent for Telephone visit? Yes    Confidential Summary of Oncology Psychology Followup Visit    Erasto Maher is a 55 year old male who presents for Depression  .The patient was seen for a 30 minute appointment on 2/16/2021.    Subjective: He reported emotional fatigue that he was addressing by sleeping more. We addressed this with cognitive tools and problem-solving. He provided specific examples of how he would implement these ideas demonstrating his understanding.     Objective: Affect was appropriate to the content of the therapeutic conversation.     Diagnosis:   Encounter Diagnosis   Name Primary?     Depressed mood Yes     Recommendations/Plan:  1. Avoid \"what if\" and \"when\" thoughts  2. Return for follow-up    Thank you for this opportunity to participate in your care of this patient.    Tutu Farmer Psy.D., L.P.  Director, " Oncology Supportive Care

## 2021-02-17 NOTE — PROGRESS NOTES
"NEURO-ONCOLOGY VISIT  Feb 22, 2021    CHIEF COMPLAINT: Mr. 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 He completed chemoradiotherapy on 8/30/2019 and was managed on a clinical trial receiving atezolizumab (anti-PDL1) plus adjuvant temozolomide. He received his last dose of immunotherapy on 3/16/2020 when imaging on 3/28/2020 showed progression of disease. He underwent a repeat craniotomy for tumor resection with Dr. Chan on 4/9/2020 with placement of GammaTiles.     Aristeo was managed on lomustine monotherapy. However, imaging in 11/2020 showed a new distant lesion consistent with disease progression.     He completed a repeat course of radiation, then a repeat resection on 1/13/2021 plus use of the compassionate use Ziopharm treatment protocol that utilizes an intratumoral adenovirus injection activating human interleukin-12 + veledimex in combination with adjuvant nivolumab. Pathology was consistent with recurrent glioblastoma. He is currently managed on nivolumab + bevacizumab.    I met with Aristeo and Judith (wife) today for this follow-up visit.     HISTORY OF PRESENT ILLNESS  -Aristeo states that he is doing well today.   -Aristeo's wife, Judith, joined us via phone and says that overall, he is doing a lot better.  Coordination seems to have improved and he is now using both arms to complete tasks with less neglect of the left side.  Energy levels are good, but continues to have a little fatigue. More interactive with family/ friends.  -Wonders about getting a COVID vaccine--whether this is safe.  May have access through a local clinic with periodic extra supply.  -Tolerating nivo and kee infusions.   -He is off dexamethasone; stopped this about 4 days after last visit.  -Dullness in thinking at times, but cognition is still improving. Working with OT, " especially with the goal to drive again.  -Aristeo and Judith wonder if his T4 level is low; in the past, hypothyroid symptoms have manifested as feeling cold at night, and he is recently experiencing this again.  -On Lexapro and mood is positive.     -No recurrent seizure events.    REVIEW OF SYSTEMS  A comprehensive ROS negative except as in HPI.      MEDICATIONS   Current Outpatient Medications   Medication Sig Dispense Refill     escitalopram (LEXAPRO) 10 MG tablet Take 10 mg by mouth every morning        fluticasone (FLONASE) 50 MCG/ACT nasal spray Spray 1 spray into both nostrils nightly as needed        levETIRAcetam (KEPPRA) 1000 MG tablet Take 1 tablet (1,000 mg) by mouth 2 times daily 180 tablet 1     levothyroxine (SYNTHROID/LEVOTHROID) 125 MCG tablet Take 125 mcg by mouth every morning        loratadine (CLARITIN) 10 MG tablet Take 10 mg by mouth nightly as needed        melatonin 5 MG tablet Take 5 mg by mouth nightly as needed        olopatadine (PATANOL) 0.1 % ophthalmic solution Place 1-2 drops into both eyes daily as needed        omeprazole (PRILOSEC) 20 MG DR capsule Take 20 mg by mouth as needed        Psyllium 500 MG CAPS Take 4 capsules by mouth as needed        dexamethasone (DECADRON) 1 MG tablet Take 1 tablet (1 mg) by mouth 2 times daily (with meals) (Patient not taking: Reported on 1/27/2021) 30 tablet 0     study - sarah, IDS #[id number], capsule Patient to continue daily dosing at home with remaining capsules as directed. (Patient not taking: Reported on 2/9/2021) 14 capsule 0     DRUG ALLERGIES   Allergies   Allergen Reactions     No Clinical Screening - See Comments Rash     Cats and dogs         ONCOLOGIC HISTORY  -PRESENTATION: New onset seizures; complex partial event with speech arrest and altered mental status lasting several minutes. Later had secondary generalization. Started Keppra.   -MR brain imaging with a ring enhancing lesion in the right parietal lobe.    -6/27/2019 SURGERY:  Right temporal craniotomy for mass resection, gross total resection by Dr. Tam Barreto.  PATHOLOGY: Glioblastoma; IDH1 R132H wildtype, MGMT promoter unmethylated  -7/3/2019 CLINICAL TRIAL: Evaluated by Dr. Wilmer Mckenna at [facility], consented for clinical trial 2016-0867 (Standard of Care plus atezolizumab).  -7/22 - 8/30/2019 CHEMORADS: 60cGy with concurrent temozolomide 75mg/m2/day (145mg) plus atezolizumab Q2 weeks on clinical trial 2016-0867.  -9/6/2019 NEURO-ONC: Evaluated at the [facility].   -9/25/2019 - 3/16/2020 CHEMO: Adjuvant chemotherapy; temozolomide + atezolizumab 840 mg IV on clinical trial 2016-0867.  -4/6/2020 NEURO-ONC: Communicated with Dr. Borrero. Plan for surgery and next steps pending pathology results.   -4/9/2020 SURGERY + RADS: Repeat craniotomy for surgical debulking plus placement of GammaTiles.  PATHOLOGY: Recurrent glioblastoma.   Next generation sequencing: Microsatellite instability: Stable. Tumor mutational burden: 4 (Low). APC, CDK4, MDM2, PTEN mutated. ARID1A, ASXL1, ATRX, DNMT3A, FANCE, KDM5C, MED12, MEF2B, NF1, RUNX1, TERT mutated. PD-L1 Negative.  -4/20/2020 NEURO-ONC/ CHEMO: Starting Lomustine 3 weeks post-op. Consented for next generation sequencing and sending most recent tumor sample.    -4/30/2020 CHEMO: Lomustine, cycle 1.   -5/11/2020 NEURO-ONC: Clinically well. Referral to genetic counseling.   -6/5/2020 NEURO-ONC/ MRB/ CHEMO: Clinically well. Imaging with a positive treatment response. Lomustine, cycle 2 (start date of 6/11).    -7/20/2020 NEURO-ONC/ MRB/ CHEMO: Clinically well. Imaging without concern; aside from subdural fluid collection. Labs at goal. Lomustine, cycle 3 (start date of 7/23).    -8/31/2020 NEURO-ONC/ MRB/ CHEMO: Clinically well. Imaging without concern. Labs at goal. Lomustine, cycle 4 (start date of 9/3).   -10/12/2020 NEURO-ONC/ MRB/ CHEMO: Clinically well. Imaging without concern for cancer growth, but the resection cavity is expanding with time.  "Per Dr. Chan; continue to monitor with no surgical intervention at this time. Labs at goal. Lomustine, cycle 5 (start date of 10/15).    -11/23/2020 NEURO-ONC/ MRB: Clinically well. Imaging with a new distant lesion. Stopping lomustine. Referral to Dr. Chan.    -12/10 - 12/16/2020 RADS: Gamma Knife therapy of 15 Gy/ fraction x 5 fractions.  -12/18/2020 NEURO-ONC: Discussion of treatment.  -1/13/2021 SURGERY: Right craniotomy for open biopsy of the right parietal tumor plus Ziopharm adenovirus injection.  PATHOLOGY: Recurrent glioblastoma.  -1/25/2021 NEURO-ONC: Clinically improving since surgery. Continue taking veledimex through Wednesday.   -1/27/2021 CHEMO: Starting nivolumab + bevacizumab.   -2/22/2021 NEURO-ONC/ CHEMO: Clinically improving. Continue nivolumab + bevacizumab.    SOCIAL HISTORY   Tobacco use: Never smoker.   Alcohol use: Social, infrequent.   Drug use: Denies.  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       PHYSICAL EXAMINATION  /82 (BP Location: Right arm, Patient Position: Sitting, Cuff Size: Adult Regular)   Pulse 94   Temp 98.2  F (36.8  C) (Oral)   Resp 16   Wt 77.9 kg (171 lb 11.2 oz)   SpO2 98%   BMI 24.64 kg/m     Wt Readings from Last 2 Encounters:   02/22/21 77.9 kg (171 lb 11.2 oz)   02/09/21 78.6 kg (173 lb 4.8 oz)      Ht Readings from Last 2 Encounters:   01/13/21 1.778 m (5' 10\")   01/04/21 1.778 m (5' 10\")      KPS 90    -Generally well appearing. Lower energy today.   -Respiratory: No audible wheezing.   -Skin: No rashes.   -Psychiatric: Normal mood and affect. Pleasant, talkative.  -Neurologic:   MENTAL STATUS:     Alert, oriented.    Recall: Intact.    Speech fluent.    Comprehension intact.     CRANIAL NERVES:     Pupils are equal, round.     Extraocular movements full.     Symmetric facial movements.   Hearing intact.   With normal phonation, no dysfunction of the palate or tongue.   MOTOR: Antigravity in the arms. No pronation and no drift. Rises " from the chair without using his arms.  SENSATION: Intact to light touch throughout per report.  COORDINATION: Largely equal on finger nose with eyes closed on left and right.   GAIT: Steady and fluent; able to walk on heels and toes.       MEDICAL RECORDS  Obtained and personally reviewed all available outside medical records in addition to reviewing any records available in our electronic system.     LABS  Personally reviewed all available lab results.     IMAGING  No new neuro-imaging to review.         IMPRESSION  Clinic time for this high complexity visit was spent discussing in detail the nature of his cancer in light of his current adjuvant treatment plan.     Clinically, Aristeo is noting improvement in the his left-sided symptoms. He is working with OT. Currently off dexamethasone. Otherwise, he notes continued fatigue. Denies any other significant side effects related to nivolumab + bevacizumab.      Compassionate use Darshana utilizes a combination of virus gene therapy (Xj-LNL-cXY-2) + veledimex with nivolumab to enhance the ZX-[id number]-lqxyksoe effect by increasing the number and activity of effector CD8+ T-cells in an otherwise highly immunosuppressant cancer. IL-12 is a cytokine with anticancer activity and Wy-EYX-beK-12 is a genetically engineered, non-pathogenic form of an adenovirus that encodes for this cytokine. The virus infects the tumor cells and uses its machinery to produce IL-12. Veledimex activates production of IL-12 within the viral infected tumor cells. With the completion of viral injection + post-operative veledimex, the plan is to continue nivolumab + bevacizumab every 2 weeks.     Labs reviewed; no concerning findings. TSH today was 7.23; free T4 was 1.20, which is normal. Given the normal labs, will continue same levothyroxine dose and continue monitoring    Repeat imaging next month.     Finally, discussed COVID vaccine. From a chemotherapy standpoint, I see no contraindication, but  recommended that they ask Dr. Chan regarding his opinion on getting the vaccine.     PROBLEM LIST  Glioblastoma, MGMT unmethylated and IDH1 wildtype by IHC  Seizure  Chemotherapy-associated constipation  Headaches  Apraxia    PLAN  -CANCER-DIRECTED THERAPY-  -As above; Continue nivolumab + bevacizumab.  -Repeat imaging next month.      -STEROIDS-  -Off dexamethasone.   -Monitor for worsening symptoms.     -SEIZURE MANAGEMENT-  -Given history of seizures, will continue current antiepileptic regimen of Keppra for the foreseeable future.    -Quality of life/ MOOD/ FATIGUE-  -Continue Lexapro.   -Continue to monitor mood as untreated/ undertreated depression can worsen fatigue, dysorexia, and quality of life.  -Continue to monitor TSH/ T4 levels and adjust Synthroid as needed.     Return to clinic in 1 month + imaging + labs + infusion.      Aristeo was also seen and evaluated by a heme/onc fellow today under my direct supervision.     Ericka Avila MD  Neuro-oncology

## 2021-02-17 NOTE — PATIENT INSTRUCTIONS
Continue nivolumab + bevacizumab.  Monitoring thyroid function:   -TSH today was 7.23; free T4 was 1.20, which is normal   -Given the normal labs, will continue same levothyroxine dose and continue monitoring    Repeat imaging next month.      Ask Dr. Chan regarding COVID vaccine; in my opinion, ok to get vaccine.     Return to clinic in 4 weeks.     Ericka Avila MD  Neuro-oncology

## 2021-02-22 NOTE — PROGRESS NOTES
Infusion Nursing Note:  Erasto Maher presents today for Cycle 3 Avastin, Nivolumab.    Patient seen by provider today: Yes: Dr. Avila    Intravenous Access:  Peripheral IV placed.    Treatment Conditions:  Lab Results   Component Value Date    HGB 12.6 02/22/2021     Lab Results   Component Value Date    WBC 2.4 02/22/2021      Lab Results   Component Value Date    ANEU 1.3 02/22/2021     Lab Results   Component Value Date     02/22/2021      Lab Results   Component Value Date     02/22/2021                   Lab Results   Component Value Date    POTASSIUM 4.0 02/22/2021           Lab Results   Component Value Date    MAG 1.9 01/19/2021            Lab Results   Component Value Date    CR 0.81 02/22/2021                   Lab Results   Component Value Date    JUDIE 9.0 02/22/2021                Lab Results   Component Value Date    BILITOTAL 0.4 02/22/2021           Lab Results   Component Value Date    ALBUMIN 3.7 02/22/2021                    Lab Results   Component Value Date    ALT 34 02/22/2021           Lab Results   Component Value Date    AST 18 02/22/2021     BP: WNL  Results reviewed, labs MET treatment parameters, ok to proceed with treatment.    Post Infusion Assessment:  Patient tolerated infusion without incident.  Blood return noted pre and post infusion.  Site patent and intact, free from redness, edema or discomfort.  No evidence of extravasations. Access discontinued per protocol.      Discharge Plan:   Patient declined prescription refills.  Copy of AVS reviewed with patient and/or family.  Patient will return 3/8 for next appointment.  Patient discharged in stable condition accompanied by: self.  Departure Mode: Ambulatory.    Claudia Merida RN

## 2021-02-22 NOTE — NURSING NOTE
"Oncology Rooming Note    February 22, 2021 7:52 AM   Erasto Maher is a 55 year old male who presents for:    Chief Complaint   Patient presents with     Blood Draw     Labs drawn via PIV. VS taken.      Oncology Clinic Visit     Pt is here for a rtn for Glioblastma     Initial Vitals: Blood Pressure 117/82 (BP Location: Right arm, Patient Position: Sitting, Cuff Size: Adult Regular)   Pulse 94   Temperature 98.2  F (36.8  C) (Oral)   Respiration 16   Weight 77.9 kg (171 lb 11.2 oz)   Oxygen Saturation 98%   Body Mass Index 24.64 kg/m   Estimated body mass index is 24.64 kg/m  as calculated from the following:    Height as of 1/13/21: 1.778 m (5' 10\").    Weight as of this encounter: 77.9 kg (171 lb 11.2 oz). Body surface area is 1.96 meters squared.  No Pain (0) Comment: Data Unavailable   No LMP for male patient.  Allergies reviewed: Yes  Medications reviewed: Yes    Medications: Medication refills not needed today.  Pharmacy name entered into EPIC:    [facility] - [place], [place] - [address] [id number] - [place], [place] - [address] AT [facility] [address]  [facility] - [place], [place] - [address]    Clinical concerns: none       Christine Angeles MA            "

## 2021-02-22 NOTE — LETTER
"    2/22/2021         RE: Erasto Maher  [address]  [facility] [address]        Dear Colleague,    Thank you for referring your patient, Erasto Maher, to the [facility]. Please see a copy of my visit note below.    NEURO-ONCOLOGY VISIT  Feb 22, 2021    CHIEF COMPLAINT: Mr. 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 He completed chemoradiotherapy on 8/30/2019 and was managed on a clinical trial receiving atezolizumab (anti-PDL1) plus adjuvant temozolomide. He received his last dose of immunotherapy on 3/16/2020 when imaging on 3/28/2020 showed progression of disease. He underwent a repeat craniotomy for tumor resection with Dr. Chan on 4/9/2020 with placement of GammaTiles.     Aristeo was managed on lomustine monotherapy. However, imaging in 11/2020 showed a new distant lesion consistent with disease progression.     He completed a repeat course of radiation, then a repeat resection on 1/13/2021 plus use of the compassionate use Ziopharm treatment protocol that utilizes an intratumoral adenovirus injection activating human interleukin-12 + veledimex in combination with adjuvant nivolumab. Pathology was consistent with recurrent glioblastoma. He is currently managed on nivolumab + bevacizumab.    I met with Aristeo and Judith (wife) today for this follow-up visit.     HISTORY OF PRESENT ILLNESS  -Aristeo states that he is doing well today.   -Aristeo's wife, Judith, joined us via phone and says that overall, he is doing a lot better.  Coordination seems to have improved and he is now using both arms to complete tasks with less neglect of the left side.  Energy levels are good, but continues to have a little fatigue. More interactive with family/ friends.  -Wonders about getting a COVID vaccine--whether this is safe.  " May have access through a local clinic with periodic extra supply.  -Tolerating nivo and kee infusions.   -He is off dexamethasone; stopped this about 4 days after last visit.  -Dullness in thinking at times, but cognition is still improving. Working with OT, especially with the goal to drive again.  -Aristeo and Judith wonder if his T4 level is low; in the past, hypothyroid symptoms have manifested as feeling cold at night, and he is recently experiencing this again.  -On Lexapro and mood is positive.     -No recurrent seizure events.    REVIEW OF SYSTEMS  A comprehensive ROS negative except as in HPI.      MEDICATIONS   Current Outpatient Medications   Medication Sig Dispense Refill     escitalopram (LEXAPRO) 10 MG tablet Take 10 mg by mouth every morning        fluticasone (FLONASE) 50 MCG/ACT nasal spray Spray 1 spray into both nostrils nightly as needed        levETIRAcetam (KEPPRA) 1000 MG tablet Take 1 tablet (1,000 mg) by mouth 2 times daily 180 tablet 1     levothyroxine (SYNTHROID/LEVOTHROID) 125 MCG tablet Take 125 mcg by mouth every morning        loratadine (CLARITIN) 10 MG tablet Take 10 mg by mouth nightly as needed        melatonin 5 MG tablet Take 5 mg by mouth nightly as needed        olopatadine (PATANOL) 0.1 % ophthalmic solution Place 1-2 drops into both eyes daily as needed        omeprazole (PRILOSEC) 20 MG DR capsule Take 20 mg by mouth as needed        Psyllium 500 MG CAPS Take 4 capsules by mouth as needed        dexamethasone (DECADRON) 1 MG tablet Take 1 tablet (1 mg) by mouth 2 times daily (with meals) (Patient not taking: Reported on 1/27/2021) 30 tablet 0     study - sarah, IDS #[id number], capsule Patient to continue daily dosing at home with remaining capsules as directed. (Patient not taking: Reported on 2/9/2021) 14 capsule 0     DRUG ALLERGIES   Allergies   Allergen Reactions     No Clinical Screening - See Comments Rash     Cats and dogs         ONCOLOGIC HISTORY  -PRESENTATION: New  onset seizures; complex partial event with speech arrest and altered mental status lasting several minutes. Later had secondary generalization. Started Keppra.   -MR brain imaging with a ring enhancing lesion in the right parietal lobe.    -6/27/2019 SURGERY: Right temporal craniotomy for mass resection, gross total resection by Dr. Tam Barreto.  PATHOLOGY: Glioblastoma; IDH1 R132H wildtype, MGMT promoter unmethylated  -7/3/2019 CLINICAL TRIAL: Evaluated by Dr. Wilmer Mckenna at [facility], consented for clinical trial 2016-0867 (Standard of Care plus atezolizumab).  -7/22 - 8/30/2019 CHEMORADS: 60cGy with concurrent temozolomide 75mg/m2/day (145mg) plus atezolizumab Q2 weeks on clinical trial 2016-0867.  -9/6/2019 NEURO-ONC: Evaluated at the [facility].   -9/25/2019 - 3/16/2020 CHEMO: Adjuvant chemotherapy; temozolomide + atezolizumab 840 mg IV on clinical trial 2016-0867.  -4/6/2020 NEURO-ONC: Communicated with Dr. Borrero. Plan for surgery and next steps pending pathology results.   -4/9/2020 SURGERY + RADS: Repeat craniotomy for surgical debulking plus placement of GammaTiles.  PATHOLOGY: Recurrent glioblastoma.   Next generation sequencing: Microsatellite instability: Stable. Tumor mutational burden: 4 (Low). APC, CDK4, MDM2, PTEN mutated. ARID1A, ASXL1, ATRX, DNMT3A, FANCE, KDM5C, MED12, MEF2B, NF1, RUNX1, TERT mutated. PD-L1 Negative.  -4/20/2020 NEURO-ONC/ CHEMO: Starting Lomustine 3 weeks post-op. Consented for next generation sequencing and sending most recent tumor sample.    -4/30/2020 CHEMO: Lomustine, cycle 1.   -5/11/2020 NEURO-ONC: Clinically well. Referral to genetic counseling.   -6/5/2020 NEURO-ONC/ MRB/ CHEMO: Clinically well. Imaging with a positive treatment response. Lomustine, cycle 2 (start date of 6/11).    -7/20/2020 NEURO-ONC/ MRB/ CHEMO: Clinically well. Imaging without concern; aside from subdural fluid collection. Labs at goal. Lomustine, cycle 3 (start date of 7/23).    -8/31/2020  "NEURO-ONC/ MRB/ CHEMO: Clinically well. Imaging without concern. Labs at goal. Lomustine, cycle 4 (start date of 9/3).   -10/12/2020 NEURO-ONC/ MRB/ CHEMO: Clinically well. Imaging without concern for cancer growth, but the resection cavity is expanding with time. Per Dr. Chan; continue to monitor with no surgical intervention at this time. Labs at goal. Lomustine, cycle 5 (start date of 10/15).    -11/23/2020 NEURO-ONC/ MRB: Clinically well. Imaging with a new distant lesion. Stopping lomustine. Referral to Dr. Chan.    -12/10 - 12/16/2020 RADS: Gamma Knife therapy of 15 Gy/ fraction x 5 fractions.  -12/18/2020 NEURO-ONC: Discussion of treatment.  -1/13/2021 SURGERY: Right craniotomy for open biopsy of the right parietal tumor plus Ziopharm adenovirus injection.  PATHOLOGY: Recurrent glioblastoma.  -1/25/2021 NEURO-ONC: Clinically improving since surgery. Continue taking veledimex through Wednesday.   -1/27/2021 CHEMO: Starting nivolumab + bevacizumab.   -2/22/2021 NEURO-ONC/ CHEMO: Clinically improving. Continue nivolumab + bevacizumab.    SOCIAL HISTORY   Tobacco use: Never smoker.   Alcohol use: Social, infrequent.   Drug use: Denies.  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       PHYSICAL EXAMINATION  /82 (BP Location: Right arm, Patient Position: Sitting, Cuff Size: Adult Regular)   Pulse 94   Temp 98.2  F (36.8  C) (Oral)   Resp 16   Wt 77.9 kg (171 lb 11.2 oz)   SpO2 98%   BMI 24.64 kg/m     Wt Readings from Last 2 Encounters:   02/22/21 77.9 kg (171 lb 11.2 oz)   02/09/21 78.6 kg (173 lb 4.8 oz)      Ht Readings from Last 2 Encounters:   01/13/21 1.778 m (5' 10\")   01/04/21 1.778 m (5' 10\")      KPS 90    -Generally well appearing. Lower energy today.   -Respiratory: No audible wheezing.   -Skin: No rashes.   -Psychiatric: Normal mood and affect. Pleasant, talkative.  -Neurologic:   MENTAL STATUS:     Alert, oriented.    Recall: Intact.    Speech fluent.    Comprehension intact. "     CRANIAL NERVES:     Pupils are equal, round.     Extraocular movements full.     Symmetric facial movements.   Hearing intact.   With normal phonation, no dysfunction of the palate or tongue.   MOTOR: Antigravity in the arms. No pronation and no drift. Rises from the chair without using his arms.  SENSATION: Intact to light touch throughout per report.  COORDINATION: Largely equal on finger nose with eyes closed on left and right.   GAIT: Steady and fluent; able to walk on heels and toes.       MEDICAL RECORDS  Obtained and personally reviewed all available outside medical records in addition to reviewing any records available in our electronic system.     LABS  Personally reviewed all available lab results.     IMAGING  No new neuro-imaging to review.         IMPRESSION  Clinic time for this high complexity visit was spent discussing in detail the nature of his cancer in light of his current adjuvant treatment plan.     Clinically, Aristeo is noting improvement in the his left-sided symptoms. He is working with OT. Currently off dexamethasone. Otherwise, he notes continued fatigue. Denies any other significant side effects related to nivolumab + bevacizumab.      Compassionate use Vimalhenrietta utilizes a combination of virus gene therapy (Yg-WGA-lIO-2) + veledimex with nivolumab to enhance the [id number] effect by increasing the number and activity of effector CD8+ T-cells in an otherwise highly immunosuppressant cancer. IL-12 is a cytokine with anticancer activity and Eb-TUL-sqI-12 is a genetically engineered, non-pathogenic form of an adenovirus that encodes for this cytokine. The virus infects the tumor cells and uses its machinery to produce IL-12. Veledimex activates production of IL-12 within the viral infected tumor cells. With the completion of viral injection + post-operative veledimex, the plan is to continue nivolumab + bevacizumab every 2 weeks.     Labs reviewed; no concerning findings. TSH today was  7.23; free T4 was 1.20, which is normal. Given the normal labs, will continue same levothyroxine dose and continue monitoring    Repeat imaging next month.     Finally, discussed COVID vaccine. From a chemotherapy standpoint, I see no contraindication, but recommended that they ask Dr. Chan regarding his opinion on getting the vaccine.     PROBLEM LIST  Glioblastoma, MGMT unmethylated and IDH1 wildtype by IHC  Seizure  Chemotherapy-associated constipation  Headaches  Apraxia    PLAN  -CANCER-DIRECTED THERAPY-  -As above; Continue nivolumab + bevacizumab.  -Repeat imaging next month.      -STEROIDS-  -Off dexamethasone.   -Monitor for worsening symptoms.     -SEIZURE MANAGEMENT-  -Given history of seizures, will continue current antiepileptic regimen of Keppra for the foreseeable future.    -Quality of life/ MOOD/ FATIGUE-  -Continue Lexapro.   -Continue to monitor mood as untreated/ undertreated depression can worsen fatigue, dysorexia, and quality of life.  -Continue to monitor TSH/ T4 levels and adjust Synthroid as needed.     Return to clinic in 1 month + imaging + labs + infusion.      Aristeo was also seen and evaluated by a heme/onc fellow today under my direct supervision.     Ericak Avila MD  Neuro-oncology

## 2021-02-22 NOTE — PATIENT INSTRUCTIONS
Contact Numbers    [facility] [place] (for Scheduling/Triage/After Hours Nurse Line): [phone]    Please call the [facility] nurse triage or the after hours nurse line if you experience a temperature greater than or equal to 100.5, shaking chills, have uncontrolled nausea, vomiting and/or diarrhea, dizziness, lightheadedness, shortness of breath, chest pain, bleeding, unexplained bruising, or if you have any other new/concerning symptoms, questions or concerns.     If you are having any concerning symptoms or wish to speak to a provider before your next infusion visit, please call your care coordinator or triage to notify them so we can adequately serve you.     If you need any refills on medications (narcotics or other medications), please call before your infusion appointment.       February 2021 Sunday Monday Tuesday Wednesday Thursday Friday Saturday        1     2    VIDEO VISIT RETURN   9:45 AM   (60 min.)   Tutu Farmer PsyD [facility] 3    NEURO EVAL   8:45 AM   (60 min.)   Lisseth Falcon PT   [facility]    NEURO TREATMENT   9:45 AM   (45 min.)   Azeb Vega OT   [facility] 4     5     6       7     8     9    LAB PERIPHERAL   2:00 PM   (15 min.)    [facility] LAB DRAW   [facility]    [facility] ONC INFUSION 120   2:30 PM   (120 min.)   [facility]FUS[facility]   [facility] 10    NEURO TREATMENT  10:30 AM   (45 min.)   Azeb Vega OT   [facility] 11     12     13       14     15     16    VIDEO VISIT RETURN   9:45 AM   (60 min.)   Tutu Farmer PsyD   [facility] 17    NEURO TREATMENT  10:30 AM   (45 min.)   Azeb Vgea OT   [facility] 18     19     20       21     22    LAB PERIPHERAL   7:00 AM   (15 min.)   [facility] DRAW    [facility]    [facility] RETURN   7:15 AM   (30 min.)   Ericka Avila MD   [facility] ONC INFUSION 30   8:00 AM   (90 min.)   [facility] INFUSION   [facility] 23     24    NEURO TREATMENT  10:15 AM   (45 min.)   Azeb Vega OT   [facility] [place] 25     26     27 28 March 2021 Sunday Monday Tuesday Wednesday Thursday Friday Saturday        1     2     3     4     5     6       7     8    LAB PERIPHERAL  10:15 AM   (15 min.)   [facility] DRAW   [facility] ONC INFUSION 30  11:00 AM   (30 min.)   [facility] ONCOL[facility]Y INFUSION   [facility] 9     10     11     12     13       14     15     16     17     18     19     20       21     22    MR BRAIN WWO  12:30 PM   (60 min.)   [email or web address]   [facility]    LAB PERIPHERAL   2:30 PM   (15 min.)   [facility] DRAW   [facility]    [facility] ONC INFUSION 30   3:00 PM   (30 min.)   [facility] ONCOL[facility]Y INFUSION   [facility]    [facility] RETURN   3:15 PM   (30 min.)   Ericka Avila MD   [facility] 23     24     25     26     27       28     29     30     31                                    Lab Results:  Recent Results (from the past 12 hour(s))   CBC with platelets differential    Collection Time: 02/22/21  8:00 AM   Result Value Ref Range    WBC 2.4 (L) 4.0 - 11.0 10e9/L    RBC Count 3.85 (L) 4.4 - 5.9 10e12/L    Hemoglobin 12.6 (L) 13.3 - 17.7 g/dL    Hematocrit 37.3 (L) 40.0 - 53.0 %    MCV 97 78 - 100 fl    MCH 32.7 26.5 - 33.0 pg    MCHC 33.8 31.5 - 36.5 g/dL    RDW 12.4 10.0 - 15.0 %    Platelet Count 155 150 - 450 10e9/L    Diff Method Automated Method     % Neutrophils 55.2 %    % Lymphocytes 24.1 %    % Monocytes 16.9 %    %  Eosinophils 2.1 %    % Basophils 1.3 %    % Immature Granulocytes 0.4 %    Nucleated RBCs 0 0 /100    Absolute Neutrophil 1.3 (L) 1.6 - 8.3 10e9/L    Absolute Lymphocytes 0.6 (L) 0.8 - 5.3 10e9/L    Absolute Monocytes 0.4 0.0 - 1.3 10e9/L    Absolute Eosinophils 0.1 0.0 - 0.7 10e9/L    Absolute Basophils 0.0 0.0 - 0.2 10e9/L    Abs Immature Granulocytes 0.0 0 - 0.4 10e9/L    Absolute Nucleated RBC 0.0    TSH with free T4 reflex    Collection Time: 02/22/21  8:00 AM   Result Value Ref Range    TSH 7.23 (H) 0.40 - 4.00 mU/L   Comprehensive metabolic panel    Collection Time: 02/22/21  8:00 AM   Result Value Ref Range    Sodium 139 133 - 144 mmol/L    Potassium 4.0 3.4 - 5.3 mmol/L    Chloride 105 94 - 109 mmol/L    Carbon Dioxide 26 20 - 32 mmol/L    Anion Gap 8 3 - 14 mmol/L    Glucose 111 (H) 70 - 99 mg/dL    Urea Nitrogen 14 7 - 30 mg/dL    Creatinine 0.81 0.66 - 1.25 mg/dL    GFR Estimate >90 >60 mL/min/[1.73_m2]    GFR Estimate If Black >90 >60 mL/min/[1.73_m2]    Calcium 9.0 8.5 - 10.1 mg/dL    Bilirubin Total 0.4 0.2 - 1.3 mg/dL    Albumin 3.7 3.4 - 5.0 g/dL    Protein Total 7.1 6.8 - 8.8 g/dL    Alkaline Phosphatase 61 40 - 150 U/L    ALT 34 0 - 70 U/L    AST 18 0 - 45 U/L   T4 free    Collection Time: 02/22/21  8:00 AM   Result Value Ref Range    T4 Free 1.20 0.76 - 1.46 ng/dL

## 2021-02-22 NOTE — NURSING NOTE
Chief Complaint   Patient presents with     Blood Draw     Labs drawn via PIV. VS taken.      Labs drawn from PIV placed by RN. Line flushed with saline. Vitals taken. Pt checked in for appointment(s).    Isabel SMITH RN PHN BSN  BMT/Oncology Lab

## 2021-02-24 NOTE — PROGRESS NOTES
Outpatient Occupational Therapy Discharge Note     Patient: Erasto Maher  : 1965    Beginning/End Dates of Reporting Period:  2021 to 2021    Referring Provider: Arlyn Vazquez MD    Therapy Diagnosis: decreased IADL performance    Client Self Report:   0      Goals:     Goal Identifier 1 home program   Goal Description Pt will report compliance with home program 7/7 days including left hand FMC tasks, proprioception tasks, scanning activities and appropriate progression of exercise program in preparation for return to work and leisure activities.    Target Date 21   Date Met  21   Progress:     Goal Identifier 2 typing   Goal Description Pt will demonstrate typing task with 80% accuracy in preparation for return to work.    Target Date 21   Date Met  02/10/21   Progress:     Goal Identifier 3 driving   Goal Description Pt will complete 3/3 [employer] driving subtasks with a PASS score, the foot tap reaction time with a PASS score and name 11/12 driving signs in preparation for return to driving.    Target Date 21   Date Met  (2021: PASS score with R foot tap and sign recognition)   Progress:     Goal Identifier     Goal Description     Target Date     Date Met      Progress:     Goal Identifier     Goal Description     Target Date     Date Met      Progress:     Goal Identifier     Goal Description     Target Date     Date Met      Progress:     Goal Identifier     Goal Description     Target Date     Date Met      Progress:     Goal Identifier     Goal Description     Target Date     Date Met      Progress:     Progress Toward Goals:   Pt seen for 6 visits with an increase in LUE strength and FMC; slightly below baseline.  Decreased left peripheral visual field.  Pt met 2/3 goals.  DId not meet goal #3.      Plan:  Discharge from therapy.    Discharge:    Reason for Discharge: Patient has met goals 1,2 and partially met goal 3.     Equipment Issued:  0    Discharge Plan: Patient to continue home program: throw and catch tennis ball, bounce & catch tennis ball, when walking, practice exaggerated visual scan/head turn to left, typing-especially practicing keys using 4th and 5th left digits with lessons 68,73,85, theraputty exercises.

## 2021-03-02 NOTE — PROGRESS NOTES
"Erasto Maher is a 55 year old male who is being evaluated via a billable telephone visit. Phone call duration: 30 minutes      The patient has been notified of following:      \"This telephone visit will be conducted via a call between you and your physician/provider. We have found that certain health care needs can be provided without the need for a physical exam.  This service lets us provide the care you need with a short phone conversation.  If a prescription is necessary we can send it directly to your pharmacy.  If lab work is needed we can place an order for that and you can then stop by our lab to have the test done at a later time.     Telephone visits are billed at different rates depending on your insurance coverage. During this emergency period, for some insurers they may be billed the same as an in-person visit.  Please reach out to your insurance provider with any questions.     If during the course of the call the physician/provider feels a telephone visit is not appropriate, you will not be charged for this service.\"     Patient has given verbal consent for Telephone visit? Yes    Confidential Summary of Oncology Psychology Followup Visit    Erasto Maher is a 55 year old male who presents for depressed mood secondary to brain cancer. The patient was seen for a 30 minute appointment on 3/2/2021.    Subjective: He is doing much better. Still working on slight deficits he sees post surgery but emotionally feeling good today.     Objective: Affect was appropriate to the content of the therapeutic conversation.     Diagnosis:   Encounter Diagnosis   Name Primary?     Depressed mood Yes     Recommendations/Plan:  1. Follow-up appointment after his next scan.     Thank you for this opportunity to participate in your care of this patient.    Tutu Farmer Psy.D., L.P.  Director, Oncology Supportive Care   "

## 2021-03-08 NOTE — PATIENT INSTRUCTIONS
[facility] Triage and after hours / weekends / holidays:  [phone]    Please call the triage or after hours line if you experience a temperature greater than or equal to 100.5, shaking chills, have uncontrolled nausea, vomiting and/or diarrhea, dizziness, shortness of breath, chest pain, bleeding, unexplained bruising, or if you have any other new/concerning symptoms, questions or concerns.      If you are having any concerning symptoms or wish to speak to a provider before your next infusion visit, please call your care coordinator or triage to notify them so we can adequately serve you.     If you need a refill on a narcotic prescription or other medication, please call before your infusion appointment.                 March 2021 Sunday Monday Tuesday Wednesday Thursday Friday Saturday        1     2    VIDEO VISIT RETURN  10:45 AM   (60 min.)   Tutu Farmer PsyD   [facility] 3     4     5     6       7     8    LAB PERIPHERAL   1:00 PM   (15 min.)   [facility] DRAW   [facility] ONC INFUSION 60   1:30 PM   (60 min.)    ONCOLOGY INFUSION   [facility] 9     10     11     12     13       14     15     16     17     18     19     20       21     22    MR BRAIN WWO  12:30 PM   (60 min.)   [id number]   [facility]    LAB PERIPHERAL   2:30 PM   (15 min.)   [facility]RAW   [facility] ONC INFUSION 120   3:00 PM   (120 min.)    ONCOLOGY INFUSION   [facility] RETURN   3:15 PM   (30 min.)   Ericka Avila MD   [facility] 23    VIDEO VISIT RETURN  10:45 AM   (60 min.)   Tutu Farmer PsyD M [facility] 24     25     26     27       28     29     30     31 April 2021 Sunday Monday Tuesday Wednesday Thursday Friday  Saturday                       1     2     3       4     5     6     7     8  Happy Birthday!     9     10       11     12     13     14     15     16     17       18     19     20     21     22     23     24       25     26     27     28     29     30                         Recent Results (from the past 24 hour(s))   Comprehensive metabolic panel    Collection Time: 03/08/21  1:35 PM   Result Value Ref Range    Sodium 138 133 - 144 mmol/L    Potassium 4.3 3.4 - 5.3 mmol/L    Chloride 105 94 - 109 mmol/L    Carbon Dioxide 28 20 - 32 mmol/L    Anion Gap 4 3 - 14 mmol/L    Glucose 114 (H) 70 - 99 mg/dL    Urea Nitrogen 9 7 - 30 mg/dL    Creatinine 0.72 0.66 - 1.25 mg/dL    GFR Estimate >90 >60 mL/min/[1.73_m2]    GFR Estimate If Black >90 >60 mL/min/[1.73_m2]    Calcium 9.1 8.5 - 10.1 mg/dL    Bilirubin Total 0.8 0.2 - 1.3 mg/dL    Albumin 3.8 3.4 - 5.0 g/dL    Protein Total 7.0 6.8 - 8.8 g/dL    Alkaline Phosphatase 62 40 - 150 U/L    ALT 26 0 - 70 U/L    AST 17 0 - 45 U/L   CBC with platelets differential    Collection Time: 03/08/21  1:35 PM   Result Value Ref Range    WBC 4.3 4.0 - 11.0 10e9/L    RBC Count 4.09 (L) 4.4 - 5.9 10e12/L    Hemoglobin 13.4 13.3 - 17.7 g/dL    Hematocrit 39.0 (L) 40.0 - 53.0 %    MCV 95 78 - 100 fl    MCH 32.8 26.5 - 33.0 pg    MCHC 34.4 31.5 - 36.5 g/dL    RDW 12.2 10.0 - 15.0 %    Platelet Count 174 150 - 450 10e9/L    Diff Method Automated Method     % Neutrophils 68.8 %    % Lymphocytes 16.0 %    % Monocytes 10.8 %    % Eosinophils 3.3 %    % Basophils 0.9 %    % Immature Granulocytes 0.2 %    Nucleated RBCs 0 0 /100    Absolute Neutrophil 2.9 1.6 - 8.3 10e9/L    Absolute Lymphocytes 0.7 (L) 0.8 - 5.3 10e9/L    Absolute Monocytes 0.5 0.0 - 1.3 10e9/L    Absolute Eosinophils 0.1 0.0 - 0.7 10e9/L    Absolute Basophils 0.0 0.0 - 0.2 10e9/L    Abs Immature Granulocytes 0.0 0 - 0.4 10e9/L    Absolute Nucleated RBC 0.0

## 2021-03-08 NOTE — NURSING NOTE
Chief Complaint   Patient presents with     Blood Draw     Labs drawn via PIV by rn in lab. VS taken.     Labs drawn from PIV placed by RN. Line flushed with saline. Vitals taken. Pt checked in for appointment(s).    Wilmer Castro RN

## 2021-03-08 NOTE — PROGRESS NOTES
Infusion Nursing Note:  Erasto Maher presents today for C4 D1 Nivolumab/Bevacizumab.    Patient seen by provider today: No   present during visit today: Not Applicable.    Note: pt assessed upon arrival to infusion clinic. Denies fever, chills, SOB, or cough.  Feeling well; starting to exercise more and gradually getting his strength back.   No other concerns noted.      Verified with Ana Maria in pharmacy that bevacizumab could be given prior to nivolumab.    Intravenous Access:  Peripheral IV placed.    Treatment Conditions:  Lab Results   Component Value Date    HGB 13.4 03/08/2021     Lab Results   Component Value Date    WBC 4.3 03/08/2021      Lab Results   Component Value Date    ANEU 2.9 03/08/2021     Lab Results   Component Value Date     03/08/2021      Lab Results   Component Value Date     03/08/2021                   Lab Results   Component Value Date    POTASSIUM 4.3 03/08/2021           Lab Results   Component Value Date    MAG 1.9 01/19/2021            Lab Results   Component Value Date    CR 0.72 03/08/2021                   Lab Results   Component Value Date    JUDIE 9.1 03/08/2021                Lab Results   Component Value Date    BILITOTAL 0.8 03/08/2021           Lab Results   Component Value Date    ALBUMIN 3.8 03/08/2021                    Lab Results   Component Value Date    ALT 26 03/08/2021           Lab Results   Component Value Date    AST 17 03/08/2021       Results reviewed, labs MET treatment parameters, ok to proceed with treatment.      Post Infusion Assessment:  Patient tolerated infusion without incident.  Blood return noted pre and post infusion.  Site patent and intact, free from redness, edema or discomfort.  No evidence of extravasations.  Access discontinued per protocol.       Discharge Plan:   Patient declined prescription refills.  AVS to patient via digitalboxT.  Patient will return 3/22/21 for next appointment.   Patient discharged in stable  condition accompanied by: self.  Departure Mode: Ambulatory.    Darlyn Perdue RN

## 2021-03-19 NOTE — PATIENT INSTRUCTIONS
Continue nivolumab + bevacizumab.  Monitoring thyroid function:   -TSH today was normal   -Given the normal labs, will continue same levothyroxine dose and continue monitoring    Imaging with one area of concern.   Consideration for laser ablation.      Return to clinic pending discussion with Dr. Chan.     Ericka Avila MD  Neuro-oncology

## 2021-03-19 NOTE — PROGRESS NOTES
"NEURO-ONCOLOGY VISIT  Mar 22, 2021    CHIEF COMPLAINT: Mr. 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 He completed chemoradiotherapy on 8/30/2019 and was managed on a clinical trial receiving atezolizumab (anti-PDL1) plus adjuvant temozolomide. He received his last dose of immunotherapy on 3/16/2020 when imaging on 3/28/2020 showed progression of disease. He underwent a repeat craniotomy for tumor resection with Dr. Chan on 4/9/2020 with placement of GammaTiles.     Aristeo was managed on lomustine monotherapy. However, imaging in 11/2020 showed a new distant lesion consistent with disease progression.     He completed a repeat course of radiation, then a repeat resection on 1/13/2021 plus use of the compassionate use [employer] treatment protocol that utilizes an intratumoral adenovirus injection activating human interleukin-12 + veledimex in combination with adjuvant nivolumab. Pathology was consistent with recurrent glioblastoma. He is currently managed on nivolumab + bevacizumab.    I met with Aristeo and Judith (wife) today for this follow-up visit.     HISTORY OF PRESENT ILLNESS  -Aristeo states that he is doing better today.   -Coordination is near baseline and he is using both arms to complete tasks with less neglect of the left side.  Gait is more stable. Using his virtual ski machine and working out more.   -Energy levels are better, but continues to be fatigued during the day.   -Mood is better. On Lexapro.   -Tolerating nivo and kee infusions.   -Off dexamethasone.  -Dullness in thinking at times, but cognition is still improving. Running his company meetings. Continued issues with maintaining focus. Working with OT, especially with the goal to drive again.  -No recurrent seizure events.  -Received COVID vaccine. Second dose due on Thursday.     REVIEW OF " SYSTEMS  A comprehensive ROS negative except as in HPI.      MEDICATIONS   Current Outpatient Medications   Medication Sig Dispense Refill     escitalopram (LEXAPRO) 10 MG tablet Take 10 mg by mouth every morning        fluticasone (FLONASE) 50 MCG/ACT nasal spray Spray 1 spray into both nostrils nightly as needed        levETIRAcetam (KEPPRA) 1000 MG tablet Take 1 tablet (1,000 mg) by mouth 2 times daily 180 tablet 1     levothyroxine (SYNTHROID/LEVOTHROID) 125 MCG tablet Take 125 mcg by mouth every morning        loratadine (CLARITIN) 10 MG tablet Take 10 mg by mouth nightly as needed        melatonin 5 MG tablet Take 5 mg by mouth nightly as needed        olopatadine (PATANOL) 0.1 % ophthalmic solution Place 1-2 drops into both eyes daily as needed        omeprazole (PRILOSEC) 20 MG DR capsule Take 20 mg by mouth as needed        Psyllium 500 MG CAPS Take 4 capsules by mouth as needed        DRUG ALLERGIES   Allergies   Allergen Reactions     No Clinical Screening - See Comments Rash     Cats and dogs         ONCOLOGIC HISTORY  -PRESENTATION: New onset seizures; complex partial event with speech arrest and altered mental status lasting several minutes. Later had secondary generalization. Started Keppra.   -MR brain imaging with a ring enhancing lesion in the right parietal lobe.    -6/27/2019 SURGERY: Right temporal craniotomy for mass resection, gross total resection by Dr. Tam Barreto.  PATHOLOGY: Glioblastoma; IDH1 R132H wildtype, MGMT promoter unmethylated  -7/3/2019 CLINICAL TRIAL: Evaluated by Dr. Wilmer Mckenna at [facility], consented for clinical trial 9532-0329 (Standard of Care plus atezolizumab).  -7/22 - 8/30/2019 CHEMORADS: 60cGy with concurrent temozolomide 75mg/m2/day (145mg) plus atezolizumab Q2 weeks on clinical trial [id number].  -9/6/2019 NEURO-ONC: Evaluated at the [facility].   -9/25/2019 - 3/16/2020 CHEMO: Adjuvant chemotherapy; temozolomide + atezolizumab 840 mg IV on clinical trial  [id number].  -4/6/2020 NEURO-ONC: Communicated with Dr. Borrero. Plan for surgery and next steps pending pathology results.   -4/9/2020 SURGERY + RADS: Repeat craniotomy for surgical debulking plus placement of GammaTiles.  PATHOLOGY: Recurrent glioblastoma.   Next generation sequencing: Microsatellite instability: Stable. Tumor mutational burden: 4 (Low). APC, CDK4, MDM2, PTEN mutated. ARID1A, ASXL1, ATRX, DNMT3A, FANCE, KDM5C, MED12, MEF2B, NF1, RUNX1, TERT mutated. PD-L1 Negative.  -4/20/2020 NEURO-ONC/ CHEMO: Starting Lomustine 3 weeks post-op. Consented for next generation sequencing and sending most recent tumor sample.    -4/30/2020 CHEMO: Lomustine, cycle 1.   -5/11/2020 NEURO-ONC: Clinically well. Referral to genetic counseling.   -6/5/2020 NEURO-ONC/ MRB/ CHEMO: Clinically well. Imaging with a positive treatment response. Lomustine, cycle 2 (start date of 6/11).    -7/20/2020 NEURO-ONC/ MRB/ CHEMO: Clinically well. Imaging without concern; aside from subdural fluid collection. Labs at goal. Lomustine, cycle 3 (start date of 7/23).    -8/31/2020 NEURO-ONC/ MRB/ CHEMO: Clinically well. Imaging without concern. Labs at goal. Lomustine, cycle 4 (start date of 9/3).   -10/12/2020 NEURO-ONC/ MRB/ CHEMO: Clinically well. Imaging without concern for cancer growth, but the resection cavity is expanding with time. Per Dr. Chan; continue to monitor with no surgical intervention at this time. Labs at goal. Lomustine, cycle 5 (start date of 10/15).    -11/23/2020 NEURO-ONC/ MRB: Clinically well. Imaging with a new distant lesion. Stopping lomustine. Referral to Dr. Chan.    -12/10 - 12/16/2020 RADS: Gamma Knife therapy of 15 Gy/ fraction x 5 fractions.  -12/18/2020 NEURO-ONC: Discussion of treatment.  -1/13/2021 SURGERY: Right craniotomy for open biopsy of the right parietal tumor plus Ziopharm adenovirus injection.  PATHOLOGY: Recurrent glioblastoma.  -1/25/2021 NEURO-ONC: Clinically improving since surgery. Continue  "taking veledimex through Wednesday.   -1/27/2021 CHEMO: Starting nivolumab + bevacizumab.   -2/22/2021 NEURO-ONC/ CHEMO: Clinically improving. Continue nivolumab + bevacizumab.  -3/22/2021 NEURO-ONC/ MRB/ CHEMO: Clinically improving. Imaging with positive treatment response, but with 1 area of concern; consideration for biopsy + GÓMEZ. Continue nivolumab + bevacizumab.    SOCIAL HISTORY   Tobacco use: Never smoker.   Alcohol use: Social, infrequent.   Drug use: Denies.  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       PHYSICAL EXAMINATION  BP (!) 150/99   Pulse 79   Temp 98.1  F (36.7  C) (Oral)   Resp 16   Wt 77.6 kg (171 lb)   SpO2 98%   BMI 24.54 kg/m     Wt Readings from Last 2 Encounters:   03/22/21 77.6 kg (171 lb)   03/08/21 78.7 kg (173 lb 8 oz)      Ht Readings from Last 2 Encounters:   01/13/21 1.778 m (5' 10\")   01/04/21 1.778 m (5' 10\")      KPS 90    -Generally well appearing. Lower energy today.   -Respiratory: No audible wheezing.   -Skin: No rashes.   -Psychiatric: Normal mood and affect. Pleasant, talkative.  -Neurologic:   MENTAL STATUS:     Alert, oriented.    Recall: Intact.    Speech fluent.    Comprehension intact.     CRANIAL NERVES:     Pupils are equal, round.     Extraocular movements full.     Symmetric facial movements (per patient report).   Hearing intact.   With normal phonation, no dysfunction of the palate or tongue.   MOTOR: Antigravity in the arms. No pronation and no drift. Rises from the chair without using his arms.   On toe/ heel walk, decreased height on the left.   SENSATION: Intact to light touch throughout per report.  COORDINATION: Largely equal on finger nose with eyes closed on left and right.   GAIT: Steady, but slight malposition of the left foot in stride.    Good turns.    Able to walk on heels and toes.       MEDICAL RECORDS  Obtained and personally reviewed all available outside medical records in addition to reviewing any records available in " our electronic system.     LABS  Personally reviewed all available lab results.     IMAGING  Personally reviewed MR brain imaging from today and compared to prior imaging. To my eye, there is a reduction in contrast enhancement about the cavity, but superior-anterior to the cavity there is a lesion that has an increase in contrast enhancement.     Imaging was shown to and results were reviewed with Aristeo and Judith.     Imaging and case was reviewed at Brain Tumor Conference earlier today.        IMPRESSION  Clinic time for this high complexity visit was spent discussing in detail the nature of his cancer in light of his current adjuvant treatment plan and recent imaging.     Clinically, Aristeo is noting further improvement in the his left-sided symptoms. He is working with OT and cognition is improving, though he is noting some issues with attention. Remains off dexamethasone. Otherwise, he notes continued mild fatigue. Denies any other significant side effects related to nivolumab + bevacizumab.      Imaging with positive treatment response about the resection cavity, however, there is a satellite lesion with increased contrast enhancement. Imaging was discussed at Brain Tumor Conference earlier today and Dr. Chan had recommended the option of a biopsy + GÓMEZ. Aristeo and Judith are interested in learning more about this option and I will communicate this with Dr. Chan. Given positive treatment response elsewhere, will continue with nivolumab and kee today.     Labs reviewed; no concerning findings. TSH today was normal. Given the normal labs, will continue same levothyroxine dose and continue monitoring.    PROBLEM LIST  Glioblastoma, MGMT unmethylated and IDH1 wildtype by IHC  Seizure  Chemotherapy-associated constipation  Headaches  Apraxia    PLAN  -CANCER-DIRECTED THERAPY-  -As above.      -STEROIDS-  -Off dexamethasone.   -Monitor for worsening symptoms.     -SEIZURE MANAGEMENT-  -Given history of seizures, will  continue current antiepileptic regimen of Keppra for the foreseeable future.    -Quality of life/ MOOD/ FATIGUE-  -Continue Lexapro.   -Continue to monitor mood as untreated/ undertreated depression can worsen fatigue, dysorexia, and quality of life.  -Continue to monitor TSH/ T4 levels and adjust Synthroid as needed.     COVID vaccination series complete as of 3/25/2021.    Return to clinic pending discussion with Dr. Chan.    Ericka Avila MD  Neuro-oncology

## 2021-03-22 NOTE — LETTER
"    3/22/2021         RE: Erasto Maher  [address]  [facility] [address]        Dear Colleague,    Thank you for referring your patient, Erasto Maher, to the [facility] CANCER [facility]. Please see a copy of my visit note below.    NEURO-ONCOLOGY VISIT  Mar 22, 2021    CHIEF COMPLAINT: Mr. 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 He completed chemoradiotherapy on 8/30/2019 and was managed on a clinical trial receiving atezolizumab (anti-PDL1) plus adjuvant temozolomide. He received his last dose of immunotherapy on 3/16/2020 when imaging on 3/28/2020 showed progression of disease. He underwent a repeat craniotomy for tumor resection with Dr. Chan on 4/9/2020 with placement of GammaTiles.     Aristeo was managed on lomustine monotherapy. However, imaging in 11/2020 showed a new distant lesion consistent with disease progression.     He completed a repeat course of radiation, then a repeat resection on 1/13/2021 plus use of the compassionate use Ziopharm treatment protocol that utilizes an intratumoral adenovirus injection activating human interleukin-12 + veledimex in combination with adjuvant nivolumab. Pathology was consistent with recurrent glioblastoma. He is currently managed on nivolumab + bevacizumab.    I met with Aristeo and Judith (wife) today for this follow-up visit.     HISTORY OF PRESENT ILLNESS  -Aristeo states that he is doing better today.   -Coordination is near baseline and he is using both arms to complete tasks with less neglect of the left side.  Gait is more stable. Using his virtual ski machine and working out more.   -Energy levels are better, but continues to be fatigued during the day.   -Mood is better. On Lexapro.   -Tolerating nivo and kee infusions.   -Off dexamethasone.  -Dullness in thinking at " times, but cognition is still improving. Running his company meetings. Continued issues with maintaining focus. Working with OT, especially with the goal to drive again.  -No recurrent seizure events.  -Received COVID vaccine. Second dose due on Thursday.     REVIEW OF SYSTEMS  A comprehensive ROS negative except as in HPI.      MEDICATIONS   Current Outpatient Medications   Medication Sig Dispense Refill     escitalopram (LEXAPRO) 10 MG tablet Take 10 mg by mouth every morning        fluticasone (FLONASE) 50 MCG/ACT nasal spray Spray 1 spray into both nostrils nightly as needed        levETIRAcetam (KEPPRA) 1000 MG tablet Take 1 tablet (1,000 mg) by mouth 2 times daily 180 tablet 1     levothyroxine (SYNTHROID/LEVOTHROID) 125 MCG tablet Take 125 mcg by mouth every morning        loratadine (CLARITIN) 10 MG tablet Take 10 mg by mouth nightly as needed        melatonin 5 MG tablet Take 5 mg by mouth nightly as needed        olopatadine (PATANOL) 0.1 % ophthalmic solution Place 1-2 drops into both eyes daily as needed        omeprazole (PRILOSEC) 20 MG DR capsule Take 20 mg by mouth as needed        Psyllium 500 MG CAPS Take 4 capsules by mouth as needed        DRUG ALLERGIES   Allergies   Allergen Reactions     No Clinical Screening - See Comments Rash     Cats and dogs         ONCOLOGIC HISTORY  -PRESENTATION: New onset seizures; complex partial event with speech arrest and altered mental status lasting several minutes. Later had secondary generalization. Started Keppra.   -MR brain imaging with a ring enhancing lesion in the right parietal lobe.    -6/27/2019 SURGERY: Right temporal craniotomy for mass resection, gross total resection by Dr. Tam Barreto.  PATHOLOGY: Glioblastoma; IDH1 R132H wildtype, MGMT promoter unmethylated  -7/3/2019 CLINICAL TRIAL: Evaluated by Dr. Wilmer Mckenna at [facility], consented for clinical trial 5420-1244 (Standard of Care plus atezolizumab).  -7/22 - 8/30/2019 CHEMORADS: 60cGy  with concurrent temozolomide 75mg/m2/day (145mg) plus atezolizumab Q2 weeks on clinical trial 5374-4985.  -9/6/2019 NEURO-ONC: Evaluated at the [facility].   -9/25/2019 - 3/16/2020 CHEMO: Adjuvant chemotherapy; temozolomide + atezolizumab 840 mg IV on clinical trial 8392-0869.  -4/6/2020 NEURO-ONC: Communicated with Dr. Borrero. Plan for surgery and next steps pending pathology results.   -4/9/2020 SURGERY + RADS: Repeat craniotomy for surgical debulking plus placement of GammaTiles.  PATHOLOGY: Recurrent glioblastoma.   Next generation sequencing: Microsatellite instability: Stable. Tumor mutational burden: 4 (Low). APC, CDK4, MDM2, PTEN mutated. ARID1A, ASXL1, ATRX, DNMT3A, FANCE, KDM5C, MED12, MEF2B, NF1, RUNX1, TERT mutated. PD-L1 Negative.  -4/20/2020 NEURO-ONC/ CHEMO: Starting Lomustine 3 weeks post-op. Consented for next generation sequencing and sending most recent tumor sample.    -4/30/2020 CHEMO: Lomustine, cycle 1.   -5/11/2020 NEURO-ONC: Clinically well. Referral to genetic counseling.   -6/5/2020 NEURO-ONC/ MRB/ CHEMO: Clinically well. Imaging with a positive treatment response. Lomustine, cycle 2 (start date of 6/11).    -7/20/2020 NEURO-ONC/ MRB/ CHEMO: Clinically well. Imaging without concern; aside from subdural fluid collection. Labs at goal. Lomustine, cycle 3 (start date of 7/23).    -8/31/2020 NEURO-ONC/ MRB/ CHEMO: Clinically well. Imaging without concern. Labs at goal. Lomustine, cycle 4 (start date of 9/3).   -10/12/2020 NEURO-ONC/ MRB/ CHEMO: Clinically well. Imaging without concern for cancer growth, but the resection cavity is expanding with time. Per Dr. Chan; continue to monitor with no surgical intervention at this time. Labs at goal. Lomustine, cycle 5 (start date of 10/15).    -11/23/2020 NEURO-ONC/ MRB: Clinically well. Imaging with a new distant lesion. Stopping lomustine. Referral to Dr. Chan.    -12/10 - 12/16/2020 RADS: Gamma Knife therapy of 15 Gy/ fraction x 5  "fractions.  -12/18/2020 NEURO-ONC: Discussion of treatment.  -1/13/2021 SURGERY: Right craniotomy for open biopsy of the right parietal tumor plus Ziopharm adenovirus injection.  PATHOLOGY: Recurrent glioblastoma.  -1/25/2021 NEURO-ONC: Clinically improving since surgery. Continue taking veledimex through Wednesday.   -1/27/2021 CHEMO: Starting nivolumab + bevacizumab.   -2/22/2021 NEURO-ONC/ CHEMO: Clinically improving. Continue nivolumab + bevacizumab.  -3/22/2021 NEURO-ONC/ MRB/ CHEMO: Clinically improving. Imaging with positive treatment response, but with 1 area of concern; consideration for biopsy + GÓMEZ. Continue nivolumab + bevacizumab.    SOCIAL HISTORY   Tobacco use: Never smoker.   Alcohol use: Social, infrequent.   Drug use: Denies.  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       PHYSICAL EXAMINATION  BP (!) 150/99   Pulse 79   Temp 98.1  F (36.7  C) (Oral)   Resp 16   Wt 77.6 kg (171 lb)   SpO2 98%   BMI 24.54 kg/m     Wt Readings from Last 2 Encounters:   03/22/21 77.6 kg (171 lb)   03/08/21 78.7 kg (173 lb 8 oz)      Ht Readings from Last 2 Encounters:   01/13/21 1.778 m (5' 10\")   01/04/21 1.778 m (5' 10\")      KPS 90    -Generally well appearing. Lower energy today.   -Respiratory: No audible wheezing.   -Skin: No rashes.   -Psychiatric: Normal mood and affect. Pleasant, talkative.  -Neurologic:   MENTAL STATUS:     Alert, oriented.    Recall: Intact.    Speech fluent.    Comprehension intact.     CRANIAL NERVES:     Pupils are equal, round.     Extraocular movements full.     Symmetric facial movements (per patient report).   Hearing intact.   With normal phonation, no dysfunction of the palate or tongue.   MOTOR: Antigravity in the arms. No pronation and no drift. Rises from the chair without using his arms.   On toe/ heel walk, decreased height on the left.   SENSATION: Intact to light touch throughout per report.  COORDINATION: Largely equal on finger nose with eyes closed " on left and right.   GAIT: Steady, but slight malposition of the left foot in stride.    Good turns.    Able to walk on heels and toes.       MEDICAL RECORDS  Obtained and personally reviewed all available outside medical records in addition to reviewing any records available in our electronic system.     LABS  Personally reviewed all available lab results.     IMAGING  Personally reviewed MR brain imaging from today and compared to prior imaging. To my eye, there is a reduction in contrast enhancement about the cavity, but superior-anterior to the cavity there is a lesion that has an increase in contrast enhancement.     Imaging was shown to and results were reviewed with Aristeo and Judith.     Imaging and case was reviewed at Brain Tumor Conference earlier today.        IMPRESSION  Clinic time for this high complexity visit was spent discussing in detail the nature of his cancer in light of his current adjuvant treatment plan and recent imaging.     Clinically, Aristeo is noting further improvement in the his left-sided symptoms. He is working with OT and cognition is improving, though he is noting some issues with attention. Remains off dexamethasone. Otherwise, he notes continued mild fatigue. Denies any other significant side effects related to nivolumab + bevacizumab.      Imaging with positive treatment response about the resection cavity, however, there is a satellite lesion with increased contrast enhancement. Imaging was discussed at Brain Tumor Conference earlier today and Dr. Chan had recommended the option of a biopsy + GÓMEZ. Aristeo and Judith are interested in learning more about this option and I will communicate this with Dr. Chan. Given positive treatment response elsewhere, will continue with nivolumab and kee today.     Labs reviewed; no concerning findings. TSH today was normal. Given the normal labs, will continue same levothyroxine dose and continue monitoring.    PROBLEM LIST  Glioblastoma, MGMT  unmethylated and IDH1 wildtype by IHC  Seizure  Chemotherapy-associated constipation  Headaches  Apraxia    PLAN  -CANCER-DIRECTED THERAPY-  -As above.      -STEROIDS-  -Off dexamethasone.   -Monitor for worsening symptoms.     -SEIZURE MANAGEMENT-  -Given history of seizures, will continue current antiepileptic regimen of Keppra for the foreseeable future.    -Quality of life/ MOOD/ FATIGUE-  -Continue Lexapro.   -Continue to monitor mood as untreated/ undertreated depression can worsen fatigue, dysorexia, and quality of life.  -Continue to monitor TSH/ T4 levels and adjust Synthroid as needed.     COVID vaccination series complete as of 3/25/2021.    Return to clinic pending discussion with Dr. Chan.    Ericka Avila MD  Neuro-oncology

## 2021-03-22 NOTE — NURSING NOTE
Chief Complaint   Patient presents with     Blood Draw     Labs drawn via PIV placed by RN in lab. VS taken      Karen Caballero RN

## 2021-03-22 NOTE — PROGRESS NOTES
Infusion Nursing Note:  Erasto Maher presents today for C5D1 MVASI/ Nivolumab.    Patient seen by provider today: Yes: Dr. Avila   present during visit today: Not Applicable.    Note: Patient reports to tolerating infusons well.  Blood pressures slightly elevated and could not get two consecutive blood pressure readings below rage of 150/100.  Dr. Martin aware and is ok with patient proceeding with today's MVASI with slightly elevate blood pressures.      Intravenous Access:  Peripheral IV placed.    Treatment Conditions:  Lab Results   Component Value Date    HGB 13.6 03/22/2021     Lab Results   Component Value Date    WBC 3.8 03/22/2021      Lab Results   Component Value Date    ANEU 2.5 03/22/2021     Lab Results   Component Value Date     03/22/2021      Lab Results   Component Value Date     03/22/2021                   Lab Results   Component Value Date    POTASSIUM 4.3 03/22/2021           Lab Results   Component Value Date    MAG 1.9 01/19/2021            Lab Results   Component Value Date    CR 0.86 03/22/2021                   Lab Results   Component Value Date    JUDIE 8.8 03/22/2021                Lab Results   Component Value Date    BILITOTAL 0.4 03/22/2021           Lab Results   Component Value Date    ALBUMIN 3.7 03/22/2021                    Lab Results   Component Value Date    ALT 28 03/22/2021           Lab Results   Component Value Date    AST 15 03/22/2021       Results reviewed, labs MET treatment parameters, ok to proceed with treatment.      Post Infusion Assessment:  Patient tolerated infusion without incident.  Blood return noted pre and post infusion.  Site patent and intact, free from redness, edema or discomfort.  No evidence of extravasations.  Access discontinued per protocol.       Discharge Plan:   Discharge instructions reviewed with: Patient.  AVS to patient via [employer]tw[employer].  Patient will be called for next appointment.   Patient discharged in stable  condition accompanied by: self.  Departure Mode: Ambulatory.    Tricia Martin RN

## 2021-03-23 NOTE — PROGRESS NOTES
"Erasto Maher is a 55 year old male who is being evaluated via a billable telephone visit. Phone call duration: 30 minutes      The patient has been notified of following:      \"This telephone visit will be conducted via a call between you and your physician/provider. We have found that certain health care needs can be provided without the need for a physical exam.  This service lets us provide the care you need with a short phone conversation.  If a prescription is necessary we can send it directly to your pharmacy.  If lab work is needed we can place an order for that and you can then stop by our lab to have the test done at a later time.     Telephone visits are billed at different rates depending on your insurance coverage. During this emergency period, for some insurers they may be billed the same as an in-person visit.  Please reach out to your insurance provider with any questions.     If during the course of the call the physician/provider feels a telephone visit is not appropriate, you will not be charged for this service.\"     Patient has given verbal consent for Telephone visit? Yes    Confidential Summary of Oncology Psychology Followup Visit    Erasto Maher is a 55 year old male who presents for Depression  The patient was seen for a 30 minute appointment on 3/23/2021.    Subjective: He reported getting very good news from his last scan. He is happy about this news but still apprehensive about there being some residual material near one of his cancer sites. We discussed his feelings about this and his plan for moving forward. He provided personal examples of his plan demonstrating his understanding of the concepts.     Objective: Affect was appropriate to the content of the therapeutic conversation.     Diagnosis:   Encounter Diagnosis   Name Primary?     Depressed mood Yes     Recommendations/Plan:  1. Continue to set goals for himself that are measurable and achievable.   2. Return for " follow-up    Thank you for this opportunity to participate in your care of this patient.    Tutu Farmer Psy.D., L.P.  Director, Oncology Supportive Care

## 2021-03-25 PROBLEM — C71.9 GLIOBLASTOMA (H): Status: ACTIVE | Noted: 2020-04-02

## 2021-03-26 NOTE — TELEPHONE ENCOUNTER
FUTURE VISIT INFORMATION      SURGERY INFORMATION:    Date: 21    Location: [facility] OR    Surgeon:  Dr. Chan    Anesthesia Type:  general    Procedure: INTRAOPERATIVE MAGNETIC RESONANCE IMAGING LASER ABLATION, WITH OPTICAL TRACKING SYSTEM Biopsy    Consult: 3.26.21    RECORDS REQUESTED FROM:       Primary Care Provider: Dr. Edwards    Most recent EKG+ Tracin.3.20    Most recent ECHO: 10.31. Amie

## 2021-03-26 NOTE — TELEPHONE ENCOUNTER
Received second Covid vaccine yesterday  Started running fever tonight, 102.5 with chills  Is on immunotherapy currently. Completed several rounds of chemo, but no longer on chemotherapy. Dr Avila is primary neuro-oncologist. RN paged Dr Avila directly via american messaging at 6:23 pm to call RN back directly at [phone].    Dr Avila called back and states this is a common reaction to be expected. Patient can use Tylenol or Ibuprofen, if fever lingers through the weekend, or spikes higher like to 104 while using antipyretics to call back. Monitor for neurological symptoms such as weakness, seizure, terrible headache.   RN called back patient's wife Judith and notified of what Dr Avila stated. Caller verbalized understanding and had no further questions.     Alanna Duncan RN/[facility]      Additional Information    Negative: Shock suspected (e.g., cold/pale/clammy skin, too weak to stand, low BP, rapid pulse)    Negative: Difficult to awaken or acting confused (e.g., disoriented, slurred speech)    Negative: Bluish (or gray) lips or face now    Negative: New onset rash with many purple (or blood-colored) spots or dots    Negative: Sounds like a life-threatening emergency to the triager    Negative: Fever > 104 F (40 C)    Negative: Other symptom is present, see that guideline  (e.g., symptoms of cough, runny nose, sore throat, earache, abdominal pain, diarrhea, vomiting)    Negative: [1] Neutropenia known or suspected (e.g., recent cancer chemotherapy) AND [2] fever > 100.4 F (38.0 C)    Negative: [1] Neutropenia known or suspected (e.g., recent cancer chemotherapy) AND [2] signs or symptoms of suspected infection are present    Negative: [1] Headache AND [2] stiff neck (can't touch chin to chest)    Negative: Difficulty breathing    Negative: [1] Drinking very little AND [2] dehydration suspected (e.g., no urine > 12 hours, very dry mouth, very lightheaded)    Negative: Patient sounds very  sick or weak to the triager  (Exception: mild weakness and hasn't taken fever medicine)    Negative: [1] Fever > 101 F (38.3 C) AND [2] age > 60    Negative: [1] Fever > 101 F (38.3 C) AND [2] co-morbidity risk factor for serious infection (e.g., COPD, heart failure, liver failure, renal failure with dialysis)    Negative: [1] Fever > 100.0 F (37.8 C) AND [2] bedridden (e.g., nursing home patient, CVA, chronic illness, recovering from surgery)    [1] Fever > 100.0 F (37.8 C) AND [2] diabetes mellitus or weak immune system (e.g., HIV positive, cancer chemo, splenectomy, organ transplant, chronic steroids)    Protocols used: CANCER - FEVER-A-AH

## 2021-03-26 NOTE — PROGRESS NOTES
"Aristeo is a 55 year old who is being evaluated via a billable video visit.      How would you like to obtain your AVS? MyChart  If the video visit is dropped, the invitation should be resent by: Text to cell phone: [phone]  Will anyone else be joining your video visit? No      I have reviewed and updated the patient's allergies and medication list.    Concerns: No new concerns.  Refills: None needed.       Vitals - Patient Reported  Weight (Patient Reported): 77.6 kg (171 lb)  Height (Patient Reported): 177.8 cm (5' 10\")  BMI (Based on Pt Reported Ht/Wt): 24.54  Pain Score: No Pain (0)      Valentina Stroud CMA      Video Start Time: 2:30 PM  Video-Visit Details    Type of service:  Video Visit    Video End Time:3:15 PM    Originating Location (pt. Location): Home    Distant Location (provider location):  [facility] CANCER [facility]     Platform used for Video Visit: AmWell     55 M with multi-focal IDHwt, MGMTu glioblastoma s/p GTR followed by TMZ/RT, atezolizumab + TMZ, a second resection with gammatile placement,SRS, and Ziopharm IL12 adenovirus injection/Avastin/Nivo. The most recent MRI revealed an enlarging CE anterior to the site of the Ziopharm virus injection. The patient presents for surgical considerations.    On review of systems, the wife noted that the patient's left sided symptoms seem slightly worse after COVID vaccination, though these symptoms have improved since.    Video examination grossly symmetric    I have reviewed the MRI of the brain on 3/22/2021. There is decreased enhancement in three previous nodular CE's. However, there is a 1.5 cm nodular enhancement in the high parietal region that seemed more prominent than the previous MRI.    AP: 55 M with MRI findings suggestive of disease progression. I have reviewed the option of laser thermal ablation for the patient in treating this area, followed by Ziopharm re-administration. The patient had recently received Avastin and will " need a 3-4 week washout period before laser ablation can be safely performed. I will schedule an MRI in two weeks for interval assessment. For now, I am planning the laser ablation 4 weeks from the last Avastin dose. I will also put in a compassionate use request to [facility] for a second viral injection. I have discussed this care plan with Dr. Avila, my neuro-oncology colleague.    I have spent 45  minutes today, with the majority of the visit counseling the patient on the diagnosis. All questions were answered. Over 50% of my time on the unit was spent counseling the patient and coordinating care regarding his recurrent glioblastoma.

## 2021-03-26 NOTE — LETTER
"    3/26/2021         RE: Erasto Maher  [address]  [facility] [address]        Dear Colleague,    Thank you for referring your patient, Erasto Maher, to the [facility] CANCER [facility]. Please see a copy of my visit note below.    Aristeo is a 55 year old who is being evaluated via a billable video visit.      How would you like to obtain your AVS? MyChart  If the video visit is dropped, the invitation should be resent by: Text to cell phone: [phone]  Will anyone else be joining your video visit? No      I have reviewed and updated the patient's allergies and medication list.    Concerns: No new concerns.  Refills: None needed.       Vitals - Patient Reported  Weight (Patient Reported): 77.6 kg (171 lb)  Height (Patient Reported): 177.8 cm (5' 10\")  BMI (Based on Pt Reported Ht/Wt): 24.54  Pain Score: No Pain (0)      Valentina tSroud CMA      Video Start Time: 2:30 PM  Video-Visit Details    Type of service:  Video Visit    Video End Time:3:15 PM    Originating Location (pt. Location): Home    Distant Location (provider location):  [facility] CANCER [facility]     Platform used for Video Visit: AmWell     55 M with multi-focal IDHwt, MGMTu glioblastoma s/p GTR followed by TMZ/RT, atezolizumab + TMZ, a second resection with gammatile placement,SRS, and Ziopharm IL12 adenovirus injection/Avastin/Nivo. The most recent MRI revealed an enlarging CE anterior to the site of the Ziopharm virus injection. The patient presents for surgical considerations.    On review of systems, the wife noted that the patient's left sided symptoms seem slightly worse after COVID vaccination, though these symptoms have improved since.    Video examination grossly symmetric    I have reviewed the MRI of the brain on 3/22/2021. There is decreased enhancement in three previous nodular CE's. However, there is a 1.5 cm nodular enhancement in the high parietal region that seemed more prominent than " the previous MRI.    AP: 55 M with MRI findings suggestive of disease progression. I have reviewed the option of laser thermal ablation for the patient in treating this area, followed by Ziopharm re-administration. The patient had recently received Avastin and will need a 3-4 week washout period before laser ablation can be safely performed. I will schedule an MRI in two weeks for interval assessment. For now, I am planning the laser ablation 4 weeks from the last Avastin dose. I will also put in a compassionate use request to [facility] for a second viral injection. I have discussed this care plan with Dr. Avila, my neuro-oncology colleague.    I have spent 45  minutes today, with the majority of the visit counseling the patient on the diagnosis. All questions were answered. Over 50% of my time on the unit was spent counseling the patient and coordinating care regarding his recurrent glioblastoma.          Again, thank you for allowing me to participate in the care of your patient.        Sincerely,        Marquise Chan MD

## 2021-03-29 NOTE — TELEPHONE ENCOUNTER
Called patient lvm for patient to call back.   Patient called back and confirmed all times and dates for surgery and pre and post op and covid and pac.

## 2021-04-05 NOTE — LETTER
"    4/5/2021         RE: Erasto Maher  [address]  [facility] [address]        Dear Colleague,    Thank you for referring your patient, Erasto Maher, to the [facility]. Please see a copy of my visit note below.    Aristeo is a 55 year old who is being evaluated via a billable video visit.      How would you like to obtain your AVS? MyChart  If the video visit is dropped, the invitation should be resent by: Text to cell phone: [phone]  Will anyone else be joining your video visit? No    I have reviewed and updated the patient's allergies and medication list.    Concerns: No concerns - but reports improvement in the last week or so.   Refills: Will check between intake call and video visit.      Vitals - Patient Reported  Weight (Patient Reported): 77.1 kg (170 lb)  Height (Patient Reported): 177.8 cm (5' 10\")  BMI (Based on Pt Reported Ht/Wt): 24.39  Pain Score: No Pain (0)    Valentina Stroud CMA      Video-Visit Details  Type of service:  Video Visit    Video Start Time: 3:53 PM  Video End Time:4:25 PM    Originating Location (pt. Location): Home    Distant Location (provider location):  [facility]     Platform used for Video Visit: [employer]     ________________________________________________    NEURO-ONCOLOGY VISIT  Apr 5, 2021    CHIEF COMPLAINT: Mr. 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 He completed chemoradiotherapy on 8/30/2019 and was managed on a clinical trial receiving atezolizumab (anti-PDL1) plus adjuvant temozolomide. He received his last dose of immunotherapy on 3/16/2020 when imaging on 3/28/2020 showed progression of disease. He underwent a repeat craniotomy for tumor resection with Dr. Chan on 4/9/2020 with placement of GammaTiles.     Aristeo was managed on " lomustine monotherapy. However, imaging in 11/2020 showed a new distant lesion consistent with disease progression.     He completed a repeat course of radiation, then a repeat resection on 1/13/2021 plus use of the compassionate use Vimalopharm treatment protocol that utilizes an intratumoral adenovirus injection activating human interleukin-12 + veledimex in combination with adjuvant nivolumab. Pathology was consistent with recurrent glioblastoma. He was receiving nivolumab + bevacizumab until imaging in 3/2021 showed an area of contrast enhancement. The plan is for biopsy + GÓMEZ on 4/21/2021.    I met with Aristeo and Judith (wife) today for this follow-up visit.     HISTORY OF PRESENT ILLNESS  -Aristeo states that he is doing well today.   -Coordination is further nearing baseline and he is using both arms to complete tasks. Typing is better/ faster. Gait is more stable. Using his virtual [employer] machine and working out more. Able to play golf over the weekend and he carried his clubs for the entire course.  -Energy levels are better, but continues to be fatigued during the day.   -Mood is better. On Lexapro.   -Off dexamethasone.  -Cognition is still improving. Running his company meetings. Continued issues with maintaining focus. Working with OT, especially with the goal to drive again.  -No recurrent seizure events.  -Aristeo's [birth year] is [birth year]. Excited to have his daughter coming in from [place] for the weekend to celebrate with the rest of the family.    REVIEW OF SYSTEMS  A comprehensive ROS negative except as in HPI.      MEDICATIONS   Current Outpatient Medications   Medication Sig Dispense Refill     escitalopram (LEXAPRO) 10 MG tablet Take 10 mg by mouth every morning        fluticasone (FLONASE) 50 MCG/ACT nasal spray Spray 1 spray into both nostrils nightly as needed        levETIRAcetam (KEPPRA) 1000 MG tablet Take 1 tablet (1,000 mg) by mouth 2 times daily 180 tablet 1     levothyroxine (SYNTHROID/LEVOTHROID)  125 MCG tablet Take 125 mcg by mouth every morning        loratadine (CLARITIN) 10 MG tablet Take 10 mg by mouth nightly as needed        melatonin 5 MG tablet Take 5 mg by mouth nightly as needed        olopatadine (PATANOL) 0.1 % ophthalmic solution Place 1-2 drops into both eyes daily as needed        omeprazole (PRILOSEC) 20 MG DR capsule Take 20 mg by mouth as needed        Psyllium 500 MG CAPS Take 4 capsules by mouth as needed        DRUG ALLERGIES   Allergies   Allergen Reactions     No Clinical Screening - See Comments Rash     Cats and dogs         ONCOLOGIC HISTORY  -PRESENTATION: New onset seizures; complex partial event with speech arrest and altered mental status lasting several minutes. Later had secondary generalization. Started Keppra.   -MR brain imaging with a ring enhancing lesion in the right parietal lobe.    -6/27/2019 SURGERY: Right temporal craniotomy for mass resection, gross total resection by Dr. Tam Barreto.  PATHOLOGY: Glioblastoma; IDH1 R132H wildtype, MGMT promoter unmethylated  -7/3/2019 CLINICAL TRIAL: Evaluated by Dr. Wilmer Mckenna at [facility], consented for clinical trial 4664-0802 (Standard of Care plus atezolizumab).  -7/22 - 8/30/2019 CHEMORADS: 60cGy with concurrent temozolomide 75mg/m2/day (145mg) plus atezolizumab Q2 weeks on clinical trial 3563-9394.  -9/6/2019 NEURO-ONC: Evaluated at the [facility].   -9/25/2019 - 3/16/2020 CHEMO: Adjuvant chemotherapy; temozolomide + atezolizumab 840 mg IV on clinical trial 9021-1933.  -4/6/2020 NEURO-ONC: Communicated with Dr. Borrero. Plan for surgery and next steps pending pathology results.   -4/9/2020 SURGERY + RADS: Repeat craniotomy for surgical debulking plus placement of GammaTiles.  PATHOLOGY: Recurrent glioblastoma.   Next generation sequencing: Microsatellite instability: Stable. Tumor mutational burden: 4 (Low). APC, CDK4, MDM2, PTEN mutated. ARID1A, ASXL1, ATRX, DNMT3A, FANCE, KDM5C, MED12, MEF2B, NF1, RUNX1, TERT mutated.  PD-L1 negative.  -4/20/2020 NEURO-ONC/ CHEMO: Starting Lomustine 3 weeks post-op. Consented for next generation sequencing and sending most recent tumor sample.    -4/30/2020 CHEMO: Lomustine, cycle 1.   -5/11/2020 NEURO-ONC: Clinically well. Referral to genetic counseling.   -6/5/2020 NEURO-ONC/ MRB/ CHEMO: Clinically well. Imaging with a positive treatment response. Lomustine, cycle 2 (start date of 6/11).    -7/20/2020 NEURO-ONC/ MRB/ CHEMO: Clinically well. Imaging without concern; aside from subdural fluid collection. Labs at goal. Lomustine, cycle 3 (start date of 7/23).    -8/31/2020 NEURO-ONC/ MRB/ CHEMO: Clinically well. Imaging without concern. Labs at goal. Lomustine, cycle 4 (start date of 9/3).   -10/12/2020 NEURO-ONC/ MRB/ CHEMO: Clinically well. Imaging without concern for cancer growth, but the resection cavity is expanding with time. Per Dr. Chan; continue to monitor with no surgical intervention at this time. Labs at goal. Lomustine, cycle 5 (start date of 10/15).    -11/23/2020 NEURO-ONC/ MRB: Clinically well. Imaging with a new distant lesion. Stopping lomustine. Referral to Dr. Chan.    -12/10 - 12/16/2020 RADS: Gamma Knife therapy of 15 Gy/ fraction x 5 fractions.  -12/18/2020 NEURO-ONC: Discussion of treatment.  -1/13/2021 SURGERY: Right craniotomy for open biopsy of the right parietal tumor plus Ziopharm adenovirus injection.  PATHOLOGY: Recurrent glioblastoma.  -1/25/2021 NEURO-ONC: Clinically improving since surgery. Continue taking veledimex through Wednesday.   -1/27/2021 CHEMO: Starting nivolumab + bevacizumab.   -2/22/2021 NEURO-ONC/ CHEMO: Clinically improving. Continue nivolumab + bevacizumab.  -3/22/2021 NEURO-ONC/ MRB/ CHEMO: Clinically improving. Imaging with positive treatment response, but with 1 area of concern; consideration for biopsy + GÓMEZ. Continue nivolumab + bevacizumab.  -4/5/2021 NEURO-ONC/ MRB: Clinically improved. Imaging overall stable, but slightly more pronounced  "area of contrast enhancement (4mm in size) in the right frontal lobe. Plan is for biopsy + GÓMEZ on 4/21. Holding nivolumab + bevacizumab.    SOCIAL HISTORY   Tobacco use: Never smoker.   Alcohol use: Social, infrequent.   Drug use: Denies.  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       PHYSICAL EXAMINATION  Wt Readings from Last 2 Encounters:   03/22/21 77.6 kg (171 lb)   03/08/21 78.7 kg (173 lb 8 oz)      Ht Readings from Last 2 Encounters:   01/13/21 1.778 m (5' 10\")   01/04/21 1.778 m (5' 10\")      KPS 90    -Generally well appearing. Good energy today.   -Respiratory: No audible wheezing.   -Skin: No rashes.   -Psychiatric: Normal mood and affect. Pleasant, talkative.  -Neurologic:   MENTAL STATUS:     Alert, oriented.    Recall: Intact.    Speech fluent.    Comprehension intact.     CRANIAL NERVES:     Pupils are equal, round.     Extraocular movements full, no diplopia.    Symmetric facial movements.   Hearing intact.   With normal phonation, no dysfunction of the palate or tongue.   MOTOR: Antigravity in the arms. No pronation and no drift.   SENSATION: Intact to light touch throughout.  COORDINATION: Largely equal on finger nose with eyes closed on left and right.    The rest of a comprehensive physical examination is deferred due to PHE (public health emergency) video visit restrictions.         MEDICAL RECORDS  Obtained and personally reviewed all available outside medical records in addition to reviewing any records available in our electronic system.     LABS  Personally reviewed all available lab results.     IMAGING  Personally reviewed MR brain imaging from today and compared to prior imaging. To my eye, there is stability in the degree of contrast enhancement about the cavity and also in the superior-anterior region of the cavity. T2 FLAIR is stable in these regions. There is a slightly more pronounced 4mm spot of contrast enhancement in the frontal lobe with a slight increase in " associated T2 FLAIR.             Imaging was shown to and results were reviewed with Aristeo and Judith.     Imaging and case was previously reviewed with Dr. Chan.      IMPRESSION  Clinic time for this high complexity visit was spent discussing in detail the nature of his cancer in light of his repeat imaging.     Clinically, Aristeo is noting further improvement in the his left-sided symptoms. He is went golfing over the weekend, he is typing faster, and his cognition is improving. Remains off dexamethasone. Otherwise, he notes continued mild fatigue.    Imaging results are detailed above and I am encouraged by the overall stability that is noted over these 2 weeks. Aristeo has met with Dr. Chan in consultation and the plan is for biopsy plus laser ablation 4 weeks from the last kee dose; currently scheduled for 4/21. He is also considering repeat compassionate use Ziopharm for a second viral injection. I reached out to Katy Chan and Mackenzie today to notify them that the imaging has been completed and confirm that the current plan is unchanged. In addition, I let them know of my concerns regarding the 4mm distant lesion and if this could be an additional target for GÓMEZ or for SRS. In addition, while we are holding chemotherapy for now, I will look to resume infusions on 4/26 and I will confirm that Dr. Chan does not see any contraindications to this plan.    PROBLEM LIST  Glioblastoma, MGMT unmethylated and IDH1 wildtype by IHC  Seizure  Chemotherapy-associated constipation  Headaches  Apraxia    PLAN  -CANCER-DIRECTED THERAPY-  -As above.      -STEROIDS-  -Off dexamethasone.   -Monitor for worsening symptoms.     -SEIZURE MANAGEMENT-  -Given history of seizures, will continue current antiepileptic regimen of Keppra for the foreseeable future.    -Quality of life/ MOOD/ FATIGUE-  -Continue Lexapro.   -Continue to monitor mood as untreated/ undertreated depression can worsen fatigue, dysorexia, and quality of  life.  -Continue to monitor TSH/ T4 levels and adjust Synthroid as needed.     COVID vaccination series complete as of 3/25/2021.    Return to clinic after surgery with Dr. Chan, currently scheduled for 4/21. Appt with me + infusion on 4/26.    Ericka Avila MD  Neuro-oncology       Again, thank you for allowing me to participate in the care of your patient.        Sincerely,        Ericka Avila MD

## 2021-04-05 NOTE — PROGRESS NOTES
"Aristeo is a 55 year old who is being evaluated via a billable video visit.      How would you like to obtain your AVS? MyChart  If the video visit is dropped, the invitation should be resent by: Text to cell phone: [phone]  Will anyone else be joining your video visit? No    I have reviewed and updated the patient's allergies and medication list.    Concerns: No concerns - but reports improvement in the last week or so.   Refills: Will check between intake call and video visit.      Vitals - Patient Reported  Weight (Patient Reported): 77.1 kg (170 lb)  Height (Patient Reported): 177.8 cm (5' 10\")  BMI (Based on Pt Reported Ht/Wt): 24.39  Pain Score: No Pain (0)    Valentina Stroud CMA      Video-Visit Details  Type of service:  Video Visit    Video Start Time: 3:53 PM  Video End Time:4:25 PM    Originating Location (pt. Location): Home    Distant Location (provider location):  [facility]     Platform used for Video Visit: [employer]     ________________________________________________    NEURO-ONCOLOGY VISIT  Apr 5, 2021    CHIEF COMPLAINT: Mr. 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 He completed chemoradiotherapy on 8/30/2019 and was managed on a clinical trial receiving atezolizumab (anti-PDL1) plus adjuvant temozolomide. He received his last dose of immunotherapy on 3/16/2020 when imaging on 3/28/2020 showed progression of disease. He underwent a repeat craniotomy for tumor resection with Dr. Chan on 4/9/2020 with placement of GammaTiles.     Aristeo was managed on lomustine monotherapy. However, imaging in 11/2020 showed a new distant lesion consistent with disease progression.     He completed a repeat course of radiation, then a repeat resection on 1/13/2021 plus use of the compassionate use Zi[employer] treatment protocol that " utilizes an intratumoral adenovirus injection activating human interleukin-12 + veledimex in combination with adjuvant nivolumab. Pathology was consistent with recurrent glioblastoma. He was receiving nivolumab + bevacizumab until imaging in 3/2021 showed an area of contrast enhancement. The plan is for biopsy + GÓMEZ on 4/21/2021.    I met with Aristeo and Judith (wife) today for this follow-up visit.     HISTORY OF PRESENT ILLNESS  -Aristeo states that he is doing well today.   -Coordination is further nearing baseline and he is using both arms to complete tasks. Typing is better/ faster. Gait is more stable. Using his virtual [employer] machine and working out more. Able to play golf over the weekend and he carried his clubs for the entire course.  -Energy levels are better, but continues to be fatigued during the day.   -Mood is better. On Lexapro.   -Off dexamethasone.  -Cognition is still improving. Running his company meetings. Continued issues with maintaining focus. Working with OT, especially with the goal to drive again.  -No recurrent seizure events.  -Aristeo's [birth year] is [birth year]. Excited to have his daughter coming in from [place] for the weekend to celebrate with the rest of the family.    REVIEW OF SYSTEMS  A comprehensive ROS negative except as in HPI.      MEDICATIONS   Current Outpatient Medications   Medication Sig Dispense Refill     escitalopram (LEXAPRO) 10 MG tablet Take 10 mg by mouth every morning        fluticasone (FLONASE) 50 MCG/ACT nasal spray Spray 1 spray into both nostrils nightly as needed        levETIRAcetam (KEPPRA) 1000 MG tablet Take 1 tablet (1,000 mg) by mouth 2 times daily 180 tablet 1     levothyroxine (SYNTHROID/LEVOTHROID) 125 MCG tablet Take 125 mcg by mouth every morning        loratadine (CLARITIN) 10 MG tablet Take 10 mg by mouth nightly as needed        melatonin 5 MG tablet Take 5 mg by mouth nightly as needed        olopatadine (PATANOL) 0.1 % ophthalmic solution Place 1-2 drops  into both eyes daily as needed        omeprazole (PRILOSEC) 20 MG DR capsule Take 20 mg by mouth as needed        Psyllium 500 MG CAPS Take 4 capsules by mouth as needed        DRUG ALLERGIES   Allergies   Allergen Reactions     No Clinical Screening - See Comments Rash     Cats and dogs         ONCOLOGIC HISTORY  -PRESENTATION: New onset seizures; complex partial event with speech arrest and altered mental status lasting several minutes. Later had secondary generalization. Started Keppra.   -MR brain imaging with a ring enhancing lesion in the right parietal lobe.    -6/27/2019 SURGERY: Right temporal craniotomy for mass resection, gross total resection by Dr. Tam Barreto.  PATHOLOGY: Glioblastoma; IDH1 R132H wildtype, MGMT promoter unmethylated  -7/3/2019 CLINICAL TRIAL: Evaluated by Dr. Wilmer Mckenna at [facility], consented for clinical trial 8617-4209 (Standard of Care plus atezolizumab).  -7/22 - 8/30/2019 CHEMORADS: 60cGy with concurrent temozolomide 75mg/m2/day (145mg) plus atezolizumab Q2 weeks on clinical trial 2236-8995.  -9/6/2019 NEURO-ONC: Evaluated at the [facility].   -9/25/2019 - 3/16/2020 CHEMO: Adjuvant chemotherapy; temozolomide + atezolizumab 840 mg IV on clinical trial 1805-3059.  -4/6/2020 NEURO-ONC: Communicated with Dr. Borrero. Plan for surgery and next steps pending pathology results.   -4/9/2020 SURGERY + RADS: Repeat craniotomy for surgical debulking plus placement of GammaTiles.  PATHOLOGY: Recurrent glioblastoma.   Next generation sequencing: Microsatellite instability: Stable. Tumor mutational burden: 4 (Low). APC, CDK4, MDM2, PTEN mutated. ARID1A, ASXL1, ATRX, DNMT3A, FANCE, KDM5C, MED12, MEF2B, NF1, RUNX1, TERT mutated. PD-L1 negative.  -4/20/2020 NEURO-ONC/ CHEMO: Starting Lomustine 3 weeks post-op. Consented for next generation sequencing and sending most recent tumor sample.    -4/30/2020 CHEMO: Lomustine, cycle 1.   -5/11/2020 NEURO-ONC: Clinically well. Referral to genetic  counseling.   -6/5/2020 NEURO-ONC/ MRB/ CHEMO: Clinically well. Imaging with a positive treatment response. Lomustine, cycle 2 (start date of 6/11).    -7/20/2020 NEURO-ONC/ MRB/ CHEMO: Clinically well. Imaging without concern; aside from subdural fluid collection. Labs at goal. Lomustine, cycle 3 (start date of 7/23).    -8/31/2020 NEURO-ONC/ MRB/ CHEMO: Clinically well. Imaging without concern. Labs at goal. Lomustine, cycle 4 (start date of 9/3).   -10/12/2020 NEURO-ONC/ MRB/ CHEMO: Clinically well. Imaging without concern for cancer growth, but the resection cavity is expanding with time. Per Dr. Chan; continue to monitor with no surgical intervention at this time. Labs at goal. Lomustine, cycle 5 (start date of 10/15).    -11/23/2020 NEURO-ONC/ MRB: Clinically well. Imaging with a new distant lesion. Stopping lomustine. Referral to Dr. Chan.    -12/10 - 12/16/2020 RADS: Gamma Knife therapy of 15 Gy/ fraction x 5 fractions.  -12/18/2020 NEURO-ONC: Discussion of treatment.  -1/13/2021 SURGERY: Right craniotomy for open biopsy of the right parietal tumor plus Ziopharm adenovirus injection.  PATHOLOGY: Recurrent glioblastoma.  -1/25/2021 NEURO-ONC: Clinically improving since surgery. Continue taking veledimex through Wednesday.   -1/27/2021 CHEMO: Starting nivolumab + bevacizumab.   -2/22/2021 NEURO-ONC/ CHEMO: Clinically improving. Continue nivolumab + bevacizumab.  -3/22/2021 NEURO-ONC/ MRB/ CHEMO: Clinically improving. Imaging with positive treatment response, but with 1 area of concern; consideration for biopsy + GÓMEZ. Continue nivolumab + bevacizumab.  -4/5/2021 NEURO-ONC/ MRB: Clinically improved. Imaging overall stable, but slightly more pronounced area of contrast enhancement (4mm in size) in the right frontal lobe. Plan is for biopsy + GÓMEZ on 4/21. Holding nivolumab + bevacizumab.    SOCIAL HISTORY   Tobacco use: Never smoker.   Alcohol use: Social, infrequent.   Drug use: Denies.  Employment: Business  "owner.   . 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       PHYSICAL EXAMINATION  Wt Readings from Last 2 Encounters:   03/22/21 77.6 kg (171 lb)   03/08/21 78.7 kg (173 lb 8 oz)      Ht Readings from Last 2 Encounters:   01/13/21 1.778 m (5' 10\")   01/04/21 1.778 m (5' 10\")      KPS 90    -Generally well appearing. Good energy today.   -Respiratory: No audible wheezing.   -Skin: No rashes.   -Psychiatric: Normal mood and affect. Pleasant, talkative.  -Neurologic:   MENTAL STATUS:     Alert, oriented.    Recall: Intact.    Speech fluent.    Comprehension intact.     CRANIAL NERVES:     Pupils are equal, round.     Extraocular movements full, no diplopia.    Symmetric facial movements.   Hearing intact.   With normal phonation, no dysfunction of the palate or tongue.   MOTOR: Antigravity in the arms. No pronation and no drift.   SENSATION: Intact to light touch throughout.  COORDINATION: Largely equal on finger nose with eyes closed on left and right.    The rest of a comprehensive physical examination is deferred due to PHE (public health emergency) video visit restrictions.         MEDICAL RECORDS  Obtained and personally reviewed all available outside medical records in addition to reviewing any records available in our electronic system.     LABS  Personally reviewed all available lab results.     IMAGING  Personally reviewed MR brain imaging from today and compared to prior imaging. To my eye, there is stability in the degree of contrast enhancement about the cavity and also in the superior-anterior region of the cavity. T2 FLAIR is stable in these regions. There is a slightly more pronounced 4mm spot of contrast enhancement in the frontal lobe with a slight increase in associated T2 FLAIR.             Imaging was shown to and results were reviewed with Rosita.     Imaging and case was previously reviewed with Dr. Chan.      IMPRESSION  Clinic time for this high complexity visit was spent discussing in detail the nature " of his cancer in light of his repeat imaging.     Clinically, Aristeo is noting further improvement in the his left-sided symptoms. He is went golfing over the weekend, he is typing faster, and his cognition is improving. Remains off dexamethasone. Otherwise, he notes continued mild fatigue.    Imaging results are detailed above and I am encouraged by the overall stability that is noted over these 2 weeks. Aristeo has met with Dr. Chan in consultation and the plan is for biopsy plus laser ablation 4 weeks from the last eke dose; currently scheduled for 4/21. He is also considering repeat compassionate use Ziopharm for a second viral injection. I reached out to Katy Chan and Makcenzie today to notify them that the imaging has been completed and confirm that the current plan is unchanged. In addition, I let them know of my concerns regarding the 4mm distant lesion and if this could be an additional target for GÓMEZ or for SRS. In addition, while we are holding chemotherapy for now, I will look to resume infusions on 4/26 and I will confirm that Dr. Chan does not see any contraindications to this plan.    PROBLEM LIST  Glioblastoma, MGMT unmethylated and IDH1 wildtype by IHC  Seizure  Chemotherapy-associated constipation  Headaches  Apraxia    PLAN  -CANCER-DIRECTED THERAPY-  -As above.      -STEROIDS-  -Off dexamethasone.   -Monitor for worsening symptoms.     -SEIZURE MANAGEMENT-  -Given history of seizures, will continue current antiepileptic regimen of Keppra for the foreseeable future.    -Quality of life/ MOOD/ FATIGUE-  -Continue Lexapro.   -Continue to monitor mood as untreated/ undertreated depression can worsen fatigue, dysorexia, and quality of life.  -Continue to monitor TSH/ T4 levels and adjust Synthroid as needed.     COVID vaccination series complete as of 3/25/2021.    Return to clinic after surgery with Dr. Chan, currently scheduled for 4/21. Appt with me + infusion on 4/26.    Ericka Avila,  MD  Neuro-oncology

## 2021-04-13 NOTE — PROGRESS NOTES
"Erasto Maher is a 56 year old male who is being evaluated via a billable telephone visit. Phone call duration: 30 minutes      The patient has been notified of following:      \"This telephone visit will be conducted via a call between you and your physician/provider. We have found that certain health care needs can be provided without the need for a physical exam.  This service lets us provide the care you need with a short phone conversation.  If a prescription is necessary we can send it directly to your pharmacy.  If lab work is needed we can place an order for that and you can then stop by our lab to have the test done at a later time.     Telephone visits are billed at different rates depending on your insurance coverage. During this emergency period, for some insurers they may be billed the same as an in-person visit.  Please reach out to your insurance provider with any questions.     If during the course of the call the physician/provider feels a telephone visit is not appropriate, you will not be charged for this service.\"     Patient has given verbal consent for Telephone visit? Yes    Confidential Summary of Oncology Psychology Followup Visit    Erasto Maher is a 56 year old male who presents for Depression  The patient was seen for a 30 minute appointment on 4/13/2021.    Subjective: Reported doing better and generally feeling good about his scan results and physician appointments last week. 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 He felt the suggestions offered may be of good value.     Objective: Affect was appropriate to the content of the therapeutic conversation.     Diagnosis:   Encounter Diagnosis   Name Primary?     Depressed mood Yes     Recommendations/Plan:  1. Use the \"neutrality\" mentality when it comes to relationship communication  2. Return " for follow-up     Thank you for this opportunity to participate in your care of this patient.    Tutu Farmer Psy.D., L.P.  Director, Oncology Supportive Care

## 2021-04-16 NOTE — TELEPHONE ENCOUNTER
Surgery packet mailed. Patient has been encouraged to call RN Care Coordinator to review information.    Benita Street RN, BSN  Care Coordinator  [facility]

## 2021-04-19 NOTE — H&P
Pre-Operative H & P         Video-Visit Details    Type of service:  Video Visit    Patient verbally consented to video service today: YES      Video Start Time: 1336  Video End Time (time video stopped): 1400    Originating Location (pt. Location): Home    Distant Location (provider location):  home    Mode of Communication:  Video Conference via [place] [employer]        CC:  Preoperative exam to assess for increased cardiopulmonary risk while undergoing surgery and anesthesia.    Date of Encounter: 4/19/2021  Primary Care Physician:  Ranjit Edwards  Associated diagnosis: glioblastoma    HPI  Erasto Maher is a 56 year old male who presents for pre-operative H & P in preparation for INTRAOPERATIVE MAGNETIC RESONANCE IMAGING Monteris LASER ABLATION, WITH OPTICAL TRACKING SYSTEM Biopsy with Dr. Chan on 4/21/21 at [facility]. Patient is being evaluated for comorbid conditions of seasonal allergies, anemia, headaches, left sided weakness, history of seizures, hypothyroidism, GERD, depression, difficulty coping and insomnia      Mr. Maher has a diagnosis of right parietal glioblastoma. He is s/p gross total resection 6/2019. He completed chemoradiation 8/2019 as part of a clinical trial. He had his last dose of immunotherapy 3/2020. Imaging later in March revealed disease progression. He then underwent repeat craniotomy 4/9/2020 with gamma tile placement. Imaging 11/2020 revealed a new distant lesion consistent with disease progression. He then underwent radiation and repeat resection 1/13/21 and immunotherapy. imaging 3/2021 showed an area of contrast enhancement.  He is now scheduled for the above procedure     History is obtained from the patient and chart review.      Past Medical History  Past Medical History:   Diagnosis Date     Allergic rhinitis      Anemia      GERD (gastroesophageal reflux disease)      Glioblastoma (H)      Insomnia      PONV  (postoperative nausea and vomiting)      Seizure (H)        Past Surgical History  Past Surgical History:   Procedure Laterality Date     APPENDECTOMY  11/1981     COLONOSCOPY       CRANIOTOMY  06/28/2019     HERNIA REPAIR      x2     MRI CRANIOTOMY Right 1/13/2021    Procedure: Image Guided Craniotomy, autoguide, virus injection;  Surgeon: Marquise Chan MD;  Location: [facility] OR     MRI CRANIOTOMY WITH OPTICAL TRACKING SYSTEM Right 4/9/2020    Procedure: intraoperative magnetic resonance imaging/stealth assisted right craniotomy, with gammatile placement;  Surgeon: Marquise Chan MD;  Location: [facility] OR       Hx of Blood transfusions/reactions: denies     Hx of abnormal bleeding or anti-platelet use: denies    Menstrual history: No LMP for male patient.    Steroid use in the last year: denies chronic use    Personal or FH with difficulty with Anesthesia:  denies    Prior to Admission Medications  Current Outpatient Medications   Medication Sig Dispense Refill     acetaminophen (TYLENOL) 325 MG tablet Take 325-650 mg by mouth every 6 hours as needed for mild pain       escitalopram (LEXAPRO) 10 MG tablet Take 10 mg by mouth every morning        fluticasone (FLONASE) 50 MCG/ACT nasal spray Spray 1 spray into both nostrils nightly as needed        levETIRAcetam (KEPPRA) 1000 MG tablet Take 1 tablet (1,000 mg) by mouth 2 times daily 180 tablet 1     levothyroxine (SYNTHROID/LEVOTHROID) 125 MCG tablet Take 125 mcg by mouth every morning        loratadine (CLARITIN) 10 MG tablet Take 10 mg by mouth nightly as needed        melatonin 5 MG tablet Take 5 mg by mouth nightly as needed        olopatadine (PATANOL) 0.1 % ophthalmic solution Place 1-2 drops into both eyes daily as needed        omeprazole (PRILOSEC) 20 MG DR capsule Take 20 mg by mouth At Bedtime        Psyllium 500 MG CAPS Take 4 capsules by mouth as needed          Allergies  Allergies   Allergen Reactions     No Clinical Screening - See Comments  Rash     Cats and dogs         Social History  Social History     Socioeconomic History     Marital status:      Spouse name: Not on file     Number of children: 2     Years of education: Not on file     Highest education level: Not on file   Occupational History     Occupation: self-employed   Social Needs     Financial resource strain: Not on file     Food insecurity     Worry: Not on file     Inability: Not on file     Transportation needs     Medical: Not on file     Non-medical: Not on file   Tobacco Use     Smoking status: Never Smoker     Smokeless tobacco: Never Used   Substance and Sexual Activity     Alcohol use: Yes     Frequency: 2-4 times a month     Drinks per session: 3 or 4     Drug use: Not Currently     Sexual activity: Not Currently     Partners: Female   Lifestyle     Physical activity     Days per week: Not on file     Minutes per session: Not on file     Stress: Not on file   Relationships     Social connections     Talks on phone: Not on file     Gets together: Not on file     Attends [employer] service: Not on file     Active member of club or organization: Not on file     Attends meetings of clubs or organizations: Not on file     Relationship status: Not on file     Intimate partner violence     Fear of current or ex partner: Not on file     Emotionally abused: Not on file     Physically abused: Not on file     Forced sexual activity: Not on file   Other Topics Concern     Not on file   Social History Narrative     Not on file       Family History  Family History   Problem Relation Age of Onset     Hypotension Mother      Myocardial Infarction Father      Pacemaker Father      Endocrine Disease Father         prediabetes     No Known Problems Brother        ROS/MED HX  ENT/Pulmonary:     (+) allergic rhinitis,  (-) tobacco use   Neurologic: Comment: glioblastoma    (+) seizures,     Cardiovascular:     (+) -----Previous cardiac testing   Echo: Date: Results:    Stress Test: Date:  Results:    ECG Reviewed: Date: 4/2020 Results:  NSR  Cath: Date: Results:   (-) taking anticoagulants/antiplatelets   METS/Exercise Tolerance:  >4   Hematologic:     (+) anemia,  (-) history of blood transfusion   Musculoskeletal:  - neg musculoskeletal ROS     GI/Hepatic:     (+) GERD,     Renal/Genitourinary:  - neg Renal ROS     Endo:     (+) thyroid problem, hypothyroidism,     Psychiatric/Substance Use:     (+) psychiatric history     Infectious Disease:  - neg infectious disease ROS     Malignancy:   (+) Malignancy, History of Neuro.Neuro CA Active status post.     Other:            The complete review of systems is negative other than noted in the HPI or here.    0 lbs 0 oz  Data Unavailable   There is no height or weight on file to calculate BMI.       Physical Exam  Constitutional: Awake, alert, cooperative, no apparent distress, and appears stated age.  Respiratory: non labored breathing    Neuropsychiatric: Calm, cooperative. Normal affect.     Please refer to the physical examination documented by the anesthesiologist in the anesthesia record on the day of surgery    Labs: (personally reviewed)  Component      Latest Ref Rng & Units 1/13/2021   WBC      4.0 - 11.0 10e9/L    RBC Count      4.4 - 5.9 10e12/L    Hemoglobin      13.3 - 17.7 g/dL    Hematocrit      40.0 - 53.0 %    MCV      78 - 100 fl    MCH      26.5 - 33.0 pg    MCHC      31.5 - 36.5 g/dL    RDW      10.0 - 15.0 %    Platelet Count      150 - 450 10e9/L    Diff Method          % Neutrophils      %    % Lymphocytes      %    % Monocytes      %    % Eosinophils      %    % Basophils      %    % Immature Granulocytes      %    Nucleated RBCs      0 /100    Absolute Neutrophil      1.6 - 8.3 10e9/L    Absolute Lymphocytes      0.8 - 5.3 10e9/L    Absolute Monocytes      0.0 - 1.3 10e9/L    Absolute Eosinophils      0.0 - 0.7 10e9/L    Absolute Basophils      0.0 - 0.2 10e9/L    Abs Immature Granulocytes      0 - 0.4 10e9/L    Absolute  Nucleated RBC          Sodium      133 - 144 mmol/L    Potassium      3.4 - 5.3 mmol/L    Chloride      94 - 109 mmol/L    Carbon Dioxide      20 - 32 mmol/L    Anion Gap      3 - 14 mmol/L    Glucose      70 - 99 mg/dL    Urea Nitrogen      7 - 30 mg/dL    Creatinine      0.66 - 1.25 mg/dL    GFR Estimate      >60 mL/min/1.73:m2    GFR Estimate If Black      >60 mL/min/1.73:m2    Calcium      8.5 - 10.1 mg/dL    Bilirubin Total      0.2 - 1.3 mg/dL    Albumin      3.4 - 5.0 g/dL    Protein Total      6.8 - 8.8 g/dL    Alkaline Phosphatase      40 - 150 U/L    ALT      0 - 70 U/L    AST      0 - 45 U/L    ABO       O   RH(D)       Pos   Antibody Screen       Neg   Test Valid Only At       [facility]   Specimen Expires       01/16/2021   TSH      0.40 - 4.00 mU/L      Component      Latest Ref Rng & Units 3/22/2021   WBC      4.0 - 11.0 10e9/L 3.8 (L)   RBC Count      4.4 - 5.9 10e12/L 4.20 (L)   Hemoglobin      13.3 - 17.7 g/dL 13.6   Hematocrit      40.0 - 53.0 % 41.1   MCV      78 - 100 fl 98   MCH      26.5 - 33.0 pg 32.4   MCHC      31.5 - 36.5 g/dL 33.1   RDW      10.0 - 15.0 % 12.2   Platelet Count      150 - 450 10e9/L 169   Diff Method       Automated Method   % Neutrophils      % 64.4   % Lymphocytes      % 16.1   % Monocytes      % 12.0   % Eosinophils      % 6.5   % Basophils      % 1.0   % Immature Granulocytes      % 0.0   Nucleated RBCs      0 /100 0   Absolute Neutrophil      1.6 - 8.3 10e9/L 2.5   Absolute Lymphocytes      0.8 - 5.3 10e9/L 0.6 (L)   Absolute Monocytes      0.0 - 1.3 10e9/L 0.5   Absolute Eosinophils      0.0 - 0.7 10e9/L 0.3   Absolute Basophils      0.0 - 0.2 10e9/L 0.0   Abs Immature Granulocytes      0 - 0.4 10e9/L 0.0   Absolute Nucleated RBC       0.0   Sodium      133 - 144 mmol/L 137   Potassium      3.4 - 5.3 mmol/L 4.3   Chloride      94 - 109 mmol/L 107   Carbon Dioxide      20 - 32 mmol/L 29   Anion Gap      3 - 14 mmol/L 1 (L)    Glucose      70 - 99 mg/dL 140 (H)   Urea Nitrogen      7 - 30 mg/dL 13   Creatinine      0.66 - 1.25 mg/dL 0.86   GFR Estimate      >60 mL/min/1.73:m2 >90   GFR Estimate If Black      >60 mL/min/1.73:m2 >90   Calcium      8.5 - 10.1 mg/dL 8.8   Bilirubin Total      0.2 - 1.3 mg/dL 0.4   Albumin      3.4 - 5.0 g/dL 3.7   Protein Total      6.8 - 8.8 g/dL 7.1   Alkaline Phosphatase      40 - 150 U/L 55   ALT      0 - 70 U/L 28   AST      0 - 45 U/L 15   ABO          RH(D)          Antibody Screen          Test Valid Only At          Specimen Expires          TSH      0.40 - 4.00 mU/L 2.56     EKG 4/2020   NSR       ASSESSMENT and PLAN  Aristeo is a 56 year old man who is scheduled for INTRAOPERATIVE MAGNETIC RESONANCE IMAGING Monteris LASER ABLATION, WITH OPTICAL TRACKING SYSTEM Biopsy on 4/21/21 by Dr. Chan in treatment of glioblastoma.  PAC referral for risk assessment and optimization for anesthesia with comorbid conditions of allergies, anemia, headaches, left sided weakness, history of seizures, hypothyroidism, GERD, depression, difficulty coping and insomnia:       Pre-operative considerations:        1.  Cardiac:  Functional status- METS >4.  denies  cardiac symptoms today. High risk surgery with 0.4% risk of major adverse cardiac event.   ~EKG showed NSR 4/2020.        2.  Pulm:  Airway feasible.  HILLARY risk: low.    ~ Seasonal allergies - can continue Claritin and Flonase PRN   ~non smoker       3.  GI:  H/o PONV, anti-emetic intervention recommended.       4. Endo:  Hypothyroidism - continue levothyroxine.          5. Neuro:  He has a history of one seizure in June 2019 prior to his glioblastoma diagnosis.  He is on keppra now and denies any seizures since.   ~ Glioblastoma s/p previous procedures, chemo and radiation. The above procedure is planned.        6. Psych: Depression, difficult coping, insomnia - followed by psychology. Continue lexapro and PRN melatonin.        7. Heme: Anemia - hgb was wnl  3/22/21.  Patient has no history of blood transfusions. As this is a virtual visit and he is scheduled for surgery <48 hours, will repeat T&S in preop DOS       VTE risk: 3%       **Physical exam and vital signs not completed today as this visit was scheduled as a virtual visit during Covid 19 pandemic. Physical exam should be completed the DOS in pre-op**    40 minutes were spent completing chart review, seeing the patient, reviewing labs and test results and completing documentation          Dahiana Vigil PA-C  Preoperative Assessment Center  [facility]  Phone: [phone]  Fax: [phone]

## 2021-04-19 NOTE — PROGRESS NOTES
Aristeo is a 56 year old who is being evaluated via a billable video visit.      How would you like to obtain your AVS? MyChart    Will anyone else be joining your video visit? No    HPI       Review of Systems         Objective    Vitals - Patient Reported  Pain Score: No Pain (0)        Physical Exam     SUSAN Ko LPN

## 2021-04-19 NOTE — PATIENT INSTRUCTIONS
Preparing for Your Surgery      Name:  Erasto Maher   MRN:  [id number]   :  1965   Today's Date:  2021       Arriving for surgery:  Surgery date:  21  Arrival time:  05:30 am    Restrictions due to COVID 19:  One consistent visitor per patient is allowed.  The visitor will be allowed in the pre-op area.  Visitors are asked to leave the building during the surgery.  No ill visitors.  All visitors must wear face mask.    [employer] parking is available for anyone with mobility limitations or disabilities.  ([place]  24 hours/ 7 days a week; [facility]  7 am- 3:30 pm, Mon- Fri)    Please come to:   [facility] [address]  [address] [place]  [address]  -    Please proceed to [address] on the 3rd floor. [phone]?     - ?If you are in need of directions, wheelchair or escort please stop at the Information Desk in the lobby.  Inform the information person that you are here for surgery; a wheelchair and escort to [address] will be provided.?     What can I eat or drink?  -  You may eat and drink normally for up to 8 hours before your surgery.   -  You may have clear liquids until 2 hours before surgery.     Examples of clear liquids:  Water  Clear broth  Juices (apple, white grape, white cranberry  and cider) without pulp  Noncarbonated, powder based beverages  (lemonade and Abdulaziz-Aid)  Sodas (Sprite, 7-Up, ginger ale and seltzer)  Coffee or tea (without milk or cream)  Gatorade    -  No Alcohol for at least 24 hours before surgery     Which medicines can I take?  Hold Aspirin for 7 days before surgery.   Hold Multivitamins for 7 days before surgery.  Hold Supplements for 7 days before surgery.  Hold Ibuprofen (Advil, Motrin) for 1 day before surgery--unless otherwise directed by surgeon.  Hold Naproxen (Aleve) for 4 days before surgery.  -  PLEASE TAKE these medications the day of surgery:  Tylenol if needed; take morning  medications.    How do I prepare myself?  - Please take 2 showers before surgery using Scrubcare or Hibiclens soap.    Use this soap only from the neck to your toes.     Leave the soap on your skin for one minute--then rinse thoroughly.      You may use your own shampoo and conditioner; no other hair products.   - Please remove all jewelry and body piercings.  - No lotions, deodorants or fragrance.  - No makeup or fingernail polish.   - Bring your ID and insurance card.    - All patients are required to have a Covid-19 test within 4 days of surgery/procedure.      -Patients will be contacted by the [facility] scheduling team within 1 week of surgery to make an appointment.      - Patients may call the Scheduling team at [phone] if they have not been scheduled within 4 days of  surgery.    Questions or Concerns:    - For any questions regarding the day of surgery or your hospital stay, please contact the Pre Admission Nursing Office at [phone].       - If you have health changes between today and your surgery please call your surgeon.       For questions after surgery please call your surgeons office.

## 2021-04-19 NOTE — ANESTHESIA PREPROCEDURE EVALUATION
Anesthesia Pre-Procedure Evaluation    Patient: Erasto Maher   MRN: [id number] : 1965        Preoperative Diagnosis: Glioblastoma (H) [C71.9]   Procedure : Procedure(s):  INTRAOPERATIVE MAGNETIC RESONANCE IMAGING Monteris LASER ABLATION, WITH OPTICAL TRACKING SYSTEM Biopsy     Past Medical History:   Diagnosis Date     Allergic rhinitis      Anemia      GERD (gastroesophageal reflux disease)      Glioblastoma (H)      Insomnia      PONV (postoperative nausea and vomiting)      Seizure (H)       Past Surgical History:   Procedure Laterality Date     APPENDECTOMY  1981     COLONOSCOPY       CRANIOTOMY  2019     HERNIA REPAIR      x2     MRI CRANIOTOMY Right 2021    Procedure: Image Guided Craniotomy, autoguide, virus injection;  Surgeon: Marquise Chan MD;  Location: [facility] OR     MRI CRANIOTOMY WITH OPTICAL TRACKING SYSTEM Right 2020    Procedure: intraoperative magnetic resonance imaging/stealth assisted right craniotomy, with gammatile placement;  Surgeon: Marquise Chan MD;  Location: [facility] OR      Allergies   Allergen Reactions     No Clinical Screening - See Comments Rash     Cats and dogs        Social History     Tobacco Use     Smoking status: Never Smoker     Smokeless tobacco: Never Used   Substance Use Topics     Alcohol use: Yes     Frequency: 2-4 times a month     Drinks per session: 3 or 4      Wt Readings from Last 1 Encounters:   21 77.6 kg (171 lb)        Anesthesia Evaluation   Pt has had prior anesthetic. Type: General.    History of anesthetic complications  - PONV.      ROS/MED HX  ENT/Pulmonary:     (+) allergic rhinitis,  (-) tobacco use   Neurologic: Comment: glioblastoma    (+) seizures, last seizure: 2019,     Cardiovascular:     (+) -----Previous cardiac testing   Echo: Date: Results:    Stress Test: Date: Results:    ECG Reviewed: Date: 2020 Results:  NSR  Cath: Date: Results:   (-) taking anticoagulants/antiplatelets, murmur and  wheezes   METS/Exercise Tolerance: >4 METS    Hematologic:     (+) anemia,  (-) history of blood transfusion   Musculoskeletal:  - neg musculoskeletal ROS     GI/Hepatic:     (+) GERD, Asymptomatic on medication,     Renal/Genitourinary:  - neg Renal ROS     Endo:     (+) thyroid problem, hypothyroidism,     Psychiatric/Substance Use:     (+) psychiatric history depression     Infectious Disease:  - neg infectious disease ROS     Malignancy:   (+) Malignancy, History of Neuro.Neuro CA Active status post Surgery, Chemo and Radiation.     Other:            Physical Exam    Airway        Mallampati: II       Respiratory Devices and Support         Dental  no notable dental history         Cardiovascular          Rhythm and rate: regular and normal (-) no murmur    Pulmonary           breath sounds clear to auscultation   (-) no rhonchi and no wheezes        OUTSIDE LABS:  CBC:   Lab Results   Component Value Date    WBC 3.8 (L) 03/22/2021    WBC 4.3 03/08/2021    HGB 13.6 03/22/2021    HGB 13.4 03/08/2021    HCT 41.1 03/22/2021    HCT 39.0 (L) 03/08/2021     03/22/2021     03/08/2021     BMP:   Lab Results   Component Value Date     03/22/2021     03/08/2021    POTASSIUM 4.3 03/22/2021    POTASSIUM 4.3 03/08/2021    CHLORIDE 107 03/22/2021    CHLORIDE 105 03/08/2021    CO2 29 03/22/2021    CO2 28 03/08/2021    BUN 13 03/22/2021    BUN 9 03/08/2021    CR 0.86 03/22/2021    CR 0.72 03/08/2021     (H) 03/22/2021     (H) 03/08/2021     COAGS:   Lab Results   Component Value Date    PTT 26 01/19/2021    INR 1.08 01/19/2021     POC:   Lab Results   Component Value Date     (H) 01/14/2021     HEPATIC:   Lab Results   Component Value Date    ALBUMIN 3.7 03/22/2021    PROTTOTAL 7.1 03/22/2021    ALT 28 03/22/2021    AST 15 03/22/2021    ALKPHOS 55 03/22/2021    BILITOTAL 0.4 03/22/2021     OTHER:   Lab Results   Component Value Date    JUDIE 8.8 03/22/2021    PHOS 2.7 01/19/2021     MAG 1.9 01/19/2021    LIPASE 321 01/19/2021    AMYLASE 84 01/19/2021    TSH 2.56 03/22/2021    T4 1.20 02/22/2021    CRP 8.9 (H) 01/19/2021    SED 25 (H) 01/19/2021       Anesthesia Plan    ASA Status:  3   NPO Status:  NPO Appropriate    Anesthesia Type: General.     - Airway: ETT   Induction: Intravenous.   Maintenance: Balanced.   Techniques and Equipment:     - Lines/Monitors: 2nd IV, Arterial Line     Consents    Anesthesia Plan(s) and associated risks, benefits, and realistic alternatives discussed. Questions answered and patient/representative(s) expressed understanding.     - Discussed with:  Patient      - Extended Intubation/Ventilatory Support Discussed: Yes.      - Patient is DNR/DNI Status: No    Use of blood products discussed: Yes.     - Discussed with: Patient.     - Consented: consented to blood products            Reason for refusal: other.     Postoperative Care    Pain management: IV analgesics, Oral pain medications, Multi-modal analgesia.   PONV prophylaxis: Ondansetron (or other 5HT-3), Scopolamine patch, Dexamethasone or Solumedrol     Comments:              PAC Discussion and Assessment    ASA Classification: 3  Case is suitable for: [place]  Anesthetic techniques and relevant risks discussed: GA                  PAC Resident/NP Anesthesia Assessment: Aristeo is a 56 year old man who is scheduled for INTRAOPERATIVE MAGNETIC RESONANCE IMAGING Monteris LASER ABLATION, WITH OPTICAL TRACKING SYSTEM Biopsy on 4/21/21 by Dr. Chan in treatment of glioblastoma.  PAC referral for risk assessment and optimization for anesthesia with comorbid conditions of allergies, anemia, headaches, left sided weakness, history of seizures, hypothyroidism, GERD, depression, difficulty coping and insomnia:       Pre-operative considerations:        1.  Cardiac:  Functional status- METS >4.  denies  cardiac symptoms today. High risk surgery with 0.4% risk of major adverse cardiac event.   ~EKG showed NSR 4/2020.         2.  Pulm:  Airway feasible.  HILLARY risk: low.    ~ Seasonal allergies - can continue Claritin and Flonase PRN   ~non smoker       3.  GI:  H/o PONV, anti-emetic intervention recommended.       4. Endo:  Hypothyroidism - continue levothyroxine.          5. Neuro:  He has a history of one seizure in June 2019 prior to his glioblastoma diagnosis.  He is on keppra now and denies any seizures since.   ~ Glioblastoma s/p previous procedures, chemo and radiation. The above procedure is planned.        6. Psych: Depression, difficult coping, insomnia - followed by psychology. Continue lexapro and PRN melatonin.        7. Heme: Anemia - hgb was wnl 3/22/21.  Patient has no history of blood transfusions. As this is a virtual visit and he is scheduled for surgery <48 hours, will repeat T&S in preop DOS       VTE risk: 3%       **Physical exam and vital signs not completed today as this visit was scheduled as a virtual visit during Covid 19 pandemic. Physical exam should be completed the DOS in pre-op**     Patient is optimized and is acceptable candidate for the proposed procedure.  No further diagnostic evaluation is needed.      Leighann Meek PA-C

## 2021-04-21 NOTE — ANESTHESIA PROCEDURE NOTES
Arterial Line Procedure Note  Pre-Procedure   Staff -        Anesthesiologist:  Bud Otto MD       Performed By: anesthesiologist       Location: OR       Pre-Anesthestic Checklist: patient identified, IV checked, risks and benefits discussed, informed consent, monitors and equipment checked, pre-op evaluation and at physician/surgeon's request  Timeout:       Correct Patient: Yes        Correct Procedure: Yes   Procedure   Procedure: arterial line       Laterality: left       Insertion Site: radial.  Sterile Prep        Skin prep: Chloraprep  Narrative        Tegaderm dressing used.       Complications: None apparent,        Arterial waveform: Yes        IBP within 10% of NIBP: Yes  Comments:  20 g 2 in angio catheter placed into the right radial artery via palpation, no hematoma, no apparent complication, BP within 10% of NIBP

## 2021-04-21 NOTE — DISCHARGE SUMMARY
[facility] Discharge Summary and Instructions    Erasto Maher MRN# [id number]   Age: 56 year old YOB: 1965     Date of Admission:  4/21/2021  Date of Discharge::  4/22/2021  Admitting Physician:  Marquise Chan MD  Discharge Physician:  Marquise Chan MD          Admission Diagnoses:   Glioblastoma (H) [C71.9]          Discharge Diagnosis:     Glioblastoma (H) [C71.9]   In addition to the assessment of diagnoses detailed above, this 56 year old male  patient admitted from home has the following conditions contributing to the complexity of their medical care:    Brain cancer and Cerebral edema ,           Procedures:   04/21/2021  1) MRI guided biopsy of the right frontal lesion  2) MRI guided laser ablation of the right frontal lesion (GÓMEZ)  3) MRI guided biopsy of the right parietal lesion  4) MRI guided laser ablation of the right parietal lesion (GÓMEZ)  5) Extraction of a fractured screw             Brief History of Illness:   56 M with recurrent glioblastoma who presents for laser ablation of the right frontal and parietal contrast enhancing mass           Hospital Course:   Following surgery the patient was monitored in the Neurosurgical ICU, patient remained medically and neurologically stable.  Patient denied pain or headache, nausea/vomiting, aphasia, but noted mild left hemibody weakness.    Post operatively head CTs were obtained and revealed no acute hemorhage.    Patient was given a 3 day prescription of Keflex for perioperative wound prophylaxis.   Patient was evaluated by PT and OT who felt the patient was safe to discharge home with family and outpatient OT.   Surgical pathology was pending upon discharge.   Patient will need to follow up in Neurosurgical clinic in 2 weeks for wound check.  Patient will follow up with Neuro Oncology on 4/26/2021 to discuss future treatment plan.   Prior to discharge patient was medically stable and at neurologic baseline,  tolerating diet, ambulating safely, voiding, passing flatus, and pain control was achieved with oral medications.           Discharge Medications:     Current Discharge Medication List      START taking these medications    Details   !! dexamethasone (DECADRON) 1 MG tablet Take 1 tablet (1 mg) by mouth every 6 hours for 1 day  Qty: 4 tablet, Refills: 0    Comments: Taper 4  Associated Diagnoses: Glioblastoma (H)      !! dexamethasone (DECADRON) 1.5 MG tablet Take 2 tablets (3 mg) by mouth every 6 hours for 1 day  Qty: 8 tablet, Refills: 0    Comments: Taper 2  Associated Diagnoses: Glioblastoma (H)      !! dexamethasone (DECADRON) 2 MG tablet Take 1 tablet (2 mg) by mouth every 6 hours for 1 day  Qty: 4 tablet, Refills: 0    Comments: Taper 3  Associated Diagnoses: Glioblastoma (H)      !! dexamethasone (DECADRON) 4 MG tablet Take 1 tablet (4 mg) by mouth every 6 hours for 1 day  Qty: 4 tablet, Refills: 0    Comments: Taper 1  Associated Diagnoses: Glioblastoma (H)       !! - Potential duplicate medications found. Please discuss with provider.      CONTINUE these medications which have NOT CHANGED    Details   acetaminophen (TYLENOL) 325 MG tablet Take 325-650 mg by mouth every 6 hours as needed for mild pain      escitalopram (LEXAPRO) 10 MG tablet Take 10 mg by mouth every morning       fluticasone (FLONASE) 50 MCG/ACT nasal spray Spray 1 spray into both nostrils nightly as needed       levETIRAcetam (KEPPRA) 1000 MG tablet Take 1 tablet (1,000 mg) by mouth 2 times daily  Qty: 180 tablet, Refills: 1    Associated Diagnoses: Generalized tonic-clonic seizure (H); Glioblastoma (H)      levothyroxine (SYNTHROID/LEVOTHROID) 125 MCG tablet Take 125 mcg by mouth every morning       loratadine (CLARITIN) 10 MG tablet Take 10 mg by mouth nightly as needed       melatonin 5 MG tablet Take 5 mg by mouth nightly as needed       olopatadine (PATANOL) 0.1 % ophthalmic solution Place 1-2 drops into both eyes daily as needed        omeprazole (PRILOSEC) 20 MG DR capsule Take 20 mg by mouth At Bedtime       Psyllium 500 MG CAPS Take 4 capsules by mouth as needed             Temp:  [97.4  F (36.3  C)-99  F (37.2  C)] 98.1  F (36.7  C)  Pulse:  [] 98  Resp:  [12-16] 16  BP: (128-156)/() 130/70  MAP:  [78 mmHg-111 mmHg] 97 mmHg  Arterial Line BP: (105-151)/(62-84) 130/73  SpO2:  [94 %-99 %] 94 %     Exam:  General: Awake;  Alert, In No Acute Distress  Pulm: Breathing Comfortably on RA  Mental status: Oriented x 3  Cranial Nerves: Cranial Nerves II-XII Intact Bilaterally  Strength:        Delt Bi Tri Hand Flexion/  Extension Iliopsoas Quadriceps Tibialis Anterior EHL Gastroc     C5 C6 C7 C8/T1 L2 L3 L4 L5 S1   R 5 5 5 5 5 5 5 5 5   L 4 4 4 4 4 4 4 4 4      Pronator Drift: Absent  Sensory: Intact to Light Touch  Reflexes: No Hyperreflexia, Torres s or Clonus Present; Toes Down-Going Bilaterally  INCISION: c/d/i     Assessment:   Erasto Maher is a 55 year old male with history of recurrent multifocal glioblastoma s/p multiple resections, Gamma Knife radiation, and chemotherapies who underwent craniotomy for biopsy and Ziopharm virus injection by Dr. Chan on 1/13/21. Now s/p left frontal laser ablation and biopsy. Patient is doing well post-op.            Discharge Instructions and Follow-Up:     Discharge diet: Regular   Discharge activity: You may advance activity as tolerated. No strenuous exercise or heay lifting greater than 10 lbs for 4 weeks or until seen and cleared in clinic.   Discharge follow-up: Follow-up with Dr. Ericka Avila on 4/26/2021 @ 11:30 am    Follow-up Dr. Marquise Chan MD on 5/07/2021 @ 2:30 pm, all additional follow-up visits to be determined by Dr. Marquise Chan MD       Wound care: Ok to shower,however no scrubbing of the wound and no soaking of the wound, meaning no bathtubs or swimming pools. Pat dry only. Leave wound open to air.  Patient to have wound checked 2 weeks following  surgery.    Wound location: cranial  Closure technique: monocryl  Dressing needs: none  Post-op care at follow-up: Keep clean     Please call if you have:  1. increased pain, redness, drainage, swelling at your incision  2. fevers > 101.5 F degrees  3. with any questions or concerns.  You may reach the Neurosurgery clinic at [phone] during regular work hours. ER at [phone].    and ask for the Neurosurgery Resident on call at [phone], during off hours or weekends.         Discharge Disposition:     Discharged to home        RUPA Mac, CNP  Department of Neurosurgery  Pager: [phone]

## 2021-04-21 NOTE — PROGRESS NOTES
Admitted/transferred from: PACU at 1500  Reason for admission/transfer: Post brain biopsy  2 RN skin assessment: completed by Jeanette BRADY and Rishi BLANCAS  Result of skin assessment and interventions/actions: No concerns. Two small head incisions BECKI, WDL. Preventative meplex to coccyx  Height, weight, drug calc weight: Done  Patient belongings (see Flowsheet): Two bags with clothes. Wife has phone and wallet.  MDRO education added to care plan NA    Head CT completed. L side weaker than R. Mild pnumbness across forehead. Lobatolol given to keep SBP<180. Pt on RA. Kaba in place. Wife at bedside.  ?

## 2021-04-21 NOTE — PROGRESS NOTES
1430 Dr. Chan and Dr. Jenkins at bedside to assess Patient; Neurosurgery updated that slight weakness was noted on left upper and lower extremities; Patient is still very sleepy from anesthesia; CT scan will be obtained prior to handoff to 4A; will continue to monitor. No further orders at this time.

## 2021-04-21 NOTE — ANESTHESIA PROCEDURE NOTES
Airway       Patient location during procedure: OR  Staff -        CRNA: Remedios Layton APRN CRNA       Performed By: CRNA  Consent for Airway        Urgency: elective  Indications and Patient Condition       Indications for airway management: alphonso-procedural       Induction type:intravenous       Mask difficulty assessment: 1 - vent by mask    Final Airway Details       Final airway type: endotracheal airway       Successful airway: ETT - single  Endotracheal Airway Details        ETT size (mm): 8.0       Cuffed: yes       Successful intubation technique: direct laryngoscopy       DL Blade Type: MAC 4       Grade View of Cords: 1       Adjucts: stylet       Position: Right       Measured from: lips       Secured at (cm): 23       Bite block used: None    Post intubation assessment        Placement verified by: capnometry, equal breath sounds and chest rise        Number of attempts at approach: 1       Secured with: pink tape       Ease of procedure: easy       Dentition: Intact and Unchanged    Medication(s) Administered   Medication Administration Time: 4/21/2021 7:57 AM

## 2021-04-21 NOTE — ANESTHESIA POSTPROCEDURE EVALUATION
Patient: Erasto Maher    Procedure(s):  INTRAOPERATIVE MAGNETIC RESONANCE IMAGING Monteris LASER ABLATION, WITH OPTICAL TRACKING SYSTEM Biopsy    Diagnosis:Glioblastoma (H) [C71.9]  Diagnosis Additional Information: No value filed.    Anesthesia Type:  General    Note:  Disposition: Admission   Postop Pain Control: Uneventful            Sign Out: Well controlled pain   PONV: No   Neuro/Psych: Uneventful            Sign Out: Acceptable/Baseline neuro status   Airway/Respiratory: Uneventful            Sign Out: Acceptable/Baseline resp. status   CV/Hemodynamics: Uneventful            Sign Out: Acceptable CV status; No obvious hypovolemia; No obvious fluid overload   Other NRE: NONE   DID A NON-ROUTINE EVENT OCCUR? No    Event details/Postop Comments:  I assessed the patient in PACU prior to discharge.  The patient was awake and alert with minimal to moderate pain which was well controlled with medications.  The patient denied any significant nausea.  The patient's heart rate and blood pressure with consistent with his preoperative measurements, and he was breathing comfortably without any signs of respiratory distress.  There were no readily apparent anesthetic complications.  All his questions were answered.           Last vitals:  Vitals:    04/21/21 1445 04/21/21 1500 04/21/21 1515   BP:      Pulse: 85 90 87   Resp: 16     Temp: 36.4  C (97.6  F)     SpO2: 99% 99% 98%       Last vitals prior to Anesthesia Care Transfer:  CRNA VITALS  4/21/2021 1309 - 4/21/2021 1409      4/21/2021             Pulse:  87    ART BP:  139/73    SpO2:  98 %    Resp Rate (observed):  16          Electronically Signed By: Bud Otto MD  April 21, 2021  3:32 PM

## 2021-04-21 NOTE — OP NOTE
Name: Erasto Maher  MRN: [id number]  YOB: 1965  Date of Surgery: April 21, 2021    Pre-operative Diagnosis:  Right frontal and parietal glioblastoma  Post-operative Diagnosis:   Same  Procedure:    1) MRI guided biopsy of the right frontal lesion  2) MRI guided laser ablation of the right frontal lesion (GÓMEZ)  3) MRI guided biopsy of the right parietal lesion  4) MRI guided laser ablation of the right parietal lesion (GÓMEZ)  5) Extraction of a fractured screw      Anesthesia:  GETA     Surgeon: Marquise Monzon MD PhD  Assistant:  Ander Jenkins MD PhD     Indication:  56 M with recurrent glioblastoma who presents for laser ablation of the right frontal and parietal contrast enhancing mass     Description of Procedure: After pre-operative laboratory value and informed consent were verified, the patient was brought into the operating room. The patient underwent general anesthesia and intubation. Antibiotic was administered.     The patient was placed in a supine position, with head pinned in a neutral position and tilted slightly to the left. The right frontal area and parietal regions were prepped in a sterile manner. A Smart Grid was applied over the approximate entry points of the two planned trajectory. The MRI was brought into the room for imaging of the lesion.      Based on the lesion, the planned entry points were identified.  One Clearpoint frame was mounted at each entry point.  Trajectory was adjusted based on real time MR imaging until the desired trajectory was achieved.       A 4 mm incision was made over the planned entry point at the right frontal region. A 4 mm Mica hole was made to support the planned trajectory.  The Nashold needle was advanced to the prescribed depth and two biopsies were taken. A bolt for supporting the Monteris laser ablation was inserted.    I then turned the attention to the right parietal lesion.  A 3 mm incision was made over the planned entry point at  the right parietal region. A 3 mm Marathon hole was made to support the planned trajectory.  The Nashold needle was advanced to the prescribed depth and two biopsies were taken.    Frozen pathology revealed findings consistent with recurrent/residual tumor.    A Monteris laser probe was inserted through the right frontal bolt to the prescribed depth. Another laser probe was inserted to the prescribed depth through the Clearpoint frame that was mounted at the right parietal region.     The right frontal lesion was ablated at two serial depth, ablating 100% of the contrast enhancing region. The right parietal lesion was ablated in a single treatment, ablating 100% of the contrast enhancing lesion.     After hemostasis, I turned my attention to frame removal. While removing the right frontal frame, a screw fractured.  The region overlying the fractured screw was incised with a 15 scalpel. The fractured screw tip was identified.  Using a #2 cutting drill bit, the fractured screw tip was isolated and removed. The small Himanshu hole that resulted from this drilling was packed with Surgi-flow.  The wound was amply irrigated. The skin incision was approximated with 3'0 monocryl, and Exofin was applied.     The right parietal incision was closed with Exofin.  The right frontal incision was closed with 3'0 monocryl followed by Exofin application.    An intra-operative MRI was performed to confirm ablation of the right frontal and parietal lesions. The MRI revealed expected ablation cavities, without evidence of new hemorrhage or surgical complications.     I was present and performed the key portions of the procedure.     EBL:   < 10 cc's    Specimens:   Biopsy specimens     Disposition: ICU

## 2021-04-22 NOTE — PLAN OF CARE
Major Shift Events: Neuro grossly intact. Follows commands, calls appropriately. Q1H neuro checks maintained. Labetalol given for SBP>140 with effect. SR, no ectopy. O2 WNL on RA. Tolerating PO, regular diet ordered. AUOP via foely, removed @ 0600. Scapl sites x2 WNL, BECKI. R PIV x2 WNL. L radial ART WNL.    Plan: Encourage ambulation/mobility as well as PO intake. Tx off ICU when clinically indicated.    For vital signs and complete assessments, please see documentation flowsheets.

## 2021-04-22 NOTE — PLAN OF CARE
Discharged to: Home at 1145  Belongings: with patient and spouse.    AVS (After Visit Summary) discussed with: patient and family.  Pt left with transporter and wife.  All belongings with patient.  VSS, exam stable, discharge instructions explained.   Spouse and patient had no questions about discharge instructions.

## 2021-04-22 NOTE — PROGRESS NOTES
Neurosurgery progress note    S: no acute events since surgery, slow to wake up    O:  Temp:  [97.4  F (36.3  C)-99  F (37.2  C)] 98.1  F (36.7  C)  Pulse:  [] 98  Resp:  [12-16] 16  BP: (128-156)/() 130/70  MAP:  [78 mmHg-111 mmHg] 97 mmHg  Arterial Line BP: (105-151)/(62-84) 130/73  SpO2:  [94 %-99 %] 94 %    Exam:  General: Awake;  Alert, In No Acute Distress  Pulm: Breathing Comfortably on RA  Mental status: Oriented x 3  Cranial Nerves: Cranial Nerves II-XII Intact Bilaterally  Strength:       Delt Bi Tri Hand Flexion/  Extension Iliopsoas Quadriceps Tibialis Anterior EHL Gastroc    C5 C6 C7 C8/T1 L2 L3 L4 L5 S1   R 5 5 5 5 5 5 5 5 5   L 4 4 4 4 4 4 4 4 4     Pronator Drift: Absent  Sensory: Intact to Light Touch  Reflexes: No Hyperreflexia, Torres s or Clonus Present; Toes Down-Going Bilaterally  INCISION: c/d/i    Assessment:   Erasto Maher is a 55 year old male with history of recurrent multifocal glioblastoma s/p multiple resections, Gamma Knife radiation, and chemotherapies who underwent craniotomy for biopsy and Ziopharm virus injection by Dr. Chan on 1/13/21. Now s/p left frontal laser ablation and biopsy. Patient is doing well post-op.     Floor q4 neurochecks possible discharge  PT OT    Debora Crawford MD  Neurosurgery    Please contact neurosurgery resident on call with questions.    Dial * * [phone], enter [phone] when prompted

## 2021-04-22 NOTE — PROGRESS NOTES
04/22/21 0900   Quick Adds   Type of Visit Initial Occupational Therapy Evaluation   Living Environment   People in home spouse   Current Living Arrangements house   Home Accessibility stairs to enter home;stairs within home   Number of Stairs, Main Entrance 3   Number of Stairs, Within Home, Primary other (see comments)  (1 flight to bedroom)   Stair Railings, Within Home, Primary railings safe and in good condition   Transportation Anticipated family or friend will provide   Living Environment Comments Pt reports he has 2 adult children who live out of state.  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  Has 3STE and flight up to shower and bedroom.  Pt not driving currently due to prior sx deficits. Wife drives, hired lawn care this year.    Self-Care   Usual Activity Tolerance excellent   Current Activity Tolerance good   Regular Exercise Yes   Activity/Exercise Type biking;strength training;walking   Exercise Amount/Frequency daily   Equipment Currently Used at Home none   Activity/Exercise/Self-Care Comment Pt is very active, exercises daily often full work out with aerobic activity and strength training. Owns a gym   Disability/Function   Hearing Difficulty or Deaf no   Wear Glasses or Blind no   Concentrating, Remembering or Making Decisions Difficulty no   Difficulty Communicating no   Walking or Climbing Stairs Difficulty no   Dressing/Bathing Difficulty no   Toileting issues no   Doing Errands Independently Difficulty (such as shopping) no   Fall history within last six months no   Change in Functional Status Since Onset of Current Illness/Injury yes   General Information   Onset of Illness/Injury or Date of Surgery 04/21/21   Referring Physician Dr Rocio ARREGUIN   Patient/Family Therapy Goal Statement (OT) return to driving   Additional Occupational Profile Info/Pertinent History of Current Problem 55 year old male with history of recurrent multifocal glioblastoma s/p multiple resections, Gamma  Knife radiation, and chemotherapies who underwent craniotomy for biopsy and Ziopharm virus injection by Dr. Chan on 1/13/21. 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 Up w A Activity orders, asap for disc planning   Cognitive Status Examination   Orientation Status orientation to person, place and time   Affect/Mental Status (Cognitive) WFL   Follows Commands initiation impaired  (mild delay in initiation noted, possible impaired insight)   Safety Deficit insight into deficits/self-awareness   Cognitive Status Comments Pt reports intact, mild deficit noted.  Per Pt wife reports delay in responses and attention deficits prior to sx.    Visual Perception   Visual Impairment/Limitations WFL   Visual Perception Impairment other (see comments)  (needs further assessment)   Visual Motor Impairment   (intact saccades and tracking up down L/R. Mild nystagmus L)   Visual Processing Deficit visual attention, left;visual search, left   Impact of Vision Impairment on Function (Vision) deficits to L noted with environmental navigation and reach to task   Sensory   Sensory Comments reports sensory changes in L hand   Pain Assessment   Patient Currently in Pain No   Integumentary/Edema   Integumentary/Edema no deficits were identifed   Posture   Posture not impaired   Posture Comments EOB or standing   Range of Motion Comprehensive   Comment, General Range of Motion slight limitations at full sh flexion  reports this is baseline3   Strength Comprehensive (MMT)   Comment, General Manual Muscle Testing (MMT) Assessment NT   Coordination   Upper Extremity Coordination Left UE impaired   Fine Motor Coordination deficits on l    Bed Mobility   Comment (Bed Mobility) mod I   Transfers   Transfer Comments SBA   Balance   Balance Comments deficits noted, no AE, wide base while standing EOB using urinal,  L turns demonstrate decline in gait safety   Activities of Daily Living   BADL Assessment/Intervention lower body dressing;grooming;feeding;toileting   Lower Body Dressing Assessment/Training   [place] Level (Lower Body Dressing) minimum assist (75% patient effort)   Grooming Assessment/Training   [place] Level (Grooming) set up   Position (Grooming) unsupported sitting   Eating/Self Feeding   [place] Level (Feeding) independent   Position (Self-Feeding) sitting up in bed   Toileting   [place] Level (Toileting) modified independence   Assistive Devices (Toileting) urinal   Position (Toileting) supported standing   Comment (Toileting) Pt standing at bedside, wide base of support LE and leaning back against bed.  Requested privacy.   Instrumental Activities of Daily Living (IADL)   Previous Responsibilities meal prep;housekeeping;laundry;shopping;yardwork;medication management;work;finances  (recently hired for lawn care)   IADL Comments wife assists with work tasks   Clinical Impression   Criteria for Skilled Therapeutic Interventions Met (OT) yes   OT Diagnosis crani, ablation   OT Problem List-Impairments impacting ADL problems related to;activity tolerance impaired;balance;cognition;vision;other (see comments);post-surgical precautions;pain  (neglect to L)   Assessment of Occupational Performance 1-3 Performance Deficits   Identified Performance Deficits driving, home management, shower transfers   Planned Therapy Interventions (OT) IADL retraining;ADL retraining;visual perception   Clinical Decision Making Complexity (OT) moderate complexity   Therapy Frequency (OT) Daily   Predicted Duration of Therapy 1 week   Risk & Benefits of therapy have been explained evaluation/treatment results reviewed;care plan/treatment  goals reviewed;risks/benefits reviewed;current/potential barriers reviewed;participants voiced agreement with care plan;participants included;patient   OT Discharge Planning    OT Discharge Recommendation (DC Rec) Home with assist;home with outpatient occupational therapy  (24h supervision initially)   OT Rationale for DC Rec Pt reports near baseline, deficits noted with L neglect, poor hallway navigation with L turns, Unilateral support on IV pole with short demonstration with no AE.  Wide base of support noted with dynamic standing.     OT Brief overview of current status  Educated re crani precautions, pt wants to golf within 4 wekk precautions. Is avid gym participant with high level of intensity.   Total Evaluation Time (Minutes)   Total Evaluation Time (Minutes) 10

## 2021-04-22 NOTE — PLAN OF CARE
OT 4A/PACU   Pt evaluated, reports near baseline deficits, potential lack of insight into deficit.  Cognition and vision appear intact.  Noted L neglect with screening and hallway walking.  Pt having a hard time making left turns, navigating around RN desk.  Educated re precautions, pt asking if he can golf within the 4 weeks, pt to ask MD if he is allowed to golf sooner than his 4 week precautions, educated pt generalized rec would be no, but up to his MD.      Rec: home w initial 24h supervision and OP OT, no driving until cleared by MD.    Per pt he saw an OP OT at [place] after prior surgeries and would be interested in resuming this to regain IND with community mobility L neglect and L UE function.      Pt discharged to home w OT referral, goals not met 2/2 disc on same day as evaluation.

## 2021-04-22 NOTE — PLAN OF CARE
PT 4A: Defer- Per discussion with OT patient is mobilizing near baseline and safe discharge home with supervision. No acute PT needs at this time, will complete orders.

## 2021-04-23 NOTE — PROGRESS NOTES
[facility]: Post-Discharge Note  SITUATION                                                      Admission:    Admission Date: 04/21/21   Reason for Admission: Glioblastoma  Discharge:   Discharge Date: 04/22/21  Discharge Diagnosis: Glioblastoma    BACKGROUND                                                      56 M with recurrent glioblastoma who presents for laser ablation of the right frontal and parietal contrast enhancing mass    ASSESSMENT      Discharge Assessment  Patient reports symptoms are: Unchanged  Does the patient have all of their medications?: Yes  Does patient know what their new medications are for?: Yes  Does patient have a follow-up appointment scheduled?: Yes  Does patient have any other questions or concerns?: Yes(spoke to Dr. Chan)    Post-op  Did the patient have surgery or a procedure: Yes  Fever: Yes  Chills: No  Eating & Drinking: eating and drinking without complaints/concerns  Bowel Function: normal  Urinary Status: voiding without complaint/concerns      PLAN                                                      Outpatient Plan:  Follow-up with Dr. Ericka Avila on 4/26/2021 @ 11:30 am     Follow-up Dr. Marquise Chan MD on 5/07/2021 @ 2:30 pm, all additional follow-up visits to be determined by Dr. Marquise Chan MD    Future Appointments   Date Time Provider Department Center   4/26/2021 11:00 AM  [facility] LAB DRAW [facility]   4/26/2021 11:30 AM Ericka Avila MD [facility]   4/26/2021  1:30 PM  ONCOLOGY INFUSION [facility]   5/7/2021  2:30 PM Marquise Chan MD [facility]   5/11/2021 11:00 AM Tutu Farmer PsyD [facility]           Agnes Shepherd

## 2021-04-25 NOTE — PATIENT INSTRUCTIONS
Excellent targeting of lesions with GÓMEZ.   Pathology discussed.     Resuming nivolumab and kee today.   -Starting Lisinopril 5mg daily.  -Monitor blood pressure daily for the next 3-5 days.     Ericka Avila MD  Neuro-oncology

## 2021-04-25 NOTE — PROGRESS NOTES
"NEURO-ONCOLOGY VISIT  Apr 26, 2021    CHIEF COMPLAINT: Mr. 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 He completed chemoradiotherapy on 8/30/2019 and was managed on a clinical trial receiving atezolizumab (anti-PDL1) plus adjuvant temozolomide. He received his last dose of immunotherapy on 3/16/2020 when imaging on 3/28/2020 showed progression of disease. He underwent a repeat craniotomy for tumor resection with Dr. Chan on 4/9/2020 with placement of GammaTiles.     Aristeo was managed on lomustine monotherapy. However, imaging in 11/2020 showed a new distant lesion consistent with disease progression.     He completed a repeat course of radiation, then a repeat resection on 1/13/2021 plus use of the compassionate use [employer] treatment protocol that utilizes an intratumoral adenovirus injection activating human interleukin-12 + veledimex in combination with adjuvant nivolumab. Pathology was consistent with recurrent glioblastoma. He was receiving nivolumab + bevacizumab until imaging in 3/2021 showed an area of contrast enhancement.     Aristeo underwent a biopsy + GÓMEZ on 4/21/2021 to both the frontal and parietal lesions and pathology showed small pieces of recurrent glioblastoma with predominant necrosis.    I met with Aristeo and Judith (wife) today for this follow-up visit.     HISTORY OF PRESENT ILLNESS  -Aristeo states that he is doing better today. Recovered fairly well from surgery, no pain. However, he started feeling weak on Friday. With starting dexamethasone 4mg daily and the left leg is more strong. Now able to walk the stairs. Mild continued issues with left arm coordination. Thinking is fine, though foggy/ slow at times. Energy has been lower, improved with steroid use. Voice is raspy since surgery. Was experiencing hiccups post-operatively that " self-resolved on Saturday.  -Mood is good. On Lexapro.   -No recurrent seizure events.    REVIEW OF SYSTEMS  A comprehensive ROS negative except as in HPI.      MEDICATIONS   Current Outpatient Medications   Medication Sig Dispense Refill     levothyroxine (SYNTHROID/LEVOTHROID) 125 MCG tablet Take 125 mcg by mouth every morning        lisinopril (ZESTRIL) 5 MG tablet Take 1 tablet (5 mg) by mouth daily 30 tablet 3     loratadine (CLARITIN) 10 MG tablet Take 10 mg by mouth nightly as needed        melatonin 5 MG tablet Take 5 mg by mouth nightly as needed        olopatadine (PATANOL) 0.1 % ophthalmic solution Place 1-2 drops into both eyes daily as needed        omeprazole (PRILOSEC) 20 MG DR capsule Take 20 mg by mouth At Bedtime        senna-docusate (SENOKOT-S/PERICOLACE) 8.6-50 MG tablet Take 1 tablet by mouth 2 times daily for 14 days 28 tablet 0     acetaminophen (TYLENOL) 325 MG tablet Take 325-650 mg by mouth every 6 hours as needed for mild pain       dexamethasone (DECADRON) 1 MG tablet Take 1 tablet (1 mg) by mouth every 6 hours for 1 day 4 tablet 0     escitalopram (LEXAPRO) 10 MG tablet Take 10 mg by mouth every morning        fluticasone (FLONASE) 50 MCG/ACT nasal spray Spray 1 spray into both nostrils nightly as needed        levETIRAcetam (KEPPRA) 1000 MG tablet Take 1 tablet (1,000 mg) by mouth 2 times daily 180 tablet 1     Psyllium 500 MG CAPS Take 4 capsules by mouth as needed        DRUG ALLERGIES   Allergies   Allergen Reactions     No Clinical Screening - See Comments Rash     Cats and dogs         ONCOLOGIC HISTORY  -PRESENTATION: New onset seizures; complex partial event with speech arrest and altered mental status lasting several minutes. Later had secondary generalization. Started Keppra.   -MR brain imaging with a ring enhancing lesion in the right parietal lobe.    -6/27/2019 SURGERY: Right temporal craniotomy for mass resection, gross total resection by Dr. Tam Barreto.  PATHOLOGY:  Glioblastoma; IDH1 R132H wildtype, MGMT promoter unmethylated  -7/3/2019 CLINICAL TRIAL: Evaluated by Dr. Wilmer Mckenna at [facility], consented for clinical trial 2016-0867 (Standard of Care plus atezolizumab).  -7/22 - 8/30/2019 CHEMORADS: 60cGy with concurrent temozolomide 75mg/m2/day (145mg) plus atezolizumab Q2 weeks on clinical trial 2016-0867.  -9/6/2019 NEURO-ONC: Evaluated at the [facility].   -9/25/2019 - 3/16/2020 CHEMO: Adjuvant chemotherapy; temozolomide + atezolizumab 840 mg IV on clinical trial 2016-0867.  -4/6/2020 NEURO-ONC: Communicated with Dr. Borrero. Plan for surgery and next steps pending pathology results.   -4/9/2020 SURGERY + RADS: Repeat craniotomy for surgical debulking plus placement of GammaTiles.  PATHOLOGY: Recurrent glioblastoma.   Next generation sequencing: Microsatellite instability: Stable. Tumor mutational burden: 4 (Low). APC, CDK4, MDM2, PTEN mutated. ARID1A, ASXL1, ATRX, DNMT3A, FANCE, KDM5C, MED12, MEF2B, NF1, RUNX1, TERT mutated. PD-L1 negative.  -4/20/2020 NEURO-ONC/ CHEMO: Starting Lomustine 3 weeks post-op. Consented for next generation sequencing and sending most recent tumor sample.    -4/30/2020 CHEMO: Lomustine, cycle 1.   -5/11/2020 NEURO-ONC: Clinically well. Referral to genetic counseling.   -6/5/2020 NEURO-ONC/ MRB/ CHEMO: Clinically well. Imaging with a positive treatment response. Lomustine, cycle 2 (start date of 6/11).    -7/20/2020 NEURO-ONC/ MRB/ CHEMO: Clinically well. Imaging without concern; aside from subdural fluid collection. Labs at goal. Lomustine, cycle 3 (start date of 7/23).    -8/31/2020 NEURO-ONC/ MRB/ CHEMO: Clinically well. Imaging without concern. Labs at goal. Lomustine, cycle 4 (start date of 9/3).   -10/12/2020 NEURO-ONC/ MRB/ CHEMO: Clinically well. Imaging without concern for cancer growth, but the resection cavity is expanding with time. Per Dr. Chan; continue to monitor with no surgical intervention at this time. Labs at goal. Lomustine,  cycle 5 (start date of 10/15).    -11/23/2020 NEURO-ONC/ MRB: Clinically well. Imaging with a new distant lesion. Stopping lomustine. Referral to Dr. Chan.    -12/10 - 12/16/2020 RADS: Gamma Knife therapy of 15 Gy/ fraction x 5 fractions.  -12/18/2020 NEURO-ONC: Discussion of treatment.  -1/13/2021 SURGERY: Right craniotomy for open biopsy of the right parietal tumor plus Ziopharm adenovirus injection.  PATHOLOGY: Recurrent glioblastoma.  -1/25/2021 NEURO-ONC: Clinically improving since surgery. Continue taking veledimex through Wednesday.   -1/27/2021 CHEMO: Starting nivolumab + bevacizumab.   -2/22/2021 NEURO-ONC/ CHEMO: Clinically improving. Continue nivolumab + bevacizumab.  -3/22/2021 NEURO-ONC/ MRB/ CHEMO: Clinically improving. Imaging with positive treatment response, but with 1 area of concern; consideration for biopsy + GÓMEZ. Continue nivolumab + bevacizumab.  -4/5/2021 NEURO-ONC/ MRB: Clinically improved. Imaging overall stable, but slightly more pronounced area of contrast enhancement (4mm in size) in the right frontal lobe. Plan is for biopsy + GÓMEZ on 4/21. Holding nivolumab + bevacizumab.  -4/21/2021 SURGERY: MRI guided biopsy and  laser ablation of the right frontal lesion and right parietal lesion.   PATHOLOGY: A. Brain, right biopsy #1, biopsy: Small fragments of cortical nieves matter.       B. Brain, right biopsy #2, biopsy: Cortex and mildly hypercellular white matter.       C. Brain, right biopsy #3, biopsy: Small pieces of recurrent, active glioblastoma with predominant necrosis.       D. Brain, right biopsy #4, biopsy: Mixture of necrosis and blood.   -4/26/2021 NEURO-ONC/ CHEMO: Clinically improving with dexamethasone use. Focal disease control with GÓMEZ. Continue nivolumab + kee. Planning to repeat viral injection on 5/26.     SOCIAL HISTORY   Tobacco use: Never smoker.   Alcohol use: Social, infrequent.   Drug use: Denies.  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  "      PHYSICAL EXAMINATION  BP (!) 141/99   Pulse 81   Temp 97.7  F (36.5  C) (Oral)   Resp 18   Wt 74.2 kg (163 lb 9.6 oz)   SpO2 99%   BMI 23.47 kg/m     Wt Readings from Last 2 Encounters:   04/26/21 74.2 kg (163 lb 9.6 oz)   04/22/21 78.4 kg (172 lb 13.5 oz)      Ht Readings from Last 2 Encounters:   04/21/21 1.778 m (5' 10\")   01/13/21 1.778 m (5' 10\")      KPS 70    -Generally well appearing. Lower energy today.   -Respiratory: No audible wheezing.   -Skin: No rashes.   -Psychiatric: Normal mood and affect. Pleasant, talkative.  -Neurologic:   MENTAL STATUS:     Alert, oriented.    Recall: Intact, slightly slower with mental processing.    Speech fluent.    Comprehension intact.     CRANIAL NERVES:     Pupils are equal, round.     Extraocular movements full, no diplopia.    Symmetric facial movements.   Hearing intact.   With normal phonation, no dysfunction of the palate or tongue.    Slightly raspy voice.   MOTOR: Antigravity in the arms. Finger spread on the left, no pronation and no drift.   SENSATION: Intact to light touch throughout.  COORDINATION: Largely equal on finger nose with eyes closed on left and right.  GAIT: Steady. Steppage gait. Slower speed.    Able to toe/ heel and tandem walk with decreased distance from foot to floor on the left.        MEDICAL RECORDS  Obtained and personally reviewed all available outside medical records in addition to reviewing any records available in our electronic system.     LABS  Personally reviewed all available lab results.     IMAGING  Personally reviewed intra-operative MR brain imaging from 4/21 and compared to prior imaging. To my eye, there is stability in the degree of contrast enhancement about the cavity. The contrast enhancement in the frontal and parietal lobe were successfully targeted by ablation.    Imaging and case was previously reviewed with Dr. Chan. Case was also discussed at Brain Tumor Conference today.       IMPRESSION  Clinic time " for this high complexity visit was spent discussing in detail the nature of his cancer in light of his recent surgery.     Clinically, Aristeo is still recovering from surgery. Left-sided symptoms, cognition, and fatigue have improved with the use of dexamethasone.     Intr-operative imaging reviewed, which showed excellent targeting of laser ablation to both the right frontal and parietal lesions. Pathology consistent with small pieces of recurrent, active glioblastoma with predominant necrosis.     In the setting of local disease control with GÓMEZ, will resume nivolumab and kee infusions today. Will repeat infusion of nivolumab alone (no bevacizumab) in 2 weeks (week of 5/10) in preparation for repeat viral injection planned for 5/26. I will then see him back 2 weeks post-operatively with labs + infusion + appt on 6/14.     PROBLEM LIST  Glioblastoma, MGMT unmethylated and IDH1 wildtype by IHC  Seizure  Chemotherapy-associated constipation  Headaches  Apraxia    PLAN  -CANCER-DIRECTED THERAPY-  -As above.      -HYPERTENSION-  -Related to kee use.   -Starting Lisinopril 5mg daily.   -Recommended monitoring blood pressure at home.   -Can increase antihypertensive as needed.    -STEROIDS-  -Continue dexamethasone 4mg daily per Dr. Chan.   -Monitor for any worsening of symptoms with taper.     -SEIZURE MANAGEMENT-  -Given history of seizures, will continue current antiepileptic regimen of Keppra for the foreseeable future.    -Quality of life/ MOOD/ FATIGUE-  -Continue Lexapro.   -Continue to monitor mood as untreated/ undertreated depression can worsen fatigue, dysorexia, and quality of life.  -Continue to monitor TSH/ T4 levels and adjust Synthroid as needed.     COVID vaccination series complete as of 3/25/2021.    Return to clinic on 6/14.    Ericka Avila MD  Neuro-oncology

## 2021-04-26 NOTE — TUMOR CONFERENCE
Tumor Conference Information  Tumor Conference: Brain  Specialties Present: Medical oncology, Pathology, Radiology, Radiation oncology, Surgery  Patient Status: A current patient  Pathology: Discussed (see comment)  Treatment to Date: Surgical intervention(s), Chemoradiation, Adjuvant chemotherapy, Immunotherapy, Palliative radiation, Palliative chemotherapy  Clinical Trial Eligibility: Previously enrolled  Recommended Plan: Continue with current treatment plan (Comment: reviewed imaging and path; continuing on current treatment plan and clinical trial)  Did the review exceed 30 minutes?: did not           Documentation / Disclaimer Cancer Tumor Board Note  Cancer tumor board recommendations do not override what is determined to be reasonable care and treatment, which is dependent on the circumstances of a patient's case; the patient's medical, social, and personal concerns; and the clinical judgment of the oncologist [physician].

## 2021-04-26 NOTE — LETTER
"    4/26/2021         RE: Erasto Maher  [address]  [facility] [address]        Dear Colleague,    Thank you for referring your patient, Erasto Maher, to the [facility]. Please see a copy of my visit note below.    NEURO-ONCOLOGY VISIT  Apr 26, 2021    CHIEF COMPLAINT: Mr. 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 He completed chemoradiotherapy on 8/30/2019 and was managed on a clinical trial receiving atezolizumab (anti-PDL1) plus adjuvant temozolomide. He received his last dose of immunotherapy on 3/16/2020 when imaging on 3/28/2020 showed progression of disease. He underwent a repeat craniotomy for tumor resection with Dr. Chan on 4/9/2020 with placement of GammaTiles.     Aristeo was managed on lomustine monotherapy. However, imaging in 11/2020 showed a new distant lesion consistent with disease progression.     He completed a repeat course of radiation, then a repeat resection on 1/13/2021 plus use of the compassionate use [employer] treatment protocol that utilizes an intratumoral adenovirus injection activating human interleukin-12 + veledimex in combination with adjuvant nivolumab. Pathology was consistent with recurrent glioblastoma. He was receiving nivolumab + bevacizumab until imaging in 3/2021 showed an area of contrast enhancement.     Aristeo underwent a biopsy + GÓMEZ on 4/21/2021 to both the frontal and parietal lesions and pathology showed small pieces of recurrent glioblastoma with predominant necrosis.    I met with Aristeo and Judith (wife) today for this follow-up visit.     HISTORY OF PRESENT ILLNESS  -Aristeo states that he is doing better today. Recovered fairly well from surgery, no pain. However, he started feeling weak on Friday. With starting dexamethasone 4mg daily and the left leg is " more strong. Now able to walk the stairs. Mild continued issues with left arm coordination. Thinking is fine, though foggy/ slow at times. Energy has been lower, improved with steroid use. Voice is raspy since surgery. Was experiencing hiccups post-operatively that self-resolved on Saturday.  -Mood is good. On Lexapro.   -No recurrent seizure events.    REVIEW OF SYSTEMS  A comprehensive ROS negative except as in HPI.      MEDICATIONS   Current Outpatient Medications   Medication Sig Dispense Refill     levothyroxine (SYNTHROID/LEVOTHROID) 125 MCG tablet Take 125 mcg by mouth every morning        lisinopril (ZESTRIL) 5 MG tablet Take 1 tablet (5 mg) by mouth daily 30 tablet 3     loratadine (CLARITIN) 10 MG tablet Take 10 mg by mouth nightly as needed        melatonin 5 MG tablet Take 5 mg by mouth nightly as needed        olopatadine (PATANOL) 0.1 % ophthalmic solution Place 1-2 drops into both eyes daily as needed        omeprazole (PRILOSEC) 20 MG DR capsule Take 20 mg by mouth At Bedtime        senna-docusate (SENOKOT-S/PERICOLACE) 8.6-50 MG tablet Take 1 tablet by mouth 2 times daily for 14 days 28 tablet 0     acetaminophen (TYLENOL) 325 MG tablet Take 325-650 mg by mouth every 6 hours as needed for mild pain       dexamethasone (DECADRON) 1 MG tablet Take 1 tablet (1 mg) by mouth every 6 hours for 1 day 4 tablet 0     escitalopram (LEXAPRO) 10 MG tablet Take 10 mg by mouth every morning        fluticasone (FLONASE) 50 MCG/ACT nasal spray Spray 1 spray into both nostrils nightly as needed        levETIRAcetam (KEPPRA) 1000 MG tablet Take 1 tablet (1,000 mg) by mouth 2 times daily 180 tablet 1     Psyllium 500 MG CAPS Take 4 capsules by mouth as needed        DRUG ALLERGIES   Allergies   Allergen Reactions     No Clinical Screening - See Comments Rash     Cats and dogs         ONCOLOGIC HISTORY  -PRESENTATION: New onset seizures; complex partial event with speech arrest and altered mental status lasting several  minutes. Later had secondary generalization. Started Keppra.   -MR brain imaging with a ring enhancing lesion in the right parietal lobe.    -6/27/2019 SURGERY: Right temporal craniotomy for mass resection, gross total resection by Dr. Tam Barreto.  PATHOLOGY: Glioblastoma; IDH1 R132H wildtype, MGMT promoter unmethylated  -7/3/2019 CLINICAL TRIAL: Evaluated by Dr. Wilmer Mckenna at [facility], consented for clinical trial 2016-0867 (Standard of Care plus atezolizumab).  -7/22 - 8/30/2019 CHEMORADS: 60cGy with concurrent temozolomide 75mg/m2/day (145mg) plus atezolizumab Q2 weeks on clinical trial 2016-0867.  -9/6/2019 NEURO-ONC: Evaluated at the [facility].   -9/25/2019 - 3/16/2020 CHEMO: Adjuvant chemotherapy; temozolomide + atezolizumab 840 mg IV on clinical trial 2016-0867.  -4/6/2020 NEURO-ONC: Communicated with Dr. Borrero. Plan for surgery and next steps pending pathology results.   -4/9/2020 SURGERY + RADS: Repeat craniotomy for surgical debulking plus placement of GammaTiles.  PATHOLOGY: Recurrent glioblastoma.   Next generation sequencing: Microsatellite instability: Stable. Tumor mutational burden: 4 (Low). APC, CDK4, MDM2, PTEN mutated. ARID1A, ASXL1, ATRX, DNMT3A, FANCE, KDM5C, MED12, MEF2B, NF1, RUNX1, TERT mutated. PD-L1 negative.  -4/20/2020 NEURO-ONC/ CHEMO: Starting Lomustine 3 weeks post-op. Consented for next generation sequencing and sending most recent tumor sample.    -4/30/2020 CHEMO: Lomustine, cycle 1.   -5/11/2020 NEURO-ONC: Clinically well. Referral to genetic counseling.   -6/5/2020 NEURO-ONC/ MRB/ CHEMO: Clinically well. Imaging with a positive treatment response. Lomustine, cycle 2 (start date of 6/11).    -7/20/2020 NEURO-ONC/ MRB/ CHEMO: Clinically well. Imaging without concern; aside from subdural fluid collection. Labs at goal. Lomustine, cycle 3 (start date of 7/23).    -8/31/2020 NEURO-ONC/ MRB/ CHEMO: Clinically well. Imaging without concern. Labs at goal. Lomustine, cycle 4 (start  date of 9/3).   -10/12/2020 NEURO-ONC/ MRB/ CHEMO: Clinically well. Imaging without concern for cancer growth, but the resection cavity is expanding with time. Per Dr. Chan; continue to monitor with no surgical intervention at this time. Labs at goal. Lomustine, cycle 5 (start date of 10/15).    -11/23/2020 NEURO-ONC/ MRB: Clinically well. Imaging with a new distant lesion. Stopping lomustine. Referral to Dr. Chan.    -12/10 - 12/16/2020 RADS: Gamma Knife therapy of 15 Gy/ fraction x 5 fractions.  -12/18/2020 NEURO-ONC: Discussion of treatment.  -1/13/2021 SURGERY: Right craniotomy for open biopsy of the right parietal tumor plus Ziopharm adenovirus injection.  PATHOLOGY: Recurrent glioblastoma.  -1/25/2021 NEURO-ONC: Clinically improving since surgery. Continue taking veledimex through Wednesday.   -1/27/2021 CHEMO: Starting nivolumab + bevacizumab.   -2/22/2021 NEURO-ONC/ CHEMO: Clinically improving. Continue nivolumab + bevacizumab.  -3/22/2021 NEURO-ONC/ MRB/ CHEMO: Clinically improving. Imaging with positive treatment response, but with 1 area of concern; consideration for biopsy + GÓMEZ. Continue nivolumab + bevacizumab.  -4/5/2021 NEURO-ONC/ MRB: Clinically improved. Imaging overall stable, but slightly more pronounced area of contrast enhancement (4mm in size) in the right frontal lobe. Plan is for biopsy + GÓMEZ on 4/21. Holding nivolumab + bevacizumab.  -4/21/2021 SURGERY: MRI guided biopsy and  laser ablation of the right frontal lesion and right parietal lesion.   PATHOLOGY: A. Brain, right biopsy #1, biopsy: Small fragments of cortical nieves matter.       B. Brain, right biopsy #2, biopsy: Cortex and mildly hypercellular white matter.       C. Brain, right biopsy #3, biopsy: Small pieces of recurrent, active glioblastoma with predominant necrosis.       D. Brain, right biopsy #4, biopsy: Mixture of necrosis and blood.   -4/26/2021 NEURO-ONC/ CHEMO: Clinically improving with dexamethasone use. Focal disease  "control with GÓMEZ. Continue nivolumab + kee. Planning to repeat viral injection on 5/26.     SOCIAL HISTORY   Tobacco use: Never smoker.   Alcohol use: Social, infrequent.   Drug use: Denies.  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       PHYSICAL EXAMINATION  BP (!) 141/99   Pulse 81   Temp 97.7  F (36.5  C) (Oral)   Resp 18   Wt 74.2 kg (163 lb 9.6 oz)   SpO2 99%   BMI 23.47 kg/m     Wt Readings from Last 2 Encounters:   04/26/21 74.2 kg (163 lb 9.6 oz)   04/22/21 78.4 kg (172 lb 13.5 oz)      Ht Readings from Last 2 Encounters:   04/21/21 1.778 m (5' 10\")   01/13/21 1.778 m (5' 10\")      KPS 70    -Generally well appearing. Lower energy today.   -Respiratory: No audible wheezing.   -Skin: No rashes.   -Psychiatric: Normal mood and affect. Pleasant, talkative.  -Neurologic:   MENTAL STATUS:     Alert, oriented.    Recall: Intact, slightly slower with mental processing.    Speech fluent.    Comprehension intact.     CRANIAL NERVES:     Pupils are equal, round.     Extraocular movements full, no diplopia.    Symmetric facial movements.   Hearing intact.   With normal phonation, no dysfunction of the palate or tongue.    Slightly raspy voice.   MOTOR: Antigravity in the arms. Finger spread on the left, no pronation and no drift.   SENSATION: Intact to light touch throughout.  COORDINATION: Largely equal on finger nose with eyes closed on left and right.  GAIT: Steady. Steppage gait. Slower speed.    Able to toe/ heel and tandem walk with decreased distance from foot to floor on the left.        MEDICAL RECORDS  Obtained and personally reviewed all available outside medical records in addition to reviewing any records available in our electronic system.     LABS  Personally reviewed all available lab results.     IMAGING  Personally reviewed intra-operative MR brain imaging from 4/21 and compared to prior imaging. To my eye, there is stability in the degree of contrast enhancement about the " cavity. The contrast enhancement in the frontal and parietal lobe were successfully targeted by ablation.    Imaging and case was previously reviewed with Dr. Chan. Case was also discussed at Brain Tumor Conference today.       IMPRESSION  Clinic time for this high complexity visit was spent discussing in detail the nature of his cancer in light of his recent surgery.     Clinically, Aristeo is still recovering from surgery. Left-sided symptoms, cognition, and fatigue have improved with the use of dexamethasone.     Intr-operative imaging reviewed, which showed excellent targeting of laser ablation to both the right frontal and parietal lesions. Pathology consistent with small pieces of recurrent, active glioblastoma with predominant necrosis.     In the setting of local disease control with GÓMEZ, will resume nivolumab and kee infusions today. Will repeat infusion of nivolumab alone (no bevacizumab) in 2 weeks (week of 5/10) in preparation for repeat viral injection planned for 5/26. I will then see him back 2 weeks post-operatively with labs + infusion + appt on 6/14.     PROBLEM LIST  Glioblastoma, MGMT unmethylated and IDH1 wildtype by IHC  Seizure  Chemotherapy-associated constipation  Headaches  Apraxia    PLAN  -CANCER-DIRECTED THERAPY-  -As above.      -HYPERTENSION-  -Related to kee use.   -Starting Lisinopril 5mg daily.   -Recommended monitoring blood pressure at home.   -Can increase antihypertensive as needed.    -STEROIDS-  -Continue dexamethasone 4mg daily per Dr. Chan.   -Monitor for any worsening of symptoms with taper.     -SEIZURE MANAGEMENT-  -Given history of seizures, will continue current antiepileptic regimen of Keppra for the foreseeable future.    -Quality of life/ MOOD/ FATIGUE-  -Continue Lexapro.   -Continue to monitor mood as untreated/ undertreated depression can worsen fatigue, dysorexia, and quality of life.  -Continue to monitor TSH/ T4 levels and adjust Synthroid as needed.     COVID  vaccination series complete as of 3/25/2021.    Return to clinic on 6/14.    Ericka Avila MD  Neuro-oncology       Again, thank you for allowing me to participate in the care of your patient.        Sincerely,        Ericka Avila MD

## 2021-04-26 NOTE — NURSING NOTE
"Oncology Rooming Note    April 26, 2021 12:35 PM   Erasto Maher is a 56 year old male who presents for:    Chief Complaint   Patient presents with     Blood Draw     IV placement with blood draw and vitals     Oncology Clinic Visit     Pt is here for a rtn for Glioblastoma     Initial Vitals: Blood Pressure (Abnormal) 141/99   Pulse 81   Temperature 97.7  F (36.5  C) (Oral)   Respiration 18   Weight 74.2 kg (163 lb 9.6 oz)   Oxygen Saturation 99%   Body Mass Index 23.47 kg/m   Estimated body mass index is 23.47 kg/m  as calculated from the following:    Height as of 4/21/21: 1.778 m (5' 10\").    Weight as of this encounter: 74.2 kg (163 lb 9.6 oz). Body surface area is 1.91 meters squared.  No Pain (0) Comment: Data Unavailable   No LMP for male patient.  Allergies reviewed: Yes  Medications reviewed: Yes    Medications: Medication refills not needed today.  Pharmacy name entered into [facility]:    [facility] - [place], [place] - [address] [id number] - [place], [place] - [address] [facility] [address]  [facility] - [place], [place] - [address]    Clinical concerns: none       Christine Angeles MA            "

## 2021-04-26 NOTE — PATIENT INSTRUCTIONS
Contact Numbers  [facility]: [phone] (for symptom and scheduling needs)    Please call the [facility] Triage line if you experience a temperature greater than or equal to 100.4, shaking chills, have uncontrolled nausea, vomiting and/or diarrhea, dizziness, shortness of breath, chest pain, bleeding, unexplained bruising, or if you have any other new/concerning symptoms, questions or concerns.     If you are having any concerning symptoms or wish to speak to a provider before your next infusion visit, please call your care coordinator or triage to notify them so we can adequately serve you.     If you need a refill on a narcotic prescription or other medication, please call triage before your infusion appointment.

## 2021-04-26 NOTE — NURSING NOTE
Chief Complaint   Patient presents with     Blood Draw     IV placement with blood draw and vitals     Labs drawn via IV placed by Kristin Barakat in left arm. Extra purple top drawn today -  orders in open orders

## 2021-04-26 NOTE — PROGRESS NOTES
Treatment Conditions:  Lab Results   Component Value Date     04/26/2021                   Lab Results   Component Value Date    POTASSIUM 3.5 04/26/2021           Lab Results   Component Value Date    MAG 2.4 04/22/2021            Lab Results   Component Value Date    CR 0.71 04/26/2021                   Lab Results   Component Value Date    JUDIE 9.1 04/26/2021                Lab Results   Component Value Date    BILITOTAL 0.4 04/26/2021           Lab Results   Component Value Date    ALBUMIN 3.6 04/26/2021                    Lab Results   Component Value Date    ALT 37 04/26/2021           Lab Results   Component Value Date    AST 8 04/26/2021       Results reviewed, labs MET treatment parameters, ok to proceed with treatment.

## 2021-04-26 NOTE — PROGRESS NOTES
Infusion Nursing Note:  Erasto Maher presents today for Day 1 Cycle 6 Nivolumab and Avastin.    Patient seen by provider today: Yes: Dr. Avila saw patient prior to infusion   present during visit today: Not Applicable.    Note:   TORB Dr. Avila/Milly Ordonez RN at 1416 on 4/26/21  OK to proceed with Avastin today even though patient had surgery last week.  Dr. Avila discussed with neurosurgery as well.    Intravenous Access:  Peripheral IV placed in lab.    Treatment Conditions:  Lab Results   Component Value Date     04/26/2021                   Lab Results   Component Value Date    POTASSIUM 3.5 04/26/2021           Lab Results   Component Value Date    MAG 2.4 04/22/2021            Lab Results   Component Value Date    CR 0.71 04/26/2021                   Lab Results   Component Value Date    JUDIE 9.1 04/26/2021                Lab Results   Component Value Date    BILITOTAL 0.4 04/26/2021           Lab Results   Component Value Date    ALBUMIN 3.6 04/26/2021                    Lab Results   Component Value Date    ALT 37 04/26/2021           Lab Results   Component Value Date    AST 8 04/26/2021       Results reviewed, labs MET treatment parameters, ok to proceed with treatment.  BP: 141/99 and 143/98.      Post Infusion Assessment:  Patient tolerated infusion without incident.  Blood return noted pre and post infusion.  Site patent and intact, free from redness, edema or discomfort.  No evidence of extravasations.  Access discontinued per protocol.       Discharge Plan:   Patient declined prescription refills.  Discharge instructions reviewed with: Patient.  Patient and/or family verbalized understanding of discharge instructions and all questions answered.  AVS to patient via [employer].  Patient will return 5/10/21 for next appointment.   Patient discharged in stable condition accompanied by: self.  Departure Mode: Ambulatory.    Milly Ordonez, MARCELINO

## 2021-04-27 NOTE — TELEPHONE ENCOUNTER
FUTURE VISIT INFORMATION      SURGERY INFORMATION:    Date: 21    Location: [facility] OR    Surgeon:  Marquise Chan MD    Anesthesia Type:  General    Procedure: INTRAOPERATIVE Magnetic resonance imaging/Stealth Assisted Right  CRANIOTOMY    RECORDS REQUESTED FROM:       Primary Care Provider: Ranjit Edwards MD - [facility]    Most recent EKG+ Tracin/3/20    Most recent Cardiac Stress Test: 10/31/17- Amie

## 2021-05-05 NOTE — TELEPHONE ENCOUNTER
Talked with patient wife, she has question. I stated to patient to make sure to ask those question on 05/07 appointment with Dr. Chan.    Patient wife was given times and dates and location of surgery and preop covid testing.    05/05/2021@1012am

## 2021-05-07 NOTE — LETTER
"    5/7/2021         RE: Erasto Maher  [address]  [facility] [address]        Dear Colleague,    Thank you for referring your patient, Erasto Maher, to the [facility]. Please see a copy of my visit note below.    Aristeo is a 56 year old who is being evaluated via a billable video visit.      How would you like to obtain your AVS? MyChart  If the video visit is dropped, the invitation should be resent by: Text to cell phone: [phone]  Will anyone else be joining your video visit? Yes: Wife-Judith. How would they like to receive their invitation? Text to cell phone: [phone]   Vitals - Patient Reported  Weight (Patient Reported): 72.6 kg (160 lb)  Height (Patient Reported): 177.8 cm (5' 10\")  BMI (Based on Pt Reported Ht/Wt): 22.96  Pain Score: No Pain (0)    Lily Nguyen CMA on 5/7/2021 at 2:01 PM        Video Start Time: 2:30PM    Video-Visit Details    Type of service:  Video Visit    Video End Time: 2:45PM    Originating Location (pt. Location): Home    Distant Location (provider location):  [facility]     Platform used for Video Visit: [facility]    Post-operative visit    56 M s/p laser ablation of a right frontal and parietal glioblastoma on 4/21/2021. Post-operative course unremarkable. The patient was discharged on POD1.    Since discharge, the patient has returned to his base line neurologic status. He successfully played a round of golf yesterday.    Examination non-focal. Incision well-healed.    AP: 56 glioblastoma patient who is doing well after laser thermal ablation. We are planning for a repeat Ziopharm virus injection in two weeks, pending FDA and IRB approval. I will update the patient once I hear back form the FDA.              Again, thank you for allowing me to participate in the care of your patient.        Sincerely,        Marquise Chan MD    "

## 2021-05-07 NOTE — PROGRESS NOTES
"Aristeo is a 56 year old who is being evaluated via a billable video visit.      How would you like to obtain your AVS? MyChart  If the video visit is dropped, the invitation should be resent by: Text to cell phone: [phone]  Will anyone else be joining your video visit? Yes: Wife-Judith. How would they like to receive their invitation? Text to cell phone: [phone]   Vitals - Patient Reported  Weight (Patient Reported): 72.6 kg (160 lb)  Height (Patient Reported): 177.8 cm (5' 10\")  BMI (Based on Pt Reported Ht/Wt): 22.96  Pain Score: No Pain (0)    Lily Nguyen CMA on 5/7/2021 at 2:01 PM        Video Start Time: 2:30PM    Video-Visit Details    Type of service:  Video Visit    Video End Time: 2:45PM    Originating Location (pt. Location): Home    Distant Location (provider location):  [facility]     Platform used for Video Visit: [employer]    Post-operative visit    56 M s/p laser ablation of a right frontal and parietal glioblastoma on 4/21/2021. Post-operative course unremarkable. The patient was discharged on POD1.    Since discharge, the patient has returned to his base line neurologic status. He successfully played a round of golf yesterday.    Examination non-focal. Incision well-healed.    AP: 56 glioblastoma patient who is doing well after laser thermal ablation. We are planning for a repeat Ziopharm virus injection in two weeks, pending FDA and IRB approval. I will update the patient once I hear back form the FDA.          "

## 2021-05-10 NOTE — NURSING NOTE
Chief Complaint   Patient presents with     Lab Only     venipuncture, vitals checked, PIV placed     Constance Holloway RN on 5/10/2021 at 9:41 AM

## 2021-05-10 NOTE — PROGRESS NOTES
Infusion Nursing Note:  Erasto Maher presents today for Cycle 7 Day 1 Nivolumab.    Patient seen by provider today: No   present during visit today: Not Applicable.    Note: Pt has no new concerns to report. Endorses his Avastin is on hold due to upcoming surgery.    Intravenous Access:  Peripheral IV placed.    Treatment Conditions:  Lab Results   Component Value Date     05/10/2021                   Lab Results   Component Value Date    POTASSIUM 4.4 05/10/2021           Lab Results   Component Value Date    MAG 2.4 04/22/2021            Lab Results   Component Value Date    CR 0.78 05/10/2021                   Lab Results   Component Value Date    JUDIE 9.2 05/10/2021                Lab Results   Component Value Date    BILITOTAL 0.4 05/10/2021           Lab Results   Component Value Date    ALBUMIN 3.8 05/10/2021                    Lab Results   Component Value Date    ALT 36 05/10/2021           Lab Results   Component Value Date    AST 21 05/10/2021       TSH   Date Value Ref Range Status   05/10/2021 5.22 (H) 0.40 - 4.00 mU/L Final     Dr. Avila paged and is aware of TSH result, down since last cycle.      Post Infusion Assessment:  Patient tolerated infusion without incident.  Blood return noted pre and post infusion.  Access discontinued per protocol.       Discharge Plan:   Patient declined prescription refills.  AVS to patient via [employer].  Patient will return 6/14 for next appointment.   Patient discharged in stable condition accompanied by: self.  Departure Mode: Ambulatory.  Face to Face time: 0.    Nicki Hudson RN

## 2021-05-11 NOTE — PROGRESS NOTES
"Erasto Maher is a 56 year old male who is being evaluated via a billable telephone visit. Phone call duration: 30 minutes      The patient has been notified of following:      \"This telephone visit will be conducted via a call between you and your physician/provider. We have found that certain health care needs can be provided without the need for a physical exam.  This service lets us provide the care you need with a short phone conversation.  If a prescription is necessary we can send it directly to your pharmacy.  If lab work is needed we can place an order for that and you can then stop by our lab to have the test done at a later time.     Telephone visits are billed at different rates depending on your insurance coverage. During this emergency period, for some insurers they may be billed the same as an in-person visit.  Please reach out to your insurance provider with any questions.     If during the course of the call the physician/provider feels a telephone visit is not appropriate, you will not be charged for this service.\"     Patient has given verbal consent for Telephone visit? Yes    Confidential Summary of Oncology Psychology Followup Visit    Erasto Maher is a 56 year old male who presents for Depression  The patient was seen for a 30 minute appointment on 5/11/2021.    Subjective: Doing better overall. This appointment focused on discord that is occurring in his marriage. 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     Objective: Affect was appropriate to the content of the therapeutic conversation.     Diagnosis:   Encounter Diagnosis   Name Primary?     Depressed mood Yes     Recommendations/Plan:  1. Increase the frequency and variety of gratitude  2. Return for follow-up    Thank you for this opportunity to participate in your care of this patient.    Tutu Farmer Psy.D., L.P.  Director, " [facility]

## 2021-05-24 NOTE — ANESTHESIA PREPROCEDURE EVALUATION
Anesthesia Pre-Procedure Evaluation    Patient: Erasto Maher   MRN: [id number] : 1965        Preoperative Diagnosis: Glioblastoma (H) [C71.9]   Procedure : Procedure(s):  INTRAOPERATIVE Magnetic resonance imaging/Stealth Assisted Right  CRANIOTOMY     Past Medical History:   Diagnosis Date     Allergic rhinitis      Anemia      GERD (gastroesophageal reflux disease)      Glioblastoma (H)      Insomnia      PONV (postoperative nausea and vomiting)      Seizure (H)       Past Surgical History:   Procedure Laterality Date     APPENDECTOMY  1981     COLONOSCOPY       CRANIOTOMY  2019     HERNIA REPAIR      x2     MRI CRANIOTOMY Right 2021    Procedure: Image Guided Craniotomy, autoguide, virus injection;  Surgeon: Marquise Chan MD;  Location: [facility] OR     MRI CRANIOTOMY LASER ABLATION Right 2021    Procedure: INTRAOPERATIVE MAGNETIC RESONANCE IMAGING Monteris LASER ABLATION, WITH OPTICAL TRACKING SYSTEM Biopsy;  Surgeon: Marquise Chan MD;  Location: [facility] OR     MRI CRANIOTOMY WITH OPTICAL TRACKING SYSTEM Right 2020    Procedure: intraoperative magnetic resonance imaging/stealth assisted right craniotomy, with gammatile placement;  Surgeon: Marquise Chan MD;  Location: [facility] OR      Allergies   Allergen Reactions     No Clinical Screening - See Comments Rash     Cats and dogs        Social History     Tobacco Use     Smoking status: Never Smoker     Smokeless tobacco: Never Used   Substance Use Topics     Alcohol use: Yes     Frequency: 2-4 times a month     Drinks per session: 3 or 4      Wt Readings from Last 1 Encounters:   05/10/21 74.8 kg (164 lb 14.4 oz)        Anesthesia Evaluation   Pt has had prior anesthetic. Type: General.    History of anesthetic complications  - PONV.      ROS/MED HX  ENT/Pulmonary:     (+) allergic rhinitis,  (-) tobacco use   Neurologic: Comment: glioblastoma    (+) seizures, last seizure: 2019,     Cardiovascular:     (+)  -----Previous cardiac testing   Echo: Date: Results:    Stress Test: Date: Results:    ECG Reviewed: Date: 4/2020 Results:  NSR  Cath: Date: Results:   (-) taking anticoagulants/antiplatelets and murmur   METS/Exercise Tolerance: >4 METS Comment: golfs 3-4 times weekly, rides stationary bike   Hematologic:     (+) anemia,  (-) history of blood transfusion   Musculoskeletal:  - neg musculoskeletal ROS     GI/Hepatic:     (+) GERD, Asymptomatic on medication,     Renal/Genitourinary:  - neg Renal ROS     Endo:     (+) thyroid problem, hypothyroidism,     Psychiatric/Substance Use:     (+) psychiatric history depression     Infectious Disease:  - neg infectious disease ROS     Malignancy:   (+) Malignancy, History of Neuro.Neuro CA Active status post Surgery, Chemo and Radiation.     Other:            Physical Exam    Airway        Mallampati: II   TM distance: > 3 FB   Neck ROM: full   Mouth opening: > 3 cm    Respiratory Devices and Support         Dental  no notable dental history         Cardiovascular          Rhythm and rate: regular and normal (-) no peripheral edema and no murmur    Pulmonary   pulmonary exam normal        breath sounds clear to auscultation           OUTSIDE LABS:  CBC:   Lab Results   Component Value Date    WBC 4.3 05/24/2021    WBC 9.2 04/22/2021    HGB 13.6 05/24/2021    HGB 12.6 (L) 04/22/2021    HCT 40.3 05/24/2021    HCT 36.4 (L) 04/22/2021     05/24/2021     04/22/2021     BMP:   Lab Results   Component Value Date     05/24/2021     05/10/2021    POTASSIUM 4.3 05/24/2021    POTASSIUM 4.4 05/10/2021    CHLORIDE 103 05/24/2021    CHLORIDE 104 05/10/2021    CO2 29 05/24/2021    CO2 26 05/10/2021    BUN 16 05/24/2021    BUN 14 05/10/2021    CR 0.84 05/24/2021    CR 0.78 05/10/2021     (H) 05/24/2021     (H) 05/10/2021     COAGS:   Lab Results   Component Value Date    PTT 26 05/24/2021    INR 1.03 05/24/2021     POC:   Lab Results   Component Value  Date     (H) 04/21/2021     HEPATIC:   Lab Results   Component Value Date    ALBUMIN 3.7 05/24/2021    PROTTOTAL 7.0 05/24/2021    ALT 25 05/24/2021    AST 13 05/24/2021    ALKPHOS 71 05/24/2021    BILITOTAL 0.4 05/24/2021     OTHER:   Lab Results   Component Value Date    JUDIE 8.8 05/24/2021    PHOS 3.7 04/22/2021    MAG 2.4 (H) 04/22/2021    LIPASE 321 01/19/2021    AMYLASE 84 01/19/2021    TSH 5.22 (H) 05/10/2021    T4 1.16 05/10/2021    CRP 8.9 (H) 01/19/2021    SED 25 (H) 01/19/2021       Anesthesia Plan    ASA Status:  3   NPO Status:  NPO Appropriate    Anesthesia Type: General.     - Airway: ETT   Induction: Intravenous, Propofol.   Maintenance: Balanced.   Techniques and Equipment:     - Lines/Monitors: 2nd IV, Arterial Line     Consents    Anesthesia Plan(s) and associated risks, benefits, and realistic alternatives discussed. Questions answered and patient/representative(s) expressed understanding.     - Discussed with:  Patient         Postoperative Care    Pain management: IV analgesics, Oral pain medications, Multi-modal analgesia.   PONV prophylaxis: Ondansetron (or other 5HT-3), Dexamethasone or Solumedrol, Background Propofol Infusion, Scopolamine patch     Comments:              PAC Discussion and Assessment    ASA Classification: 3  Case is suitable for: [facility]                    PAC Resident/NP Anesthesia Assessment: Aristeo is a 56 year old man who is scheduled for INTRAOPERATIVE MAGNETIC RESONANCE IMAGING Monteris LASER ABLATION, WITH OPTICAL TRACKING SYSTEM Biopsy on 4/21/21 by Dr. Chan in treatment of glioblastoma.  PAC referral for risk assessment and optimization for anesthesia with comorbid conditions of allergies, anemia, headaches, left sided weakness, history of seizures, hypothyroidism, GERD, depression, difficulty coping and insomnia:         Pre-operative considerations:          1.  Cardiac:  Functional status- METS >4.  denies cardiac symptoms. High risk surgery with 0.4%  risk of major adverse cardiac event.    ~EKG showed NSR 4/2020.     ~hypertension using lisinopril       2.  Pulm:  Airway feasible.  HILLARY risk: low.     ~ Seasonal allergies using Claritin and Flonase PRN    ~non smoker         3.  GI:  H/o PONV, anti-emetic intervention recommended.  Patient states scopolamine has worked well in the past. His wife states she was told there are certain medications that he should not use with the study drug that will be started DOS, and is not sure if scopolamine is on the list.        4. Endo:  Hypothyroidism - continue levothyroxine.            5. Neuro:  He has a history of one seizure in June 2019 prior to his glioblastoma diagnosis.  He is on keppra now and denies any seizures since.    ~ Glioblastoma s/p previous procedures, chemo and radiation. The above procedure is planned.          6. Psych: Depression, difficult coping, insomnia - followed by psychology. Continue lexapro        7. Heme: Anemia - hgb was 12.6 4/22/21  Patient has no history of blood transfusions.     8. Access; patient has a history of difficult IV access. R is better than left. Vascular access team has been needed in the past.         VTE risk: 3%       Patient is optimized and is acceptable candidate for the proposed procedure.  No further diagnostic evaluation is needed.     Please see H&P on same date for further details    KARLA Meek PA-C

## 2021-05-24 NOTE — H&P
Pre-Operative H & P     CC:  Preoperative exam to assess for increased cardiopulmonary risk while undergoing surgery and anesthesia.    Date of Encounter: 5/24/2021  Primary Care Physician:  Ranjit Edwards  Associated diagnosis: glioblastoma     HPI  Erasto Maher is a 56 year old male who presents for pre-operative H & P in preparation for INTRAOPERATIVE Magnetic resonance imaging/Stealth Assisted Right CRANIOTOMY with Dr. Chan on 5/27/21 at [facility]. Patient is being evaluated for comorbid conditions of seasonal allergies, anemia, headaches, left sided weakness, history of seizures, hypothyroidism, GERD, depression, difficulty coping and insomnia        Mr. Maher has a diagnosis of right parietal glioblastoma. He is s/p gross total resection 6/2019. He completed chemoradiation 8/2019 as part of a clinical trial. He had his last dose of immunotherapy 3/2020. Imaging later in March revealed disease progression. He then underwent repeat craniotomy 4/9/2020 with gamma tile placement. Imaging 11/2020 revealed a new distant lesion consistent with disease progression. He then underwent radiation and repeat resection 1/13/21 and immunotherapy. imaging 3/2021 showed an area of contrast enhancement.  He then underwent INTRAOPERATIVE MAGNETIC RESONANCE IMAGING Monteris LASER ABLATION 4/21/21 and is now scheduled for the above procedure.      History is obtained from the patient and chart review.      Past Medical History  Past Medical History:   Diagnosis Date     Allergic rhinitis      Anemia      GERD (gastroesophageal reflux disease)      Glioblastoma (H)      Insomnia      PONV (postoperative nausea and vomiting)      Seizure (H)        Past Surgical History  Past Surgical History:   Procedure Laterality Date     APPENDECTOMY  11/1981     COLONOSCOPY       CRANIOTOMY  06/28/2019     HERNIA REPAIR      x2     MRI CRANIOTOMY Right 1/13/2021    Procedure: Image  Guided Craniotomy, autoguide, virus injection;  Surgeon: Marquise Chan MD;  Location: [facility] OR     MRI CRANIOTOMY LASER ABLATION Right 4/21/2021    Procedure: INTRAOPERATIVE MAGNETIC RESONANCE IMAGING Monteris LASER ABLATION, WITH OPTICAL TRACKING SYSTEM Biopsy;  Surgeon: Marquise Chan MD;  Location: [facility] OR     MRI CRANIOTOMY WITH OPTICAL TRACKING SYSTEM Right 4/9/2020    Procedure: intraoperative magnetic resonance imaging/stealth assisted right craniotomy, with gammatile placement;  Surgeon: Marquise Chan MD;  Location: [facility] OR       Hx of Blood transfusions/reactions: denies     Hx of abnormal bleeding or anti-platelet use: denies    Menstrual history: No LMP for male patient.    Steroid use in the last year: decadron     Personal or FH with difficulty with Anesthesia:  Patient has a history of PONV, but has no family history of anesthesia complications.        Prior to Admission Medications  Current Outpatient Medications   Medication Sig Dispense Refill     acetaminophen (TYLENOL) 325 MG tablet Take 325-650 mg by mouth every 6 hours as needed for mild pain       escitalopram (LEXAPRO) 10 MG tablet Take 10 mg by mouth every morning        fluticasone (FLONASE) 50 MCG/ACT nasal spray Spray 1 spray into both nostrils At Bedtime        levETIRAcetam (KEPPRA) 1000 MG tablet Take 1 tablet (1,000 mg) by mouth 2 times daily 180 tablet 1     levothyroxine (SYNTHROID/LEVOTHROID) 125 MCG tablet Take 125 mcg by mouth every morning        lisinopril (ZESTRIL) 5 MG tablet Take 1 tablet (5 mg) by mouth daily (Patient taking differently: Take 5 mg by mouth every morning ) 30 tablet 3     loratadine (CLARITIN) 10 MG tablet Take 10 mg by mouth At Bedtime        melatonin 5 MG tablet Take 5 mg by mouth nightly as needed        olopatadine (PATANOL) 0.1 % ophthalmic solution Place 1-2 drops into both eyes daily as needed        omeprazole (PRILOSEC) 20 MG DR capsule Take 20 mg by mouth At Bedtime         Psyllium 500 MG CAPS Take 4 capsules by mouth as needed        [START ON 5/26/2021] study - sarah, IDS #[id number], capsule Take 1 capsule (20 mg) by mouth daily 1 capsule 0     dexamethasone (DECADRON) 1 MG tablet Take 1 tablet (1 mg) by mouth every 6 hours for 1 day (Patient taking differently: Take 1 mg by mouth every 6 hours ) 4 tablet 0       Allergies  Allergies   Allergen Reactions     No Clinical Screening - See Comments Rash     Cats and dogs         Social History  Social History     Socioeconomic History     Marital status:      Spouse name: Not on file     Number of children: 2     Years of education: Not on file     Highest education level: Not on file   Occupational History     Occupation: self-employed   Social Needs     Financial resource strain: Not on file     Food insecurity     Worry: Not on file     Inability: Not on file     Transportation needs     Medical: Not on file     Non-medical: Not on file   Tobacco Use     Smoking status: Never Smoker     Smokeless tobacco: Never Used   Substance and Sexual Activity     Alcohol use: Yes     Frequency: 2-4 times a month     Drinks per session: 3 or 4     Drug use: Not Currently     Sexual activity: Not Currently     Partners: Female   Lifestyle     Physical activity     Days per week: Not on file     Minutes per session: Not on file     Stress: Not on file   Relationships     Social connections     Talks on phone: Not on file     Gets together: Not on file     Attends Worship service: Not on file     Active member of club or organization: Not on file     Attends meetings of clubs or organizations: Not on file     Relationship status: Not on file     Intimate partner violence     Fear of current or ex partner: Not on file     Emotionally abused: Not on file     Physically abused: Not on file     Forced sexual activity: Not on file   Other Topics Concern     Not on file   Social History Narrative     Not on file       Family History  Family  "History   Problem Relation Age of Onset     Hypotension Mother      Myocardial Infarction Father      Pacemaker Father      Endocrine Disease Father         prediabetes     No Known Problems Brother      Anesthesia Reaction No family hx of      Deep Vein Thrombosis (DVT) No family hx of      ROS/MED HX  ENT/Pulmonary:     (+) allergic rhinitis,  (-) tobacco use   Neurologic: Comment: glioblastoma    (+) seizures, last seizure: 6/22/2019,     Cardiovascular:     (+) -----Previous cardiac testing   Echo: Date: Results:    Stress Test: Date: Results:    ECG Reviewed: Date: 4/2020 Results:  NSR  Cath: Date: Results:   (-) taking anticoagulants/antiplatelets   METS/Exercise Tolerance: >4 METS    Hematologic:     (+) anemia,  (-) history of blood transfusion   Musculoskeletal:  - neg musculoskeletal ROS     GI/Hepatic:     (+) GERD, Asymptomatic on medication,     Renal/Genitourinary:  - neg Renal ROS     Endo:     (+) thyroid problem, hypothyroidism,     Psychiatric/Substance Use:     (+) psychiatric history depression     Infectious Disease:  - neg infectious disease ROS     Malignancy:   (+) Malignancy, History of Neuro.Neuro CA Active status post Surgery, Chemo and Radiation.     Other:            The complete review of systems is negative other than noted in the HPI or here.   Temp: 97.4  F (36.3  C) Temp src: Oral BP: 127/88 Pulse: 76   Resp: 16 SpO2: 99 %         162 lbs 4.8 oz  5' 10\"[PT REPORTED[   Body mass index is 23.29 kg/m .       Physical Exam  Constitutional: Awake, alert, cooperative, no apparent distress, and appears stated age.  Eyes: Pupils equal, round and reactive to light, extra ocular muscles intact, sclera clear, conjunctiva normal.  HENT: Normocephalic, oral pharynx with moist mucus membranes, good dentition.   Respiratory: Clear to auscultation bilaterally, no crackles or wheezing.  Cardiovascular: Regular rate and rhythm, normal S1 and S2, and no murmur noted.  Carotids no bruits. No edema. " Palpable pulses to radial arteries.   GI: Normal bowel sounds, soft, non-distended, non-tender  Genitourinary:  deferred  Skin: Warm and dry.   Musculoskeletal: Full ROM of neck. There is no redness, warmth, or swelling of the exposed joints. Gross motor strength is normal.    Neurologic: Awake, alert, oriented to name, place and time. Cranial nerves II-XII are grossly intact. Gait is normal.   Neuropsychiatric: Calm, cooperative. Normal affect.     Labs: (personally reviewed)  Component      Latest Ref Rng & Units 5/24/2021   WBC      4.0 - 11.0 10e9/L 4.3   RBC Count      4.4 - 5.9 10e12/L 4.34 (L)   Hemoglobin      13.3 - 17.7 g/dL 13.6   Hematocrit      40.0 - 53.0 % 40.3   MCV      78 - 100 fl 93   MCH      26.5 - 33.0 pg 31.3   MCHC      31.5 - 36.5 g/dL 33.7   RDW      10.0 - 15.0 % 12.3   Platelet Count      150 - 450 10e9/L 185   Diff Method       Automated Method   % Neutrophils      % 65.8   % Lymphocytes      % 16.3   % Monocytes      % 11.9   % Eosinophils      % 5.1   % Basophils      % 0.7   % Immature Granulocytes      % 0.2   Nucleated RBCs      0 /100 0   Absolute Neutrophil      1.6 - 8.3 10e9/L 2.8   Absolute Lymphocytes      0.8 - 5.3 10e9/L 0.7 (L)   Absolute Monocytes      0.0 - 1.3 10e9/L 0.5   Absolute Eosinophils      0.0 - 0.7 10e9/L 0.2   Absolute Basophils      0.0 - 0.2 10e9/L 0.0   Abs Immature Granulocytes      0 - 0.4 10e9/L 0.0   Absolute Nucleated RBC       0.0   Sodium      133 - 144 mmol/L 136   Potassium      3.4 - 5.3 mmol/L 4.3   Chloride      94 - 109 mmol/L 103   Carbon Dioxide      20 - 32 mmol/L 29   Anion Gap      3 - 14 mmol/L 5   Glucose      70 - 99 mg/dL 117 (H)   Urea Nitrogen      7 - 30 mg/dL 16   Creatinine      0.66 - 1.25 mg/dL 0.84   GFR Estimate      >60 mL/min/1.73:m2 >90   GFR Estimate If Black      >60 mL/min/1.73:m2 >90   Calcium      8.5 - 10.1 mg/dL 8.8   Bilirubin Total      0.2 - 1.3 mg/dL 0.4   Albumin      3.4 - 5.0 g/dL 3.7   Protein Total      6.8  - 8.8 g/dL 7.0   Alkaline Phosphatase      40 - 150 U/L 71   ALT      0 - 70 U/L 25   AST      0 - 45 U/L 13   PTT      22 - 37 sec 26   INR      0.86 - 1.14 1.03     EKG 4/3/20   NSR       ASSESSMENT and PLAN  Aristeo is a 56 year old man who is scheduled for INTRAOPERATIVE MAGNETIC RESONANCE IMAGING Monteris LASER ABLATION, WITH OPTICAL TRACKING SYSTEM Biopsy on 4/21/21 by Dr. Chan in treatment of glioblastoma.  PAC referral for risk assessment and optimization for anesthesia with comorbid conditions of allergies, anemia, headaches, left sided weakness, history of seizures, hypothyroidism, GERD, depression, difficulty coping and insomnia:         Pre-operative considerations:          1.  Cardiac:  Functional status- METS >4.  denies cardiac symptoms. High risk surgery with 0.4% risk of major adverse cardiac event.    ~EKG showed NSR 4/2020.     ~hypertension using lisinopril       2.  Pulm:  Airway feasible.  HILLARY risk: low.     ~ Seasonal allergies using Claritin and Flonase PRN    ~non smoker         3.  GI:  H/o PONV, anti-emetic intervention recommended.  Patient states scopolamine has worked well in the past. His wife states she was told there are certain medications that he should not use with the study drug that will be started DOS, and is not sure if scopolamine is on the list.        4. Endo:  Hypothyroidism - continue levothyroxine.            5. Neuro:  He has a history of one seizure in June 2019 prior to his glioblastoma diagnosis.  He is on keppra now and denies any seizures since.    ~ Glioblastoma s/p previous procedures, chemo and radiation. The above procedure is planned.          6. Psych: Depression, difficult coping, insomnia - followed by psychology. Continue lexapro        7. Heme: Anemia - hgb was 12.6 4/22/21  Patient has no history of blood transfusions.     8. Access; patient has a history of difficult IV access. R is better than left. Vascular access team has been needed in the past.          VTE risk: 3%       Patient is optimized and is acceptable candidate for the proposed procedure.  No further diagnostic evaluation is needed.       35 minutes were spent completing chart review, seeing the patient, reviewing labs and test results and completing documentation      Dahiana Vigil PA-C  Preoperative Assessment Center  [facility]  Phone: [phone]  Fax: [phone]

## 2021-05-24 NOTE — PATIENT INSTRUCTIONS
Preparing for Your Surgery      Name:  Erasto Maher   MRN:  [id number]   :  1965   Today's Date:  2021       Arriving for surgery:  Surgery date:  21  Arrival time:  5:30AM    Restrictions due to COVID 19:  One consistent visitor per patient is allowed.  The visitor will be allowed in the pre-op area.  Visitors are asked to leave the building during the surgery.  No ill visitors.  All visitors must wear face mask.    [employer] parking is available for anyone with mobility limitations or disabilities.  ([place]  24 hours/ 7 days a week; [place] [employer]  7 am- 3:30 pm, Mon- Fri)    Please come to:     [facility] [address]  [address] [place] [address]    When entering the hospital you will be asked COVID screening questions, you will then be directed to Security and registration.  Registration will direct you to the correct lobby to wait until escorted to the preop area. Preop number- [phone]     What can I eat or drink?  -  You may eat and drink normally for up to 8 hours before your surgery. (Until 21, 11:30PM)  -  You may have clear liquids until 2 hours before surgery. (Until 21, 5:30AM)    Examples of clear liquids:  Water  Clear broth  Juices (apple, white grape, white cranberry  and cider) without pulp  Noncarbonated, powder based beverages  (lemonade and Abdulaziz-Aid)  Sodas (Sprite, 7-Up, ginger ale and seltzer)  Coffee or tea (without milk or cream)  Gatorade    -  No Alcohol for at least 24 hours before surgery     Which medicines can I take?    Hold Aspirin for 7 days before surgery.   Hold Multivitamins for 7 days before surgery.  Hold Supplements for 7 days before surgery.  Hold Ibuprofen (Advil, Motrin) for 1 day before surgery--unless otherwise directed by surgeon.  Hold Naproxen (Aleve) for 4 days before surgery.    -  DO NOT take these medications the day of surgery:    Lisinopril   Psyllium    -   PLEASE TAKE these medications the day of surgery:    Acetaminophen(Tylenol) as needed    Dexamethasone(Decadron)   Escitalopram(Lexapro)    Levetiracetam(Keppra)   Levothyroxine    Patanol eye drops as needed       Study Med to be given to the patient by the Neurosurgery Resident the morning of surgery    How do I prepare myself?  - Please take 2 showers before surgery using Scrubcare or Hibiclens soap.    Use this soap only from the neck to your toes.     Leave the soap on your skin for one minute--then rinse thoroughly.      You may use your own shampoo and conditioner; no other hair products.   - Please remove all jewelry and body piercings.  - No lotions, deodorants or fragrance.  - Bring your ID and insurance card.    - All patients are required to have a Covid-19 test within 4 days of surgery/procedure.      -Patients will be contacted by the [facility] scheduling team within 1 week of surgery to make an appointment.      - Patients may call the Scheduling team at [phone] if they have not been scheduled within 4 days of  surgery.        Questions or Concerns:    - For any questions regarding the day of surgery or your hospital stay, please contact the Pre Admission Nursing Office at [phone].       - If you have health changes between today and your surgery please call your surgeon.       For questions after surgery please call your surgeons office.

## 2021-05-24 NOTE — H&P (VIEW-ONLY)
Pre-Operative H & P     CC:  Preoperative exam to assess for increased cardiopulmonary risk while undergoing surgery and anesthesia.    Date of Encounter: 5/24/2021  Primary Care Physician:  Ranjit Edwards  Associated diagnosis: glioblastoma     HPI  Erasto Maher is a 56 year old male who presents for pre-operative H & P in preparation for INTRAOPERATIVE Magnetic resonance imaging/Stealth Assisted Right CRANIOTOMY with Dr. Chan on 5/27/21 at [facility]. Patient is being evaluated for comorbid conditions of seasonal allergies, anemia, headaches, left sided weakness, history of seizures, hypothyroidism, GERD, depression, difficulty coping and insomnia        Mr. Maher has a diagnosis of right parietal glioblastoma. He is s/p gross total resection 6/2019. He completed chemoradiation 8/2019 as part of a clinical trial. He had his last dose of immunotherapy 3/2020. Imaging later in March revealed disease progression. He then underwent repeat craniotomy 4/9/2020 with gamma tile placement. Imaging 11/2020 revealed a new distant lesion consistent with disease progression. He then underwent radiation and repeat resection 1/13/21 and immunotherapy. imaging 3/2021 showed an area of contrast enhancement.  He then underwent INTRAOPERATIVE MAGNETIC RESONANCE IMAGING Monteris LASER ABLATION 4/21/21 and is now scheduled for the above procedure.      History is obtained from the patient and chart review.      Past Medical History  Past Medical History:   Diagnosis Date     Allergic rhinitis      Anemia      GERD (gastroesophageal reflux disease)      Glioblastoma (H)      Insomnia      PONV (postoperative nausea and vomiting)      Seizure (H)        Past Surgical History  Past Surgical History:   Procedure Laterality Date     APPENDECTOMY  11/1981     COLONOSCOPY       CRANIOTOMY  06/28/2019     HERNIA REPAIR      x2     MRI CRANIOTOMY Right 1/13/2021    Procedure: Image  Guided Craniotomy, autoguide, virus injection;  Surgeon: Marquise Chan MD;  Location: [facility] OR     MRI CRANIOTOMY LASER ABLATION Right 4/21/2021    Procedure: INTRAOPERATIVE MAGNETIC RESONANCE IMAGING Monteris LASER ABLATION, WITH OPTICAL TRACKING SYSTEM Biopsy;  Surgeon: Marquise Chan MD;  Location: [facility] OR     MRI CRANIOTOMY WITH OPTICAL TRACKING SYSTEM Right 4/9/2020    Procedure: intraoperative magnetic resonance imaging/stealth assisted right craniotomy, with gammatile placement;  Surgeon: Marquise Chan MD;  Location: [facility] OR       Hx of Blood transfusions/reactions: denies     Hx of abnormal bleeding or anti-platelet use: denies    Menstrual history: No LMP for male patient.    Steroid use in the last year: decadron     Personal or FH with difficulty with Anesthesia:  Patient has a history of PONV, but has no family history of anesthesia complications.        Prior to Admission Medications  Current Outpatient Medications   Medication Sig Dispense Refill     acetaminophen (TYLENOL) 325 MG tablet Take 325-650 mg by mouth every 6 hours as needed for mild pain       escitalopram (LEXAPRO) 10 MG tablet Take 10 mg by mouth every morning        fluticasone (FLONASE) 50 MCG/ACT nasal spray Spray 1 spray into both nostrils At Bedtime        levETIRAcetam (KEPPRA) 1000 MG tablet Take 1 tablet (1,000 mg) by mouth 2 times daily 180 tablet 1     levothyroxine (SYNTHROID/LEVOTHROID) 125 MCG tablet Take 125 mcg by mouth every morning        lisinopril (ZESTRIL) 5 MG tablet Take 1 tablet (5 mg) by mouth daily (Patient taking differently: Take 5 mg by mouth every morning ) 30 tablet 3     loratadine (CLARITIN) 10 MG tablet Take 10 mg by mouth At Bedtime        melatonin 5 MG tablet Take 5 mg by mouth nightly as needed        olopatadine (PATANOL) 0.1 % ophthalmic solution Place 1-2 drops into both eyes daily as needed        omeprazole (PRILOSEC) 20 MG DR capsule Take 20 mg by mouth At Bedtime         Psyllium 500 MG CAPS Take 4 capsules by mouth as needed        [START ON 5/26/2021] study - sarah, IDS #[id number], capsule Take 1 capsule (20 mg) by mouth daily 1 capsule 0     dexamethasone (DECADRON) 1 MG tablet Take 1 tablet (1 mg) by mouth every 6 hours for 1 day (Patient taking differently: Take 1 mg by mouth every 6 hours ) 4 tablet 0       Allergies  Allergies   Allergen Reactions     No Clinical Screening - See Comments Rash     Cats and dogs         Social History  Social History     Socioeconomic History     Marital status:      Spouse name: Not on file     Number of children: 2     Years of education: Not on file     Highest education level: Not on file   Occupational History     Occupation: self-employed   Social Needs     Financial resource strain: Not on file     Food insecurity     Worry: Not on file     Inability: Not on file     Transportation needs     Medical: Not on file     Non-medical: Not on file   Tobacco Use     Smoking status: Never Smoker     Smokeless tobacco: Never Used   Substance and Sexual Activity     Alcohol use: Yes     Frequency: 2-4 times a month     Drinks per session: 3 or 4     Drug use: Not Currently     Sexual activity: Not Currently     Partners: Female   Lifestyle     Physical activity     Days per week: Not on file     Minutes per session: Not on file     Stress: Not on file   Relationships     Social connections     Talks on phone: Not on file     Gets together: Not on file     Attends Worship service: Not on file     Active member of club or organization: Not on file     Attends meetings of clubs or organizations: Not on file     Relationship status: Not on file     Intimate partner violence     Fear of current or ex partner: Not on file     Emotionally abused: Not on file     Physically abused: Not on file     Forced sexual activity: Not on file   Other Topics Concern     Not on file   Social History Narrative     Not on file       Family History  Family  "History   Problem Relation Age of Onset     Hypotension Mother      Myocardial Infarction Father      Pacemaker Father      Endocrine Disease Father         prediabetes     No Known Problems Brother      Anesthesia Reaction No family hx of      Deep Vein Thrombosis (DVT) No family hx of      ROS/MED HX  ENT/Pulmonary:     (+) allergic rhinitis,  (-) tobacco use   Neurologic: Comment: glioblastoma    (+) seizures, last seizure: 6/22/2019,     Cardiovascular:     (+) -----Previous cardiac testing   Echo: Date: Results:    Stress Test: Date: Results:    ECG Reviewed: Date: 4/2020 Results:  NSR  Cath: Date: Results:   (-) taking anticoagulants/antiplatelets   METS/Exercise Tolerance: >4 METS    Hematologic:     (+) anemia,  (-) history of blood transfusion   Musculoskeletal:  - neg musculoskeletal ROS     GI/Hepatic:     (+) GERD, Asymptomatic on medication,     Renal/Genitourinary:  - neg Renal ROS     Endo:     (+) thyroid problem, hypothyroidism,     Psychiatric/Substance Use:     (+) psychiatric history depression     Infectious Disease:  - neg infectious disease ROS     Malignancy:   (+) Malignancy, History of Neuro.Neuro CA Active status post Surgery, Chemo and Radiation.     Other:            The complete review of systems is negative other than noted in the HPI or here.   Temp: 97.4  F (36.3  C) Temp src: Oral BP: 127/88 Pulse: 76   Resp: 16 SpO2: 99 %         162 lbs 4.8 oz  5' 10\"[PT REPORTED[   Body mass index is 23.29 kg/m .       Physical Exam  Constitutional: Awake, alert, cooperative, no apparent distress, and appears stated age.  Eyes: Pupils equal, round and reactive to light, extra ocular muscles intact, sclera clear, conjunctiva normal.  HENT: Normocephalic, oral pharynx with moist mucus membranes, good dentition.   Respiratory: Clear to auscultation bilaterally, no crackles or wheezing.  Cardiovascular: Regular rate and rhythm, normal S1 and S2, and no murmur noted.  Carotids no bruits. No edema. " Palpable pulses to radial arteries.   GI: Normal bowel sounds, soft, non-distended, non-tender  Genitourinary:  deferred  Skin: Warm and dry.   Musculoskeletal: Full ROM of neck. There is no redness, warmth, or swelling of the exposed joints. Gross motor strength is normal.    Neurologic: Awake, alert, oriented to name, place and time. Cranial nerves II-XII are grossly intact. Gait is normal.   Neuropsychiatric: Calm, cooperative. Normal affect.     Labs: (personally reviewed)  Component      Latest Ref Rng & Units 5/24/2021   WBC      4.0 - 11.0 10e9/L 4.3   RBC Count      4.4 - 5.9 10e12/L 4.34 (L)   Hemoglobin      13.3 - 17.7 g/dL 13.6   Hematocrit      40.0 - 53.0 % 40.3   MCV      78 - 100 fl 93   MCH      26.5 - 33.0 pg 31.3   MCHC      31.5 - 36.5 g/dL 33.7   RDW      10.0 - 15.0 % 12.3   Platelet Count      150 - 450 10e9/L 185   Diff Method       Automated Method   % Neutrophils      % 65.8   % Lymphocytes      % 16.3   % Monocytes      % 11.9   % Eosinophils      % 5.1   % Basophils      % 0.7   % Immature Granulocytes      % 0.2   Nucleated RBCs      0 /100 0   Absolute Neutrophil      1.6 - 8.3 10e9/L 2.8   Absolute Lymphocytes      0.8 - 5.3 10e9/L 0.7 (L)   Absolute Monocytes      0.0 - 1.3 10e9/L 0.5   Absolute Eosinophils      0.0 - 0.7 10e9/L 0.2   Absolute Basophils      0.0 - 0.2 10e9/L 0.0   Abs Immature Granulocytes      0 - 0.4 10e9/L 0.0   Absolute Nucleated RBC       0.0   Sodium      133 - 144 mmol/L 136   Potassium      3.4 - 5.3 mmol/L 4.3   Chloride      94 - 109 mmol/L 103   Carbon Dioxide      20 - 32 mmol/L 29   Anion Gap      3 - 14 mmol/L 5   Glucose      70 - 99 mg/dL 117 (H)   Urea Nitrogen      7 - 30 mg/dL 16   Creatinine      0.66 - 1.25 mg/dL 0.84   GFR Estimate      >60 mL/min/1.73:m2 >90   GFR Estimate If Black      >60 mL/min/1.73:m2 >90   Calcium      8.5 - 10.1 mg/dL 8.8   Bilirubin Total      0.2 - 1.3 mg/dL 0.4   Albumin      3.4 - 5.0 g/dL 3.7   Protein Total      6.8  - 8.8 g/dL 7.0   Alkaline Phosphatase      40 - 150 U/L 71   ALT      0 - 70 U/L 25   AST      0 - 45 U/L 13   PTT      22 - 37 sec 26   INR      0.86 - 1.14 1.03     EKG 4/3/20   NSR       ASSESSMENT and PLAN  Aristeo is a 56 year old man who is scheduled for INTRAOPERATIVE MAGNETIC RESONANCE IMAGING Monteris LASER ABLATION, WITH OPTICAL TRACKING SYSTEM Biopsy on 4/21/21 by Dr. Chan in treatment of glioblastoma.  PAC referral for risk assessment and optimization for anesthesia with comorbid conditions of allergies, anemia, headaches, left sided weakness, history of seizures, hypothyroidism, GERD, depression, difficulty coping and insomnia:         Pre-operative considerations:          1.  Cardiac:  Functional status- METS >4.  denies cardiac symptoms. High risk surgery with 0.4% risk of major adverse cardiac event.    ~EKG showed NSR 4/2020.     ~hypertension using lisinopril       2.  Pulm:  Airway feasible.  HILLARY risk: low.     ~ Seasonal allergies using Claritin and Flonase PRN    ~non smoker         3.  GI:  H/o PONV, anti-emetic intervention recommended.  Patient states scopolamine has worked well in the past. His wife states she was told there are certain medications that he should not use with the study drug that will be started DOS, and is not sure if scopolamine is on the list.        4. Endo:  Hypothyroidism - continue levothyroxine.            5. Neuro:  He has a history of one seizure in June 2019 prior to his glioblastoma diagnosis.  He is on keppra now and denies any seizures since.    ~ Glioblastoma s/p previous procedures, chemo and radiation. The above procedure is planned.          6. Psych: Depression, difficult coping, insomnia - followed by psychology. Continue lexapro        7. Heme: Anemia - hgb was 12.6 4/22/21  Patient has no history of blood transfusions.     8. Access; patient has a history of difficult IV access. R is better than left. Vascular access team has been needed in the past.          VTE risk: 3%       Patient is optimized and is acceptable candidate for the proposed procedure.  No further diagnostic evaluation is needed.       35 minutes were spent completing chart review, seeing the patient, reviewing labs and test results and completing documentation      Dahiana Vigil PA-C  Preoperative Assessment Center  [facility]  Phone: [phone]  Fax: [phone]

## 2021-05-24 NOTE — PROGRESS NOTES
2020 : Informed Consent Note     The consent form, including purpose, risks and benefits, was reviewed with Erastoyovanny Maher, and all questions were answered before he signed the consent form. The patient understands that the study involves an active treatment phase as well as a post-treatment follow up phase.     Present during the discussion were Aristeo and his spouse Judith. A copy of the signed form was provided to the patient. No procedures specific to this study were performed prior to the patient signing the consent form.    Consent Version Date: 4/14/21  Consent obtained by: Lily Yadav RN    Date: 5/24/21  HIPAA authorization signed?: yes  HIPAA authorization version date: 12/11/2020  Per Dr. Chan, the screening provider visit can occur on Day 0 before surgery. The patient and spouse were informed that the resident will be in contact with them and will arrange to meet them before the surgical procedure to give the Veledimex Day 0 dose to the patient prior to surgery (3h +/- 2h).   Lily Yadav, Clinical Research Coordinator, RN-ZULAY.     Form 503.03.01 (Version 2)     Effective date: 01AUG2018     Next Review Date: 01AUG2020

## 2021-05-27 PROBLEM — Z00.6 EXAMINATION OF PARTICIPANT OR CONTROL IN CLINICAL RESEARCH: Status: ACTIVE | Noted: 2020-01-01

## 2021-05-27 NOTE — ANESTHESIA POSTPROCEDURE EVALUATION
"Patient: Erasto Maher    Procedure(s):  INTRAOPERATIVE Magnetic resonance imaging/Stealth Assisted Right  CRANIOTOMY    Diagnosis:Glioblastoma (H) [C71.9]  Diagnosis Additional Information: No value filed.    Anesthesia Type:  General    Note:  Disposition: ICU            ICU Sign Out: Anesthesiologist/ICU physician sign out WAS performed   Postop Pain Control: Uneventful            Sign Out: Well controlled pain   PONV: No   Neuro/Psych:             Sign Out: Other neuro status (lethargic. answers \"yes/no\" with head movement. Neurosurg will reassess.)   Airway/Respiratory: Uneventful            Sign Out: Acceptable/Baseline resp. status   CV/Hemodynamics:             Sign Out: Detailed CV status               Blood Pressure: HypERtension               Rate/Rhythm: Normal HR (SBP ~ 160. Getting labetalol and hydral to achieve goal <140.)   Other NRE: NONE   DID A NON-ROUTINE EVENT OCCUR?     Event details/Postop Comments:  Emerging from anesthesia as expected. Neurosurge paged to eval baseline vs. Postop neuro status but currently responding to voice, answering \"yes/no\" questions with head movement but not opening eyes at this point. SBP ~16 and getting labetalol and hydralazine to achieve goal <140. Once this goal is met, okay for discharge from PACU. Dr. Kline is aware of the patient if additional issues should present.            Last vitals:  Vitals:    05/27/21 0900 05/27/21 1100   BP: 135/84    Pulse: 92    Resp: 16    Temp: 36.4  C (97.6  F)    SpO2: 99% 99%       Last vitals prior to Anesthesia Care Transfer:  CRNA VITALS  5/27/2021 1521 - 5/27/2021 1607      5/27/2021             ART BP:  172/77    ART Mean:  123    Temp:  (!) 20.7  C (69.3  F)    SpO2:  97 %          Electronically Signed By: Tutu Siddiqi MD  May 27, 2021  4:07 PM  "

## 2021-05-27 NOTE — PROGRESS NOTES
[id number]: Study Visit Note   Subject name: Erasto Maher     Visit: Day 0 (5/27/21)    Did the study visit occur within the appropriate window allowed by the protocol? yes    Since the last study visit, He has been doing well.  The patient had the screening provider assessment today (okay to occur before surgery per Dr. Chan). Day 0 labs completed. Patient took Veledimex dose at 1130. Research RN coordinated drug delivery for the patient.     I have personally interviewed Erasto Maher and reviewed his medical record for adverse events and concomitant medications and these have been recorded on the corresponding logs in Erasto Maher's research file.     Erasto Maher was given the opportunity to ask any trial related questions.    Please see provider progress note for physical exam and other clinical information. Labs were reviewed - any significant lab values were addressed and reviewed.    Lily Yadav, Clinical Research Coordinator, RN-ZULAY.       [id number]: Medication Count/IDS Note      Erasto Maher is enrolled on the trial number listed above. The patient presented for his Day 0 day visit.   IDS number: [id number]    Drug name: Veledimex  Number of bottles dispensed (on Day 0): 1  Lot number(s): [id number]  Number of pills dispensed: 1    Number of bottles returned (on Day 0): 1    Lot number(s): [id number]    Number of pills returned: 0    The study drug [id number] was delivered to the OR RN by the research RN.      Veledimex (14 capsules) were dispensed and delivered to the patient's inpatient nurse/unit, including the patient's drug diary. Rx# [id number].     Provided education to the nurses about documentation on the drug diary, drug administration instructions, as well as prohibited medications. Nurses were also educated that both the drug diary and the remaining Veledimex pills should be sent home with the patient.     Drug diary returned? No- will be  returned following day 14.     Are there any discrepancies between the amount of medication the patient was instructed to take and the amount recorded as taken in the patient s drug diary? No    Are there any discrepancies between the amount of medication the patient has recorded as taken in the drug diary and the amount that would be expected to be returned based on the amount recorded as taken? No    Lily Yadav, Clinical Research Coordinator, RN-ZULAY.

## 2021-05-27 NOTE — PROGRESS NOTES
"Neurosurgery pre op assessment note     S: NPO since last night     O:  Exam:  Physical exam:   Blood pressure 135/84, pulse 92, temperature 97.6  F (36.4  C), temperature source Oral, resp. rate 16, height 1.778 m (5' 10\"), weight 73.5 kg (162 lb 0.6 oz), SpO2 99 %.  NEUROLOGIC:  -- Awake; Alert; oriented x 3  -- Follows commands briskly  -- +repetition, calculation, and naming  -- Speech fluent, spontaneous. No aphasia or dysarthria.  -- no gaze preference. No apparent hemineglect.  Cranial Nerves:  -- visual fields full to confrontation, PERRL 3-2mm bilat and brisk, extraocular movements intact  -- face symmetrical, tongue midline  -- sensory V1-V3 intact bilaterally  -- palate elevates symmetrically, uvula midline  -- hearing grossly intact bilat  -- Cerebellar: Finger nose finger without dysmetria, intact rapid alternating motions bilaterally    Motor:  Normal bulk / tone; no tremor, rigidity, or bradykinesia.    No Pronator Drift      Delt Bi Tri Hand Flexion/  Extension Iliopsoas Quadriceps Tibialis Anterior EHL Gastroc     C5 C6 C7 C8/T1 L2 L3 L4 L5 S1   R 5 5 5 5 5 5 5 5 5   L 4 4 4 4 4 4 4 4 4      Pronator Drift: Absent  Sensory: Intact to Light Touch  Reflexes: No Hyperreflexia, Torres s or Clonus Present; Toes Down-Going Bilaterally  INCISION: c/d/i    KPS: 90     Assessment:   Erasto Maher is a 55 year old male with history of recurrent multifocal glioblastoma s/p multiple resections, Gamma Knife radiation, and chemotherapies who underwent craniotomy for biopsy and Ziopharm virus injection by Dr. Chan on 1/13/21 and left frontal laser ablation and biopsy on 4/21. Present on 5/27 for repeat ziopharm virus injection.      Stat CBC, CMP, PTT INR preop  Administer [id number] in preop     Debora Crawford MD  Neurosurgery     Please contact neurosurgery resident on call with questions.    Dial * * [phone], enter [phone] when prompted    "

## 2021-05-27 NOTE — OR NURSING
Neuro resident at bedside to assess patient. Plan to get a CT scan before patient goes to ICU. Left arm weakness but improving with time. Will continue to monitor.

## 2021-05-27 NOTE — ANESTHESIA PROCEDURE NOTES
Arterial Line Procedure Note  Pre-Procedure   Staff -        Anesthesiologist:  Lan Meredith MD       CRNA: Selam Cho RN       Performed By: CRNA       Location: OR       Pre-Anesthestic Checklist: patient identified, IV checked, risks and benefits discussed, informed consent, monitors and equipment checked, pre-op evaluation and at physician/surgeon's request  Timeout:       Correct Patient: Yes        Correct Procedure: Yes        Correct Site: Yes        Correct Position: Yes   Procedure   Procedure: arterial line       Laterality: left       Insertion Site: radial.  Sterile Prep        Standard elements of sterile barrier followed       Skin prep: Chloraprep  Insertion/Injection        Technique: Seldinger Technique        Catheter size: 20ga short catheter.  Narrative         Secured by: suture       Tegaderm dressing used.       Complications: None apparent,        Arterial waveform: Yes        IBP within 10% of NIBP: Yes

## 2021-05-27 NOTE — ANESTHESIA PROCEDURE NOTES
Airway       Patient location during procedure: OR  Staff -        Other Anesthesia Staff: Selam Cho RN       Performed By: SRNA  Consent for Airway        Urgency: elective  Indications and Patient Condition       Indications for airway management: alphonso-procedural       Induction type:intravenous       Mask difficulty assessment: 1 - vent by mask    Final Airway Details       Final airway type: endotracheal airway       Successful airway: ETT - single  Endotracheal Airway Details        ETT size (mm): 7.5       Cuffed: yes       Successful intubation technique: direct laryngoscopy       DL Blade Type: Hudson 2       Grade View of Cords: 1       Adjucts: stylet       Position: Left       Measured from: lips       Secured at (cm): 23       Bite block used: None    Post intubation assessment        Placement verified by: capnometry, equal breath sounds and chest rise        Number of attempts at approach: 1       Secured with: pink tape       Ease of procedure: easy       Dentition: Intact and Unchanged    Medication(s) Administered   Medication Administration Time: 5/27/2021 1:09 PM

## 2021-05-27 NOTE — LETTER
Transition Communication Hand-off for Care Transitions to Next Level of Care Provider    Name: Erasto Maher  : 1965  MRN #: [id number]  Primary Care Provider: Ranjit Edwards     Primary Clinic: [address] [place] [address] [address]  [facility] [address]     Reason for Hospitalization:  Glioblastoma (H) [C71.9]  Examination of participant or control in clinical research [Z00.6]  Admit Date/Time: 2021  9:11 AM  Discharge Date: 6/3/2021  Payor Source: Payor: [employer] / Plan: [employer] OPEN ACCESS / Product Type: HMO /              Reason for Communication Hand-off Referral:   Notification of the discharge plan    Discharge Plan:    Care Management Discharge Note     Discharge Date: 21        Discharge Disposition:  Acute rehab placement at [facility] ([phone])     Discharge Services:   Acute rehab placement     Discharge DME:  Not applicable     Discharge Transportation:   NEPTALI spoke with pt's floor nurse (Susan) who indicates that pt can be transported by w/c.  NEPTALI arranged for [facility] (Pardeep [phone]) to provide w/c transport at 12:45pm.     Private pay costs discussed: Not applicable     PAS Confirmation Code:  Not required as pt is admitting to ARU setting  Patient/family educated on Medicare website which has current facility and service quality ratings: yes(Medicare Care Compare list provided)     Education Provided on the Discharge Plan:  Yes  Persons Notified of Discharge Plans: Pt, wife, 6A nursing and Kristin Clement NP who states that Dr. Chan is also agreeable to the discharge plan  Patient/Family in Agreement with the Plan:  Pt and wife voice understanding of the discharge plan and agreement with the discharge plan.     Handoff Referral Completed: Yes     Additional Information:  - Kristin Clement NP has confirmed readiness for discharge  - [facility] ([place] for admit  - NEPTALI has educated pt's wife about visiting policy at [facility] (1  consistent designated masked visitor throughout stay)        MATTHEW Hernandez  Social Work, 6A  Phone:  [phone]  Pager:  [phone]  6/3/2021

## 2021-05-27 NOTE — OR NURSING
Page sent to neurosurgery resident at 1633.    tara Maher PACU bay 23.  Pt needs post op orders and brief op note. Left upper extremity is weak upon exam but improving.  [phone] ext [phone]

## 2021-05-27 NOTE — ANESTHESIA CARE TRANSFER NOTE
Patient: Erasto Maher    Procedure(s):  INTRAOPERATIVE Magnetic resonance imaging/Stealth Assisted Right  CRANIOTOMY    Diagnosis: Glioblastoma (H) [C71.9]  Diagnosis Additional Information: No value filed.    Anesthesia Type:   General     Note:    Oropharynx: oropharynx clear of all foreign objects and spontaneously breathing  Level of Consciousness: awake  Oxygen Supplementation: face mask  Level of Supplemental Oxygen (L/min / FiO2): 8  Independent Airway: airway patency satisfactory and stable  Dentition: dentition unchanged  Vital Signs Stable: post-procedure vital signs reviewed and stable  Report to RN Given: handoff report given  Patient transferred to: PACU    Handoff Report: Identifed the Patient, Identified the Reponsible Provider, Reviewed the pertinent medical history, Discussed the surgical course, Reviewed Intra-OP anesthesia mangement and issues during anesthesia, Set expectations for post-procedure period and Allowed opportunity for questions and acknowledgement of understanding      Vitals: (Last set prior to Anesthesia Care Transfer)  CRNA VITALS  5/27/2021 1521 - 5/27/2021 1553      5/27/2021             ART BP:  172/77    ART Mean:  123    Temp:  36    SpO2:  97 %        Electronically Signed By: RUPA Delaney CRNA  May 27, 2021  3:53 PM

## 2021-05-27 NOTE — PROGRESS NOTES
[facility]  Neurosurgery Progress Note    Subjective:  OR yesterday, no acute events overnight    Objective:   Temp:  [97.1  F (36.2  C)-98.8  F (37.1  C)] 97.1  F (36.2  C)  Pulse:  [] 95  Resp:  [8-20] 12  BP: (131-149)/() 134/86  MAP:  [69 mmHg-115 mmHg] 95 mmHg  Arterial Line BP: ()/(55-87) 135/73  SpO2:  [97 %-100 %] 98 %  I/O last 3 completed shifts:  In: 3275 [P.O.:600; I.V.:2675]  Out: 2680 [Urine:2675; Blood:5]    Gen: Appears comfortable, NAD  Wound: clean, dry, intact  Neurologic:  - Alert & Oriented to person, place, time, and situation  - Follows commands briskly  - Speech fluent, spontaneous. No aphasia or dysarthria.  - No gaze preference. No apparent hemineglect.  - PERRL, EOMI  - Strong eye closure, jaw clench, and cheek puff  - Face symmetric with sensation intact to light touch  - Palate elevates symmetrically, uvula midline, tongue protrudes midline  - Trapezii and sternocleidomastoid muscles 5/5 bilaterally  - No pronator drift     Del Tr Bi WE WF Gr   R 5 5 5 5 5 5   L 5 5 5 5 5 5    HF KE KF DF PF EHL   R 5 5 5 5 5 5   L 4+ 4+ 4+ 4+ 4+ 4+     Reflexes 2+ throughout    Sensation intact and symmetric to light touch throughout    LABS  Recent Labs   Lab Test 05/28/21  0303 05/27/21  1105 05/24/21  1333   WBC 5.7 Canceled, Test credited 4.3   HGB 11.6* Canceled, Test credited 13.6   MCV 92 Canceled, Test credited 93   * Canceled, Test credited 185       Recent Labs   Lab Test 05/28/21  0303 05/27/21  1105 05/24/21  1333    136 136   POTASSIUM 4.2 4.1 4.3   CHLORIDE 108 106 103   CO2 26 28 29   BUN 12 9 16   CR 0.73 0.79 0.84   ANIONGAP 4 3 5   JUDIE 8.4* 8.6 8.8   * 104* 117*       IMAGING  Head CT 5/27/21  Impression:   1. New postinjection changes to the right frontal lobe with expected focus of pneumocephalus in the treatment region. No significant intracranial hemorrhage or mass effect.  2. Multifocal areas of hypoattenuation  correspond to prior areas of ablation. Gamma tiles are present within the right parietal lobe.    Assessment:  Erasto Maher is a 56 year old male s/p right craniotomy for tumor resection and ziopharm study virus injection (5/27/21); he has been clinically stable.    KPS 90    Brain cancer      Plan:  - Serial neuro exams  - follow research protocol instructions for medicaations  - Ancef 72 hrs  - Keppra  - Pain control  - HOB > 30 degrees  - Regular diet  - Bowel regimen  - PRN antiemetics  - PT/OT  - SCDs for DVT proph    Ander Jenkins MD, PhD  Neurosurgery Resident PGY-2    Please contact neurosurgery resident on call with questions.    Dial * * [phone], enter [phone] when prompted.

## 2021-05-28 NOTE — OP NOTE
Name:  Erasto Maher  MRN:  [id number]  YOB: 1965  Date of Surgery:  May 27, 2021      Pre-operative Diagnosis; recurrent glioblastoma, right frontal  Post-operative Diagnosis:  Same  Procedure:  1) Craniotomy for [id number]-12 injection  2) Neuro-navigation  3) Intraoperative MRI    Anesthesia: GETA  Surgeon: Marquise Monzon MD, PhD  Assistant: Jack Leschke, MD    Description of Procedure: After pre-operative laboratory value and informed consent were verified, the patient was brought into the operating room. The patient underwent general anesthesia and intubation. Antibiotic was administered.    The patient was placed in a supine position, with the head turned to the left exposing the right frontal cranium.  The Medtronic Stealth reference frame was placed. The patient's anatomy was registered relative to the pre-operative MRI using the InfluAds system.    The region overlying the tumor was identified. An incision was planned based on the trajectory to the contrast-enhancing region. The area encompassing the surgical incision was prepped and draped in a sterile manner.     Time out was performed. The 3.5 cm incision was made with a 10 scalpel. Hemostasis was achieved using a combination of cautery and Libby clips. The incision was retracted using a Weitlander. The bony anatomy was exposed. The ideal entry point for the injection was confirmed using the Stealth probe.    A Han[place] hole was placed the edge of the bony exposure and extended into a small craniotomy.  Dural bleeding was controlled. The epidural space was packed with Surgiflow.     The dura was opened in a cruciate manner. Corticectomy was performed in the region of the planned entry point. The trajectory to the lesion was defined using the Stealth probe. 0.1 ml of the [id number]-12 adenovirus was injected over a 1 minute period through a 22 gauge spinal needle at a depth of 2 cm. The needle was kept in place for 2 minutes  before removal.    The craniectomy site was covered with a piece of gelfoam. The defect was covered with a round titanium cover and secured with three titanium screws.  The wound was amply irrigated. The muscle and galea were closed with 2'0 vicryl. The skin was closed with 3'0 Monocryl. Exofin was applied.    I was present and performed the key portions of the procedure.    EBL: < 10 ml's    Specimens: None    Disposition: ICU

## 2021-05-28 NOTE — PROGRESS NOTES
Pt admitted to 4A from PACU at 18:00, s/p right craniotomy.  VSS upon arrival (slightly tachycardic) and on 2L NC. Pt alert and oriented x 4. Pt appears comfortable. 3 Pin sites on the head (1 front and 2 back) c/d/i. Right crani site BECKI, sutures/liquid bandage. Neuro assessment per flow sheet. 2 bags of belongings in the room. Pt's wife will take the  wallet home but will leave his cellphone at the bedside. Report given to the night RN and will resume with cares.

## 2021-05-28 NOTE — PLAN OF CARE
1900-0730:  Neuro: A&Ox4, intermittently getting month wrong. L) sided weakness kirk LUE, remained consistent overnight. Slight L) drift. Pupils equal/reactive. Q1 hour neuro checks.   Cardiac: NSR/ST SBP goal <140, PRN hydralazinex1, labetalol x3.   Resp: RA, capno WNL. LS clear.  GI/: Arauz with adequate UO, passing gas, no BM.   Diet/Appetite: Regular diet, PRN reglan x1 with dinner. Scopolamine patch behind L) ear. Good appetite.  Skin: 3 drill sites on head, CDI, no drainage overnight.  Access: R) PIV NS 75cc/hr. L) radial A-line  Drains: arauz  Activity: Ax2, turns not OOB yet.  Pain: scheduled tylenol, PRN dilaudid available but not needed overnight.     Will continue with plan of care and notify MD of any changes.

## 2021-05-28 NOTE — PROGRESS NOTES
[facility]  Neurosurgery Progress Note    Subjective:  Febrile yesterday, clx negative, started to have persistent hiccups, more sleepy throughout the day, head CT reassuring, no acute events overnight    Objective:   Temp:  [98.8  F (37.1  C)-101.6  F (38.7  C)] 98.8  F (37.1  C)  Pulse:  [] 94  Resp:  [16-22] 20  BP: (117-158)/(70-99) 135/93  MAP:  [100 mmHg-115 mmHg] 100 mmHg  Arterial Line BP: (122-166)/() 122/91  SpO2:  [92 %-99 %] 99 %  I/O last 3 completed shifts:  In: 2605 [P.O.:1510; I.V.:1095]  Out: 3450 [Urine:3450]    Gen: Appears comfortable, NAD  Wound: clean, dry, intact  Neurologic:  - Alert & Oriented to person, place, time, and situation  - Follows commands briskly  - Speech fluent, spontaneous. No aphasia or dysarthria.  - No gaze preference. No apparent hemineglect.  - PERRL, EOMI  - Strong eye closure, jaw clench, and cheek puff  - Face symmetric with sensation intact to light touch  - Palate elevates symmetrically, uvula midline, tongue protrudes midline  - Trapezii and sternocleidomastoid muscles 5/5 bilaterally  - No pronator drift     Del Tr Bi WE WF Gr   R 5 5 5 5 5 5   L 4 4 4 5 5 5    HF KE KF DF PF EHL   R 5 5 5 5 5 5   L 4 4 4 4 4 4     Reflexes 2+ throughout    Sensation intact and symmetric to light touch throughout    LABS  Recent Labs   Lab Test 05/29/21  0735 05/29/21  0530 05/28/21  0303   WBC 6.7 5.6 5.7   HGB 13.3 12.1* 11.6*   MCV 93 92 92   * 144* 126*       Recent Labs   Lab Test 05/29/21  0530 05/28/21  0303 05/27/21  1105    138 136   POTASSIUM 4.0 4.2 4.1   CHLORIDE 102 108 106   CO2 27 26 28   BUN 11 12 9   CR 0.81 0.73 0.79   ANIONGAP 4 4 3   JUDIE 8.6 8.4* 8.6   * 141* 104*       IMAGING  Head CT 5/28/21  Impression:  1. No new findings.  2. Stable postprocedural ablation changes in the right frontal and parietal lobes.  3. Postsurgical right parietal occipital craniotomy changes for resection of underlying mass  with gamma tile placement.  4. Stable right parasagittal parietal lobe lesion, better appreciated on prior MR.      Assessment:  Erasto Maher is a 56 year old male s/p right craniotomy for tumor resection and ziopharm study virus injection (5/27/21); he has been clinically stable.    KPS 90    Brain cancer      Plan:  - Serial neuro exams  - follow research protocol instructions for medicaations  - Ancef 72 hrs  - Keppra  - Pain control  - HOB > 30 degrees  - Regular diet  - Bowel regimen  - PRN antiemetics  - PT/OT  - SCDs for DVT proph    Ander Jenkins MD, PhD  Neurosurgery Resident PGY-2    Please contact neurosurgery resident on call with questions.    Dial * * [phone], enter [phone] when prompted.

## 2021-05-29 NOTE — PROGRESS NOTES
Pt was stable throughout the day. Pt had a temp of 103.5 F in the morning. It was treated with tylenol and ice packs and ended at 98.5 F. Pt got up to the chair to eat lunch with 2 assist. Pt had hiccups for most of the day but had a 3 hour break from them in the middle of the day. Pt rested well during this time.

## 2021-05-29 NOTE — PLAN OF CARE
Major Shift Events:  A&Ox3-4. Intermittent word-finding difficulty, slow to respond. L sided weakness. Tmax 101.6. SBP goal < 140. Intermittent hiccups, PRN Reglan given. Frequency & urgency w/ voiding. Straight cath'd x1 for PVR of > 300ml @ 0230.   Plan: Continue to monitor neurological status. Update team w/ any changes or concerns.  For vital signs and complete assessments, please see documentation flowsheets.

## 2021-05-29 NOTE — PLAN OF CARE
D/I; No changes in the neuro status except pt was having more frequent hiccups. Left arm (sometimes left leg) remains slightly weaker than the right. Tmax 101.4.. MD notified. Blood culture/urine cx and CXR ordered and completed.  Pt states no major H/A. On schedule Tylenol.  Kaba d/c'd this am ... pt voided few times. Bladder scanned this afternoon. (987cc)....  Straight cath'd 900cc. MD aware.   A: HR 70-90s. -150s.  PRN Labetalol given few times.  Currently SBP 120s.. Head CT completed.  Pt had no nausea and tolerated regular diet today. Pt given the study drug this am. NO BM yet.. +BS/gas.  Up in chair few times and ambulated in the room to bathroom with minimal assist of 2.   P: Continue monitor temp/neuro/VS... and report any changes to MD.

## 2021-05-30 NOTE — PROGRESS NOTES
[facility]  Neurosurgery Progress Note    Subjective:  Febrile yesterday, persistent hiccups, more lethargic throughout the day    Objective:   Temp:  [98.5  F (36.9  C)-103.5  F (39.7  C)] 99  F (37.2  C)  Pulse:  [] 90  Resp:  [15-22] 20  BP: (102-149)/(63-93) 128/78  SpO2:  [86 %-99 %] 98 %  I/O last 3 completed shifts:  In: 1673.75 [P.O.:1480; I.V.:193.75]  Out: 2885 [Urine:2885]    Gen: Appears comfortable, NAD  Wound: clean, dry, intact  Neurologic:  - lethargic & Oriented to person, place, time, and situation  - Follows commands briskly  - Speech fluent, spontaneous. No aphasia or dysarthria.  - No gaze preference. No apparent hemineglect.  - PERRL, EOMI  - Strong eye closure, jaw clench, and cheek puff  - Face symmetric with sensation intact to light touch  - Palate elevates symmetrically, uvula midline, tongue protrudes midline  - Trapezii and sternocleidomastoid muscles 5/5 bilaterally  - R drift     Del Tr Bi WE WF Gr   R 5 5 5 5 5 5   L 3 3 3 4 4 4    HF KE KF DF PF EHL   R 5 5 5 5 5 5   L 3 3 3 3 3 3     Reflexes 2+ throughout    Sensation intact and symmetric to light touch throughout    LABS  Recent Labs   Lab Test 05/29/21  0735 05/29/21  0530 05/28/21  0303   WBC 6.7 5.6 5.7   HGB 13.3 12.1* 11.6*   MCV 93 92 92   * 144* 126*       Recent Labs   Lab Test 05/29/21  0530 05/28/21  0303 05/27/21  1105    138 136   POTASSIUM 4.0 4.2 4.1   CHLORIDE 102 108 106   CO2 27 26 28   BUN 11 12 9   CR 0.81 0.73 0.79   ANIONGAP 4 4 3   JUDIE 8.6 8.4* 8.6   * 141* 104*     CTH: 1. Stable post surgical changes of right frontal and parietal lobe  mass ablation and right parieto-occipital craniotomy with underlying  mass resection and gamma tile placement.   2. Unchanged appearance of right parasagittal parietal lobe lesion.      Assessment:  Erasto CROSS Nika is a 56 year old male s/p right craniotomy for tumor resection and ziopharm study virus injection  (5/27/21); he has been clinically stable.    KPS 90    Brain cancer      Plan:  - Serial neuro exams  - follow research protocol instructions for medications, CTH stable, will discuss with Dr. Chan about the dose today  - Ancef 72 hrs  - IVF to ensure hydration  - Keppra  - Pain control  - HOB > 30 degrees  - Regular diet  - Bowel regimen  - PRN antiemetics  - PT/OT  - SCDs for DVT proph, no hep for dvt ppx due to research protocol    Please contact neurosurgery resident on call with questions.    Dial * * [phone], enter [phone] when prompted.     Debora Crawford MD  Neurosurgery PGY3      I have reviewed the history above and agree with the resident's assessment and plan.  MIKE Esteban MD

## 2021-05-30 NOTE — PLAN OF CARE
Major Shift Events:  Disoriented to time. Slow to respond/inattention. Increasingly lethargic overnight. L sided weakness. SBP < 140. Tmax 100.4. Hiccups worsening, PRN Thorazine given w/ relief. No BM since 5/26, passing flatus. Voiding spontaneously. MIVF restarted.   Plan: Continue to monitor neuro status. Update team w/ any changes. Pt needs PT/OT orders.   For vital signs and complete assessments, please see documentation flowsheets.

## 2021-05-30 NOTE — PROGRESS NOTES
[facility]  Neurosurgery Progress Note    Subjective:  Continues to have persistent hiccup, but improved in the AM; exam stable since yesterday; IVF bolus given for negative I/O balance    Objective:   Temp:  [98.8  F (37.1  C)-100.8  F (38.2  C)] 99  F (37.2  C)  Pulse:  [] 95  Resp:  [16-20] 16  BP: ()/(54-98) 126/80  SpO2:  [93 %-99 %] 99 %  I/O last 3 completed shifts:  In: 1375 [P.O.:775; I.V.:100; IV Piggyback:500]  Out: 2200 [Urine:2200]    Gen: Appears comfortable, NAD  Wound: clean, dry, intact  Neurologic:  - Oriented to person, place, time, and situation  - Follows commands briskly  - Speech fluent, spontaneous. No aphasia or dysarthria.  - No gaze preference. No apparent hemineglect.  - PERRL, EOMI  - Strong eye closure, jaw clench, and cheek puff  - Face symmetric with sensation intact to light touch  - Palate elevates symmetrically, uvula midline, tongue protrudes midline  - Trapezii and sternocleidomastoid muscles 5/5 bilaterally  - R drift     Del Tr Bi WE WF Gr   R 5 5 5 5 5 5   L 3 3 3 4 4 4    HF KE KF DF PF EHL   R 5 5 5 5 5 5   L 3 2 2 3 3 3     Reflexes 2+ throughout    Sensation intact and symmetric to light touch throughout    LABS  Recent Labs   Lab Test 05/31/21  0409 05/30/21  0555 05/29/21  0735   WBC 4.7 5.2 6.7   HGB 11.7* 11.9* 13.3   MCV 94 92 93   * 141* 136*       Recent Labs   Lab Test 05/31/21  0409 05/30/21  0555 05/29/21  2347 05/29/21  0530    134  --  133   POTASSIUM 4.0 4.1 3.9 4.0   CHLORIDE 101 100  --  102   CO2 29 26  --  27   BUN 14 13  --  11   CR 0.82 0.84  --  0.81   ANIONGAP 4 8  --  4   JUDIE 8.6 8.3*  --  8.6   * 110*  --  116*     CTH: 1. Stable post surgical changes of right frontal and parietal lobe  mass ablation and right parieto-occipital craniotomy with underlying  mass resection and gamma tile placement.   2. Unchanged appearance of right parasagittal parietal lobe lesion.      Assessment:  Erasto CROSS  Nika is a 56 year old male s/p right craniotomy for tumor resection and ziopharm study virus injection (5/27/21); he has been clinically stable.    KPS 80    Brain cancer      Plan:  - Serial neuro exams  - follow research protocol instructions for medication  -- study drug after discussion with Dr. Chan today  - Ancef 72 hrs (completed)  - IVF to ensure hydration  - Keppra  - Pain control  - HOB > 30 degrees  - Regular diet  - Bowel regimen  - PRN antiemetics  - PT/OT  - SCDs for DVT proph, no hep for dvt ppx due to research protocol      Ander Jenkins MD, PhD  Neurosurgery Resident PGY-2    Please contact neurosurgery resident on call with questions.    Dial * * [phone], enter [phone] when prompted.

## 2021-05-30 NOTE — PLAN OF CARE
Major Shift Events: A & O x4. Left arm and leg weaker than right arm and leg. Denies numbness and tingling. Up to chair x1 with OT. Minimal appetite. Voiding. Veledimex given per parish from Dr. Chan. Wife at bedside.   Plan: PT, OT, increase activity and appetite.   For vital signs and complete assessments, please see documentation flowsheets.

## 2021-05-30 NOTE — PROGRESS NOTES
05/30/21 1530   Quick Adds   Type of Visit Initial PT Evaluation   Living Environment   People in home spouse   Current Living Arrangements house   Home Accessibility stairs to enter home;stairs within home   Number of Stairs, Main Entrance 3   Number of Stairs, Within Home, Primary   (flight up to bedroom)   Transportation Anticipated family or friend will provide   Living Environment Comments Patient lives with his wife in house with 3 steps to enter and full flight of stairs to upper level. Has not been driving due to symptoms, wife drives, hired lawn care this year.   Self-Care   Usual Activity Tolerance excellent   Current Activity Tolerance fair   Regular Exercise Yes   Equipment Currently Used at Home grab bar, tub/shower;shower chair   Activity/Exercise/Self-Care Comment Patient is independent with all mobility, per wife left leg starts to drag if fatigued but no falls or other mobility concerns. 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    Disability/Function   Hearing Difficulty or Deaf no   Wear Glasses or Blind no   Concentrating, Remembering or Making Decisions Difficulty yes   Concentration Management delayed responses, forgetfulness   Difficulty Eating/Swallowing no   Walking or Climbing Stairs Difficulty no   Dressing/Bathing Difficulty no   Toileting issues no   Doing Errands Independently Difficulty (such as shopping) yes   Errands Management patient has not returned to driving, wife assists with errands   Fall history within last six months no   Change in Functional Status Since Onset of Current Illness/Injury yes   General Information   Onset of Illness/Injury or Date of Surgery 05/27/21   Referring Physician Debora Crawford MD   Patient/Family Therapy Goals Statement (PT) return home, return to driving, golfing   Pertinent History of Current Problem (include personal factors and/or comorbidities that impact the POC) Erasto Mckeondamonnadege is a 56 year old male s/p right craniotomy  for tumor resection and ziopharm study virus injection (5/27/21);    Existing Precautions/Restrictions fall  (craniotomy)   Cognition   Orientation Status (Cognition) oriented x 3   Affect/Mental Status (Cognition) low arousal/lethargic   Follows Commands (Cognition) follows one-step commands;delayed response/completion;increased processing time needed;initiation impaired;75-90% accuracy;physical/tactile prompts required;repetition of directions required   Cognitive Status Comments Patient with delayed responses, impaired initiation. Forgetful and demonstrates L side inattention.   Pain Assessment   Patient Currently in Pain No   Posture    Posture Protracted shoulders   Range of Motion (ROM)   ROM Comment BLE WFL   Strength   Strength Comments LLE weakness, demonstrates at least 3+/5 LLE strength with mobility   Bed Mobility   Comment (Bed Mobility) Supine>sit with mod A   Transfers   Transfer Safety Comments sit<>stand with min Ax2   Gait/Stairs (Locomotion)   Comment (Gait/Stairs) Ambulates with min Ax2 (hand hold assist + holding onto gait belt), demonstrates left lateral lean, left inattention, decreased L foot clearance, NBOS   Balance   Balance Comments requires Ax1-2 for static standing balance and ambulation   Sensory Examination   Sensory Perception patient reports no sensory changes   Coordination   Coordination Comments FNF dysmetric on L, intact on R   Clinical Impression   Criteria for Skilled Therapeutic Intervention yes, treatment indicated   PT Diagnosis (PT) impaired functional mobility   Influenced by the following impairments strength, balance, activity tolerance, coordination, L side inattention, cognition   Functional limitations due to impairments bed mobility, transfers, gait, stairs, functional endurance   Clinical Presentation Evolving/Changing   Clinical Presentation Rationale PMH/comorbidities, clinical judgement   Clinical Decision Making (Complexity) moderate complexity   Therapy  Frequency (PT) 6x/week   Predicted Duration of Therapy Intervention (days/wks) 1-2 weeks   Planned Therapy Interventions (PT) balance training;bed mobility training;gait training;neuromuscular re-education;patient/family education;postural re-education;stair training;strengthening;transfer training   Risk & Benefits of therapy have been explained evaluation/treatment results reviewed;care plan/treatment goals reviewed;risks/benefits reviewed;current/potential barriers reviewed;participants voiced agreement with care plan;participants included;patient;spouse/significant other   PT Discharge Planning    PT Discharge Recommendation (DC Rec) [facility] patient will benefit from intensive, interdisciplinary therapy.  Anticipate will be able to tolerate 3 hours of therapy per day   PT Rationale for DC Rec Patient is below baseline mobility, will benefit from continued skilled therapy to progress strength, balance, and functional independence prior to returning home.   Total Evaluation Time   Total Evaluation Time (Minutes) 8

## 2021-05-30 NOTE — PROGRESS NOTES
"   05/30/21 1211   Quick Adds   Type of Visit Initial Occupational Therapy Evaluation       Present no   Language english   Living Environment   People in home spouse   Current Living Arrangements house   Home Accessibility stairs to enter home;stairs within home   Number of Stairs, Main Entrance 3   Number of Stairs, Within Home, Primary   (flight up to bedroom)   Transportation Anticipated family or friend will provide   Living Environment Comments Pt reports he has 2 adult children who live out of state.  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  Has 3STE and flight up to shower and bedroom.  Pt not driving currently due to prior sx deficits. Wife drives, hired lawn care this year.    Self-Care   Usual Activity Tolerance excellent   Current Activity Tolerance moderate   Regular Exercise Yes   Equipment Currently Used at Home grab bar, tub/shower;shower chair   Activity/Exercise/Self-Care Comment Per pt report, he had remained independent with mobility since previous admission in april 2021.  He had not returned to work and reports his wife \"has to watch him more\" because of decreased cognition.  pt states he had returned to golfing with his wife but was only tolerating walking half the course instead of the whole thing.  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    Disability/Function   Hearing Difficulty or Deaf no   Wear Glasses or Blind no   Concentrating, Remembering or Making Decisions Difficulty yes   Concentration Management pt reports his wife has noticed cognitive decline   Difficulty Communicating yes   Communication Management slowed speech   Difficulty Eating/Swallowing no   Walking or Climbing Stairs Difficulty no   Dressing/Bathing Difficulty no   Toileting issues no   Doing Errands Independently Difficulty (such as shopping) yes   Errands Management Pt has not returned to driving; wife assists " with errands   Fall history within last six months yes   Number of times patient has fallen within last six months 1   Change in Functional Status Since Onset of Current Illness/Injury yes   General Information   Onset of Illness/Injury or Date of Surgery 05/27/21   Referring Physician Debora Crawford MD   Patient/Family Therapy Goal Statement (OT) Return to home; be able to drive, to golf   Additional Occupational Profile Info/Pertinent History of Current Problem Erasto Maher is a 56 year old male s/p right craniotomy for tumor resection and ziopharm study virus injection (5/27/21)   Existing Precautions/Restrictions fall  (craniotomy precautions)   General Observations and Info Activity: ambulate with assist   Cognitive Status Examination   Orientation Status orientation to person, place and time   Affect/Mental Status (Cognitive) WFL   Follows Commands follows one-step commands;75-90% accuracy;increased processing time needed;initiation impaired;repetition of directions required   Safety Deficit insight into deficits/self-awareness;problem-solving   Memory Deficit minimal deficit   Executive Function Deficit insight/awareness of deficits;problem-solving/reasoning;self-monitoring/self-correction   Cognitive Status Comments Pt is alert and conversational, speech is slowed but appropriate, mildly forgetful, reports his wife has had to provide increased supervision at home PTA due to decreased cognition, pt requires cues repeated and has difficulty monitoring L side 2/2 inattention/neglect   Visual Perception   Impact of Vision Impairment on Function (Vision) pt denies acute visual changes; shows R gaze preference, able to scan left of midling approx 45 degrees and then loses eye contact with target   Sensory   Sensory Quick Adds No deficits were identified   Sensory Comments pt denies acute sensory changes   Pain Assessment   Patient Currently in Pain No   Range of Motion Comprehensive   Comment, General Range  of Motion RUE/RLE WNL; LUE impaired   Strength Comprehensive (MMT)   Comment, General Manual Muscle Testing (MMT) Assessment not formally assessed though demonstrates L pronator drift and L side weakness during functional reaching and movement of LLE   Coordination   Coordination Comments LUE impaired   Bed Mobility   Bed Mobility supine-sit   Supine-Sit Bladen (Bed Mobility) moderate assist (50% patient effort)   Transfers   Transfers bed-chair transfer;sit-stand transfer;toilet transfer   Transfer Skill: Bed to Chair/Chair to Bed   Bed-Chair Bladen (Transfers) verbal cues;minimum assist (75% patient effort);2 person assist   Assistive Device (Bed-Chair Transfers) standard walker   Transfer Comments step by step verbal cues   Sit-Stand Transfer   Sit-Stand Bladen (Transfers) minimum assist (75% patient effort);2 person assist   Assistive Device (Sit-Stand Transfers) walker, standard   Toilet Transfer   Type (Toilet Transfer) sit-stand;stand-sit   Bladen Level (Toilet Transfer) minimum assist (75% patient effort);2 person assist   Assistive Device (Toilet Transfer) grab bars/safety frame;walker, standard   Toilet Transfer Comments step by step cues   Balance   Balance Comments impaired; L lateral lean in sitting and standing; constant min A to maintain balance/upright posture during mobility   Activities of Daily Living   BADL Assessment/Intervention toileting   Toileting   Bladen Level (Toileting) minimum assist (75% patient effort);moderate assist (50% patient effort)   Instrumental Activities of Daily Living (IADL)   IADL Comments family able to assist with IADLs prn   Clinical Impression   Criteria for Skilled Therapeutic Interventions Met (OT) yes;meets criteria   OT Diagnosis decreased activity tolerance and independence with ADLs   OT Problem List-Impairments impacting ADL problems related to;activity tolerance impaired;balance;cognition;coordination;mobility;range of motion  (ROM);strength;post-surgical precautions   Assessment of Occupational Performance 5 or more Performance Deficits   Identified Performance Deficits bed mobility, transfers, toileting, dressing, bathing, mobility   Planned Therapy Interventions (OT) ADL retraining;IADL retraining;bed mobility training;cognition;neuromuscular re-education;ROM;strengthening;transfer training   Clinical Decision Making Complexity (OT) low complexity   Therapy Frequency (OT) Daily   Predicted Duration of Therapy 6/12/21   Risk & Benefits of therapy have been explained evaluation/treatment results reviewed;care plan/treatment goals reviewed;current/potential barriers reviewed;participants voiced agreement with care plan;participants included;patient   OT Discharge Planning    OT Discharge Recommendation (DC Rec) [facility] patient will benefit from intensive, interdisciplinary therapy.  Anticipate will be able to tolerate 3 hours of therapy per day   OT Rationale for DC Rec Pt is currently below baseline with regards to mobility and independence with self cares and will benefit from continued skilled therapy intervention to address deficits.   OT Brief overview of current status  Min A x 2, FWW and step by step cues for bed mobility, transfers and toileting with impaired LUE/LLE motor planning and L side inattention   Total Evaluation Time (Minutes)   Total Evaluation Time (Minutes) 5

## 2021-05-31 NOTE — PLAN OF CARE
Major Shift Events:  A&OX4, slow to respond at times. Moving L side with more strength as day has progressed but is still weaker than R side, continues to have L pronator drift, L neglect. Denies pain. VSS. Ambulated in [place] with gait belt +assist of 1-2 X2. LBM 5/30. Appetite improved this afternoon but does need encouragement to eat. Unable to void this AM-straight cath'd for 675cc. Voided 375cc this afternoon. Wife at bedside, supportive of patient and POC.  For vital signs and complete assessments, please see documentation flowsheets.      Transferring to: 6A. Report given to MARCELINO Krishnan.  Belongings: Clothing and shoes, sent with patient.  Chart and medications sent with patient.  Patient's wife, Judith, at bedside, aware of transfer.

## 2021-05-31 NOTE — PLAN OF CARE
Summary: Patient is alert and oriented x 4. PERRL. 3 mm, round, brisk. L pronator drift. R > L in both UEs and LEs. Increased difficulty with finger-to-nose and heel-to-shin with 00 assessment, but has improved. MD Alice notified. Hiccups for most of night, but resolved early this AM. Denies pain. NSR to sinus tachycardia. 80s-110s. Systolic blood pressure goal less than 140. Tmax 37.9. Ice packs applied. Lung sounds clear. RA. Specific gravity sent due to increased urine output. 500 mL NS bolus given. Regular diet. 1 BM overnight. Voids adequately using the urinal.    Safety concerns: Call light within reach    Plan of care: Transfer to general care    Miguel Redman RN gave verbal report to MARCELINO Blue.

## 2021-06-01 NOTE — PLAN OF CARE
Vitals: VSS  Neuros: Improving; Left side 4/5, right side 5/5. Left neglect, left drift  IV: SLd  Labs/Electrolytes: WNL  Resp/trach: WNL  Diet: Regular - Good po  Bowel status: BM 5/30  : Voided multiple times  Skin: Head incision CDI  Pain: Denies  Activity: Up with assist of 2, GB. Walked halls x 1 and sat in recliner multiple times  Social: Wife at bedside   Plan: Pt/wife hoping to discharge home once stronger

## 2021-06-01 NOTE — PROVIDER NOTIFICATION
Notified by bedside RN, Vicente, pt lethargic making it difficult to participate in neuro exam.  Pt able to state name and place before falling back asleep.  Following minimal commands.  Dr. Jenkins updated and assessed pt at bedside.

## 2021-06-01 NOTE — PLAN OF CARE
Status: S/p craniotomy for tumor resection and ziopharm study virus injection  Vitals: VSS on RA  Neuros: Pt was very lethargic overnight, A&O to self and place around 0000, only disoriented to time at 0400, L side 3/5, R side 5/5, denies N/T, L neglect, slight L drift  IV: PIV is SL  Labs/Electrolytes: WDL  Resp/trach: Lung sounds are clear  Diet: Reg, tolerating  Bowel status: Bowel sounds are active, no BM overnight  : Voiding spontaneously  Skin: Head incision is WDL and BECKI  Pain: Denied  Activity: Assist x2 w/ Gb and walker, repo q2h  Social: No phone calls for the patient overnight  Plan: Will continue to monitor and follow POC  Updates this shift: MD was paged about pt's level of lethargy overnight, came and assessed patient, stated that was his status in ICU. Pt got a head CT overnight.

## 2021-06-01 NOTE — PROGRESS NOTES
[facility]  Neurosurgery Progress Note    Subjective: intermittently worsening weakness L hemibody, head CT reassuring    Objective:   Temp:  [96.3  F (35.7  C)-98.4  F (36.9  C)] 96.7  F (35.9  C)  Pulse:  [74-98] 74  Resp:  [12-18] 16  BP: (104-123)/(68-90) 104/68  SpO2:  [93 %-99 %] 98 %  I/O last 3 completed shifts:  In: 1110 [P.O.:1100; I.V.:10]  Out: 1450 [Urine:1450]    Gen: Appears comfortable, NAD  Wound: clean, dry, intact  Neurologic:  - Oriented to person, place, time, and situation  - Follows commands briskly  - Speech fluent, spontaneous. No aphasia or dysarthria.  - No gaze preference. No apparent hemineglect.  - PERRL, EOMI  - Strong eye closure, jaw clench, and cheek puff  - Face symmetric with sensation intact to light touch  - Palate elevates symmetrically, uvula midline, tongue protrudes midline  - Trapezii and sternocleidomastoid muscles 5/5 bilaterally  - R drift     Del Tr Bi WE WF Gr   R 5 5 5 5 5 5   L 3 3 3 4 4 4    HF KE KF DF PF EHL   R 5 5 5 5 5 5   L 3 2 2 3 3 3     Reflexes 2+ throughout    Sensation intact and symmetric to light touch throughout    LABS  Recent Labs   Lab Test 06/01/21  0640 05/31/21  0409 05/30/21  0555   WBC 3.1* 4.7 5.2   HGB 11.6* 11.7* 11.9*   MCV 93 94 92    131* 141*       Recent Labs   Lab Test 06/01/21  0640 05/31/21  0409 05/30/21  0555    134 134   POTASSIUM 3.8 4.0 4.1   CHLORIDE 103 101 100   CO2 27 29 26   BUN 13 14 13   CR 0.84 0.82 0.84   ANIONGAP 4 4 8   JUDIE 8.7 8.6 8.3*   * 108* 110*     CTH: 1. Stable post surgical changes of right frontal and parietal lobe  mass ablation and right parieto-occipital craniotomy with underlying  mass resection and gamma tile placement.   2. Unchanged appearance of right parasagittal parietal lobe lesion.      Assessment:  Erasto Mckeondamonnadege is a 56 year old male s/p right craniotomy for tumor resection and ziopharm study virus injection (5/27/21); he has been  clinically stable.    KPS 70    Brain cancer      Plan:  - Serial neuro exams  - follow research protocol instructions for medication  -- ok for study drug for the remaining days (total 14 days)  - Ancef 72 hrs (completed)  - Keppra  - Pain control  - HOB > 30 degrees  - Regular diet  - Bowel regimen  - PRN antiemetics  - PT/OT: ARU  - SCDs for DVT proph, no hep for dvt ppx due to research protocol      Ander Jenkins MD, PhD  Neurosurgery Resident PGY-2    Please contact neurosurgery resident on call with questions.    Dial * * [phone], enter [phone] when prompted.

## 2021-06-01 NOTE — PROGRESS NOTES
[facility]  Neurosurgery Progress Note    Subjective: no acute events overnight; exam improved    Objective:   Temp:  [95.4  F (35.2  C)-97.1  F (36.2  C)] 95.9  F (35.5  C)  Pulse:  [62-90] 62  Resp:  [16] 16  BP: (113-127)/(75-85) 122/77  SpO2:  [95 %-100 %] 99 %  I/O last 3 completed shifts:  In: 780 [P.O.:780]  Out: 1025 [Urine:1025]    Gen: Appears comfortable, NAD  Wound: clean, dry, intact  Neurologic:  - Oriented to person, place, time, and situation  - Follows commands briskly  - Speech fluent, spontaneous. No aphasia or dysarthria.  - No gaze preference. No apparent hemineglect.  - PERRL, EOMI  - Strong eye closure, jaw clench, and cheek puff  - Face symmetric with sensation intact to light touch  - Palate elevates symmetrically, uvula midline, tongue protrudes midline  - Trapezii and sternocleidomastoid muscles 5/5 bilaterally  - R drift     Del Tr Bi WE WF Gr   R 5 5 5 5 5 5   L 4 4 4 4 4 4    HF KE KF DF PF EHL   R 5 5 5 5 5 5   L 4 4 4 4 4 4     Reflexes 2+ throughout    Sensation intact and symmetric to light touch throughout    LABS  Recent Labs   Lab Test 06/02/21  0717 06/01/21  0640 05/31/21  0409   WBC 3.4* 3.1* 4.7   HGB 11.9* 11.6* 11.7*   MCV 92 93 94    155 131*       Recent Labs   Lab Test 06/02/21  0717 06/01/21  0640 05/31/21  0409    134 134   POTASSIUM 3.7 3.8 4.0   CHLORIDE 105 103 101   CO2 27 27 29   BUN 12 13 14   CR 0.81 0.84 0.82   ANIONGAP 4 4 4   JUDIE 8.6 8.7 8.6   * 104* 108*     Imaging:  No new imaging      Assessment:  Erasto Maher is a 56 year old male s/p right craniotomy for tumor resection and ziopharm study virus injection (5/27/21); he has been clinically stable.    KPS 70    Brain cancer      Plan:  - Serial neuro exams  - follow research protocol instructions for medication  -- ok for study drug for the remaining days (total 14 days)  - Ancef 72 hrs (completed)  - Keppra  - Pain control  - HOB > 30 degrees  -  Regular diet  - Bowel regimen  - PRN antiemetics  - PT/OT: ARU  - SCDs for DVT proph, no hep for dvt ppx due to research protocol  - medically ready for discharge      Ander Jenkins MD, PhD  Neurosurgery Resident PGY-2    Please contact neurosurgery resident on call with questions.    Dial * * [phone], enter [phone] when prompted.

## 2021-06-01 NOTE — PLAN OF CARE
PT: PT attempted to see pt this pm but pt just returning from walk with nsg and wife reports pt will want to nap for a bit. PT was unable to check back with pt due to schedule. Tomorrow PT will check in with pt/wife in am to try to set a time for PT so pt able to anticipate session and work with PT.

## 2021-06-01 NOTE — PLAN OF CARE
Status: s/p right craniotomy for tumor resection and ziopharm study virus injection (5/27/21);   Vitals: VSS on RA. SBP <140.  Neuros: Alert and oriented x4. Slow to respond. L side 3/5, R side 5/5. Left neglect. Slight L drift. Denies N/T.   IV: Saline locked   Resp/trach: WNL  Diet: regular diet  Bowel status: BS+ No BM this shift.   : Voiding via urinal   Skin: R head incision BECKI, scarring to head. Pin sites BECKI. PIV spots on top of both feet. Blanchable redness to back/shoulders. Preventative mepilex in place.   Pain: Pt requested tylenol x1 before bed for generalized pain   Activity: A2 gb, turn and repo q2hr  Social: Wife at bedside, supportive   Plan: Continue to monitor and follow POC.   Updates this shift: Patient is on clinical trial. Clip board for documentation is with the medications in the patient's bin.

## 2021-06-01 NOTE — PROGRESS NOTES
Arrived from:  4A  Belongings/meds:  Clothing, shoes, clipboard with trial medication information   2 RN Skin Assessment Completed by:  Ariella WOODALL and Shara STRICKLAND RN  Non-intact findings documented (yes/no/NA): R head incision BECKI, scarring to head. Pin sites BECKI. PIV spots on top of both feet. Blanchable redness to back/shoulders. Preventative mepilex in place.

## 2021-06-02 NOTE — PLAN OF CARE
PT: pt seen this morning for PT, pt needing CGA with verb and tactile cue for all mobility due to L neglect and mild LOB. Pt demo standing reaching task needing CGA due to instability. Amb no A.D but needing CGA due to instability, pt has demo amb with WW but still needing assist due to L neglect.   PT D.c rec are ARU but per Dr. Chan pt needs to be able to get to out pt treatment on June 9th. In which pt would need to be D.c from rehab.   PT to cont to progress with functional mobility and family training.

## 2021-06-02 NOTE — PLAN OF CARE
Pt POD#6 R crani for GBM, vss, neuros include: intermittently lethargic but orientated x4, forgetul, poor safety judgement, high fall risk, L neglect, L side 4/5, R side 5/5. PIV SL, regular diet w/assistance ordering and tray set-up, good PO intake, voiding spont, no BM this shift. Pt denies N/T, pt denies pain, taking bowel meds, BA/CA on @ all times. ARU is recommended, family is still figuring out their preference. Continue to monitor per orders.

## 2021-06-02 NOTE — PLAN OF CARE
"Status: POD #5 of right craniotomy, has glioblastoma and has had multiple previous brain surgeries  Vitals: VSS on RA  Neuros: A&Ox4 but slow to respond, PERRLA, left strengths 4/5, right strengths 5/5, denies numbness/tingling, left pronator drift present, severe left neglect  IV: PIV SL  Resp/trach: Denies SOB, clear LS  Diet: Regular diet, fair intake this shift  Bowel status: No BM this shift, audible BS, scheduled bowel meds given  : Voiding spontaneously without difficulty  Skin: Crani incision BECKI, clean and dry. mepilex on coccyx  Pain: Denies  Activity: Up with assist of 1-2 and gait belt, walk on patients left side  Social: Pt's wife present for the afternoon  Updates this shift: PT never saw patient today and pt and pt's wife were extremely upset, as \"physical therapy is the main reason we are here\". RN walked with patient x2 this shift and explained that the patient will be seen by PT tomorrow    "

## 2021-06-02 NOTE — PLAN OF CARE
RN called NSG (primary team) regarding study drug (Veledimex). Order specifies to verify with attending (Dr Chan) prior to administering medication. Per NSG NP (Kristin ABEBE) appropriate and cleared to administer to pt.

## 2021-06-02 NOTE — CONSULTS
[facility]   PM&R CONSULT    Consulting Provider: Kristin Lawrence  Reason for Consult: Assessment of rehabilitation   Location of Patient: 6214  Date of Encounter: 6/2/2021   Date of Admission: 5/27/2021      ASSESSMENT/PLAN:    Mr. Erasto Maher is a Right handed 56 year old yo male, initially diagnosed with glioblastoma multiforme in June 2019, underwent initial resection in March 2020 with radiation and chemotherapy in a clinical trial at [facility], admitted on 5/27 for reoccurrence.   He underwent resection and is currently in a research study with the Avastin for a total of 15 days, he was started on 27 May and will complete his research study medication on 10 Lea.  From my discussion with his wife Anshu at bedside he cannot start the infusion until after the research study is done.  Thus his first infusion is on 14 June.    He presents with mild left-sided weakness, left neglect, mild impaired cognition, and impaired balance.  Recommend acute rehab unit placement with estimated length of stay of 6 to 7 days.  Recommend PT OT and short speech language pathology evaluation and treatment for cognition.  His goals will be modified independence without an assistive device for mobility, modified independence with 4+12 stairs to access his bedroom and bathroom, and modified independent with his ADLs.  He will require supervision with his IADLs  He has good support in his wife and a strong string of friends as well as a sister.  Counseled the patient and his wife regarding goals of therapy expectations of the acute rehab unit and they are agreeable to the plan of care.  Sofia Mcmahon MD, [facility]   Department of Rehabilitation       HPI:    Erasto Maher is a 56 year old male who presented to the 5/27/2021 for symptoms of  , and was diagnosed with recurrent glioblastoma, right frontal GBM, he is s/p right craniotomy for tumor resection and ziopharm study virus  injection (5/27/21); he has been clinically stable per NS team.   He is currently on keppra   He is currently on a research medication till the 10th of this month.    Avastin infusion on the 14th.        Reviewed labs   Reviewed the HCT   Results for MAGNOLIA LIZARRAGA (MRN [id number]) as of 6/2/2021 16:10   Ref. Range 6/2/2021 07:17   Sodium Latest Ref Range: 133 - 144 mmol/L 136   Potassium Latest Ref Range: 3.4 - 5.3 mmol/L 3.7   Chloride Latest Ref Range: 94 - 109 mmol/L 105   Carbon Dioxide Latest Ref Range: 20 - 32 mmol/L 27   Urea Nitrogen Latest Ref Range: 7 - 30 mg/dL 12   Creatinine Latest Ref Range: 0.66 - 1.25 mg/dL 0.81   GFR Estimate Latest Ref Range: >60 mL/min/1.73_m2 >90   GFR Estimate If Black Latest Ref Range: >60 mL/min/1.73_m2 >90   Calcium Latest Ref Range: 8.5 - 10.1 mg/dL 8.6   Anion Gap Latest Ref Range: 3 - 14 mmol/L 4   Magnesium Latest Ref Range: 1.6 - 2.3 mg/dL 1.9   Phosphorus Latest Ref Range: 2.5 - 4.5 mg/dL 3.5   Albumin Latest Ref Range: 3.4 - 5.0 g/dL 2.9 (L)   Protein Total Latest Ref Range: 6.8 - 8.8 g/dL 6.4 (L)   Bilirubin Total Latest Ref Range: 0.2 - 1.3 mg/dL 0.3   Alkaline Phosphatase Latest Ref Range: 40 - 150 U/L 73   ALT Latest Ref Range: 0 - 70 U/L 42   AST Latest Ref Range: 0 - 45 U/L 26   CRP Inflammation Latest Ref Range: 0.0 - 8.0 mg/L 10.0 (H)   Glucose Latest Ref Range: 70 - 99 mg/dL 108 (H)   WBC Latest Ref Range: 4.0 - 11.0 10e9/L 3.4 (L)   Hemoglobin Latest Ref Range: 13.3 - 17.7 g/dL 11.9 (L)   Hematocrit Latest Ref Range: 40.0 - 53.0 % 34.8 (L)   Platelet Count Latest Ref Range: 150 - 450 10e9/L 178   RBC Count Latest Ref Range: 4.4 - 5.9 10e12/L 3.77 (L)   MCV Latest Ref Range: 78 - 100 fl 92   MCH Latest Ref Range: 26.5 - 33.0 pg 31.6   MCHC Latest Ref Range: 31.5 - 36.5 g/dL 34.2   RDW Latest Ref Range: 10.0 - 15.0 % 12.6   Diff Method Unknown Automated Method   % Neutrophils Latest Units: % 48.7   % Lymphocytes Latest Units: % 27.6   % Monocytes Latest  Units: % 15.4   % Eosinophils Latest Units: % 6.5   % Basophils Latest Units: % 1.2   % Immature Granulocytes Latest Units: % 0.6   Nucleated RBCs Latest Ref Range: 0 /100 0   Absolute Neutrophil Latest Ref Range: 1.6 - 8.3 10e9/L 1.6   Absolute Lymphocytes Latest Ref Range: 0.8 - 5.3 10e9/L 0.9   Absolute Monocytes Latest Ref Range: 0.0 - 1.3 10e9/L 0.5   Absolute Eosinophils Latest Ref Range: 0.0 - 0.7 10e9/L 0.2   Absolute Basophils Latest Ref Range: 0.0 - 0.2 10e9/L 0.0   Abs Immature Granulocytes Latest Ref Range: 0 - 0.4 10e9/L 0.0   Absolute Nucleated RBC Unknown 0.0   Platelet Estimate Unknown Confirming automated cell count   Sed Rate Latest Ref Range: 0 - 20 mm/h 43 (H)   INR Latest Ref Range: 0.86 - 1.14  1.02           PREVIOUS LEVEL OF FUNCTION   He was independent with all aspects of mobility and ADLs prior to this admit.  He was playing golf is very motivated      CURRENT FUNCTION   Currently he ambulated ~300 feet needing CGA. He is at times unsteady on feet and presented with abnormal gait pattern.   Pt ran into staff when ambulating due to L neglect.   Pt educated to visually scan to the left in order to increase L side awareness.   Pt required vc's to look to the left when ambulating.    In PT, he amb no AD needing CGA to min A due to instability and L neglect. 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      Past Medical History:  Past Medical History:   Diagnosis Date     Allergic rhinitis      Anemia      GERD (gastroesophageal reflux disease)      Glioblastoma (H)      Insomnia      PONV (postoperative nausea and vomiting)      Seizure (H)            Current Medications:  Current  Facility-Administered Medications   Medication     acetaminophen (TYLENOL) tablet 650 mg     benzocaine-menthol (CEPACOL) 15-3.6 MG lozenge 1 lozenge     bisacodyl (DULCOLAX) Suppository 10 mg     chlorproMAZINE (THORAZINE) tablet 10 mg     escitalopram (LEXAPRO) tablet 10 mg     famotidine (PEPCID) tablet 20 mg     hydrALAZINE (APRESOLINE) injection 10-20 mg     HYDROmorphone (DILAUDID) injection 0.2 mg    Or     HYDROmorphone (PF) (DILAUDID) injection 0.4 mg     hydrOXYzine (ATARAX) tablet 25 mg     labetalol (NORMODYNE/TRANDATE) injection 10-40 mg     levETIRAcetam (KEPPRA) tablet 1,000 mg     levothyroxine (SYNTHROID/LEVOTHROID) tablet 125 mcg     lidocaine (LMX4) cream     lidocaine 1 % 0.1-1 mL     lisinopril (ZESTRIL) tablet 5 mg     loratadine (CLARITIN) tablet 10 mg     magnesium hydroxide (MILK OF MAGNESIA) suspension 30 mL     metoclopramide (REGLAN) injection 10 mg     polyethylene glycol (MIRALAX) Packet 17 g     senna-docusate (SENOKOT-S/PERICOLACE) 8.6-50 MG per tablet 1 tablet     sodium chloride (PF) 0.9% PF flush 3 mL     sodium chloride (PF) 0.9% PF flush 3 mL     sodium phosphate (FLEET ENEMA) 1 enema     study - Hb-RMM-rLR-12 (IDS #5764) 2 x 10^11vp /0.1mL BIOHAZARDOUS intratumoral inj 0.1 mL     study - veledimex (IDS #5764) capsule 20 mg                 Labs   Lab Results   Component Value Date    WBC 3.4 (L) 06/02/2021    HGB 11.9 (L) 06/02/2021    HCT 34.8 (L) 06/02/2021    MCV 92 06/02/2021     06/02/2021     Lab Results   Component Value Date     06/02/2021    POTASSIUM 3.7 06/02/2021    CHLORIDE 105 06/02/2021    CO2 27 06/02/2021     (H) 06/02/2021     Lab Results   Component Value Date    GFRESTIMATED >90 06/02/2021    GFRESTBLACK >90 06/02/2021     Lab Results   Component Value Date    AST 26 06/02/2021    ALT 42 06/02/2021    ALKPHOS 73 06/02/2021    BILITOTAL 0.3 06/02/2021     Lab Results   Component Value Date    INR 1.02 06/02/2021     Lab Results   Component  "Value Date    BUN 12 06/02/2021    CR 0.81 06/02/2021         ON EXAMINATION:  Vitals:    06/01/21 2300 06/02/21 0727 06/02/21 1140 06/02/21 1510   BP: 127/85 122/77 120/77 122/78   BP Location: Right arm Right arm Right arm Right arm   Pulse: 84 62 86 88   Resp: 16 16 16 16   Temp: 96.4  F (35.8  C) 95.9  F (35.5  C) 97.1  F (36.2  C) 98  F (36.7  C)   TempSrc: Oral Axillary Oral Oral   SpO2: 99% 99% 99% 98%   Weight:       Height:           Physical Exam:  Blood pressure 122/78, pulse 88, temperature 98  F (36.7  C), temperature source Oral, resp. rate 16, height 1.778 m (5' 10\"), weight 71.7 kg (158 lb 1.1 oz), SpO2 98 %.     Aristeo is lying in bed is comfortable in no acute distress  His head is turned to the right, with much stimulation he moves his eyes towards the left to evaluate his environment.  Left neglect is mild to moderate  Extraocular movements are good  No nystagmus is noted  He has a mild left facial weakness  Left hemiparesis, all muscle groups are 4 out of 5, including the dorsiflexion  Right side is 5 out of 5  He has a scalp incision that is open to air and clean and dry  No bogginess is noted around the incision  Lower extremities have no edema  SKIN: no rashes or lesions noted.   EXT: No edema bilaterally, chronic stasis changes, no ulcers.         Thank you for the consult. Please call for any questions. Pager number [phone]   Total of 70 min spent in this encounter with more than 50% in counseling and coordination.   Sofia Mcmahon   Department of Rehabilitation Medicine       "

## 2021-06-02 NOTE — PROGRESS NOTES
Care Management Initial Consult    General Information  Assessment completed with: Family(completed with pt's wife as pt was sleeping), (Wife (Judith))  Type of CM/SW Visit: Initial Assessment    Primary Care Provider verified and updated as needed: Yes   Readmission within the last 30 days: no previous admission in last 30 days      Reason for Consult: discharge planning  Advance Care Planning: Advance Care Planning Reviewed: (Per wife, pt has a health care directive)  (Pt's wife will provide copy of HCD)       Communication Assessment  Patient's communication style: spoken language (English or Bilingual)    Hearing Difficulty or Deaf: no   Wear Glasses or Blind: no    Cognitive  Cognitive/Neuro/Behavioral: .WDL except, level of consciousness, speech  Level of Consciousness: lethargic, alert  Arousal Level: opens eyes spontaneously  Orientation: oriented x 4  Mood/Behavior: calm  Best Language: 0 - No aphasia  Speech: slow, logical, spontaneous    Living Environment:   People in home: spouse     Current living Arrangements: house      Able to return to prior arrangements: yes       Family/Social Support:  Care provided by: self  Provides care for: no one  Marital Status:   Wife, Children(Friends)  (Judith)       Description of Support System: Supportive    Support Assessment: Adequate family and caregiver support    Current Resources:   Patient receiving home care services: No     Community Resources: (Disability parking certificate, privately hired )  Equipment currently used at home: grab bar, tub/shower, shower chair  Supplies currently used at home:      Employment/Financial:  Employment Status: employed full-time     Employment/ Comments: (Pt did not serve in the )  Financial Concerns: No concerns identified   Referral to Financial Counselor: No       Lifestyle & Psychosocial Needs:        Socioeconomic History     Marital status:      Spouse name: Not on file     Number  of children: 2     Years of education: Not on file     Highest education level: Not on file   Occupational History     Occupation: self-employed     Tobacco Use     Smoking status: Never Smoker     Smokeless tobacco: Never Used   Substance and Sexual Activity     Alcohol use: Yes     Frequency: 2-4 times a month     Drinks per session: 3 or 4     Drug use: Not Currently     Sexual activity: Not Currently     Partners: Female       Functional Status:  Prior to admission patient needed assistance:   Dependent ADLs:: (Independent with mobility/adl's prior to hospitalization)  Dependent IADLs:: (Pt's wife completes the IADL's)       Mental Health Status:  Mental Health Status: (Not applicable)       Chemical Dependency Status:  Chemical Dependency Status: (Not applicable)             Values/Beliefs:  Spiritual, Cultural Beliefs, Muslim Practices, Values that affect care: yes  Description of Beliefs that Will Affect Care: (Anabaptist)            Additional Information:  SW received referral to see pt for discharge planning as a acute rehab stay is recommended.  Per Kristin Clement NP, pt will discharge on a study drug (Veledimex) which has already been dispensed to pt (2 week supply).  Kristin states that pt is only on the study drug for a total of 2 weeks.  Kristin also asked if pt can discharge to ARU on oral chemo agent (Avastin)     NEPTALI met with pt's wife in pt's room (pt was sleeping).  Per discussion, pt lives with his wife in a single family home. There are 3 steps (with handrail) to enter the home from the garage entrance.  The main floor of the home has a 1/2 bath.  Pt's bed and bath (step in shower) are on the 2nd floor of the home. There are 4 steps followed by a landing followed by an add'l 12-15 steps, to the 2nd floor (with a handrail).  Prior to hospitalization, pt was indep with all mobility (no assistive device, one fall in the past year that did not result in a fracture) and adl's .  Pt's wife completes the  "housekeeping, laundry, meal preparation, shopping and household financial mgnt tasks.  Pt and wife also have a privately hired  once every 3 weeks.  Pt's wife has been providing pt with transport since 12/2020.  Pt was indep with medication administration.  Pt has a backless shower chair and a suction grab bar in the shower.  Pt does not own add'l DME.  Pt has a disability parking certificate.  Pt was not utilizing add'l community resources.  Pt does not have a known .  Pt did not serve in the .  Pt continues to work.  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  Per pt's wife, pt has been working \"very part time\" as of late.  Pt practices the [employer].  Pt does not have CD/MI history.  Pt has a Health Care Directive which pt's wife will provide for scanning into [employer].  Pt's PCP is Dr. Edwards who offices at [facility] in [place].  Pt has not had add'l hospitalizations in the past 30 days.  Pt's support system includes pt's wife (Judith), adult children (Soraida who resides in [place] [facility] and Abdelrahman who resides in [place]) and friends.      Disposition planning was discussed.  Pt's wife is aware the ARU is recommended and voices agreement with this plan.  NEPTALI provided a \"Medicare Care Compare\" list and wife indicates that  ARU is first choice followed by [facility].  NEPTALI spoke with OT (Zunilda) who recommends a PMR eval.  NEPTALI obtained orders for PMR eval and spoke with Dr. Rhodes who plans to evaluate pt today.  NEPTALI phoned Admissions at  ARU (Beryl) and referred pt.   NEPTALI informed Beryl that pt will discharge on study drug. NEPTALI informed Beryl that MD would like pt to receive Avastin (oral chemo agent) during ARU stay and that pt will need a chemo infusion on 6/14/2021.  Beryl has requested that OT complete cognitive testing (NEPTALI informed OT/Iraida).      NEPTALI will follow for discharge planning.    MATTHEW Hernandez  Social Work, 6A  Phone:  " [phone]  Pager:  [phone]  6/2/2021        RACHEL Lara

## 2021-06-02 NOTE — PROGRESS NOTES
[facility]  Neurosurgery Progress Note    Subjective: no acute events overnight; feels tired; doing well    Objective:   Temp:  [96  F (35.6  C)-98  F (36.7  C)] 97  F (36.1  C)  Pulse:  [77-88] 77  Resp:  [16] 16  BP: (114-128)/(77-83) 114/77  SpO2:  [97 %-99 %] 98 %  No intake/output data recorded.    Gen: Appears comfortable, NAD  Wound: clean, dry, intact  Neurologic:  - Oriented to person, place, time, and situation  - Follows commands briskly  - Speech fluent, spontaneous. No aphasia or dysarthria.  - No gaze preference. No apparent hemineglect.  - PERRL, EOMI  - Strong eye closure, jaw clench, and cheek puff  - Face symmetric with sensation intact to light touch  - Palate elevates symmetrically, uvula midline, tongue protrudes midline  - Trapezii and sternocleidomastoid muscles 5/5 bilaterally  - R drift     Del Tr Bi WE WF Gr   R 5 5 5 5 5 5   L 4 4 4 4 4 4    HF KE KF DF PF EHL   R 5 5 5 5 5 5   L 4 4 4 4 4 4     Reflexes 2+ throughout    Sensation intact and symmetric to light touch throughout    LABS  Recent Labs   Lab Test 06/03/21  0659 06/02/21  0717 06/01/21  0640   WBC 3.5* 3.4* 3.1*   HGB 11.8* 11.9* 11.6*   MCV 92 92 93    178 155       Recent Labs   Lab Test 06/03/21  0659 06/02/21  0717 06/01/21  0640    136 134   POTASSIUM 3.8 3.7 3.8   CHLORIDE 105 105 103   CO2 25 27 27   BUN 11 12 13   CR 0.72 0.81 0.84   ANIONGAP 5 4 4   JUDIE 8.5 8.6 8.7   * 108* 104*     Imaging:  No new imaging      Assessment:  Erasto Maher is a 56 year old male s/p right craniotomy for tumor resection and ziopharm study virus injection (5/27/21); he has been clinically stable.    KPS 70    Brain cancer  Thrombocytopenia   Cerebral Compression   Cerebral Edema       Plan:  - Serial neuro exams  - follow research protocol instructions for medication  -- ok for study drug for the remaining days (total 14 days)  - Ancef 72 hrs (completed)  - Keppra  - Pain control  - HOB  > 30 degrees  - Regular diet  - Bowel regimen  - PRN antiemetics  - PT/OT: ARU  - SCDs for DVT proph, no hep for dvt ppx due to research protocol  - medically ready for discharge      Ander Jenkins MD, PhD  Neurosurgery Resident PGY-2    Please contact neurosurgery resident on call with questions.    Dial * * [phone], enter [phone] when prompted.

## 2021-06-02 NOTE — PROGRESS NOTES
Status: POD #6 of right craniotomy, has glioblastoma and has had multiple previous brain surgeries  Vitals: VSS on RA  Neuros: Slow to respond. Left strength 3/5, right strength 5/5. Left pronator drift, left neglect.   IV: PIV SL  Resp/trach: WDL  Diet: Regular diet with thin liquids.  Bowel status: Last BM 5/30/21. BS +  : Voiding freely.   Skin: Crani incision BECKI CDI. Mepilex on coccyx  Pain: Denies  Activity: Up with assist of 1-2 with gaitbelt. Walk on patients left side.   Plan: Work with PT in am. Follow POC

## 2021-06-03 PROBLEM — C71.9 GBM (GLIOBLASTOMA MULTIFORME) (H): Status: ACTIVE | Noted: 2021-01-01

## 2021-06-03 NOTE — PROGRESS NOTES
Care Management Follow Up    Length of Stay (days): 7    Expected Discharge Date: 06/03/21     Concerns to be Addressed: discharge planning     Patient plan of care discussed at interdisciplinary rounds: Yes    Anticipated Discharge Disposition: Short term acute rehab placement     Anticipated Discharge Services:  Short term acute rehab placement  Anticipated Discharge DME: Not applicable     Patient/family educated on Medicare website which has current facility and service quality ratings: yes(Medicare Care Compare list provided)  Education Provided on the Discharge Plan:  yes  Patient/Family in Agreement with the Plan:  yes    Referrals Placed by CM/SW: Post Acute Facilities  Private pay costs discussed: Not applicable at this time.    Additional Information:  SW is following pt for discharge planning.  Per review of 6/2/2021 PMR progress note entry, ARU is recommended and a 6-7 day stay in ARU is anticipated.  NEPTALI spoke with Kristin Clement Neuro Surgery NP who confirms that pt is medically ready for discharge.   NEPTALI phoned Admissions at  ARU informing of the above. NEPTALI will await a confirmation from  ARU regarding acceptance and bed availability for today.    MATTHEW Hernandez  Social Work, 6A  Phone:  [phone]  Pager:  [phone]  6/3/2021          RACHEL Lara

## 2021-06-03 NOTE — PLAN OF CARE
PT 6A: Discharge orders in place. PT spoke with wife to answer questions about ARU regarding frequency, rehab potential and timeline.  Wife satisfied with explanation, no other rehab needs at this time considering discharge.     Physical Therapy Discharge Summary    Reason for therapy discharge:    Discharged to acute rehabilitation facility.    Progress towards therapy goal(s). See goals on Care Plan in [facility] electronic health record for goal details.  Goals partially met.  Barriers to achieving goals:   discharge from facility.    Therapy recommendation(s):    Continued therapy is recommended.  Rationale/Recommendations:  ARU.

## 2021-06-03 NOTE — PROGRESS NOTES
CLINICAL NUTRITION SERVICES - BRIEF NOTE      RN consult received regarding Pt and pt's wife Judith hoping to start nutritional supplement (boost) d/t pt not feel he is eating enough and does not like hospital food.      Intervention: Met with Pt and Spouse regarding list of nutritional supplements available to order with meals. Explained that Boost no longer available on hospital formulary d/t change in vendor and now have Ensure Enlive supplement available which is equivalent to Boost. Pt and spouse receptive to information.  Informed by pt's spouse that pt is now discharging to ARU this afternoon. Recommended to request RD consult at ARU for receiving Ensure Enlive and to help with menu options to improve po intakes during Rehab stay.    Beryl Santiago RD,LD  6A/7D pager [phone]

## 2021-06-03 NOTE — CONSULTS
"Social Work: Initial Assessment with Discharge Plan    Patient Name: Erasto Maher  : 1965  Age: 56 year old  MRN: [id number]  Completed assessment with: Interview w/ pt and wife, Judith, and chart review  Admitted to ARU: 6/3/2021    Presenting Information   Date of SW assessment: Lea 3, 2021  Health Care Directive: Provided Copy - pt's wifeJudith, emailed completed copy of HCD to SW. With their permission, SW [facility] the [facility] to get copied into [employer].   Primary Health Care Agent: Pt  Secondary Health Care Agent: WifeJudith  Living Situation: Lives in a house w/ wifeJudith, in [place], [place]. The main floor of the home has a 1/2 bath.  Pt's bed and bath (step in shower) are on the 2nd floor of the home. There are 4 steps followed by a landing followed by an add'l 12-15 steps, to the 2nd floor (with a handrail)  Previous Functional Status: IND w/ mobility, ADLs, transfers and gait. Judith completes the housekeeping, laundry, meal preparation, shopping and finances. Privately hired  once every 3 weeks.  Pt's wife provides transportation.  Pt was IND with medication administration. Pt has a disability parking certificate.   DME available:  Backless shower chair and a suction grab bar in the shower  Patient and family understanding of hospitalization: Appropriate  Cultural/Language/Spiritual Considerations: English speaking male. Mosque.      Physical Health  Reason for admission: Glioblastoma multiforme    \"Erasto Maher is a 56 year old male with GBM s/p right craniotomy for tumor resection and ziopharm study virus injection (21). He is currently below baseline with deficits in strength, mobility, performing ADLs. Admission to acute inpatient rehab 2021.\"    Provider Information   Primary Care Physician:[facility] in [place]. Last appointment a couple months ago per pt.  : None    Mental Health/Chemical " "Dependency:   Diagnosis: None  Alcohol/Tobacco/Narcotis: None  Support/Services in Place: NA  Services Needed/Recommended: Supportive services available by consult (Health Psychology and D Lo services)   Sexuality/Intimacy: Not discussed     Support System  Marital Status: Wife, Judith  Family support: Daughter, Soraida ([facility]). Son, Abdelrahman ([place]).   Other support available: Friends    Community Resources  Current in home services: Privately hired  once every 3 weeks.  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 Per Ashley, pt has been working \"very part time.\"  Income Source: Wages  Education: JUANI from [employer] in [facility]  Financial Concerns:  None reported  Insurance: [employer]      Discharge Plan   Patient and family discharge goal: Discharge home w/ recommended services  Provided Education on discharge plan: Yes  Patient agreeable to discharge plan:  Yes  Provided education and attained signature for Medicare IM and IRF Patient Rights and Privacy Information provided to patient : NA  Provided patient with [facility] Resources: NA  Barriers to discharge: Medical clearance, therapy goals    Discharge Recommendations   Disposition: See above   Transportation Needs: Family assistance   Name of Transportation Company and Phone: N/A     Additional comments   10/15 on [facility]. Discharge date and needs pending.     Please invite to Care Conference:  Wife, Judith Durant MSW, [facility]  Flostefania                "

## 2021-06-03 NOTE — PROGRESS NOTES
Care Management Discharge Note    Discharge Date: 06/03/21       Discharge Disposition:  Acute rehab placement at [facility] ([phone])    Discharge Services:   Acute rehab placement    Discharge DME:  Not applicable    Discharge Transportation:   NEPTALI spoke with pt's floor nurse (Susan) who indicates that pt can be transported by w/c.  NEPTALI arranged for  [facility] (Pardeep [phone]) to provide w/c transport at 12:45pm.    Private pay costs discussed: Not applicable    PAS Confirmation Code:  Not required as pt is admitting to ARU setting  Patient/family educated on Medicare website which has current facility and service quality ratings: yes(Medicare Care Compare list provided)    Education Provided on the Discharge Plan:  Yes  Persons Notified of Discharge Plans: Pt, wife, 6A nursing and Kristin Clement NP who states that Dr. Chan is also agreeable to the discharge plan  Patient/Family in Agreement with the Plan:  Pt and wife voice understanding of the discharge plan and agreement with the discharge plan.    Handoff Referral Completed: Yes    Additional Information:  - Kristin Clement NP has confirmed readiness for discharge  - [facility] for [facility]  - SW has educated pt's wife about visiting policy at [facility] (1 consistent designated masked visitor throughout stay)      MATTHEW Hernandez  Social Work, 6A  Phone:  [phone]  Pager:  [phone]  6/3/2021            RACHEL Lara

## 2021-06-03 NOTE — PLAN OF CARE
Pt POD#7 R crani, vss, neuros include: a/o x4, slow speech, L neglect, L pronator drift (improvement), L 4/5, R 5/5. PIV removed per orders, regular diet with assistance with ordering and some tray set-up, voiding spont, had BM this morning, up SBA w/GB. Pt needs BA/CA on @ all times, RN gave study drug to his wife and was instructed to give to ARU RN. Pt's cranial incision BECKI, denies pain, received PRN pepcid, showered with therapy assistance, up in chair, declined lunch but ate well for breakfast. RN attempted to call for report but receiving RN busy, will attempt again.

## 2021-06-03 NOTE — PROGRESS NOTES
Status: POD #7 of right craniotomy, has glioblastoma and has had multiple previous brain surgeries  Vitals: VSS on RA  Neuros: Slow to respond. Left strength 4/5, right strength 5/5. Left pronator drift, left neglect.   IV: PIV SL  Resp/trach: WDL  Diet: Regular diet  Bowel status: Last BM 5/31/21. BS +  : Voiding freely.   Skin: Crani incision BECKI CDI. Pain: Denies  Activity: Up with assist of 1-2 with gaitbelt. Walk on patients left side.   Plan: Follow POC

## 2021-06-03 NOTE — PROGRESS NOTES
SW received copy of pt's Health Care Directive and placed in the [facility] chart (due to a computer issue, SW is not able to email the directive to self and then forward to [employer] - which is the process required by [employer]).    MATTHEW Hernandez  Social Work, 6A  Phone:  [phone]  Pager:  [phone]  6/3/2021

## 2021-06-03 NOTE — DISCHARGE SUMMARY
"[facility] Discharge Summary and Instructions    Erasto Maher MRN# [id number]   Age: 56 year old YOB: 1965     Date of Admission:  5/27/2021  Date of Discharge::  6/3/2021  Admitting Physician:  Marquise Chan MD  Discharge Physician:  Marquise Chan MD          Admission Diagnoses:   Glioblastoma (H) [C71.9]  Examination of participant or control in clinical research [Z00.6]          Discharge Diagnosis:     Glioblastoma (H) [C71.9]  Examination of participant or control in clinical research [Z00.6]     In addition to the assessment of diagnoses detailed above, this 56 year old male  patient admitted from home who has the following conditions contributing to the complexity of their medical care:    Brain cancer,           Procedures:   5/27/2021  1) Craniotomy for [id number] injection  2) Neuro-navigation  3) Intraoperative MRI           Brief History of Illness:   Mr. 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 He completed chemoradiotherapy on 8/30/2019 and was managed on a clinical trial receiving atezolizumab (anti-PDL1) plus adjuvant temozolomide. He received his last dose of immunotherapy on 3/16/2020 when imaging on 3/28/2020 showed progression of disease. He underwent a repeat craniotomy for tumor resection with Dr. Chan on 4/9/2020 with placement of GammaTiles.      Aristeo was managed on lomustine monotherapy. However, imaging in 11/2020 showed a new distant lesion consistent with disease progression.      He completed a repeat course of radiation, then a repeat resection on 1/13/2021 plus use of the compassionate use Ziopharm treatment protocol that utilizes an intratumoral adenovirus injection activating human interleukin-12 + veledimex in combination with adjuvant nivolumab. Pathology was consistent " with recurrent glioblastoma. He was receiving nivolumab + bevacizumab until imaging in 3/2021 showed an area of contrast enhancement.      Aristeo underwent a biopsy + GÓMEZ on 4/21/2021 to both the frontal and parietal lesions and pathology showed small pieces of recurrent glioblastoma with predominant necrosis.  The patient presents on 5/27/2021 for repeat craniotomy for virus injection.        Hospital Course:   Following surgery the patient was monitoring in the neurosurgical ICU.   The patient denied significant pain or headache immediately following surgery.    Given recent virus injection the patient did develop fevers, patient was cultured to ensure no systemic infection was noted and all cultures returned negative.  The patient was given the study drug Veledimex post operatively and this will continue for 14 total days.   Symptoms that may be associated with the drug include myalgias, fever, and fatigue.    The patient initially experienced hemibody weakness immediately but this had improved significantly prior to discharge.   PT and OT followed the patient throughout his stay and felt that due to the fact hes below his functional baseline he would benefit from ongoing therapy at acute rehab.    On 6/3/2021 the patient was deemed ready for discharge to an acute rehab facility.  Prior to discharge the patient was tolerating diet, voiding without arauz, passing bowel movements, ambulating with assistance and medically stable.             Discharge Medications:     Current Discharge Medication List      START taking these medications    Details   polyethylene glycol (MIRALAX) 17 g packet Take 17 g by mouth 2 times daily for 14 days  Qty: 28 packet, Refills: 0    Associated Diagnoses: Glioblastoma (H)      senna-docusate (SENOKOT-S/PERICOLACE) 8.6-50 MG tablet Take 1 tablet by mouth 2 times daily for 14 days  Qty: 28 tablet, Refills: 0    Associated Diagnoses: Glioblastoma (H)         CONTINUE these medications  which have CHANGED    Details   acetaminophen (TYLENOL) 325 MG tablet Take 2 tablets (650 mg) by mouth every 4 hours as needed for other (For optimal non-opioid multimodal pain management to improve pain control.)  Qty:        escitalopram (LEXAPRO) 10 MG tablet Take 1 tablet (10 mg) by mouth every morning  Qty:      Comments: Depression      fluticasone (FLONASE) 50 MCG/ACT nasal spray Spray 1 spray into both nostrils At Bedtime  Qty:      Comments: Allergic rhinitis      levETIRAcetam (KEPPRA) 1000 MG tablet Take 1 tablet (1,000 mg) by mouth 2 times daily  Qty: 180 tablet, Refills: 1    Comments: Seizure ppx  Associated Diagnoses: Generalized tonic-clonic seizure (H); Glioblastoma (H); Other secondary hypertension      levothyroxine (SYNTHROID/LEVOTHROID) 125 MCG tablet Take 1 tablet (125 mcg) by mouth every morning  Qty: 30 tablet, Refills: 3    Comments: Hypothyroidism  Associated Diagnoses: Generalized tonic-clonic seizure (H); Glioblastoma (H); Other secondary hypertension      lisinopril (ZESTRIL) 5 MG tablet Take 1 tablet (5 mg) by mouth daily  Qty: 30 tablet, Refills: 3    Comments: HTN  Associated Diagnoses: Other secondary hypertension; Generalized tonic-clonic seizure (H); Glioblastoma (H)      loratadine (CLARITIN) 10 MG tablet Take 1 tablet (10 mg) by mouth At Bedtime  Qty:      Comments: Seasonal allergies      study - velelzax, IDS #[id number], capsule Take 1 capsule (20 mg) by mouth daily  Qty: 1 capsule, Refills: 0    Comments: PATIENT HAS OWN SUPPLY  Associated Diagnoses: Glioblastoma (H)         CONTINUE these medications which have NOT CHANGED    Details   Nutritional Supplements (BOOST BREEZE) LIQD Take 1 Can by mouth 3 times daily    Comments: 1 can between meals         STOP taking these medications       olopatadine (PATANOL) 0.1 % ophthalmic solution Comments:   Reason for Stopping:         omeprazole (PRILOSEC) 20 MG DR capsule Comments:   Reason for Stopping:         Psyllium 500 MG CAPS  Comments:   Reason for Stopping:           Objective:   Temp:  [96  F (35.6  C)-98  F (36.7  C)] 97  F (36.1  C)  Pulse:  [77-88] 77  Resp:  [16] 16  BP: (114-128)/(77-83) 114/77  SpO2:  [97 %-99 %] 98 %  No intake/output data recorded.     Gen: Appears comfortable, NAD  Wound: clean, dry, intact  Neurologic:  - Oriented to person, place, time, and situation  - Follows commands briskly  - Speech fluent, spontaneous. No aphasia or dysarthria.  - No gaze preference. No apparent hemineglect.  - PERRL, EOMI  - Strong eye closure, jaw clench, and cheek puff  - Face symmetric with sensation intact to light touch  - Palate elevates symmetrically, uvula midline, tongue protrudes midline  - Trapezii and sternocleidomastoid muscles 5/5 bilaterally  - R drift       Del Tr Bi WE WF Gr   R 5 5 5 5 5 5   L 4 4 4 4 4 4     HF KE KF DF PF EHL   R 5 5 5 5 5 5   L 4 4 4 4 4 4      Reflexes 2+ throughout     Sensation intact and symmetric to light touch throughout                 Discharge Instructions and Follow-Up:     Discharge diet: Regular   Discharge activity: You may advance activity as tolerated. No strenuous exercise or heay lifting greater than 10 lbs for 4 weeks or until seen and cleared in clinic.   Discharge follow-up: Please follow-up with Dr. Marquise Chan on 6/11/2021 @ 2:45 pm    Please follow-up with Dr. Ericka Avila on 6/14/2021 @ 7:30 am     Wound care: Ok to shower,however no scrubbing of the wound and no soaking of the wound, meaning no bathtubs or swimming pools. Pat dry only. Leave wound open to air.  Patient to have wound checked 2 weeks following surgery.    Wound location: cranial  Closure technique: monocryl  Dressing needs: none  Post-op care at follow-up: keep dry and clean     Please call if you have:  1. increased pain, redness, drainage, swelling at your incision  2. fevers > 101.5 F degrees  3. with any questions or concerns.  You may reach the Neurosurgery clinic at [phone] during regular work  hours. ER at [phone].    and ask for the Neurosurgery Resident on call at [phone], during off hours or weekends.         Discharge Disposition:     Discharged to home        RUPA Mac, CNP  Department of Neurosurgery  Pager: [phone]

## 2021-06-03 NOTE — PLAN OF CARE
Status: POD #6 of right craniotomy, has glioblastoma and has had multiple previous brain surgeries  Vitals: VSS on RA  Neuros: A&Ox4 but slow to respond, PERRLA, left strengths 4/5, right strengths 5/5, denies numbness/tingling, left pronator drift present, severe left neglect  IV: PIV SL, replaced this shift  Resp/trach: Denies SOB, clear LS  Diet: Regular diet, poor intake this shift. Nutrition was consulted in hopes of receiving a nutritional supplement such as boost (RN discussed with patient and patient's wife)  Bowel status: No BM this shift, audible BS, scheduled bowel meds given  : Voiding spontaneously without difficulty  Skin: Crani incision BECKI, clean and dry  Pain: Denies  Activity: Up with assist of 1-2 and gait belt  Social: Pt's wife present for the afternoon  Updates this shift: Patient likely to discharge to ARU, see SW note for information regarding discharge

## 2021-06-03 NOTE — INTERIM SUMMARY
[facility] Pre-Admission Screen    Referral Source:  [facility] [facility] 6214-02  Admit date to referring facility: 5/27/2021    Physical Medicine and Rehab Consult Completed: Yes Completed by Dr. Mcmahon on 6/2/2021 with recommendations for ARC    Rehab Diagnosis:    Brain Dysfunction 02.1 Non-Traumatic - s/p right craniotomy for tumor resection and ziopharm study virus injection.     Justification for Acute Inpatient Rehabilitation  Mr. Erasto Maher is a Right handed 56 year old yo male, initially diagnosed with glioblastoma multiforme in June 2019, underwent initial resection in March 2020 with radiation and chemotherapy in a clinical trial at [facility], admitted on 5/27 for reoccurrence.Pt is s/p right craniotomy for tumor resection and ziopharm study virus injection. Pt  is currently in a research study with the Veledimex for a total of 15 days, he was started on 27 May and will complete his research study medication on 10 Lea. Plan is for chemo infusion on 6/14/21 following discharge from [facility]. Pt is medically ready to admit to Acute Inpatient Rehab Unit.      Patient requires an intensive inpatient rehab program to address the following acute impairments:impaired strength, impaired activity tolerance, impaired balance, impaired cognition and impaired judgement and safety awareness, left side weakness, left neglect.     Current Active Medical Management Needs/Risks for Clinical Complications  The patient requires the high level of rehabilitation physician supervision that accompanies the provision of intensive rehabilitation therapy.  The patient needs the services of the rehabilitation physician to assess the patient medically and functionally and to modify the course of treatment as needed to maximize the patient's capacity to benefit from the rehabilitation process.    Neuro - Manage changes in neuro status/neuro checks. Pt  is currently in a  research study with the Veledimex for a total of 15 days, he was started on 27 May and will complete his research study medication on 10 Lea. Continue Keppra.    Prophylaxis - SCDs for DVT proph, no hep for dvt ppx due to research protocol    Manage pain    ID - Monitor s/s of infection at incision site. Completed Ancef.    Past Medical/Surgical History   Surgery in the past 100 days: Yes   Additional relevant past medical history: glioblastoma multiforme in June 2019, underwent initial resection in March 2020 with radiation and chemotherapy in a clinical trial at [facility], anemia, GERD, Seizure    Level of Functioning Prior to Admission:    LIVING ENVIRONMENT   People in home: spouse   Current Living Arrangements: house   Home Accessibility: stairs to enter home, stairs within home    Number of Stairs, Main Entrance: 3         Number of Stairs, Within Home, Primary: (flight up to bedroom)   Transportation Anticipated: family or friend will provide   Living Environment Comments: Patient lives with his wife in house with 3 steps to enter and full flight of stairs to upper level. Has not been driving due to symptoms, wife drives, hired lawn care this year.    SELF-CARE   Usual Activity Tolerance: excellent   Regular Exercise: Yes   Equipment Currently Used at Home: grab bar, tub/shower, shower chair   Activity/Exercise/Self-Care Comment: Patient is independent with all mobility, per wife left leg starts to drag if fatigued but no falls or other mobility concerns. 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     DISABILITY/FUNCTION   Concentrating, Remembering or Making Decisions Difficulty: yes    Concentration Management: delayed responses, forgetfulness    Difficulty Communicating: yes    Communication: other (see comments)    Communication Management: slowed speech   Difficulty Eating/Swallowing: no   Walking or Climbing Stairs Difficulty: no   Dressing/Bathing Difficulty: no   Toileting issues:  no   Doing Errands Independently Difficulty (such as shopping): yes    Errands Management: patient has not returned to driving, wife assists with errands   Fall history within last six months: no; Number of times patient has fallen within last six months: 1   Change in Functional Status Since Onset of Current Illness/Injury: yes  Additional Comments:     Level of Function: GG Scale (Section GG Functional Ability and Goals; CMS's OTT Version 3.0 Manual effective 10.1.2019):  PT Current Function Goals for Rehab   Bed Rolling 4 Supervision or touching assitance 6 Independent   Supine to Sit 4 Supervision or touching assitance 6 Independent   Sit to Stand 3 Partial/moderate assistance 6 Independent   Transfer 3 Partial/moderate assistance 6 Independent   Ambulation 3 Partial/moderate assistance 6 Independent   Stairs Not completed 6 Independent     OT Current Function Goals for Rehab   Feeding 5 Setup or clean-up assistance 6 Independent   Grooming 4 Supervision or touching assitance 6 Independent   Bathing 3 Partial/moderate assistance 6 Independent   Upper Body Dressing 3 Partial/moderate assistance 6 Independent   Lower Body Dressing 3 Partial/moderate assistance 6 Independent   Toileting 4 Supervision or touching assitance 6 Independent   Toilet Transfer 3 Partial/moderate assistance 6 Independent   Tub/Shower Transfer Not completed 6 Independent   Cognition Impaired Supervision     SLP Current Function Goals for Rehab   Swallow Not Impaired Tolerate least restrictive diet without signs & symptoms of aspiration and adhere to safe swallow strategies   Communication Not Assessed Communicate basic needs effectively       Current Diet:  Regular diet and Thin liquids    Summary Statement:  Pt requires min A for STS without AE. Pt ambulates room <> bathroom with min A, unsteady on feet, difficulty attending to L side with all tasks. Pt completes TB bathing with overall min A to wash back and hair to avoid scrubbing on  incisions. Pt required max VC for sequencing. Pt completed g/h tasks in standing with set- up A and CGA for safety. Mod A for LE dressing as unable to dress L side of body, needed VC for finding LLE. Pt requires SLP services for full cognitive evaluation and treatment due to need for verbal cueing of sequencing during therapy tasks.     Expected Therapies and Services Required During Inpatient Rehab Admission  Intensity of Therapy: Patient requires intensive therapies not available in a lesser level of care. Patient is motivated, making gains, and can tolerate 3 hours of therapy a day.  Physical Therapy: 60 minutes per day, 7 days a week for 9  days  Occupational Therapy: 60 minutes per day, 7 days a week for 9  days  Speech and Language Therapy: 60 minutes per day, 9 days a week for 7 days  Rehabilitation Nursing Needs: Patient requires 24 hour Rehab Nursing to manage vitals, medication education, carryover of new rehab techniques, care coordination, skin integrity, assess neurologic status, pain management and provide safe environment for patient at falls risk.    Precautions/restrictions/special needs:   Precautions: fall precautions and Craniotomy precautions   Restrictions: N/A   Special Needs: N/A  Designated Visitor: Judith Maher (Wife)    Expected Level of Improvement: Modified independence without an assistive device for mobility, modified independence with 4+12 stairs to access his bedroom and bathroom, and modified independent with his ADLs.  He will require supervision with his IADLs.   Expected Length of time to achieve: 9 days    Anticipated Discharge Needs:  Anticipated Discharge Destination: Home  Anticipated Discharge Support: Family member and Friends  24/7 support available : Yes  Identified caregiver(s):  Wife  Anticipated Discharge Needs: Home with outpatient therapy    Identified challenges/barriers:  N/A      Rehab Admissions Liaison Signature:       Physician statement of review and  agreement:  I have reviewed and am in agreement of the need for IRF stay to address above functional and medical needs. In addition to above statements address, Patient requires intensive active and ongoing therapeutic intervention and multiple therapies; Patient requires medical supervision; Expected to actively participate in the intensive rehab program; Sufficiently stable to actively participate; Expectation for measurable improvement in functional capacity or adaption to impairments.    Physician Signature:                                           Date/Time:

## 2021-06-03 NOTE — H&P
[facility]   Acute Rehabilitation Unit  Admission History and Physical    Chief Complaint  Glioblastoma multiforme     History of Present Illness     HPI  Erasto Maher is a 56 year old male with 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 S/P Stealth guided resection 06/27/019. He completed chemoradiotherapy on 8/30/2019 and was managed on a clinical trial receiving atezolizumab plus adjuvant temozolomide. He received his last dose of immunotherapy on 3/16/2020 when imaging on 3/28/2020 showed progression of disease. He underwent a repeat craniotomy for tumor resection on 4/9/2020 with placement of GammaTiles.     Aristeo was managed on lomustine monotherapy. However, imaging in 11/2020 showed a new distant lesion consistent with disease progression. He completed a repeat course of radiation, then a repeat resection on 1/13/2021 plus use of the compassionate use Ziopharm treatment protocol. Pathology was consistent with recurrent glioblastoma. He was receiving nivolumab + bevacizumab until imaging in 3/2021 showed an area of contrast enhancement. Aristeo underwent a biopsy + GÓMEZ on 4/21/2021 to both the frontal and parietal lesions and pathology showed small pieces of recurrent glioblastoma with predominant necrosis.     He presented to [facility] on 05/26/2021 for additional viral injection of tumor. Following surgery the patient was evaluated by PT, OT and PMR service. All specialties collectively recommended that patient would benefit from ongoing therapies in the acute inpatient rehabilitation setting.     In review of the therapy notes:    Physical Therapy:  Range of Motion (ROM)   ROM Comment BLE WFL   Strength   Strength Comments LLE weakness, demonstrates at least 3+/5 LLE strength with mobility   Bed Mobility   Comment (Bed Mobility) Supine>sit with mod A   Transfers   Transfer Safety Comments sit<>stand with  min Ax2   Gait/Stairs (Locomotion)   Comment (Gait/Stairs) Ambulates with min Ax2 (hand hold assist + holding onto gait belt), demonstrates left lateral lean, left inattention, decreased L foot clearance, NBOS   Balance   Balance Comments requires Ax1-2 for static standing balance and ambulation     Occupational Therapy:  Bed Mobility   Bed Mobility supine-sit   Supine-Sit Hart (Bed Mobility) moderate assist (50% patient effort)   Transfers   Transfers bed-chair transfer;sit-stand transfer;toilet transfer   Transfer Skill: Bed to Chair/Chair to Bed   Bed-Chair Hart (Transfers) verbal cues;minimum assist (75% patient effort);2 person assist   Assistive Device (Bed-Chair Transfers) standard walker   Transfer Comments step by step verbal cues   Sit-Stand Transfer   Sit-Stand Hart (Transfers) minimum assist (75% patient effort);2 person assist   Assistive Device (Sit-Stand Transfers) walker, standard   Toilet Transfer   Type (Toilet Transfer) sit-stand;stand-sit   Hart Level (Toilet Transfer) minimum assist (75% patient effort);2 person assist   Assistive Device (Toilet Transfer) grab bars/safety frame;walker, standard   Toilet Transfer Comments step by step cues   Balance   Balance Comments impaired; L lateral lean in sitting and standing; constant min A to maintain balance/upright posture during mobility     Currently, the patient is medically appropriate and is assessed to have needs and will benefit from an inpatient acute rehabilitation comprehensive program working with PT, OT, SLP and will also benefit from supervision and management of Rehab Nursing and Rehab MD.     Review of Systems  A 10 point ROS was performed and negative unless otherwise noted in HPI.     Past Medical History  Past Medical History:   Diagnosis Date     Allergic rhinitis      Anemia      GERD (gastroesophageal reflux disease)      Glioblastoma (H)      Insomnia      PONV (postoperative nausea and vomiting)       Seizure (H)      Surgical History  Past Surgical History:   Procedure Laterality Date     APPENDECTOMY  11/1981     COLONOSCOPY       CRANIOTOMY  06/28/2019     HERNIA REPAIR      x2     MRI CRANIOTOMY Right 1/13/2021    Procedure: Image Guided Craniotomy, autoguide, virus injection;  Surgeon: Marquise Chan MD;  Location: [facility] OR     MRI CRANIOTOMY LASER ABLATION Right 4/21/2021    Procedure: INTRAOPERATIVE MAGNETIC RESONANCE IMAGING Monteris LASER ABLATION, WITH OPTICAL TRACKING SYSTEM Biopsy;  Surgeon: Marquise Chan MD;  Location: [facility] OR     MRI CRANIOTOMY WITH OPTICAL TRACKING SYSTEM Right 4/9/2020    Procedure: intraoperative magnetic resonance imaging/stealth assisted right craniotomy, with gammatile placement;  Surgeon: Marquise Chan MD;  Location: [facility] OR     MRI CRANIOTOMY WITH OPTICAL TRACKING SYSTEM Right 5/27/2021    Procedure: INTRAOPERATIVE Magnetic resonance imaging/Stealth Assisted Right  CRANIOTOMY;  Surgeon: Marquise Chan MD;  Location: [facility] OR     Family History  Family History   Problem Relation Age of Onset     Hypotension Mother      Myocardial Infarction Father      Pacemaker Father      Endocrine Disease Father         prediabetes     No Known Problems Brother      Anesthesia Reaction No family hx of      Deep Vein Thrombosis (DVT) No family hx of      MEDICATIONS  Medications Prior to Admission   Medication Sig Dispense Refill Last Dose     acetaminophen (TYLENOL) 325 MG tablet Take 2 tablets (650 mg) by mouth every 4 hours as needed for other (For optimal non-opioid multimodal pain management to improve pain control.)        escitalopram (LEXAPRO) 10 MG tablet Take 1 tablet (10 mg) by mouth every morning        fluticasone (FLONASE) 50 MCG/ACT nasal spray Spray 1 spray into both nostrils At Bedtime        levETIRAcetam (KEPPRA) 1000 MG tablet Take 1 tablet (1,000 mg) by mouth 2 times daily 180 tablet 1      levothyroxine (SYNTHROID/LEVOTHROID) 125 MCG  "tablet Take 1 tablet (125 mcg) by mouth every morning 30 tablet 3      lisinopril (ZESTRIL) 5 MG tablet Take 1 tablet (5 mg) by mouth daily 30 tablet 3      loratadine (CLARITIN) 10 MG tablet Take 1 tablet (10 mg) by mouth At Bedtime        Nutritional Supplements (BOOST BREEZE) LIQD Take 1 Can by mouth 3 times daily        polyethylene glycol (MIRALAX) 17 g packet Take 17 g by mouth 2 times daily for 14 days 28 packet 0      senna-docusate (SENOKOT-S/PERICOLACE) 8.6-50 MG tablet Take 1 tablet by mouth 2 times daily for 14 days 28 tablet 0      study - veledimex, IDS #[id number], capsule Take 1 capsule (20 mg) by mouth daily 1 capsule 0      ALLERGIES     Allergies   Allergen Reactions     No Clinical Screening - See Comments Rash     Cats and dogs         SOCIAL HISTORY  Marital Status:   Living situation: House, 3 steps to get in, bedroom upstairs (full flight)  Family support: 2 children, one in [place], one out [facility]  Vocational History: Business owner  Tobacco use: Never smoker  Alcohol use: 2 - 4 times a month  Illicit drug use: Denies    PRIOR FUNCTIONAL HISTORY   Pt was independent with all ADLs/IADLs, transfers, mobility and gait. Patient and wife do report a slow progressive decline after each surgery with baseline left side neglect however his current presentation is drastically different from 1 week ago.     Objective      Examination     Vital Signs:  /84 (BP Location: Left arm)   Pulse 86   Temp 95.6  F (35.3  C) (Oral)   Resp 18   Ht 1.778 m (5' 10\")   Wt 72.7 kg (160 lb 3.4 oz)   SpO2 98%   BMI 22.99 kg/m      Physical Examination  General: NAD, pleasant and cooperative   HEENT: ATNC, oropharynx clear with MMM, PERRL  Pulmonary: clear breath sounds b/l, no rales/wheezing   Cardiovascular: RRR, no murmur  Abdominal: soft, non-tender, non-distended  Extremities: warm, well perfused, no edema in bilateral lower extremities, no tenderness in calves  MSK/neuro:   Mental Status:  alert and " oriented x3    Cranial Nerves:   1. 2nd CN: Pupils equal, round, reactive to light and accomodation. and homonomous otis anopsia or left visual field. intact to confrontation.   2. 3rd,4th,6th CN:  EOMI, Unable to fully tract with left side due to field cut but able to move eyes left if prompted  3. 5th CN: facial sensation intact   4. 7th CN: face symmetrical   5. 8th CN: functional hearing bilaterally  6. 9th, 10th CN: palate elevates symmetrically   7. 11th CN: sternocleidomastoids and trapezii strong   8. 12th CN: tongue midline and without fasciculations     Sensory: Unable to feel light touch at left plantar foot and left dorsal hand. Right side intact   Strength:    SF  EF  EE  WE  G  I  HF  KE  DF  EHL  PF   R  5/5 5/5 5/5 5/5 5/5 5/5 4+/5 5/5 5/5 5/5 5/5  L  5/5 5/5 5/5 5/5 4/5 5/5 4/5 5/5 5/5 5/5 5/5    Torres's test: negative bilaterally   Babinski reflex: downgoing bilaterally    Tone per modified Duane Scale: No increase tone   Abnormal movements: None   Coordination: No dysmetria on finger to nose b/l. Delayed velocity on left side   Speech: Grossly intact   Cognition: Grossly intact   Gait: Not assess     Labs     Lab Results   Component Value Date    WBC 3.5 (L) 06/03/2021    HGB 11.8 (L) 06/03/2021    HCT 35.2 (L) 06/03/2021    MCV 92 06/03/2021     06/03/2021     Lab Results   Component Value Date     06/03/2021    POTASSIUM 3.8 06/03/2021    CHLORIDE 105 06/03/2021    CO2 25 06/03/2021     (H) 06/03/2021     Lab Results   Component Value Date    GFRESTIMATED >90 06/03/2021    GFRESTBLACK >90 06/03/2021     Lab Results   Component Value Date    AST 25 06/03/2021    ALT 43 06/03/2021    ALKPHOS 74 06/03/2021    BILITOTAL 0.3 06/03/2021     Lab Results   Component Value Date    INR 0.98 06/03/2021     Lab Results   Component Value Date    BUN 11 06/03/2021    CR 0.72 06/03/2021        Imaging     MRI BRAIN 05/27/2021  Impression: Limited imaging primarily for the purposes of  stereotactic  localization.  Postsurgical craniotomy changes of the right parietal convexity and  ablation changes of the right frontal lobe with increased areas of  nodular enhancement including new areas of nodular enhancement in the  right temporal lobe as described above concerning for disease  Progression.    CT HEAD 06/01/2021  Impression:  1. Stable post surgical changes of right frontoparietal mass resection  and gamma tile placement. Right parasagittal parietal lobe lesion is  unchanged.  2. No evidence of new intracranial hemorrhage, midline shift, or  hydrocephalus.      Assessment     Erasto Maher is a 56 year old male with GBM s/p right craniotomy for tumor resection and ziopharm study virus injection (5/27/21). He is currently below baseline with deficits in strength, mobility, performing ADLs.    Admission to acute inpatient rehab 06/03/2021.       Plan      Rehab Cares     1. PT, OT, SLP minutes of each on a daily basis, in addition to rehab nursing and close management of physiatrist.    2. Impairment of ADL's:  OT for 60 minutes daily to work on ADL re-training such as grooming, self cares and bathing.    3. Impairment of mobility:  PT for 60 minutes daily to work on neuromuscular re-education focusing on strength, balance, coordination, and endurance.    4. SLP for 60 min daily for cognitive evaluation and treatment strategies for higher level cognitive deficits and memory impairment. ognitive and linguistic deficits  5. Rehab RN for med administration, bowel regimen, wound care and patient education.   6. FEN: Regular diet thin liquids  7. Bowel: Scheduled Miralax and Senna/Docusate  8. Bladder: Continent, scan protocol  9. DVT Prophylaxis: Mechanical  10. GI Prophylaxis: Femotidine  11. 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 S/P  Stealth guided resection 06/27/019 He completed chemoradiotherapy on 8/30/2019 and was managed on a clinical trial receiving atezolizumab (anti-PDL1) plus adjuvant temozolomide. He received his last dose of immunotherapy on 3/16/2020 when imaging on 3/28/2020 showed progression of disease. He underwent a repeat craniotomy for tumor resection with Dr. Chan on 4/9/2020 with placement of GammaTiles.   - Continue PTA keppra   - Continue Veledimex, discussed with pharmacy, will continue home meds (present in narcotic box)      Wound Care  Ok to shower, however no scrubbing of the wound and no soaking of the wound, meaning no bathtubs or swimming pools. Pat dry only. Leave wound open to air.  Patient to have wound checked 2 weeks following surgery.    Wound location: cranial  Closure technique: monocryl  Dressing needs: none  Post-op care at follow-up: keep dry and clean    Chronic Medical Conditions  Hypertension: PTA Lisinopril  Depression: PTA Lexapro  Allergies: PTA Loratadine  Hypothyroid: PTA levothyroxine    Code: Full code  Disposition: Home with family cares  ELOS:  7 days.  Rehab prognosis:  Good     Follow Up After Discharge   1. Primary care physical, 1-2 weeks after discharge date  2. Physical Medicine and Rehabilitation, 4-6 weeks after discharge date  3. Dr. Marquise Chan on 6/11/2021 @ 2:45 pm  4. Dr. Ericka Avila on 6/14/2021 @ 7:30 am         **The patient was seen and discussed with my attending Teja who agrees with the above assessment and plan.    Casimiro Panchal MD  Physical Medicine and Rehabilitation PGY-2  Pager: [phone]      Physician Attestation   I, Becky Lezama MD, saw this patient with the resident and agree with the resident/fellow's findings and plan of care as documented in the note.      I personally reviewed vital signs, medications, labs, imaging and notes in the chart.    Myles findings:   Erasto Maher is a 57 yo male with h/o recurrent GBM who was admitted on 5/27 for  planned craniotomy for [id number] injection. This is his 5th brain surgery since he was diagnosed in 6/2019. Other medical co-morbidities include anemia, mild leukopenia, depression, hypothyroidism and HTN.     He was I with ADLs and mobility prior to admission. 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 Judith noted that he was able to manage but recently had some problem with his visual field and left sided neglect. No driving since 12/2020 but he would like to resume driving as soon as possible.     Current functional deficits include left hemiparesis, left sensory loss, left sided neglect, left homonymous hemianopsia, dysarthria, aphasia and some cognitive deficits.     Anticipate improvement to mod I level with mobility and ADLs. No driving at discharge. Can consider OT screening for driving as outpatient but his visual and attention deficits are a big barrier. Will benefit from intensive rehabilitation including 60 minutes each of PT, OT and SLP, rehabilitation nursing and close management by physiatry.    Good rehab potentials; medical prognosis is guarded and will defer to neuro-oncology team. ELOS is 7 days.     Becky Lezama MD  Date of Service (when I saw the patient): 06/03/21

## 2021-06-03 NOTE — PLAN OF CARE
Occupational Therapy Discharge Summary    Reason for therapy discharge:    Discharged to acute rehabilitation facility.    Progress towards therapy goal(s). See goals on Care Plan in [facility] electronic health record for goal details.  Goals not met.  Barriers to achieving goals:   discharge from facility.    Therapy recommendation(s):    Continued therapy is recommended.  Rationale/Recommendations:   . Pt would benefit from intensive OT services to improve IND with ADL's and transfers as pt was IND prior. Pt will be able to tolerate 3 hours of therapies. Pt motivated to participate in therapy and has good family support. Pt would make good ARC candidate at this time.

## 2021-06-04 NOTE — PROGRESS NOTES
"CLINICAL NUTRITION SERVICES - ASSESSMENT NOTE     Nutrition Prescription    RECOMMENDATIONS FOR MDs/PROVIDERS TO ORDER:  None today     Malnutrition Status:    Patient does not meet two of the established criteria necessary for diagnosing malnutrition    Recommendations already ordered by Registered Dietitian (RD):  Ensure Enlive TID at all meals (ask pt what flavor he prefers when taking meal orders)    RD will add to diet order that pt is approved for peds menu items    Future/Additional Recommendations:  Monitor meal intakes and supplement acceptance  Monitor weight and lab trends      REASON FOR ASSESSMENT  Erasto Mhaer is a/an 56 year old male assessed by the dietitian for Patient/Family Request    NUTRITION/MEDICAL HISTORY  Per chart review: Pt admits to ARU for rehabilitation in the setting of GBM s/p right craniotomy for tumor resection and ziopharm study virus injection on 5/27/21 (GBM initially diagnosed in 06/2019). Other past medical history noted for GERD, HTN, depression, hypothyroidism.      Per pt visit: RD visited pt/wife at bedside, pt reports fair appetite, but has some dislike of hospital food, wife reports plan to bring in foods as well, RD reviewed options for supplement and peds menu, pt agreeing to both, reviewed weight trends below, noting pt about 5 lbs down from UBW. RD reviewed the importance of adequate nutritional intakes for therapies, pt/wife verbalized understanding/agreement with nutrition plan of care.     CURRENT NUTRITION ORDERS  Diet: Regular  Intake/Tolerance: 75% thus far     LABS  Labs reviewed    MEDICATIONS  Medications reviewed    ANTHROPOMETRICS  Height: 177.8 cm (5' 10\")  Most Recent Weight: 72.7 kg (160 lb 3.4 oz)    IBW: 75.4 kg  BMI: Normal BMI  UBW: Pt reports around 165 lbs   Weight History:  05/10/21 74.8 kg (164 lb 14.4 oz)   03/08/21 78.7 kg (173 lb 8 oz)   11/23/20 76.3 kg (168 lb 3.2 oz)   06/05/20 75.2 kg (165 lb 11.2 oz)   09/06/19 73.7 kg (162 lb 8 " oz)     Weight assessment: Non-significant 2.4% weight loss in 1 month, significant 7.5% weight loss in 3 months, non-significant 4.7% weight loss in 6 months, non-significant 3% weight loss in 1 year.     Dosing Weight: 72.7 kg    ASSESSED NUTRITION NEEDS  Estimated Energy Needs: 2180 kcals/day (30 kcals/kg)  Justification: Maintenance  Estimated Protein Needs: 70 grams protein/day (1 grams of pro/kg)  Justification: Maintenance  Estimated Fluid Needs: 2180 mL/day (30 mL/kg)   Justification: Maintenance    MALNUTRITION  % Intake: Decreased intake does not meet criteria  % Weight Loss: Up to 7.5% in 3 months (non-severe)  Subcutaneous Fat Loss: None observed  Muscle Loss: None observed  Fluid Accumulation/Edema: None noted  Malnutrition Diagnosis: Patient does not meet two of the established criteria necessary for diagnosing malnutrition    NUTRITION DIAGNOSIS  Predicted inadequate nutrient intake related to decreased appetite as evidenced by pt self report with weight loss       INTERVENTIONS  Implementation  Nutrition Education: RD reviewed the importance of adequate nutritional intakes for improved health status.    Medical food supplement therapy - ordered as above   Modify composition of meals/snacks - ordered as above     Goals  Patient to consume % of nutritionally adequate meal trays TID, or the equivalent with supplements/snacks.     Monitoring/Evaluation  Progress toward goals will be monitored and evaluated per protocol.    Radha Boyce RD, CNSC, LD  [facility] pager: [phone]

## 2021-06-04 NOTE — PLAN OF CARE
Patient admitted today. Recovering after crani for glioblastoma.  Incision healing well and BECKI.  Left neglect and field cut noted.  Call light and personal items placed on right side.  Left side strength 4/5.  Cognition is impaired. Slow to comprehend one step commands and easily distractable.  Able to express his needs. He used the call light appropriately x 1 after giving cues to remember to use it.  Timed toileting done to prevent patient from attempting to get up without assist.  Ambulated to bathroom with CGA/min assist.   He is on a study medication that is due every morning with breakfast. The medication is in the [employer] narcotic drawer.  Breakfast was ordered for tomorrow at 0730.

## 2021-06-04 NOTE — PROGRESS NOTES
06/04/21 0830   Quick Adds   Type of Visit Initial PT Evaluation   Living Environment   People in home spouse   Current Living Arrangements house   Home Accessibility stairs to enter home;stairs within home   Number of Stairs, Main Entrance 3   Stair Railings, Main Entrance railing on right side (ascending)   Number of Stairs, Within Home, Primary other (see comments)  (16 to BR)   Stair Railings, Within Home, Primary railing on left side (ascending);railing on right side (ascending)   Transportation Anticipated family or friend will provide   Living Environment Comments Lives with spouse in [place], spouse works from home and can provide 24/7 supervision. 3 MARTIN with railing on R side ascending. 2 level home, has bedroom/bathrooms on 2nd level. Main level has couch and 1/2 bath so pt could stay on main level but goal is to get to 2nd level. 4 steps to landing with L side ascending railing, 12 steps more from landing to 2nd level with R side ascending. Has small walk in shower, shower stool, and suction cub grab bar. Has short toilet   Self-Care   Usual Activity Tolerance good   Current Activity Tolerance fair   Regular Exercise Yes   Activity/Exercise Type strength training;walking   Exercise Amount/Frequency 3-5 times/wk   Equipment Currently Used at Home none  (has shower stool, suction cup grab bar)   Activity/Exercise/Self-Care Comment enjoys golfing in summer, travels for skiing in winter. Has full gym in basement, typically does strengthening, machines, walking but since April surgery has only been doing stationary bike and golfing. 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   Disability/Function   Hearing Difficulty or Deaf no   Wear Glasses or Blind no  (has L field cut)   Concentrating, Remembering or Making Decisions Difficulty yes   Concentration Management Mild cog deficits with numbers (telling time, recalling when appointments are) and slight delay  in processing time   Difficulty Communicating yes   Communication difficulty speaking   Communication Management slowed communication at times, no word finding difficulty   Difficulty Eating/Swallowing no   Walking or Climbing Stairs Difficulty no   Dressing/Bathing Difficulty no   Toileting issues no   Doing Errands Independently Difficulty (such as shopping) yes   Errands Management Spouse drives pt   Fall history within last six months no   Change in Functional Status Since Onset of Current Illness/Injury yes   General Information   Onset of Illness/Injury or Date of Surgery 05/26/21   Referring Physician Casimiro Panchal MD   Patient/Family Therapy Goals Statement (PT) to return to golf & skiing   Pertinent History of Current Problem (include personal factors and/or comorbidities that impact the POC) GBM s/p right craniotomy for tumor resection and ziopharm study virus injection GBM dx 2019, has had multiple surgeries and treatments   Existing Precautions/Restrictions fall;other (see comments)  (crani)   Heart Disease Risk Factors Stress;Gender;Age   General Observations seated in chair, pleasant and agreeable to PT eval, wife present and suportive   Cognition   Cognitive Status Comments slowed processing, tangental at times   Pain Assessment   Patient Currently in Pain No   Integumentary/Edema   Integumentary/Edema no deficits were identifed   Posture    Posture Not impaired   Range of Motion (ROM)   ROM Quick Adds ROM WFL   Strength Comprehensive (MMT)   Comment, General Manual Muscle Testing (MMT) Assessment L 4/5   ARC Assessment Only   Acute Rehab Functional Assessment See IP Rehab Daily Documentation Flowsheet for Functional Mobility/ADL Assessment   Balance   Balance Comments good sitting balance, mildyl unsteady during ambulation w/o AD, formal assessment planned for tomorrow   Sensory Examination   Sensory Perception Comments lt touch & protective absent in L foot and hand, hot/cold intact hand,  impaired foot (difficulty distinguishing hot/cold), proprioception impaired at least to L ankle   Coordination   Coordination Comments impaired L side   Muscle Tone   Muscle Tone no deficits were identified   Clinical Impression   Criteria for Skilled Therapeutic Intervention yes, treatment indicated   PT Diagnosis (PT) impaired gait 2/2 L in-attn, mild hemiparesis and impaired somatosensation   Influenced by the following impairments strength, cognition, somatosensory, balance,    Functional limitations due to impairments bed mobility, transfers, stairs, home and community mobility,    Clinical Presentation Evolving/Changing   Clinical Presentation Rationale complex medical, > 4 personal factors limiting independence with functional mobility   Clinical Decision Making (Complexity) high complexity   Therapy Frequency (PT) Other (see comments)  (60-75 minutes daily)   Predicted Duration of Therapy Intervention (days/wks) 5 days   Planned Therapy Interventions (PT) balance training;bed mobility training;gait training;home exercise program;neuromuscular re-education;patient/family education;strengthening;stretching;transfer training   Anticipated Equipment Needs at Discharge (PT) cane, straight   Risk & Benefits of therapy have been explained evaluation/treatment results reviewed;care plan/treatment goals reviewed;risks/benefits reviewed;current/potential barriers reviewed;participants voiced agreement with care plan;participants included;patient;spouse/significant other   Clinical Impression Comments PLOF pt was ind for functional mobililty ADLs and some IADLs including golfing. Currently pt requires close SBA-CGA for transfers, gait and stairs w/o AD d/t balance, L in-attention, mild L otis-paresis. D/t repeat brain resections, acute on chronic L field cut, on-going GBM treatment, expect some of these deficits, especially L in-attn, to persist. in-pt therapy to focus on compensatory strateigies, family training for  safety.    PT Discharge Planning    PT Discharge Recommendation (DC Rec) home with assist;home with outpatient physical therapy   PT Rationale for DC Rec d/t cognition, balance impairment and L field cut will likely need 24/7 supervision, OP PT to work on balance   Total Evaluation Time   Total Evaluation Time (Minutes) 15

## 2021-06-04 NOTE — PLAN OF CARE
FOCUS/GOAL  Medical management    ASSESSMENT, INTERVENTIONS AND CONTINUING PLAN FOR GOAL:  Pt is alert and oriented. No complaints of pain. Assist of 1 with walker. Continent of bladder. Alarms on for safety. Pt has a L field cut. Appeared to be sleeping on rounds.

## 2021-06-04 NOTE — PROGRESS NOTES
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 S/P Stealth guided resection 06/27/019. He completed chemoradiotherapy on 8/30/2019 and was managed on a clinical trial receiving atezolizumab plus adjuvant temozolomide. He received his last dose of immunotherapy on 3/16/2020 when imaging on 3/28/2020 showed progression of disease. He underwent a repeat craniotomy for tumor resection on 4/9/2020 with placement of GammaTiles. Aristeo was managed on lomustine monotherapy. However, imaging in 11/2020 showed a new distant lesion consistent with disease progression. He completed a repeat course of radiation, then a repeat resection on 1/13/2021 plus use of the compassionate use Ziopharm treatment protocol. Pathology was consistent with recurrent glioblastoma. He was receiving nivolumab + bevacizumab until imaging in 3/2021 showed an area of contrast enhancement. Aristeo underwent a biopsy + GÓMEZ on 4/21/2021 to both the frontal and parietal lesions and pathology showed small pieces of recurrent glioblastoma with predominant necrosis. He presented to [facility] on 05/26/2021 for additional viral injection of tumor. Following surgery the patient was evaluated by PT, OT and PMR service. All specialties collectively recommended that patient would benefit from ongoing therapies in the acute inpatient rehabilitation setting.\"   General Observations Patient did not have any SLP services during most recent admission. Per report, patient spouse reports mild left-neglect at baseline, and mild cognitive deficits prior to most recent surgery.   Disability/Function   Hearing Difficulty or Deaf no   Wear Glasses or Blind no   Pain Assessment   Patient Currently in " Pain No   Type of Evaluation   Type of Evaluation Speech, Language, Cognition   Oral Motor   Oral Musculature generally intact   Facial Symmetry (Oral Motor)   Facial Symmetry (Oral Motor) WNL   Speech   Speech Intelligibility (Motor Speech) WFL;conversational level   Articulation (Motor Speech) WFL   Speech Fluency (Motor Speech) WFL   Conversational Level, Speech Intelligibility (Motor Speech) intact   Auditory Comprehension   Follows Commands (Auditory Comprehension) 2-step commands   Yes/No Questions (Auditory Comprehension) WFL;simple/factual questions   Object Identification (Auditory Comprehension) WFL;multiple objects   2 Step, Follows Commands (Auditory Comprehension) achieved with repetition   Simple/Factual Questions (Auditory Comprehension) intact   Multiple Objects, Identification (Auditory Comprehension) intact   Verbal Expression   Automatic Speech (Verbal Expression) WFL   Confrontational Naming (Verbal Expression) WFL   Responsive Naming (Verbal Expression) WFL   Conversational Speech (Verbal Expression) WFL   Reading Comprehension   Oral Reading Ability (Reading Comprehension) not tested   Written Language   Written Expression (Written Language) not tested   Cognition   Cognitive Status Patient was able to be made alert for 45 minutes of total 60 minute speech therapy evaluation.   Cognitive Function attention deficit;executive function deficit;memory deficit   Additional cognitive-linguistic evaluation indicated  Recommended   Attention Deficit (Cognition) moderate deficit   Executive Function Deficit (Cognition) moderate deficit   Memory Deficit (Cognition) moderate deficit   General Therapy Interventions   Planned Therapy Interventions Cognitive Treatment   Cognitive treatment External memory strategy training;Progressive attention training   SLP Therapy Assessment/Plan   Criteria for Skilled Therapeutic Interventions Met (SLP Eval) yes;treatment indicated   SLP Diagnosis Moderate Cognitive  impairment   Rehab Potential (SLP Eval) fair, will monitor progress closely   Therapy Frequency (SLP Eval) daily   Predicted Duration of Therapy Intervention (SLP Eval) 1 week   Comment, Therapy Assessment/Plan (SLP) Patient would benefit from skilled SLP services as he currently demonstrates below baseline cognitive function, and will require cognitive training to return to prior home environment as independently as possible. Patient speech and language appears WFL at unstructured conversation level, however suspect patient immediate recall, attention, and initiation impacting ability to respond in a timely manner or follow directions with 100% accy vs true language impairment. Patient is able to follow 2-step directions with repetition of instruction. Improved participation as session continued and patient became more alert. Patient demonstrates moderate-severe left side neglect, though noted mild left-side neglect at baseline. Through informal cognitive evaluation, patient with noted moderate sustained and alternating attention, thought organization, immediate recall, abstract thinking, and visuospatial skills. Patient also with noted reduced initiation during evaluation.     Total Evaluation Time   Total Evaluation Time (Minutes) 45  (Language)      steady

## 2021-06-04 NOTE — PLAN OF CARE
Alert and oriented, although quite slow to respond. Continent of bladder and bowel. LBM 6/4. Denies pain. Assist of 1 with gait belt only, no assistive equipment. Regular thin diet. Craniotomy incision approximated. Bed alarm on for safety, call light within reach, Ok to continue with care plan.

## 2021-06-04 NOTE — PLAN OF CARE
Discharge Planner Post-Acute Rehab PT:     Discharge Plan: home w/ prn assist, OP PT, ~6/8    Precautions: falls, crani, impulsive    Current Status:  Bed Mobility: SUP  Transfer: CGA  Gait: CGA w/o A.D, L foot catches but not to point of LOB, plan to trial foot-up  Stairs: CGA with vcs for safety  Balance: good sitting, mildly unsteady with gait, L path deviation    Assessment:  PLOF pt was ind for functional mobililty ADLs and some IADLs including golfing. Currently pt requires close SBA-CGA for transfers, gait and stairs w/o AD d/t balance, L in-attention, mild L otis-paresis. D/t repeat brain resections, acute on chronic L field cut, on-going GBM treatment, expect some of these deficits, especially L in-attn, to persist. in-pt therapy to focus on compensatory strateigies, family training for safety.     Other Barriers to Discharge (DME, Family Training, etc): MARTIN and within home, likely to need higher level of assist than previous, fatigue, cognition- ?new learning

## 2021-06-04 NOTE — PROGRESS NOTES
Discharge Planner Post-Acute Rehab OT:     Discharge Plan: Home with spouse who works full time from home    Precautions: L inattention, crani, falls    Current Status:  ADLs: CGA with transfers/mobility with no AD. Min A LB dressing for problem solving d/t dressing apraxia, SBA UB dressing, SBA standing h/g x15 min  IADLs: NT, spouse can assist  Vision/Cognition: L inattention, more significant with mobility    Assessment: Pt presents with post-op precautions, L inattention, L incoordination, dressing apraxia, and cog deficits in attention, sequencing, processing, and initiation cognition. D/t acute on chronic L field cut, L inattention, and cog deficits from ongoing GBM and repeat surgeries, will focus on compensatory strategies to manage safely at home. Anticipate pt will need supervision-mod I with ADLs with family training for safety    Other Barriers to Discharge (DME, Family Training, etc):   Spouse works from home and will occasionally need to leave home for errands but otherwise will provide constant supervision  2 level home with 16 steps up to bedroom/bathroom  Has shower stool and suction cup grab bar, has short toilet and may need riser

## 2021-06-04 NOTE — PLAN OF CARE
Patient admitted to unit around 1330 from 6A, received report from 6A as patient was coming onto unit.  Patient denies pain upon arrival, slow speech/responses, wife rode with patient and is at bedside and speaks for the patient at times. Patient was able to stand with min A and stepped up onto scale and into bed with CGAo2 and gait belt, needs cues.  Patient and wife given room orientation after being seen by Resident MD.  VSS, patient ate lunch that wife brought, continue with POC.

## 2021-06-04 NOTE — PLAN OF CARE
Discharge Planner Post-Acute Rehab SLP:     Discharge Plan: Home with HH or OP?    Precautions: Fall, Left-side neglect    Current Status:  Communication: Appears WFL at unstructured conversation level, however suspect cognition impacts initiation and retention of instructions vs true language impairment.  Cognition: Moderate impairment with sustained/selective attention, immediate/working memory, moderately-complex problem solving, thought organization, abstract thinking, and initiation. Left-side neglect.  Swallow: Regular and thin. Do not anticipate SLP services for swallowing.    Assessment: Patient would benefit from skilled SLP services as he currently demonstrates below baseline cognitive function, and will require cognitive training to return to prior home environment as independently as possible. Patient speech and language appears WFL at unstructured conversation level, however suspect patient immediate recall, attention, and initiation impacting ability to respond in a timely manner or follow directions with 100% accy vs true language impairment. Patient is able to follow 2-step directions with repetition of instruction. Improved participation as session continued and patient became more alert. Patient demonstrates moderate-severe left side neglect, though noted mild left-side neglect at baseline. Through informal cognitive evaluation, patient with noted moderate sustained and alternating attention, thought organization, immediate recall, abstract thinking, and visuospatial skills. Patient also with noted reduced initiation during evaluation.     Other Barriers to Discharge (Family Training, etc): TBD

## 2021-06-04 NOTE — PHARMACY-MEDICATION REGIMEN REVIEW
Pharmacy Medication Regimen Review  Erasto Maher is a 56 year old male who is currently in the Acute Rehab Unit.    Assessment: Upon review of the medications and patient chart the following irregularities were found:     Medications without appropriate durations: study veledimex's protocol is for 14 days post resection (last dose on 6/10/21). No end date currently ordered.   If questions on this study, please discuss with IDS.    - Research Coordinator ([phone])   - Dr. Chan (pager: [phone])     Plan:   -consider adding end date to study veledimex for after dose on 6/10/21 or discontinue at that time.     Attending provider will be sent this note for review.  If there are any emergent issues noted above, pharmacist will contact provider directly by phone.      Pharmacy will periodically review the resident's medication regimen for any PRN medications not administered in > 72 hours and discontinue them. The pharmacist will discuss gradual dose reductions of psychopharmacologic medications with interdisciplinary team on a regular basis.    Please contact pharmacy if the above does not answer specific medication questions/concerns.    Background:  A pharmacist has reviewed all medications and pertinent medical history today.  Medications were reviewed for appropriate use and any irregularities found are listed with recommendations.      Current Facility-Administered Medications:      acetaminophen (TYLENOL) tablet 650 mg, 650 mg, Oral, Q4H PRN, Casimiro Panchal MD     benzocaine-menthol (CEPACOL) 15-3.6 MG lozenge 1 lozenge, 1 lozenge, Buccal, Q1H PRN, Casimiro Panchal MD     escitalopram (LEXAPRO) tablet 10 mg, 10 mg, Oral, QAM, Casimiro Panchal MD, 10 mg at 06/04/21 0759     famotidine (PEPCID) tablet 10 mg, 10 mg, Oral, BID, Casimiro Panchal MD, 10 mg at 06/04/21 0759     levETIRAcetam (KEPPRA) tablet 1,000 mg, 1,000 mg, Oral, BID, Casimiro Panchal MD, 1,000 mg at 06/04/21 0800      levothyroxine (SYNTHROID/LEVOTHROID) tablet 125 mcg, 125 mcg, Oral, Daily, Casimiro Panchal MD, 125 mcg at 06/04/21 0800     lisinopril (ZESTRIL) tablet 5 mg, 5 mg, Oral, Daily, Becky Lezama MD, 5 mg at 06/04/21 0800     loratadine (CLARITIN) tablet 10 mg, 10 mg, Oral, At Bedtime, Casimiro Panchal MD, 10 mg at 06/03/21 2051     polyethylene glycol (MIRALAX) Packet 17 g, 17 g, Oral, BID, Casimiro Panchal MD, 17 g at 06/03/21 2051     senna-docusate (SENOKOT-S/PERICOLACE) 8.6-50 MG per tablet 1 tablet, 1 tablet, Oral, BID, Casimiro Panchal MD, 1 tablet at 06/03/21 2051     study - veledimex (IDS #[id number]) capsule 20 mg, 20 mg, Oral, Daily, Casimiro Panchal MD, 20 mg at 06/04/21 0800  No current outpatient prescriptions on file.

## 2021-06-04 NOTE — PROGRESS NOTES
06/04/21 0900   Quick Adds   Type of Visit Initial Occupational Therapy Evaluation   Living Environment   People in home spouse   Current Living Arrangements house   Home Accessibility stairs to enter home;stairs within home   Transportation Anticipated family or friend will provide   Living Environment Comments OT: Lives with spouse in [place], spouse works from home and can provide 24/7 supervision. 3 MARTIN with railing on R side ascending. 2 level home, has bedroom/bathrooms on 2nd level. Main level has couch and 1/2 bath so pt could stay on main level but goal is to get to 2nd level. 4 steps to landing with L side ascending railing, 12 steps more from landing to 2nd level with R side ascending. Has small walk in shower, shower stool, and suction cub grab bar. Has short toilet   Self-Care   Usual Activity Tolerance good   Current Activity Tolerance fair   Regular Exercise Yes   Activity/Exercise Type strength training;walking   Exercise Amount/Frequency 3-5 times/wk   Equipment Currently Used at Home none  (has shower stool, suction cup grab bar)   Activity/Exercise/Self-Care Comment OT: enjoys golfing in summer, travels for skiing in winter. Has full gym in basement, typically does strengthening, machines, walking but since April surgery has only been doing stationary bike and golfing. 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   Disability/Function   Hearing Difficulty or Deaf no   Wear Glasses or Blind no   Concentrating, Remembering or Making Decisions Difficulty yes   Concentration Management Mild cog deficits with numbers (telling time, recalling when appointments are) and slight delay in processing time   Difficulty Communicating yes   Communication difficulty speaking   Communication Management slowed communication at times, no word finding difficulty   Difficulty Eating/Swallowing no   Walking or Climbing Stairs Difficulty no   Dressing/Bathing  Difficulty no   Toileting issues no   Doing Errands Independently Difficulty (such as shopping) yes   Errands Management Spouse drives pt   Fall history within last six months no   Change in Functional Status Since Onset of Current Illness/Injury yes   General Information   Referring Physician Julio Blank MD   Patient/Family Therapy Goal Statement (OT) Return to golf in the summer, skiing in the winter, and return home by 6/13 so he can continue treatment   Additional Occupational Profile Info/Pertinent History of Current Problem Erasto Maher is a 56 year old male with 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 Hx of craniotomies, tumor resections, gamma tiles for past 2 years. Aristeo underwent a biopsy + GÓMEZ on 4/21/2021 to both the frontal and parietal lesions and pathology showed small pieces of recurrent glioblastoma with predominant necrosis. He presented to [facility] on 05/26/2021 for additional viral injection of tumor. Following surgery the patient was evaluated by PT, OT and PMR service. All specialties collectively recommended that patient would benefit from ongoing therapies in the acute inpatient rehabilitation setting.   Existing Precautions/Restrictions fall;lifting  (craniotomy- no lifting over 10#)   Limitations/Impairments visual;other (see comments)  (hx of L field cut and L inattention from GBM and resections)   Left Upper Extremity (Weight-bearing Status) other (see comments)   Right Upper Extremity (Weight-bearing Status) other (see comments)   Left Lower Extremity (Weight-bearing Status) full weight-bearing (FWB)   Right Lower Extremity (Weight-bearing Status) full weight-bearing (FWB)   Cognitive Status Examination   Orientation Status orientation to person, place and time   Affect/Mental Status (Cognitive) WFL;flat/blunted affect   Cognitive Status Comments Slow processing and poor attention when multi-tasking- frequently need to  physically stop pt from moving while provided pt with cue so he can correctly process cue. Repetition to follow commands. L inattention   Visual Perception   Visual Impairment/Limitations peripheral vision impaired left;visual/perceptual impairments present   Visual Processing Deficit visual attention, left   Impact of Vision Impairment on Function (Vision) L inattention, cues to attend to L side, better attention to L side during ADL routine and standing in front of mirror but when helper is on R side of pt he frequently bumps into objects on L side. Hx of L field cut in peripheral field, compensates by scanning to L side   Sensory   Sensory Comments L inattention   Pain Assessment   Patient Currently in Pain No   Integumentary/Edema   Integumentary/Edema Comments R forehead crani incision with stitches   Range of Motion Comprehensive   Comment, General Range of Motion BUE AROM WFL. Bilateral limited shoulder flexion/abduction from long history of limited shoulder limitations- no change, has not worsened   Strength Comprehensive (MMT)   Comment, General Manual Muscle Testing (MMT) Assessment  test- R 67#, L 50#. LUE strength 4/5, RUE strength 4+/5   Muscle Tone Assessment   Muscle Tone Quick Adds No deficits were identified   Coordination   Upper Extremity Coordination Left UE impaired   Left hand, nine hole peg test (seconds) 67   Right hand, nine hole peg test (seconds) 30   ARC Assessment Only   Acute Rehab Functional Assessment See IP Rehab Daily Documentation Flowsheet for Functional Mobility/ADL Assessment   Instrumental Activities of Daily Living (IADL)   Previous Responsibilities meal prep;work   Clinical Impression   Criteria for Skilled Therapeutic Interventions Met (OT) yes   OT Diagnosis Functional decline in ADLs, IADLs, mobility   OT Problem List-Impairments impacting ADL problems related to;activity tolerance impaired;balance;cognition;communication;coordination;mobility;motor  control;sensation;post-surgical precautions;postural control;sensory feedback;vision   Assessment of Occupational Performance 5 or more Performance Deficits   Identified Performance Deficits dressing, toileting, bathing, hygiene, grooming, transfers, mobility, meal prep, housekeeping, exercise   Planned Therapy Interventions (OT) ADL retraining;IADL retraining;balance training   Clinical Decision Making Complexity (OT) moderate complexity   Therapy Frequency (OT) Daily   Predicted Duration of Therapy 6 days   Anticipated Equipment Needs Upon Discharge (OT) shower chair;other (see comments)  (may need cane pending progress?)   Risk & Benefits of therapy have been explained evaluation/treatment results reviewed;care plan/treatment goals reviewed;risks/benefits reviewed;current/potential barriers reviewed;participants voiced agreement with care plan;participants included;patient;spouse/significant other   Comment-Clinical Impression Pt presents with post-op precautions, L inattention, L incoordination, dressing apraxia, and cog deficits in attention, sequencing, processing, and initiation cognition. D/t acute on chronic L field cut, L inattention, and cog deficits from ongoing GBM and repeat surgeries, will focus on compensatory strategies to manage safely at home. Anticipate pt will need supervision-mod I with ADLs with family training for safety

## 2021-06-05 NOTE — PLAN OF CARE
FOCUS/GOAL  Bowel management, Bladder management, and Pain management    ASSESSMENT, INTERVENTIONS AND CONTINUING PLAN FOR GOAL:    Pt slept well during the overnight, denies pain. Continent of bladder. Pt has a L field cut. Call light within reach, bed alarm on.

## 2021-06-05 NOTE — PLAN OF CARE
FOCUS/GOAL  Bowel management, Bladder management, and Medication management    ASSESSMENT, INTERVENTIONS AND CONTINUING PLAN FOR GOAL:  Pt was A/O, able to use call light and make needs known to staff. Denies pain, SOB, chest pain nor dizziness. CGA with gait belt for transfers. On regular diet, thin liquids, takes medicines whole. Cont of BL/BM, LBM-6/4/21. VSS. Surgical incision on head, clean and dry. Will continue with current POC.

## 2021-06-05 NOTE — PROGRESS NOTES
Cognitive-Linguistic Quick Test results    Cognitive Domain  Severity Rating  Attention        Severe  Memory        Mild  Executive Functions       Severe  Language        Mild  Visuospatial Skills       Severe    Composite Severity Rating      1.8/5.0 (Moderate)    Clock Drawing Severity Rating    Severe    Note slow response time and information processing speed and reduced task initiation.

## 2021-06-05 NOTE — PLAN OF CARE
Discharge Planner Post-Acute Rehab SLP:      Discharge Plan: Home with HH or OP for SLP     Precautions: Fall, Left-side neglect     Current Status:  Communication: Appears WFL at unstructured conversation level, however suspect cognition impacts initiation and retention of instructions vs true language impairment.  Cognition: Moderate impairment with sustained/selective attention, immediate/working memory, moderately-complex problem solving, thought organization, abstract thinking, and initiation. Left-side neglect.  Swallow: Regular and thin. Do not anticipate SLP services for swallowing.     Assessment: Patient participated in formal cognitive-communication assessment Cognitive Linguistic Quick Test with severe rating for attention, executive function and visuospatial skills and clock drawing and mild severity rating for memory and language. Note slow information processing speed, response time and reduced task initiation.      Other Barriers to Discharge (Family Training, etc): TBD

## 2021-06-05 NOTE — PROGRESS NOTES
06/05/21 1100   Signing Clinician's Name / Credentials   Signing clinician's name / credentials Chen Orlando DPT   Dynamic Gait Index (Sae and Hardy [place], 1995)   Gait Level Surface 2   Change in Gait Speed 3   Gait and Horizontal Head Turns 1   Gait with Vertical Head Turns 2   Gait and Pivot Turns 3   Step Over Obstacle 3   Step Around Obstacles 0  (d/t L field cut)   Steps 2   Total Dynamic Gait Index Score  (A score of 19 or less has been correlated to an increased risk of falls in community dwelling older adults, patients with vestibular disorders, and patients with MS.)   Total Score (out of 24) 16     Dynamic Gait Index (DGI):The DGI is a measure of balance during gait that is reliable and valid for the elderly and individuals with Parkinson's disease, MS, vestibular disorders, or s/p stroke. Gait assistive device used: None    Patient score: 16/24  Scores ?19/24 indicate an increased risk for falls according to Liane et al 2000  Minimal Detectable Change = 2.9 in community dwelling elderly according to Yazan et al 2011    Assessment (rationale for performing, application to patient s function & care plan): pt s/p craniotomy with demonstrated impairments in balance, performed as objective outcome measure to assess risk for falls.      Minutes billed as physical performance test 10

## 2021-06-05 NOTE — PROGRESS NOTES
"    [facility]   Acute Rehabilitation Unit  Inpatient Progress Note     Subjective     24 Hours Summary  Erasto is doing well this morning. Denies HA. Has been voiding w/o arauz, ambulating with assistance, and tolerating his diet. Denies chest pain/SOB, n/v, and leg pain. He had his last seizure from his GBM was 6/22/19.    Review of Systems  10 point review of systems was otherwise negative other     Objective     Vital signs:  Temp: 96.6  F (35.9  C) Temp src: Oral BP: 109/79 Pulse: 88   Resp: 18 SpO2: 98 % O2 Device: None (Room air)   Height: 177.8 cm (5' 10\") Weight: 72.7 kg (160 lb 3.4 oz)  Estimated body mass index is 22.99 kg/m  as calculated from the following:    Height as of this encounter: 1.778 m (5' 10\").    Weight as of this encounter: 72.7 kg (160 lb 3.4 oz).      Physical Examination  General: Awake, sitting up in chair  Cardiovascular: RRR, no extra heart sounds, no murmur  Pulmonary: Non-labored breathing, CTAB, no wheeze, no rhonci  Abdominal:  soft, non-tender, non-distended  Extremities: warm, well perfused, no edema in bilateral lower extremities, no tenderness in calves  NEURO: alert and cooperative, with left sided neglect.  Moves all four.    LABS  CBC RESULTS:   Recent Labs   Lab Test 06/03/21  0659   WBC 3.5*   RBC 3.83*   HGB 11.8*   HCT 35.2*   MCV 92   MCH 30.8   MCHC 33.5   RDW 12.6        Last Comprehensive Metabolic Panel:  Sodium   Date Value Ref Range Status   06/03/2021 136 133 - 144 mmol/L Final     Potassium   Date Value Ref Range Status   06/03/2021 3.8 3.4 - 5.3 mmol/L Final     Chloride   Date Value Ref Range Status   06/03/2021 105 94 - 109 mmol/L Final     Carbon Dioxide   Date Value Ref Range Status   06/03/2021 25 20 - 32 mmol/L Final     Anion Gap   Date Value Ref Range Status   06/03/2021 5 3 - 14 mmol/L Final     Glucose   Date Value Ref Range Status   06/03/2021 106 (H) 70 - 99 mg/dL Final     Urea Nitrogen   Date Value Ref " Range Status   06/03/2021 11 7 - 30 mg/dL Final     Creatinine   Date Value Ref Range Status   06/03/2021 0.72 0.66 - 1.25 mg/dL Final     GFR Estimate   Date Value Ref Range Status   06/03/2021 >90 >60 mL/min/[1.73_m2] Final     Comment:     Non  GFR Calc  Starting 12/18/2018, serum creatinine based estimated GFR (eGFR) will be   calculated using the Chronic Kidney Disease Epidemiology Collaboration   (CKD-EPI) equation.       Calcium   Date Value Ref Range Status   06/03/2021 8.5 8.5 - 10.1 mg/dL Final         IMAGING  No new imaging.     Assessment     Erasto Maher is a 56 year old male with GBM s/p right craniotomy for tumor resection and ziopharm study virus injection (5/27/21). He has impairments in strength, mobility, performing ADLs.     Admission to acute inpatient rehab 06/03/2021.       Plan        Rehabilitation     1. PT, OT, SLP minutes of each on a daily basis, in addition to rehab nursing and close management of physiatrist.    2. Impairment of ADL's:  OT for 60 minutes daily to work on ADL re-training such as grooming, self cares and bathing.    3. Impairment of mobility:  PT for 60 minutes daily to work on neuromuscular re-education focusing on strength, balance, coordination, and endurance.    4. SLP for 60 min daily for higher level cognitive deficits and memory impairment. cognitive and linguistic deficits  5. Rehab RN for med administration, bowel regimen, wound care and patient education.   6. FEN: Regular diet thin liquids  7. Bowel: Scheduled Miralax and Senna/Docusate  8. Bladder: Continent, scan protocol  9. DVT Prophylaxis: Mechanical.  10. GI Prophylaxis: Femotidine  11. 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 S/P Stealth guided resection 06/27/019 He completed chemoradiotherapy on 8/30/2019 and was managed on  a clinical trial receiving atezolizumab (anti-PDL1) plus adjuvant temozolomide. He received his last dose of immunotherapy on 3/16/2020 when imaging on 3/28/2020 showed progression of disease. He underwent a repeat craniotomy for tumor resection with Dr. Chan on 4/9/2020 with placement of GammaTiles.   - Continue PTA keppra 1000 mg BID  - Continue Veledimex, and home meds (present in narcotic box)  - Has an infusion scheduled for 6/14, but anticipate he will be able to discharge before this        Wound Care  Ok to shower, however no scrubbing of the wound and no soaking of the wound, meaning no bathtubs or swimming pools. Pat dry only. Leave wound open to air.  Patient to have wound checked 2 weeks following surgery.    Wound location: cranial  Closure technique: monocryl  Dressing needs: none  Post-op care at follow-up: keep dry and clean     Chronic Medical Conditions  Hypertension: PTA Lisinopril  Depression: PTA Lexapro  Allergies: PTA Loratadine  Hypothyroid: PTA levothyroxine     Code: Full code  Disposition: Home with family cares  ELOS:  Tentative discharge date 6/8/21  Rehab prognosis:  Good     Follow Up After Discharge   1. Primary care physical, 1-2 weeks after discharge date  3. Dr. Marquise Chan on 6/11/2021 @ 2:45 pm  4. Dr. Ericka Avila on 6/14/2021 @ 7:30 am             Continue cares and plans as outlined.    Fredy Rea MD .

## 2021-06-05 NOTE — PLAN OF CARE
Discharge Planner Post-Acute Rehab OT:      Discharge Plan: Home with spouse who works full time from home     Precautions: L inattention, crani, falls     Current Status:  ADLs: CGA with transfers/mobility with no AD. Min A LB dressing for problem solving d/t dressing apraxia, SBA UB dressing, SBA standing h/g x15 min, CGA with shower transfer and bathing. Uses shower chair and grab bar. Pt required initiation cue for starting and stopping bathing.   IADLs: NT, spouse can assist  Vision/Cognition: L inattention, more significant with mobility     Assessment: Pt presents with post-op precautions, L inattention, L incoordination, dressing apraxia, and cog deficits in attention, sequencing, processing, and initiation cognition. D/t acute on chronic L field cut, L inattention, and cog deficits from ongoing GBM and repeat surgeries, will focus on compensatory strategies to manage safely at home. Anticipate pt will need supervision-mod I with ADLs with family training for safety     Other Barriers to Discharge (DME, Family Training, etc):   Spouse works from home and will occasionally need to leave home for errands but otherwise will provide constant supervision  2 level home with 16 steps up to bedroom/bathroom  Has shower stool and suction cup grab bar, has short toilet and may need riser

## 2021-06-05 NOTE — PLAN OF CARE
Discharge Planner Post-Acute Rehab PT:      Discharge Plan: home w/ prn assist, OP PT, ~6/8     Precautions: falls, crani, impulsive     Current Status:  Bed Mobility: SUP  Transfer: CGA  Gait: CGA w/o A.D, L foot catches but not to point of LOB, plan to trial foot-up  Stairs: CGA with vcs for safety  Balance: good sitting, mildly unsteady with gait, L path deviation     Assessment:  Focus on gait with cues for L environmental scanning and increasing gait speed, able to increase gait speed but needs max cuing for head turns to L and attending to L side. Trial of L foot up for ambulation, no toe catching with it donned, will continue to use tomorrow.      Other Barriers to Discharge (DME, Family Training, etc): MARTIN and within home, likely to need higher level of assist than previous, fatigue, cognition- ?new learning

## 2021-06-05 NOTE — PLAN OF CARE
Showered in OT.    Denied pain.   Left neglect/visual field cut. Call light placed on right and reminded him to use it frequently.  He did attempt to get up x1 on his own without calling.  Bed/chair alarms in place.  Attempted to toilet patient every 3 hours this shift to prevent falls.    Had a smear of BM in underwear this am, then continent on the toilet.

## 2021-06-06 NOTE — PLAN OF CARE
"FOCUS/GOAL  Bowel management, Medical management, Cognition/Memory/Judgment/Problem solving and Reinforcement of self-care/ADL    ASSESSMENT, INTERVENTIONS AND CONTINUING PLAN FOR GOAL:  Patient is alert to self/place/situation, gets disoriented to time and has very slow processing and slow speech.  Patient denies any pain on this shift, received study drug this am- finished breakfast right before 8am and med was given at 8:19.  Patient got up to chair for breakfast and was able to eat with set up assist, used calllight to request to get back into bed afterwards and waited for staff to assist.  Alarms are still on at all times just in case.  Patient was unable to fully participate with SLP after that, per report \"kept dozing off.\"  Patient was assessed at that time and encouraged to get up and walk to the BR, was able to ambulate with CGAo1 and gait belt, needs many cues to get going. MD was still just notified of patient's lethargy during therapy. Later in the day was moving a little better and following commands more quickly but still slow.  No additional care concerns at this time, spouse brings patient dinner in the evenings.      "

## 2021-06-06 NOTE — PLAN OF CARE
Discharge Planner Post-Acute Rehab PT:      Discharge Plan: home w/ prn assist, OP PT, ~6/8     Precautions: falls, crani, impulsive     Current Status:  Bed Mobility: SUP  Transfer: CGA  Gait: CGA w/o A.D, L foot catches but not to point of LOB, plan to trial foot-up  Stairs: CGA with vcs for safety  Balance: good sitting, mildly unsteady with gait, L path deviation     Assessment:  Focus on dynamic gait and L sided attention, pt was able to demo head turns and looking L for target without LOB though challenging. Noted occasional scuffing of L foot with ambulation with and without foot up today, but no LOB.        Other Barriers to Discharge (DME, Family Training, etc): MARTIN and within home, likely to need higher level of assist than previous, fatigue, cognition- ?new learning

## 2021-06-06 NOTE — PLAN OF CARE
Discharge Planner Post-Acute Rehab OT:      Discharge Plan: Home with spouse who works full time from home     Precautions: L inattention, crani, falls     Current Status:  ADLs: CGA with transfers/mobility with no AD. SBA and set up LB dressing for problem solving d/t dressing apraxia, SBA UB dressing, SBA standing h/g x15 min, CGA with shower transfer and bathing. Uses shower chair and grab bar. Pt required initiation cue for starting and stopping each activity and cues for locating items on L side. Pt able to attend to L side of the body during bathing and dressing without cues.    IADLs: NT, spouse can assist  Vision/Cognition: L inattention, more significant with mobility     Assessment: Pt presents with post-op precautions, L inattention, L incoordination, dressing apraxia, and cog deficits in attention, sequencing, processing, and initiation cognition. D/t acute on chronic L field cut, L inattention, and cog deficits from ongoing GBM and repeat surgeries, will focus on compensatory strategies to manage safely at home. Anticipate pt will need supervision-mod I with ADLs with family training for safety     Other Barriers to Discharge (DME, Family Training, etc):   Spouse works from home and will occasionally need to leave home for errands but otherwise will provide constant supervision  2 level home with 16 steps up to bedroom/bathroom  Has shower stool and suction cup grab bar, has short toilet and may need riser

## 2021-06-06 NOTE — PROGRESS NOTES
"    [facility]   Acute Rehabilitation Unit  Inpatient Progress Note     Subjective     Erasto is doing well this morning. Denies HA. Has been voiding w/o arauz, ambulating with assistance, and tolerating his diet. Denies chest pain/SOB, n/v, and leg pain. He had his last seizure from his GBM was 6/22/19.    PT: Bed Mobility: SUP  Transfer: CGA  Gait: CGA w/o A.D, L foot catches but not to point of LOB, plan to trial foot-up  Stairs: CGA with vcs for safety  Balance: good sitting, mildly unsteady with gait, L path deviation    Review of Systems  10 point review of systems was otherwise negative other     Objective     Vital signs:  Temp: 98.3  F (36.8  C) Temp src: Oral BP: 137/85 Pulse: 88   Resp: 16 SpO2: 97 % O2 Device: None (Room air)   Height: 177.8 cm (5' 10\") Weight: 72.7 kg (160 lb 3.4 oz)  Estimated body mass index is 22.99 kg/m  as calculated from the following:    Height as of this encounter: 1.778 m (5' 10\").    Weight as of this encounter: 72.7 kg (160 lb 3.4 oz).      Physical Examination  General: Awake, sitting up in chair  Cardiovascular: RRR, no extra heart sounds, no murmur  Pulmonary: Non-labored breathing, CTAB, no wheeze, no rhonci  Abdominal:  soft, non-tender, non-distended  Extremities: warm, well perfused, no edema in bilateral lower extremities, no tenderness in calves  NEURO: alert and cooperative, with left sided neglect.  Moves all four.    LABS  CBC RESULTS:   Recent Labs   Lab Test 06/03/21  0659   WBC 3.5*   RBC 3.83*   HGB 11.8*   HCT 35.2*   MCV 92   MCH 30.8   MCHC 33.5   RDW 12.6        Last Comprehensive Metabolic Panel:  Sodium   Date Value Ref Range Status   06/03/2021 136 133 - 144 mmol/L Final     Potassium   Date Value Ref Range Status   06/03/2021 3.8 3.4 - 5.3 mmol/L Final     Chloride   Date Value Ref Range Status   06/03/2021 105 94 - 109 mmol/L Final     Carbon Dioxide   Date Value Ref Range Status   06/03/2021 25 20 - 32 " mmol/L Final     Anion Gap   Date Value Ref Range Status   06/03/2021 5 3 - 14 mmol/L Final     Glucose   Date Value Ref Range Status   06/03/2021 106 (H) 70 - 99 mg/dL Final     Urea Nitrogen   Date Value Ref Range Status   06/03/2021 11 7 - 30 mg/dL Final     Creatinine   Date Value Ref Range Status   06/03/2021 0.72 0.66 - 1.25 mg/dL Final     GFR Estimate   Date Value Ref Range Status   06/03/2021 >90 >60 mL/min/[1.73_m2] Final     Comment:     Non  GFR Calc  Starting 12/18/2018, serum creatinine based estimated GFR (eGFR) will be   calculated using the Chronic Kidney Disease Epidemiology Collaboration   (CKD-EPI) equation.       Calcium   Date Value Ref Range Status   06/03/2021 8.5 8.5 - 10.1 mg/dL Final         IMAGING  No new imaging.     Assessment     Erasto Maher is a 56 year old male with GBM s/p right craniotomy for tumor resection and ziopharm study virus injection (5/27/21). He has impairments in strength, mobility, performing ADLs.     Admission to acute inpatient rehab 06/03/2021.       Plan        Rehabilitation     1. PT, OT, SLP minutes of each on a daily basis, in addition to rehab nursing and close management of physiatrist.    2. Impairment of ADL's:  OT for 60 minutes daily to work on ADL re-training such as grooming, self cares and bathing.    3. Impairment of mobility:  PT for 60 minutes daily to work on neuromuscular re-education focusing on strength, balance, coordination, and endurance.    4. SLP for 60 min daily for higher level cognitive deficits and memory impairment. cognitive and linguistic deficits  5. Rehab RN for med administration, bowel regimen, wound care and patient education.   6. FEN: Regular diet thin liquids  7. Bowel: Scheduled Miralax and Senna/Docusate  8. Bladder: Continent, scan protocol  9. DVT Prophylaxis: Mechanical.  10. GI Prophylaxis: Femotidine  11. Pain Management: PRN Tylenol     Medical Management     Glioblastoma multiform  (IDH1  R132H wildtype, MGMT promoter unmethylated)  First diagnosed 06/22/019 during work up for first time seizure. S/P Stealth guided resection 06/27/019 He completed chemoradiotherapy on 8/30/2019 and was managed on a clinical trial receiving atezolizumab (anti-PDL1) plus adjuvant temozolomide. He received his last dose of immunotherapy on 3/16/2020 when imaging on 3/28/2020 showed progression of disease. He underwent a repeat craniotomy for tumor resection with Dr. Chan on 4/9/2020 with placement of GammaTiles.   - Continue PTA keppra 1000 mg BID  - Continue Veledimex, and home meds (present in narcotic box)  - Has an infusion scheduled for 6/14, but anticipate he will be able to discharge before this        Wound Care  Ok to shower, however no scrubbing of the wound and no soaking of the wound, meaning no bathtubs or swimming pools. Pat dry only. Leave wound open to air.  Patient to have wound checked 2 weeks following surgery.    Wound location: cranial  Closure technique: monocryl  Dressing needs: none  Post-op care at follow-up: keep dry and clean     Chronic Medical Conditions  Hypertension: PTA Lisinopril  Depression: PTA Lexapro  Allergies: PTA Loratadine  Hypothyroid: PTA levothyroxine     Code: Full code  Disposition: Home with family cares  ELOS:  Tentative discharge date 6/8/21  Rehab prognosis:  Good     Follow Up After Discharge   1. Primary care physical, 1-2 weeks after discharge date  3. Dr. Marquise Chan on 6/11/2021 @ 2:45 pm  4. Dr. Ericka Avila on 6/14/2021 @ 7:30 am             Doing well. Fatigues at times. Monitor. Continue cares and plans as outlined.    Fredy Rea MD .

## 2021-06-06 NOTE — PROGRESS NOTES
Speech Therapy Progress Note    -30 minutes this AM due to somnolence. Clinician attempted to see patient at 0945. He was asleep in bed. He briefly opened his eyes and responded to a few questions, but was unable to maintain adequate alertness to participate in Speech Therapy. No improvement noted with change in positioning (i.e., HOB raised to semi-upright position). Session deferred. Per RN report, patient had not received any medications that may have a sedating effect last night or this AM. Will re-attempt to see patient on 6/7/21.

## 2021-06-06 NOTE — PLAN OF CARE
Individualized Overall Plan Of Care (IOPOC)      Rehab diagnosis/Impairment Group Code: Brain dysfunction 02.1 non-traumatic - s/p right craniotomy for tumor resection and ziopharm study virus injection.   Gbm (glioblastoma multiforme) (h)       Expected functional outcome: Modified independence without an assistive device for mobility, modified    independence with 4+12 stairs to access his bedroom and bathroom, and modified independent with his ADLs.  He will require supervision with his IADLs.   Clinical Impression Comments:     Mobility: PLOF pt was ind for functional mobililty ADLs and some IADLs including golfing. Currently pt requires close SBA-CGA for transfers, gait and stairs w/o AD d/t balance, L in-attention, mild L otis-paresis. D/t repeat brain resections, acute on chronic L field cut, on-going GBM treatment, expect some of these deficits, especially L in-attn, to persist. in-pt therapy to focus on compensatory strateigies, family training for safety.     ADL: Pt presents with post-op precautions, L inattention, L incoordination, dressing apraxia, and cog deficits in attention, sequencing, processing, and initiation cognition. D/t acute on chronic L field cut, L inattention, and cog deficits from ongoing GBM and repeat surgeries, will focus on compensatory strategies to manage safely at home. Anticipate pt will need supervision-mod I with ADLs with family training for safety    Communication/Cognition/Swallow: Patient would benefit from skilled SLP services as he currently demonstrates below baseline cognitive function, and will require cognitive training to return to prior home environment as independently as possible. Patient speech and language appears WFL at unstructured conversation level, however suspect patient immediate recall, attention, and initiation impacting ability to respond in a timely manner or follow directions with 100% accy vs true language impairment. Patient is able to follow  2-step directions with repetition of instruction. Improved participation as session continued and patient became more alert. Patient demonstrates moderate-severe left side neglect, though noted mild left-side neglect at baseline. Through informal cognitive evaluation, patient with noted moderate sustained and alternating attention, thought organization, immediate recall, abstract thinking, and visuospatial skills. Patient also with noted reduced initiation during evaluation.      Intensity of therapy:   PT 60 minutes, Other (see comments)(60-75 minutes daily), for 5 days  OT 60 minutes, Daily, for 6 days  SLP 60 minutes, daily, for 1 week    Orthotics None  Education None  Neuropsychology Testing: No  Other:  TBD      Medical Prognosis: Fair    Physician summary statement: In addition to above statements address, Patient requires intensive active and ongoing therapeutic intervention and multiple therapies; Patient requires medical supervision; Expected to actively participate in the intensive rehab program; Sufficiently stable to actively participate; Expectation for measurable improvement in functional capacity or adaption to impairments.      Discharge destination: prior home  Discharge rehabilitation needs: outpatient      Estimated length of stay: 9 days      Rehabilitation Physician Fredy Rea MD

## 2021-06-06 NOTE — PLAN OF CARE
FOCUS/GOAL  Bowel management, Bladder management, and Medication management    ASSESSMENT, INTERVENTIONS AND CONTINUING PLAN FOR GOAL:  Pt was A/O, able to use call light and make needs known to staff. Denies pain, SOB, chest pain nor dizziness. CGA with gait bel for transfers. On regular diet, thin liquids, takes medicines whole. Cont of BL/BM, LBM-6/5/21. VSS. Surgical incision on head, clean and dry. Will continue with current POC.

## 2021-06-07 NOTE — PROGRESS NOTES
"    [facility]   Acute Rehabilitation Unit  Inpatient Progress Note     Subjective     Erasto is doing well this morning. Denies HA. Has been voiding w/o arauz, ambulating with assistance, and tolerating his diet. Denies chest pain/SOB, n/v, and leg pain. He had his last seizure from his GBM was 6/22/19. He and his wife report he is very somnolent in the morning and has always been pre-surgery, and never feels truly awake before 9am.    Review of Systems  10 point review of systems was otherwise negative other     Objective     Vital signs:  Temp: 97.5  F (36.4  C) Temp src: Oral BP: 120/72 Pulse: 80   Resp: 16 SpO2: 96 % O2 Device: None (Room air)   Height: 177.8 cm (5' 10\") Weight: 72.7 kg (160 lb 3.4 oz)  Estimated body mass index is 22.99 kg/m  as calculated from the following:    Height as of this encounter: 1.778 m (5' 10\").    Weight as of this encounter: 72.7 kg (160 lb 3.4 oz).      Physical Examination  General: Awake, sitting up in chair  Cardiovascular: RRR, no extra heart sounds, no murmur  Pulmonary: Non-labored breathing, CTAB, no wheeze, no rhonci  Abdominal:  soft, non-tender, non-distended  Extremities: warm, well perfused, no edema in bilateral lower extremities, no tenderness in calves  NEURO: alert and cooperative, with left sided neglect.  Moves all four.    LABS  CBC RESULTS:   Recent Labs   Lab Test 06/03/21  0659   WBC 3.5*   RBC 3.83*   HGB 11.8*   HCT 35.2*   MCV 92   MCH 30.8   MCHC 33.5   RDW 12.6        Last Comprehensive Metabolic Panel:  Sodium   Date Value Ref Range Status   06/07/2021 135 133 - 144 mmol/L Final     Potassium   Date Value Ref Range Status   06/07/2021 4.3 3.4 - 5.3 mmol/L Final     Chloride   Date Value Ref Range Status   06/07/2021 103 94 - 109 mmol/L Final     Carbon Dioxide   Date Value Ref Range Status   06/07/2021 26 20 - 32 mmol/L Final     Anion Gap   Date Value Ref Range Status   06/07/2021 6 3 - 14 mmol/L Final "     Glucose   Date Value Ref Range Status   06/07/2021 107 (H) 70 - 99 mg/dL Final     Urea Nitrogen   Date Value Ref Range Status   06/07/2021 14 7 - 30 mg/dL Final     Creatinine   Date Value Ref Range Status   06/07/2021 0.89 0.66 - 1.25 mg/dL Final     GFR Estimate   Date Value Ref Range Status   06/07/2021 >90 >60 mL/min/[1.73_m2] Final     Comment:     Non  GFR Calc  Starting 12/18/2018, serum creatinine based estimated GFR (eGFR) will be   calculated using the Chronic Kidney Disease Epidemiology Collaboration   (CKD-EPI) equation.       Calcium   Date Value Ref Range Status   06/07/2021 8.9 8.5 - 10.1 mg/dL Final       IMAGING  No new imaging.     Assessment     Erasto Maher is a 56 year old male with GBM s/p right craniotomy for tumor resection and ziopharm study virus injection (5/27/21). He has impairments in strength, mobility, performing ADLs.     Admission to acute inpatient rehab 06/03/2021.     Plan        Rehabilitation     1. PT, OT, SLP minutes of each on a daily basis, in addition to rehab nursing and close management of physiatrist.    2. Impairment of ADL's:  OT for 60 minutes daily to work on ADL re-training such as grooming, self cares and bathing.    3. Impairment of mobility:  PT for 60 minutes daily to work on neuromuscular re-education focusing on strength, balance, coordination, and endurance.    4. SLP for 60 min daily for higher level cognitive deficits and memory impairment. cognitive and linguistic deficits  5. Rehab RN for med administration, bowel regimen, wound care and patient education.   6. FEN: Regular diet thin liquids  7. Bowel: Scheduled Miralax and Senna/Docusate  8. Bladder: Continent, scan protocol  9. DVT Prophylaxis: Mechanical.  10. GI Prophylaxis: Femotidine  11. Pain Management: PRN Tylenol     Medical Management     Glioblastoma multiform  (IDH1 R132H wildtype, MGMT promoter unmethylated)  First diagnosed 06/22/019 during work up for first  time seizure. S/P Stealth guided resection 06/27/019 He completed chemoradiotherapy on 8/30/2019 and was managed on a clinical trial receiving atezolizumab (anti-PDL1) plus adjuvant temozolomide. He received his last dose of immunotherapy on 3/16/2020 when imaging on 3/28/2020 showed progression of disease. He underwent a repeat craniotomy for tumor resection with Dr. Chan on 4/9/2020 with placement of GammaTiles.   - Continue PTA keppra 1000 mg BID  - Continue Veledimex, and home meds (present in narcotic box)  - Has an infusion scheduled for 6/14, but anticipate he will be able to discharge before this        Wound Care  Ok to shower, however no scrubbing of the wound and no soaking of the wound, meaning no bathtubs or swimming pools. Pat dry only. Leave wound open to air.  Patient to have wound checked 2 weeks following surgery.    Wound location: cranial  Closure technique: monocryl  Dressing needs: none  Post-op care at follow-up: keep dry and clean     Chronic Medical Conditions  Hypertension: PTA Lisinopril  Depression: PTA Lexapro  Allergies: PTA Loratadine  Hypothyroid: PTA levothyroxine     Code: Full code  Disposition: Home with family cares  ELOS:  Tentative discharge date 6/8/21  Rehab prognosis:  Good     Follow Up After Discharge   1. Primary care physical, 1-2 weeks after discharge date  3. Dr. Marquise Chan on 6/11/2021 @ 2:45 pm  4. Dr. Ericka Avila on 6/14/2021 @ 7:30 am           **Patient was seen and discussed with our attending Dr. Encarnacion who agrees with the above assessment and plan.    Lana Morrell, MS4    Casimiro Panchal M.D.  Physical Medicine & Rehabilitation PGY-2  Pager: [phone]

## 2021-06-07 NOTE — PLAN OF CARE
Discharge Planner Post-Acute Rehab OT:      Discharge Plan: Home with spouse who works full time from home     Precautions: L inattention, crani, falls     Current Status:  ADLs: SBA transfers/mobility with no AD. SBA with min verbal cues for LB dressing. Setup UB dressing. Supervision with toileting, standing h/g. CGA with shower transfer and bathing. Requires initiation cue for starting and stopping each activity and cues for locating items on L side. Pt able to attend to L side of the body during bathing and dressing without cues.    IADLs: NT, spouse can assist  Vision/Cognition: L inattention, more significant with mobility     Assessment: Continue to focus on compensatory strategies to manage acute on chronic L inattention and cog deficits safely at home. Anticipate pt may need 24/7 supervision, setup family training for 6/8 to train spouse in providing 24/7. Spouse has to leave home for short durations for errands/work and hopes that pt can be mod I with basic mobility, toileting, and making sandwich on main level when spouse is gone - will need to determine who can provide supervision when spouse is gone     Other Barriers to Discharge (DME, Family Training, etc):   Spouse works from home and will occasionally need to leave home for errands but otherwise will provide constant supervision  2 level home with 16 steps up to bedroom/bathroom  Has shower stool and suction cup grab bar, has short toilet and may need riser

## 2021-06-07 NOTE — PLAN OF CARE
Discharge Planner Post-Acute Rehab SLP:      Discharge Plan: Home with HH or OP for SLP     Precautions: Fall, Left-side neglect     Current Status:  Communication: Appears WFL at unstructured conversation level, however suspect cognition impacts initiation and retention of instructions  Cognition: Moderate impairment with sustained/selective attention, immediate/working memory, moderately-complex problem solving, thought organization, abstract thinking, and initiation. Left-side neglect.  Swallow: Regular and thin.      Assessment: worked on cell phone use, texting and reading texts.doing well with reading scanning, but needing occasional cues for icons far left, and proofing.  Other Barriers to Discharge (Family Training, etc): TBD

## 2021-06-07 NOTE — PLAN OF CARE
FOCUS/GOAL  Bowel management, Bladder management, Medication management, Pain management, Medical management, Mobility, Skin integrity, and Safety management    ASSESSMENT, INTERVENTIONS AND CONTINUING PLAN FOR GOAL:    Pt is oriented x 4, was tired/sleepy and difficulty waking up this morning. Continent of bowel and bladder. Ax1 transfers with gait belt. Reg diet, thin liquids. Denied pain, nausea and vomiting, numbness or tingling, SOB. Alarms on, call light within reach. Will continue with plan of care.

## 2021-06-07 NOTE — PLAN OF CARE
A&O x3. Pt slow to respond and seems disoriented to situation at times. Pt denied pain, nausea, SOB or other new concerns overnight. Pt tried to self transfer this shift and set off bed alarm. By the time staff ran to room pt was already up and walking with unsteady gait. Pt slept majority of shift. Call light within reach. Bed alarm on for safety. Continue with plan of care.

## 2021-06-07 NOTE — PLAN OF CARE
"  VS: /67 (BP Location: Left arm)   Pulse 94   Temp 97.3  F (36.3  C) (Oral)   Resp 12   Ht 1.778 m (5' 10\")   Wt 72.7 kg (160 lb 3.4 oz)   SpO2 98%   BMI 22.99 kg/m     Denies chest pain and SOB  Alert and oriented. Forgetful, needs frequent redirectioning   O2: >90% on RA   Output: Voids via toilet or urinal   Last BM: 6/6/21  Bowel sounds active   Activity: Close CGA with gait belt  L sided weaker, L side neglect   Skin: Incision to R side of head - BECKI   Pain: Denies   CMS: Intact. Denies numbness and tingling   Dressing: N/A   Diet: Regular   LDA: N/A   Equipment: Personal belongings, urinal   Plan: Continue to monitor   Additional Info: Med study in am - narc box      "

## 2021-06-07 NOTE — PROGRESS NOTES
SPIRITUAL HEALTH SERVICES  SPIRITUAL ASSESSMENT Progress Note  [facility] ([facility] [place] ARU R513 6/7     REFERRAL SOURCE: Follow Up    Pt's spouse had conversation with unit . Pt was resting. The question for the moment was he interested in receiving SHS and visits with unit .    PLAN: The spouse mentioned she would ask and if it is 'yes' she would inform the bedside nurse. Afterward the unit  will follow up with pt.    Milla Sewell  Associate    Pager: [phone]

## 2021-06-08 NOTE — PLAN OF CARE
"FOCUS/GOAL  Bowel management, Bladder management, Medication management, and Safety management    ASSESSMENT, INTERVENTIONS AND CONTINUING PLAN FOR GOAL:  /74   Pulse 78   Temp 97.3  F (36.3  C) (Oral)   Resp 12   Ht 1.778 m (5' 10\")   Wt 72.7 kg (160 lb 3.4 oz)   SpO2 97%   BMI 22.99 kg/m    Pt vitals stable. Alert and oriented x4. Denies pain, HA, dizziness, nausea/vomiting, or SOB. Continent, voids spontaneously without difficulties, LBM 6/7. On regular diet/thin liquids, tolerating well. Takes pills whole without difficulties. C/o heart burn this evening, Tums PRN ordered.  Head incision C/D/I, and open to air. Transfers with assist of 1 with a belt. Pt self transferred twice this evening. Safety precautions emphasized. Alarms on.     "

## 2021-06-08 NOTE — CARE CONFERENCE
Acute Rehab Care Conference/Team Rounds      Type: Team Rounds    Present: Dr. Eusebio Blank, Casimiro Panchal MD Resident, Rogers Ochoa PT, Meera Muñiz OT, Tawnya Ledezma SLP, Sylvia Davenport SW, Radha Boyce RD, Kat Shen , Linda Otto RN, Patient Aristeo Maher        Discharge Barriers/Treatment/Education    Rehab Diagnosis: Recurrent glioblastoma s/p craniotomy for Xl-CYO-nUS-12 injection    Active Medical Co-morbidities/Prognosis: HTN, depression, hypothyroidism, Hx of seizures    Safety: Pt sometimes self transfers. Bed and chair alarms on at all times, frequent checks and anticipating needs done by nursing staff.    Pain:Denie pain.    Medications, Skin, Tubes/Lines: No tubes or lines. Pt's wife will manage meds at home. Right scalp incision BECKI and healing well.    Swallowing/Nutrition:    Bowel/Bladder: Continent of bowel and bladder. LBM 6/8/21.    Psychosocial: Lives in a house w/ wife, Judith, in [place], [place]. The main floor of the home has a 1/2 bath.  Pt's bed and bath (step in shower) are on the 2nd floor of the home. There are 4 steps followed by a landing followed by an add'l 12-15 steps, to the 2nd floor (with a handrail). IND w/ mobility, ADLs, transfers and gait. Judith completes the housekeeping, laundry, meal preparation, shopping and finances. Privately hired  once every 3 weeks.  Pt's wife provides transportation.  Pt was IND with medication administration. Pt has a disability parking certificate.     ADLs/IADLs: Continue to focus on compensatory strategies to manage acute on chronic L inattention and cog deficits safely at home. Requires SBA with all ADLs, transfers, and mobility for verbal cues for sequencing and attention. Pt will need 24/7 supervision from spouse and completed family training 6/8, spouse able to provide accurate verbal cues and implementing compensatory strategies. Recommending  OT for home modifications/compensatory strategies for L  "inattention then transitioning to OP OT. Recommend shower stool for initial showers    Mobility: Pt amb in room SBA without assistive device with vc's and visual reminders for scanning to L side. Pt demo sup<>sit SBA, STS no assistive device SBA, amb without assistive device 150+ ft SBA with reminders for attention to L, and navigating 22x8\" steps R rail CGA with vc's for reminders to attend to LUE. Recommend OP PT. No DME needed for ambulation.    Cognition/Language:SLP: pt with mild deficits for problem solving, memory, left inattention. Wife/pt report worsened with latest hospitalization, will benefit from support from wife for IADLS, and likely further SLP tx.     Community Re-Entry: supervision amb without assistive device for reminders to L side    Transportation: requires assist from family    Plan of Care and goals reviewed and updated.    Discharge Plan/Recommendations    Fall Precautions: continue    Overall plan for the patient:   Medically stable.  Functionally needs cues for L sided inattention and limited carryover, but physically able to ambulate and perform ADLs on his own with SBA.  Cognitive deficits present and deficits in visual perception.  Ongoing family training including tomorrow, but will discharge home tomorrow after completion.  Recommending 24 hr supervision, at least initially, to ensure safety.  Will arrange HH PT, OT, SLP, RN, and SW to continue therapies, assist with home services, and help guide weaning of 24 hr supervision and transition to outpatient when appropriate.        Utilization Review and Continued Stay Justification    Medical Necessity Criteria:    For any criteria that is not met, please document reason and plan for discharge, transfer, or modification of plan of care to address.    Requires intensive rehabilitation program to treat functional deficits?: Yes    Requires 3x per week or greater involvement of rehabilitation physician to oversee rehabilitation program?: " Yes    Requires rehabilitation nursing interventions?: Yes    Patient is making functional progress?: Yes    There is a potential for additional functional progress? Yes    Patient is participating in therapy 3 hours per day a minimum of 5 days per week or 15 hours per week in 7 day period?:Yes    Has discharge needs that require coordinated discharge planning approach?:Yes      Barriers/Concerns related to meeting medical necessity criteria:  None    Team Plan to Address Concern:  N/A      Final Physician Sign off    Statement of Approval: I have reviewed and agree with the recommendations and documentation in this care conference note.       Patient Goals  Social Work Goals: Confirm discharge recommendations with therapy, coordinate safe discharge plan and remain available to support and assist as needed.       OT Frequency: daily 60-90 minutes  OT goal: hygiene/grooming: modified independent, while standing  OT goal: upper body dressing: Supervision/stand-by assist, including set-up/clothing retrieval  OT goal: lower body dressing: Supervision/stand-by assist, including set-up/clothing retrieval  OT goal: upper body bathing: Supervision/stand-by assist, within precautions  OT goal: lower body bathing: Supervision/stand-by assist, with precautions  OT goal: bed mobility: Independent  OT Goal: transfer: Modified independent  OT goal: toilet transfer/toileting: Modified independent  OT goal: meal preparation: Supervision/stand-by assist, with simple meal preparation, ambulatory level  OT goal 1: pt will complete HEP for LUE coordination, attention, and strengthening to increase functional use of LUE for ADLs/IADLs        PT Frequency: 60-75 minutes daily  PT goal: bed mobility: Independent, Supine to/from sit, Rolling, Bridging, Within precautions (met)  PT goal: transfers: Supervision/stand-by assist, Bed to/from chair, Sit to/from stand (met)  PT goal: gait: Supervision/stand-by assist, Greater than 200 feet  (met)  PT goal: stairs: Supervision/stand-by assist, Greater than 10 stairs, Rail on right  PT goal: perform aerobic activity with stable cardiovascular response: NuStep, 15 minutes, continuous activity     PT goal 1: following education pt/wife state 3 or > fall prevention strategies to demo knowledge of how to reduce falls  PT Goal 2: perform car transfer with SBA from wife in order to access personal tranportation  PT Goal 3: perform floor transfer w/ furniture A SBA to demo safe fall recovery  PT Goal 4: using handout perform LLE strength/coodination for improved functional mobility            SLP Frequency: Daily   SLP goal 1: Patient will attend to the left side during structured therapeutic activitities in 80% of opportunities with usual (50-74%) max auditory cues to attend to improve awareness, attention, and independent understanding of left-side neglect.  SLP goal 2: Patient will complete procedural recall and simple problem solving structured tasks with 80% accy and occasional (25-49%) max auditory cues in order to improve independence with functional task completion and increase safety awareness.    Patient/Family Goal: Bowel: Patient will manage bowel status independently by 6/20/21.  Patient/Family Goal: Bladder: Patient will manage bladder status independently by 6/20/21.  Patient/Family Goal: Medication Management: Pt's wife will manage meds at home.

## 2021-06-08 NOTE — PLAN OF CARE
Problem: Goal/Outcome  Goal: Goal Outcome Summary  Outcome: Improving   FOCUS/GOAL  Bowel management, Bladder management, Medical management, Discharge planning, Mobility, and Safety management    ASSESSMENT, INTERVENTIONS AND CONTINUING PLAN FOR GOAL:  Pt able to use the call light this morning and waited for this nurse to come to his room and provide assistance to the toilet. Needed assist to get shoe on left foot and he was able to just put on right shoe on with just set up assist. SBA-CGA using gait belt with transfer and walking to the toilet. Cues to pay attention to left side. Remembered to call out to nurse when done with toilet and he didn't stand up before nurse opened the toilet door to help him. Continent of bowel and bladder. He had a bm this morning. SBA with oral cares, washing face and brushing hair in front of sink.  Right scalp incision with surgical glue BECKI and healing well. Pt denied pain. Rehab rounds this morning. Pt's wife in room and now assisting pt with cares. Plan is to discharge home tomorrow.

## 2021-06-08 NOTE — DISCHARGE INSTRUCTIONS
Follow up Appointments    - Neurosurgery  You are scheduled for a video visit with Dr. Chan on Friday, June 11th at 2:45 PM.    Address  [facility]  Phone   [phone]    - Oncology  You are scheduled to see Dr. Avila on Monday, June 14th at 7:30 AM with labs at 7:00 AM.    Address  [facility]                          Clinics and Surgery Center, Floor 2                          [address] [place] [address]  Phone   [phone]    - Primary care  You are scheduled to see Dr. Edwards on Tuesday, June 15th at 9:10 AM.    Address  [address], [address]                          [place] [address]  Phone   [phone]  Fax                  [phone]    ------------------------------------------------------------------------------  Home Health Care:   [facility] PH: [phone] (previously known as: [facility])  Nurse, physical therapy, occupational therapy, speech therapy and    First RN visit will be Friday 06/11 and intake will contact Judith to coordinate/schedule.   ------------------------------------------------------------------------------

## 2021-06-08 NOTE — PLAN OF CARE
Discharge Planner Post-Acute Rehab PT:     Discharge Plan:  home w/ prn assist, OP PT, ~6/8     Precautions: falls, crani, impulsive    Current Status:  Bed Mobility: SBA   Transfer: SBA to CGA V.c for . And L side awareness,   Gait: in and outside amb no A.D close SBA to CGA. Pt tend to drag L foot with early heel strike or toe catch. V.c for L side awareness.   Stairs:   Balance: SBA for all balance.     Assessment:  pt wife present for PT session today, pt demo outside amb with amb up and down inclines and uneven surfaces, in an out of wife's car. Pt demo gait and balance with pt making R<>L turns. Pt needing v.c to stay on task and cont to scan L to objects.     Other Barriers to Discharge (DME, Family Training, etc): MARTIN and within home, likely to need higher level of assist than previous, fatigue, cognition- ?new learning

## 2021-06-08 NOTE — PROGRESS NOTES
"    [facility]   Acute Rehabilitation Unit  Inpatient Progress Note     Subjective     Pt seen in team rounds.  Aristeo is doing well this morning. Denies HA. Has been voiding w/o arauz, ambulating with assistance, and tolerating his diet. Denies chest pain/SOB, n/v, and leg pain. Plan to discharge tomorrow afternoon after therapies. Discussed home help options and set 24/7 care expectations with wife.     Review of Systems  10 point review of systems was otherwise negative other     Objective     Vital signs:  Temp: 97.3  F (36.3  C) Temp src: Oral BP: 115/74 Pulse: 78   Resp: 12 SpO2: 97 % O2 Device: None (Room air)   Height: 177.8 cm (5' 10\") Weight: 72.7 kg (160 lb 3.4 oz)  Estimated body mass index is 22.99 kg/m  as calculated from the following:    Height as of this encounter: 1.778 m (5' 10\").    Weight as of this encounter: 72.7 kg (160 lb 3.4 oz).      Physical Examination  General: Awake, sitting up in chair  Cardiovascular: No apparent chest discomfort  Pulmonary: Non-labored breathing  Abdominal: non-distended  Extremities: no edema in bilateral lower extremities  NEURO: alert and cooperative, with left sided neglect.  Moves all four.    LABS    Last Comprehensive Metabolic Panel:  Sodium   Date Value Ref Range Status   06/08/2021 136 133 - 144 mmol/L Final     Potassium   Date Value Ref Range Status   06/08/2021 4.6 3.4 - 5.3 mmol/L Final     Comment:     Specimen slightly hemolyzed, potassium may be falsely elevated     Chloride   Date Value Ref Range Status   06/08/2021 104 94 - 109 mmol/L Final     Carbon Dioxide   Date Value Ref Range Status   06/08/2021 24 20 - 32 mmol/L Final     Anion Gap   Date Value Ref Range Status   06/08/2021 8 3 - 14 mmol/L Final     Glucose   Date Value Ref Range Status   06/08/2021 100 (H) 70 - 99 mg/dL Final     Urea Nitrogen   Date Value Ref Range Status   06/08/2021 14 7 - 30 mg/dL Final     Creatinine   Date Value Ref Range Status "   06/08/2021 0.77 0.66 - 1.25 mg/dL Final     GFR Estimate   Date Value Ref Range Status   06/08/2021 >90 >60 mL/min/[1.73_m2] Final     Comment:     Non  GFR Calc  Starting 12/18/2018, serum creatinine based estimated GFR (eGFR) will be   calculated using the Chronic Kidney Disease Epidemiology Collaboration   (CKD-EPI) equation.       Calcium   Date Value Ref Range Status   06/08/2021 8.8 8.5 - 10.1 mg/dL Final       IMAGING  No new imaging.     Assessment     Erasto Maher is a 56 year old male with GBM s/p right craniotomy for tumor resection and ziopharm study virus injection (5/27/21). He has impairments in strength, mobility, performing ADLs.     Admission to acute inpatient rehab 06/03/2021. Plan to discharge tomorrow, 6/9, after therapies. Discussed home care options and 24/7 care needs with Aristeo and his wife.     Plan        Rehabilitation     1. PT, OT, SLP minutes of each on a daily basis, in addition to rehab nursing and close management of physiatrist.    2. Impairment of ADL's:  OT for 60 minutes daily to work on ADL re-training such as grooming, self cares and bathing.    3. Impairment of mobility:  PT for 60 minutes daily to work on neuromuscular re-education focusing on strength, balance, coordination, and endurance.    4. SLP for 60 min daily for higher level cognitive deficits and memory impairment. cognitive and linguistic deficits  5. Rehab RN for med administration, bowel regimen, wound care and patient education.   6. FEN: Regular diet thin liquids  7. Bowel: Scheduled Miralax and Senna/Docusate  8. Bladder: Continent, scan protocol  9. DVT Prophylaxis: Mechanical.  10. GI Prophylaxis: Femotidine  11. 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 S/P Stealth guided resection 06/27/019 He completed chemoradiotherapy on  8/30/2019 and was managed on a clinical trial receiving atezolizumab (anti-PDL1) plus adjuvant temozolomide. He received his last dose of immunotherapy on 3/16/2020 when imaging on 3/28/2020 showed progression of disease. He underwent a repeat craniotomy for tumor resection with Dr. Chan on 4/9/2020 with placement of GammaTiles.   - Continue PTA keppra 1000 mg BID  - Continue Veledimex, and home meds (present in narcotic box).  End date for this medication 6/10.  - Has an infusion scheduled for 6/14 as an outpatient        Wound Care  Ok to shower, however no scrubbing of the wound and no soaking of the wound, meaning no bathtubs or swimming pools. Pat dry only. Leave wound open to air.  Patient to have wound checked 2 weeks following surgery.    Wound location: cranial  Closure technique: monocryl  Dressing needs: none  Post-op care at follow-up: keep dry and clean     Chronic Medical Conditions  Hypertension: PTA Lisinopril  Depression: PTA Lexapro  Allergies: PTA Loratadine  Hypothyroid: PTA levothyroxine     Code: Full code  Disposition: Home with family care and initial 24 hr supervision  ELOS:  Tentative discharge date 6/9/21  Rehab prognosis:  Good     Follow Up After Discharge   1. Primary care physical, 1-2 weeks after discharge date  3. Dr. Marquise Chan on 6/11/2021 @ 2:45 pm  4. Dr. Ericka Avila on 6/14/2021 @ 7:30 am           **Patient was seen and discussed with our attending Dr. Blank who agrees with the above assessment and plan.    Lana Morrell, MS4    Physician Attestation   I, Eusebio Blank, was present with the medical student who participated in the service and in the documentation of the note.  I have verified the history and personally performed the physical exam and medical decision making.  I have made the necessary changes to the note and agree with the assessment and plan of care as documented.     I personally reviewed vital signs, medications and labs.    Eusebio Blank MD  Date of  Service (when I saw the patient): 06/08/21    Time Spent on this Encounter   I, Julio Blank, spent a total of 35 minutes face-to-face or managing the care of Erasto Maher. Over 50% of my time on the unit was spent counseling the patient and coordinating care. See note for details.

## 2021-06-08 NOTE — PLAN OF CARE
FOCUS/GOAL  Bowel management, Bladder management, and Pain management    ASSESSMENT, INTERVENTIONS AND CONTINUING PLAN FOR GOAL:    Pt is alert and oriented x4, denies pain. Pt self transfer and set off bed alarm x1 on this shift. Continent of bowel and bladder. Slept well. Continue with plan of care.

## 2021-06-08 NOTE — PROGRESS NOTES
Team rounds. Patient in track to discharge home tomorrow with 24/7 supervision and support from pt wife. Team met with pt and pt wife at bedside. OP set up for end of June (June 30th), team recommending HC in the interim. SW discussed with pt and pt wife, both agreeable and offered choice. Denied preference and referral sent to [facility] for RN, PT, OT, SLP and SW. [facility] accepted and SOC on Friday June 11th w/ RN. Pt and pt wife updated on this and information reflected in AVS. [facility] aware to contact pt wife directly to coordinate SOC and scheduling.     SW also provided pt wife with information on caregiver agencies and OOP expenses. Pt wife appreciative of the information. Was given RN station number and SW direct number if additional needs arise. No further SW needs identified at this time. SW available if needs arise.     Home Health Care:   [facility] PH: [phone] (previously known as: [facility])  Nurse, physical therapy, occupational therapy, speech therapy and    RN SOC Friday June 11th     Sylvia Davenport, ELIZA, [facility]   Phone: [phone]  I   Pager: [phone]

## 2021-06-09 NOTE — CONSULTS
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 He was unaccompanied during the interview.     The interview for this evaluation was completed over the telephone. Testing was completed face-to-face with the patient on the ARU.    BACKGROUND INFORMATION / INTERVIEW FINDINGS    Records indicate that Mr. Maher suffered a first onset seizure on June 22, 2019.  He then underwent a stealth guided resection on June 27, 2019.  Pathology was consistent with glioblastoma (IDH1 R132H wild-type MGMT promoter unmethylated).  He completed chemoradiotherapy on August 30, 2019, on a clinical trial receiving atezolizumab plus adjuvant temozolomide.  Imaging in March, 2020, showed progression of disease.  He underwent a repeat craniotomy for tumor resection on April 9, 2020, with placement of gamma tiles.  He was then managed on lomustine monotherapy.  Imaging in November, 2020 showed a new lesion consistent with disease progression.  He underwent a repeat course of radiation, and then a repeat resection in January, 2021.  Imaging in March, 2021 showed an area of contrast-enhancement.  He underwent biopsy + GÓMEZ on April 21, 2021 to both the frontal and parietal lesions.  Pathology was consistent with recurrent glioblastoma.  He presented to [facility] on May 26, 2021 for additional viral injection of the tumor.  Following his hospital stay, he was discharged to the Acute Rehabilitation Unit.  The most recent MRI of his brain on May 27, 2021 documented \"Postsurgical craniotomy changes of the right parietal convexity and ablation changes of the right frontal lobe with increased areas of nodular enhancement including new areas of nodular enhancement in the right temporal lobe as described above concerning for disease " "progression.\"  At the time of his admission, he was noted to have left hemiparesis, left sensory loss, left-sided neglect/inattention, left homonymous hemianopia, dysarthria, aphasia, and cognitive deficits.  His other medical history includes acute hyponatremia, disorder of eye movements, allergic rhinitis, anemia, gastroesophageal reflux disease, and insomnia.  Concerns have been expressed about his cognition, including his ability to work, drive, and manage day-to-day needs.  The current evaluation was requested by Dr. Becky Lezama, in this context.    On interview, Mr. Maher confirmed the above history.  He reported that he began noticing changes in his thinking following his January, 2021 surgery.  He stated that there has been a \"small extent\" of change in his thinking.  He noted that he finds himself less patient than he used to be.  He reported that he is sometimes confused with time.  He indicated his belief that his thinking has been gradually improving since January, but remains \"slightly different.\"  He also noted that he had historically been not the most patient person, but now has been less tolerant of events or instances that he views to be not a good use of his time.  He reported that he struggled to fill out some paperwork, and he is not sure if this was because his brain was working less well, or if he was just less tolerant of filling out the form.  When prompted, he stated that he has less awareness of stimuli on his left visual field.  He noted that this left-sided inattention has gotten better.  He acknowledged that he sometimes bumps into objects on his left side.  He stated that this left-sided inattention has led to him not driving.    With respect to mental health, Mr. Maher reported that his mood is generally positive.  He noted that there are moments where his mood is negative.  He stated that he did not have mental health diagnoses or treatments prior to his initial tumor " "diagnosis.  He stated that around the time of his second surgery, the notion that he had a tumor was \"very jarring.\"  He stated that he then began treatment with an antidepressant.  He has also been following with Dr. Tutu Farmer, and reported that the sessions have been helpful.  He sees Dr. Farmer every 3 weeks.  He noted that Dr. Farmer has provided him with helpful tools.  He reported feeling optimistic that he will survive and beat the tumor.  He denied prior psychiatric hospitalization, hallucinations, or symptoms consistent with a severe mental illness.  He denied suicidal ideation.    Regarding other medical background, Mr. Maher denied prior head injury or seizure.  He stated that he had a seizure in June, 2019, but has otherwise not had seizure activity.  He stated that his sleep is very good, and that he averages about 9 hours of sleep per night.  He noted that he had some trouble with sleep following his diagnosis.  He indicated that since he has been in the ARU, he has had some confusion with daytime and nighttime.  He slept about 8.5 hours a night before the exam.  He denied pain.  Per records, his current medications include acetaminophen, escitalopram, famotidine, fluticasone, levetiracetam, levothyroxine, lisinopril, loratadine, polyethylene glycol, and senna-docusate.  He stated that he consumes 2 or 3 alcoholic drinks per week in a social context, but otherwise denied substance use.  He reported that he will rarely consume THC Gummies.  He denied past problematic substance use.  He denied known family neurologic history.    Mr. Maher lives at home with his wife.  He manages his own basic daily activities.  He stated that his wife is now helping him with his medications.  His wife has now been more involved with her finances, and they are also now using a .  His wife is primarily responsible for preparing their meals.  He is not currently driving, but stated that " he hopes to return to driving.    By way of background, Mr. Maher and his wife have been  for 35 years.  They have 2 adult children.  Their son lives in [place], and their daughter lives in [place].  Regarding educational background, he graduated from high school with good grades.  He earned a bachelor's degree from [employer] in business.  He then earned an JUANI from [employer].  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  He reported that he has still been operating this business, but the business has recently been running self sufficiently.    BEHAVIORAL OBSERVATIONS  Mr. Maher was polite and cooperative with the exam. There were times during the interview and testing in which he had a lengthy pause in his response to queries.  Several times during the interview, he required repetition of questions or instructions. His speech was notable for dysfluency. His comprehension was otherwise normal.  His thought processes were notable for drowsiness, mild distractibility, occasional forgetting, mild carelessness, and moderate slowing.  Prominent left-sided inattention or neglect was documented.  For example, he omitted a large portion of the left side of a figure on a drawing task.  He also had difficulty locating stimuli on the left side of the page.  Additionally, when completing a motor task, he neglected to place pegs on the left hand column of peg holes.  His insight was reduced, as evidenced by his assertion that he has not noticed significant changes in his thinking. His mood was neutral with congruent affect. His effort was good. The current results are felt to be a broadly accurate reflection of his cognitive functioning.     RESULTS OF EXAM  His performances on standardized measures of neuropsychological functioning were as follows.     He was oriented to time,  place, and various aspects of personal information.  He was able to state the names of 5 of the 6 most recent past presidents.  Performance on a measure of single word reading was average.  He did not obtain a passing score on a stand-alone measure of cognitive performance validity, although this is likely due to his visual processing deficits.  Auditory attention for digits was average.  Learning of words in a list format was borderline impaired.  Delayed recall of list words was low average.  Percent retention of list words was average.  Delayed recognition of list words was borderline impaired.  Learning of simple geometric shapes and their spatial locations was impaired.  Delayed recall of the shapes and their locations was impaired.  Percent retention of the shapes was normal.  Delayed recognition of the shapes was low average.  Line bisection was notable for 1 omission error, and numerous errors in which he placed the bisecting line to the right of the midline.  Visuospatial judgments for variably oriented lines were impaired.  Nonverbal reasoning for incomplete matrices was borderline impaired.  His drawing of a complicated geometric figure was severely impaired, and notable for inattention to the left side of the figure in particular, and other general inattention to the figure's details.  Comprehension of phrases and short stories was low average.  Verbal associative fluency was borderline impaired.  Animal fluency was impaired.  Naming to confrontation was average.  Verbal abstract reasoning was high average.  Measures of speeded visual sequencing under focused attention and divided attention were both impaired and discontinued due to his difficulty with tracking the stimuli on the left side of the page.  Speeded word reading was impaired.  Speeded color naming was impaired.  Speeded inhibition of an overlearned response was performed in the low average to average range.  Speeded fine motor dexterity was  impaired for the dominant, right hand, and discontinued with the left hand.    He endorsed items consistent with minimal symptoms of depression, and minimal symptoms of anxiety on self-report measures.    IMPRESSIONS  Mr. Maher demonstrated deficits and weaknesses that are consistent with near global brain dysfunction, but most particularly so for right fronto-parietal and broader right hemispheric networks.  These findings are in keeping with his known lesions and treatments.  Given the time since his recent surgery, I think it is possible that he could see modest improvements in the near-term future, although likelihood of a full recovery to baseline seems limited.  In addition to his known left-sided neglect/inattention, he has other prominent deficits in visual problem-solving, speeded visual processing, and basic visuospatial judgments.  Additional deficits were noted in nonverbal learning, nonverbal memory, and executive functioning skills.  Milder weaknesses were noted in attention, right hand fine motor dexterity, verbal learning, and verbal recall.  His insight is also impaired.  Relative preservation was noted in recognition memory, orientation, and on one measure of verbal reasoning.  He is not reporting elevated symptoms of depression or anxiety.    With respect to the referral question, I have significant concerns about Mr. Maher's ability to return to activities such as driving and operating his business at the current time.  He has prominent cognitive dysfunction in a number of cognitive domains, including executive functioning/decision-making, that would make it difficult for him to operate a large-scale business.  Additionally, his deficits are highly concerning for driving safety.  When you couple his cognitive deficits with his lack of insight, it is a recipe for error and negative consequences.    RECOMMENDATIONS    1.  Mr. Maher will benefit from support and supervision of his  daily activities.  I do not think he is capable of managing these tasks at the current time.    2.  As alluded to above, I do not think he is capable of operating his business at this time.    3.  He should not drive.    4.  He will benefit from continued work with Dr. Tutu Farmer.     5.  Of course, he should continue to follow closely with Dr. Ericka Avila and Dr. Marquise Chan.    6.  Follow-up neuropsychological evaluation could be considered in the future, if clinically indicated.  The current results can be used as a baseline at that time.    Tam Guerrero, Ph.D., L.P., ABPP  Board Certified in Clinical Neuropsychology   / Licensed Psychologist [id number]    Time spent:  One unit (60 minutes) neurobehavioral status exam including interview, clinical assessment of thinking, reasoning, and judgment by licensed and board-certified neuropsychologist (CPT 34707). One unit (60 minutes) neuropsychological testing evaluation by licensed and board-certified neuropsychologist, including integration of patient data, interpretation of standardized test results and clinical data, clinical decision-making, treatment planning, and report, first hour (CPT 18083). One unit(s) (70 minutes) of neuropsychological testing evaluation by licensed and board-certified neuropsychologist, including integration of patient data, interpretation of standardized test results and clinical data, clinical decision-making, treatment planning, and report, subsequent hours (CPT 55436). One unit (30 minutes) of psychological and neuropsychological test administration and scoring by technician, first 30 minutes (CPT 80293). Five units (165 minutes) psychological or neuropsychological test administration and scoring by technician, subsequent 30 minutes (CPT 56168). Diagnoses: C71.9, R41.842, F06.8.

## 2021-06-09 NOTE — DISCHARGE SUMMARY
[facility]   Acute Rehabilitation Unit  Discharge summary   Name: Erasto Maher   YOB: 1965 (56 year old y.o.)  MRN: [id number]    Date of Admission: 6/3/2021  Date of Discharge: 6/9/2021  Disposition: Home with [facility]  Primary Care Physician: Ranjit Edwards  Attending physician: Eusebio Blank MD  Other significant physician provider(s): Zane Panchal, PGY-2       Discharge Diagnoses:       Rehab Diagnoses:   1. Glioblastoma multiforme  2. Impaired functional mobility  3. Impaired ADLs  4. Impaired cognition      Contributing Diagnosis     Hypertension  Depression  Hypothyroid     Brief History of Illness:   Erasto Maher is a 56 year old male with 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 S/P Stealth guided resection 06/27/019. He completed chemoradiotherapy on 8/30/2019 and was managed on a clinical trial receiving atezolizumab plus adjuvant temozolomide. He received his last dose of immunotherapy on 3/16/2020 when imaging on 3/28/2020 showed progression of disease. He underwent a repeat craniotomy for tumor resection on 4/9/2020 with placement of GammaTiles.     Aristeo was managed on lomustine monotherapy. However, imaging in 11/2020 showed a new distant lesion consistent with disease progression. He completed a repeat course of radiation, then a repeat resection on 1/13/2021 plus use of the compassionate use [employer] treatment protocol. Pathology was consistent with recurrent glioblastoma. He was receiving nivolumab + bevacizumab until imaging in 3/2021 showed an area of contrast enhancement. Aristeo underwent a biopsy + GÓMEZ on 4/21/2021 to both the frontal and parietal lesions and pathology showed small pieces of recurrent glioblastoma with predominant necrosis.      He presented to [facility] on 05/26/2021 for additional viral injection of tumor. Following surgery the patient  was evaluated by PT, OT and PMR service. All specialties recommended inpatient acute rehab admission on 06/03.     Medical Course:     Glioblastoma multiform  (IDH1 R132H wildtype, MGMT promoter unmethylated)  First diagnosed 06/22/019 during work up for first time seizure. S/P Stealth guided resection 06/27/019 He completed chemoradiotherapy on 8/30/2019 and was managed on a clinical trial receiving atezolizumab (anti-PDL1) plus adjuvant temozolomide. He received his last dose of immunotherapy on 3/16/2020 when imaging on 3/28/2020 showed progression of disease. He underwent a repeat craniotomy for tumor resection with Dr. Chan on 4/9/2020 with placement of GammaTiles.   - Continue PTA keppra 1000 mg BID  - Completed Veledimex (05/24-6/10)  - Infusion scheduled for 6/14 as an outpatient     Chronic Medical Conditions  Hypertension: PTA Lisinopril  Depression: PTA Lexapro  Allergies: PTA Loratadine  Hypothyroid: PTA levothyroxine      Follow Up After Discharge   1. Primary care physical, 1-2 weeks after discharge date  3. Dr. Marquise Chan on 6/11/2021 @ 2:45 pm  4. Dr. Ericka Avila on 6/14/2021 @ 7:30 am     Consultations:     None     Procedures Performed:     None        Rehab Course:       Physical Therapy:     Bed Mobility: SBA   Transfer: SBA to [facility] V.c for . And L side awareness,   Gait: in and outside amb no A.D close SBA to CGA. Pt tend to drag L foot with early heel strike or toe catch. V.c for L side awareness.   Stairs:   Balance: SBA for all balance.     Occupational Therapy   ADLs: SBA transfers/mobility with no AD. SBA with min verbal cues for LB dressing. Setup UB dressing. Supervision with toileting, standing h/g. CGA with shower transfer and bathing. Requires initiation cue for starting and stopping each activity and cues for locating items on L side. Pt able to attend to L side of the body during bathing and dressing without cues.    IADLs: NT, spouse can assist  Vision/Cognition: L  inattention, more significant with mobility     Speech Language Pathology   Communication: Appears WFL at unstructured conversation level, however suspect cognition impacts initiation and retention of instructions  Cognition: Moderate impairment with sustained/selective attention, immediate/working memory, moderately-complex problem solving, thought organization, abstract thinking, and initiation. Left-side neglect.  Swallow: Regular and thin.          Discharge Disposition:     Home with family care (24 hour supervision)         Condition on Discharge:     Decision Maker: Patient with assist of wife  Code Status: Full code  Condition: Impaired  Diet: Regular diet thin liquids     Objective:       Examination:     VITALS  Vitals:    06/07/21 1644 06/08/21 0843 06/08/21 1549 06/09/21 0806   BP: 115/74 107/78 118/74 107/79   BP Location:  Right arm Left arm Left arm   Pulse: 78 87 89 90   Resp: 12 17 12 16   Temp: 97.3  F (36.3  C) 97  F (36.1  C) 97.2  F (36.2  C) 98.1  F (36.7  C)   TempSrc: Oral Oral Oral Oral   SpO2: 97% 98% 97% 97%   Weight:       Height:         physical examination  General: NAD, pleasant and cooperative   HEENT: ATNC, oropharynx clear with MMM, PERRL  Pulmonary: clear breath sounds b/l, no rales/wheezing   Cardiovascular: RRR, no murmur  Abdominal: soft, non-tender, non-distended  Extremities: warm, well perfused, no edema in bilateral lower extremities, no tenderness in calves  Neuro: Slow responses but answers appropriately and follows commands.  Able to ambulate with SBA and no assistive device or gait belt.       Pending Results:     None     Discharge Medications:      Current Discharge Medication List      START taking these medications    Details   famotidine (PEPCID) 10 MG tablet Take 1 tablet (10 mg) by mouth 2 times daily  Qty: 60 tablet, Refills: 1    Associated Diagnoses: GBM (glioblastoma multiforme) (H)         CONTINUE these medications which have NOT CHANGED    Details    acetaminophen (TYLENOL) 325 MG tablet Take 2 tablets (650 mg) by mouth every 4 hours as needed for other (For optimal non-opioid multimodal pain management to improve pain control.)  Qty:        escitalopram (LEXAPRO) 10 MG tablet Take 1 tablet (10 mg) by mouth every morning  Qty:      Comments: Depression      levETIRAcetam (KEPPRA) 1000 MG tablet Take 1 tablet (1,000 mg) by mouth 2 times daily  Qty: 180 tablet, Refills: 1    Comments: Seizure ppx  Associated Diagnoses: Generalized tonic-clonic seizure (H); Glioblastoma (H); Other secondary hypertension      levothyroxine (SYNTHROID/LEVOTHROID) 125 MCG tablet Take 1 tablet (125 mcg) by mouth every morning  Qty: 30 tablet, Refills: 3    Comments: Hypothyroidism  Associated Diagnoses: Generalized tonic-clonic seizure (H); Glioblastoma (H); Other secondary hypertension      lisinopril (ZESTRIL) 5 MG tablet Take 1 tablet (5 mg) by mouth daily  Qty: 30 tablet, Refills: 3    Comments: HTN  Associated Diagnoses: Other secondary hypertension; Generalized tonic-clonic seizure (H); Glioblastoma (H)      loratadine (CLARITIN) 10 MG tablet Take 1 tablet (10 mg) by mouth At Bedtime  Qty:      Comments: Seasonal allergies      polyethylene glycol (MIRALAX) 17 g packet Take 17 g by mouth 2 times daily for 14 days  Qty: 28 packet, Refills: 0    Associated Diagnoses: Glioblastoma (H)      senna-docusate (SENOKOT-S/PERICOLACE) 8.6-50 MG tablet Take 1 tablet by mouth 2 times daily for 14 days  Qty: 28 tablet, Refills: 0    Associated Diagnoses: Glioblastoma (H)      fluticasone (FLONASE) 50 MCG/ACT nasal spray Spray 1 spray into both nostrils At Bedtime  Qty:      Comments: Allergic rhinitis      Nutritional Supplements (BOOST BREEZE) LIQD Take 1 Can by mouth 3 times daily    Comments: 1 can between meals         STOP taking these medications       study - sarah, IDS #[id number], capsule Comments:   Reason for Stopping:                 Discharge Instructions and Follow Up        Follow Up Appointments   1. Primary care physical, 1-2 weeks after discharge date  3. Dr. Marquise Chan on 6/11/2021 @ 2:45 pm  4. Dr. Ericka Avila on 6/14/2021 @ 7:30 am       **Discharge summary was forwarded to Ranjit Edwards (PCP) at the time of discharge, so as to bridge from hospital to outpatient care.   **It was our pleasure to care for Erasto Maher during this hospitalization. Please do not hesitate to contact me should there be questions regarding the hospital course or discharge plan.    **I, Julio Blank MD, saw and evaluated this patient prior to discharge. 60 minutes spent in discharge, including >50% in counseling and coordination of care, medication review and plan of care recommended on follow up.     This discharge summary was started by Zane Panchal, PGY-2.  The patient was seen by me and I completed the note on the day of discharge including physical exam.    Julio Blank MD  Department of Rehabilitation Medicine

## 2021-06-09 NOTE — PLAN OF CARE
FOCUS/GOAL  Bowel management, Bladder management, and Medication management    ASSESSMENT, INTERVENTIONS AND CONTINUING PLAN FOR GOAL:  Pt was A/O, able to use call light and make needs known to staff. Denies pain, SOB, chest pain nor dizziness. SBA , no device for transfers. On regular diet, thin liquids, takes medicines whole. Cont of BL, no BM this shift, LBM-6/8/21. VSS. For discharge on 6/9/21, home medicines available. Will continue with current POC.

## 2021-06-09 NOTE — PLAN OF CARE
Speech Language Therapy Discharge Summary    Reason for therapy discharge:    Discharged to home with home therapy.    Progress towards therapy goal(s). See goals on Care Plan in [facility] electronic health record for goal details.  Goals met    Therapy recommendation(s):    Continued therapy is recommended.  Rationale/Recommendations:  sLP: pt seen for cognitive communication tx. Noting mild/moderate deficits for visual perceptual skills for visual attention to left/scanning. Also deficits for insight, reasoning, memory, complex attention. Pt at times can be slightly impulsive with decision making.  Wife and pt instructed in activities he may want to engage in at home (ie IPAD apps) as well as compensations, cueing pt may need. Wife will assist with IADLS (medications,finances) initially recommending 24 hour supervision..

## 2021-06-09 NOTE — PLAN OF CARE
Physical Therapy Discharge Summary    Reason for therapy discharge:    Discharged to home with home therapy.  All goals and outcomes met, no further needs identified.    Progress towards therapy goal(s). See goals on Care Plan in [facility] electronic health record for goal details.  Goals met    Therapy recommendation(s):    Continued therapy is recommended.  Rationale/Recommendations:  continue HH PT to assess mobility at home and progress standing dynamic balance, coordination, and L side attention to improve independence with community navigation.    Summary - SBA amb in room with consistent vc's for attention to L side. Navigating 2 flights of stairs with single rail SBA with vc's for attention to LUE descending and foot placement onto step. Pt demo floor transfer SBA with vc's for sequencing to sit up.    DME - none issued. Issued HEP for standing dynamic balance including 4 square stepping (wife to assist and will use ski poles for quadrants) and side stepping with red theraband around thighs. Recommended pt and wife amb together at home for endurance.

## 2021-06-09 NOTE — PLAN OF CARE
Problem: Goal/Outcome  Goal: Goal Outcome Summary  Outcome: Adequate for Discharge   FOCUS/GOAL  Medical management, Discharge planning, Mobility, and Safety management    ASSESSMENT, INTERVENTIONS AND CONTINUING PLAN FOR GOAL:  Pt's vitals stable. Lisinopril held due to BP parameters. Pt right scalp incision BECKI and healing well. Denied pain. Used call light for assist this morning for assist. Pt's wife here for pt's discharge today. Neuropsych testing from 8-11am this morning. He had PT from 1:15-2:30pm. He has SLP at 3:45pm. Discharge instructions and discharge med famotidine , as well as the study medication, Veledimex (1 cap left) that was stored in narc box give to pt and his wife for discharge. Also told pt and wife that nurse Bautista following velemidex called unit to make sure pt and wife will bring the empty bottle and diary to appointment with Dr Avila.

## 2021-06-10 NOTE — PROGRESS NOTES
"NAME  Erasto Maher  21     MRN  [id number]   PROVIDER  RIKA       61    [facility]     AGE  56    STATION  IP     SEX  Male           HANDEDNESS Right           EDUCATION 18                        ORIENTATION            Time  -2           Personal Info.           Place            Presidents                         WAIS-IV                                         RDS: 10                         Raw SS          Similarities  29 12          Matrix Reasoning 7 5          Digit Span  23 8          Coding  24 3                       PATY-O COMPLEX FIGURE             Raw T %ile         Copy  12.5  <1         Time to Copy 363\"  6-10                      WRAT-4               SS %ile GE         Reading  99 47 12.7         COWAT FAS             Raw 27            SS 7            T 34                         ANIMAL FLUENCY            Raw 10            SS 4            T 19                         [place] NAMING TEST - SHORT           Raw 12/15            Z = -0.52                                      COMPLEX IDEATIONAL MATERIAL           Raw 11            SS 9            T 38                         TRAIL MAKING TEST             Time Errors SSa %ile         A DC'd 0 0 0         B DC'd 0 0 0                      STROOP              Raw %ile           Word 57 <3           Color 42 <3           C/W 27 14-28                         NIYA   H-Short         Raw 10            %ile <0.7                         GROOVED PEGBOARD             Raw Drops SS T          0 0 11         LH DC 3 0 0                      BDI-II             Raw 4            Interp. MINIMAL                         HALIE             Raw 5            Interp. MINIMAL                          HVLT   1           Raw T          Trial 1  6           Trial 2  6           Trial 3  9           Learning  3           Total Recall  21 34          Delayed Recall 8 40          Percent Retention 89% 48          True Positives 10           False " Positives 1           Disc. Index  9 36                        BVMT   1           Raw T/%ile          Trial 1  3           Trial 2  4           Trial 3  4           Learning  1           Total Recall  11 28          Delayed Recall 4 28          Percent Retention 100% >16          Recognition Hits 5           Recognition F.P 0           Disc. Index  5 11-16                       LINE BISECTION            # Missed Lines 1           Errors R:  5 L:  5                      DCT             E-Score 66

## 2021-06-11 NOTE — PROGRESS NOTES
"Aristeo is a 56 year old who is being evaluated via a billable video visit.      How would you like to obtain your AVS? MyChart  If the video visit is dropped, the invitation should be resent by: Text to cell phone: [phone]  Will anyone else be joining your video visit? Wife Judith. Text link to [phone]       I have reviewed and updated the patient's allergies and medication list.    Concerns: none  Refills: none     Vitals - Patient Reported  Weight (Patient Reported): 74.8 kg (165 lb)  Height (Patient Reported): 177.8 cm (5' 10\")  BMI (Based on Pt Reported Ht/Wt): 23.67  Pain Score: No Pain (0)    Korina Lebron CMA        Video Start Time:2:45 PM  Video-Visit Details    Type of service:  Video Visit    Video End Time:3:00 PM    Originating Location (pt. Location): Home    Distant Location (provider location):  [facility]     Platform used for Video Visit: AmWell    56 M s/p repeat injection of the Ziopharm onco-immunomodulatory virus on 5/27/2021 who returns for a post-operative visit.    No new complaints since discharge. The wife, Judith is quite concerned that Erasto is moving too quickly in his ambulation and at risk for falls.  From Aristeo's perspective, he feels that he is stable in ambulation and can be independent.    Examination non-focal. Incision well healing.    AP: 56 M who is doing well s/p repeat Ziopharm virus injection. Overall, the patient is doing well. But, I have encouraged the patient to \"slow down\" a bit in order to ensure safety. I believe the patient can receive his Avastin three weeks after the procedure and have conveyed this to Dr. Avila, the patient's neuro-oncologist. I will continue to follow this patient through the [facility].    "

## 2021-06-11 NOTE — LETTER
"    6/11/2021         RE: Erasto Maher  [address]  [place] [facility] [address]        Dear Colleague,    Thank you for referring your patient, Erasto Maher, to the [facility]. Please see a copy of my visit note below.    Aristeo is a 56 year old who is being evaluated via a billable video visit.      How would you like to obtain your AVS? MyChart  If the video visit is dropped, the invitation should be resent by: Text to cell phone: [phone]  Will anyone else be joining your video visit? Wife Judith. Text link to [phone]       I have reviewed and updated the patient's allergies and medication list.    Concerns: none  Refills: none     Vitals - Patient Reported  Weight (Patient Reported): 74.8 kg (165 lb)  Height (Patient Reported): 177.8 cm (5' 10\")  BMI (Based on Pt Reported Ht/Wt): 23.67  Pain Score: No Pain (0)    Korina Lebron CMA        Video Start Time:2:45 PM  Video-Visit Details    Type of service:  Video Visit    Video End Time:3:00 PM    Originating Location (pt. Location): Home    Distant Location (provider location):  [facility]     Platform used for Video Visit: AmWell    56 M s/p repeat injection of the Ziopharm onco-immunomodulatory virus on 5/27/2021 who returns for a post-operative visit.    No new complaints since discharge. The wife, Judith is quite concerned that Erasto is moving too quickly in his ambulation and at risk for falls.  From Aristeo's perspective, he feels that he is stable in ambulation and can be independent.    Examination non-focal. Incision well healing.    AP: 56 M who is doing well s/p repeat Ziopharm virus injection. Overall, the patient is doing well. But, I have encouraged the patient to \"slow down\" a bit in order to ensure safety. I believe the patient can receive his Avastin three weeks after the procedure and have conveyed this to Dr. Avila, the patient's neuro-oncologist. I will continue to " follow this patient through the [facility].        Again, thank you for allowing me to participate in the care of your patient.        Sincerely,        Marquise Chan MD

## 2021-06-12 NOTE — PATIENT INSTRUCTIONS
Resuming nivolumab today.   Resuming kee on Wednesday or Thursday.   -Repeat infusions every 2 weeks together  -Thyroid function pending; may need to increase synthroid.     Off dexamethasone.    Holding on Optune.    Return to clinic in 1 month + labs + imaging.     Ericka Avila MD  Neuro-oncology

## 2021-06-12 NOTE — PROGRESS NOTES
"NEURO-ONCOLOGY VISIT  Jun 14, 2021    CHIEF COMPLAINT: Mr. 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 He completed chemoradiotherapy on 8/30/2019 and was managed on a clinical trial receiving atezolizumab (anti-PDL1) plus adjuvant temozolomide. He received his last dose of immunotherapy on 3/16/2020 when imaging on 3/28/2020 showed progression of disease. He underwent a repeat craniotomy for tumor resection with Dr. Chan on 4/9/2020 with placement of GammaTiles.     Aristeo was managed on lomustine monotherapy. However, imaging in 11/2020 showed a new distant lesion consistent with disease progression.     He completed a repeat course of radiation, then a repeat resection on 1/13/2021 plus use of the compassionate use Ziopharm treatment protocol that utilizes an intratumoral adenovirus injection activating human interleukin-12 + veledimex in combination with adjuvant nivolumab. Pathology was consistent with recurrent glioblastoma. He was receiving nivolumab + bevacizumab until imaging in 3/2021 showed an area of contrast enhancement.     Aristeo underwent a biopsy + GÓMEZ on 4/21/2021 to both the frontal and parietal lesions and pathology showed small pieces of recurrent glioblastoma with predominant necrosis. On 5/26/2021, he had a second intraturmoral viral injection + veledimex. Post-operatively he will continue nivolumab treatment with kee to resume later this week.     I met with Aristeo and Judith (wife) today for this follow-up visit.     HISTORY OF PRESENT ILLNESS  -Aristeo is still recovering from his procedure/ treatment. He was discharged to acute rehab on 6/3 and then, home. Today, remains weak with continued coordination/ otis-neglect/ weakness in the left leg >>> arm. Wanting to continue to work with PT/ OT.  -Cognitively more slow. Difficulty reading/ " sending text messages.  -Energy has been lower.   -Off dexamethasone, never started steroids during hospital admission.   -Completed veledimex treatment.  -Mood is good. On Lexapro. Really wanting to start playing golf again.   -No recurrent seizure events.    REVIEW OF SYSTEMS  A comprehensive ROS negative except as in HPI.      MEDICATIONS   Current Outpatient Medications   Medication Sig Dispense Refill     escitalopram (LEXAPRO) 10 MG tablet Take 1 tablet (10 mg) by mouth every morning       fluticasone (FLONASE) 50 MCG/ACT nasal spray Spray 1 spray into both nostrils At Bedtime       levETIRAcetam (KEPPRA) 1000 MG tablet Take 1 tablet (1,000 mg) by mouth 2 times daily 180 tablet 1     levothyroxine (SYNTHROID/LEVOTHROID) 125 MCG tablet Take 1 tablet (125 mcg) by mouth every morning 30 tablet 3     lisinopril (ZESTRIL) 5 MG tablet Take 1 tablet (5 mg) by mouth daily 30 tablet 3     loratadine (CLARITIN) 10 MG tablet Take 1 tablet (10 mg) by mouth At Bedtime       OMEPRAZOLE PO Take by mouth daily       senna-docusate (SENOKOT-S/PERICOLACE) 8.6-50 MG tablet Take 1 tablet by mouth 2 times daily for 14 days (Patient taking differently: 1 tablet as needed ) 28 tablet 0     acetaminophen (TYLENOL) 325 MG tablet Take 2 tablets (650 mg) by mouth every 4 hours as needed for other (For optimal non-opioid multimodal pain management to improve pain control.)       DRUG ALLERGIES   Allergies   Allergen Reactions     No Clinical Screening - See Comments Rash     Cats and dogs         ONCOLOGIC HISTORY  -PRESENTATION: New onset seizures; complex partial event with speech arrest and altered mental status lasting several minutes. Later had secondary generalization. Started Keppra.   -MR brain imaging with a ring enhancing lesion in the right parietal lobe.    -6/27/2019 SURGERY: Right temporal craniotomy for mass resection, gross total resection by Dr. Tam Barreto.  PATHOLOGY: Glioblastoma; IDH1 R132H wildtype, MGMT promoter  unmethylated  -7/3/2019 CLINICAL TRIAL: Evaluated by Dr. Wilmer Mckenna at [facility], consented for clinical trial 2016-0867 (Standard of Care plus atezolizumab).  -7/22 - 8/30/2019 CHEMORADS: 60cGy with concurrent temozolomide 75mg/m2/day (145mg) plus atezolizumab Q2 weeks on clinical trial 2016-0867.  -9/6/2019 NEURO-ONC: Evaluated at the [facility].   -9/25/2019 - 3/16/2020 CHEMO: Adjuvant chemotherapy; temozolomide + atezolizumab 840 mg IV on clinical trial 2016-0867.  -4/6/2020 NEURO-ONC: Communicated with Dr. Borrero. Plan for surgery and next steps pending pathology results.   -4/9/2020 SURGERY + RADS: Repeat craniotomy for surgical debulking plus placement of GammaTiles.  PATHOLOGY: Recurrent glioblastoma.   Next generation sequencing: Microsatellite instability: Stable. Tumor mutational burden: 4 (Low). APC, CDK4, MDM2, PTEN mutated. ARID1A, ASXL1, ATRX, DNMT3A, FANCE, KDM5C, MED12, MEF2B, NF1, RUNX1, TERT mutated. PD-L1 negative.  -4/20/2020 NEURO-ONC/ CHEMO: Starting Lomustine 3 weeks post-op. Consented for next generation sequencing and sending most recent tumor sample.    -4/30/2020 CHEMO: Lomustine, cycle 1.   -5/11/2020 NEURO-ONC: Clinically well. Referral to genetic counseling.   -6/5/2020 NEURO-ONC/ MRB/ CHEMO: Clinically well. Imaging with a positive treatment response. Lomustine, cycle 2 (start date of 6/11).    -7/20/2020 NEURO-ONC/ MRB/ CHEMO: Clinically well. Imaging without concern; aside from subdural fluid collection. Labs at goal. Lomustine, cycle 3 (start date of 7/23).    -8/31/2020 NEURO-ONC/ MRB/ CHEMO: Clinically well. Imaging without concern. Labs at goal. Lomustine, cycle 4 (start date of 9/3).   -10/12/2020 NEURO-ONC/ MRB/ CHEMO: Clinically well. Imaging without concern for cancer growth, but the resection cavity is expanding with time. Per Dr. Chan; continue to monitor with no surgical intervention at this time. Labs at goal. Lomustine, cycle 5 (start date of 10/15).    -11/23/2020  NEURO-ONC/ MRB: Clinically well. Imaging with a new distant lesion. Stopping lomustine. Referral to Dr. Chan.    -12/10 - 12/16/2020 RADS: Gamma Knife therapy of 15 Gy/ fraction x 5 fractions.  -12/18/2020 NEURO-ONC: Discussion of treatment.  -1/13/2021 SURGERY: Right craniotomy for open biopsy of the right parietal tumor plus Ziopharm adenovirus injection.  PATHOLOGY: Recurrent glioblastoma.  -1/25/2021 NEURO-ONC: Clinically improving since surgery. Continue taking veledimex through Wednesday.   -1/27/2021 CHEMO: Starting nivolumab + bevacizumab.   -2/22/2021 NEURO-ONC/ CHEMO: Clinically improving. Continue nivolumab + bevacizumab.  -3/22/2021 NEURO-ONC/ MRB/ CHEMO: Clinically improving. Imaging with positive treatment response, but with 1 area of concern; consideration for biopsy + GÓMEZ. Continue nivolumab + bevacizumab.  -4/5/2021 NEURO-ONC/ MRB: Clinically improved. Imaging overall stable, but slightly more pronounced area of contrast enhancement (4mm in size) in the right frontal lobe. Plan is for biopsy + GÓMEZ on 4/21. Holding nivolumab + bevacizumab.  -4/21/2021 SURGERY: MRI guided biopsy and  laser ablation of the right frontal lesion and right parietal lesion.   PATHOLOGY: A. Brain, right biopsy #1, biopsy: Small fragments of cortical nieves matter.       B. Brain, right biopsy #2, biopsy: Cortex and mildly hypercellular white matter.       C. Brain, right biopsy #3, biopsy: Small pieces of recurrent, active glioblastoma with predominant necrosis.       D. Brain, right biopsy #4, biopsy: Mixture of necrosis and blood.   -4/26/2021 NEURO-ONC/ CHEMO: Clinically improving with dexamethasone use. Focal disease control with GÓMEZ. Continue nivolumab + kee. Planning to repeat viral injection on 5/26.   -5/21/2021 SURGERY: Ziopharm adenovirus injection. Veledimex course completed.   -5/27/2021 MRB with postsurgical craniotomy changes. Increased areas of nodular enhancement including new areas of nodular enhancement  "in the right temporal lobe.  -6/14/2021 NEURO-ONC/ CHEMO: Clinically recovering. Restarting nivolumab, holding kee until later this week. Holding on Optune use.    SOCIAL HISTORY   Tobacco use: Never smoker.   Alcohol use: Social, infrequent.   Drug use: Denies.  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       PHYSICAL EXAMINATION  /72 (BP Location: Right arm, Patient Position: Sitting, Cuff Size: Adult Regular)   Pulse 95   Temp 97.6  F (36.4  C) (Tympanic)   Resp 16   Wt 72.9 kg (160 lb 12.8 oz)   SpO2 98%   BMI 23.07 kg/m     Wt Readings from Last 2 Encounters:   06/14/21 72.9 kg (160 lb 12.8 oz)   06/03/21 72.7 kg (160 lb 3.4 oz)      Ht Readings from Last 2 Encounters:   06/03/21 1.778 m (5' 10\")   05/27/21 1.778 m (5' 10\")      KPS 70    -Generally well appearing. Lower energy today.   -Respiratory: No audible wheezing.   -Skin: No rashes.   -Psychiatric: Normal mood and affect. Pleasant.  -Neurologic:   MENTAL STATUS:     Alert, oriented.    Recall: Slow. Slow to respond to questions.   Speech fluent.    Comprehension intact, but mild confusion noted.      CRANIAL NERVES:     Pupils are equal, round.     Extraocular movements full, no diplopia.    Homonymous hemianopsia, left-side.   Flattening of nasolabial folds on the left, good activation.   Hearing intact.   With normal phonation, no dysfunction of the palate or tongue.    Slightly raspy/ quiet voice.   MOTOR: Antigravity in the arms. Finger spread on the left with drift.    Left quad 4/5.  SENSATION: Intact to light touch throughout.  COORDINATION: Mild impairment on finger nose with eyes closed on the left.  GAIT: Less steady. Walks without assistance.    Steppage gait. Slower speed.    Malpositioning of left foot in stride (proprioceptive issue).        MEDICAL RECORDS  Obtained and personally reviewed all available outside medical records in addition to reviewing any records available in our electronic system. "     LABS  Personally reviewed all available lab results.     IMAGING  Personally reviewed post-operative MR brain imaging from 5/27 and compared to prior imaging.     Imaging and case was previously reviewed with Dr. Chan.       IMPRESSION  Clinic time for this high complexity visit was spent discussing in detail the nature of his cancer in light of his recent surgery and treatment.     Clinically, Aristeo is still recovering from surgery. Left-sided symptoms, cognition, and fatigue remain present. He was never started on dexamethasone. Hopefully with resuming kee later this week and continuing with PT/ OT, deficits will improve.     Following repeat intra-tumoral viral injeciton, Aristeo completed his course of veledimex. Will resume nivolumab today, but hold kee infusions until later this week in the setting of his recent surgery. Continue to monitor thyroid functions; today TSH was elevated, but T4 was normal. Continue synthroid. Can consider referral to endocrinology for management of hypothyroidism. Will repeat imaging in 4 weeks. Today we also discussed the use of Optune. Following an in depth discussion, the plan is to continue to hold on this treatment option. I am in complete agreement with this plan.    PROBLEM LIST  Glioblastoma, MGMT unmethylated and IDH1 wildtype by IHC  Seizure  Chemotherapy-associated constipation  Headaches  Apraxia    PLAN  -CANCER-DIRECTED THERAPY-  -As above.      -HYPERTENSION-  -Related to kee use.   -Lisinopril 5mg daily.   -Continue monitoring blood pressure at home.   -Can increase antihypertensive as needed.    -STEROIDS-  -Off dexamethasone.     -SEIZURE MANAGEMENT-  -Given history of seizures, will continue current antiepileptic regimen of Keppra for the foreseeable future.    -Quality of life/ MOOD/ FATIGUE-  -Continue Lexapro.   -Continue to monitor mood as untreated/ undertreated depression can worsen fatigue, dysorexia, and quality of life.  -Continue to monitor TSH/ T4  levels and adjust Synthroid as needed.     COVID vaccination series complete as of 3/25/2021.    Return to clinic in 1 month + labs + imaging.    Ericka Avila MD  Neuro-oncology

## 2021-06-14 NOTE — PROGRESS NOTES
Infusion Nursing Note:  Erasto Maher presents today for Cycle 8 Day 1 Nivolumab  Patient seen by provider today: Yes, Dr. Avila   present during visit today: Not Applicable.    Note: Aristeo presents to infusion today stating he feels well. He denies any symptoms, concerns, or pain today. Per Dr. Avila's note- Avastin was on hold for 2 weeks post-op, patient will be due on Wednesday for Avastin. Since the procedure, patient has had an unbalanced gait with some left sided inattention, he is OK to walk when someone is walking next to him. Denies any recent falls, MD aware of unsteady gait.    Intravenous Access:  Peripheral IV placed.    Treatment Conditions:  Lab Results   Component Value Date     06/14/2021                   Lab Results   Component Value Date    POTASSIUM 4.1 06/14/2021           Lab Results   Component Value Date    MAG 2.0 06/03/2021            Lab Results   Component Value Date    CR 0.78 06/14/2021                   Lab Results   Component Value Date    JUDIE 8.9 06/14/2021                Lab Results   Component Value Date    BILITOTAL 0.4 06/14/2021           Lab Results   Component Value Date    ALBUMIN 3.7 06/14/2021                    Lab Results   Component Value Date    ALT 34 06/14/2021           Lab Results   Component Value Date    AST 18 06/14/2021       Results reviewed, labs MET treatment parameters, ok to proceed with treatment.      Post Infusion Assessment:  Patient tolerated infusion without incident.  Blood return noted pre and post infusion.  Access discontinued per protocol.       Discharge Plan:   Patient declined prescription refills.  AVS to patient via [employer].  Patient will return 6/16- schedulers working on scheduling this.  Patient discharged in stable condition accompanied by: wife (in lobby)  Departure Mode: Ambulatory (needs someone to walk beside him)  Face to Face time: 0.      Seema Taveras RN

## 2021-06-14 NOTE — LETTER
"    6/14/2021         RE: Erasto Maher  [address]  [facility] [address]        Dear Colleague,    Thank you for referring your patient, Erasto Maher, to the [facility]. Please see a copy of my visit note below.    NEURO-ONCOLOGY VISIT  Jun 14, 2021    CHIEF COMPLAINT: Mr. 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 He completed chemoradiotherapy on 8/30/2019 and was managed on a clinical trial receiving atezolizumab (anti-PDL1) plus adjuvant temozolomide. He received his last dose of immunotherapy on 3/16/2020 when imaging on 3/28/2020 showed progression of disease. He underwent a repeat craniotomy for tumor resection with Dr. Chan on 4/9/2020 with placement of GammaTiles.     Aristeo was managed on lomustine monotherapy. However, imaging in 11/2020 showed a new distant lesion consistent with disease progression.     He completed a repeat course of radiation, then a repeat resection on 1/13/2021 plus use of the compassionate use [employer] treatment protocol that utilizes an intratumoral adenovirus injection activating human interleukin-12 + veledimex in combination with adjuvant nivolumab. Pathology was consistent with recurrent glioblastoma. He was receiving nivolumab + bevacizumab until imaging in 3/2021 showed an area of contrast enhancement.     Aristeo underwent a biopsy + GÓMEZ on 4/21/2021 to both the frontal and parietal lesions and pathology showed small pieces of recurrent glioblastoma with predominant necrosis. On 5/26/2021, he had a second intraturmoral viral injection + veledimex. Post-operatively he will continue nivolumab treatment with kee to resume later this week.     I met with Aristeo and Judith (wife) today for this follow-up visit.     HISTORY OF PRESENT ILLNESS  -Aristeo is still recovering from " his procedure/ treatment. He was discharged to acute rehab on 6/3 and then, home. Today, remains weak with continued coordination/ otis-neglect/ weakness in the left leg >>> arm. Wanting to continue to work with PT/ OT.  -Cognitively more slow. Difficulty reading/ sending text messages.  -Energy has been lower.   -Off dexamethasone, never started steroids during hospital admission.   -Completed veledimex treatment.  -Mood is good. On Lexapro. Really wanting to start playing golf again.   -No recurrent seizure events.    REVIEW OF SYSTEMS  A comprehensive ROS negative except as in HPI.      MEDICATIONS   Current Outpatient Medications   Medication Sig Dispense Refill     escitalopram (LEXAPRO) 10 MG tablet Take 1 tablet (10 mg) by mouth every morning       fluticasone (FLONASE) 50 MCG/ACT nasal spray Spray 1 spray into both nostrils At Bedtime       levETIRAcetam (KEPPRA) 1000 MG tablet Take 1 tablet (1,000 mg) by mouth 2 times daily 180 tablet 1     levothyroxine (SYNTHROID/LEVOTHROID) 125 MCG tablet Take 1 tablet (125 mcg) by mouth every morning 30 tablet 3     lisinopril (ZESTRIL) 5 MG tablet Take 1 tablet (5 mg) by mouth daily 30 tablet 3     loratadine (CLARITIN) 10 MG tablet Take 1 tablet (10 mg) by mouth At Bedtime       OMEPRAZOLE PO Take by mouth daily       senna-docusate (SENOKOT-S/PERICOLACE) 8.6-50 MG tablet Take 1 tablet by mouth 2 times daily for 14 days (Patient taking differently: 1 tablet as needed ) 28 tablet 0     acetaminophen (TYLENOL) 325 MG tablet Take 2 tablets (650 mg) by mouth every 4 hours as needed for other (For optimal non-opioid multimodal pain management to improve pain control.)       DRUG ALLERGIES   Allergies   Allergen Reactions     No Clinical Screening - See Comments Rash     Cats and dogs         ONCOLOGIC HISTORY  -PRESENTATION: New onset seizures; complex partial event with speech arrest and altered mental status lasting several minutes. Later had secondary generalization.  Started Keppra.   -MR brain imaging with a ring enhancing lesion in the right parietal lobe.    -6/27/2019 SURGERY: Right temporal craniotomy for mass resection, gross total resection by Dr. Tam Barreto.  PATHOLOGY: Glioblastoma; IDH1 R132H wildtype, MGMT promoter unmethylated  -7/3/2019 CLINICAL TRIAL: Evaluated by Dr. Wilmer Mckenna at [facility], consented for clinical trial 2016-0867 (Standard of Care plus atezolizumab).  -7/22 - 8/30/2019 CHEMORADS: 60cGy with concurrent temozolomide 75mg/m2/day (145mg) plus atezolizumab Q2 weeks on clinical trial 2016-0867.  -9/6/2019 NEURO-ONC: Evaluated at the [facility].   -9/25/2019 - 3/16/2020 CHEMO: Adjuvant chemotherapy; temozolomide + atezolizumab 840 mg IV on clinical trial 2016-0867.  -4/6/2020 NEURO-ONC: Communicated with Dr. Borrero. Plan for surgery and next steps pending pathology results.   -4/9/2020 SURGERY + RADS: Repeat craniotomy for surgical debulking plus placement of GammaTiles.  PATHOLOGY: Recurrent glioblastoma.   Next generation sequencing: Microsatellite instability: Stable. Tumor mutational burden: 4 (Low). APC, CDK4, MDM2, PTEN mutated. ARID1A, ASXL1, ATRX, DNMT3A, FANCE, KDM5C, MED12, MEF2B, NF1, RUNX1, TERT mutated. PD-L1 negative.  -4/20/2020 NEURO-ONC/ CHEMO: Starting Lomustine 3 weeks post-op. Consented for next generation sequencing and sending most recent tumor sample.    -4/30/2020 CHEMO: Lomustine, cycle 1.   -5/11/2020 NEURO-ONC: Clinically well. Referral to genetic counseling.   -6/5/2020 NEURO-ONC/ MRB/ CHEMO: Clinically well. Imaging with a positive treatment response. Lomustine, cycle 2 (start date of 6/11).    -7/20/2020 NEURO-ONC/ MRB/ CHEMO: Clinically well. Imaging without concern; aside from subdural fluid collection. Labs at goal. Lomustine, cycle 3 (start date of 7/23).    -8/31/2020 NEURO-ONC/ MRB/ CHEMO: Clinically well. Imaging without concern. Labs at goal. Lomustine, cycle 4 (start date of 9/3).   -10/12/2020 NEURO-ONC/ MRB/  CHEMO: Clinically well. Imaging without concern for cancer growth, but the resection cavity is expanding with time. Per Dr. Chan; continue to monitor with no surgical intervention at this time. Labs at goal. Lomustine, cycle 5 (start date of 10/15).    -11/23/2020 NEURO-ONC/ MRB: Clinically well. Imaging with a new distant lesion. Stopping lomustine. Referral to Dr. Chan.    -12/10 - 12/16/2020 RADS: Gamma Knife therapy of 15 Gy/ fraction x 5 fractions.  -12/18/2020 NEURO-ONC: Discussion of treatment.  -1/13/2021 SURGERY: Right craniotomy for open biopsy of the right parietal tumor plus Ziopharm adenovirus injection.  PATHOLOGY: Recurrent glioblastoma.  -1/25/2021 NEURO-ONC: Clinically improving since surgery. Continue taking veledimex through Wednesday.   -1/27/2021 CHEMO: Starting nivolumab + bevacizumab.   -2/22/2021 NEURO-ONC/ CHEMO: Clinically improving. Continue nivolumab + bevacizumab.  -3/22/2021 NEURO-ONC/ MRB/ CHEMO: Clinically improving. Imaging with positive treatment response, but with 1 area of concern; consideration for biopsy + GÓMEZ. Continue nivolumab + bevacizumab.  -4/5/2021 NEURO-ONC/ MRB: Clinically improved. Imaging overall stable, but slightly more pronounced area of contrast enhancement (4mm in size) in the right frontal lobe. Plan is for biopsy + GÓMEZ on 4/21. Holding nivolumab + bevacizumab.  -4/21/2021 SURGERY: MRI guided biopsy and  laser ablation of the right frontal lesion and right parietal lesion.   PATHOLOGY: A. Brain, right biopsy #1, biopsy: Small fragments of cortical nieves matter.       B. Brain, right biopsy #2, biopsy: Cortex and mildly hypercellular white matter.       C. Brain, right biopsy #3, biopsy: Small pieces of recurrent, active glioblastoma with predominant necrosis.       D. Brain, right biopsy #4, biopsy: Mixture of necrosis and blood.   -4/26/2021 NEURO-ONC/ CHEMO: Clinically improving with dexamethasone use. Focal disease control with GÓMEZ. Continue nivolumab +  "kee. Planning to repeat viral injection on 5/26.   -5/21/2021 SURGERY: Ziopharm adenovirus injection. Veledimex course completed.   -5/27/2021 MRB with postsurgical craniotomy changes. Increased areas of nodular enhancement including new areas of nodular enhancement in the right temporal lobe.  -6/14/2021 NEURO-ONC/ CHEMO: Clinically recovering. Restarting nivolumab, holding kee until later this week. Holding on Optune use.    SOCIAL HISTORY   Tobacco use: Never smoker.   Alcohol use: Social, infrequent.   Drug use: Denies.  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       PHYSICAL EXAMINATION  /72 (BP Location: Right arm, Patient Position: Sitting, Cuff Size: Adult Regular)   Pulse 95   Temp 97.6  F (36.4  C) (Tympanic)   Resp 16   Wt 72.9 kg (160 lb 12.8 oz)   SpO2 98%   BMI 23.07 kg/m     Wt Readings from Last 2 Encounters:   06/14/21 72.9 kg (160 lb 12.8 oz)   06/03/21 72.7 kg (160 lb 3.4 oz)      Ht Readings from Last 2 Encounters:   06/03/21 1.778 m (5' 10\")   05/27/21 1.778 m (5' 10\")      KPS 70    -Generally well appearing. Lower energy today.   -Respiratory: No audible wheezing.   -Skin: No rashes.   -Psychiatric: Normal mood and affect. Pleasant.  -Neurologic:   MENTAL STATUS:     Alert, oriented.    Recall: Slow. Slow to respond to questions.   Speech fluent.    Comprehension intact, but mild confusion noted.      CRANIAL NERVES:     Pupils are equal, round.     Extraocular movements full, no diplopia.    Homonymous hemianopsia, left-side.   Flattening of nasolabial folds on the left, good activation.   Hearing intact.   With normal phonation, no dysfunction of the palate or tongue.    Slightly raspy/ quiet voice.   MOTOR: Antigravity in the arms. Finger spread on the left with drift.    Left quad 4/5.  SENSATION: Intact to light touch throughout.  COORDINATION: Mild impairment on finger nose with eyes closed on the left.  GAIT: Less steady. Walks without assistance.    Steppage " gait. Slower speed.    Malpositioning of left foot in stride (proprioceptive issue).        MEDICAL RECORDS  Obtained and personally reviewed all available outside medical records in addition to reviewing any records available in our electronic system.     LABS  Personally reviewed all available lab results.     IMAGING  Personally reviewed post-operative MR brain imaging from 5/27 and compared to prior imaging.     Imaging and case was previously reviewed with Dr. Chan.       IMPRESSION  Clinic time for this high complexity visit was spent discussing in detail the nature of his cancer in light of his recent surgery and treatment.     Clinically, Aristeo is still recovering from surgery. Left-sided symptoms, cognition, and fatigue remain present. He was never started on dexamethasone. Hopefully with resuming kee later this week and continuing with PT/ OT, deficits will improve.     Following repeat intra-tumoral viral injeciton, Aristeo completed his course of veledimex. Will resume nivolumab today, but hold kee infusions until later this week in the setting of his recent surgery. Continue to monitor thyroid functions; today TSH was elevated, but T4 was normal. Continue synthroid. Can consider referral to endocrinology for management of hypothyroidism. Will repeat imaging in 4 weeks. Today we also discussed the use of Optune. Following an in depth discussion, the plan is to continue to hold on this treatment option. I am in complete agreement with this plan.    PROBLEM LIST  Glioblastoma, MGMT unmethylated and IDH1 wildtype by IHC  Seizure  Chemotherapy-associated constipation  Headaches  Apraxia    PLAN  -CANCER-DIRECTED THERAPY-  -As above.      -HYPERTENSION-  -Related to kee use.   -Lisinopril 5mg daily.   -Continue monitoring blood pressure at home.   -Can increase antihypertensive as needed.    -STEROIDS-  -Off dexamethasone.     -SEIZURE MANAGEMENT-  -Given history of seizures, will continue current antiepileptic  regimen of Keppra for the foreseeable future.    -Quality of life/ MOOD/ FATIGUE-  -Continue Lexapro.   -Continue to monitor mood as untreated/ undertreated depression can worsen fatigue, dysorexia, and quality of life.  -Continue to monitor TSH/ T4 levels and adjust Synthroid as needed.     COVID vaccination series complete as of 3/25/2021.    Return to clinic in 1 month + labs + imaging.    Ericka Avila MD  Neuro-oncology       Again, thank you for allowing me to participate in the care of your patient.        Sincerely,        Ericka Avila MD

## 2021-06-14 NOTE — NURSING NOTE
Chief Complaint   Patient presents with     Oncology Clinic Visit     GLIOBLASTOMA'     Blood Draw     labs drawn with piv start by rn.  vs taken     Labs drawn with PIV start by rn.  Pt tolerated well.  VS taken and pt checked in for next appt.    Amanda oRdríguez RN

## 2021-06-14 NOTE — NURSING NOTE
"Oncology Rooming Note    June 14, 2021 7:54 AM   Erasto Maher is a 56 year old male who presents for:    Chief Complaint   Patient presents with     Oncology Clinic Visit     GLIOBLASTOMA'     Blood Draw     labs drawn with piv start by rn.  vs taken     Initial Vitals: /72 (BP Location: Right arm, Patient Position: Sitting, Cuff Size: Adult Regular)   Pulse 95   Temp 97.6  F (36.4  C) (Tympanic)   Resp 16   Wt 72.9 kg (160 lb 12.8 oz)   SpO2 98%   BMI 23.07 kg/m   Estimated body mass index is 23.07 kg/m  as calculated from the following:    Height as of 6/3/21: 1.778 m (5' 10\").    Weight as of this encounter: 72.9 kg (160 lb 12.8 oz). Body surface area is 1.9 meters squared.  No Pain (0) Comment: Data Unavailable   No LMP for male patient.  Allergies reviewed: Yes  Medications reviewed: Yes    Medications: Medication refills not needed today.  Pharmacy name entered into [facility]:    [facility] - [place], [place] - [address] [id number] - [place], [place] - [address] [facility] [address]  [facility] - [place] [facility] [address] [facility] [place] [place] [facility] [address] [facility]    Clinical concerns: None.      Mai Negro MA            "

## 2021-06-15 NOTE — PROGRESS NOTES
[id number]: Medication Count/IDS Note      Erasto Maher is enrolled on the trial number listed above.   IDS number: [id number]   Drug name: Veledimex  Number of bottles returned: 1  Lot number(s): [id number]  Number of pills returned: 0    Drug diary returned? yes    Are there any discrepancies between the amount of medication the patient was instructed to take and the amount recorded as taken in the patient s drug diary?  No    Are there any discrepancies between the amount of medication the patient has recorded as taken in the drug diary and the amount that would be expected to be returned based on the amount recorded as taken?  No, but number of capsules taken is not recorded on D5 (time taken and time of breakfast is recorded).     Lily Yadav, Clinical Research Coordinator, RN-ZULAY.     Form 504.00.01 (Version 1)     Effective date: 01JUL2018     Next Review Date: 01JUL2020    [id number]: Study Visit Note   Subject name: Erasto Alberts has been doing well. Adverse events have been recorded. He has had some elevated TSH, left sided weakness, ataxia, and gait disturbance. Per Dr. Chan, these are not considered an JAYCE, a TYE, or a grade 3 toxicity.     I have personally interviewed Erasto Maher and reviewed his medical record for adverse events and concomitant medications and these have been recorded on the corresponding logs in Erasto Maher's research file.     Please see provider progress note for physical exam and other clinical information. Labs were reviewed - any significant lab values were addressed and reviewed.    Lily Yadav, Clinical Research Coordinator, RN-ZULAY.

## 2021-06-16 NOTE — TELEPHONE ENCOUNTER
Glioblastoma   Avastin infusion 2 hours ago. At dinner - wife noted his speech is slurred, facial droop, LUE weak. Advised 911 - wife declines - states symptoms are getting better, but have not resolved. Happened once before and they went away on their own - discussed with Dr. Chan at that time.       Dr. Avila or Dr. Chan    Paged Dr. Avila @ 1847 -   Paged Dr. Avila @ 1902 - could be seizure, could be TIA. If not interested in 911, then they need to monitor at home. Wouldn't increase or add any other medications. If symptoms worsen then should present to ED to rule out hemorrhage. Avastin wouldn't cause a seizure.    1909 - called patient/wife back. Patient used bathroom - he was able to walk with assistance, smile is fully back. LLE improving after using the bathroom. Speech is coming back as well. Wife will continue to monitor - will call back with any other questions/concerns.    [place]acia [place] RN on 6/16/2021 at 7:17 PM    Reason for Disposition    [1] Weakness (i.e., paralysis, loss of muscle strength) of the face, arm / hand, or leg / foot on one side of the body AND [2] sudden onset AND [3] present now    Additional Information    Negative: [1] SEVERE weakness (i.e., unable to walk or barely able to walk, requires support) AND [2] new onset or worsening    Protocols used: NEUROLOGIC DEFICIT-A-AH

## 2021-06-16 NOTE — PROGRESS NOTES
Infusion Nursing Note:  Erasto AMERICA Maher presents today for C8 additional day bevacizumab-bvzr (ZIRABEV).    Patient seen by provider today: Yes: Dr Avila 6/14   present during visit today: Not Applicable.    Note: Pt saw provider 6/14, no changes.      Intravenous Access:  Peripheral IV placed.    Treatment Conditions:  Lab Results   Component Value Date    HGB 12.6 06/09/2021     Lab Results   Component Value Date    WBC 4.9 06/09/2021      Lab Results   Component Value Date    ANEU 2.9 06/09/2021     Lab Results   Component Value Date     06/09/2021      Lab Results   Component Value Date     06/14/2021                   Lab Results   Component Value Date    POTASSIUM 4.1 06/14/2021           Lab Results   Component Value Date    MAG 2.0 06/03/2021            Lab Results   Component Value Date    CR 0.78 06/14/2021                   Lab Results   Component Value Date    JUDIE 8.9 06/14/2021                Lab Results   Component Value Date    BILITOTAL 0.4 06/14/2021           Lab Results   Component Value Date    ALBUMIN 3.7 06/14/2021                    Lab Results   Component Value Date    ALT 34 06/14/2021           Lab Results   Component Value Date    AST 18 06/14/2021           Post Infusion Assessment:  Patient tolerated infusion without incident.  Blood return noted pre and post infusion.  Site patent and intact, free from redness, edema or discomfort.  No evidence of extravasations.  Access discontinued per protocol.       Discharge Plan:   Patient declined prescription refills.  Discharge instructions reviewed with: Patient.  Patient and/or family verbalized understanding of discharge instructions and all questions answered.  AVS to patient via [facility].  Patient will return 6/29 for next appointment.   Patient discharged in stable condition accompanied by: spouse.  Departure Mode: Ambulatory.    Brionna Cardona RN

## 2021-06-17 PROBLEM — I15.8 OTHER SECONDARY HYPERTENSION: Status: ACTIVE | Noted: 2021-01-01

## 2021-06-17 NOTE — H&P
[facility]: [place]  Neurology History and Physical    Patient Name:  Erasto Maher  MRN:  [id number]    :  1965  Date of Admission:  2021  Date of Service:  2021  Primary care provider:  Ranjit Edwards      Chief Complaint:  Left sided weakness and facial droop    History of Present Illness:   Mr. 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    HPI from note by Dr. Portillo below:  -Mr. Maher is a 56 year man with Hx of right parietal glioblastoma diagnosed on 2019 following a GTC.    -Patient had his bevacizumab-bvzr infusion yesterday around 3 PM.  About 2 hours after the infusion his wife noticed that he has slurring of speech, worsening of left facial droop and left upper and lower extremity weakness which resolved after 20 minutes.  Again around 10 PM when they were going to bed, wife noticed that patient had difficulty keeping his posture in sitting and using his left leg.  Wife called 911 and patient was brought to emergency room.  She denies abnormal shaking movements and believes that his mental status remained the same throughout these episodes.    -She reports that something very similar to this happened to him in the past, before one of his surgeries.  - At baseline he has weakness in left upper and lower extremity and mild facial droop however he is still able to walk and use his left arm.  As far as his mental status, at baseline he is mildly confused and foggy.    Per chart review:  -Patient completed chemotherapy on 2019 and received last dose of immunotherapy on 3/16/202. -Imaging on 3/28/2020 showed progression of disease.    -On 2020 underwent craniotomy for tumor  resection by Dr. Chan with placement of GammaTiles. -After imaging on 11/2020 showed new distant lesionS consistent with disease progression, he completed a course of radiation and underwent repeat resection on 1/13/2021 and Intra-tumoral virus injection.    -In April 2021 following a contrast enhancement in the right parietal lobe tumor on imaging he had biopsy which showed recurrent glioblastoma with recurrent necrosis.    -Was last seen by Dr. Avila on 6/14/21, with the plan of having bevacizumab-bvzr infusion later this week.    He is in good spirits today and he himself has no new complaints. He does not appreciate the changes in strength/facial droop that his wife does. He is adamant he does not want to go to rehabilitation and would like to discharge directly home after hospitalization, where he has home therapies set up several days/week.     ROS: A comprehensive ROS was performed and pertinent findings were included in HPI.     PMH:  Past Medical History:   Diagnosis Date     Allergic rhinitis      Anemia      GERD (gastroesophageal reflux disease)      Glioblastoma (H)      Insomnia      PONV (postoperative nausea and vomiting)      Seizure (H)      Past Surgical History:   Procedure Laterality Date     APPENDECTOMY  11/1981     COLONOSCOPY       CRANIOTOMY  06/28/2019     HERNIA REPAIR      x2     MRI CRANIOTOMY Right 1/13/2021    Procedure: Image Guided Craniotomy, autoguide, virus injection;  Surgeon: Marquise Chan MD;  Location: [facility] OR     MRI CRANIOTOMY LASER ABLATION Right 4/21/2021    Procedure: INTRAOPERATIVE MAGNETIC RESONANCE IMAGING Monteris LASER ABLATION, WITH OPTICAL TRACKING SYSTEM Biopsy;  Surgeon: Marquise Chan MD;  Location: [facility] OR     MRI CRANIOTOMY WITH OPTICAL TRACKING SYSTEM Right 4/9/2020    Procedure: intraoperative magnetic resonance imaging/stealth assisted right craniotomy, with gammatile placement;  Surgeon: Marquise Chan MD;  Location: [facility] OR     MRI  "CRANIOTOMY WITH OPTICAL TRACKING SYSTEM Right 5/27/2021    Procedure: INTRAOPERATIVE Magnetic resonance imaging/Stealth Assisted Right  CRANIOTOMY;  Surgeon: Marquise Chan MD;  Location: [facility] OR       Medications   I have personally reviewed the patient's medication list.     Allergies  I have personally reviewed the patient's allergy list.     Social History:  Denies tobacco, alcohol, and recreational drug use     Family History:    Father with MI     Physical Examination:   Constitutional   - Vitals: /85   Pulse 85   Temp 97.8  F (36.6  C) (Oral)   Resp 18   Ht 1.778 m (5' 10\")   Wt 74.8 kg (165 lb)   SpO2 98%   BMI 23.68 kg/m     General: Lying in bed, NAD  Head: Atraumatic, significant left sided facial droop  Cardiac: no lower extremity edema  Neurologic:  Mental Status: Fuly alert, attentive and oriented. Follows commands. Speech slurred due to facial droop, speaks in short phrases.   Cranial Nerves: Left pupil larger than right, both reactive to light, conjugate gaze, EOMI, facial sensation intact, left nasolabial flattening, significant left sided facial droop in the lower left side of the face, upper left sided facial muscle function unaffected. Left sided neglect present.   Motor: No abnormal movements.  Moving all 4 extremities antigravity but just barely in LUE (3/5), LLE 3+/5 weakness throughout.    Sensory: Intact to light touch in bilateral upper extremities, extinction present on the left upper extremity, intact light touch on the right lower extremity and no sensation to light sensation on left lower extremity   Station/Gait: Slow shuffling gait, seems relatively stead on his feet.  Able to stand up and sit down slowly but unassisted.     Investigations:    I have personally reviewed pertinent labs, tests, and radiology images. Discussion of notable findings is included under Impression.     CBC RESULTS:   Recent Labs   Lab Test 06/17/21  0245   WBC 5.2   RBC 3.97*   HGB 12.2* "   HCT 37.4*   MCV 94   MCH 30.7   MCHC 32.6   RDW 12.9        Recent Labs   Lab Test 06/17/21  0245 06/14/21  0746    139   POTASSIUM 4.1 4.1   CHLORIDE 104 104   CO2 30 26   ANIONGAP 4 9   * 114*   BUN 15 14   CR 0.81 0.78   JUDIE 9.1 8.9     Radiological Data  I have reviewed all images from last 24 hours. Pertinent findings/interpretations included in assessment.     Did the patient transfer from an outside hospital?   No    Assessment:  Mr. Maher is a 56 year man with Hx of glioblastoma multiforme (IDH1 R132H wildtype, MGMT promoter unmethylated) s/p chemotherapy, immunotherapy, radiotherapy and resection with tumor progression who presented to ED for evaluation of acute worsening of baseline left sided weakness and facial droop following Zirabev infusion. Patient himself does not appreciate any new changes from baseline, but his wife, Judith, does feel he is worse today compared to a few days ago. MR with contrast done on 6/17 in ED shows progression of several known lesions, but and a likely new lesion in the right midbrain/migdalia. This new lesion could explain the slight worsening in left hemibody weakness. Low suspicion for seizures given patient's mental status has remained consistent across all providers who saw him today and no seizure-like activity has been noticed. Grossly, he does not have much more vasogenic edema on his MRI compared to MRI from April. Per discussion with neurosurgery, there is no clear indication for repeat resection at this time; we will trial high dose steroids and see if he has any clinical improvement.     Plan:  #Glioblastoma multiforme s/p resection, chemotherapy, radiation   #Seizure disorder secondary to above   #Vasogenic brain edema    IDH1 R132H wildtype, MGMT promoter unmethylated).   - Trial of steroids: dexamethasone 4mg BID  - Omeprazole while on steroids   - Continue Keppra 1000mg BID  - Low suspicion for sz, low threshold for EEG   - Will DORINDA  message Dr. Avila regarding patient's admission   - Appreciate neurosurgery recommendations   - PT/OT consultation     #HTN  - Resume PTA lisinopril     #Depression  - Resume PTA Lexapro     FEN: Full diet  PPx: PIV  Code: Full code  Dispo: Medically stable 2-3 days.     Staffed with Dr. Power.     Dahiana Knapp, MS4  [facility] [employer]     I was present with the medical student who participated in the service and in the documentation of the note. I have verified the history and personally performed the physical exam and medical decision making. I agree with the assessment and plan of care as documented in the note    Sean Shepherd MD  Neurology Resident, PGY-2      Physician Attestation   I, Angelo Power DO, saw this patient and agree with the findings and plan of care as documented in the note.      Patient with biopsy-proven glioblastoma on experimental treatment presenting for new left-sided weakness.  It appears that his weakness has been relatively static here in the hospital.  His MRI does show some changes in his known lesion but can certainly has new contrast-enhancement in the right midbrain tracking down the migdalia.  Discussed case with neurosurgery and patient's oncologist Dr. Avila.  No acute neurosurgical treatment planned.  Patient is below baseline with regard to weakness and may not be safe at home.  We will admit for steroid trial to see if improves his underlying weakness.  He may need a short stay in rehab, pending therapy evaluations.  I do not think this is seizure and will not start EEG at this time, however if there is any concern for seizure activity or significant fluctuations exam we may consider.  We will continue prior to admission Keppra dose.    Items personally reviewed/procedural attestation: vitals and labs.    Angelo Power DO

## 2021-06-17 NOTE — PROGRESS NOTES
Brief Neurosurgery Progress Note:    MRI reviewed. Patient is 4+ in left hemibody (baseline).   Recommendations:    - Follow with Dr. Avila for Avastin infusion next week  - Increase Keppra to 1500 mg BID  - Consider Admission to Neurology for steroid trial  - No immediate neurosurgical intervention indicated    Arcadio Knott  Resident    Case discussed with Neurosurgery Staff, Dr. Chan.

## 2021-06-17 NOTE — CONSULTS
[facility]  Neurology Consultation    Patient Name:  Erasto Maher  MRN:  [id number]    :  1965  Date of Service:  2021  Primary care provider:  Ranjit Edwards      History of Present Illness:   -Mr. Maher is a 56 year man with Hx of right parietal glioblastoma diagnosed on 2019 following a GTC.      -Patient had his bevacizumab-bvzr infusion yesterday around 3 PM.  About 2 hours after the infusion his wife noticed that he has slurring of speech, worsening of left facial droop and left upper and lower extremity weakness which resolved after 20 minutes.  Again around 10 PM when they were going to bed, wife noticed that patient had difficulty keeping his posture in sitting and using his left leg.  Wife called 911 and patient was brought to emergency room.  She denies abnormal shaking movements and believes that his mental status remained the same throughout these episodes.      -She reports that something very similar to this happened to him in the past, before one of his surgeries.    - At baseline he has weakness in left upper and lower extremity and mild facial droop however he is still able to walk and use his left arm.  As far as his mental status, at baseline he is mildly confused and foggy.      Per chart review:  -Patient completed chemotherapy on 2019 and received last dose of immunotherapy on 3/16/202. -Imaging on 3/28/2020 showed progression of disease.    -On 2020 underwent craniotomy for tumor resection by Dr. Chan with placement of GammaTiles. -After imaging on 2020 showed new distant lesionS consistent with disease progression, he completed a course of radiation and underwent repeat resection on 2021 and Intra-tumoral virus injection.    -In 2021 following a contrast enhancement in the right parietal lobe tumor on imaging he had biopsy which showed recurrent glioblastoma with recurrent necrosis.    -Was last  "seen by Dr. Avila on 6/14/21, with the plan of having bevacizumab-bvzr infusion later this week.        ROS  A 10-point ROS was performed as per HPI.     PMH  Past Medical History:   Diagnosis Date     Allergic rhinitis      Anemia      GERD (gastroesophageal reflux disease)      Glioblastoma (H)      Insomnia      PONV (postoperative nausea and vomiting)      Seizure (H)      Past Surgical History:   Procedure Laterality Date     APPENDECTOMY  11/1981     COLONOSCOPY       CRANIOTOMY  06/28/2019     HERNIA REPAIR      x2     MRI CRANIOTOMY Right 1/13/2021    Procedure: Image Guided Craniotomy, autoguide, virus injection;  Surgeon: Marquise Chan MD;  Location: [facility] OR     MRI CRANIOTOMY LASER ABLATION Right 4/21/2021    Procedure: INTRAOPERATIVE MAGNETIC RESONANCE IMAGING Monteris LASER ABLATION, WITH OPTICAL TRACKING SYSTEM Biopsy;  Surgeon: Marquise Chan MD;  Location: [facility] OR     MRI CRANIOTOMY WITH OPTICAL TRACKING SYSTEM Right 4/9/2020    Procedure: intraoperative magnetic resonance imaging/stealth assisted right craniotomy, with gammatile placement;  Surgeon: Marquise Chan MD;  Location: [facility] OR     MRI CRANIOTOMY WITH OPTICAL TRACKING SYSTEM Right 5/27/2021    Procedure: INTRAOPERATIVE Magnetic resonance imaging/Stealth Assisted Right  CRANIOTOMY;  Surgeon: Marquise Chan MD;  Location: [facility] OR       Medications   (Not in a hospital admission)      Allergies  Allergies   Allergen Reactions     No Clinical Screening - See Comments Rash     Cats and dogs         Social History  I have reviewed this patient's social history    Family History    I have reviewed this patient's family history      Physical Examination   Vitals: /78   Pulse 86   Temp 97.8  F (36.6  C) (Oral)   Resp 18   Ht 1.778 m (5' 10\")   Wt 74.8 kg (165 lb)   SpO2 96%   BMI 23.68 kg/m    General: Adult male patient, lying in bed, NAD  HEENT: op pink and moist  Cardiac: RRR  Chest: non-labored on " RA  Abdomen: ND  Extremities: Warm, no edema    Neuro:  Mental status: somnolent, oriented to self and place, Follow some simple commands on right side of body  Cranial nerves: Left pupil larger than right, both reactive to light, conjugate gaze, EOMI, facial sensation intact, left nasolabial fold flattening  Motor: Moves b/l upper ext against gravity, moves right side more than left side  Moves RLE against gravity with no drift, moves LLE side to side   Reflexes: toes equivocal  Sensory: Intact to light touch in all 4 ext, has left side extinction  Coordination: no dysmetria on right side, unable to assess on left side  Gait: deffered      Labs  BMP  Recent Labs   Lab 06/17/21  0245 06/14/21  0746    139   POTASSIUM 4.1 4.1   CHLORIDE 104 104   CO2 30 26   BUN 15 14   CR 0.81 0.78   JUDIE 9.1 8.9       CBC  Recent Labs   Lab 06/17/21  0245   WBC 5.2   HGB 12.2*          CSF  No results for input(s): CGLU, CTP in the last 168 hours.    Invalid input(s): [facility]      Radiological Data  Recent Results (from the past 48 hour(s))   Head CT w/o contrast    Narrative    EXAM: CT HEAD W/O CONTRAST  LOCATION: [facility]  DATE/TIME: 6/17/2021 2:48 AM    INDICATION: History of glioblastoma with worsening left upper extremity weakness with facial droop and slurred speech.  COMPARISON: 06/01/2021.  TECHNIQUE: Routine CT Head without IV contrast. Multiplanar reformats. Dose reduction techniques were used.    FINDINGS:  INTRACRANIAL CONTENTS: Redemonstration of postsurgical changes of right frontal parietal craniotomy with mass resection and GammaTile placement. Low-attenuation changes in the right periatrial region extending more medially over the parasagittal   convexity appear grossly unchanged. Focal area of low-attenuation change subjacent to the anterior margin of the craniotomy is stable. No acute intracranial hemorrhage identified. No CT evidence of recent transcortical infarct. No hydrocephalus.      VISUALIZED ORBITS/SINUSES/MASTOIDS: No intraorbital abnormality. No paranasal sinus mucosal disease. No middle ear or mastoid effusion.    BONES/SOFT TISSUES: Right frontoparietal craniotomy.      Impression    IMPRESSION:  1.  No significant change compared with 06/01/2021.          Impression  Mr. Maher is a 56 year man with Hx of right parietal glioblastoma s/p chemotherapy, immunotherapy, radiotherapy and resection with recurrent progression of the tumor presented with worsening of baseline left sided weakness and facial droop. Have low suspicion for seizure as his wife denies any seizure like activity or loss of copiousness. Given the story and location of symptoms, most likely the etiology of worsening symptoms is tumor progression vs edema. Would recommend Brain MRI with and without contrast for further evaluation.     Recommendations   - MRI brain w and wo contrast    Patient was seen and discussed with Dr. Gonzalez..    Jean-Claude Portillo MD  Neurology Resident PGY2  Pager [phone]

## 2021-06-17 NOTE — ED NOTES
Patient signed out to me at the end of my partner shift.    Patient's work-up between neurosurgery and neurology revealed the patient has new tumor lesions on his MRI.  At this time the patient's Keppra will be increased and the patient is to follow-up in the clinic.    Results for orders placed or performed during the hospital encounter of 06/17/21 (from the past 24 hour(s))   CBC with platelets differential   Result Value Ref Range    WBC 5.2 4.0 - 11.0 10e9/L    RBC Count 3.97 (L) 4.4 - 5.9 10e12/L    Hemoglobin 12.2 (L) 13.3 - 17.7 g/dL    Hematocrit 37.4 (L) 40.0 - 53.0 %    MCV 94 78 - 100 fl    MCH 30.7 26.5 - 33.0 pg    MCHC 32.6 31.5 - 36.5 g/dL    RDW 12.9 10.0 - 15.0 %    Platelet Count 164 150 - 450 10e9/L    Diff Method Automated Method     % Neutrophils 63.7 %    % Lymphocytes 18.4 %    % Monocytes 11.3 %    % Eosinophils 5.4 %    % Basophils 1.0 %    % Immature Granulocytes 0.2 %    Nucleated RBCs 0 0 /100    Absolute Neutrophil 3.3 1.6 - 8.3 10e9/L    Absolute Lymphocytes 1.0 0.8 - 5.3 10e9/L    Absolute Monocytes 0.6 0.0 - 1.3 10e9/L    Absolute Eosinophils 0.3 0.0 - 0.7 10e9/L    Absolute Basophils 0.1 0.0 - 0.2 10e9/L    Abs Immature Granulocytes 0.0 0 - 0.4 10e9/L    Absolute Nucleated RBC 0.0    Basic metabolic panel   Result Value Ref Range    Sodium 138 133 - 144 mmol/L    Potassium 4.1 3.4 - 5.3 mmol/L    Chloride 104 94 - 109 mmol/L    Carbon Dioxide 30 20 - 32 mmol/L    Anion Gap 4 3 - 14 mmol/L    Glucose 104 (H) 70 - 99 mg/dL    Urea Nitrogen 15 7 - 30 mg/dL    Creatinine 0.81 0.66 - 1.25 mg/dL    GFR Estimate >90 >60 mL/min/[1.73_m2]    GFR Estimate If Black >90 >60 mL/min/[1.73_m2]    Calcium 9.1 8.5 - 10.1 mg/dL   Head CT w/o contrast    Narrative    EXAM: CT HEAD W/O CONTRAST  LOCATION: [facility]  DATE/TIME: 6/17/2021 2:48 AM    INDICATION: History of glioblastoma with worsening left upper extremity weakness with facial droop and slurred speech.  COMPARISON:  06/01/2021.  TECHNIQUE: Routine CT Head without IV contrast. Multiplanar reformats. Dose reduction techniques were used.    FINDINGS:  INTRACRANIAL CONTENTS: Redemonstration of postsurgical changes of right frontal parietal craniotomy with mass resection and GammaTile placement. Low-attenuation changes in the right periatrial region extending more medially over the parasagittal   convexity appear grossly unchanged. Focal area of low-attenuation change subjacent to the anterior margin of the craniotomy is stable. No acute intracranial hemorrhage identified. No CT evidence of recent transcortical infarct. No hydrocephalus.     VISUALIZED ORBITS/SINUSES/MASTOIDS: No intraorbital abnormality. No paranasal sinus mucosal disease. No middle ear or mastoid effusion.    BONES/SOFT TISSUES: Right frontoparietal craniotomy.      Impression    IMPRESSION:  1.  No significant change compared with 06/01/2021.   MR Brain for Stroke Cmpl w/o & w Contras    Narrative    MRI brain without and with contrast  MRA of the head without contrast  Neck MRA without and with contrast    Provided History:  L sided weakness, hx glioblastoma.  ICD-10:    Comparison:  MRs dated 5/27/2021, 4/21/2021 and 4/5/2021      Technique:   Brain MRI:  Axial diffusion, FLAIR, T2-weighted, susceptibility, and  coronal T1-weighted images were obtained without intravenous contrast.  Following intravenous gadolinium-based contrast administration, axial  and coronal T1-weighted images were obtained.    Head MRA: 3D time-of-flight MRA of the [place] was performed  without intravenous contrast.  Neck MRA:  Limited non contrast 2DTOF images were obtained of the  mid-cervical region. Following intravenous gadolinium-based contrast  administration, a contrast enhanced MRA of the neck/cervical vessels  was performed.  Three-dimensional reconstructions of the neck and head MRA were  created, which were reviewed by the radiologist.    Dose: 7.5 mL IV Gadavist.      Findings:   Brain MRI: Postsurgical changes of right hemicolectomy craniotomy with  underlying mass resection. There is susceptibility artifact within the  right temporoparietal resection cavity likely representing  hemosiderin. Enhancement is seen within the right frontal lobe,  surrounding the right temporoparietal resection cavity in the superior  right parietal lobe. There is mild diffusion restriction surrounding  the right temporoparietal resection cavity which is not significantly  changed from 4/5/2021. There is mild diffusion restriction surrounding  the superior right parietal enhancing mass which is not significantly  changed. There is new enhancing T2 hyperintensity in the right lateral  midbrain extending into the right cerebral peduncle and into the migdalia  which does not demonstrate significant diffusion restriction.  Increased T2 hyperintensity within the right frontal lobe without  associated diffusion restriction or enhancement.    Head MRA demonstrates no definite aneurysm or stenosis of the major  intracranial arteries. The anterior communicating artery is patent.  Fetal origin of the right posterior cerebral artery. The left  posterior communicating artery is patent..    Neck MRA demonstrates patent major cervical arteries. The normal  distal right internal carotid artery measures 5 mm. The normal distal  left internal carotid artery measures 5 mm. Antegrade flow in the  major cervical vasculature.      Impression    Impression:  1. No evidence for acute infarction.  2. New enhancing lesions in the right lateral midbrain, right cerebral  peduncle and migdalia probably representing new tumors.  3. Increased size of the enhancing lesions in the right frontal lobe  and superior right parietal lobe, concerning for progression of  malignancy.  4. Diffusion restricting mass within the right parietal lobe likely  represents residual tumor.  5. Mild diffusion restriction surrounding the right  temporoparietal  resection cavity may represent postoperative changes versus residual  tumor.  6. Head MRA demonstrates no definite aneurysm or stenosis of the major  intracranial arteries.  7. Neck MRA demonstrates patent major cervical arteries.    I have personally reviewed the examination and initial interpretation  and I agree with the findings.    STEPHEN MIJARES MD          Review of your medicines      UNREVIEWED medicines. Ask your doctor about these medicines      Dose / Directions   acetaminophen 325 MG tablet  Commonly known as: TYLENOL      Dose: 650 mg  Take 2 tablets (650 mg) by mouth every 4 hours as needed for other (For optimal non-opioid multimodal pain management to improve pain control.)  Quantity:    Refills: 0     fluticasone 50 MCG/ACT nasal spray  Commonly known as: FLONASE      Dose: 1 spray  Spray 1 spray into both nostrils At Bedtime  Quantity:    Refills: 0     levothyroxine 125 MCG tablet  Commonly known as: SYNTHROID/LEVOTHROID  Used for: Generalized tonic-clonic seizure (H), Glioblastoma (H), Other secondary hypertension      Dose: 137 mcg  Take 137 mcg by mouth every morning  Quantity: 30 tablet  Refills: 3     Lexapro 10 MG tablet  Generic drug: escitalopram      Dose: 10 mg  Take 1 tablet (10 mg) by mouth every morning  Quantity:    Refills: 0     lisinopril 5 MG tablet  Commonly known as: ZESTRIL  Used for: Other secondary hypertension, Generalized tonic-clonic seizure (H), Glioblastoma (H)      Dose: 5 mg  Take 1 tablet (5 mg) by mouth daily  Quantity: 30 tablet  Refills: 3     loratadine 10 MG tablet  Commonly known as: CLARITIN      Dose: 10 mg  Take 1 tablet (10 mg) by mouth At Bedtime  Quantity:    Refills: 0     OMEPRAZOLE PO      Take by mouth daily  Refills: 0     senna-docusate 8.6-50 MG tablet  Commonly known as: SENOKOT-S/PERICOLACE  Used for: Glioblastoma (H)      Dose: 1 tablet  Take 1 tablet by mouth 2 times daily for 14 days  Quantity: 28 tablet  Refills: 0         CONTINUE these medicines which may have CHANGED, or have new prescriptions. If we are uncertain of the size of tablets/capsules you have at home, strength may be listed as something that might have changed.      Dose / Directions   levETIRAcetam 750 MG tablet  Commonly known as: KEPPRA  This may have changed:     medication strength    how much to take      Dose: 1,500 mg  Take 2 tablets (1,500 mg) by mouth 2 times daily  Quantity: 120 tablet  Refills: 0           Where to get your medicines      Some of these will need a paper prescription and others can be bought over the counter. Ask your nurse if you have questions.    Bring a paper prescription for each of these medications    levETIRAcetam 750 MG tablet         Final diagnoses:   Glioblastoma (H)       Increase your Keppra to 1500 mg twice daily.    Please follow up with your physicians as directed.    Cory Davies MD, Cory Ruiz MD  06/17/21 [id number]

## 2021-06-17 NOTE — ED PROVIDER NOTES
ED Provider Note  [facility]      History     Chief Complaint   Patient presents with     Extremity Weakness     Left sided      HPI  Erasto Maher is a 56 year old male with history of glioblastoma status post chemotherapy, immunotherapy, radiotherapy, and resection with baseline left-sided weakness and facial droop.  Today, patient had Ladi-BVZR infusion yesterday around 3 PM.  2 hours later, at dinner, his wife noticed worsening slurred speech, worsening facial droop, and worsening left upper and lower extremity weakness.  Symptoms lasted for approximately 20 minutes.  They consulted with the neurology/neurosurgery team recommended close monitoring of symptoms, and if worsened return to the ED.  Patient went to bed, then awoke to go to the bathroom and again noticed worsening left upper and lower extremity weakness.  Was brought to the ED for evaluation.  On arrival, patient states that he is at his baseline.  His wife disagrees and feels that his speech is more slurred, his responses are slower, and his left upper extremity is considerably weaker than baseline.  Denies any falls or recent trauma.  Denies any headache, neck pain, fever/chills, or other infectious symptoms.      Past Medical History  Past Medical History:   Diagnosis Date     Allergic rhinitis      Anemia      GERD (gastroesophageal reflux disease)      Glioblastoma (H)      Insomnia      PONV (postoperative nausea and vomiting)      Seizure (H)      Past Surgical History:   Procedure Laterality Date     APPENDECTOMY  11/1981     COLONOSCOPY       CRANIOTOMY  06/28/2019     HERNIA REPAIR      x2     MRI CRANIOTOMY Right 1/13/2021    Procedure: Image Guided Craniotomy, autoguide, virus injection;  Surgeon: Marquise Chan MD;  Location: [facility] OR     MRI CRANIOTOMY LASER ABLATION Right 4/21/2021    Procedure: INTRAOPERATIVE MAGNETIC RESONANCE IMAGING Monteris LASER ABLATION, WITH OPTICAL TRACKING SYSTEM Biopsy;   Surgeon: Marquise Chan MD;  Location: [facility] OR     MRI CRANIOTOMY WITH OPTICAL TRACKING SYSTEM Right 4/9/2020    Procedure: intraoperative magnetic resonance imaging/stealth assisted right craniotomy, with gammatile placement;  Surgeon: Marquise Chan MD;  Location: [facility] OR     MRI CRANIOTOMY WITH OPTICAL TRACKING SYSTEM Right 5/27/2021    Procedure: INTRAOPERATIVE Magnetic resonance imaging/Stealth Assisted Right  CRANIOTOMY;  Surgeon: Marquise Chan MD;  Location: [facility] OR     levETIRAcetam (KEPPRA) 750 MG tablet      Allergies   Allergen Reactions     No Clinical Screening - See Comments Rash     Cats and dogs       Family History  Family History   Problem Relation Age of Onset     Hypotension Mother      Myocardial Infarction Father      Pacemaker Father      Endocrine Disease Father         prediabetes     No Known Problems Brother      Anesthesia Reaction No family hx of      Deep Vein Thrombosis (DVT) No family hx of      Social History   Social History     Tobacco Use     Smoking status: Never Smoker     Smokeless tobacco: Never Used   Substance Use Topics     Alcohol use: Yes     Frequency: 2-4 times a month     Drinks per session: 3 or 4     Comment: social      Drug use: Not Currently      Past medical history, past surgical history, medications, allergies, family history, and social history were reviewed with the patient. No additional pertinent items.       Review of Systems   Constitutional: Negative for chills, fatigue and fever.   HENT: Negative for congestion and sore throat.    Eyes: Negative for pain and visual disturbance.   Respiratory: Negative for cough, chest tightness and shortness of breath.    Cardiovascular: Negative for chest pain and palpitations.   Gastrointestinal: Negative for abdominal distention, abdominal pain, constipation, diarrhea, nausea and vomiting.   Genitourinary: Negative for difficulty urinating, dysuria, frequency and urgency.   Musculoskeletal:  "Negative for arthralgias, back pain, myalgias and neck pain.   Skin: Negative for color change and rash.   Neurological: Positive for facial asymmetry, speech difficulty and weakness. Negative for dizziness, seizures and headaches.   Psychiatric/Behavioral: Negative for confusion.     A complete review of systems was performed with pertinent positives and negatives noted in the HPI, and all other systems negative.    Physical Exam   BP: (!) 140/94  Pulse: 83  Temp: 97.8  F (36.6  C)  Resp: 18  Height: 177.8 cm (5' 10\")  Weight: 74.8 kg (165 lb)  SpO2: 99 %  Physical Exam  Vitals signs and nursing note reviewed.   Constitutional:       General: He is not in acute distress.     Appearance: Normal appearance. He is not ill-appearing or toxic-appearing.   HENT:      Head: Normocephalic and atraumatic.      Nose: Nose normal.      Mouth/Throat:      Mouth: Mucous membranes are moist.   Eyes:      Pupils: Pupils are equal, round, and reactive to light.   Neck:      Musculoskeletal: Normal range of motion. No neck rigidity.   Cardiovascular:      Rate and Rhythm: Normal rate.      Pulses: Normal pulses.      Heart sounds: Normal heart sounds.   Pulmonary:      Effort: Pulmonary effort is normal. No respiratory distress.      Breath sounds: Normal breath sounds.   Abdominal:      General: Abdomen is flat. There is no distension.   Musculoskeletal: Normal range of motion.         General: No swelling or deformity.   Skin:     General: Skin is warm.      Capillary Refill: Capillary refill takes less than 2 seconds.   Neurological:      Mental Status: He is alert and oriented to person, place, and time.      Comments: Patient able to left upper extremity briefly and then promptly falls to bed.  Has approximately 2-second delay in answering questions, which she states is baseline.  No facial droop.  No significant dysarthria or altered mental status.  NIH 1   Psychiatric:         Mood and Affect: Mood normal.         ED Course "      Procedures        NIH Stroke Scale (calculator)  Background  Calculate stroke severity based on LOC, orientation, following commands, EOM, Visual fields, facial and extremity motor and sensation, coordination, language and unilateral neglect.  Criteria for Adults   Of possible 42 points   0 points: LOC (1A) - Alert  0 points: Orientation (1B) - Answers 2 questions correctly (both current month AND age)  0 points: Commands (1C) - Performs 2 tasks correctly (both open/close eyes AND make fist)  0 points: EOM (2) - Normal  0 points: Visual Fields (3) - No visual loss  0 points: Motor Face (4) - Normal symmetric movements  1 points: Motor Lt Arm (5A) - Drifts -within 10 seconds  0 points: Motor Rt Arm (5B) - No drift  0 points: Motor LT Leg (6A) - No drift  0 points: Motor LT Leg (6B) - No drift  0 points: Limb Ataxia (7) - Absent  0 points: Sensation (8) - Normal- 0  0 points: Language (9) - Normal without Aphasia  0 points: Speech (10) - Normal articulation without dysarthria  0 points: Extinction (11) - No abnormality  Interpretation (total score of 3-15 points)  NIH Stroke Score: 1  (12:28 AM)  Score 1-4: Minor Stroke         Results for orders placed or performed during the hospital encounter of 06/17/21   Head CT w/o contrast     Status: None    Narrative    EXAM: CT HEAD W/O CONTRAST  LOCATION: [facility]  DATE/TIME: 6/17/2021 2:48 AM    INDICATION: History of glioblastoma with worsening left upper extremity weakness with facial droop and slurred speech.  COMPARISON: 06/01/2021.  TECHNIQUE: Routine CT Head without IV contrast. Multiplanar reformats. Dose reduction techniques were used.    FINDINGS:  INTRACRANIAL CONTENTS: Redemonstration of postsurgical changes of right frontal parietal craniotomy with mass resection and GammaTile placement. Low-attenuation changes in the right periatrial region extending more medially over the parasagittal   convexity appear grossly unchanged. Focal area of  low-attenuation change subjacent to the anterior margin of the craniotomy is stable. No acute intracranial hemorrhage identified. No CT evidence of recent transcortical infarct. No hydrocephalus.     VISUALIZED ORBITS/SINUSES/MASTOIDS: No intraorbital abnormality. No paranasal sinus mucosal disease. No middle ear or mastoid effusion.    BONES/SOFT TISSUES: Right frontoparietal craniotomy.      Impression    IMPRESSION:  1.  No significant change compared with 06/01/2021.   MR Brain for Stroke Cmpl w/o & w Contras     Status: None    Narrative    MRI brain without and with contrast  MRA of the head without contrast  Neck MRA without and with contrast    Provided History:  L sided weakness, hx glioblastoma.  ICD-10:    Comparison:  MRs dated 5/27/2021, 4/21/2021 and 4/5/2021      Technique:   Brain MRI:  Axial diffusion, FLAIR, T2-weighted, susceptibility, and  coronal T1-weighted images were obtained without intravenous contrast.  Following intravenous gadolinium-based contrast administration, axial  and coronal T1-weighted images were obtained.    Head MRA: 3D time-of-flight MRA of the [place] was performed  without intravenous contrast.  Neck MRA:  Limited non contrast 2DTOF images were obtained of the  mid-cervical region. Following intravenous gadolinium-based contrast  administration, a contrast enhanced MRA of the neck/cervical vessels  was performed.  Three-dimensional reconstructions of the neck and head MRA were  created, which were reviewed by the radiologist.    Dose: 7.5 mL IV Gadavist.     Findings:   Brain MRI: Postsurgical changes of right hemicolectomy craniotomy with  underlying mass resection. There is susceptibility artifact within the  right temporoparietal resection cavity likely representing  hemosiderin. Enhancement is seen within the right frontal lobe,  surrounding the right temporoparietal resection cavity in the superior  right parietal lobe. There is mild diffusion restriction  surrounding  the right temporoparietal resection cavity which is not significantly  changed from 4/5/2021. There is mild diffusion restriction surrounding  the superior right parietal enhancing mass which is not significantly  changed. There is new enhancing T2 hyperintensity in the right lateral  midbrain extending into the right cerebral peduncle and into the migdalia  which does not demonstrate significant diffusion restriction.  Increased T2 hyperintensity within the right frontal lobe without  associated diffusion restriction or enhancement.    Head MRA demonstrates no definite aneurysm or stenosis of the major  intracranial arteries. The anterior communicating artery is patent.  Fetal origin of the right posterior cerebral artery. The left  posterior communicating artery is patent..    Neck MRA demonstrates patent major cervical arteries. The normal  distal right internal carotid artery measures 5 mm. The normal distal  left internal carotid artery measures 5 mm. Antegrade flow in the  major cervical vasculature.      Impression    Impression:  1. No evidence for acute infarction.  2. New enhancing lesions in the right lateral midbrain, right cerebral  peduncle and migdalia probably representing new tumors.  3. Increased size of the enhancing lesions in the right frontal lobe  and superior right parietal lobe, concerning for progression of  malignancy.  4. Diffusion restricting mass within the right parietal lobe likely  represents residual tumor.  5. Mild diffusion restriction surrounding the right temporoparietal  resection cavity may represent postoperative changes versus residual  tumor.  6. Head MRA demonstrates no definite aneurysm or stenosis of the major  intracranial arteries.  7. Neck MRA demonstrates patent major cervical arteries.    I have personally reviewed the examination and initial interpretation  and I agree with the findings.    STEPHEN MIJARES MD   CBC with platelets differential     Status:  Abnormal   Result Value Ref Range    WBC 5.2 4.0 - 11.0 10e9/L    RBC Count 3.97 (L) 4.4 - 5.9 10e12/L    Hemoglobin 12.2 (L) 13.3 - 17.7 g/dL    Hematocrit 37.4 (L) 40.0 - 53.0 %    MCV 94 78 - 100 fl    MCH 30.7 26.5 - 33.0 pg    MCHC 32.6 31.5 - 36.5 g/dL    RDW 12.9 10.0 - 15.0 %    Platelet Count 164 150 - 450 10e9/L    Diff Method Automated Method     % Neutrophils 63.7 %    % Lymphocytes 18.4 %    % Monocytes 11.3 %    % Eosinophils 5.4 %    % Basophils 1.0 %    % Immature Granulocytes 0.2 %    Nucleated RBCs 0 0 /100    Absolute Neutrophil 3.3 1.6 - 8.3 10e9/L    Absolute Lymphocytes 1.0 0.8 - 5.3 10e9/L    Absolute Monocytes 0.6 0.0 - 1.3 10e9/L    Absolute Eosinophils 0.3 0.0 - 0.7 10e9/L    Absolute Basophils 0.1 0.0 - 0.2 10e9/L    Abs Immature Granulocytes 0.0 0 - 0.4 10e9/L    Absolute Nucleated RBC 0.0    Basic metabolic panel     Status: Abnormal   Result Value Ref Range    Sodium 138 133 - 144 mmol/L    Potassium 4.1 3.4 - 5.3 mmol/L    Chloride 104 94 - 109 mmol/L    Carbon Dioxide 30 20 - 32 mmol/L    Anion Gap 4 3 - 14 mmol/L    Glucose 104 (H) 70 - 99 mg/dL    Urea Nitrogen 15 7 - 30 mg/dL    Creatinine 0.81 0.66 - 1.25 mg/dL    GFR Estimate >90 >60 mL/min/[1.73_m2]    GFR Estimate If Black >90 >60 mL/min/[1.73_m2]    Calcium 9.1 8.5 - 10.1 mg/dL   Asymptomatic SARS-CoV-2 COVID-19 Virus (Coronavirus) by PCR     Status: None    Specimen: Nasopharyngeal   Result Value Ref Range    SARS-CoV-2 Virus Specimen Source Nasopharyngeal     SARS-CoV-2 PCR Result NEGATIVE     SARS-CoV-2 PCR Comment       Testing was performed using the [email or web address] SARS-CoV-2 Assay on the Cepheid Gene-Xpert   Instrument Systems. Additional information about this Emergency Use Authorization (EUA)   assay can be found via the Lab Guide.     Creatinine     Status: None   Result Value Ref Range    Creatinine 0.81 0.66 - 1.25 mg/dL    GFR Estimate >90 >60 mL/min/[1.73_m2]    GFR Estimate If Black >90 >60 mL/min/[1.73_m2]   EKG  12-lead, tracing only     Status: None (Preliminary result)   Result Value Ref Range    Interpretation ECG Click View Image link to view waveform and result      Medications   escitalopram (LEXAPRO) tablet 10 mg (10 mg Oral Given 6/17/21 0941)   lisinopril (ZESTRIL) tablet 5 mg (5 mg Oral Given 6/17/21 0941)   levothyroxine (SYNTHROID/LEVOTHROID) tablet 137 mcg (137 mcg Oral Given 6/17/21 0942)   omeprazole (priLOSEC) CR capsule 20 mg (has no administration in time range)   acetaminophen (TYLENOL) tablet 650 mg (has no administration in time range)   lidocaine 1 % 0.1-1 mL (has no administration in time range)   lidocaine (LMX4) cream (has no administration in time range)   sodium chloride (PF) 0.9% PF flush 3 mL (3 mLs Intracatheter Given 6/17/21 2256)   sodium chloride (PF) 0.9% PF flush 3 mL (has no administration in time range)   melatonin tablet 1 mg (has no administration in time range)   dexamethasone (DECADRON) tablet 4 mg (4 mg Oral Given 6/17/21 2249)   enoxaparin ANTICOAGULANT (LOVENOX) injection 40 mg (40 mg Subcutaneous Given 6/17/21 2250)   senna-docusate (SENOKOT-S/PERICOLACE) 8.6-50 MG per tablet 1 tablet (1 tablet Oral Given 6/17/21 2249)     Or   senna-docusate (SENOKOT-S/PERICOLACE) 8.6-50 MG per tablet 2 tablet ( Oral See Alternative 6/17/21 2249)   levETIRAcetam (KEPPRA) tablet 1,000 mg (1,000 mg Oral Given 6/17/21 2136)   gadobutrol (GADAVIST) injection 7.5 mL (7.5 mLs Intravenous Given 6/17/21 1015)   levETIRAcetam (KEPPRA) tablet 500 mg (500 mg Oral Given 6/17/21 1536)        Assessments & Plan (with Medical Decision Making)   Patient with history of glioblastoma presents for evaluation of left-sided weakness, slurred speech.  Patient has similar symptoms at baseline, but acutely worsened tonight.    On arrival, patient has normal vital signs.  He does have some left upper extremity drift, but difficult to say if this is worse than baseline.  Patient feels that this is similar, but wife feels  that it is worsened.  His neuro exam is otherwise non focal.  NIH 1    Differential diagnosis includes progression of glioblastoma lesion, vasogenic genic edema, intercranial hemorrhage, new CVA, seizure, dehydration, electrolyte abnormality    Patient follows closely with the neurosurgery service and they were paged on arrival.  Evaluated the patient and recommended a CT head.  This appears unchanged compared to previous.  They do not feel that the glioblastoma is directly causing the patient's symptoms.  They recommended neurology evaluation to rule out other cause such as TIA/seizure.    Patient was evaluated by the neurology service who recommended MR head and neck with and without contrast.  This study is currently pending.  Will sign out to morning provider with plan to follow-up on MRI results and discuss with neurology for final disposition.    I have reviewed the nursing notes. I have reviewed the findings, diagnosis, plan and need for follow up with the patient.    Current Discharge Medication List          Final diagnoses:   Glioblastoma (H)       --  Maurice Cunningham DO  [facility] EMERGENCY DEPARTMENT  6/17/2021     Maurice Cunningham DO  06/18/21 0030

## 2021-06-17 NOTE — PHARMACY-ADMISSION MEDICATION HISTORY
Admission Medication History Completed by Pharmacy    See [facility] Admission Navigator for allergy information, preferred outpatient pharmacy, prior to admission medications and immunization status.     Medication History Sources:     Dispense report, Care everywhere ([facility]), Patient's wife.     Changes made to PTA medication list (reason):    Added: None    Deleted: Senna-docusate 8.6-50 mg tablet (Note taking per patient)    Changed: Levothyroxine 125 MCG tablet >>> Levothyroxine 137 MCG tablet.    Additional Information:    Talked to patient's wife with minimal help from the patient.     Patient was unsure as to when he took Loratadine. He wasn't sure if it was last last night or two nights ago.     Patient never misses a dose of his medication.     [id number] in [place], [place].     Prior to Admission medications    Medication Sig Last Dose Taking? Auth Provider   acetaminophen (TYLENOL) 325 MG tablet Take 2 tablets (650 mg) by mouth every 4 hours as needed for other (For optimal non-opioid multimodal pain management to improve pain control.) Past Month at Unknown time Yes Kristin Clement APRN CNP   escitalopram (LEXAPRO) 10 MG tablet Take 1 tablet (10 mg) by mouth every morning 6/17/2021 at AM Yes Kristin Clement APRN CNP   fluticasone (FLONASE) 50 MCG/ACT nasal spray Spray 1 spray into both nostrils At Bedtime Past Week at Unknown time Yes Kristin Clement APRN CNP   levETIRAcetam (KEPPRA) 750 MG tablet Take 2 tablets (1,500 mg) by mouth 2 times daily  Yes Cory Davies MD   levothyroxine (SYNTHROID/LEVOTHROID) 137 MCG tablet Take 137 mcg by mouth every morning 6/17/2021 at AM Yes Unknown, Entered By History   lisinopril (ZESTRIL) 5 MG tablet Take 1 tablet (5 mg) by mouth daily 6/17/2021 at AM Yes Kristin Clement APRN CNP   loratadine (CLARITIN) 10 MG tablet Take 1 tablet (10 mg) by mouth At Bedtime Past  Week at PM Yes Kristin Clement APRN CNP   OMEPRAZOLE PO Take by mouth daily 6/16/2021 at PM Yes Reported, Patient       Date completed: 06/17/21    Medication history completed by: Carolyne Kelley, [employer] Student pharmacist.

## 2021-06-17 NOTE — ED TRIAGE NOTES
Pt presents to ED via EMS c/o increased left sided deficits. Pt has hx of glioblastoma with left sided deficits and left sided facial droop. EMS states pt wife states his deficits progressed around 6pm today.

## 2021-06-17 NOTE — CONSULTS
"[facility]       NEUROSURGERY CONSULTATION NOTE    This consultation was requested by Dr. Cunningham from the ED service.    Reason for Consultation: Intermittent exacerbation of left sided weakness    HPI: Mr. 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 He completed chemoradiotherapy on 8/30/2019 and was managed on a clinical trial receiving atezolizumab (anti-PDL1) plus adjuvant temozolomide. He received his last dose of immunotherapy on 3/16/2020 when imaging on 3/28/2020 showed progression of disease. He underwent a repeat craniotomy for tumor resection with Dr. Chan on 4/9/2020 with placement of GammaTiles. Aristeo was managed on lomustine monotherapy. However, imaging in 11/2020 showed a new distant lesion consistent with disease progression. He completed a repeat course of radiation, then a repeat resection on 1/13/2021 plus use of the compassionate use [employer] treatment protocol that utilizes an intratumoral adenovirus injection activating human interleukin-12 + veledimex in combination with adjuvant nivolumab. Pathology was consistent with recurrent glioblastoma. He was receiving nivolumab + bevacizumab until imaging in 3/2021 showed an area of contrast enhancement. Aristeo underwent a biopsy + GÓMEZ on 4/21/2021 to both the frontal and parietal lesions and pathology showed small pieces of recurrent glioblastoma with predominant necrosis.     He most recently underwent repeat Ziopharm virus injection on 5/27.    He presents today due to intermittent exacerbation of his left sided weakness. He started taking avastin on 6/16 and later that afternoon, his wife noticed he had a left sided facial droop, his speech was a bit slurred, and his left side was very weak. This lasted about 20 minutes before he returned to " baseline. She had called the leemail and was told to come into the ED, if it happens again. He woke up wanting to go to the bathroom, and his wife noticed he was dragging his left leg. He also couldn't get up off of the toilet, so she brought him into the ED for assessment.      PAST MEDICAL HISTORY:   Past Medical History:   Diagnosis Date     Allergic rhinitis      Anemia      GERD (gastroesophageal reflux disease)      Glioblastoma (H)      Insomnia      PONV (postoperative nausea and vomiting)      Seizure (H)        PAST SURGICAL HISTORY:   Past Surgical History:   Procedure Laterality Date     APPENDECTOMY  11/1981     COLONOSCOPY       CRANIOTOMY  06/28/2019     HERNIA REPAIR      x2     MRI CRANIOTOMY Right 1/13/2021    Procedure: Image Guided Craniotomy, autoguide, virus injection;  Surgeon: Marquise Chan MD;  Location: [facility] OR     MRI CRANIOTOMY LASER ABLATION Right 4/21/2021    Procedure: INTRAOPERATIVE MAGNETIC RESONANCE IMAGING Monteris LASER ABLATION, WITH OPTICAL TRACKING SYSTEM Biopsy;  Surgeon: Marquise Chan MD;  Location: [facility] OR     MRI CRANIOTOMY WITH OPTICAL TRACKING SYSTEM Right 4/9/2020    Procedure: intraoperative magnetic resonance imaging/stealth assisted right craniotomy, with gammatile placement;  Surgeon: Marquise Chan MD;  Location: [facility] OR     MRI CRANIOTOMY WITH OPTICAL TRACKING SYSTEM Right 5/27/2021    Procedure: INTRAOPERATIVE Magnetic resonance imaging/Stealth Assisted Right  CRANIOTOMY;  Surgeon: Marquise hCan MD;  Location: [facility] OR       FAMILY HISTORY:   Family History   Problem Relation Age of Onset     Hypotension Mother      Myocardial Infarction Father      Pacemaker Father      Endocrine Disease Father         prediabetes     No Known Problems Brother      Anesthesia Reaction No family hx of      Deep Vein Thrombosis (DVT) No family hx of        SOCIAL HISTORY:   Social History     Tobacco Use     Smoking status: Never Smoker      "Smokeless tobacco: Never Used   Substance Use Topics     Alcohol use: Yes     Frequency: 2-4 times a month     Drinks per session: 3 or 4     Comment: social        MEDICATIONS:  (Not in a hospital admission)      Allergies:  Allergies   Allergen Reactions     No Clinical Screening - See Comments Rash     Cats and dogs         ROS: 10 point ROS were all negative except for pertinent positives noted in my HPI.    Physical exam:   Blood pressure (!) 140/94, pulse 81, temperature 97.8  F (36.6  C), temperature source Oral, resp. rate 18, height 1.778 m (5' 10\"), weight 74.8 kg (165 lb), SpO2 100 %.  CV: HR and BP as noted above  PULM: breathing comfortably  ABD: soft, non-distended  NEUROLOGIC:  -- Awake; Alert; oriented x 3  -- Follows commands briskly  -- Speech fluent, spontaneous. No aphasia or dysarthria.  -- No apparent hemineglect.  Cranial Nerves:  -- PERRL 3-2mm bilat and brisk, extraocular movements intact  -- face symmetrical, tongue midline  -- palate elevates symmetrically, uvula midline  -- hearing grossly intact bilat  -- Trapezii 5/5 strength bilat symmetri    Motor:  Normal bulk / tone; no tremor, rigidity, or bradykinesia.  No muscle wasting or fasciculations  4+/5 throughout left side; 5/5 on right hemibody    Sensory:  intact to LT x 4 extremities       LABS:  Recent Labs   Lab 06/14/21  0746      POTASSIUM 4.1   CHLORIDE 104   CO2 26   ANIONGAP 9   *   BUN 14   CR 0.78   JUDIE 8.9       No lab results found in last 7 days.      IMAGING:  Recent Results (from the past 48 hour(s))   Head CT w/o contrast    Narrative    EXAM: CT HEAD W/O CONTRAST  LOCATION: [facility]  DATE/TIME: 6/17/2021 2:48 AM    INDICATION: History of glioblastoma with worsening left upper extremity weakness with facial droop and slurred speech.  COMPARISON: 06/01/2021.  TECHNIQUE: Routine CT Head without IV contrast. Multiplanar reformats. Dose reduction techniques were used.    FINDINGS:  INTRACRANIAL " CONTENTS: Redemonstration of postsurgical changes of right frontal parietal craniotomy with mass resection and GammaTile placement. Low-attenuation changes in the right periatrial region extending more medially over the parasagittal   convexity appear grossly unchanged. Focal area of low-attenuation change subjacent to the anterior margin of the craniotomy is stable. No acute intracranial hemorrhage identified. No CT evidence of recent transcortical infarct. No hydrocephalus.     VISUALIZED ORBITS/SINUSES/MASTOIDS: No intraorbital abnormality. No paranasal sinus mucosal disease. No middle ear or mastoid effusion.    BONES/SOFT TISSUES: Right frontoparietal craniotomy.      Impression    IMPRESSION:  1.  No significant change compared with 06/01/2021.   MR Brain for Stroke Cmpl w/o & w Contras    Narrative    MRI brain without and with contrast  MRA of the head without contrast  Neck MRA without and with contrast    Provided History:  L sided weakness, hx glioblastoma.  ICD-10:    Comparison:  MRs dated 5/27/2021, 4/21/2021 and 4/5/2021      Technique:   Brain MRI:  Axial diffusion, FLAIR, T2-weighted, susceptibility, and  coronal T1-weighted images were obtained without intravenous contrast.  Following intravenous gadolinium-based contrast administration, axial  and coronal T1-weighted images were obtained.    Head MRA: 3D time-of-flight MRA of the [place] [facility] [place] was performed  without intravenous contrast.  Neck MRA:  Limited non contrast 2DTOF images were obtained of the  mid-cervical region. Following intravenous gadolinium-based contrast  administration, a contrast enhanced MRA of the neck/cervical vessels  was performed.  Three-dimensional reconstructions of the neck and head MRA were  created, which were reviewed by the radiologist.    Dose: 7.5 mL IV Gadavist.     Findings:   Brain MRI: Postsurgical changes of right hemicolectomy craniotomy with  underlying mass resection. There is susceptibility artifact  within the  right temporoparietal resection cavity likely representing  hemosiderin. Enhancement is seen within the right frontal lobe,  surrounding the right temporoparietal resection cavity in the superior  right parietal lobe. There is mild diffusion restriction surrounding  the right temporoparietal resection cavity which is not significantly  changed from 4/5/2021. There is mild diffusion restriction surrounding  the superior right parietal enhancing mass which is not significantly  changed. There is new enhancing T2 hyperintensity in the right lateral  midbrain extending into the right cerebral peduncle and into the migdalia  which does not demonstrate significant diffusion restriction.  Increased T2 hyperintensity within the right frontal lobe without  associated diffusion restriction or enhancement.    Head MRA demonstrates no definite aneurysm or stenosis of the major  intracranial arteries. The anterior communicating artery is patent.  Fetal origin of the right posterior cerebral artery. The left  posterior communicating artery is patent..    Neck MRA demonstrates patent major cervical arteries. The normal  distal right internal carotid artery measures 5 mm. The normal distal  left internal carotid artery measures 5 mm. Antegrade flow in the  major cervical vasculature.      Impression    Impression:  1. No evidence for acute infarction.  2. New enhancing lesions in the right lateral midbrain, right cerebral  peduncle and migdalia probably representing new tumors.  3. Increased size of the enhancing lesions in the right frontal lobe  and superior right parietal lobe, concerning for progression of  malignancy.  4. Diffusion restricting mass within the right parietal lobe likely  represents residual tumor.  5. Mild diffusion restriction surrounding the right temporoparietal  resection cavity may represent postoperative changes versus residual  tumor.  6. Head MRA demonstrates no definite aneurysm or stenosis of the  major  intracranial arteries.  7. Neck MRA demonstrates patent major cervical arteries.    I have personally reviewed the examination and initial interpretation  and I agree with the findings.    STEPHEN MIJARES MD       ASSESSMENT:  56 year old male with intermittent exacerbation in left sided weakness after starting avastin.    In addition to the assessment of diagnoses detailed above, this 56 year old male  patient admitted from the Emergency Department has the following conditions contributing to the complexity of their medical care:    Clinically pertinent cerebral edema which was evident on the CT/MRI and was treated with decadron (corticosteroids), Brain cancer and Brain compression which was evident on the CT/MRI.,    RECOMMENDATIONS:  No neurosurgical intervention indicated at this time   Neurology evaluation for assessment of TIA vs seizure    The patient was discussed with Dr. Leschke, neurosurgery chief resident, and Dr. Chan, neurosurgery staff, and they agree with the above.    Arlyn Ahumada MD  Neurosurgery Resident, PGY-2    Please contact neurosurgery resident on call with questions.    Dial * * [phone], enter [phone] when prompted.

## 2021-06-17 NOTE — ED NOTES
Pt's wife went over home medication list with RN, per pt's most recent discharge AVS and wife he is taking 137 mcg of levothyroxine. Updated MD, pharmacy, and PTA med list.

## 2021-06-17 NOTE — ED NOTES
Bed: ED11  Expected date:   Expected time:   Means of arrival:   Comments:  H412  56M L sided deficits worsened, hx brain cancer

## 2021-06-17 NOTE — DISCHARGE INSTRUCTIONS
Increase your Keppra to 1500 mg twice daily.    Please follow up with your physicians as directed.

## 2021-06-18 NOTE — ED NOTES
Upon report of pt RN at bedside for transport vitals pt not in ED room. RN to search ED for pt. PT found in ED room 12. Pt states he got the rooms confused. Pt denies falling. Pt resting in ED stretched quietly. Pt ambulated back to ED 11 with RN assist. Pt transport at bedside to assist to floor at this time. Pt A&Ox 3.

## 2021-06-18 NOTE — CONSULTS
Care Management Initial Consult    General Information  Assessment completed with: Spouse, Judith via phone  Type of CM/SW Visit: Initial Assessment  Primary Care Provider verified and updated as needed: Yes   Readmission within the last 30 days: current reason for admission unrelated to previous admission    Reason for Consult: discharge planning  Advance Care Planning: Advance Care Planning Reviewed: present on chart, verified with patient          Communication Assessment  Patient's communication style: spoken language (English or Bilingual)    Hearing Difficulty or Deaf: no   Wear Glasses or Blind: no    Cognitive  Cognitive/Neuro/Behavioral: .WDL except  Level of Consciousness: lethargic  Arousal Level: opens eyes spontaneously  Orientation: disoriented to, time  Mood/Behavior: calm  Best Language: 0 - No aphasia  Speech: clear, spontaneous    Living Environment:   People in home: spouse     Current living Arrangements: house      Able to return to prior arrangements:  TBD - pending therapy evals        Family/Social Support:  Care provided by: self, spouse/significant other  Provides care for: no one  Marital Status:   Wife, Children (Lana ([facility]) and Abdelrahman ([employer])), Parent(s)          Description of Support System: Supportive, Involved         Current Resources:   Patient receiving home care services: Yes  Skilled Home Care Services: Skilled Nursing, Physicial Therapy, Speech Therapy, Occupational Therapy - per Judith, these services were set up with [facility] after discharge from  [facility]. Per Judith, the evals were completed however no sessions began as pt is now rehospitalized.   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 Pt is not working at this time.         Financial Concerns: No concerns identified           Lifestyle & Psychosocial  Needs:        Socioeconomic History     Marital status:      Spouse name: Not on file     Number of children: 2     Years of education: Not on file     Highest education level: Not on file   Occupational History     Occupation: self-employed     Tobacco Use     Smoking status: Never Smoker     Smokeless tobacco: Never Used   Substance and Sexual Activity     Alcohol use: Yes     Frequency: 2-4 times a month     Drinks per session: 3 or 4     Comment: social      Drug use: Not Currently     Sexual activity: Not Currently     Partners: Female       Functional Status:  Prior to admission patient needed assistance: Per Judith, pt requires 24/7 assist (more so for safety reasons than needing the actual physical assist.)  Pt and Judith have hired a  every 3 weeks.          Mental Health Status:  Mental Health Status: No Current Concerns       Chemical Dependency Status:  Chemical Dependency Status: No Current Concerns             Values/Beliefs:  Spiritual, Cultural Beliefs, Holiness Practices, Values that affect care:  Pt identifies as Mormon.               Additional Information:  NEPTALI received phone call from pt's spouse Judith, requesting to speak about discharge planning. Judith reports she is flying out to [place] for the weekend to visit with her parents and pt's parents (father Sharif [phone]) were planning on coming to stay with pt. Per Judith, pt's parents will still be around this weekend and will be his visitor at [facility].   Judith briefly expressed caregiving concerns and stated she thinks she needs to hire some in home help, even if it is only a few days a week or in the mornings, as she feels like she cannot get her work done (Judith owns 3 businesses) and assist pt. NEPTALI validated concerns and provided supportive listening. NEPTALI will provide Judith with list of private pay home care services when she arrives to [facility].  Judith stated she will be available by phone while in [place]. NEPTALI  "informed Judith that PT/OT will be evaluating pt for discharge recommendations.     Addend 1130: NEPTALI met with Judith at bedside. NEPTALI provided Judith with list of private pay home care agencies. Judith appreciative. Judith stated that the medical team discussed palliative care with her, however \"she is not ready for that.\" Judith tearful throughout conversation, supportive listening provided.     SABINO Faith, LGSW  6D Adult Acute Care   Ph:[phone]  Pager [phone]          "

## 2021-06-18 NOTE — PLAN OF CARE
Status: Patient admitted on 6/17 with left sided weakness and left facial droop, MRI revealed new enhancing lesions in the right lateral midbrain, right cerebral  peduncle and migdalia probably representing new tumors, increased size of the enhancing lesions in the right frontal lobe  and superior right parietal lobe, concerning for progression of GBM malignancy  Vitals: VSS  Neuros: Disoriented to time, slight L droop, L cut, L neglect, L pronator drift, L side 3-4/5, LUE ataxic, lethargic this AM, improved throughout day, lethargy returned this afternoon MD notified  IV: PIV saline locked  Labs/Electrolytes: WNL  Resp/trach: Lung sounds clear throughout  Diet: Regular  Bowel status: No BM today, BS+, passing flatus, Senna given  : Voiding spontaneously  Skin: R head incision approximated  Pain: Denies  Activity: Up with A1, gaitbelt, A2 when lethargic  Social: Cooperative with cares, Wife Judith at bedside this AM, please contact with any changes, mother and father designated visitors for weekend as wife is out of town, bed alarm on at all times for safety  Plan: Continue with VEEG monitoring, PO Decadron, OT evaluation tomorrow, and follow POC

## 2021-06-18 NOTE — PLAN OF CARE
Arrived from:  ED  Belongings/meds:  Clothing at bedside. No other belongings, per pt everything else went home with wife  2 RN Skin Assessment Completed by:  Rishi, RN & Helena RN  Non-intact findings documented (yes/no/NA): R head incision, rash to R groin      Status: Admitted from the ED following L sided weakness and facial droop at home. Hx of R parietal glioblastoma  Vitals: VSS on RA  Neuros: L extremities 3/5, R extremities 5/5. Intermittently d/o to day. L droop, L drift.  IV: PIV SL  Diet: Regular  Bowel status: BS+ per pt LBM 6/15  : voids spontaneously. UA sent to lab  Skin: R head incision BECKI, liquid bandage and approximated  Pain: denies  Activity: A1 with GB  Plan: Continue to monitor and follow POC

## 2021-06-18 NOTE — PROGRESS NOTES
[facility]   Patient is currently open to home care services with [facility]. The patient is currently receiving RN OT ST services. [facility]  and team have been notified of patient admission. [facility] liaison will continue to follow patient during stay. If appropriate provide orders to resume home care at time of discharge.    Katelin Leigh RN BSN  [facility] Liaison  [phone]

## 2021-06-18 NOTE — PROGRESS NOTES
EEG CLINICAL NEUROPHYSIOLOGY PRELIMINARY REPORT    First 90 minutes of video-EEG reviewed. Nine to ten hz posterior dominant rhythm onleft. Moderate amounts of irregular 3 to 4 hz delta on left. Posterior dominant rhythm not present on right. 1 to 1.5 hz delta predominates over right hemisphere. No epileptiform discharges or seizures.    Study consistent with moderate diffuse encephalopathy. There is evidence of additional significant right hemispheric focal cerebral dysfunction over right hemisphere. This is consistent with known structural abnormalities in this area. Thus far no epileptiform discharges or electrographic seizures. Will continue video-EEG monitoring. Full report to follow.    Dirk Boswell MD  Contact information for physicians covering Epilepsy and EEG can be found on [facility] under Neurology Adult/[facility]/Staff Epilepsy and EEG.

## 2021-06-18 NOTE — PROGRESS NOTES
[facility]  Neurosurgery Progress Note    Subjective:  No acute events overnight    Objective:   Temp:  [97.5  F (36.4  C)-98.9  F (37.2  C)] 97.5  F (36.4  C)  Pulse:  [71-96] 71  Resp:  [14-16] 16  BP: (111-154)/(76-98) 111/76  SpO2:  [95 %-100 %] 97 %  No intake/output data recorded.    Gen: Appears comfortable, NAD  Neurologic:  - Alert & Oriented to person, place, time, and situation  - Follows commands briskly  - Speech fluent, spontaneous. No aphasia or dysarthria.  - No gaze preference. No apparent hemineglect.  - PERRL, EOMI  - Strong eye closure, jaw clench, and cheek puff  - Face symmetric with sensation intact to light touch  - Palate elevates symmetrically, uvula midline, tongue protrudes midline  - Trapezii and sternocleidomastoid muscles 5/5 bilaterally  - LUE 3/5, otherwise full strength    Reflexes 2+ throughout    Sensation intact and symmetric to light touch throughout    LABS  Recent Labs   Lab Test 06/18/21  0802 06/17/21  0245 06/09/21  0545   WBC 4.6 5.2 4.9   HGB 13.3 12.2* 12.6*   MCV 93 94 93    164 253       Recent Labs   Lab Test 06/18/21  0802 06/17/21  2207 06/17/21  0245 06/14/21  0746     --  138 139   POTASSIUM 4.4  --  4.1 4.1   CHLORIDE 102  --  104 104   CO2 26  --  30 26   BUN 11  --  15 14   CR 0.84 0.81 0.81 0.78   ANIONGAP 6  --  4 9   JUDIE 9.4  --  9.1 8.9   *  --  104* 114*       IMAGING  Brain MRI 6/17/21  Impression:  1. No evidence for acute infarction.  2. New enhancing lesions in the right lateral midbrain, right cerebral  peduncle and migdalia probably representing new tumors.  3. Increased size of the enhancing lesions in the right frontal lobe and superior right parietal lobe, concerning for progression of malignancy.  4. Diffusion restricting mass within the right parietal lobe likely represents residual tumor.  5. Mild diffusion restriction surrounding the right temporoparietal resection cavity may represent postoperative  changes versus residual tumor.  6. Head MRA demonstrates no definite aneurysm or stenosis of the major intracranial arteries.  7. Neck MRA demonstrates patent major cervical arteries.      Assessment:  Erasto Maher is a 56 year old male with hx of GBM, s/p RCT, immunotherapy, brachytherapy and resection, most recently craniotomy for virustherapy administration 5/27/21 was admitted for intermittently left-sided weakness and facial droop; he has been clinically stable.    Plan:  - Follow with Dr. Avila for Avastin infusion next week  - Keppra to 1500 mg BID  - Remainder of cares per Neurology team      Ander Jenkins MD, PhD  Neurosurgery Resident PGY-2    Please contact neurosurgery resident on call with questions.    Dial * * [phone], enter [phone] when prompted.

## 2021-06-18 NOTE — PROGRESS NOTES
"[facility]  General Neurology - Progress Note    Patient Name:  Erasto Maher  Date of Service:  June 18, 2021    Subjective:    No acute events overnight. Patient has no new complaints/concerns.     Objective:    Vitals: /76 (BP Location: Right arm)   Pulse 71   Temp 97.5  F (36.4  C) (Axillary)   Resp 16   Ht 1.778 m (5' 10\")   Wt 74.8 kg (165 lb)   SpO2 97%   BMI 23.68 kg/m    General: Lying in bed, NAD  Head: Atraumatic, normocephalic   Cardiac: no lower extremity edema  Neurologic:  Mental Status: Somnolent, had a difficult time staying awake for the exam. Oriented to person, place and time. Speech slurred and halting.  Cranial Nerves: EOM intact, without nystagmus. Left sided facial droop in the lower face. Looks better than yesterday.    Motor: No abnormal movements.  Moves right side antigravity, does not move left side antigravity. 4/5 strength on right upper and lower extremities. 0/5 strength in left hemibody (unclear whether volitional or fluctuating exam).  Sensory: Extinction of left hemibody.   Station/Gait: Unable to assess gait.      Pertinent Investigations:    I have personally reviewed most recent and pertinent labs, tests, and radiological images.     Recent Labs   Lab Test 06/18/21  0802 06/17/21  2207 06/17/21  0245     --  138   POTASSIUM 4.4  --  4.1   CHLORIDE 102  --  104   CO2 26  --  30   ANIONGAP 6  --  4   *  --  104*   BUN 11  --  15   CR 0.84 0.81 0.81   JUDIE 9.4  --  9.1     CBC RESULTS:   Recent Labs   Lab Test 06/18/21  0802   WBC 4.6   RBC 4.21*   HGB 13.3   HCT 39.0*   MCV 93   MCH 31.6   MCHC 34.1   RDW 12.9          Assessment:  Mr. Maher is a 56 year man with Hx of glioblastoma multiforme (IDH1 R132H wildtype, MGMT promoter unmethylated) s/p chemotherapy, immunotherapy, radiotherapy and resection with tumor progression who presented to ED for evaluation of acute worsening of baseline left sided " weakness and facial droop following Zirabev infusion. MR brain with contrast completed on admission shows progression of several known lesions, but and a likely new lesion in the right midbrain/migdalia that accounts for the worsening in left hemibody weakness. Per discussion with neurosurgery, there is no clear indication for repeat resection at this time; we will trial high dose steroids and see if he has any clinical improvement.     Plan:  #Glioblastoma multiforme s/p resection, chemotherapy, radiation   #Seizure disorder secondary to above   #Vasogenic brain edema  IDH1 R132H wildtype, MGMT promoter unmethylated).   - Overnight EEG  - Continue trial of steroids  - Continue Omeprazole while on steroids   - Continue Keppra 1000mg BID  - Appreciate neurosurgery recommendations   - PT/OT consultation     #Hypothyroidism  - Resume PTA levothyroxine      #HTN  - Resume PTA lisinopril      #Depression  - Resume PTA Lexapro      FEN: Full diet  PPx: PIV  Code: Full code  Dispo: Medically stable 2-3 days.     Patient was seen and discussed with Dr. Power.    Dahiana Knapp, MS4  [employer]     I was present with the medical student who participated in the service and in the documentation of the note. I have verified the history and personally performed the physical exam and medical decision making. I agree with the assessment and plan of care as documented in the note    Sean Shepherd MD  Neurology Resident, PGY-2

## 2021-06-18 NOTE — UTILIZATION REVIEW
"Admission Status; Secondary Review Determination     Admission Date: 6/17/2021  2:23 AM       Under the authority of the Utilization Management Committee, the utilization review process indicated a secondary review on the above patient.  The review outcome is based on review of the medical records, discussions with staff, and applying clinical experience noted on the date of the review.        (x)      Inpatient Status Appropriate - This patient's medical care is consistent with medical management for inpatient care and reasonable inpatient medical practice.      () Observation Status Appropriate - This patient does not meet hospital inpatient criteria and is placed in observation status. If this patient's primary payer is Medicare and was admitted as an inpatient, Condition Code 44 should be used and patient status changed to \"observation\".   () Admission Status NOT Appropriate - This patient's medical care is not consistent with medical management for Inpatient or Observation Status.        () Outpatient Procedure Status Appropriate - Procedure not on Medicare Inpatient list and no complications at the time of this review       RATIONALE FOR DETERMINATION      Brief clinical presentation, information copied from the chart, abbreviated and edited for relevant content:       Mr. Maher is a 56 year man with Hx of glioblastoma multiforme (IDH1 R132H wildtype, MGMT promoter unmethylated) s/p chemotherapy, immunotherapy, radiotherapy and resection with tumor progression who presented to ED for evaluation of acute worsening of baseline left sided weakness and facial droop following Zirabev infusion. Patient himself does not appreciate any new changes from baseline, but his wife, Judith, does feel he is worse today compared to a few days ago. MR with contrast done on 6/17 in ED shows progression of several known lesions, but and a likely new lesion in the right midbrain/migdalia. This new lesion could explain the slight " worsening in left hemibody weakness. Low suspicion for seizures given patient's mental status has remained consistent across all providers who saw him today and no seizure-like activity has been noticed. Grossly, he does not have much more vasogenic edema on his MRI compared to MRI from April. Does have significant tumor burden. Per discussion with neurosurgery, there is no clear indication for repeat resection at this time; we will trial high dose steroids and see if he has any clinical improvement. Plan for Trial of steroids: dexamethasone 4mg BID. Continue Keppra 1000mg BID.  Concerned for seizure planning for 24 hour EEG. Not doing well at home with with plan for PT/OT consultation.    Paged the team to change to OBS. Not meeting IP criteria.           In summary, the severity of illness, intensity of service provided, expected length of stay and risk for adverse outcome make the care complex, high risk and appropriate for hospital admission.        The information on this document is developed by the utilization review team in order for the business office to ensure compliance.  This only denotes the appropriateness of proper admission status and does not reflect the quality of care rendered.         The definitions of Inpatient Status and Observation Status used in making the determination above are those provided in the CMS Coverage Manual, Chapter 1 and Chapter 6, section 70.4.      Sincerely,      Roseline Lacy MD   Utilization Review/ Case Management  [facility].

## 2021-06-18 NOTE — PROGRESS NOTES
Brief Neurosurgery Note:    Gen: Appears comfortable, NAD  Neurologic:  - Alert & Oriented to person, place, time, and situation  - Follows commands briskly  - Speech fluent, spontaneous. No aphasia or dysarthria.  - No gaze preference. No apparent hemineglect.  - PERRL, EOMI  - Strong eye closure, jaw clench, and cheek puff  - Face symmetric with sensation intact to light touch  - Palate elevates symmetrically, uvula midline, tongue protrudes midline  - Trapezii and sternocleidomastoid muscles 5/5 bilaterally  - LUE 3/5, otherwise full strength     Reflexes 2+ throughout     Sensation intact and symmetric to light touch throughout      Assessment:  Erasto Maher is a 56 year old male with hx of GBM, s/p RCT, immunotherapy, brachytherapy and resection, most recently craniotomy for virustherapy administration 5/27/21 was admitted for intermittently left-sided weakness and facial droop; he has been clinically stable.     Plan:    - Follow with Dr. Avila for Avastin infusion next week  - Keppra to 1500 mg BID  - Remainder of cares per Neurology team  - Neurosurgery will follow peripherally     Arcadio Knott  Resident     Please contact neurosurgery resident on call with questions.    Dial * * [phone], enter [phone] when prompted.

## 2021-06-18 NOTE — PROGRESS NOTES
06/18/21 1512   Quick Adds   Type of Visit Initial PT Evaluation   Living Environment   People in home spouse   Current Living Arrangements house   Home Accessibility stairs to enter home;stairs within home   Number of Stairs, Main Entrance 3   Stair Railings, Main Entrance railing on right side (ascending)   Number of Stairs, Within Home, Primary other (see comments)  (4 to landing then 8 more)   Stair Railings, Within Home, Primary railing on right side (ascending)   Transportation Anticipated family or friend will provide   Living Environment Comments PT: Pt lives with wife. Wife works from home but runs three [employer]. Post discharge from [facility] 6/9 wife reports was helping pt w/ arm/ HHA with ambulation and stairs. Reports was managable level of A but also considering hiring some in home help so that she has more time to work during day.    Self-Care   Usual Activity Tolerance good   Current Activity Tolerance moderate   Regular Exercise Yes   Activity/Exercise Type other (see comments)  (recently at ARU)   Exercise Amount/Frequency daily   Equipment Currently Used at Home   (Gait belt)   Activity/Exercise/Self-Care Comment PT: Prior to surgery in April pt was very active, has goal right now to eventually return to golfing. Recently at [facility], discharged 6/9 with 24/7 assist from wife. Was issued GB, pt does not like using much. Wife has been trained to assist pt with mobility in context of L inattention deficits.    Disability/Function   Hearing Difficulty or Deaf no   Patient's preferred means of communication verbal   Use of hearing assistive devices none   Were auxiliary aids offered? no   Wear Glasses or Blind no   Concentrating, Remembering or Making Decisions Difficulty yes   Concentration Management mild cog deficit post April crani   Difficulty Communicating no   Communication Management slow communication at times   Difficulty Eating/Swallowing no   Walking or Climbing Stairs Difficulty yes   Walking  or Climbing Stairs ambulation difficulty, assistance 1 person;stair climbing difficulty, assistance 1 person   Mobility Management wife assists mobility   Dressing/Bathing Difficulty yes   Dressing/Bathing bathing difficulty, assistance 1 person;dressing difficulty, assistance 1 person   Toileting issues yes   Toileting Assistance toileting difficulty, assistance 1 person   Doing Errands Independently Difficulty (such as shopping) no   Fall history within last six months no   Change in Functional Status Since Onset of Current Illness/Injury yes   General Information   Onset of Illness/Injury or Date of Surgery 06/17/21   Referring Physician Angelo Power MD   Patient/Family Therapy Goals Statement (PT) to reuturn home, return to golfing   Pertinent History of Current Problem (include personal factors and/or comorbidities that impact the POC) Erasto Maher is a 56 year old male with hx of GBM, s/p RCT, immunotherapy, brachytherapy and resection, most recently craniotomy for virustherapy administration 5/27/21 was admitted for intermittently left-sided weakness and facial droop   Existing Precautions/Restrictions fall;other (see comments)  (crani)   Heart Disease Risk Factors Stress;Gender;Age;Medical history;Lack of physical activity   General Observations PT: Activity orders: Up w/ assist. Wife present and supportive, supplementing much of recent subjective information.    Cognition   Orientation Status (Cognition) oriented x 4   Affect/Mental Status (Cognition) low arousal/lethargic   Follows Commands (Cognition) follows one-step commands;delayed response/completion;increased processing time needed;initiation impaired;physical/tactile prompts required;repetition of directions required;50-74% accuracy;verbal cues/prompting required   Safety Deficit (Cognition) moderate deficit;ability to follow commands;insight into deficits/self-awareness;safety precautions follow-through/compliance;judgment    Memory Deficit (Cognition) minimal deficit   Cognitive Status Comments PT: slowed processing, variable cognition last couple days per spouse   Pain Assessment   Patient Currently in Pain No   Integumentary/Edema   Integumentary/Edema no deficits were identifed   Integumentary/Edema Comments video EEG electrodes over skull   Posture    Posture Forward head position;Protracted shoulders   Range of Motion (ROM)   ROM Comment BLE WFL   Strength   Strength Comments >3/5 for functional mobility, LLE weakness, functionally impacted also by inattention on that side. good  strength savana.    Bed Mobility   Comment (Bed Mobility) supine<>sit SBA   Transfers   Transfer Safety Comments sit<>stand CGA   Gait/Stairs (Locomotion)   [place] Level (Gait) minimum assist (75% patient effort);contact guard;1 person to manage equipment   Assistive Device (Gait) other (see comments)   Distance in Feet (Required for LE Total Joints) 225' x2   Deviations/Abnormal Patterns (Gait) right sided deviations;gait speed decreased;scissoring;other (see comments)  (L inattention, swing through L side impaired ~4 times)   Comment (Gait/Stairs) PT: Ambulates w/ CGA, Moise x2 for R lateral LOB, able to demo self correction x1. Needs verbal cueing in L sided attention, avoiding obstacles and navigation. Wife providing CGA for return bout.    Balance   Balance Comments good in static standing fair dynamic standing tasks, ambulation   Sensory Examination   Sensory Perception patient reports no sensory changes   Coordination   Coordination Comments impaired LUE>LLE. dysmetria/ bradykinesia and dysdiadokokinesia with testing   Muscle Tone   Muscle Tone no deficits were identified   Clinical Impression   Criteria for Skilled Therapeutic Intervention yes, treatment indicated   PT Diagnosis (PT) impaired functional mobility   Influenced by the following impairments medical status, incoordination, L inattention, decreased fucntional balance   Functional  limitations due to impairments decreased (I) w/ transfers, gait, home access, IND w/ ADLs   Clinical Presentation Evolving/Changing   Clinical Presentation Rationale clinical judgement, PMH, home support and set up, medical status   Clinical Decision Making (Complexity) moderate complexity   Therapy Frequency (PT) 6x/week   Predicted Duration of Therapy Intervention (days/wks) 5 days   Planned Therapy Interventions (PT) balance training;bed mobility training;gait training;home exercise program;neuromuscular re-education;patient/family education;strengthening;stretching;transfer training   Anticipated Equipment Needs at Discharge (PT)   (none)   Risk & Benefits of therapy have been explained evaluation/treatment results reviewed;care plan/treatment goals reviewed;risks/benefits reviewed;current/potential barriers reviewed;participants voiced agreement with care plan;participants included;patient;spouse/significant other   Clinical Impression Comments PT: Pt w/ impaired functional mobility; presents with incoordination, impaired functional balance and L sided inattention. Functional mobility impacted by medical status. Will benefit from skilled PT to progress functional mobility towards safe discharge.    PT Discharge Planning    PT Discharge Recommendation (DC Rec) home with home care physical therapy;home with assist   PT Rationale for DC Rec Pt w/ recent ARU stay in which spouse trained to provide 24/7 assist; demonstrates competency with this today. Anticipate with medical improvement to progress over ELOS to home discharge w/ resumption of HH PT. At this time wife reports pt below functional status discharged from ARU. May require TCU stay prior to discharge pending progress over next couple days/ continus variability in functional status.    PT Brief overview of current status  CGA-Moise w/ GB for ambulation. Cues for L sided awareness   Total Evaluation Time   Total Evaluation Time (Minutes) 12   Skin WDL   Skin  WDL WDL   Skin Temperature other (see comments)   Skin Moisture dry,flaky

## 2021-06-19 NOTE — PROGRESS NOTES
"   06/19/21 1121   Quick Adds   Type of Visit Initial Occupational Therapy Evaluation   Living Environment   People in home spouse   Current Living Arrangements house   Home Accessibility stairs to enter home;stairs within home   Number of Stairs, Main Entrance 3   Living Environment Comments Pt reports living in a house with his wife at baseline. Pt with recent ARU stay. Pt reports not needing as much assist as wife feels is appropriate.   Self-Care   Usual Activity Tolerance good   Current Activity Tolerance moderate   Activity/Exercise/Self-Care Comment Pt reports being active at baseline, and reports his golf game has \"slipped a little bit\" recently. Was at ARU recently and discharged to home. Pt reports ambulating around house without assist.   Disability/Function   Hearing Difficulty or Deaf no   Wear Glasses or Blind no   Concentrating, Remembering or Making Decisions Difficulty yes   Difficulty Communicating no   Difficulty Eating/Swallowing no   Walking or Climbing Stairs Difficulty yes   Dressing/Bathing Difficulty yes   Dressing/Bathing bathing difficulty, requires equipment   Doing Errands Independently Difficulty (such as shopping) yes   Change in Functional Status Since Onset of Current Illness/Injury yes   General Information   Onset of Illness/Injury or Date of Surgery 06/17/21   Referring Physician Sean Shepherd MD   Patient/Family Therapy Goal Statement (OT) return home   Additional Occupational Profile Info/Pertinent History of Current Problem Erasto Maher is a 56 year old male with hx of GBM, s/p RCT, immunotherapy, brachytherapy and resection, most recently craniotomy for virustherapy administration 5/27/21 was admitted for intermittently left-sided weakness and facial droop; he has been clinically stable.   Existing Precautions/Restrictions fall   General Observations and Info Pt sitting in bedside chair upon arrival, agreeable to therapy.   Cognitive Status Examination " "  Orientation Status orientation to person, place and time   Affect/Mental Status (Cognitive) WFL   Follows Commands follows one-step commands;75-90% accuracy;increased processing time needed   Safety Deficit insight into deficits/self-awareness;minimal deficit;impulsivity   Cognitive Status Comments Would benefit from further cognitive screening.   Visual Perception   Impact of Vision Impairment on Function (Vision) L side inattention   Sensory   Sensory Quick Adds No deficits were identified   Pain Assessment   Patient Currently in Pain No   Range of Motion Comprehensive   General Range of Motion no range of motion deficits identified   Instrumental Activities of Daily Living (IADL)   IADL Comments pt lives with wife who assists/manages IADL as needed   Clinical Impression   Criteria for Skilled Therapeutic Interventions Met (OT) yes   OT Diagnosis OT order for \"Evaluate candidacy for ARU/TCU. Has glioblastoma with slight worsening of weakness on left hemibody.\"   OT Problem List-Impairments impacting ADL problems related to;activity tolerance impaired;cognition;mobility;vision   Assessment of Occupational Performance 1-3 Performance Deficits   Identified Performance Deficits home mgmt, bathing, toileting   Planned Therapy Interventions (OT) ADL retraining;IADL retraining;cognition;transfer training;visual perception;risk factor education   Clinical Decision Making Complexity (OT) low complexity   Therapy Frequency (OT) 6x/week   Predicted Duration of Therapy 1 week   Anticipated Equipment Needs Upon Discharge (OT)   (TBD with further therapy)   Risk & Benefits of therapy have been explained evaluation/treatment results reviewed;patient   OT Discharge Planning    OT Discharge Recommendation (DC Rec) home with home care occupational therapy  (24 hour supervision)   OT Rationale for DC Rec Would currently recommend 24 hour supervision and home therapy. If unable to provide level of assist needed at home, pt would " benefit from rehab stay prior to return home for increased safety with ADL/mobility due to concerns with L inattention and cognition.   OT Brief overview of current status  Pt scores 20/30 on MOCA version 8.3 indication cognitive impairment (26 and above is considered normal).   Total Evaluation Time (Minutes)   Total Evaluation Time (Minutes) 5

## 2021-06-19 NOTE — PLAN OF CARE
Status: Patient admitted on 6/17 with left sided weakness and left facial droop, MRI revealed new enhancing lesions in the right lateral midbrain, right cerebral  peduncle and migdalia probably representing new tumors, increased size of the enhancing lesions in the right frontal lobe  and superior right parietal lobe, concerning for progression of GBM malignancy  Vitals: VSS  Neuros: Oriented x4, alert today, slight L droop, L cut, L neglect, L pronator drift, L side 3-4/5, LUE ataxic  IV: PIV saline locked  Labs/Electrolytes: WNL  Resp/trach: Lung sounds clear throughout  Diet: Regular  Bowel status: Two BM's today  : Voiding spontaneously  Skin: R head incision approximated  Pain: Denies  Activity: Up with A1, gaitbelt, A2 when lethargic, up in chair most of day  Social: Cooperative with cares, Wife Judith  please contact with any changes, mother and father designated visitors for weekend as wife is out of town, bed alarm on at all times for safety  Plan: Continue with VEEG monitoring, PO Decadron, possible discharge to home with 24 hour assist Monday, continue to monitor and follow POC

## 2021-06-19 NOTE — PROGRESS NOTES
EEG CLINICAL NEUROPHYSIOLOGY PRELIMINARY REPORT    Video-EEG through 4 AM today reviewed. Study continues Abnormal.    1) there is evidence of moderate diffuse encephalopathy at study onset.  This improved somewhat as study continues.  2) near continuous right hemispheric polymorphic delta activity is seen indicating focal cortical dysfunction in this area.  This is consistent with known structural abnormality in this region.  3) a run of lateralized periodic discharges is seen at around 3:30 PM on June 16.  These findings indicate that the known right hemispheric lesion has seizure generating potential.  Findings of this sort are associated with subclinical seizures approximately 50% of the time in this clinical setting.  No overt seizures were seen at this point, however, and no seizures were recorded during other portions of the study either.    Full report in chart.    Dirk Boswell MD  Contact information for physicians covering Epilepsy and EEG is available on [facility] under Neurology Adult/[facility]/Staff Epilepsy and EEG

## 2021-06-19 NOTE — PROGRESS NOTES
"[facility]  General Neurology - Progress Note    Patient Name:  Erasto Maher  Date of Service:  June 19, 2021    Subjective:    No acute events overnight. Asleep at the start of the encounter. Arousalble and pleasant. Patient reports sleeping well overnight. Denies pain or other concerns this AM. Ambulatory with assist per nursing.     Objective:    Vitals: /70 (BP Location: Left arm)   Pulse 68   Temp 97.7  F (36.5  C) (Axillary)   Resp 16   Ht 1.778 m (5' 10\")   Wt 74.8 kg (165 lb)   SpO2 98%   BMI 23.68 kg/m    General: Lying in bed, NAD  Head: Atraumatic, normocephalic   Cardiac: no lower extremity edema  Neurologic:  Mental Status: Somnolent, arousable requiring multiple stimulations thorough out exam. Oriented to person, place and time. Was able solve problems (add cent, dime and nickel) and name regular objects. Speech slurred and halting.  Cranial Nerves: EOM intact, without nystagmus. Eye brow raise symmetric, Left sided facial droop in the lower face.  Motor: No abnormal movements.  Moves right side antigravity, does not move left side antigravity. 4/5 strength on right upper and lower extremities. Antigravity in left hemibody, strength grossly 3-4/5. LUE falls to bed completely after 10 seconds when testing drift.   Reflex: RLE ankle clonus. Brisk patellar and ankle reflexes bilaterally. Brachioradialis 2 bilaterally.   Sensory: Intact to light touch.   Station/Gait: Unable to assess gait.      Pertinent Investigations:    I have personally reviewed most recent and pertinent labs, tests, and radiological images.   - 6/18 Overnight EEG report remarkable global R hemispherical focal dysfunction with 1-1.5 hx delta predominance. 6/16 3.30pm - lateralized periodic discharges indicative of R hemispheric lesion has seizure generating potential. No overt seizures noted on recording through early AM.   - Hyperglycemic to 119 but improved since 6/18. Rest " BMP within normal limits  - Anemic to 12.4 from 13.3 6/18. Reduced hematocrit and RBC count trending down from 6/18.    Assessment:  Mr. Maher is a 56 year man with Hx of glioblastoma multiforme (IDH1 R132H wildtype, MGMT promoter unmethylated) s/p chemotherapy, immunotherapy, radiotherapy and resection with tumor progression who presented to ED for evaluation of acute worsening of baseline left sided weakness and facial droop following Zirabev infusion. MR brain with contrast completed on admission shows progression of several known lesions and a likely new lesion in the right midbrain/migdalia that accounts for the worsening in left hemibody weakness. Per discussion with neurosurgery, there is no clear indication for repeat resection at this time; we will trial high dose steroids and see if he has any clinical improvement. Patient's EEG with PLD's but no overt seizures.     Plan:  #Glioblastoma multiforme s/p resection, chemotherapy, radiation   #Seizure disorder secondary to above   #Vasogenic brain edema  IDH1 R132H wildtype, MGMT promoter unmethylated). Overnight EEG -significant for R cerebral dysfunction  - Continue vEEG.   - Continue trial of steroids  - Continue Omeprazole while on steroids   - Increase Keppra 1500mg BID with 1g load 6/19  - PT/OT consultation     #Hypothyroidism  - Resume PTA levothyroxine      #HTN  - Resume PTA lisinopril      #Depression  - Resume PTA Lexapro      FEN: Full diet  PPx: PIV  Code: Full code  Social: Wife updated by Dr. Power via phone. Parents at bedside this afternoon.   Dispo: Likely home with 24/7 supervision on Mon     Patient was seen and discussed with Dr. Power.    Sissy Quintero, MS4  [employer]     I was present with the medical student who participated in the service and in the documentation of the note. I have verified the history and personally performed the physical exam and medical decision making. I agree with  the assessment and plan of care as documented in the note    Sean Shepherd MD  Neurology Resident, PGY-2

## 2021-06-20 NOTE — PLAN OF CARE
Status: Admitted 6/17 for intermittently left-sided weakness and facial droop. MRI showing enhancing lesions, concerning for progression of GBM malignancy  Vitals: VSS on RA  Neuros: A&O x4. Slow to respond. L neglect, L visual cut, L facial droop, L pronator drift.   IV: PIV SL  Labs/Electrolytes: Recheck in AM  Resp/trach: LS clear  Diet: Regular  Bowel status: BM x2 this shift. Multiple BMs on days, Senna held.   : Voiding spontaneously   Skin: R head incisions approximated. Nystatin cream applied to groin rash per orders  Pain: Denies  Activity: A1+ GB. Pt requiring prompts to use L side at times  Social: cooperative and pleasant with cares  Plan: VEEG. PO decadron. Potential discharge home with 24/7 home help on Monday.

## 2021-06-20 NOTE — PROGRESS NOTES
"[facility]  General Neurology - Progress Note    Patient Name:  Erasto Maher  Date of Service:  June 20, 2021    Subjective:    No acute events overnight. Patient reports sleeping well overnight. Denies pain or other concerns this AM. Ambulatory with assist per nursing.     Objective:    Vitals: /70 (BP Location: Left arm)   Pulse 85   Temp 97.5  F (36.4  C) (Oral)   Resp 16   Ht 1.778 m (5' 10\")   Wt 74.8 kg (165 lb)   SpO2 95%   BMI 23.68 kg/m    General: Lying in bed, NAD  Head: Atraumatic, normocephalic   Cardiac: no lower extremity edema  Neurologic: Over all exam improved compared to 6/19. Most notable improvements in L sided droop and L-UE strength.   Mental Status: Awake, alert and  Oriented to person, place and time. Was able solve problems (add quarter and 2 dimes).    Cranial Nerves: EOM intact, without nystagmus. Eye brow raise symmetric, able to complete close eyes bilaterally. Left sided facial droop in the lower face notable at nasolabial fold. Improved symmetry with smile and speech.   Motor: No abnormal movements.  Moves right side antigravity and L with assist. 4/5 strength on right upper and lower extremities. Antigravity in left hemibody, strength grossly 3-4/5. LUE falls to bed completely within 10 seconds when testing drift.   Reflex: RLE ankle clonus. Brisk patellar and ankle reflexes bilaterally. Brachioradialis 2 bilaterally.   Sensory: Intact to light touch.   Station/Gait: Unable to assess gait.      Pertinent Investigations:    I have personally reviewed most recent and pertinent labs, tests, and radiological images.   - 6/18 Overnight EEG report remarkable global R hemispherical focal dysfunction with 1-1.5 hx delta predominance. 6/16 3.30pm - lateralized periodic discharges indicative of R hemispheric lesion has seizure generating potential. No overt seizures noted on recording through early AM.   - Hyperglycemic to 130. Rest BMP " within normal limits  - Anemic to 12.6. Reduced hematocrit and RBC count.    Assessment:  Mr. Maher is a 56 year man with Hx of glioblastoma multiforme (IDH1 R132H wildtype, MGMT promoter unmethylated) s/p chemotherapy, immunotherapy, radiotherapy and resection with tumor progression who presented to ED for evaluation of acute worsening of baseline left sided weakness and facial droop following Zirabev infusion. MR brain with contrast completed on admission shows progression of several known lesions and a likely new lesion in the right midbrain/migdalia that accounts for the worsening in left hemibody weakness. Per discussion with neurosurgery, there is no clear indication for repeat resection at this time; we will trial high dose steroids and see if he has any clinical improvement. Patient's EEG with PLED's but no overt seizures.     Plan:  #Glioblastoma multiforme s/p resection, chemotherapy, radiation   #Seizure disorder secondary to above   #Vasogenic brain edema  IDH1 R132H wildtype, MGMT promoter unmethylated). Overnight EEG -significant for R cerebral dysfunction.   - Discontinue vEEG  - Continue reduced dose of steroids (transitioned 6/19 to 4 mg once daily from 8 mg total)  - Continue Omeprazole while on steroids  - Continue increased Keppra dosing - 1500mg BID   - PT/OT consultation     #Hypothyroidism  - Resume PTA levothyroxine      #HTN  - Resume PTA lisinopril      #Depression  - Resume PTA Lexapro      FEN: Full diet  PPx: PIV  Code: Full code  Social: Wife updated by Dr. Power via phone. Parents at bedside this afternoon.   Dispo: Likely home with 24/7 supervision on Mon     Patient was seen and discussed with Dr. Power.    Sissy Quintero, MS4  [employer]     Resident/Fellow Attestation   I, Luci Rene, was present with the medical/[employer]ARWIN student who participated in the service and in the documentation of the note.  I have verified the history and  personally performed the physical exam and medical decision making.  I agree with the assessment and plan of care as documented in the note.      Luci Rene MD  Neurology PGY-3  Date of Service (when I saw the patient): 06/20/21

## 2021-06-20 NOTE — PROGRESS NOTES
"   06/20/21 1050   General Information   Onset of Illness/Injury or Date of Surgery 06/17/21   Referring Physician Sean Shepherd MD   Patient/Family Therapy Goal Statement (SLP) None stated   Pertinent History of Current Problem SLP: Pt is a 56 year man with Hx of glioblastoma multiforme (IDH1 R132H wildtype, MGMT promoter unmethylated) s/p chemotherapy, immunotherapy, radiotherapy and resection with tumor progression who presented to ED for evaluation of acute worsening of baseline left sided weakness and facial droop following Zirabev infusion. MR brain with contrast completed on admission shows progression of several known lesions and a likely new lesion in the right midbrain/migdalia that accounts for the worsening in left hemibody weakness. Per discussion with neurosurgery, there is no clear indication for repeat resection at this time; we will trial high dose steroids and see if he has any clinical improvement. Patient's EEG with PLD's but no overt seizures. Clinical swallow eval completed per MD order.    General Observations alert and agreeable   Past History of Dysphagia Pt reports he occasionally coughs when drinking and eating, and that this has been happening since his last surgery. He reports it is variable but notable when taking pills and when eating something \"crumbly.\" RN confirms that pt was coughing when taking pills this AM. Pt denies any recent PNA.   Type of Evaluation   Type of Evaluation Swallow Evaluation   Oral Motor   Oral Musculature anomalies present  (sluggish left facial musculature)   Structural Abnormalities none present   Mucosal Quality adequate   Dentition (Oral Motor)   Dentition (Oral Motor) natural dentition   Facial Symmetry (Oral Motor)   Facial Symmetry (Oral Motor) left side impairment   Comment, Facial Symmetry (Oral Motor) sluggish movement of L face, sensation intact   Lip Function (Oral Motor)   Lip Range of Motion (Oral Motor) retraction impairment   Protrusion, Lip " "Range of Motion left side   Comment, Lip Function (Oral Motor) sluggish movement, lip seal intact   Tongue Function (Oral Motor)   Tongue ROM (Oral Motor) WNL   Jaw Function (Oral Motor)   Jaw Function (Oral Motor) WNL   Cough/Swallow/Gag Reflex (Oral Motor)   Comment, Cough/Swallow/Gag Reflex (Oral Motor) strong cough   Vocal Quality/Secretion Management (Oral Motor)   Comment, Vocal Quality/Secretion Management (Oral Motor) Pt endorses rough vocal quality which has been long standing after intubation \"two procedures ago\"   General Swallowing Observations   Current Diet/Method of Nutritional Intake (General Swallowing Observations, NIS) thin liquids;regular diet   Respiratory Support (General Swallowing Observations) none   Swallowing Evaluation Clinical swallow evaluation   Clinical Swallow Evaluation   Feeding Assistance no assistance needed   Clinical Swallow Evaluation Textures Trialed Thin Liquids;Solid Foods   Clinical Swallow Eval: Thin Liquid Texture Trial   Mode of Presentation, Thin Liquids straw;self-fed   Volume of Liquid or Food Presented 6oz thin water   Oral Phase of Swallow WFL   Pharyngeal Phase of Swallow intact   Diagnostic Statement WFL, no overt s/sx of aspiration   Clinical Swallow Evaluation: Solid Food Texture Trial   Mode of Presentation, Solid self-fed   Volume of Solid Food Presented 1/2 muffin   Oral Phase, Solid WFL   Pharyngeal Phase, Solid intact   Diagnostic Statement WFL, no overt s/sx of aspiration   Esophageal Phase of Swallow   Patient reports or presents with symptoms of esophageal dysphagia No   Swallowing Recommendations   Diet Consistency Recommendations thin liquids;regular diet   Supervision Level for Intake close supervision needed   Mode of Delivery Recommendations bolus size, small;slow rate of intake   Monitoring/Assistance Required (Eating/Swallowing) stop eating activities when fatigue is present;monitor for cough or change in vocal quality with intake   Recommended " Feeding/Eating Techniques (Swallow Eval) maintain upright sitting position for eating   Medication Administration Recommendations, Swallowing (SLP) pills one at a time, or whole in puree   Instrumental Assessment Recommendations instrumental evaluation not recommended at this time   SLP Therapy Assessment/Plan   Criteria for Skilled Therapeutic Interventions Met (SLP Eval) no problems identified which require skilled intervention   SLP Diagnosis Swallow function c/w baseline   Therapy Frequency (SLP Eval) one time eval and treatment only   Comment, Therapy Assessment/Plan (SLP) SLP: Clinical swallow eval completed per MD order. In today's eval, pt presents with a functional oropharyngeal swallow mechanism. Oral mech exam remarkable for sluggish L facial musculature. Pt assessed with thin water and muffin. Functional swallow, no s/sx of aspiration. Training provided re: safe-swallowing and pill-taking strategies. Education provided re: s/sx of aspiration and risks of ongoing aspiration. Recommend pt continue regular diet/thin liquids with close supervision to ensure pt is following safe-swallowing strategies. Pt should be fully upright and alert for all PO. Recommend pt take pills one a time without throwing his head back. Pt may also consider taking pills whole in pudding/yogurt/applesauce if this is easier for him. No additional SLP services indicated for dysphagia, recommend pt continue OP SLP POC for cognitive-communication deficits.    Therapy Plan Review/Discharge Plan (SLP)   Therapy Plan Review (SLP) evaluation/treatment results reviewed;care plan/treatment goals reviewed;risks/benefits reviewed;current/potential barriers reviewed;participants voiced agreement with care plan;participants included;patient   SLP Discharge Planning    SLP Discharge Recommendation (DC Rec) home with outpatient speech therapy   SLP Rationale for DC Rec No SLP needs for dysphagia, pt to continue with already established OP SLP POC    SLP Brief overview of current status  Recommend pt continue regular diet/thin liquids with close supervision to ensure pt is following safe-swallowing strategies. Pt should be fully upright and alert for all PO. Recommend pt take pills one a time without throwing his head back. Pt may also consider taking pills whole in pudding/yogurt/applesauce if this is easier for him. No additional SLP services indicated for dysphagia, recommend pt continue OP SLP POC for cognitive-communication deficits.    Total Evaluation Time   Total Evaluation Time (Minutes) 13

## 2021-06-20 NOTE — PLAN OF CARE
Status: Patient admitted on 6/17 with left sided weakness and left facial droop, MRI revealed new enhancing lesions in the right lateral midbrain, right cerebral  peduncle and migdalia probably representing new tumors, increased size of the enhancing lesions in the right frontal lobe  and superior right parietal lobe, concerning for progression of GBM malignancy  Vitals: VSS  Neuros: Oriented x4, alert today, slight L droop, L cut, L neglect, L pronator drift, LUE 4/5, LUE ataxic, all other extremities 5/5  IV: PIV saline locked  Labs/Electrolytes: WNL  Resp/trach: Lung sounds clear throughout  Diet: Regular  Bowel status: No BM today, multiple BM's yesterday, bowel medications held  : Voiding spontaneously  Skin: R head incision approximated  Pain: Denies  Activity: Up with A1, gaitbelt, up in chair most of day, ambulated in hallway 3x today, shower completed after VEEG leads removed  Social: Cooperative with cares, Wife Judith  please contact with any changes, mother and father designated visitors for weekend as wife is out of town, bed alarm on at all times for safety  Plan: Discharge home tomorrow with 24 assist, continue to monitor and follow POC

## 2021-06-20 NOTE — PROGRESS NOTES
EEG CLINICAL NEUROPHYSIOLOGY PRELIMINARY REPORT    Video-EEG through 4 AM today reviewed. Findings are abnormal.    1) there is evidence of mild, sometimes mild to moderate diffuse encephalopathy.   2) there is evidence of additional significant focal cerebral dysfunction over the right hemisphere.  3) no clear epileptiform discharges.  No electrographic seizures.    Full report in chart.    Dirk Boswell MD  Contact information for physicians covering Epilepsy and EEG is available on [facility] under Neurology Adult/[facility]/Staff Epilepsy and EEG

## 2021-06-21 NOTE — PLAN OF CARE
AVS reviewed with patient and wife. Questions reviewed and answered. Patient escorted to discharge pharmacy and meds picked up. Pt discharged unit at 1810.

## 2021-06-21 NOTE — PLAN OF CARE
Status: Patient admitted on 6/17 with left sided weakness and left facial droop, MRI revealed new enhancing lesions in the right lateral midbrain, right cerebral concerning for progression of GBM malignancy  Vitals: VSS  Neuros: A&O. Said wrong month at 0800. Oriented x4 at noon assessment. slight L droop, L cut, L neglect, L pronator drift, LUE 4/5, LUE ataxic, all other extremities 5/5  IV: PIV saline locked  Labs/Electrolytes: WNL  Resp/trach: Lung sounds clear throughout  Diet: Regular  Bowel status: BS+ No BM this shift. Multiple BMs yesterday.  : Voiding spontaneously  Skin: R head incision approximated  Pain: Denies  Activity: Up with A1, gaitbelt, up in chair currently, ambulated in hallway x1 today with staff.   Social: Cooperative with cares, Wife Judith  has been updated and will be here this afternoon for an update with neurology and therapy.  Plan: Discharge home possibly later today. continue to monitor and follow POC

## 2021-06-21 NOTE — PLAN OF CARE
"/81 (BP Location: Left arm)   Pulse 87   Temp 95.5  F (35.3  C) (Oral)   Resp 16   Ht 1.778 m (5' 10\")   Wt 74.8 kg (165 lb)   SpO2 98%   BMI 23.68 kg/m      [id number]. admitted on 6/17 with worsening left sided weakness and left facial droop, MRI revealed new enhancing lesions in the right lateral midbrain, right cerebral concerning for progression of GBM malignancy. A&Ox4, 4/5 to LUE and LLE baseline weakness. 5/5 both RUE and RLE. Denied pain. VS stable on room air and afebrile. Discharging home this evening once order place.   "

## 2021-06-21 NOTE — DISCHARGE SUMMARY
[facility]  NEUROLOGY DISCHARGE SUMMARY    Patient Name:  Erasto Maher  MRN:  [id number]      :  1965      Date of Admission:  2021  Date of Discharge:  2021  6:11 PM  Admitting Physician:  Angelo Power MD  Discharge Physician:  Angelo Power MD  Primary Care Provider:   Ranjit Edwards  Discharge Disposition:   Discharged to home w/ HHPT/OT and  Assist     Admission Diagnoses:  1. Glioblastoma multiforme s/p resection, chemotherapy, radiation  2. Seizure disorder secondary to GBM  3. Vasogenic brain edema  4. Hypertension  5. Depression  6. Hypothyroidism     Discharge Diagnoses:    1. Glioblastoma multiforme s/p resection, chemotherapy, radiation  2. Seizure disorder secondary to GBM  3. Vasogenic brain edema  4. Hypertension  5. Depression  6. Hypothyroidism    Brief History of Illness:   Per H&P on :   Mr. 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    HPI:  -Mr. Maher is a 56 year man with Hx of right parietal glioblastoma diagnosed on 2019 following a GTC.    -Patient had his bevacizumab-bvzr infusion yesterday around 3 PM.  About 2 hours after the infusion his wife noticed that he has slurring of speech, worsening of left facial droop and left upper and lower extremity weakness which resolved after 20 minutes.  Again around 10 PM when they were going to bed, wife noticed that patient had difficulty keeping his posture in sitting and using his left leg. Wife called 911 and patient was brought to emergency room. She denies abnormal shaking movements and believes that his mental status remained the same throughout these episodes.    -She reports that  something very similar to this happened to him in the past, before one of his surgeries.  - At baseline he has weakness in left upper and lower extremity and mild facial droop however he is still able to walk and use his left arm.  As far as his mental status, at baseline he is mildly confused and foggy.     Per chart review:  -Patient completed chemotherapy on 8/30/2019 and received last dose of immunotherapy on 3/16/202.   - Imaging on 3/28/2020 showed progression of disease.    -On 4/9/2020 underwent craniotomy for tumor resection by Dr. Chan with placement of GammaTiles.   -After imaging on 11/2020 showed new distant lesionS consistent with disease progression, he completed a course of radiation and underwent repeat resection on 1/13/2021 and Intra-tumoral virus injection.    -In April 2021 following a contrast enhancement in the right parietal lobe tumor on imaging he had biopsy which showed recurrent glioblastoma with recurrent necrosis.    -Was last seen by Dr. Avila on 6/14/21, with the plan of having bevacizumab-bvzr infusion later this week.    Neurology, neurosurgery consulted upon ED presentation. CT head w/o contrast showed no significant changes compared to his last CT on (6/1/21). MRI brain showed no acute infarction, but did shownew enhancing lesions in the right lateral midbrain and right cerebral peduncle, concerning for tumor. It also showed increased size of the known GBM lesions in the right frontal lobe and superior right parietal lobe, concerning for progression of malignancy. The patient did not appear to have much vasogenic edema. The patient was admitted to inpatient neurology and started on a steroid trial of dexamethasone 4 mg BID per neurosurgery. Neurosurgery indicated that there was no indication currently for repeat resection. The patient had waxing and waning facial droop and left sided weakness which prompted an EEG to rule out seizures. EEG from 6/19 showed one run of PLEDs with no  acute seizures. Keppra was increased to 1500 mg BID from his home dose of Keppra 1000 mg BID.     The patiens facial droop, left sided weakness and slurred speech has progressively improved over the last few days. PT/OT were consulted and agreed that the patient is clear to be discharged home with HHPT/OT and 24/7 care. The patient prefers to be discharged home as opposed to going to a TCU. His wife Judith has expressed concerns over her ability to manage the patients cares and is looking into hiring private home cares, also to fulfill the 24/7 home supervision requirement and has already set something up.  SLP has performed a swallow study and cleared the patient for thin liquids and a regular diet. The patient is to follow up with Dr. Avila for Avastin infusion as well as management of decadron taper (discharged on 2mg daily till follow up).     Hospital Course by Problem:  #Glioblastoma multiforme s/p resection, chemotherapy, radiation   #Seizure disorder secondary to above   #Vasogenic brain edema  - Keppra 1500 mg BID.  - Dexamethasone 2 mg every day.  - Continue omeprazole 20 mg BID before meals as long as he's on steroids.   - F/u with Dr. Avila for Avastin infusion as well as decadron taper (apt July 12th)   - Discharge to home with HHPT/OT and 24/7 assist.      #HTN  The patient has been normotensive throughout his hospital stay.   - Continue PTA lisinopril 5 mg QD     #Hypothyroidism  - Continue PTA levothyroxine 137 mcg QD    #Depression  - Continue PTA Lexapro 10 mg QD    Pertinent Investigations:  CT Head w/o Contrast (6/17):  IMPRESSION:  1.  No significant change compared with 06/01/2021.    MR Brain w and w/o Contrast (6/17):  Impression:  1. No evidence for acute infarction.  2. New enhancing lesions in the right lateral midbrain, right cerebral  peduncle and migdalia probably representing new tumors.  3. Increased size of the enhancing lesions in the right frontal lobe  and superior right parietal lobe,  concerning for progression of  malignancy.  4. Diffusion restricting mass within the right parietal lobe likely  represents residual tumor.  5. Mild diffusion restriction surrounding the right temporoparietal  resection cavity may represent postoperative changes versus residual  tumor.  6. Head MRA demonstrates no definite aneurysm or stenosis of the major  intracranial arteries.  7. Neck MRA demonstrates patent major cervical arteries.     I have personally reviewed the examination and initial interpretation  and I agree with the findings.     STEPHEN MIJARES MD    EEG (6/19):  IMPRESSION: Abnormal.    1) there is evidence of mild, sometimes mild to moderate diffuse encephalopathy.   2) there is evidence of additional significant focal cerebral dysfunction over the right hemisphere.  3) no clear epileptiform discharges.  No electrographic seizures.     Dirk Boswell MD  EPILEPSY STAFF     EEG (6/20):  IMPRESSION: Abnormal.  1) there is evidence of mild diffuse encephalopathy  2) evidence of extensive focal cerebral dysfunction over right hemisphere consistent with known structural abnormalities in this area.  3) rare runs of periodic sharpish theta over right frontal central regions.  This suggests that known structural abnormality is associated with cortical irritability and has seizure generating potential.  Overt seizures, however, were not recorded.     SUMMARY OF 3 DAYS OF VIDEO-EEG MONITORING: Mild to diffuse encephalopathy.  Extensive focal cerebral dysfunction over right hemisphere, consistent with known structural abnormality in this area.  Rare runs of periodic sharpish theta or delta noted over right frontocentral regions.  This is suggested that known structural abnormality is associated with some degree of cortical irritability and has a seizure generating potential.  Overt seizures, however, were not recorded in this study.  Patient's target spells were not recorded either.     Dirk  "Agustin Boswell MD  EPILEPSY STAFF     Discharge physical examination:   /81 (BP Location: Left arm)   Pulse 87   Temp 95.5  F (35.3  C) (Oral)   Resp 16   Ht 1.778 m (5' 10\")   Wt 74.8 kg (165 lb)   SpO2 98%   BMI 23.68 kg/m    General: Lying in bed, NAD  Head: Atraumatic, normocephalic   Cardiac: no lower extremity edema  Neurologic:   Mental Status: Awake, alert and  Oriented to person, place and time. Was able solve problems (add quarter and 2 dimes).    Cranial Nerves: EOM intact, without nystagmus. Eye brow raise symmetric, able to complete close eyes bilaterally. Left sided facial droop in the lower face notable at nasolabial fold, improved since last exam. Improved symmetry with smile and speech.   Motor: No abnormal movements.  Moves right side antigravity and L with assist. 4/5 strength on right upper and lower extremities. Antigravity on the left side. LUE falls to bed completely within 30 seconds when testing drift. Left lower extremity strength 4/5, left upper extremity strength 3/5.   Reflex: RLE ankle clonus. Brisk patellar and ankle reflexes bilaterally. Brachioradialis 2 bilaterally.   Sensory: Intact to light touch.   Station/Gait: Observed walking independently while working with PT     Discharge Medications:  Discharge Medication List as of 6/21/2021  5:43 PM      CONTINUE these medications which have CHANGED    Details   dexamethasone (DECADRON) 2 MG tablet Take 1 tablet (2 mg) by mouth daily (with breakfast), Disp-60 tablet, R-0, E-Prescribe      !! levETIRAcetam (KEPPRA) 750 MG tablet Take 2 tablets (1,500 mg) by mouth 2 times daily, Disp-120 tablet, R-0, Local Print      !! levETIRAcetam (KEPPRA) 750 MG tablet Take 2 tablets (1,500 mg) by mouth 2 times daily, Disp-120 tablet, R-1, Local Print       !! - Potential duplicate medications found. Please discuss with provider.      CONTINUE these medications which have NOT CHANGED    Details   acetaminophen (TYLENOL) 325 MG tablet " Take 2 tablets (650 mg) by mouth every 4 hours as needed for other (For optimal non-opioid multimodal pain management to improve pain control.), Historical      escitalopram (LEXAPRO) 10 MG tablet Take 1 tablet (10 mg) by mouth every morning, HistoricalDepression      fluticasone (FLONASE) 50 MCG/ACT nasal spray Spray 1 spray into both nostrils At Bedtime, HistoricalAllergic rhinitis      levothyroxine (SYNTHROID/LEVOTHROID) 137 MCG tablet Take 137 mcg by mouth every morning, Historical      lisinopril (ZESTRIL) 5 MG tablet Take 1 tablet (5 mg) by mouth daily, Disp-30 tablet, R-3, HistoricalHTN      loratadine (CLARITIN) 10 MG tablet Take 1 tablet (10 mg) by mouth At Bedtime, HistoricalSeasonal allergies      OMEPRAZOLE PO Take by mouth daily, Historical               Patient seen and discussed with Dr. Power.    Dahiana Knapp, MS4  [employer] [facility] [employer]     I was present with the medical student who participated in the service and in the documentation of the note. I have verified the history and personally performed the physical exam and medical decision making. I agree with the assessment and plan of care as documented in the note    Sean Shepherd MD  Neurology Resident, PGY-2

## 2021-06-21 NOTE — PLAN OF CARE
Status: Admitted 6/17 for intermittently left-sided weakness and facial droop. MRI showing enhancing lesions, concerning for progression of GBM malignancy  Vitals: VSS on RA  Neuros: A&O x4. Slow to respond. L neglect- improving, L visual cut, L facial droop, L pronator drift.   IV: PIV SL  Labs/Electrolytes: Recheck in AM  Resp/trach: LS clear  Diet: Regular  Bowel status: LBM 6/20.   : Voiding spontaneously   Skin: R head incisions approximated. Nystatin cream applied to groin rash per orders  Pain: Denies  Activity: A1+ GB.   Social: cooperative and pleasant with cares  Plan: Wife plans to visit today when she gets back to town. Potential discharge home today with 24/7 home help

## 2021-06-21 NOTE — TUMOR CONFERENCE
Tumor Conference Information  Tumor Conference: Brain  Specialties Present: Medical oncology, Pathology, Radiology, Radiation oncology, Surgery  Patient Status: A current patient  Pathology: Not Discussed  Treatment to Date: Surgical intervention(s), Chemoradiation, Adjuvant chemotherapy, Palliative chemotherapy, Palliative radiation  Clinical Trial Eligibility: Previously enrolled  Recommended Plan: Continue with current treatment plan (Comment: reviewed imaging - indeterminate changes seen, possible tumor progression; patient not interested in palliative care referral, continue with current treatment, follow up in 1 month with repeat MRI)  Did the review exceed 30 minutes?: did not           Documentation / Disclaimer Cancer Tumor Board Note  Cancer tumor board recommendations do not override what is determined to be reasonable care and treatment, which is dependent on the circumstances of a patient's case; the patient's medical, social, and personal concerns; and the clinical judgment of the oncologist [physician].

## 2021-06-22 NOTE — PROGRESS NOTES
Clinic Care Coordination Contact  [facility]/Voicemail    Clinical Data: Care Coordinator Outreach- Transition Call Management    Outreach attempted x 1.  Left message on [phone] voicemail with call back information and requested return call.    Plan: [facility] [place] team.    Ciera Whitaker MA  [facility] [place], [facility]

## 2021-06-22 NOTE — PLAN OF CARE
Occupational Therapy Discharge Summary    Reason for therapy discharge:    Discharged to home with home therapy.    Progress towards therapy goal(s). See goals on Care Plan in [facility] electronic health record for goal details.  Goals partially met.  Barriers to achieving goals:   discharge from facility.    Therapy recommendation(s):    Continued therapy is recommended.  Rationale/Recommendations:  to increase activity tolerance and safety with ADL/IADL completion.

## 2021-06-22 NOTE — PLAN OF CARE
Physical Therapy Discharge Summary    Reason for therapy discharge:    Discharged to home with home therapy.    Progress towards therapy goal(s). See goals on Care Plan in [facility] electronic health record for goal details.  Goals partially met.  Barriers to achieving goals:   discharge from facility.    Therapy recommendation(s):    Continued therapy is recommended.  Rationale/Recommendations:  home PT to maximize functional independence.

## 2021-06-22 NOTE — PROGRESS NOTES
Patient discharged on 6/21/21, please follow up per TCM workflow.    Valentina Stroud, CMA

## 2021-06-23 NOTE — PROGRESS NOTES
Clinic Care Coordination Contact    Patient has already been in contact with care team following hospital discharge. Will cancel/close post-hospital call from [facility] at this time.    Beronica Velasquez MA  [facility]

## 2021-06-27 NOTE — PROGRESS NOTES
"Aristeo is a 56 year old who is being evaluated via a billable video visit.      How would you like to obtain your AVS? MyChart  If the video visit is dropped, the invitation should be resent by: Send to e-mail at: [email or web address]  Will anyone else be joining your video visit? No      Video-Visit Details  Type of service:  Video Visit    Video Start Time: 2:05 PM  Video End Time: 2:43 PM    Originating Location (pt. Location): Home    Distant Location (provider location):  [facility]     Platform used for Video Visit: Treatspace     __________________________________________________________    NEURO-ONCOLOGY VISIT  Jun 28, 2021    CHIEF COMPLAINT: Mr. 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 He completed chemoradiotherapy on 8/30/2019 and was managed on a clinical trial receiving atezolizumab (anti-PDL1) plus adjuvant temozolomide. He received his last dose of immunotherapy on 3/16/2020 when imaging on 3/28/2020 showed progression of disease. He underwent a repeat craniotomy for tumor resection with Dr. Chan on 4/9/2020 with placement of GammaTiles.     Aristeo was managed on lomustine monotherapy. However, imaging in 11/2020 showed a new distant lesion consistent with disease progression.     He completed a repeat course of radiation, then a repeat resection on 1/13/2021 plus use of the compassionate use [employer] treatment protocol that utilizes an intratumoral adenovirus injection activating human interleukin-12 + veledimex in combination with adjuvant nivolumab. Pathology was consistent with recurrent glioblastoma. He was receiving nivolumab + bevacizumab until imaging in 3/2021 showed an area of contrast enhancement.     Aristeo underwent a biopsy + GÓMEZ on 4/21/2021 to both the frontal and parietal lesions and pathology showed " small pieces of recurrent glioblastoma with predominant necrosis. On 5/26/2021, he had a second intraturmoral viral injection + veledimex. In the post-operative period, he has resumed nivolumab and kee.     I met with Aristeo and Judith (wife) today for this follow-up visit.     HISTORY OF PRESENT ILLNESS  -Aristeo was recently admitted to the hospital for acute worsening in coordination/ otis-neglect/ weakness in the left leg >>> arm. Concern for underlying seizures at that time. No recurrent seizure events. Worked with in-home OT today. Over the past week, some improvement with increase in dexamethasone. Continued cognitive decline. On dexamethasone 2mg daily.   -Energy has been lower.     REVIEW OF SYSTEMS  A comprehensive ROS negative except as in HPI.      MEDICATIONS   Current Outpatient Medications   Medication Sig Dispense Refill     acetaminophen (TYLENOL) 325 MG tablet Take 2 tablets (650 mg) by mouth every 4 hours as needed for other (For optimal non-opioid multimodal pain management to improve pain control.)       dexamethasone (DECADRON) 2 MG tablet Take 2 tablets (4 mg) by mouth daily (with breakfast) 60 tablet 0     escitalopram (LEXAPRO) 10 MG tablet Take 1 tablet (10 mg) by mouth every morning       fluticasone (FLONASE) 50 MCG/ACT nasal spray Spray 1 spray into both nostrils At Bedtime       levETIRAcetam (KEPPRA) 750 MG tablet Take 2 tablets (1,500 mg) by mouth 2 times daily 120 tablet 1     levETIRAcetam (KEPPRA) 750 MG tablet Take 2 tablets (1,500 mg) by mouth 2 times daily 120 tablet 0     levothyroxine (SYNTHROID/LEVOTHROID) 137 MCG tablet Take 137 mcg by mouth every morning       lisinopril (ZESTRIL) 5 MG tablet Take 1 tablet (5 mg) by mouth daily 30 tablet 3     loratadine (CLARITIN) 10 MG tablet Take 1 tablet (10 mg) by mouth At Bedtime       OMEPRAZOLE PO Take by mouth daily       DRUG ALLERGIES   Allergies   Allergen Reactions     No Clinical Screening - See Comments Rash     Cats and dogs          ONCOLOGIC HISTORY  -PRESENTATION: New onset seizures; complex partial event with speech arrest and altered mental status lasting several minutes. Later had secondary generalization. Started Keppra.   -MR brain imaging with a ring enhancing lesion in the right parietal lobe.    -6/27/2019 SURGERY: Right temporal craniotomy for mass resection, gross total resection by Dr. Tam Barreto.  PATHOLOGY: Glioblastoma; IDH1 R132H wildtype, MGMT promoter unmethylated  -7/3/2019 CLINICAL TRIAL: Evaluated by Dr. Wilmer Mckenna at [facility], consented for clinical trial 2016-0867 (Standard of Care plus atezolizumab).  -7/22 - 8/30/2019 CHEMORADS: 60cGy with concurrent temozolomide 75mg/m2/day (145mg) plus atezolizumab Q2 weeks on clinical trial 2016-0867.  -9/6/2019 NEURO-ONC: Evaluated at the [facility].   -9/25/2019 - 3/16/2020 CHEMO: Adjuvant chemotherapy; temozolomide + atezolizumab 840 mg IV on clinical trial 2016-0867.  -4/6/2020 NEURO-ONC: Communicated with Dr. Borrero. Plan for surgery and next steps pending pathology results.   -4/9/2020 SURGERY + RADS: Repeat craniotomy for surgical debulking plus placement of GammaTiles.  PATHOLOGY: Recurrent glioblastoma.   Next generation sequencing: Microsatellite instability: Stable. Tumor mutational burden: 4 (Low). APC, CDK4, MDM2, PTEN mutated. ARID1A, ASXL1, ATRX, DNMT3A, FANCE, KDM5C, MED12, MEF2B, NF1, RUNX1, TERT mutated. PD-L1 negative.  -4/20/2020 NEURO-ONC/ CHEMO: Starting Lomustine 3 weeks post-op. Consented for next generation sequencing and sending most recent tumor sample.    -4/30/2020 CHEMO: Lomustine, cycle 1.   -5/11/2020 NEURO-ONC: Clinically well. Referral to genetic counseling.   -6/5/2020 NEURO-ONC/ MRB/ CHEMO: Clinically well. Imaging with a positive treatment response. Lomustine, cycle 2 (start date of 6/11).    -7/20/2020 NEURO-ONC/ MRB/ CHEMO: Clinically well. Imaging without concern; aside from subdural fluid collection. Labs at goal. Lomustine, cycle  3 (start date of 7/23).    -8/31/2020 NEURO-ONC/ MRB/ CHEMO: Clinically well. Imaging without concern. Labs at goal. Lomustine, cycle 4 (start date of 9/3).   -10/12/2020 NEURO-ONC/ MRB/ CHEMO: Clinically well. Imaging without concern for cancer growth, but the resection cavity is expanding with time. Per Dr. Chan; continue to monitor with no surgical intervention at this time. Labs at goal. Lomustine, cycle 5 (start date of 10/15).    -11/23/2020 NEURO-ONC/ MRB: Clinically well. Imaging with a new distant lesion. Stopping lomustine. Referral to Dr. Chan.    -12/10 - 12/16/2020 RADS: Gamma Knife therapy of 15 Gy/ fraction x 5 fractions.  -12/18/2020 NEURO-ONC: Discussion of treatment.  -1/13/2021 SURGERY: Right craniotomy for open biopsy of the right parietal tumor plus Ziopharm adenovirus injection.  PATHOLOGY: Recurrent glioblastoma.  -1/25/2021 NEURO-ONC: Clinically improving since surgery. Continue taking veledimex through Wednesday.   -1/27/2021 CHEMO: Starting nivolumab + bevacizumab.   -2/22/2021 NEURO-ONC/ CHEMO: Clinically improving. Continue nivolumab + bevacizumab.  -3/22/2021 NEURO-ONC/ MRB/ CHEMO: Clinically improving. Imaging with positive treatment response, but with 1 area of concern; consideration for biopsy + GÓMEZ. Continue nivolumab + bevacizumab.  -4/5/2021 NEURO-ONC/ MRB: Clinically improved. Imaging overall stable, but slightly more pronounced area of contrast enhancement (4mm in size) in the right frontal lobe. Plan is for biopsy + GÓMEZ on 4/21. Holding nivolumab + bevacizumab.  -4/21/2021 SURGERY: MRI guided biopsy and  laser ablation of the right frontal lesion and right parietal lesion.   PATHOLOGY: A. Brain, right biopsy #1, biopsy: Small fragments of cortical nieves matter.       B. Brain, right biopsy #2, biopsy: Cortex and mildly hypercellular white matter.       C. Brain, right biopsy #3, biopsy: Small pieces of recurrent, active glioblastoma with predominant necrosis.       D. Brain,  "right biopsy #4, biopsy: Mixture of necrosis and blood.   -4/26/2021 NEURO-ONC/ CHEMO: Clinically improving with dexamethasone use. Focal disease control with GÓMEZ. Continue nivolumab + kee. Planning to repeat viral injection on 5/26.   -5/21/2021 SURGERY: Ziopharm adenovirus injection. Veledimex course completed.   -5/27/2021 MRB with postsurgical craniotomy changes. Increased areas of nodular enhancement including new areas of nodular enhancement in the right temporal lobe.  -6/14/2021 NEURO-ONC/ CHEMO: Clinically recovering. Restarting nivolumab, holding kee until later this week. Holding on Optune use.  -6/17/2021 MR brain imaging with a new enhancing lesions in the right lateral midbrain, right cerebral peduncle and migdalia probably representing new tumors. Increased size of the enhancing lesions in the right frontal lobe and superior right parietal lobe, concerning for progression of malignancy. Diffusion restricting mass within the right parietal lobe likely represents residual tumor.  -6/21/2021 EEG with mild to diffuse encephalopathy.    -6/28/2021 NEURO-ONC: Increase dexamethasone 4mg daily. Continue treatment. Repeat imaging as planned.     SOCIAL HISTORY   Tobacco use: Never smoker.   Alcohol use: Social, infrequent.   Drug use: Denies.  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       PHYSICAL EXAMINATION  There were no vitals taken for this visit.   Wt Readings from Last 2 Encounters:   06/17/21 74.8 kg (165 lb)   06/14/21 72.9 kg (160 lb 12.8 oz)      Ht Readings from Last 2 Encounters:   06/17/21 1.778 m (5' 10\")   06/03/21 1.778 m (5' 10\")      KPS 60    -Generally well appearing. Lower energy today.   -Respiratory: No audible wheezing.   -Skin: No rashes.   -Neurologic:   MENTAL STATUS:     Alert.    Recall: Slow. Slow to respond to questions.   Speech fluent, but slow.    Comprehension impaired.      CRANIAL NERVES:     Pupils are equal, round.     Homonymous hemianopsia, " left-side.   Flattening of nasolabial folds on the left, good activation.   Hearing intact.   With normal phonation, no dysfunction of the palate or tongue.      The rest of a comprehensive physical examination is deferred due to PHE (public health emergency) video visit restrictions.        MEDICAL RECORDS  Obtained and personally reviewed all available outside medical records in addition to reviewing any records available in our electronic system.     LABS  Personally reviewed all available lab results.     IMAGING  Personally reviewed recent MR brain imaging.     Imaging and case was previously reviewed with Dr. Chan and Tono.       IMPRESSION  Clinic time for this high complexity visit was spent discussing in detail the nature of his cancer in light of his recent surgery, current treatment, and recent imaging results.     Clinically, Aristeo is still recovering from surgery and his recent hospitalization. Left-sided symptoms and neglect, impaired cognition, and increased fatigue remain present, but improved since starting dexamethasone. He is on dexamethasone 2mg daily; will increase to 4mg daily starting today. The plan is to continue kee and nivolumab as scheduled tomorrow. Continue synthroid. Can consider referral to endocrinology for management of hypothyroidism.     PROBLEM LIST  Glioblastoma, MGMT unmethylated and IDH1 wildtype by IHC  Seizure  Chemotherapy-associated constipation  Headaches  Apraxia    PLAN  -CANCER-DIRECTED THERAPY-  -As above.      -HYPERTENSION-  -Related to kee use.   -Lisinopril 5mg daily.   -Continue monitoring blood pressure at home.   -Can increase antihypertensive as needed.    -STEROIDS-  -Increase dexamethasone to 4mg daily.     -SEIZURE MANAGEMENT-  -Given history of seizures, will continue current antiepileptic regimen of Keppra for the foreseeable future.    -Quality of life/ MOOD/ FATIGUE-  -Continue Lexapro.   -Continue to monitor mood as untreated/ undertreated  depression can worsen fatigue, dysorexia, and quality of life.  -Continue to monitor TSH/ T4 levels and adjust Synthroid as needed.     COVID vaccination series complete as of 3/25/2021.    Return to clinic as already scheduled.    Ericka Avila MD  Neuro-oncology

## 2021-06-28 NOTE — LETTER
"    6/28/2021         RE: Erasto Maher  [address]  [facility] [address]        Dear Colleague,    Thank you for referring your patient, Erasto Maher, to the [facility]. Please see a copy of my visit note below.    Aristeo is a 56 year old who is being evaluated via a billable video visit.      How would you like to obtain your AVS? MyChart  If the video visit is dropped, the invitation should be resent by: Send to e-mail at: [email or web address]  Will anyone else be joining your video visit? No      Video-Visit Details  Type of service:  Video Visit    Video Start Time: 2:05 PM  Video End Time: 2:43 PM    Originating Location (pt. Location): Home    Distant Location (provider location):  [facility]     Platform used for Video Visit: MONOCO     __________________________________________________________    NEURO-ONCOLOGY VISIT  Jun 28, 2021    CHIEF COMPLAINT: Mr. 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 He completed chemoradiotherapy on 8/30/2019 and was managed on a clinical trial receiving atezolizumab (anti-PDL1) plus adjuvant temozolomide. He received his last dose of immunotherapy on 3/16/2020 when imaging on 3/28/2020 showed progression of disease. He underwent a repeat craniotomy for tumor resection with Dr. Chan on 4/9/2020 with placement of GammaTiles.     Aristeo was managed on lomustine monotherapy. However, imaging in 11/2020 showed a new distant lesion consistent with disease progression.     He completed a repeat course of radiation, then a repeat resection on 1/13/2021 plus use of the compassionate use Zi[employer] treatment protocol that utilizes an intratumoral adenovirus injection activating human interleukin-12 + veledimex in combination with adjuvant nivolumab. " Pathology was consistent with recurrent glioblastoma. He was receiving nivolumab + bevacizumab until imaging in 3/2021 showed an area of contrast enhancement.     Aristeo underwent a biopsy + GÓMEZ on 4/21/2021 to both the frontal and parietal lesions and pathology showed small pieces of recurrent glioblastoma with predominant necrosis. On 5/26/2021, he had a second intraturmoral viral injection + veledimex. In the post-operative period, he has resumed nivolumab and kee.     I met with Aristeo and Judith (wife) today for this follow-up visit.     HISTORY OF PRESENT ILLNESS  -Aristeo was recently admitted to the hospital for acute worsening in coordination/ otis-neglect/ weakness in the left leg >>> arm. Concern for underlying seizures at that time. No recurrent seizure events. Worked with in-home OT today. Over the past week, some improvement with increase in dexamethasone. Continued cognitive decline. On dexamethasone 2mg daily.   -Energy has been lower.     REVIEW OF SYSTEMS  A comprehensive ROS negative except as in HPI.      MEDICATIONS   Current Outpatient Medications   Medication Sig Dispense Refill     acetaminophen (TYLENOL) 325 MG tablet Take 2 tablets (650 mg) by mouth every 4 hours as needed for other (For optimal non-opioid multimodal pain management to improve pain control.)       dexamethasone (DECADRON) 2 MG tablet Take 2 tablets (4 mg) by mouth daily (with breakfast) 60 tablet 0     escitalopram (LEXAPRO) 10 MG tablet Take 1 tablet (10 mg) by mouth every morning       fluticasone (FLONASE) 50 MCG/ACT nasal spray Spray 1 spray into both nostrils At Bedtime       levETIRAcetam (KEPPRA) 750 MG tablet Take 2 tablets (1,500 mg) by mouth 2 times daily 120 tablet 1     levETIRAcetam (KEPPRA) 750 MG tablet Take 2 tablets (1,500 mg) by mouth 2 times daily 120 tablet 0     levothyroxine (SYNTHROID/LEVOTHROID) 137 MCG tablet Take 137 mcg by mouth every morning       lisinopril (ZESTRIL) 5 MG tablet Take 1 tablet (5 mg)  by mouth daily 30 tablet 3     loratadine (CLARITIN) 10 MG tablet Take 1 tablet (10 mg) by mouth At Bedtime       OMEPRAZOLE PO Take by mouth daily       DRUG ALLERGIES   Allergies   Allergen Reactions     No Clinical Screening - See Comments Rash     Cats and dogs         ONCOLOGIC HISTORY  -PRESENTATION: New onset seizures; complex partial event with speech arrest and altered mental status lasting several minutes. Later had secondary generalization. Started Keppra.   -MR brain imaging with a ring enhancing lesion in the right parietal lobe.    -6/27/2019 SURGERY: Right temporal craniotomy for mass resection, gross total resection by Dr. Tam Barreto.  PATHOLOGY: Glioblastoma; IDH1 R132H wildtype, MGMT promoter unmethylated  -7/3/2019 CLINICAL TRIAL: Evaluated by Dr. Wilmer Mckenna at [facility], consented for clinical trial 2773-8959 (Standard of Care plus atezolizumab).  -7/22 - 8/30/2019 CHEMORADS: 60cGy with concurrent temozolomide 75mg/m2/day (145mg) plus atezolizumab Q2 weeks on clinical trial 4455-4302.  -9/6/2019 NEURO-ONC: [place] at the [place].   -9/25/2019 - 3/16/2020 CHEMO: Adjuvant chemotherapy; temozolomide + atezolizumab 840 mg IV on clinical trial 2149-6796.  -4/6/2020 NEURO-ONC: Communicated with Dr. Borrero. Plan for surgery and next steps pending pathology results.   -4/9/2020 SURGERY + RADS: Repeat craniotomy for surgical debulking plus placement of GammaTiles.  PATHOLOGY: Recurrent glioblastoma.   Next generation sequencing: Microsatellite instability: Stable. Tumor mutational burden: 4 (Low). APC, CDK4, MDM2, PTEN mutated. ARID1A, ASXL1, ATRX, DNMT3A, FANCE, KDM5C, MED12, MEF2B, NF1, RUNX1, TERT mutated. PD-L1 negative.  -4/20/2020 NEURO-ONC/ CHEMO: Starting Lomustine 3 weeks post-op. Consented for next generation sequencing and sending most recent tumor sample.    -4/30/2020 CHEMO: Lomustine, cycle 1.   -5/11/2020 NEURO-ONC: Clinically well. Referral to genetic counseling.   -6/5/2020  NEURO-ONC/ MRB/ CHEMO: Clinically well. Imaging with a positive treatment response. Lomustine, cycle 2 (start date of 6/11).    -7/20/2020 NEURO-ONC/ MRB/ CHEMO: Clinically well. Imaging without concern; aside from subdural fluid collection. Labs at goal. Lomustine, cycle 3 (start date of 7/23).    -8/31/2020 NEURO-ONC/ MRB/ CHEMO: Clinically well. Imaging without concern. Labs at goal. Lomustine, cycle 4 (start date of 9/3).   -10/12/2020 NEURO-ONC/ MRB/ CHEMO: Clinically well. Imaging without concern for cancer growth, but the resection cavity is expanding with time. Per Dr. Chan; continue to monitor with no surgical intervention at this time. Labs at goal. Lomustine, cycle 5 (start date of 10/15).    -11/23/2020 NEURO-ONC/ MRB: Clinically well. Imaging with a new distant lesion. Stopping lomustine. Referral to Dr. Chan.    -12/10 - 12/16/2020 RADS: Gamma Knife therapy of 15 Gy/ fraction x 5 fractions.  -12/18/2020 NEURO-ONC: Discussion of treatment.  -1/13/2021 SURGERY: Right craniotomy for open biopsy of the right parietal tumor plus Ziopharm adenovirus injection.  PATHOLOGY: Recurrent glioblastoma.  -1/25/2021 NEURO-ONC: Clinically improving since surgery. Continue taking veledimex through Wednesday.   -1/27/2021 CHEMO: Starting nivolumab + bevacizumab.   -2/22/2021 NEURO-ONC/ CHEMO: Clinically improving. Continue nivolumab + bevacizumab.  -3/22/2021 NEURO-ONC/ MRB/ CHEMO: Clinically improving. Imaging with positive treatment response, but with 1 area of concern; consideration for biopsy + GÓMEZ. Continue nivolumab + bevacizumab.  -4/5/2021 NEURO-ONC/ MRB: Clinically improved. Imaging overall stable, but slightly more pronounced area of contrast enhancement (4mm in size) in the right frontal lobe. Plan is for biopsy + GÓMEZ on 4/21. Holding nivolumab + bevacizumab.  -4/21/2021 SURGERY: MRI guided biopsy and  laser ablation of the right frontal lesion and right parietal lesion.   PATHOLOGY: A. Brain, right biopsy  "#1, biopsy: Small fragments of cortical nieves matter.       B. Brain, right biopsy #2, biopsy: Cortex and mildly hypercellular white matter.       C. Brain, right biopsy #3, biopsy: Small pieces of recurrent, active glioblastoma with predominant necrosis.       D. Brain, right biopsy #4, biopsy: Mixture of necrosis and blood.   -4/26/2021 NEURO-ONC/ CHEMO: Clinically improving with dexamethasone use. Focal disease control with GÓMEZ. Continue nivolumab + kee. Planning to repeat viral injection on 5/26.   -5/21/2021 SURGERY: Ziopharm adenovirus injection. Veledimex course completed.   -5/27/2021 MRB with postsurgical craniotomy changes. Increased areas of nodular enhancement including new areas of nodular enhancement in the right temporal lobe.  -6/14/2021 NEURO-ONC/ CHEMO: Clinically recovering. Restarting nivolumab, holding kee until later this week. Holding on Optune use.  -6/17/2021 MR brain imaging with a new enhancing lesions in the right lateral midbrain, right cerebral peduncle and migdalia probably representing new tumors. Increased size of the enhancing lesions in the right frontal lobe and superior right parietal lobe, concerning for progression of malignancy. Diffusion restricting mass within the right parietal lobe likely represents residual tumor.  -6/21/2021 EEG with mild to diffuse encephalopathy.    -6/28/2021 NEURO-ONC: Increase dexamethasone 4mg daily. Continue treatment. Repeat imaging as planned.     SOCIAL HISTORY   Tobacco use: Never smoker.   Alcohol use: Social, infrequent.   Drug use: Denies.  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       PHYSICAL EXAMINATION  There were no vitals taken for this visit.   Wt Readings from Last 2 Encounters:   06/17/21 74.8 kg (165 lb)   06/14/21 72.9 kg (160 lb 12.8 oz)      Ht Readings from Last 2 Encounters:   06/17/21 1.778 m (5' 10\")   06/03/21 1.778 m (5' 10\")      KPS 60    -Generally well appearing. Lower energy today.   -Respiratory: No " audible wheezing.   -Skin: No rashes.   -Neurologic:   MENTAL STATUS:     Alert.    Recall: Slow. Slow to respond to questions.   Speech fluent, but slow.    Comprehension impaired.      CRANIAL NERVES:     Pupils are equal, round.     Homonymous hemianopsia, left-side.   Flattening of nasolabial folds on the left, good activation.   Hearing intact.   With normal phonation, no dysfunction of the palate or tongue.      The rest of a comprehensive physical examination is deferred due to PHE (public health emergency) video visit restrictions.        MEDICAL RECORDS  Obtained and personally reviewed all available outside medical records in addition to reviewing any records available in our electronic system.     LABS  Personally reviewed all available lab results.     IMAGING  Personally reviewed recent MR brain imaging.     Imaging and case was previously reviewed with Dr. Chan and Tono.       IMPRESSION  Clinic time for this high complexity visit was spent discussing in detail the nature of his cancer in light of his recent surgery, current treatment, and recent imaging results.     Clinically, Aristeo is still recovering from surgery and his recent hospitalization. Left-sided symptoms and neglect, impaired cognition, and increased fatigue remain present, but improved since starting dexamethasone. He is on dexamethasone 2mg daily; will increase to 4mg daily starting today. The plan is to continue kee and nivolumab as scheduled tomorrow. Continue synthroid. Can consider referral to endocrinology for management of hypothyroidism.     PROBLEM LIST  Glioblastoma, MGMT unmethylated and IDH1 wildtype by IHC  Seizure  Chemotherapy-associated constipation  Headaches  Apraxia    PLAN  -CANCER-DIRECTED THERAPY-  -As above.      -HYPERTENSION-  -Related to kee use.   -Lisinopril 5mg daily.   -Continue monitoring blood pressure at home.   -Can increase antihypertensive as needed.    -STEROIDS-  -Increase dexamethasone to 4mg  daily.     -SEIZURE MANAGEMENT-  -Given history of seizures, will continue current antiepileptic regimen of Keppra for the foreseeable future.    -Quality of life/ MOOD/ FATIGUE-  -Continue Lexapro.   -Continue to monitor mood as untreated/ undertreated depression can worsen fatigue, dysorexia, and quality of life.  -Continue to monitor TSH/ T4 levels and adjust Synthroid as needed.     COVID vaccination series complete as of 3/25/2021.    Return to clinic as already scheduled.    Ericka Avila MD  Neuro-oncology       Again, thank you for allowing me to participate in the care of your patient.        Sincerely,        Ericka Avila MD

## 2021-06-28 NOTE — PATIENT INSTRUCTIONS
Increase dexamethasone to 4mg daily.   -Refilled.    Continue with kee + nivolumab as scheduled tomorrow.    Imaging and follow-up as scheduled.     Ericka Avila MD  Neuro-oncology  6/28/2021

## 2021-06-29 NOTE — PROGRESS NOTES
Infusion Nursing Note:  Erasto Maher presents today for C9 D1 MVASI/Nivolumab (Nivo HELD)  Patient seen by provider today: No   present during visit today: Not Applicable.    Note: Patient arrives accompanied by wife. Denies any changes overnight since visit with Dr. Avila yesterday.    Intravenous Access:  Peripheral IV placed in infusion by RN.    Treatment Conditions:  Lab Results   Component Value Date    HGB 12.3 06/21/2021     Lab Results   Component Value Date    WBC 6.8 06/21/2021      Lab Results   Component Value Date    ANEU 3.3 06/17/2021     Lab Results   Component Value Date     06/21/2021      Lab Results   Component Value Date     06/29/2021                   Lab Results   Component Value Date    POTASSIUM 4.3 06/29/2021           Lab Results   Component Value Date    MAG 2.0 06/03/2021            Lab Results   Component Value Date    CR 0.66 06/29/2021                   Lab Results   Component Value Date    JUDIE 9.0 06/29/2021                Lab Results   Component Value Date    BILITOTAL 0.3 06/29/2021           Lab Results   Component Value Date    ALBUMIN 3.6 06/29/2021                    Lab Results   Component Value Date     06/29/2021           Lab Results   Component Value Date    AST 53 06/29/2021       Results reviewed, labs did NOT meet treatment parameters for Nivo: ALT >3x ULN. Notified Dr. Avila and on-call solid tumor oncologist Dr. Kirby.  Met parameters for Avastin. BP WNL.     Per [facility] Dr. Kirby/Alanna Preston, RN @4082 6/29/21  - Hold Nivolumab due to elevated LFT's being >3x ULN    Patient's wife, Judith, called and updated with news. Wife very unhappy with  not being able to receive Nivolumab today. Pt eager to hear from Dr. Avila via phone call or [employer] [employer].     Dr. Avila called back after patient/wife discharged.    Per [facility] Dr. Avila/Alanna Preston, RN @4105 6/29/21  - Agree with plan - want to avoid liver failure. Okay to complete  today's plan cycle and will plan to give on 7/12 with C10 Avastin  - Will touch base with wife      Post Infusion Assessment:  Patient tolerated infusion without incident.  Blood return noted pre and post infusion.  Site patent and intact, free from redness, edema or discomfort.  No evidence of extravasations.  Access discontinued per protocol.       Discharge Plan:   Patient declined prescription refills.  Discharge instructions reviewed with: Patient.  Patient and/or family verbalized understanding of discharge instructions and all questions answered.  AVS to patient via [employer].  Patient will return 7/12 for next appointment.   Patient discharged in stable condition accompanied by: wife.  Departure Mode: Ambulatory.  Face to Face: 8.      Alanna Preston RN

## 2021-07-10 NOTE — PROGRESS NOTES
"NEURO-ONCOLOGY VISIT  Jul 12, 2021    CHIEF COMPLAINT: Mr. 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 He completed chemoradiotherapy on 8/30/2019 and was managed on a clinical trial receiving atezolizumab (anti-PDL1) plus adjuvant temozolomide. He received his last dose of immunotherapy on 3/16/2020 when imaging on 3/28/2020 showed progression of disease. He underwent a repeat craniotomy for tumor resection with Dr. Chan on 4/9/2020 with placement of GammaTiles.     Aristeo was managed on lomustine monotherapy. However, imaging in 11/2020 showed a new distant lesion consistent with disease progression.     He completed a repeat course of radiation, then a repeat resection on 1/13/2021 plus use of the compassionate use [employer] treatment protocol that utilizes an intratumoral adenovirus injection activating human interleukin-12 + veledimex in combination with adjuvant nivolumab. Pathology was consistent with recurrent glioblastoma. He was receiving nivolumab + bevacizumab until imaging in 3/2021 showed an area of contrast enhancement.     Aristeo underwent a biopsy + GÓMEZ on 4/21/2021 to both the frontal and parietal lesions and pathology showed small pieces of recurrent glioblastoma with predominant necrosis. On 5/26/2021, he had a second intraturmoral viral injection + veledimex. In the post-operative period, he has resumed nivolumab and kee. Unfortunately, imaging in 7/2021 showed diffuse cancer progression.     I met with Aristeo and Judith (wife) today for this follow-up visit.     HISTORY OF PRESENT ILLNESS  -Aristeo continues to struggle with coordination/ otis-neglect/ weakness in the left leg >>> arm. He is a 1 person assist. Working with in-home therapies.   -No recurrent seizure events.   -On dexamethasone 4mg daily.   -Continued cognitive impairment, poor " insight.   -Visual neglect. No double vision.   -Some coughing with swallowing. Some episodes of hiccups. Speech is more hoarse and slurred, worse with fatigue.   -Energy is lower.   -2 weeks ago Nivo was held due to elevated LFTs. LFTs trended down.     REVIEW OF SYSTEMS  A comprehensive ROS negative except as in HPI.      MEDICATIONS   Current Outpatient Medications   Medication Sig Dispense Refill     dexamethasone (DECADRON) 2 MG tablet Take 3 tablets (6 mg) by mouth daily (with breakfast) 90 tablet 1     dexamethasone (DECADRON) 2 MG tablet Take 2 tablets (4 mg) by mouth daily (with breakfast) 60 tablet 0     escitalopram (LEXAPRO) 10 MG tablet Take 1 tablet (10 mg) by mouth every morning       fluticasone (FLONASE) 50 MCG/ACT nasal spray Spray 1 spray into both nostrils At Bedtime       levETIRAcetam (KEPPRA) 750 MG tablet Take 2 tablets (1,500 mg) by mouth 2 times daily 120 tablet 1     levothyroxine (SYNTHROID/LEVOTHROID) 137 MCG tablet Take 137 mcg by mouth every morning       lisinopril (ZESTRIL) 5 MG tablet Take 1 tablet (5 mg) by mouth daily 30 tablet 3     loratadine (CLARITIN) 10 MG tablet Take 1 tablet (10 mg) by mouth At Bedtime       OMEPRAZOLE PO Take 10 mg by mouth At Bedtime        acetaminophen (TYLENOL) 325 MG tablet Take 2 tablets (650 mg) by mouth every 4 hours as needed for other (For optimal non-opioid multimodal pain management to improve pain control.) (Patient not taking: Reported on 7/12/2021)       levETIRAcetam (KEPPRA) 750 MG tablet Take 2 tablets (1,500 mg) by mouth 2 times daily (Patient not taking: Reported on 7/12/2021) 120 tablet 0     DRUG ALLERGIES   Allergies   Allergen Reactions     No Clinical Screening - See Comments Rash     Cats and dogs         ONCOLOGIC HISTORY  -PRESENTATION: New onset seizures; complex partial event with speech arrest and altered mental status lasting several minutes. Later had secondary generalization. Started Keppra.   -MR brain imaging with a ring  enhancing lesion in the right parietal lobe.    -6/27/2019 SURGERY: Right temporal craniotomy for mass resection, gross total resection by Dr. Tam Barreto.  PATHOLOGY: Glioblastoma; IDH1 R132H wildtype, MGMT promoter unmethylated  -7/3/2019 CLINICAL TRIAL: Evaluated by Dr. Wilmer Mckenna at [facility], consented for clinical trial 2016-0867 (Standard of Care plus atezolizumab).  -7/22 - 8/30/2019 CHEMORADS: 60cGy with concurrent temozolomide 75mg/m2/day (145mg) plus atezolizumab Q2 weeks on clinical trial 2016-0867.  -9/6/2019 NEURO-ONC: Evaluated at the [facility].   -9/25/2019 - 3/16/2020 CHEMO: Adjuvant chemotherapy; temozolomide + atezolizumab 840 mg IV on clinical trial 2016-0867.  -4/6/2020 NEURO-ONC: Communicated with Dr. Borrero. Plan for surgery and next steps pending pathology results.   -4/9/2020 SURGERY + RADS: Repeat craniotomy for surgical debulking plus placement of GammaTiles.  PATHOLOGY: Recurrent glioblastoma.   Next generation sequencing: Microsatellite instability: Stable. Tumor mutational burden: 4 (Low). APC, CDK4, MDM2, PTEN mutated. ARID1A, ASXL1, ATRX, DNMT3A, FANCE, KDM5C, MED12, MEF2B, NF1, RUNX1, TERT mutated. PD-L1 negative.  -4/20/2020 NEURO-ONC/ CHEMO: Starting Lomustine 3 weeks post-op. Consented for next generation sequencing and sending most recent tumor sample.    -4/30/2020 CHEMO: Lomustine, cycle 1.   -5/11/2020 NEURO-ONC: Clinically well. Referral to genetic counseling.   -6/5/2020 NEURO-ONC/ MRB/ CHEMO: Clinically well. Imaging with a positive treatment response. Lomustine, cycle 2 (start date of 6/11).    -7/20/2020 NEURO-ONC/ MRB/ CHEMO: Clinically well. Imaging without concern; aside from subdural fluid collection. Labs at goal. Lomustine, cycle 3 (start date of 7/23).    -8/31/2020 NEURO-ONC/ MRB/ CHEMO: Clinically well. Imaging without concern. Labs at goal. Lomustine, cycle 4 (start date of 9/3).   -10/12/2020 NEURO-ONC/ MRB/ CHEMO: Clinically well. Imaging without concern  for cancer growth, but the resection cavity is expanding with time. Per Dr. Chan; continue to monitor with no surgical intervention at this time. Labs at goal. Lomustine, cycle 5 (start date of 10/15).    -11/23/2020 NEURO-ONC/ MRB: Clinically well. Imaging with a new distant lesion. Stopping lomustine. Referral to Dr. Chan.    -12/10 - 12/16/2020 RADS: Gamma Knife therapy of 15 Gy/ fraction x 5 fractions.  -12/18/2020 NEURO-ONC: Discussion of treatment.  -1/13/2021 SURGERY: Right craniotomy for open biopsy of the right parietal tumor plus Ziopharm adenovirus injection.  PATHOLOGY: Recurrent glioblastoma.  -1/25/2021 NEURO-ONC: Clinically improving since surgery. Continue taking veledimex through Wednesday.   -1/27/2021 CHEMO: Starting nivolumab + bevacizumab.   -2/22/2021 NEURO-ONC/ CHEMO: Clinically improving. Continue nivolumab + bevacizumab.  -3/22/2021 NEURO-ONC/ MRB/ CHEMO: Clinically improving. Imaging with positive treatment response, but with 1 area of concern; consideration for biopsy + GÓMEZ. Continue nivolumab + bevacizumab.  -4/5/2021 NEURO-ONC/ MRB: Clinically improved. Imaging overall stable, but slightly more pronounced area of contrast enhancement (4mm in size) in the right frontal lobe. Plan is for biopsy + GÓMEZ on 4/21. Holding nivolumab + bevacizumab.  -4/21/2021 SURGERY: MRI guided biopsy and  laser ablation of the right frontal lesion and right parietal lesion.   PATHOLOGY: A. Brain, right biopsy #1, biopsy: Small fragments of cortical nieves matter.       B. Brain, right biopsy #2, biopsy: Cortex and mildly hypercellular white matter.       C. Brain, right biopsy #3, biopsy: Small pieces of recurrent, active glioblastoma with predominant necrosis.       D. Brain, right biopsy #4, biopsy: Mixture of necrosis and blood.   -4/26/2021 NEURO-ONC/ CHEMO: Clinically improving with dexamethasone use. Focal disease control with GÓMEZ. Continue nivolumab + kee. Planning to repeat viral injection on 5/26.  "  -5/21/2021 SURGERY: Ziopharm adenovirus injection. Veledimex course completed.   -5/27/2021 MRB with postsurgical craniotomy changes. Increased areas of nodular enhancement including new areas of nodular enhancement in the right temporal lobe.  -6/14/2021 NEURO-ONC/ CHEMO: Clinically recovering. Restarting nivolumab, holding kee until later this week. Holding on Optune use.  -6/17/2021 MR brain imaging with a new enhancing lesions in the right lateral midbrain, right cerebral peduncle and migdalia probably representing new tumors. Increased size of the enhancing lesions in the right frontal lobe and superior right parietal lobe, concerning for progression of malignancy. Diffusion restricting mass within the right parietal lobe likely represents residual tumor.  -6/21/2021 EEG with mild to diffuse encephalopathy.    -6/28/2021 NEURO-ONC: Increase dexamethasone 4mg daily. Continue treatment. Repeat imaging as planned.   -7/12/2021 NEURO-ONC/ MRB: Imaging with diffuse cancer progression. Stopping treatment, referral to hospice, increasing dexamethasone to 6mg daily.    SOCIAL HISTORY   Tobacco use: Never smoker.   Alcohol use: Social, infrequent.   Drug use: Denies.  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       PHYSICAL EXAMINATION  BP (!) 136/93   Pulse 76   Temp 97.5  F (36.4  C) (Oral)   Resp 16   Wt 75.2 kg (165 lb 12.8 oz)   SpO2 98%   BMI 23.79 kg/m     Wt Readings from Last 2 Encounters:   07/12/21 75.2 kg (165 lb 12.8 oz)   06/29/21 73.3 kg (161 lb 11.2 oz)      Ht Readings from Last 2 Encounters:   06/17/21 1.778 m (5' 10\")   06/03/21 1.778 m (5' 10\")      KPS 60    -Generally well appearing.  -Respiratory: No audible wheezing.   -Skin: No rashes.   -Neurologic:   MENTAL STATUS:     Alert.    Recall: Responds well to questions.   Speech fluent, but slow.    CRANIAL NERVES:     Pupils are equal, round.     Hearing intact.   Mild dysarthria.      MEDICAL RECORDS  Obtained and personally " reviewed all available outside medical records in addition to reviewing any records available in our electronic system.     LABS  Personally reviewed all available lab results.     IMAGING  Personally reviewed MR brain imaging from today and compared to priornimaging. To my eye, there is an increase in contrast enhancement, diffusion, and perfusion plus associated T2 FLAIR about the multi-focal lesions consistent with diffuse cancer progression through kee.     Imaging was shown to and results were reviewed with Aristeo and Judith.     Imaging and case will be reviewed at Brain Tumor Conference later today. Results personally discussed with Dr. Chan.       IMPRESSION  Clinic time for this high complexity visit was spent discussing in detail the nature of his cancer in light of his current treatment and repeat imaging results.     Clinically, Aristeo has not seen improvement in neurological deficits despite use of kee and dexamethasone. Labs with nearly normalized LFTs and no other concerning findings, TSH normal. Unfortunately, imaging shows diffuse cancer progression, evident that the cancer is not responding to his current treatment. With no surgical, radiation therapy or effective chemotherapy treatments remaining, particularly given the progression of cancer within the brainstem and the lack of continued response to kee, we discussed transitioning to hospice. Referral has been placed. Will increase dexamethasone to 6mg daily to hopefully reduce his symptom burden. I provided Aristeo and Judith with a prognosis to 1-2 months.     PROBLEM LIST  Glioblastoma, MGMT unmethylated and IDH1 wildtype by IHC  Seizure  Chemotherapy-associated constipation  Headaches  Apraxia    PLAN  -CANCER-DIRECTED THERAPY-  -As above; Referral to hospice.     -STEROIDS-  -Increase dexamethasone to 6 mg daily.     -SEIZURE MANAGEMENT-  -Given history of seizures, will continue current antiepileptic regimen of Keppra for the foreseeable  future.    COVID vaccination series complete as of 3/25/2021.    Return to clinic is not needed at this time.   It was an honor taking part in Aristeo's care.    Ericka Avila MD  Neuro-oncology

## 2021-07-12 NOTE — LETTER
"    7/12/2021         RE: Erasto Maher  [address]  [facility] [address]        Dear Colleague,    Thank you for referring your patient, Erasto Maher, to the [facility]. Please see a copy of my visit note below.    NEURO-ONCOLOGY VISIT  Jul 12, 2021    CHIEF COMPLAINT: Mr. 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 He completed chemoradiotherapy on 8/30/2019 and was managed on a clinical trial receiving atezolizumab (anti-PDL1) plus adjuvant temozolomide. He received his last dose of immunotherapy on 3/16/2020 when imaging on 3/28/2020 showed progression of disease. He underwent a repeat craniotomy for tumor resection with Dr. Chan on 4/9/2020 with placement of GammaTiles.     Aristeo was managed on lomustine monotherapy. However, imaging in 11/2020 showed a new distant lesion consistent with disease progression.     He completed a repeat course of radiation, then a repeat resection on 1/13/2021 plus use of the compassionate use [employer] treatment protocol that utilizes an intratumoral adenovirus injection activating human interleukin-12 + veledimex in combination with adjuvant nivolumab. Pathology was consistent with recurrent glioblastoma. He was receiving nivolumab + bevacizumab until imaging in 3/2021 showed an area of contrast enhancement.     Aristeo underwent a biopsy + GÓMEZ on 4/21/2021 to both the frontal and parietal lesions and pathology showed small pieces of recurrent glioblastoma with predominant necrosis. On 5/26/2021, he had a second intraturmoral viral injection + veledimex. In the post-operative period, he has resumed nivolumab and kee. Unfortunately, imaging in 7/2021 showed diffuse cancer progression.     I met with Aristeo and Judith (wife) today for this follow-up visit.     HISTORY OF " PRESENT ILLNESS  -Aristeo continues to struggle with coordination/ otis-neglect/ weakness in the left leg >>> arm. He is a 1 person assist. Working with in-home therapies.   -No recurrent seizure events.   -On dexamethasone 4mg daily.   -Continued cognitive impairment, poor insight.   -Visual neglect. No double vision.   -Some coughing with swallowing. Some episodes of hiccups. Speech is more hoarse and slurred, worse with fatigue.   -Energy is lower.   -2 weeks ago Nivo was held due to elevated LFTs. LFTs trended down.     REVIEW OF SYSTEMS  A comprehensive ROS negative except as in HPI.      MEDICATIONS   Current Outpatient Medications   Medication Sig Dispense Refill     dexamethasone (DECADRON) 2 MG tablet Take 3 tablets (6 mg) by mouth daily (with breakfast) 90 tablet 1     dexamethasone (DECADRON) 2 MG tablet Take 2 tablets (4 mg) by mouth daily (with breakfast) 60 tablet 0     escitalopram (LEXAPRO) 10 MG tablet Take 1 tablet (10 mg) by mouth every morning       fluticasone (FLONASE) 50 MCG/ACT nasal spray Spray 1 spray into both nostrils At Bedtime       levETIRAcetam (KEPPRA) 750 MG tablet Take 2 tablets (1,500 mg) by mouth 2 times daily 120 tablet 1     levothyroxine (SYNTHROID/LEVOTHROID) 137 MCG tablet Take 137 mcg by mouth every morning       lisinopril (ZESTRIL) 5 MG tablet Take 1 tablet (5 mg) by mouth daily 30 tablet 3     loratadine (CLARITIN) 10 MG tablet Take 1 tablet (10 mg) by mouth At Bedtime       OMEPRAZOLE PO Take 10 mg by mouth At Bedtime        acetaminophen (TYLENOL) 325 MG tablet Take 2 tablets (650 mg) by mouth every 4 hours as needed for other (For optimal non-opioid multimodal pain management to improve pain control.) (Patient not taking: Reported on 7/12/2021)       levETIRAcetam (KEPPRA) 750 MG tablet Take 2 tablets (1,500 mg) by mouth 2 times daily (Patient not taking: Reported on 7/12/2021) 120 tablet 0     DRUG ALLERGIES   Allergies   Allergen Reactions     No Clinical Screening -  See Comments Rash     Cats and dogs         ONCOLOGIC HISTORY  -PRESENTATION: New onset seizures; complex partial event with speech arrest and altered mental status lasting several minutes. Later had secondary generalization. Started Keppra.   -MR brain imaging with a ring enhancing lesion in the right parietal lobe.    -6/27/2019 SURGERY: Right temporal craniotomy for mass resection, gross total resection by Dr. Tam Barreto.  PATHOLOGY: Glioblastoma; IDH1 R132H wildtype, MGMT promoter unmethylated  -7/3/2019 CLINICAL TRIAL: Evaluated by Dr. Wilmer Mckenna at [facility], consented for clinical trial 2016-0867 (Standard of Care plus atezolizumab).  -7/22 - 8/30/2019 CHEMORADS: 60cGy with concurrent temozolomide 75mg/m2/day (145mg) plus atezolizumab Q2 weeks on clinical trial 2016-0867.  -9/6/2019 NEURO-ONC: Evaluated at the [facility].   -9/25/2019 - 3/16/2020 CHEMO: Adjuvant chemotherapy; temozolomide + atezolizumab 840 mg IV on clinical trial 2016-0867.  -4/6/2020 NEURO-ONC: Communicated with Dr. Borrero. Plan for surgery and next steps pending pathology results.   -4/9/2020 SURGERY + RADS: Repeat craniotomy for surgical debulking plus placement of GammaTiles.  PATHOLOGY: Recurrent glioblastoma.   Next generation sequencing: Microsatellite instability: Stable. Tumor mutational burden: 4 (Low). APC, CDK4, MDM2, PTEN mutated. ARID1A, ASXL1, ATRX, DNMT3A, FANCE, KDM5C, MED12, MEF2B, NF1, RUNX1, TERT mutated. PD-L1 negative.  -4/20/2020 NEURO-ONC/ CHEMO: Starting Lomustine 3 weeks post-op. Consented for next generation sequencing and sending most recent tumor sample.    -4/30/2020 CHEMO: Lomustine, cycle 1.   -5/11/2020 NEURO-ONC: Clinically well. Referral to genetic counseling.   -6/5/2020 NEURO-ONC/ MRB/ CHEMO: Clinically well. Imaging with a positive treatment response. Lomustine, cycle 2 (start date of 6/11).    -7/20/2020 NEURO-ONC/ MRB/ CHEMO: Clinically well. Imaging without concern; aside from subdural fluid  collection. Labs at goal. Lomustine, cycle 3 (start date of 7/23).    -8/31/2020 NEURO-ONC/ MRB/ CHEMO: Clinically well. Imaging without concern. Labs at goal. Lomustine, cycle 4 (start date of 9/3).   -10/12/2020 NEURO-ONC/ MRB/ CHEMO: Clinically well. Imaging without concern for cancer growth, but the resection cavity is expanding with time. Per Dr. Chan; continue to monitor with no surgical intervention at this time. Labs at goal. Lomustine, cycle 5 (start date of 10/15).    -11/23/2020 NEURO-ONC/ MRB: Clinically well. Imaging with a new distant lesion. Stopping lomustine. Referral to Dr. Chan.    -12/10 - 12/16/2020 RADS: Gamma Knife therapy of 15 Gy/ fraction x 5 fractions.  -12/18/2020 NEURO-ONC: Discussion of treatment.  -1/13/2021 SURGERY: Right craniotomy for open biopsy of the right parietal tumor plus Ziopharm adenovirus injection.  PATHOLOGY: Recurrent glioblastoma.  -1/25/2021 NEURO-ONC: Clinically improving since surgery. Continue taking veledimex through Wednesday.   -1/27/2021 CHEMO: Starting nivolumab + bevacizumab.   -2/22/2021 NEURO-ONC/ CHEMO: Clinically improving. Continue nivolumab + bevacizumab.  -3/22/2021 NEURO-ONC/ MRB/ CHEMO: Clinically improving. Imaging with positive treatment response, but with 1 area of concern; consideration for biopsy + GÓMEZ. Continue nivolumab + bevacizumab.  -4/5/2021 NEURO-ONC/ MRB: Clinically improved. Imaging overall stable, but slightly more pronounced area of contrast enhancement (4mm in size) in the right frontal lobe. Plan is for biopsy + GÓMEZ on 4/21. Holding nivolumab + bevacizumab.  -4/21/2021 SURGERY: MRI guided biopsy and  laser ablation of the right frontal lesion and right parietal lesion.   PATHOLOGY: A. Brain, right biopsy #1, biopsy: Small fragments of cortical nieves matter.       B. Brain, right biopsy #2, biopsy: Cortex and mildly hypercellular white matter.       C. Brain, right biopsy #3, biopsy: Small pieces of recurrent, active glioblastoma  "with predominant necrosis.       D. Brain, right biopsy #4, biopsy: Mixture of necrosis and blood.   -4/26/2021 NEURO-ONC/ CHEMO: Clinically improving with dexamethasone use. Focal disease control with GÓMEZ. Continue nivolumab + kee. Planning to repeat viral injection on 5/26.   -5/21/2021 SURGERY: Ziopharm adenovirus injection. Veledimex course completed.   -5/27/2021 MRB with postsurgical craniotomy changes. Increased areas of nodular enhancement including new areas of nodular enhancement in the right temporal lobe.  -6/14/2021 NEURO-ONC/ CHEMO: Clinically recovering. Restarting nivolumab, holding kee until later this week. Holding on Optune use.  -6/17/2021 MR brain imaging with a new enhancing lesions in the right lateral midbrain, right cerebral peduncle and migdalia probably representing new tumors. Increased size of the enhancing lesions in the right frontal lobe and superior right parietal lobe, concerning for progression of malignancy. Diffusion restricting mass within the right parietal lobe likely represents residual tumor.  -6/21/2021 EEG with mild to diffuse encephalopathy.    -6/28/2021 NEURO-ONC: Increase dexamethasone 4mg daily. Continue treatment. Repeat imaging as planned.   -7/12/2021 NEURO-ONC/ MRB: Imaging with diffuse cancer progression. Stopping treatment, referral to hospice, increasing dexamethasone to 6mg daily.    SOCIAL HISTORY   Tobacco use: Never smoker.   Alcohol use: Social, infrequent.   Drug use: Denies.  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       PHYSICAL EXAMINATION  BP (!) 136/93   Pulse 76   Temp 97.5  F (36.4  C) (Oral)   Resp 16   Wt 75.2 kg (165 lb 12.8 oz)   SpO2 98%   BMI 23.79 kg/m     Wt Readings from Last 2 Encounters:   07/12/21 75.2 kg (165 lb 12.8 oz)   06/29/21 73.3 kg (161 lb 11.2 oz)      Ht Readings from Last 2 Encounters:   06/17/21 1.778 m (5' 10\")   06/03/21 1.778 m (5' 10\")      KPS 60    -Generally well appearing.  -Respiratory: No " audible wheezing.   -Skin: No rashes.   -Neurologic:   MENTAL STATUS:     Alert.    Recall: Responds well to questions.   Speech fluent, but slow.    CRANIAL NERVES:     Pupils are equal, round.     Hearing intact.   Mild dysarthria.      MEDICAL RECORDS  Obtained and personally reviewed all available outside medical records in addition to reviewing any records available in our electronic system.     LABS  Personally reviewed all available lab results.     IMAGING  Personally reviewed MR brain imaging from today and compared to priornimaging. To my eye, there is an increase in contrast enhancement, diffusion, and perfusion plus associated T2 FLAIR about the multi-focal lesions consistent with diffuse cancer progression through kee.     Imaging was shown to and results were reviewed with Aristeo and Judith.     Imaging and case will be reviewed at Brain Tumor Conference later today. Results personally discussed with Dr. Chan.       IMPRESSION  Clinic time for this high complexity visit was spent discussing in detail the nature of his cancer in light of his current treatment and repeat imaging results.     Clinically, Aristeo has not seen improvement in neurological deficits despite use of kee and dexamethasone. Labs with nearly normalized LFTs and no other concerning findings, TSH normal. Unfortunately, imaging shows diffuse cancer progression, evident that the cancer is not responding to his current treatment. With no surgical, radiation therapy or effective chemotherapy treatments remaining, particularly given the progression of cancer within the brainstem and the lack of continued response to kee, we discussed transitioning to hospice. Referral has been placed. Will increase dexamethasone to 6mg daily to hopefully reduce his symptom burden. I provided Aristeo and Judith with a prognosis to 1-2 months.     PROBLEM LIST  Glioblastoma, MGMT unmethylated and IDH1 wildtype by IHC  Seizure  Chemotherapy-associated  constipation  Headaches  Apraxia    PLAN  -CANCER-DIRECTED THERAPY-  -As above; Referral to hospice.     -STEROIDS-  -Increase dexamethasone to 6 mg daily.     -SEIZURE MANAGEMENT-  -Given history of seizures, will continue current antiepileptic regimen of Keppra for the foreseeable future.    COVID vaccination series complete as of 3/25/2021.    Return to clinic is not needed at this time.   It was an honor taking part in Aristeo's care.    Ericka Avila MD  Neuro-oncology         Again, thank you for allowing me to participate in the care of your patient.        Sincerely,        Ericka Avila MD

## 2021-07-12 NOTE — NURSING NOTE
Chief Complaint   Patient presents with     Blood Draw     Vitals, blood drawn of PIV that was already in place. Pt checked into appt     PIV was left in for infusion.    MIKE Dorman LPN

## 2021-07-12 NOTE — DISCHARGE INSTRUCTIONS
MRI Contrast Discharge Instructions    The IV contrast you received today will pass out of your body in your  urine. This will happen in the next 24 hours. You will not feel this process.  Your urine will not change color.    Drink at least 4 extra glasses of water or juice today (unless your doctor  has restricted your fluids). This reduces the stress on your kidneys.  You may take your regular medicines.    If you are on dialysis: It is best to have dialysis today.    If you have a reaction: Most reactions happen right away. If you have  any new symptoms after leaving the hospital (such as hives or swelling),  call your hospital at the correct number below. Or call your family doctor.  If you have breathing distress or wheezing, call 911.    Special instructions: ***    I have read and understand the above information.    Signature:______________________________________ Date:___________    Staff:__________________________________________ Date:___________     Time:__________    [facility] Departments:    ___[place]: [phone]  ___[facility]: [phone]  ___[place]: [phone] ___[facility]: [phone]  ___[facility]: [phone]  ___[facility]: [phone]  ___[phone]  ___[facility]: [phone]  ___[phone]

## 2021-07-12 NOTE — TUMOR CONFERENCE
Tumor Conference Information  Tumor Conference: Brain  Specialties Present: Medical oncology, Pathology, Radiology, Radiation oncology, Surgery  Patient Status: A current patient, For progression  Pathology: Not Discussed  Treatment to Date: Surgical intervention(s), Chemoradiation, Adjuvant chemotherapy, Palliative chemotherapy, Palliative radiation, Biopsy  Clinical Trial Eligibility: Not discussed  Recommended Plan: Hospice/palliative care (Comment: reviewed imaging - disease progression seen; patient not doing well clinically, plan is to refer patient to hospice)  Did the review exceed 30 minutes?: did not           Documentation / Disclaimer Cancer Tumor Board Note  Cancer tumor board recommendations do not override what is determined to be reasonable care and treatment, which is dependent on the circumstances of a patient's case; the patient's medical, social, and personal concerns; and the clinical judgment of the oncologist [physician].

## 2021-07-12 NOTE — NURSING NOTE
"Oncology Rooming Note    July 12, 2021 8:53 AM   Erasto Maher is a 56 year old male who presents for:    Chief Complaint   Patient presents with     Blood Draw     Vitals, blood drawn of PIV that was already in place. Pt checked into appt     Initial Vitals: BP (!) 136/93   Pulse 76   Temp 97.5  F (36.4  C) (Oral)   Resp 16   Wt 75.2 kg (165 lb 12.8 oz)   SpO2 98%   BMI 23.79 kg/m   Estimated body mass index is 23.79 kg/m  as calculated from the following:    Height as of 6/17/21: 1.778 m (5' 10\").    Weight as of this encounter: 75.2 kg (165 lb 12.8 oz). Body surface area is 1.93 meters squared.  No Pain (1) Comment: Data Unavailable   No LMP for male patient.  Allergies reviewed: Yes  Medications reviewed: Yes    Medications: MEDICATION REFILLS NEEDED TODAY. Provider was notified. Patient is requesting a refill on Keppra 750 mg tablet (1500 mg total) and Dexamethazone 2 mg tablet (4 mg total).     Pharmacy name entered into [employer]:    [facility] - [place], [place] - [address] [id number] - [place], [place] - [address] [facility] [address]  [facility] - [place], [place] - [address] [facility] - [place], [place] - [address] [facility]    Clinical concerns: Since being hospitalized patient has taken 2 steps backwards with movement.  Dr Avila  was notified.      Ana Maria Guerra            "

## 2021-07-13 NOTE — TELEPHONE ENCOUNTER
Refill of dexamethasone was sent on 7/12 for 6 mg of dexamethasone daily with breakfast.   Judith [facility] will not [facility] the new prescription because they state it will be to soon.  [facility] number [phone].   [facility]. That this is a new dose so they should be able to  the new prescription. Pharmacist will call the insurance company to over ride the refill date.   Call placed to Judith that I had [facility] to [facility] and that [facility] going to call the insurance company to have the refill date over ridden.   Please let us know if you need anything else from us regarding this prescription.

## 2021-08-03 NOTE — TELEPHONE ENCOUNTER
Called and spoke with Judith.    Aristeo is declining. Not able to walk. Voice is soft. Eating is becoming difficult and he is choking with medication.     We discussed stopping lisinopril. The plan will be to stop lisinopril and when the Hospice RN checks in again next Tuesday, Judith will record his BP. Aristeo's BP has been running in 120/70's. Myself or the RNCC will check in with Judith in 1 week to see if Aristeo is doing well off this medication.     Additionally, Judith has hired full-time help which is a big asset in caring for Aristeo plus allowing her to continue to work, etc.     Judith thanked me for the call.     Ericka Avila MD  Neuro-oncology  8/3/2021

## 2021-08-04 NOTE — TELEPHONE ENCOUNTER
LTD forms received via fax from [employer].      Forms to be completed and put in folder for provider to approve.    Fax #:  [phone]  Policy # [id number]      Christine Angeles MA

## 2021-08-05 NOTE — TELEPHONE ENCOUNTER
LTD forms filled out to the best of this writers ability and put in providers folder for review and signature.      Saray Go CMA ([facility])

## 2021-08-10 NOTE — TELEPHONE ENCOUNTER
LTD paperwork completed, checked for accuracy, signed and faxed to [employer] @ [phone]. A copy was made, sent to scanning and original mailed to patient at home address.    Successful transmission verified in Right Fax.      Christine Angeles MA

## 2021-08-16 NOTE — TELEPHONE ENCOUNTER
Dr. Avila's visit notes faxed to [employer] [phone]. Fax confirmation received.    Benita Street, RN, BSN  Neuro-Oncology Care Coordinator  [facility]

## 2022-10-13 NOTE — PLAN OF CARE
Status: Increased nausea and headache. HX: GBM  Vitals: VSS  Neuros: A/Ox4. Generalized weakness.   IV: PIV infusing NS @75 ml/hr  Diet: Regular  Bowel status: No BM   : Voiding spontaneously  Skin: WDL  Pain: No c/o pain or nausea this shuift  Activity: Up with 1, GB   Social: Wife updated over phone. Neurosurgery team updated wife over phone.   Plan: Continue to follow POC      Location #1: scalp Total Pulses (Will Not Render If 0): 0 Dose Setting #1 (Mj/Cm2): 331 Session Time: 03:28 Treatment Number: 6 Detail Level: Generalized Post-Care Instructions: I reviewed with the patient in detail post-care instructions. Patient should stay away from the sun and wear sun protection until treated areas are fully healed. Total Square Area In Cm2 (Required For Proper Billing): 36 Treatment Time: 00:02 % Increase/Decrease From Last Treatment: 5% increase Consent: Written consent obtained, risks reviewed including but not limited to crusting, scabbing, blistering, scarring, darker or lighter pigmentary change, incidental hair removal, bruising, and/or incomplete removal. Total Energy In Joules: 11.92

## 2023-01-16 NOTE — PROGRESS NOTES
"Erasto Maher is a 55 year old male who is being evaluated via a billable video visit.      The patient has been notified of following:     \"This video visit will be conducted via a call between you and your physician/provider. We have found that certain health care needs can be provided without the need for an in-person physical exam.  This service lets us provide the care you need with a video conversation.  If a prescription is necessary we can send it directly to your pharmacy.  If lab work is needed we can place an order for that and you can then stop by our lab to have the test done at a later time.    Video visits are billed at different rates depending on your insurance coverage.  Please reach out to your insurance provider with any questions.    If during the course of the call the physician/provider feels a video visit is not appropriate, you will not be charged for this service.\"    Patient has given verbal consent for Video visit? Yes    How would you like to obtain your AVS? Kimberlyhar    Patient would like the video invitation sent by: Text to cell phone: [phone]     I have reviewed and updated the patient's allergies and medication list.    Concerns:  Patient has no new concerns.      Refills: N/A      DUSTY Bagley          Video Start Time: 10:55 AM    Additional provider notes: Please see below.     _________________________________________________________________    NEURO-ONCOLOGY VISIT  Apr 20, 2020    CHIEF COMPLAINT: Mr. 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 He completed chemoradiotherapy on 8/30/2019 and was managed on a clinical trial receiving atezolizumab (anti-PDL1) plus adjuvant temozolomide. He last received an immunotherapy treatment on 3/16/20 when imaging on 3/28/20 showed progression of disease. He underwent " Teaching / education initiated regarding perioperative experience, expectations, and pain management during stay. Patient verbalized understanding. a repeat craniotomy for tumor resection with Dr. Chan on 4/9/2020 +/- placement of GammaTiles.     I had a virtual visit with Aristeo and Judith (wife) plus their daughter today.       HISTORY OF PRESENT ILLNESS  -Aristeo states that he is doing well today. Recovered well from surgery. Denies any wound healing concerns.  -He notes mild continued issues with proprioception/ spatial awareness with worsening coordination and dexterity, however, this also improved since surgery. He is back to working out routinely.   -Off dexamethasone, tolerated taper.    -No recurrent seizure events.   -Mood is good. Started Lexapro and feels his energy is better.     REVIEW OF SYSTEMS  A comprehensive ROS negative except as in HPI.      MEDICATIONS   Current Outpatient Medications   Medication Sig Dispense Refill     acetaZOLAMIDE (DIAMOX) 250 MG tablet Take 250 mg by mouth 2 times daily as needed Taken 1-2 days before ascent and continue until descent. (for skiing)       escitalopram (LEXAPRO) 10 MG tablet Take 0.5 tablets (5 mg) by mouth daily       fluticasone (FLONASE) 50 MCG/ACT nasal spray Spray 1 spray into both nostrils nightly as needed        levETIRAcetam (KEPPRA) 1000 MG tablet Take 1,000 mg by mouth 2 times daily        levothyroxine (SYNTHROID/LEVOTHROID) 125 MCG tablet Take 125 mcg by mouth daily       loratadine (CLARITIN) 10 MG tablet Take 10 mg by mouth nightly as needed        melatonin 5 MG tablet Take 5 mg by mouth nightly as needed        olopatadine (PATANOL) 0.1 % ophthalmic solution Place 1-2 drops into both eyes daily as needed        oxyCODONE (ROXICODONE) 5 MG tablet Take 1-2 tablets (5-10 mg) by mouth every 3 hours as needed 25 tablet 0     pantoprazole (PROTONIX) 40 MG EC tablet Take 1 tablet (40 mg) by mouth At Bedtime for 28 days 28 tablet 0     Psyllium 500 MG CAPS Take 4 capsules by mouth At Bedtime       senna-docusate (SENOKOT-S/PERICOLACE) 8.6-50 MG tablet Take 1 tablet by mouth 2 times daily 30 tablet  0     DRUG ALLERGIES   Allergies   Allergen Reactions     No Clinical Screening - See Comments Rash     Cats and dogs         ONCOLOGIC HISTORY  -PRESENTATION: New onset seizures; complex partial event with speech arrest and altered mental status lasting several minutes. Later had secondary generalization. Started Keppra.   -MR brain imaging with a ring enhancing lesion in the right parietal lobe.    -6/27/2019 SURGERY: Right temporal craniotomy for mass resection, gross total resection by Dr. Tam Barreto.  PATHOLOGY: Glioblastoma; IDH1 R132H wildtype, MGMT promoter unmethylated  -7/3/2019 CLINICAL TRIAL: Evaluated by Dr. Wilmer Mckenna at [facility], consented for clinical trial 2016-0867 (Standard of Care plus atezolizumab).  -7/22 - 8/30/2019 CHEMORADS: 60cGy with concurrent temozolomide 75mg/m2/day (145mg) plus atezolizumab Q2 weeks on clinical trial 2016-0867.  -9/6/2019 NEURO-ONC: Evaluated at the [facility].   -9/25/2019 - 3/16/2020 CHEMO: Adjuvant chemotherapy; temozolomide + atezolizumab 840 mg IV on clinical trial 2016-0867.  -4/6/2020 NEURO-ONC: Communicated with Dr. Borrero. Plan for surgery and next steps pending pathology results.   -4/9/2020 SURGERY + RADS: Repeat craniotomy for surgical debulking plus placement of GammaTiles.  -4/20/2020 NEURO-ONC/ CHEMO: Starting Lomustine 3 weeks post-op. Consented for next generation sequencing and sending most recent tumor sample.      SOCIAL HISTORY   Tobacco use: Never smoker.   Alcohol use: Social, infrequent.   Drug use: Denies marijuana use.  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   .       PHYSICAL EXAMINATION  -Generally well appearing.  -Respiratory: No audible wheezing.   -Skin: No facial rashes. Healing head incision.  -Psychiatric: Normal mood and affect. Pleasant, talkative.  -Neurologic:   MENTAL STATUS:     Alert, oriented to date.    Recall: Intact.    Speech fluent.    Comprehension intact to multi-step commands.     CRANIAL NERVES:      Pupils are equal, round, reactive to light.     Extraocular movements full, patient denies diplopia.     Symmetric facial movements.   Hearing intact.   With normal phonation, no dysfunction of the palate or tongue.   MOTOR:    Antigravity in arms.     The rest of a comprehensive physical examination is deferred due to PHE (public health emergency) video visit restrictions.       MEDICAL RECORDS  Obtained and personally reviewed all available outside medical records in addition to reviewing any records available in our electronic system.     LABS  Personally reviewed all available lab results.     IMAGING  Personally reviewed pre- and post-operative MR brain imaging from earlier this month. To my eye, there was a gross total resection with GammaTile placement.    Imaging results discussed with Aristeo and Ashley.     Imaging and case discussed at Brain Tumor Conference today.       IMPRESSION  Clinic time was spent discussing in detail the nature of this tumor, providing emotional support, answering questions, and providing local resources.     Post-operative imaging demonstrated gross total resection. With pathology being consistent with tumor, GammaTiles were placed. As there is some evidence to support GammaTiles disrupting the blood brain barrier, will look to start lomustine 3 weeks post-op.     Also received consent today to send recent pathology for next generation sequencing to hopefully identify targeted therapy options.     Finally, discussed the use of Optune and Aristeo is concerned about limiting quality of life.      PROBLEM LIST  Glioblastoma, MGMT unmethylated and IDH1 wildtype by IHC  Seizure  Chemotherapy-associated constipation  Headaches  Apraxia    PLAN  -CANCER-DIRECTED THERAPY-  -As above; Lomustine cycle 1 due to start on 4/30.  -Prior authorization obtained.   -Pharmacy to call with chemotherapy teaching.  -Supportive medications: Zofran; refilled today.  -Labs the week of 4/27 and week of  5/25.    -Repeat MR brain imaging in 2 months.   -Sending pathology for next generation sequencing.   -Not interested in [employer] [place] at this time.   -Looking to connect with the [facility] for clinical trial options.     -STEROIDS-  -Off dexamethasone.    -SEIZURE MANAGEMENT-  -Given history of seizures, will continue current antiepileptic regimen of Keppra for the foreseeable future.    -Quality of life/ MOOD/ FATIGUE-  -Denies any mood issues.   -Continue to monitor mood as untreated/ undertreated depression can worsen fatigue, dysorexia, and quality of life.    Return to clinic on 6/8/2020 + imaging + labs.     Ericka Avila MD  Neuro-oncology       Video-Visit Details    Type of service:  Video Visit    Video End Time (time video stopped): 11:45 AM    Originating Location (pt. Location): Home    Distant Location (provider location):  [facility]     Mode of Communication:  Video Conference via [employer]      Ericka Avila MD

## 2024-03-05 NOTE — ANESTHESIA CARE TRANSFER NOTE
Patient: Erasto Maher    Procedure(s):  INTRAOPERATIVE MAGNETIC RESONANCE IMAGING [place] LASER ABLATION, WITH OPTICAL TRACKING SYSTEM Biopsy    Diagnosis: Glioblastoma (H) [C71.9]  Diagnosis Additional Information: No value filed.    Anesthesia Type:   General     Note:    Oropharynx: spontaneously breathing  Level of Consciousness: drowsy  Oxygen Supplementation: face mask    Independent Airway: airway patency satisfactory and stable  Dentition: dentition unchanged  Vital Signs Stable: post-procedure vital signs reviewed and stable  Report to RN Given: handoff report given  Patient transferred to: PACU    Handoff Report: Identifed the Patient, Identified the Reponsible Provider, Reviewed the pertinent medical history, Discussed the surgical course, Reviewed Intra-OP anesthesia mangement and issues during anesthesia, Set expectations for post-procedure period and Allowed opportunity for questions and acknowledgement of understanding      Vitals: (Last set prior to Anesthesia Care Transfer)  CRNA VITALS  4/21/2021 1309 - 4/21/2021 1348      4/21/2021             Pulse:  87    ART BP:  139/73    SpO2:  98 %    Resp Rate (observed):  16        Electronically Signed By: RUPA Haddad CRNA  April 21, 2021  1:48 PM  
90

## 2025-03-17 NOTE — LETTER
2020         RE: Erasto Maher  [address]  [facility] [address]        Dear Colleague,    Thank you for referring your patient, Erasto Maher, to the [facility] RADIATION ONCOLOGY GAMMA KNIFE. Please see a copy of my visit note below.    Name: Erasto Maher.  : 1965 Medical Record #: [id number]  Diagnosis: C71.3 Malignant neoplasm of parietal lobe  Date of Treatment: 2020  Referring Physicians: Marquise Chan    GAMMA KNIFE DAILY TREATMENT PROCEDURE NOTE     Fraction 5 of 5  Treatment Summary:  Treatment Site Dose Modality From To Elapsed Days  Fx.   right parietal    2,500 cGy [place] 60 12/10/2020 2020 6   5   right temporal    2,500 cGy [place] 60 12/10/2020 2020 6   5   right lesion    2,500 cGy [place] 60 12/10/2020 2020 6   5             DESCRIPTION OF PROCEDURE:  On 2020 the patient was brought to the [place] Gamma Knife IconTM suite at [facility] and treated with fractionated stereotactic radiotherapy.     The [employer] Gamma Knife IconTM Plan software was used to create a highly conformal dose distribution using the number and size collimators detailed above.     TREATMENT:    The patient was brought to the Gamma Knife suite. A timeout was performed to confirm the correct patient and correct procedure.    Patient was identified by 2 methods.  Site was verified.    The mask was placed and a cone beam CT was done and fused to the previously approved plan.        The physicist and I evaluated the fusion and confirmed the target coverage after auto-registration.      The treatment was delivered using the [employer] Gamma Knife IconTM without complication.      The mask was removed and the patient was discharged home in stable condition.    The patient tolerated the treatment well and had no complications.    Approved by:  Emperatriz Knight MD        Again, thank you for allowing me to  participate in the care of your patient.        Sincerely,        Emperatriz Knight MD     normal...

## (undated) DEVICE — STRAP UNIVERSAL POSITIONING 2-PIECE 4X47X76" 91-287

## (undated) DEVICE — SPONGE SURGIFOAM 100 1974

## (undated) DEVICE — PREP SKIN SCRUB TRAY 4461A

## (undated) DEVICE — ESU CORD BIPOLAR GREEN 10-4000

## (undated) DEVICE — ESU CORD BIPOLAR AND IRR TUBING AESCULAP US355

## (undated) DEVICE — ESU FCP CODMAN 8"X1.0MM SLIM TIP 9008100SL

## (undated) DEVICE — ADH SKIN CLOSURE PREMIERPRO EXOFIN 1.0ML 3470

## (undated) DEVICE — NDL ANGIOCATH 14GA 1.25" 4048

## (undated) DEVICE — NDL SPINAL 22GA 3.5" QUINCKE 405181

## (undated) DEVICE — NEUROBLATE OPTIC FULLFIRE LASER PROBE

## (undated) DEVICE — LINEN TOWEL PACK X5 5464

## (undated) DEVICE — NDL BLUNT 17GA 1.5" 8881202330

## (undated) DEVICE — SOL RINGERS LACTATED 1000ML BAG 07953-09

## (undated) DEVICE — LINEN TOWEL PACK X30 5481

## (undated) DEVICE — PACK CRANIOTOMY

## (undated) DEVICE — CLIP RANEY

## (undated) DEVICE — ESU GROUND PAD ADULT W/CORD E7507

## (undated) DEVICE — SOL NACL 0.9% IRRIG 1000ML BOTTLE 2F7124

## (undated) DEVICE — SPONGE COTTONOID 1/2X1/2" 20-04S

## (undated) DEVICE — DRAPE POUCH IRR 1016

## (undated) DEVICE — Device

## (undated) DEVICE — PREP CHLORAPREP CLEAR 3ML 260400

## (undated) DEVICE — RX SURGIFLO HEMOSTATIC MATRIX W/THROMBIN 8ML 2994

## (undated) DEVICE — LINEN TOWEL PACK X6 WHITE 5487

## (undated) DEVICE — DRAPE MAYO STAND 23X54 8337

## (undated) DEVICE — PREP POVIDONE IODINE SOLUTION 10% 4OZ

## (undated) DEVICE — SU MONOCRYL 3-0 PS-1 27" Y936H

## (undated) DEVICE — PREP POVIDONE IODINE SCRUB 7.5% 4OZ APL82212

## (undated) DEVICE — CATH TRAY FOLEY SURESTEP 16FR W/URINE MTR STATLK LF A303416A

## (undated) DEVICE — DRSG TEGADERM 4X4 3/4" 1626W

## (undated) DEVICE — COVER CAMERA VIDEO LASER 9X96" 04-CC227

## (undated) DEVICE — RX BACITRACIN OINTMENT 0.9G 1/32OZ CUR001109

## (undated) DEVICE — DRAPE SHEET REV FOLD 3/4 9349

## (undated) DEVICE — DRSG KERLIX 4 1/2"X4YDS ROLL 6715

## (undated) DEVICE — PERFORATOR 14MM CODMAN

## (undated) DEVICE — DRAPE SHEET HALF 40X60" 9358

## (undated) DEVICE — GLOVE PROTEXIS BLUE W/NEU-THERA 7.5  2D73EB75

## (undated) DEVICE — LABEL MEDICATION SYSTEM 3303-P

## (undated) DEVICE — DRAPE POUCH INSTRUMENT 1018

## (undated) DEVICE — CONNECTOR STOPCOCK 3 WAY MALE LL 2C6204

## (undated) DEVICE — TUBING SUCTION 10'X3/16" N510

## (undated) DEVICE — SPONGE COTTONOID 1X3" 20-10S

## (undated) DEVICE — NDL BX BRAIN STEALTH KIT PASSIVE  9733068

## (undated) DEVICE — CATH TRAY FOLEY SURESTEP 16FR W/URNE MTR STLK LATEX A303316A

## (undated) DEVICE — DECANTER BAG 2002S

## (undated) DEVICE — DRAPE C-ARM W/STRAPS 42X72" 07-CA104

## (undated) DEVICE — MARKER SPHERES PASSIVE MEDT PACK 5 8801075

## (undated) DEVICE — PACK GOWN 3/PK DISP XL SBA32GPFCB

## (undated) DEVICE — SPONGE COTTONOID 1/2X1 1/2" 20-06S

## (undated) DEVICE — SYR 30ML LL W/O NDL 302832

## (undated) DEVICE — DRAPE MICROSCOPE LEICA 54X150" AR8033650

## (undated) DEVICE — PREP DURAPREP 26ML APL 8630

## (undated) DEVICE — WIPES FOLEY CARE SURESTEP PROVON DFC100

## (undated) DEVICE — NDL KIT MRI BIOPSY 28CM MDBM-08-28X

## (undated) DEVICE — SYR 01ML 27GA 0.5" NDL TBC 309623

## (undated) DEVICE — SU VICRYL 2-0 CT-2 CR 8X18" J726D

## (undated) DEVICE — DRSG GAUZE 4X4" TRAY 6939

## (undated) DEVICE — SU NUROLON 4-0 TF CR 8X18" C584D

## (undated) DEVICE — STPL SKIN 35W ROTATING HEAD PRW35

## (undated) DEVICE — MR NEURO PATIENT DRAPE

## (undated) DEVICE — SURGICEL HEMOSTAT 4X8" 1952

## (undated) DEVICE — MR SCANNER BORE DRAPE WITH EXTENSION

## (undated) DEVICE — SU ETHILON 3-0 PS-1 18" 1663H

## (undated) DEVICE — PACK NEURO MINOR UMMC SNE32MNMU4

## (undated) DEVICE — TARGETING UNIT COVER

## (undated) DEVICE — DRAPE U SPLIT 74X120" 29440

## (undated) DEVICE — NEUROBLATE ROBOTIC PROBE DRIVER

## (undated) DEVICE — SPONGE COTTONOID 1/4X1/4" 20-01S

## (undated) DEVICE — SYR 03ML LL W/O NDL 309657

## (undated) DEVICE — PIN SKULL MAYFIELD ADULT TITANIUM 3/PK A1120

## (undated) DEVICE — SUCTION MANIFOLD DORNOCH ULTRA CART UL-CL500

## (undated) DEVICE — ADH FLOSEAL W/HUMAN THROMBIN 5ML 1501825

## (undated) DEVICE — SOL WATER IRRIG 1000ML BOTTLE 2F7114

## (undated) DEVICE — DRAPE IOBAN INCISE 13X13" 6640EZ

## (undated) DEVICE — PAD CHUX UNDERPAD 23X24" 7136

## (undated) DEVICE — PROTECTOR ARM ONE-STEP TRENDELENBURG 40418

## (undated) DEVICE — GLOVE PROTEXIS MICRO 7.0  2D73PM70

## (undated) RX ORDER — ONDANSETRON 2 MG/ML
INJECTION INTRAMUSCULAR; INTRAVENOUS
Status: DISPENSED
Start: 2021-01-01

## (undated) RX ORDER — AMOXICILLIN 250 MG
CAPSULE ORAL
Status: DISPENSED
Start: 2021-01-01

## (undated) RX ORDER — LABETALOL HYDROCHLORIDE 5 MG/ML
INJECTION, SOLUTION INTRAVENOUS
Status: DISPENSED
Start: 2021-01-01

## (undated) RX ORDER — FENTANYL CITRATE 50 UG/ML
INJECTION, SOLUTION INTRAMUSCULAR; INTRAVENOUS
Status: DISPENSED
Start: 2021-01-01

## (undated) RX ORDER — ACETAMINOPHEN 325 MG/1
TABLET ORAL
Status: DISPENSED
Start: 2021-01-01

## (undated) RX ORDER — DEXAMETHASONE SODIUM PHOSPHATE 4 MG/ML
INJECTION, SOLUTION INTRA-ARTICULAR; INTRALESIONAL; INTRAMUSCULAR; INTRAVENOUS; SOFT TISSUE
Status: DISPENSED
Start: 2021-01-01

## (undated) RX ORDER — LIDOCAINE HYDROCHLORIDE 20 MG/ML
INJECTION, SOLUTION EPIDURAL; INFILTRATION; INTRACAUDAL; PERINEURAL
Status: DISPENSED
Start: 2021-01-01

## (undated) RX ORDER — FENTANYL CITRATE 50 UG/ML
INJECTION, SOLUTION INTRAMUSCULAR; INTRAVENOUS
Status: DISPENSED
Start: 2020-04-09

## (undated) RX ORDER — SODIUM CHLORIDE, SODIUM LACTATE, POTASSIUM CHLORIDE, CALCIUM CHLORIDE 600; 310; 30; 20 MG/100ML; MG/100ML; MG/100ML; MG/100ML
INJECTION, SOLUTION INTRAVENOUS
Status: DISPENSED
Start: 2021-01-01

## (undated) RX ORDER — MANNITOL 20 G/100ML
INJECTION, SOLUTION INTRAVENOUS
Status: DISPENSED
Start: 2021-01-01

## (undated) RX ORDER — ESMOLOL HYDROCHLORIDE 10 MG/ML
INJECTION INTRAVENOUS
Status: DISPENSED
Start: 2020-04-09

## (undated) RX ORDER — SODIUM CHLORIDE 9 MG/ML
INJECTION, SOLUTION INTRAVENOUS
Status: DISPENSED
Start: 2021-01-01

## (undated) RX ORDER — HYDROMORPHONE HYDROCHLORIDE 1 MG/ML
INJECTION, SOLUTION INTRAMUSCULAR; INTRAVENOUS; SUBCUTANEOUS
Status: DISPENSED
Start: 2020-04-09

## (undated) RX ORDER — ONDANSETRON 2 MG/ML
INJECTION INTRAMUSCULAR; INTRAVENOUS
Status: DISPENSED
Start: 2020-04-09

## (undated) RX ORDER — ACETAMINOPHEN 325 MG/1
TABLET ORAL
Status: DISPENSED
Start: 2020-04-09

## (undated) RX ORDER — SODIUM CHLORIDE, SODIUM LACTATE, POTASSIUM CHLORIDE, CALCIUM CHLORIDE 600; 310; 30; 20 MG/100ML; MG/100ML; MG/100ML; MG/100ML
INJECTION, SOLUTION INTRAVENOUS
Status: DISPENSED
Start: 2020-04-09

## (undated) RX ORDER — SCOLOPAMINE TRANSDERMAL SYSTEM 1 MG/1
PATCH, EXTENDED RELEASE TRANSDERMAL
Status: DISPENSED
Start: 2021-01-01

## (undated) RX ORDER — MANNITOL 20 G/100ML
INJECTION, SOLUTION INTRAVENOUS
Status: DISPENSED
Start: 2020-04-09

## (undated) RX ORDER — LIDOCAINE HYDROCHLORIDE AND EPINEPHRINE 10; 10 MG/ML; UG/ML
INJECTION, SOLUTION INFILTRATION; PERINEURAL
Status: DISPENSED
Start: 2021-01-01

## (undated) RX ORDER — PHENYLEPHRINE HCL IN 0.9% NACL 1 MG/10 ML
SYRINGE (ML) INTRAVENOUS
Status: DISPENSED
Start: 2020-04-09

## (undated) RX ORDER — EPHEDRINE SULFATE 50 MG/ML
INJECTION, SOLUTION INTRAMUSCULAR; INTRAVENOUS; SUBCUTANEOUS
Status: DISPENSED
Start: 2021-01-01

## (undated) RX ORDER — FENTANYL CITRATE-0.9 % NACL/PF 10 MCG/ML
PLASTIC BAG, INJECTION (ML) INTRAVENOUS
Status: DISPENSED
Start: 2021-01-01

## (undated) RX ORDER — BACITRACIN 50000 [IU]/1
INJECTION, POWDER, FOR SOLUTION INTRAMUSCULAR
Status: DISPENSED
Start: 2020-04-09

## (undated) RX ORDER — PROPOFOL 10 MG/ML
INJECTION, EMULSION INTRAVENOUS
Status: DISPENSED
Start: 2021-01-01

## (undated) RX ORDER — ESMOLOL HYDROCHLORIDE 10 MG/ML
INJECTION INTRAVENOUS
Status: DISPENSED
Start: 2021-01-01

## (undated) RX ORDER — GLYCOPYRROLATE 0.2 MG/ML
INJECTION, SOLUTION INTRAMUSCULAR; INTRAVENOUS
Status: DISPENSED
Start: 2021-01-01

## (undated) RX ORDER — DEXAMETHASONE SODIUM PHOSPHATE 4 MG/ML
INJECTION, SOLUTION INTRA-ARTICULAR; INTRALESIONAL; INTRAMUSCULAR; INTRAVENOUS; SOFT TISSUE
Status: DISPENSED
Start: 2020-04-09

## (undated) RX ORDER — SODIUM CHLORIDE 9 MG/ML
INJECTION, SOLUTION INTRAVENOUS
Status: DISPENSED
Start: 2020-04-09

## (undated) RX ORDER — MAGNESIUM SULFATE HEPTAHYDRATE 40 MG/ML
INJECTION, SOLUTION INTRAVENOUS
Status: DISPENSED
Start: 2021-01-01

## (undated) RX ORDER — HYDRALAZINE HYDROCHLORIDE 20 MG/ML
INJECTION INTRAMUSCULAR; INTRAVENOUS
Status: DISPENSED
Start: 2021-01-01

## (undated) RX ORDER — PROPOFOL 10 MG/ML
INJECTION, EMULSION INTRAVENOUS
Status: DISPENSED
Start: 2020-04-09

## (undated) RX ORDER — EPHEDRINE SULFATE 50 MG/ML
INJECTION, SOLUTION INTRAMUSCULAR; INTRAVENOUS; SUBCUTANEOUS
Status: DISPENSED
Start: 2020-04-09